# Patient Record
Sex: MALE | Race: WHITE | NOT HISPANIC OR LATINO | Employment: FULL TIME | URBAN - METROPOLITAN AREA
[De-identification: names, ages, dates, MRNs, and addresses within clinical notes are randomized per-mention and may not be internally consistent; named-entity substitution may affect disease eponyms.]

---

## 2017-01-27 ENCOUNTER — LAB VISIT (OUTPATIENT)
Dept: LAB | Facility: HOSPITAL | Age: 77
End: 2017-01-27
Attending: INTERNAL MEDICINE
Payer: COMMERCIAL

## 2017-01-27 ENCOUNTER — OFFICE VISIT (OUTPATIENT)
Dept: HEMATOLOGY/ONCOLOGY | Facility: CLINIC | Age: 77
End: 2017-01-27
Payer: COMMERCIAL

## 2017-01-27 VITALS
SYSTOLIC BLOOD PRESSURE: 114 MMHG | BODY MASS INDEX: 32.94 KG/M2 | WEIGHT: 217.38 LBS | HEIGHT: 68 IN | TEMPERATURE: 98 F | DIASTOLIC BLOOD PRESSURE: 62 MMHG | OXYGEN SATURATION: 98 % | HEART RATE: 66 BPM

## 2017-01-27 DIAGNOSIS — R19.00 PELVIC MASS IN MALE: ICD-10-CM

## 2017-01-27 DIAGNOSIS — D69.6 THROMBOCYTOPENIA: ICD-10-CM

## 2017-01-27 DIAGNOSIS — E85.9 AMYLOIDOSIS, UNSPECIFIED TYPE: Primary | ICD-10-CM

## 2017-01-27 DIAGNOSIS — E85.9 AMYLOIDOSIS: ICD-10-CM

## 2017-01-27 LAB
ALBUMIN SERPL BCP-MCNC: 4.3 G/DL
ALP SERPL-CCNC: 68 U/L
ALT SERPL W/O P-5'-P-CCNC: 34 U/L
ANION GAP SERPL CALC-SCNC: 13 MMOL/L
AST SERPL-CCNC: 28 U/L
BASOPHILS # BLD AUTO: 0.03 K/UL
BASOPHILS NFR BLD: 0.7 %
BILIRUB SERPL-MCNC: 2.2 MG/DL
BUN SERPL-MCNC: 16 MG/DL
CALCIUM SERPL-MCNC: 9.9 MG/DL
CHLORIDE SERPL-SCNC: 103 MMOL/L
CO2 SERPL-SCNC: 26 MMOL/L
CREAT SERPL-MCNC: 1 MG/DL
DIFFERENTIAL METHOD: ABNORMAL
EOSINOPHIL # BLD AUTO: 0.3 K/UL
EOSINOPHIL NFR BLD: 7.2 %
ERYTHROCYTE [DISTWIDTH] IN BLOOD BY AUTOMATED COUNT: 13.9 %
EST. GFR  (AFRICAN AMERICAN): >60 ML/MIN/1.73 M^2
EST. GFR  (NON AFRICAN AMERICAN): >60 ML/MIN/1.73 M^2
GLUCOSE SERPL-MCNC: 142 MG/DL
HCT VFR BLD AUTO: 43.2 %
HGB BLD-MCNC: 14.6 G/DL
LYMPHOCYTES # BLD AUTO: 0.8 K/UL
LYMPHOCYTES NFR BLD: 17.8 %
MCH RBC QN AUTO: 32.4 PG
MCHC RBC AUTO-ENTMCNC: 33.8 %
MCV RBC AUTO: 96 FL
MONOCYTES # BLD AUTO: 0.7 K/UL
MONOCYTES NFR BLD: 16.2 %
NEUTROPHILS # BLD AUTO: 2.7 K/UL
NEUTROPHILS NFR BLD: 58.1 %
PLATELET # BLD AUTO: 125 K/UL
PMV BLD AUTO: 9.1 FL
POTASSIUM SERPL-SCNC: 5 MMOL/L
PROT SERPL-MCNC: 7.1 G/DL
RBC # BLD AUTO: 4.5 M/UL
SODIUM SERPL-SCNC: 142 MMOL/L
WBC # BLD AUTO: 4.56 K/UL

## 2017-01-27 PROCEDURE — 1157F ADVNC CARE PLAN IN RCRD: CPT | Mod: S$GLB,,, | Performed by: INTERNAL MEDICINE

## 2017-01-27 PROCEDURE — 85025 COMPLETE CBC W/AUTO DIFF WBC: CPT | Mod: PO

## 2017-01-27 PROCEDURE — 1159F MED LIST DOCD IN RCRD: CPT | Mod: S$GLB,,, | Performed by: INTERNAL MEDICINE

## 2017-01-27 PROCEDURE — 86334 IMMUNOFIX E-PHORESIS SERUM: CPT

## 2017-01-27 PROCEDURE — 99214 OFFICE O/P EST MOD 30 MIN: CPT | Mod: S$GLB,,, | Performed by: INTERNAL MEDICINE

## 2017-01-27 PROCEDURE — 84165 PROTEIN E-PHORESIS SERUM: CPT

## 2017-01-27 PROCEDURE — 3074F SYST BP LT 130 MM HG: CPT | Mod: S$GLB,,, | Performed by: INTERNAL MEDICINE

## 2017-01-27 PROCEDURE — 36415 COLL VENOUS BLD VENIPUNCTURE: CPT | Mod: PO

## 2017-01-27 PROCEDURE — 80053 COMPREHEN METABOLIC PANEL: CPT | Mod: PO

## 2017-01-27 PROCEDURE — 1160F RVW MEDS BY RX/DR IN RCRD: CPT | Mod: S$GLB,,, | Performed by: INTERNAL MEDICINE

## 2017-01-27 PROCEDURE — 84165 PROTEIN E-PHORESIS SERUM: CPT | Mod: 26,,, | Performed by: PATHOLOGY

## 2017-01-27 PROCEDURE — 1126F AMNT PAIN NOTED NONE PRSNT: CPT | Mod: S$GLB,,, | Performed by: INTERNAL MEDICINE

## 2017-01-27 PROCEDURE — 86334 IMMUNOFIX E-PHORESIS SERUM: CPT | Mod: 26,,, | Performed by: PATHOLOGY

## 2017-01-27 PROCEDURE — 3078F DIAST BP <80 MM HG: CPT | Mod: S$GLB,,, | Performed by: INTERNAL MEDICINE

## 2017-01-27 PROCEDURE — 99999 PR PBB SHADOW E&M-EST. PATIENT-LVL III: CPT | Mod: PBBFAC,,, | Performed by: INTERNAL MEDICINE

## 2017-01-27 PROCEDURE — 83520 IMMUNOASSAY QUANT NOS NONAB: CPT

## 2017-01-27 NOTE — PROGRESS NOTES
Reason for visit: Amyloid tumor  Date of Diagnosis: September 26, 2014    HPI:   The patient is a 76-year-old  male who presents in consultation to the hematology oncology clinic today to discuss further evaluation and management recommendations for amyloid tumor.    I have reviewed all of the patient's relevant clinical history available in the medical record and have utilized this in my evaluation and management recommendations today.  Today the patient reports that overall he feels well and has no specific complaints.  He denies any chest pain or shortness of breath.  He denies any melena, hematochezia, hematemesis, hemoptysis or hematuria.  He denies any nausea, vomiting or abdominal pain.  He denies any bowel or urinary complaints.  The patient has previously been evaluated in the outpatient hematology oncology clinic by Dr. Roselyn Delgado for a chronic history of mild thrombocytopenia.    PAST MEDICAL HISTORY:   1.  Hypertension  2.  Dyslipidemia  3.  Asthma  4.  Obstructive sleep apnea  5.  GERD  6.  BPH  7.  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  Gilbert syndrome  9.  Colon polyps  10. Fatty liver    SURGICAL HISTORY:   1.  Tonsillectomy  2.  [place] tooth extraction    FAMILY HISTORY: His sister had an unknown type of cancer.  His father had cholangiocarcinoma.  He denies any other immediate family members with cancer or bleeding/clotting disorders.    SOCIAL HISTORY: He has never smoked cigarettes.  He drinks about 2 cans of beer every day.  He denies any recreational drug use.  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  He is  and has 3 children.  He lives in [place].    ALLERGIES: [NKDA]    MEDICATIONS: [Medcard has been reviewed and/or reconciled.]    REVIEW OF SYSTEMS:   GENERAL: [No fevers, chills or sweats. No fatigue, weight loss or loss of appetite.]  HEENT: [No blurred vision, tinnitus, nasal discharge, sorethroat or dysphagia.]  HEART: [No chest pain,  palpitations or shortness of breath.]    LUNGS: [No cough, hemoptysis or breathing problems.]  ABDOMEN: [No abdominal pain, nausea, vomiting, diarrhea, constipation or melena.]  GENITOURINARY: [No dysuria, bleeding or malodorous discharge.]  NEURO: [No headache, dizziness or vertigo.]  HEMATOLOGY: [No easy bruising, spontaneous bleeding or blood clots in the past].  MUSCULOSKELETAL: [No arthralgias, myalgias or bone pains.]  SKIN: [No rashes or skin lesions.]  PSYCHIATRY: [No depression or anxiety.]    PHYSICAL EXAMINATION:   VS: Reviewed on nurse's notes.  APPEARANCE: The patient is an obese and well-groomed  male who appears in no acute distress.  HEENT: No scleral icterus. Both external auditory canals clear. No oral ulcers, lesions. Throat clear  HEAD: No sinus tenderness.  NECK: Supple. No palpable lymphadenopathy. Thyroid non-tender, no palpable masses  CHEST: Breath sounds clear bilaterally. No rales. No rhonchi. Unlabored respirations.  CARDIOVASCULAR: Normal S1, S2. Normal rate. Regular rhythm.  ABDOMEN: Bowel sounds normal. No tenderness. No abdominal distention. No hepatomegaly. No splenomegaly.  LYMPHATIC: No palpable supraclavicular, axillary nodes  EXTREMITIES: No clubbing, cyanosis, edema  SKIN: No lesions. No petechiae. No ecchymoses. No induration or nodules  NEUROLOGIC: No focal findings. Alert & Oriented x 3. Mood appropriate to affect    LABS:   Reviewed    IMAGING:  Reviewed    IMPRESSION:  1.  Amyloid tumor  2.  Elevated bilirubin    PLAN:  1.  I had a detailed discussion with the patient today with regard to his current clinical situation.  The patient had a needle biopsy of an incidentally noted pelvic mass in Aug 2014. This was sent to the [facility] for a second opinion.  He has been diagnosed with an amyloid tumor.  We have previously had a detailed discussion about the potentially different types of amyloid and the clinical relevance of each of these types.  We did contact the  [facility] to try to determine if liquid chromatography/tandem mass spectrometry would help to further characterize this.  This detected a peptide profile that includes proteins deposited with amyloid of all types including serum amyloid peak competent, April lipoprotein a 4 and a poor lipoprotein E.  However a specific amyloid type was not identified by this analysis.  So in general it was felt that this finding supported a diagnosis of amyloidosis but the specific amyloid type could not be determined.  Clinical correlation and repeat analysis on another amyloid involved site has been recommended.  All of this was discussed in detail with the patient and all of his questions were answered to his satisfaction.  2.  The patient's SPEP and UPEP results were normal.  Serum free light chains show normal kappa and lambda light chains with abnormal serum free light chain ratio. This is stable and can be followed conservatively at this time. I will follow up with pending labs from today.  3.  CT scan pelvis from Sep 2016 shows stable findings with regard to his pelvic mass. He will continue to follow up with Dr. Chua and is scheduled for follow up pelvic CT in sep 2018.  4.  Follow up with Dr. Dorsey for management of colon polyps.    Follow up in 1 year with labs 1 week before clinic visit. He knows to call sooner for any additional questions or new problems.    Larry Crespo MD

## 2017-01-30 LAB
ALBUMIN SERPL ELPH-MCNC: 4.4 G/DL
ALPHA1 GLOB SERPL ELPH-MCNC: 0.28 G/DL
ALPHA2 GLOB SERPL ELPH-MCNC: 0.84 G/DL
B-GLOBULIN SERPL ELPH-MCNC: 0.75 G/DL
GAMMA GLOB SERPL ELPH-MCNC: 0.63 G/DL
INTERPRETATION SERPL IFE-IMP: NORMAL
KAPPA LC SER QL IA: 1.54 MG/DL
KAPPA LC/LAMBDA SER IA: 2.11
LAMBDA LC SER QL IA: 0.73 MG/DL
PATHOLOGIST INTERPRETATION IFE: NORMAL
PATHOLOGIST INTERPRETATION SPE: NORMAL
PROT SERPL-MCNC: 6.9 G/DL

## 2017-02-03 ENCOUNTER — ANESTHESIA (OUTPATIENT)
Dept: ENDOSCOPY | Facility: HOSPITAL | Age: 77
End: 2017-02-03
Payer: COMMERCIAL

## 2017-02-03 ENCOUNTER — SURGERY (OUTPATIENT)
Age: 77
End: 2017-02-03

## 2017-02-03 ENCOUNTER — ANESTHESIA EVENT (OUTPATIENT)
Dept: ENDOSCOPY | Facility: HOSPITAL | Age: 77
End: 2017-02-03
Payer: COMMERCIAL

## 2017-02-03 PROCEDURE — 63600175 PHARM REV CODE 636 W HCPCS: Performed by: NURSE ANESTHETIST, CERTIFIED REGISTERED

## 2017-02-03 PROCEDURE — 25000003 PHARM REV CODE 250: Performed by: NURSE ANESTHETIST, CERTIFIED REGISTERED

## 2017-02-03 RX ORDER — LIDOCAINE HYDROCHLORIDE 10 MG/ML
INJECTION INFILTRATION; PERINEURAL
Status: DISCONTINUED | OUTPATIENT
Start: 2017-02-03 | End: 2017-02-03

## 2017-02-03 RX ORDER — PROPOFOL 10 MG/ML
VIAL (ML) INTRAVENOUS
Status: DISCONTINUED | OUTPATIENT
Start: 2017-02-03 | End: 2017-02-03

## 2017-02-03 RX ADMIN — PROPOFOL 20 MG: 10 INJECTION, EMULSION INTRAVENOUS at 10:02

## 2017-02-03 RX ADMIN — PROPOFOL 30 MG: 10 INJECTION, EMULSION INTRAVENOUS at 10:02

## 2017-02-03 RX ADMIN — PROPOFOL 50 MG: 10 INJECTION, EMULSION INTRAVENOUS at 10:02

## 2017-02-03 RX ADMIN — LIDOCAINE HYDROCHLORIDE 50 MG: 10 INJECTION, SOLUTION INFILTRATION; PERINEURAL at 10:02

## 2017-02-03 NOTE — TRANSFER OF CARE
"Anesthesia Transfer of Care Note    Patient: Jovan Del Cid Jr.    Procedure(s) Performed: Procedure(s) (LRB):  COLONOSCOPY (N/A)    Patient location: PACU    Anesthesia Type: MAC    Transport from OR: Transported from OR on room air with adequate spontaneous ventilation    Post pain: adequate analgesia    Post assessment: no apparent anesthetic complications and tolerated procedure well    Post vital signs: stable    Level of consciousness: sedated    Nausea/Vomiting: no nausea/vomiting    Complications: none          Last vitals:   Visit Vitals    BP (!) 143/90 (BP Location: Left arm, Patient Position: Lying, BP Method: Automatic)    Pulse 73    Temp 36.6 °C (97.9 °F) (Oral)    Resp 18    Ht 5' 8" (1.727 m)    Wt 96.6 kg (213 lb)    SpO2 99%    BMI 32.39 kg/m2     "

## 2017-02-03 NOTE — ANESTHESIA PREPROCEDURE EVALUATION
02/03/2017  Jovan Del Cid Jr. is a 76 y.o., male.    [facility] Anesthesia Evaluation    I have reviewed the Patient Summary Reports.    I have reviewed the Nursing Notes.   I have reviewed the Medications.     Review of Systems  Anesthesia Hx:  No problems with previous Anesthesia  Denies Family Hx of Anesthesia complications.   Denies Personal Hx of Anesthesia complications.   Social:  Former Smoker, Social Alcohol Use    Hematology/Oncology:     Oncology Normal   Hematology Comments: thrombocytopenia   Cardiovascular:   Hypertension, well controlled Denies MI.   Denies CABG/stent.      hyperlipidemia    Pulmonary:   Denies COPD. Asthma Sleep Apnea pulm nodule  Last used rescue inhaler 5 months ago   Renal/:   Denies Chronic Renal Disease. BPH    Hepatic/GI:   Bowel Prep. Denies GERD. Denies Liver Disease.  Denies Hepatitis. Last prep at 0620   Musculoskeletal:  Musculoskeletal Normal    Neurological:   Denies CVA. Denies Seizures.    Endocrine:  Endocrine Normal        Physical Exam  General:  Obesity    Airway/Jaw/Neck:  Airway Findings: Mouth Opening: Normal Tongue: Normal  General Airway Assessment: Adult  Mallampati: II      Dental:  Dental Findings: In tact   Chest/Lungs:  Chest/Lungs Findings: Clear to auscultation, Normal Respiratory Rate     Heart/Vascular:  Heart Findings: Rate: Normal  Rhythm: Regular Rhythm  Sounds: Normal             Anesthesia Plan  Type of Anesthesia, risks & benefits discussed:  Anesthesia Type:  MAC  Patient's Preference:   Intra-op Monitoring Plan: standard ASA monitors  Intra-op Monitoring Plan Comments:   Post Op Pain Control Plan:   Post Op Pain Control Plan Comments:   Induction:   IV  Beta Blocker:  Patient is not currently on a Beta-Blocker (No further documentation required).       Informed Consent: Patient understands risks and agrees with Anesthesia plan.   Questions answered. Anesthesia consent signed with patient.  ASA Score: 2     Day of Surgery Review of History & Physical: I have interviewed and examined the patient. I have reviewed the patient's H&P dated:  There are no significant changes.  H&P update referred to the surgeon.         Ready For Surgery From Anesthesia Perspective.

## 2017-02-03 NOTE — ANESTHESIA POSTPROCEDURE EVALUATION
"Anesthesia Post Evaluation    Patient: Jovan Del Cid Jr.    Procedure(s) Performed: Procedure(s) (LRB):  COLONOSCOPY (N/A)    Final Anesthesia Type: MAC  Patient location during evaluation: PACU  Patient participation: No - Unable to Participate, Sedation  Level of consciousness: sedated  Post-procedure vital signs: reviewed and stable  Pain management: adequate  Airway patency: patent  PONV status at discharge: No PONV  Anesthetic complications: no      Cardiovascular status: blood pressure returned to baseline and hemodynamically stable  Respiratory status: unassisted, spontaneous ventilation and room air  Hydration status: euvolemic  Follow-up not needed.        Visit Vitals    BP (!) 143/90 (BP Location: Left arm, Patient Position: Lying, BP Method: Automatic)    Pulse 73    Temp 36.6 °C (97.9 °F) (Oral)    Resp 18    Ht 5' 8" (1.727 m)    Wt 96.6 kg (213 lb)    SpO2 99%    BMI 32.39 kg/m2       Pain/Roxie Score: Pain Assessment Performed: Yes (2/3/2017  9:55 AM)  Presence of Pain: denies (2/3/2017  9:55 AM)      "

## 2017-02-03 NOTE — ANESTHESIA RELEASE NOTE
"Anesthesia Release from PACU Note    Patient: Jovan Del Cid Jr.    Procedure(s) Performed: Procedure(s) (LRB):  COLONOSCOPY (N/A)    Anesthesia type: MAC    Post pain: Adequate analgesia    Post assessment: no apparent anesthetic complications, tolerated procedure well and no evidence of recall    Last Vitals:   Visit Vitals    BP (!) 143/90 (BP Location: Left arm, Patient Position: Lying, BP Method: Automatic)    Pulse 73    Temp 36.6 °C (97.9 °F) (Oral)    Resp 18    Ht 5' 8" (1.727 m)    Wt 96.6 kg (213 lb)    SpO2 99%    BMI 32.39 kg/m2       Post vital signs: stable    Level of consciousness: sedated    Nausea/Vomiting: no nausea/no vomiting    Complications: none    Airway Patency: patent    Respiratory: unassisted, spontaneous ventilation, room air    Cardiovascular: stable and blood pressure at baseline    Hydration: euvolemic  "

## 2017-02-07 ENCOUNTER — TELEPHONE (OUTPATIENT)
Dept: HEMATOLOGY/ONCOLOGY | Facility: CLINIC | Age: 77
End: 2017-02-07

## 2017-02-07 NOTE — TELEPHONE ENCOUNTER
----- Message from Larry Crespo MD sent at 2/6/2017  9:46 AM CST -----  Please let the patient know that the results of all of his recent labs look okay.  I will see him back in one year at [facility] clinic with labs (cbc, cmp, spep, light chains, serum immunofixation) 1 week before clinic visit at [facility] lab as recommended. Orders are in. Thank you.  If this appointment has not not already been scheduled please schedule.  Thank you.

## 2017-02-09 ENCOUNTER — TELEPHONE (OUTPATIENT)
Dept: HEMATOLOGY/ONCOLOGY | Facility: CLINIC | Age: 77
End: 2017-02-09

## 2017-02-09 NOTE — TELEPHONE ENCOUNTER
----- Message from Gwen Correia LPN sent at 2/9/2017  2:35 PM CST -----  Contact: pt      ----- Message -----     From: Elen Main     Sent: 2/9/2017   2:05 PM       To: Sunita CRENSHAW Staff    Pt returning missed , pt can be reached at [phone]///[id number]

## 2017-06-15 ENCOUNTER — OFFICE VISIT (OUTPATIENT)
Dept: FAMILY MEDICINE | Facility: CLINIC | Age: 77
End: 2017-06-15
Payer: COMMERCIAL

## 2017-06-15 VITALS
SYSTOLIC BLOOD PRESSURE: 128 MMHG | HEIGHT: 67 IN | WEIGHT: 220.69 LBS | TEMPERATURE: 96 F | OXYGEN SATURATION: 95 % | BODY MASS INDEX: 34.64 KG/M2 | RESPIRATION RATE: 18 BRPM | HEART RATE: 69 BPM | DIASTOLIC BLOOD PRESSURE: 68 MMHG

## 2017-06-15 DIAGNOSIS — E78.5 DYSLIPIDEMIA: ICD-10-CM

## 2017-06-15 DIAGNOSIS — G47.33 OSA (OBSTRUCTIVE SLEEP APNEA): ICD-10-CM

## 2017-06-15 DIAGNOSIS — Z00.00 ROUTINE ADULT HEALTH MAINTENANCE: ICD-10-CM

## 2017-06-15 DIAGNOSIS — I10 ESSENTIAL HYPERTENSION: ICD-10-CM

## 2017-06-15 DIAGNOSIS — N40.0 BENIGN NON-NODULAR PROSTATIC HYPERPLASIA WITHOUT LOWER URINARY TRACT SYMPTOMS: ICD-10-CM

## 2017-06-15 DIAGNOSIS — D69.6 THROMBOCYTOPENIA: ICD-10-CM

## 2017-06-15 DIAGNOSIS — J45.20 MILD INTERMITTENT ASTHMA WITHOUT COMPLICATION: Primary | ICD-10-CM

## 2017-06-15 PROCEDURE — 1159F MED LIST DOCD IN RCRD: CPT | Mod: S$GLB,,, | Performed by: INTERNAL MEDICINE

## 2017-06-15 PROCEDURE — 99999 PR PBB SHADOW E&M-EST. PATIENT-LVL IV: CPT | Mod: PBBFAC,,, | Performed by: INTERNAL MEDICINE

## 2017-06-15 PROCEDURE — 1126F AMNT PAIN NOTED NONE PRSNT: CPT | Mod: S$GLB,,, | Performed by: INTERNAL MEDICINE

## 2017-06-15 PROCEDURE — 99215 OFFICE O/P EST HI 40 MIN: CPT | Mod: S$GLB,,, | Performed by: INTERNAL MEDICINE

## 2017-06-16 NOTE — PROGRESS NOTES
Subjective:       Patient ID: Jovan Del Cid Jr. is a 76 y.o. male.    Chief Complaint: Hypertension; Hyperlipidemia; Benign Prostatic Hypertrophy; Sleep Apnea; and Asthma    Hypertension   This is a chronic problem. The problem is controlled. Pertinent negatives include no chest pain, headaches, neck pain, palpitations or shortness of breath.   Hyperlipidemia   Pertinent negatives include no chest pain, myalgias or shortness of breath. Current antihyperlipidemic treatment includes statins. The current treatment provides significant improvement of lipids.   Benign Prostatic Hypertrophy   Irritative symptoms do not include frequency or urgency. Pertinent negatives include no chills, dysuria, hematuria, nausea or vomiting.   Asthma   There is no cough, shortness of breath or wheezing. This is a chronic problem. Pertinent negatives include no appetite change, chest pain, ear pain, fever, headaches, myalgias, postnasal drip, rhinorrhea, sneezing, sore throat or trouble swallowing. His past medical history is significant for asthma.     Review of Systems   Constitutional: Negative for activity change, appetite change, chills, diaphoresis, fatigue, fever and unexpected weight change.   HENT: Negative for drooling, ear discharge, ear pain, facial swelling, hearing loss, mouth sores, nosebleeds, postnasal drip, rhinorrhea, sinus pressure, sneezing, sore throat, tinnitus, trouble swallowing and voice change.    Eyes: Negative for photophobia, redness and visual disturbance.   Respiratory: Negative for apnea, cough, choking, chest tightness, shortness of breath and wheezing.    Cardiovascular: Negative for chest pain, palpitations and leg swelling.   Gastrointestinal: Negative for abdominal distention, abdominal pain, anal bleeding, blood in stool, constipation, diarrhea, nausea and vomiting.   Endocrine: Negative for cold intolerance, heat intolerance, polydipsia, polyphagia and polyuria.   Genitourinary: Negative for  difficulty urinating, dysuria, enuresis, flank pain, frequency, genital sores, hematuria and urgency.   Musculoskeletal: Negative for arthralgias, back pain, gait problem, joint swelling, myalgias, neck pain and neck stiffness.   Skin: Negative for color change, pallor, rash and wound.   Allergic/Immunologic: Negative for food allergies and immunocompromised state.   Neurological: Negative for dizziness, tremors, seizures, syncope, facial asymmetry, speech difficulty, weakness, light-headedness, numbness and headaches.   Hematological: Negative for adenopathy. Does not bruise/bleed easily.   Psychiatric/Behavioral: Negative for agitation, behavioral problems, confusion, decreased concentration, dysphoric mood, hallucinations, self-injury, sleep disturbance and suicidal ideas. The patient is not nervous/anxious and is not hyperactive.        Objective:      Physical Exam   Constitutional: He is oriented to person, place, and time. He appears well-developed and well-nourished. No distress.   HENT:   Head: Normocephalic and atraumatic.   Eyes: Pupils are equal, round, and reactive to light.   Neck: Normal range of motion. Neck supple. No JVD present. Carotid bruit is not present. No tracheal deviation present. No thyromegaly present.   Cardiovascular: Normal rate, regular rhythm, normal heart sounds and intact distal pulses.    Pulmonary/Chest: Effort normal and breath sounds normal. No respiratory distress. He has no wheezes. He has no rales. He exhibits no tenderness.   Abdominal: Soft. Bowel sounds are normal. He exhibits no distension. There is no tenderness. There is no rebound and no guarding.   Musculoskeletal: Normal range of motion. He exhibits no edema or tenderness.   Lymphadenopathy:     He has no cervical adenopathy.   Neurological: He is alert and oriented to person, place, and time. No cranial nerve deficit. Coordination normal.   Skin: Skin is warm and dry. No rash noted. He is not diaphoretic. No  erythema. No pallor.   Psychiatric: He has a normal mood and affect. His behavior is normal. Judgment and thought content normal.   Nursing note and vitals reviewed.      Assessment:       1. Mild intermittent asthma without complication    2. Benign non-nodular prostatic hyperplasia without lower urinary tract symptoms    3. Dyslipidemia    4. Essential hypertension    5. KALYAN (obstructive sleep apnea)    6. Thrombocytopenia    7. Routine adult health maintenance        Plan:        stable-----------continue meds.                 Notes/labs reviewed.               Check cbc,cmp,lipids,hga1c,psa.                      F/u prn or 6 months,

## 2017-06-20 ENCOUNTER — LAB VISIT (OUTPATIENT)
Dept: LAB | Facility: HOSPITAL | Age: 77
End: 2017-06-20
Attending: INTERNAL MEDICINE
Payer: COMMERCIAL

## 2017-06-20 DIAGNOSIS — J45.20 MILD INTERMITTENT ASTHMA WITHOUT COMPLICATION: ICD-10-CM

## 2017-06-20 DIAGNOSIS — I10 ESSENTIAL HYPERTENSION: ICD-10-CM

## 2017-06-20 DIAGNOSIS — Z00.00 ROUTINE ADULT HEALTH MAINTENANCE: ICD-10-CM

## 2017-06-20 LAB
BASOPHILS # BLD AUTO: 0.05 K/UL
BASOPHILS NFR BLD: 1.2 %
CHOLEST/HDLC SERPL: 3.5 {RATIO}
COMPLEXED PSA SERPL-MCNC: 1 NG/ML
DIFFERENTIAL METHOD: ABNORMAL
EOSINOPHIL # BLD AUTO: 0.4 K/UL
EOSINOPHIL NFR BLD: 8.8 %
ERYTHROCYTE [DISTWIDTH] IN BLOOD BY AUTOMATED COUNT: 14.2 %
HCT VFR BLD AUTO: 43.7 %
HDL/CHOLESTEROL RATIO: 28.7 %
HDLC SERPL-MCNC: 164 MG/DL
HDLC SERPL-MCNC: 47 MG/DL
HGB BLD-MCNC: 14.3 G/DL
LDLC SERPL CALC-MCNC: 93.6 MG/DL
LYMPHOCYTES # BLD AUTO: 0.9 K/UL
LYMPHOCYTES NFR BLD: 19.6 %
MCH RBC QN AUTO: 31.2 PG
MCHC RBC AUTO-ENTMCNC: 32.7 %
MCV RBC AUTO: 95 FL
MONOCYTES # BLD AUTO: 0.6 K/UL
MONOCYTES NFR BLD: 13.6 %
NEUTROPHILS # BLD AUTO: 2.5 K/UL
NEUTROPHILS NFR BLD: 56.6 %
NONHDLC SERPL-MCNC: 117 MG/DL
PLATELET # BLD AUTO: 148 K/UL
PMV BLD AUTO: 10.5 FL
RBC # BLD AUTO: 4.59 M/UL
TRIGL SERPL-MCNC: 117 MG/DL
WBC # BLD AUTO: 4.34 K/UL

## 2017-06-20 PROCEDURE — 85025 COMPLETE CBC W/AUTO DIFF WBC: CPT

## 2017-06-20 PROCEDURE — 80061 LIPID PANEL: CPT

## 2017-06-20 PROCEDURE — 36415 COLL VENOUS BLD VENIPUNCTURE: CPT | Mod: PO

## 2017-06-20 PROCEDURE — 83036 HEMOGLOBIN GLYCOSYLATED A1C: CPT

## 2017-06-20 PROCEDURE — 84153 ASSAY OF PSA TOTAL: CPT

## 2017-06-21 LAB
ESTIMATED AVG GLUCOSE: 123 MG/DL
HBA1C MFR BLD HPLC: 5.9 %

## 2017-06-21 RX ORDER — ROSUVASTATIN CALCIUM 40 MG/1
40 TABLET, COATED ORAL DAILY
Qty: 30 TABLET | Refills: 6 | Status: SHIPPED | OUTPATIENT
Start: 2017-06-21 | End: 2018-04-27 | Stop reason: SDUPTHER

## 2017-07-13 RX ORDER — VALSARTAN AND HYDROCHLOROTHIAZIDE 160; 12.5 MG/1; MG/1
TABLET, FILM COATED ORAL
Qty: 30 TABLET | Refills: 11 | Status: SHIPPED | OUTPATIENT
Start: 2017-07-13 | End: 2018-08-14 | Stop reason: SDUPTHER

## 2017-08-02 RX ORDER — MONTELUKAST SODIUM 10 MG/1
10 TABLET ORAL NIGHTLY
Qty: 30 TABLET | Refills: 6 | Status: SHIPPED | OUTPATIENT
Start: 2017-08-02 | End: 2018-04-27 | Stop reason: SDUPTHER

## 2017-08-02 NOTE — TELEPHONE ENCOUNTER
----- Message from Coco Moon sent at 8/2/2017  3:42 PM CDT -----  Contact: [facility]-  1. What is the name of the medication you are requesting? montelukest  2. What is the dose? 10 mg   3. How do you take the medication? Orally, topically, etc? orally  4. How often do you take this medication? Once daily  5. Do you need a 30 day or 90 day supply? 30 day  6. How many refills are you requesting? n/a  7. What is your preferred pharmacy and location of the pharmacy?   [id number] [place] [facility] [address]  [address] [address]  Phone: [phone] Fax: [phone]  8. Who can we contact with further questions? [phone]

## 2017-09-25 RX ORDER — FLUTICASONE PROPIONATE 50 MCG
2 SPRAY, SUSPENSION (ML) NASAL DAILY
Qty: 16 G | Refills: 3 | Status: SHIPPED | OUTPATIENT
Start: 2017-09-25 | End: 2018-04-02 | Stop reason: SDUPTHER

## 2017-10-18 DIAGNOSIS — K63.5 POLYP OF COLON, UNSPECIFIED PART OF COLON, UNSPECIFIED TYPE: Primary | ICD-10-CM

## 2017-10-18 RX ORDER — SODIUM, POTASSIUM,MAG SULFATES 17.5-3.13G
1 SOLUTION, RECONSTITUTED, ORAL ORAL ONCE
Qty: 1 BOTTLE | Refills: 0 | Status: SHIPPED | OUTPATIENT
Start: 2017-10-18 | End: 2017-10-18

## 2017-11-13 ENCOUNTER — HOSPITAL ENCOUNTER (OUTPATIENT)
Facility: HOSPITAL | Age: 77
Discharge: HOME | End: 2017-11-13
Attending: INTERNAL MEDICINE | Admitting: INTERNAL MEDICINE
Payer: COMMERCIAL

## 2017-11-13 ENCOUNTER — ANESTHESIA (OUTPATIENT)
Dept: ENDOSCOPY | Facility: HOSPITAL | Age: 77
End: 2017-11-13
Payer: COMMERCIAL

## 2017-11-13 ENCOUNTER — SURGERY (OUTPATIENT)
Age: 77
End: 2017-11-13

## 2017-11-13 ENCOUNTER — ANESTHESIA EVENT (OUTPATIENT)
Dept: ENDOSCOPY | Facility: HOSPITAL | Age: 77
End: 2017-11-13
Payer: COMMERCIAL

## 2017-11-13 VITALS
DIASTOLIC BLOOD PRESSURE: 80 MMHG | RESPIRATION RATE: 19 BRPM | TEMPERATURE: 98 F | HEIGHT: 67 IN | HEART RATE: 65 BPM | BODY MASS INDEX: 33.43 KG/M2 | OXYGEN SATURATION: 98 % | WEIGHT: 213 LBS | SYSTOLIC BLOOD PRESSURE: 112 MMHG

## 2017-11-13 DIAGNOSIS — D12.6 ADENOMATOUS COLON POLYP: ICD-10-CM

## 2017-11-13 PROCEDURE — 25000003 PHARM REV CODE 250: Performed by: INTERNAL MEDICINE

## 2017-11-13 PROCEDURE — 25000003 PHARM REV CODE 250: Performed by: NURSE ANESTHETIST, CERTIFIED REGISTERED

## 2017-11-13 PROCEDURE — 45380 COLONOSCOPY AND BIOPSY: CPT | Mod: 33,,, | Performed by: INTERNAL MEDICINE

## 2017-11-13 PROCEDURE — 37000008 HC ANESTHESIA 1ST 15 MINUTES: Performed by: INTERNAL MEDICINE

## 2017-11-13 PROCEDURE — 37000009 HC ANESTHESIA EA ADD 15 MINS: Performed by: INTERNAL MEDICINE

## 2017-11-13 PROCEDURE — 88305 TISSUE EXAM BY PATHOLOGIST: CPT | Mod: 26,,, | Performed by: PATHOLOGY

## 2017-11-13 PROCEDURE — 45380 COLONOSCOPY AND BIOPSY: CPT | Performed by: INTERNAL MEDICINE

## 2017-11-13 PROCEDURE — 27201012 HC FORCEPS, HOT/COLD, DISP: Performed by: INTERNAL MEDICINE

## 2017-11-13 PROCEDURE — 63600175 PHARM REV CODE 636 W HCPCS: Performed by: NURSE ANESTHETIST, CERTIFIED REGISTERED

## 2017-11-13 PROCEDURE — 88305 TISSUE EXAM BY PATHOLOGIST: CPT | Performed by: PATHOLOGY

## 2017-11-13 RX ORDER — SODIUM CHLORIDE, SODIUM LACTATE, POTASSIUM CHLORIDE, CALCIUM CHLORIDE 600; 310; 30; 20 MG/100ML; MG/100ML; MG/100ML; MG/100ML
INJECTION, SOLUTION INTRAVENOUS CONTINUOUS
Status: DISCONTINUED | OUTPATIENT
Start: 2017-11-13 | End: 2017-11-13 | Stop reason: HOSPADM

## 2017-11-13 RX ORDER — GLYCOPYRROLATE 0.2 MG/ML
INJECTION INTRAMUSCULAR; INTRAVENOUS
Status: DISCONTINUED | OUTPATIENT
Start: 2017-11-13 | End: 2017-11-13

## 2017-11-13 RX ORDER — PROPOFOL 10 MG/ML
VIAL (ML) INTRAVENOUS
Status: DISCONTINUED | OUTPATIENT
Start: 2017-11-13 | End: 2017-11-13

## 2017-11-13 RX ORDER — LIDOCAINE HYDROCHLORIDE 10 MG/ML
INJECTION INFILTRATION; PERINEURAL
Status: DISCONTINUED | OUTPATIENT
Start: 2017-11-13 | End: 2017-11-13

## 2017-11-13 RX ADMIN — PROPOFOL 20 MG: 10 INJECTION, EMULSION INTRAVENOUS at 10:11

## 2017-11-13 RX ADMIN — GLYCOPYRROLATE 0.2 MG: 0.2 INJECTION INTRAMUSCULAR; INTRAVENOUS at 10:11

## 2017-11-13 RX ADMIN — PROPOFOL 40 MG: 10 INJECTION, EMULSION INTRAVENOUS at 10:11

## 2017-11-13 RX ADMIN — SODIUM CHLORIDE, SODIUM LACTATE, POTASSIUM CHLORIDE, AND CALCIUM CHLORIDE: .6; .31; .03; .02 INJECTION, SOLUTION INTRAVENOUS at 08:11

## 2017-11-13 RX ADMIN — LIDOCAINE HYDROCHLORIDE 50 MG: 10 INJECTION, SOLUTION INFILTRATION; PERINEURAL at 10:11

## 2017-11-13 RX ADMIN — PROPOFOL 30 MG: 10 INJECTION, EMULSION INTRAVENOUS at 10:11

## 2017-11-13 RX ADMIN — PROPOFOL 60 MG: 10 INJECTION, EMULSION INTRAVENOUS at 10:11

## 2017-11-13 NOTE — ANESTHESIA RELEASE NOTE
"Anesthesia Release from PACU Note    Patient: Jovan Del Cid Jr.    Procedure(s) Performed: Procedure(s) (LRB):  COLONOSCOPY (N/A)    Anesthesia type: MAC    Post pain: Adequate analgesia    Post assessment: no apparent anesthetic complications, tolerated procedure well and no evidence of recall    Last Vitals:   Visit Vitals  /80 (BP Location: Left arm, Patient Position: Lying)   Pulse 69   Temp 36.9 °C (98.4 °F) (Oral)   Resp 18   Ht 5' 7" (1.702 m)   Wt 96.6 kg (213 lb)   SpO2 95%   BMI 33.36 kg/m²       Post vital signs: stable    Level of consciousness: awake    Nausea/Vomiting: no nausea/no vomiting    Complications: none    Airway Patency: patent    Respiratory: unassisted, spontaneous ventilation, room air    Cardiovascular: stable and blood pressure at baseline    Hydration: euvolemic  "

## 2017-11-13 NOTE — ANESTHESIA PREPROCEDURE EVALUATION
11/13/2017  Jovan Del Cid Jr. is a 77 y.o., male.    Anesthesia Evaluation    I have reviewed the Patient Summary Reports.    I have reviewed the Nursing Notes.   I have reviewed the Medications.     Review of Systems  Anesthesia Hx:  No problems with previous Anesthesia  Denies Family Hx of Anesthesia complications.   Denies Personal Hx of Anesthesia complications.   Social:  Former Smoker, Alcohol Use 2-3 beers per day   Hematology/Oncology:  Hematology Normal      Oncology Comments: Amyloid disease    Cardiovascular:   Hypertension Denies MI.   Denies CABG/stent.      hyperlipidemia    Pulmonary:   Asthma Sleep Apnea Pulmonary nodule seen on imaging study   Renal/:   BPH    Hepatic/GI:   Bowel Prep. Denies GERD. Denies Liver Disease.  Denies Hepatitis. Liver cyst   Musculoskeletal:  Musculoskeletal Normal    Neurological:   Denies CVA. Denies Seizures.    Endocrine:  Endocrine Normal        Physical Exam  General:  Obesity    Airway/Jaw/Neck:  Airway Findings: Mouth Opening: Normal Tongue: Normal  General Airway Assessment: Adult  Mallampati: II      Dental:  Dental Findings: In tact   Chest/Lungs:  Chest/Lungs Findings: Clear to auscultation, Normal Respiratory Rate     Heart/Vascular:  Heart Findings: Rate: Normal  Rhythm: Regular Rhythm  Sounds: Normal             Anesthesia Plan  Type of Anesthesia, risks & benefits discussed:  Anesthesia Type:  MAC  Patient's Preference:   Intra-op Monitoring Plan: standard ASA monitors  Intra-op Monitoring Plan Comments:   Post Op Pain Control Plan:   Post Op Pain Control Plan Comments:   Induction:   IV  Beta Blocker:  Patient is not currently on a Beta-Blocker (No further documentation required).       Informed Consent: Patient understands risks and agrees with Anesthesia plan.  Questions answered. Anesthesia consent signed with patient.  ASA Score: 2     Day  of Surgery Review of History & Physical: I have interviewed and examined the patient. I have reviewed the patient's H&P dated:  There are no significant changes.  H&P update referred to the surgeon.         Ready For Surgery From Anesthesia Perspective.

## 2017-11-13 NOTE — OR NURSING
Final time out preformed for Patient and procedure verification agreed by all staff - RN, Tech, MD and CRNA.   See anesthesia record for MAC documentation including vital signs and medication administration.

## 2017-11-13 NOTE — PLAN OF CARE
VSS. No GI bleeding noted. Discharge instructions provided to pt/spouse w/understanding verbalized.

## 2017-11-13 NOTE — PLAN OF CARE
Pt discharged from unit in stable condition via wc. Driven per family. No distress noted, denies pain or discomfort. All discharge criteria met.

## 2017-11-13 NOTE — H&P
Short Stay Endoscopy History and Physical    PCP - Paul Neal MD    Procedure - Colonoscopy  ASA 2  Mallampati - per anesthesia  History of Anesthesia problems - no  Family history Anesthesia problems -  no     HPI:  This is a 77 y.o. male here for evaluation of :     Average Risk Screening: No  High risk screening: yes  History of polyps: yes  Anemia: No  Blood in stools: No  Diarrhea: No  Abdominal Pain: No    Review of Systems:  CONSTITUTIONAL: Denies weight change,  fatigue, fevers, chills, night sweats.  CARDIOVASCULAR: Denies chest pain, shortness of breath, orthopnea and edema.  RESPIRATORY: Denies cough, hemoptysis, dyspnea, and wheezing.  GI: See HPI.    Medical History:  Past Medical History:   Diagnosis Date    Asthma     Bronchitis chronic     Cough     Hyperlipidemia     Hypertension        Surgical History:   Past Surgical History:   Procedure Laterality Date    COLONOSCOPY N/A 6/21/2016    Procedure: COLONOSCOPY;  Surgeon: Alonso Dorsey MD;  Location: [facility];  Service: Endoscopy;  Laterality: N/A;    COLONOSCOPY N/A 11/1/2016    Procedure: COLONOSCOPY;  Surgeon: Alonso Dorsey MD;  Location: [facility];  Service: Endoscopy;  Laterality: N/A;    COLONOSCOPY N/A 2/3/2017    Procedure: COLONOSCOPY;  Surgeon: Alonso Dorsey MD;  Location: [facility];  Service: Endoscopy;  Laterality: N/A;    TONSILLECTOMY         Family History:   Family History   Problem Relation Age of Onset    Cancer Father     Alzheimer's disease Mother     Colon cancer Neg Hx     Melanoma Neg Hx        Social History:   Social History   Substance Use Topics    Smoking status: Former Smoker     Years: 3.00     Types: Cigarettes    Smokeless tobacco: Never Used      Comment: smoked a pipe - quit smoking 1960    Alcohol use 1.2 - 1.8 oz/week     2 - 3 Cans of beer per week      Comment: daily       Allergies: Reviewed.    Medications:  No current facility-administered medications on file  prior to encounter.      Current Outpatient Prescriptions on File Prior to Encounter   Medication Sig Dispense Refill    aspirin (ECOTRIN) 81 MG EC tablet Take 81 mg by mouth once daily.      betamethasone dipropionate (DIPROLENE) 0.05 % ointment AAA of right forearm bid prn 45 g 1    fluticasone (FLONASE) 50 mcg/actuation nasal spray 2 sprays by Each Nare route once daily. 16 g 3    montelukast (SINGULAIR) 10 mg tablet Take 1 tablet (10 mg total) by mouth every evening. 30 tablet 6    multivitamin (ONE DAILY MULTIVITAMIN) per tablet Take 1 tablet by mouth once daily.      rosuvastatin (CRESTOR) 40 MG Tab Take 1 tablet (40 mg total) by mouth once daily. 30 tablet 6    valsartan-hydrochlorothiazide (DIOVAN-HCT) 160-12.5 mg per tablet take 1 tablet once daily 30 tablet 11    fluticasone-salmeterol 500-50 mcg/dose (ADVAIR) 500-50 mcg/dose DsDv diskus inhaler Inhale 1 puff into the lungs 2 (two) times daily. 60 each 5       Physical Exam:  Vital Signs:   Vitals:    11/13/17 0853   BP: 107/80   Pulse: 69   Resp: 18   Temp: 98.4 °F (36.9 °C)     General Appearance: Well appearing in no acute distress  ENT: OP clear  Chest: CTA B  CV: RRR, no m/r/g  Abd: s/nt/nd/nabs  Ext: no edema    Labs:  Reviewed    Impression: Hx of polyps    Plan:  I have explained the risks and benefits of colonoscopy to the patient including but not limited to bleeding, perforation, infection, and death. The patient wishes to proceed with colonoscopy.

## 2017-11-13 NOTE — DISCHARGE SUMMARY
[facility]  Brief Operative Note     SUMMARY     Surgery Date: 11/13/2017     Surgeon(s) and Role:     * Alonso Dorsey MD - Primary    Assisting Surgeon: None    Pre-op Diagnosis:  Polyp of colon, unspecified part of colon, unspecified type [K63.5]    Post-op Diagnosis:  Post-Op Diagnosis Codes:     * Polyp of colon, unspecified part of colon, unspecified type [K63.5]    Procedure(s) (LRB):  COLONOSCOPY (N/A)    Anesthesia: Monitor Anesthesia Care    Description of the findings of the procedure: Procedure completed. See Procedure note for details.     Findings/Key Components: Procedure completed. See Procedure note for details.     Prosthesis/Implants: None    Estimated Blood Loss: less than 10         Specimens:   Specimen (12h ago through future)    Start     Ordered    11/13/17 1022  Specimen to Pathology - Surgery  Once     Comments:  1. Colon Polyp      11/13/17 1021          Discharge Note    SUMMARY     Admit Date: 11/13/2017    Discharge Date and Time:  11/13/2017 10:22 AM    Hospital Course (synopsis of major diagnoses, care, treatment, and services provided during the course of the hospital stay): Procedure completed. See Procedure note for details.      Final Diagnosis: Post-Op Diagnosis Codes:     * Polyp of colon, unspecified part of colon, unspecified type [K63.5]    Disposition: Home or Self Care    Follow Up/Patient Instructions:     Medications:  Reconciled Home Medications:   Current Discharge Medication List      CONTINUE these medications which have NOT CHANGED    Details   aspirin (ECOTRIN) 81 MG EC tablet Take 81 mg by mouth once daily.      betamethasone dipropionate (DIPROLENE) 0.05 % ointment AAA of right forearm bid prn  Qty: 45 g, Refills: 1    Associated Diagnoses: Lichen simplex chronicus      fluticasone (FLONASE) 50 mcg/actuation nasal spray 2 sprays by Each Nare route once daily.  Qty: 16 g, Refills: 3      montelukast (SINGULAIR) 10 mg tablet Take 1 tablet (10  mg total) by mouth every evening.  Qty: 30 tablet, Refills: 6      multivitamin (ONE DAILY MULTIVITAMIN) per tablet Take 1 tablet by mouth once daily.      rosuvastatin (CRESTOR) 40 MG Tab Take 1 tablet (40 mg total) by mouth once daily.  Qty: 30 tablet, Refills: 6      valsartan-hydrochlorothiazide (DIOVAN-HCT) 160-12.5 mg per tablet take 1 tablet once daily  Qty: 30 tablet, Refills: 11      fluticasone-salmeterol 500-50 mcg/dose (ADVAIR) 500-50 mcg/dose DsDv diskus inhaler Inhale 1 puff into the lungs 2 (two) times daily.  Qty: 60 each, Refills: 5    Comments: Please call patient to pick prescription once it is ready.  Associated Diagnoses: Asthma, mild intermittent, uncomplicated             Discharge Procedure Orders  Diet general     Activity as tolerated       Follow-up Information     Paul Neal MD.    Specialty:  Internal Medicine  Contact information:  [address] [place] [address] [phone]

## 2017-11-13 NOTE — DISCHARGE INSTRUCTIONS
Understanding Colon and Rectal Polyps    The colon (also called the large intestine) is a muscular tube that forms the last part of the digestive tract. It absorbs water and stores food waste. The colon is about 4 to 6 feet long. The rectum is the last 6 inches of the colon. The colon and rectum have a smooth lining composed of millions of cells. Changes in these cells can lead to growths in the colon that can become cancerous and should be removed. Multiple tests are available to screen for colon cancer, but the colonoscopy is the most recommended test. During colonoscopy, these polyps can be removed. How often you need this test depends on many things including your condition, your family history, symptoms, and what the findings were at the previous colonoscopy.   When the colon lining changes  Changes that happen in the cells that line the colon or rectum can lead to growths called polyps. Over a period of years, polyps can turn cancerous. Removing polyps early may prevent cancer from ever forming.  Polyps  Polyps are fleshy clumps of tissue that form on the lining of the colon or rectum. Small polyps are usually benign (not cancerous). However, over time, cells in a polyp can change and become cancerous. Certain types of polyps known as adenomatous polyps are premalignant. The risk for invasive cancer increases with the size of the polyp and certain cell and gene features. This means that they can become cancerous if they're not removed. Hyperplastic polyps are benign. They can grow quite large and not turn cancerous.   Cancer  Almost all colorectal cancers start when polyp cells begin growing abnormally. As a cancerous tumor grows, it may involve more and more of the colon or rectum. In time, cancer can also grow beyond the colon or rectum and spread to nearby organs or to glands called lymph nodes. The cells can also travel to other parts of the body. This is known as metastasis. The earlier a cancerous  tumor is removed, the better the chance of preventing its spread.    Date Last Reviewed: 8/1/2016  © 9945-2453 [employer]. [address] [place] [address], [place] [address]. All rights reserved. This information is not intended as a substitute for professional medical care. Always follow your healthcare professional's instructions.        Hemorrhoids    Hemorrhoids are swollen and inflamed veins inside the rectum and near the anus. The rectum is the last several inches of the colon. The anus is the passage between the rectum and the outside of the body.  Causes  The veins can become swollen due to increased pressure in them. This is most often caused by:  · Chronic constipation or diarrhea  · Straining when having a bowel movement  · Sitting too long on the toilet  · A low-fiber diet  · Pregnancy  Symptoms  · Bleeding from the rectum (this may be noticeable after bowel movements)  · Lump near the anus  · Itching around the anus  · Pain around the anus  There are different types of hemorrhoids. Depending on the type you have and the severity, you may be able to treat yourself at home. In some cases, a procedure may be the best treatment option. Your healthcare provider can tell you more about this, if needed.  Home care  General care  · To get relief from pain or itching, try:  ¨ Topical products. Your healthcare provider may prescribe or recommend creams, ointments, or pads that can be applied to the hemorrhoid. Use these exactly as directed.  ¨ Medicines. Your healthcare provider may recommend stool softeners, suppositories, or laxatives to help manage constipation. Use these exactly as directed.  ¨ Sitz baths. A sitz bath involves sitting in a few inches of warm bath water. Be careful not to make the water so hot that you burn yourself--test it before sitting in it. Soak for about 10 to 15 minutes a few times a day. This may help relieve pain.  Tips to help prevent hemorrhoids  · Eat more fiber. Fiber adds  bulk to stool and absorbs water as it moves through your colon. This makes stool softer and easier to pass.  ¨ Increase the fiber in your diet with more fiber-rich foods. These include fresh fruit, vegetables, and whole grains.  ¨ Take a fiber supplement or bulking agent, if advised to by your provider. These include products such as psyllium or methylcellulose.  · Drink plenty of water, if directed to by your provider. This can help keep stool soft.  · Be more active. Frequent exercise aids digestion and helps prevent constipation. It may also help make bowel movements more regular.  · Dont strain during bowel movements. This can make hemorrhoids more likely. Also, dont sit on the toilet for long periods of time.  Follow-up care  Follow up with your healthcare provider, or as advised. If a culture or imaging tests were done, you will be notified of the results when they are ready. This may take a few days or longer.  When to seek medical advice  Call your healthcare provider right away if any of these occur:  · Increased bleeding from the rectum  · Increased pain around the rectum or anus  · Weakness or dizziness  Call 911  Call 911 or return to the emergency department right away if any of these occur:  · Trouble breathing or swallowing  · Fainting or loss of consciousness  · Unusually fast heart rate  · Vomiting blood  · Large amounts of blood in stool  Date Last Reviewed: 6/22/2015  © 9842-8643 [employer]. [address] [place] [address], [place] [address]. All rights reserved. This information is not intended as a substitute for professional medical care. Always follow your healthcare professional's instructions.

## 2017-12-18 ENCOUNTER — OFFICE VISIT (OUTPATIENT)
Dept: FAMILY MEDICINE | Facility: CLINIC | Age: 77
End: 2017-12-18
Payer: COMMERCIAL

## 2017-12-18 ENCOUNTER — LAB VISIT (OUTPATIENT)
Dept: LAB | Facility: HOSPITAL | Age: 77
End: 2017-12-18
Payer: COMMERCIAL

## 2017-12-18 VITALS
HEART RATE: 85 BPM | WEIGHT: 218.94 LBS | DIASTOLIC BLOOD PRESSURE: 70 MMHG | HEIGHT: 66 IN | SYSTOLIC BLOOD PRESSURE: 126 MMHG | RESPIRATION RATE: 16 BRPM | OXYGEN SATURATION: 99 % | TEMPERATURE: 96 F | BODY MASS INDEX: 35.19 KG/M2

## 2017-12-18 DIAGNOSIS — L29.9 ITCHY SKIN: ICD-10-CM

## 2017-12-18 DIAGNOSIS — K76.89 LIVER CYST: ICD-10-CM

## 2017-12-18 DIAGNOSIS — D69.6 THROMBOCYTOPENIA: ICD-10-CM

## 2017-12-18 DIAGNOSIS — Z00.00 ROUTINE ADULT HEALTH MAINTENANCE: ICD-10-CM

## 2017-12-18 DIAGNOSIS — N40.0 BENIGN PROSTATIC HYPERPLASIA WITHOUT LOWER URINARY TRACT SYMPTOMS: ICD-10-CM

## 2017-12-18 DIAGNOSIS — I10 ESSENTIAL HYPERTENSION: ICD-10-CM

## 2017-12-18 DIAGNOSIS — E78.5 DYSLIPIDEMIA: ICD-10-CM

## 2017-12-18 DIAGNOSIS — J45.909 UNCOMPLICATED ASTHMA, UNSPECIFIED ASTHMA SEVERITY, UNSPECIFIED WHETHER PERSISTENT: Primary | ICD-10-CM

## 2017-12-18 DIAGNOSIS — D17.9 LIPOMA, UNSPECIFIED SITE: ICD-10-CM

## 2017-12-18 DIAGNOSIS — G47.33 OSA (OBSTRUCTIVE SLEEP APNEA): ICD-10-CM

## 2017-12-18 LAB
ALBUMIN SERPL BCP-MCNC: 3.9 G/DL
ALBUMIN SERPL BCP-MCNC: 3.9 G/DL
ALP SERPL-CCNC: 52 U/L
ALP SERPL-CCNC: 52 U/L
ALT SERPL W/O P-5'-P-CCNC: 27 U/L
ALT SERPL W/O P-5'-P-CCNC: 27 U/L
ANION GAP SERPL CALC-SCNC: 7 MMOL/L
ANION GAP SERPL CALC-SCNC: 7 MMOL/L
AST SERPL-CCNC: 23 U/L
AST SERPL-CCNC: 23 U/L
BILIRUB SERPL-MCNC: 1.6 MG/DL
BILIRUB SERPL-MCNC: 1.6 MG/DL
BUN SERPL-MCNC: 14 MG/DL
BUN SERPL-MCNC: 14 MG/DL
CALCIUM SERPL-MCNC: 9.3 MG/DL
CALCIUM SERPL-MCNC: 9.3 MG/DL
CHLORIDE SERPL-SCNC: 100 MMOL/L
CHLORIDE SERPL-SCNC: 100 MMOL/L
CO2 SERPL-SCNC: 30 MMOL/L
CO2 SERPL-SCNC: 30 MMOL/L
CREAT SERPL-MCNC: 1.1 MG/DL
CREAT SERPL-MCNC: 1.1 MG/DL
EST. GFR  (AFRICAN AMERICAN): >60 ML/MIN/1.73 M^2
EST. GFR  (AFRICAN AMERICAN): >60 ML/MIN/1.73 M^2
EST. GFR  (NON AFRICAN AMERICAN): >60 ML/MIN/1.73 M^2
EST. GFR  (NON AFRICAN AMERICAN): >60 ML/MIN/1.73 M^2
ESTIMATED AVG GLUCOSE: 114 MG/DL
GLUCOSE SERPL-MCNC: 181 MG/DL
GLUCOSE SERPL-MCNC: 181 MG/DL
HBA1C MFR BLD HPLC: 5.6 %
POTASSIUM SERPL-SCNC: 3.9 MMOL/L
POTASSIUM SERPL-SCNC: 3.9 MMOL/L
PROT SERPL-MCNC: 6.7 G/DL
PROT SERPL-MCNC: 6.7 G/DL
SODIUM SERPL-SCNC: 137 MMOL/L
SODIUM SERPL-SCNC: 137 MMOL/L

## 2017-12-18 PROCEDURE — 80053 COMPREHEN METABOLIC PANEL: CPT

## 2017-12-18 PROCEDURE — 99215 OFFICE O/P EST HI 40 MIN: CPT | Mod: S$GLB,,, | Performed by: INTERNAL MEDICINE

## 2017-12-18 PROCEDURE — 83036 HEMOGLOBIN GLYCOSYLATED A1C: CPT

## 2017-12-18 PROCEDURE — 36415 COLL VENOUS BLD VENIPUNCTURE: CPT | Mod: PO

## 2017-12-18 PROCEDURE — 99999 PR PBB SHADOW E&M-EST. PATIENT-LVL IV: CPT | Mod: PBBFAC,,, | Performed by: INTERNAL MEDICINE

## 2017-12-18 RX ORDER — TRIAMCINOLONE ACETONIDE 1 MG/G
CREAM TOPICAL
Status: ON HOLD | COMMUNITY
Start: 2017-11-27 | End: 2022-09-02

## 2017-12-18 RX ORDER — ECONAZOLE NITRATE 10 MG/G
CREAM TOPICAL DAILY
COMMUNITY
Start: 2017-11-30 | End: 2020-10-15

## 2017-12-18 NOTE — PROGRESS NOTES
Subjective:       Patient ID: Jovan Del Cid Jr. is a 77 y.o. male.    Chief Complaint: Follow-up; Hypertension; Hyperlipidemia; Asthma; and Benign Prostatic Hypertrophy    Hypertension   This is a chronic problem. The problem is controlled. Pertinent negatives include no chest pain, headaches, neck pain, palpitations or shortness of breath.   Hyperlipidemia   Pertinent negatives include no chest pain, myalgias or shortness of breath. Current antihyperlipidemic treatment includes statins. The current treatment provides significant improvement of lipids.   Asthma   There is no cough, shortness of breath or wheezing. Pertinent negatives include no appetite change, chest pain, ear pain, fever, headaches, myalgias, postnasal drip, rhinorrhea, sneezing, sore throat or trouble swallowing. His past medical history is significant for asthma.   Benign Prostatic Hypertrophy   Irritative symptoms do not include frequency or urgency. Pertinent negatives include no chills, dysuria, hematuria, nausea or vomiting.     Review of Systems   Constitutional: Negative for activity change, appetite change, chills, diaphoresis, fatigue, fever and unexpected weight change.   HENT: Negative for drooling, ear discharge, ear pain, facial swelling, hearing loss, mouth sores, nosebleeds, postnasal drip, rhinorrhea, sinus pressure, sneezing, sore throat, tinnitus, trouble swallowing and voice change.    Eyes: Negative for photophobia, redness and visual disturbance.   Respiratory: Negative for apnea, cough, choking, chest tightness, shortness of breath and wheezing.    Cardiovascular: Negative for chest pain, palpitations and leg swelling.   Gastrointestinal: Negative for abdominal distention, abdominal pain, anal bleeding, blood in stool, constipation, diarrhea, nausea and vomiting.   Endocrine: Negative for cold intolerance, heat intolerance, polydipsia, polyphagia and polyuria.   Genitourinary: Negative for difficulty urinating, dysuria,  enuresis, flank pain, frequency, genital sores, hematuria and urgency.   Musculoskeletal: Negative for arthralgias, back pain, gait problem, joint swelling, myalgias, neck pain and neck stiffness.   Skin: Negative for color change, pallor and wound.   Allergic/Immunologic: Negative for food allergies and immunocompromised state.   Neurological: Negative for dizziness, tremors, seizures, syncope, facial asymmetry, speech difficulty, weakness, light-headedness, numbness and headaches.   Hematological: Negative for adenopathy. Does not bruise/bleed easily.   Psychiatric/Behavioral: Negative for agitation, behavioral problems, confusion, decreased concentration, dysphoric mood, hallucinations, self-injury, sleep disturbance and suicidal ideas. The patient is not nervous/anxious and is not hyperactive.        Objective:      Physical Exam   Constitutional: He is oriented to person, place, and time. He appears well-developed and well-nourished. No distress.   HENT:   Head: Normocephalic and atraumatic.   Eyes: Pupils are equal, round, and reactive to light. No scleral icterus.   Neck: Normal range of motion. Neck supple. No JVD present. Carotid bruit is not present. No tracheal deviation present. No thyromegaly present.   Cardiovascular: Normal rate, regular rhythm, normal heart sounds and intact distal pulses.    Pulmonary/Chest: Effort normal and breath sounds normal. No respiratory distress. He has no wheezes. He has no rales. He exhibits no tenderness.   Abdominal: Soft. Bowel sounds are normal. He exhibits no distension. There is no tenderness. There is no rebound and no guarding.   Musculoskeletal: Normal range of motion. He exhibits no edema or tenderness.   Lymphadenopathy:     He has no cervical adenopathy.   Neurological: He is alert and oriented to person, place, and time. No cranial nerve deficit. Coordination normal.   Skin: Skin is warm and dry. No rash noted. He is not diaphoretic. No erythema. No pallor.    Psychiatric: He has a normal mood and affect. His behavior is normal. Judgment and thought content normal.   Nursing note and vitals reviewed.      Assessment:       1. Uncomplicated asthma, unspecified asthma severity, unspecified whether persistent    2. KALYAN (obstructive sleep apnea)    3. Liver cyst    4. Essential hypertension    5. Dyslipidemia    6. Benign prostatic hyperplasia without lower urinary tract symptoms    7. Lipoma, unspecified site    8. Thrombocytopenia    9. Itchy skin        Plan:        stable------------continue meds.               Notes/labs reviewed.                              Check cmp,hga1c.              F/u derm.                 F/u 6 months.

## 2018-02-05 ENCOUNTER — HOSPITAL ENCOUNTER (OUTPATIENT)
Dept: RADIOLOGY | Facility: HOSPITAL | Age: 78
Discharge: HOME | End: 2018-02-05
Attending: INTERNAL MEDICINE
Payer: COMMERCIAL

## 2018-02-05 ENCOUNTER — OFFICE VISIT (OUTPATIENT)
Dept: PULMONOLOGY | Facility: CLINIC | Age: 78
End: 2018-02-05
Payer: COMMERCIAL

## 2018-02-05 VITALS
HEIGHT: 66 IN | OXYGEN SATURATION: 96 % | DIASTOLIC BLOOD PRESSURE: 80 MMHG | SYSTOLIC BLOOD PRESSURE: 130 MMHG | HEART RATE: 79 BPM | BODY MASS INDEX: 35.22 KG/M2 | RESPIRATION RATE: 18 BRPM | WEIGHT: 219.13 LBS

## 2018-02-05 DIAGNOSIS — J32.0 CHRONIC MAXILLARY SINUSITIS: ICD-10-CM

## 2018-02-05 DIAGNOSIS — R05.9 COUGH: ICD-10-CM

## 2018-02-05 DIAGNOSIS — G47.33 OSA (OBSTRUCTIVE SLEEP APNEA): ICD-10-CM

## 2018-02-05 DIAGNOSIS — R05.9 COUGH: Primary | ICD-10-CM

## 2018-02-05 DIAGNOSIS — R91.8 LUNG INFILTRATE: ICD-10-CM

## 2018-02-05 DIAGNOSIS — J45.909 UNCOMPLICATED ASTHMA, UNSPECIFIED ASTHMA SEVERITY, UNSPECIFIED WHETHER PERSISTENT: Primary | ICD-10-CM

## 2018-02-05 PROCEDURE — 71046 X-RAY EXAM CHEST 2 VIEWS: CPT | Mod: TC,FY,PO

## 2018-02-05 PROCEDURE — 99999 PR PBB SHADOW E&M-EST. PATIENT-LVL III: CPT | Mod: PBBFAC,,, | Performed by: INTERNAL MEDICINE

## 2018-02-05 PROCEDURE — 99204 OFFICE O/P NEW MOD 45 MIN: CPT | Mod: S$GLB,,, | Performed by: INTERNAL MEDICINE

## 2018-02-05 PROCEDURE — 71046 X-RAY EXAM CHEST 2 VIEWS: CPT | Mod: 26,,, | Performed by: RADIOLOGY

## 2018-02-05 PROCEDURE — 3008F BODY MASS INDEX DOCD: CPT | Mod: S$GLB,,, | Performed by: INTERNAL MEDICINE

## 2018-02-05 PROCEDURE — 1159F MED LIST DOCD IN RCRD: CPT | Mod: S$GLB,,, | Performed by: INTERNAL MEDICINE

## 2018-02-05 RX ORDER — LEVOFLOXACIN 500 MG/1
500 TABLET, FILM COATED ORAL DAILY
Qty: 10 TABLET | Refills: 0 | Status: SHIPPED | OUTPATIENT
Start: 2018-02-05 | End: 2018-02-15

## 2018-02-05 RX ORDER — FLUTICASONE PROPIONATE AND SALMETEROL 500; 50 UG/1; UG/1
1 POWDER RESPIRATORY (INHALATION) 2 TIMES DAILY
Qty: 60 EACH | Refills: 11 | Status: SHIPPED | OUTPATIENT
Start: 2018-02-05 | End: 2018-08-10

## 2018-02-05 RX ORDER — ALBUTEROL SULFATE 90 UG/1
1-2 AEROSOL, METERED RESPIRATORY (INHALATION) EVERY 6 HOURS PRN
Qty: 18 G | Refills: 3 | Status: SHIPPED | OUTPATIENT
Start: 2018-02-05 | End: 2019-02-05

## 2018-02-05 RX ORDER — METHYLPREDNISOLONE 4 MG/1
TABLET ORAL
Qty: 1 PACKAGE | Refills: 3 | Status: SHIPPED | OUTPATIENT
Start: 2018-02-05 | End: 2018-04-27 | Stop reason: ALTCHOICE

## 2018-02-05 NOTE — ASSESSMENT & PLAN NOTE
Last visit was 2015  Last [place]; FEV1 2.74( 97%)  Immunization: Declined flu shot  MEDs: Montelukast and Advair 500/50  ACT score 19

## 2018-02-05 NOTE — PROGRESS NOTES
"Subjective:       Patient ID: Jovan Del Cid Jr. is a 77 y.o. male.    Chief Complaint: Cough      Jovan Del Cid Jr. is a 77 y.o. male here for evaluation of a cough , congestion.  Has been out of his meds: Advair 500/50 and Singular  Last visit : 2015  No fever  Declined immunizations  Has increased cough, postnasal, sharp pain, pleuritic  CXR shows LLL infiltrate  Busy lifestyle.  No interval asthma exacerbations since last visit, Actually not used rescue albuterol in 12 months.  Asthma control test score is 19.         Asthma   He complains of cough. There is no shortness of breath, sputum production or wheezing. Associated symptoms include postnasal drip. His past medical history is significant for asthma.     Review of Systems   Constitutional: Negative.    HENT: Positive for postnasal drip. Negative for congestion.    Eyes: Negative.    Respiratory: Positive for cough. Negative for sputum production, shortness of breath, wheezing, asthma nighttime symptoms, pleurisy, dyspnea on extertion and use of rescue inhaler.    Cardiovascular: Negative.    Genitourinary: Negative.    Musculoskeletal: Negative.    Skin: Negative.    Gastrointestinal: Negative.    Neurological: Negative.    Psychiatric/Behavioral: Negative.        Objective:       Vitals:    02/05/18 1013   BP: 130/80   Pulse: 79   Resp: 18   SpO2: 96%   Weight: 99.4 kg (219 lb 2.2 oz)   Height: 5' 6" (1.676 m)       Physical Exam   Constitutional: He is oriented to person, place, and time. He appears well-developed and well-nourished.   HENT:   Head: Normocephalic and atraumatic.       Right Ear: External ear normal.   Left Ear: External ear normal.   Nose: Nose normal.   Mouth/Throat: Uvula is midline, oropharynx is clear and moist and mucous membranes are normal. No oropharyngeal exudate.   Eyes: Conjunctivae, EOM and lids are normal. Pupils are equal, round, and reactive to light. No scleral icterus.   Neck: Trachea normal, normal range of " "motion and phonation normal. Neck supple.   Neck 18"   Cardiovascular: Normal rate, regular rhythm, S1 normal, S2 normal, normal heart sounds, intact distal pulses and normal pulses.    No murmur heard.  Pulmonary/Chest: Effort normal. No respiratory distress. He has decreased breath sounds in the left lower field. He has no wheezes. He has no rhonchi. He has no rales.   Abdominal: Soft. Bowel sounds are normal.   Musculoskeletal: Normal range of motion. He exhibits no edema.   Lymphadenopathy:     He has no cervical adenopathy.     He has no axillary adenopathy.   Neurological: He is alert and oriented to person, place, and time. No cranial nerve deficit or sensory deficit. GCS eye subscore is 4. GCS verbal subscore is 5. GCS motor subscore is 6.   Skin: Skin is warm, dry and intact. No rash noted. No cyanosis. Nails show no clubbing.   Psychiatric: He has a normal mood and affect. His speech is normal.   Nursing note and vitals reviewed.    Personal Diagnostic Review  Chest x-ray:     Findings: There is a parenchymal opacity noted within the left lung base which may represent some early infiltrate or atelectasis and is new when compared to a plain film from 01/29/2015.  Followup is recommended.  The heart is not enlarged.  No flowsheet data found.      Assessment:       Problem List Items Addressed This Visit     Asthma - Primary     Last visit was 2015  Last [place]; FEV1 2.74( 97%)  Immunization: Declined flu shot  MEDs: Montelukast and Advair 500/50  ACT score 19         Relevant Medications    fluticasone-salmeterol 500-50 mcg/dose (ADVAIR) 500-50 mcg/dose DsDv diskus inhaler    albuterol (VENTOLIN HFA) 90 mcg/actuation inhaler    methylPREDNISolone (MEDROL DOSEPACK) 4 mg tablet    Other Relevant Orders    X-Ray Chest PA And Lateral    Spirometry with/without bronchodilator    KALYAN (obstructive sleep apnea)     Unable to tolerate PAP         Chronic maxillary sinusitis     Proper instruction Flonase         " Relevant Medications    methylPREDNISolone (MEDROL DOSEPACK) 4 mg tablet      Other Visit Diagnoses     Lung infiltrate        Relevant Medications    levoFLOXacin (LEVAQUIN) 500 MG tablet          Plan:     Levaquin and Medrol dose pack sent  Refills for Advair    Follow-up in about 6 months (around 8/5/2018) for Spirometry and CXR next visit.    This note was prepared using voice recognition system and is likely to have sound alike errors that may have been overlooked even after proof reading.  Please call me with any questions    Discussed diagnosis, its evaluation, treatment and usual course. All questions answered.    Thank you for the courtesy of participating in the care of this patient    Stefan Buckner MD      Answers for HPI/ROS submitted by the patient on 2/5/2018   Asthma  In the past 4 weeks, how much of the time did your asthma keep you from getting as much done at work, school, or at home?: none of the time  During the past 4 weeks, how often have you had shortness of breath?: once a day  During the past 4 weeks, how often did your asthma symptoms (Wheezing, coughing, shortness of breath, chest tightness or pain) wake you up at night or earlier that usual in the morning?: once a week  During the past 4 weeks, how often have you used your rescue inhaler or nebulizer medication (such as albuterol)?: not at all  How would you rate your asthma control during the past 4 weeks?: well controlled   : 19

## 2018-02-21 ENCOUNTER — LAB VISIT (OUTPATIENT)
Dept: LAB | Facility: HOSPITAL | Age: 78
End: 2018-02-21
Attending: INTERNAL MEDICINE
Payer: COMMERCIAL

## 2018-02-21 DIAGNOSIS — E85.9 AMYLOIDOSIS, UNSPECIFIED TYPE: ICD-10-CM

## 2018-02-21 DIAGNOSIS — R19.00 PELVIC MASS IN MALE: ICD-10-CM

## 2018-02-21 DIAGNOSIS — D69.6 THROMBOCYTOPENIA: ICD-10-CM

## 2018-02-21 LAB
ALBUMIN SERPL BCP-MCNC: 4.1 G/DL
ALP SERPL-CCNC: 59 U/L
ALT SERPL W/O P-5'-P-CCNC: 24 U/L
ANION GAP SERPL CALC-SCNC: 12 MMOL/L
AST SERPL-CCNC: 19 U/L
BASOPHILS # BLD AUTO: 0.04 K/UL
BASOPHILS NFR BLD: 0.7 %
BILIRUB SERPL-MCNC: 1.2 MG/DL
BUN SERPL-MCNC: 12 MG/DL
CALCIUM SERPL-MCNC: 9.4 MG/DL
CHLORIDE SERPL-SCNC: 103 MMOL/L
CO2 SERPL-SCNC: 25 MMOL/L
CREAT SERPL-MCNC: 0.9 MG/DL
DIFFERENTIAL METHOD: ABNORMAL
EOSINOPHIL # BLD AUTO: 0.9 K/UL
EOSINOPHIL NFR BLD: 16.4 %
ERYTHROCYTE [DISTWIDTH] IN BLOOD BY AUTOMATED COUNT: 14.2 %
EST. GFR  (AFRICAN AMERICAN): >60 ML/MIN/1.73 M^2
EST. GFR  (NON AFRICAN AMERICAN): >60 ML/MIN/1.73 M^2
GLUCOSE SERPL-MCNC: 118 MG/DL
HCT VFR BLD AUTO: 40.9 %
HGB BLD-MCNC: 13.7 G/DL
LYMPHOCYTES # BLD AUTO: 1.2 K/UL
LYMPHOCYTES NFR BLD: 22.1 %
MCH RBC QN AUTO: 32.4 PG
MCHC RBC AUTO-ENTMCNC: 33.5 G/DL
MCV RBC AUTO: 97 FL
MONOCYTES # BLD AUTO: 0.5 K/UL
MONOCYTES NFR BLD: 8.2 %
NEUTROPHILS # BLD AUTO: 2.9 K/UL
NEUTROPHILS NFR BLD: 52.6 %
PLATELET # BLD AUTO: 139 K/UL
PMV BLD AUTO: 9.6 FL
POTASSIUM SERPL-SCNC: 4.5 MMOL/L
PROT SERPL-MCNC: 6.8 G/DL
RBC # BLD AUTO: 4.23 M/UL
SODIUM SERPL-SCNC: 140 MMOL/L
WBC # BLD AUTO: 5.48 K/UL

## 2018-02-21 PROCEDURE — 86334 IMMUNOFIX E-PHORESIS SERUM: CPT

## 2018-02-21 PROCEDURE — 83520 IMMUNOASSAY QUANT NOS NONAB: CPT | Mod: 59

## 2018-02-21 PROCEDURE — 86334 IMMUNOFIX E-PHORESIS SERUM: CPT | Mod: 26,,, | Performed by: PATHOLOGY

## 2018-02-21 PROCEDURE — 36415 COLL VENOUS BLD VENIPUNCTURE: CPT | Mod: PO

## 2018-02-21 PROCEDURE — 85025 COMPLETE CBC W/AUTO DIFF WBC: CPT | Mod: PO

## 2018-02-21 PROCEDURE — 84165 PROTEIN E-PHORESIS SERUM: CPT | Mod: 26,,, | Performed by: PATHOLOGY

## 2018-02-21 PROCEDURE — 80053 COMPREHEN METABOLIC PANEL: CPT | Mod: PO

## 2018-02-21 PROCEDURE — 84165 PROTEIN E-PHORESIS SERUM: CPT

## 2018-02-22 LAB
ALBUMIN SERPL ELPH-MCNC: 4.18 G/DL
ALPHA1 GLOB SERPL ELPH-MCNC: 0.3 G/DL
ALPHA2 GLOB SERPL ELPH-MCNC: 0.85 G/DL
B-GLOBULIN SERPL ELPH-MCNC: 0.69 G/DL
GAMMA GLOB SERPL ELPH-MCNC: 0.58 G/DL
INTERPRETATION SERPL IFE-IMP: NORMAL
KAPPA LC SER QL IA: 1.65 MG/DL
KAPPA LC/LAMBDA SER IA: 2.01
LAMBDA LC SER QL IA: 0.82 MG/DL
PATHOLOGIST INTERPRETATION IFE: NORMAL
PATHOLOGIST INTERPRETATION SPE: NORMAL
PROT SERPL-MCNC: 6.6 G/DL

## 2018-02-28 ENCOUNTER — OFFICE VISIT (OUTPATIENT)
Dept: HEMATOLOGY/ONCOLOGY | Facility: CLINIC | Age: 78
End: 2018-02-28
Payer: COMMERCIAL

## 2018-02-28 VITALS
HEIGHT: 66 IN | TEMPERATURE: 99 F | DIASTOLIC BLOOD PRESSURE: 72 MMHG | WEIGHT: 225.75 LBS | SYSTOLIC BLOOD PRESSURE: 136 MMHG | RESPIRATION RATE: 18 BRPM | BODY MASS INDEX: 36.28 KG/M2 | OXYGEN SATURATION: 93 % | HEART RATE: 70 BPM

## 2018-02-28 DIAGNOSIS — D69.6 THROMBOCYTOPENIA: ICD-10-CM

## 2018-02-28 DIAGNOSIS — R19.00 PELVIC MASS IN MALE: ICD-10-CM

## 2018-02-28 DIAGNOSIS — E85.9 AMYLOIDOSIS, UNSPECIFIED TYPE: Primary | ICD-10-CM

## 2018-02-28 DIAGNOSIS — K76.0 FATTY LIVER: ICD-10-CM

## 2018-02-28 PROCEDURE — 99999 PR PBB SHADOW E&M-EST. PATIENT-LVL IV: CPT | Mod: PBBFAC,,, | Performed by: INTERNAL MEDICINE

## 2018-02-28 PROCEDURE — 99214 OFFICE O/P EST MOD 30 MIN: CPT | Mod: S$GLB,,, | Performed by: INTERNAL MEDICINE

## 2018-02-28 NOTE — PROGRESS NOTES
Reason for visit: Amyloid tumor  Date of Diagnosis: September 26, 2014    HPI:   The patient is a 77-year-old  male who presents in consultation to the hematology oncology clinic today to discuss further evaluation and management recommendations for amyloid tumor.    I have reviewed all of the patient's relevant clinical history available in the medical record and have utilized this in my evaluation and management recommendations today.  Today the patient reports that overall he feels well and has no specific complaints.  He denies any chest pain or shortness of breath.  He denies any melena, hematochezia, hematemesis, hemoptysis or hematuria.  He denies any nausea, vomiting or abdominal pain.  He denies any bowel or urinary complaints.  The patient has previously been evaluated in the outpatient hematology oncology clinic by Dr. Roselyn Delgado for a chronic history of mild thrombocytopenia.    PAST MEDICAL HISTORY:   1.  Hypertension  2.  Dyslipidemia  3.  Asthma  4.  Obstructive sleep apnea  5.  GERD  6.  BPH  7.  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  Gilbert syndrome  9.  Colon polyps  10. Fatty liver    SURGICAL HISTORY:   1.  Tonsillectomy  2.  [place] tooth extraction    FAMILY HISTORY: His sister had an unknown type of cancer.  His father had cholangiocarcinoma.  He denies any other immediate family members with cancer or bleeding/clotting disorders.    SOCIAL HISTORY: He has never smoked cigarettes.  He drinks about 2 cans of beer every day.  He denies any recreational drug use.  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  He is  and has 3 children.  He lives in [place].    ALLERGIES: [NKDA]    MEDICATIONS: [Medcard has been reviewed and/or reconciled.]    REVIEW OF SYSTEMS:   GENERAL: [No fevers, chills or sweats. No fatigue, weight loss or loss of appetite.]  HEENT: [No blurred vision, tinnitus, nasal discharge, sorethroat or dysphagia.]  HEART: [No chest pain,  palpitations or shortness of breath.]    LUNGS: [No cough, hemoptysis or breathing problems.]  ABDOMEN: [No abdominal pain, nausea, vomiting, diarrhea, constipation or melena.]  GENITOURINARY: [No dysuria, bleeding or malodorous discharge.]  NEURO: [No headache, dizziness or vertigo.]  HEMATOLOGY: [No easy bruising, spontaneous bleeding or blood clots in the past].  MUSCULOSKELETAL: [No arthralgias, myalgias or bone pains.]  SKIN: [No rashes or skin lesions.]  PSYCHIATRY: [No depression or anxiety.]    PHYSICAL EXAMINATION:   VS: Reviewed on nurse's notes.  APPEARANCE: The patient is an obese and well-groomed  male who appears in no acute distress.  HEENT: No scleral icterus. Both external auditory canals clear. No oral ulcers, lesions. Throat clear  HEAD: No sinus tenderness.  NECK: Supple. No palpable lymphadenopathy. Thyroid non-tender, no palpable masses  CHEST: Breath sounds clear bilaterally. No rales. No rhonchi. Unlabored respirations.  CARDIOVASCULAR: Normal S1, S2. Normal rate. Regular rhythm.  ABDOMEN: Bowel sounds normal. No tenderness. No abdominal distention. No hepatomegaly. No splenomegaly.  LYMPHATIC: No palpable supraclavicular, axillary nodes  EXTREMITIES: No clubbing, cyanosis, edema  SKIN: No lesions. No petechiae. No ecchymoses. No induration or nodules  NEUROLOGIC: No focal findings. Alert & Oriented x 3. Mood appropriate to affect    LABS:   Reviewed    IMAGING:  Reviewed    IMPRESSION:  1.  Amyloid tumor  2.  Elevated bilirubin  3.  Fatty liver  4.  Chronic thrombocytopenia    PLAN:  1.  I had a detailed discussion with the patient today with regard to his current clinical situation.  The patient had a needle biopsy of an incidentally noted pelvic mass in Aug 2014. This was sent to the [facility] for a second opinion.  He has been diagnosed with an amyloid tumor.  We have previously had a detailed discussion about the potentially different types of amyloid and the clinical  relevance of each of these types.  We did contact the [facility] to try to determine if liquid chromatography/tandem mass spectrometry would help to further characterize this.  This detected a peptide profile that includes proteins deposited with amyloid of all types including serum amyloid peak competent, April lipoprotein a 4 and a poor lipoprotein E.  However a specific amyloid type was not identified by this analysis.  So in general it was felt that this finding supported a diagnosis of amyloidosis but the specific amyloid type could not be determined.  Clinical correlation and repeat analysis on another amyloid involved site has been recommended.  All of this was discussed in detail with the patient and all of his questions were answered to his satisfaction.  2.  The patient's SPEP and UPEP results were normal.  Serum free light chains show normal kappa and lambda light chains with abnormal serum free light chain ratio. This is stable and can be followed conservatively at this time.   3.  CT scan pelvis from Sep 2016 shows stable findings with regard to his pelvic mass. I will schedule him for follow up pelvic CT in sep 2018.  4. Hepatology consult for further evaluation of fatty liver. His chronic thrombocytopenia has been stable for several years. He knows to seek immediate medical attention for any signs/symptoms of increased bleeding.    Follow up in 1 year with labs 1 week before clinic visit. He knows to call sooner for any additional questions or new problems.    Larry Crespo MD

## 2018-04-02 RX ORDER — FLUTICASONE PROPIONATE 50 MCG
SPRAY, SUSPENSION (ML) NASAL
Qty: 16 G | Refills: 3 | Status: SHIPPED | OUTPATIENT
Start: 2018-04-02 | End: 2018-12-21 | Stop reason: SDUPTHER

## 2018-04-27 ENCOUNTER — OFFICE VISIT (OUTPATIENT)
Dept: GASTROENTEROLOGY | Facility: CLINIC | Age: 78
End: 2018-04-27
Payer: COMMERCIAL

## 2018-04-27 VITALS
HEART RATE: 60 BPM | DIASTOLIC BLOOD PRESSURE: 72 MMHG | WEIGHT: 220.44 LBS | HEIGHT: 66 IN | SYSTOLIC BLOOD PRESSURE: 128 MMHG | BODY MASS INDEX: 35.43 KG/M2

## 2018-04-27 DIAGNOSIS — E85.9 AMYLOIDOSIS, UNSPECIFIED TYPE: ICD-10-CM

## 2018-04-27 DIAGNOSIS — D69.6 THROMBOCYTOPENIA: ICD-10-CM

## 2018-04-27 DIAGNOSIS — K76.0 FATTY LIVER: Primary | ICD-10-CM

## 2018-04-27 PROCEDURE — 99214 OFFICE O/P EST MOD 30 MIN: CPT | Mod: S$GLB,,, | Performed by: INTERNAL MEDICINE

## 2018-04-27 PROCEDURE — 3078F DIAST BP <80 MM HG: CPT | Mod: CPTII,S$GLB,, | Performed by: INTERNAL MEDICINE

## 2018-04-27 PROCEDURE — 3074F SYST BP LT 130 MM HG: CPT | Mod: CPTII,S$GLB,, | Performed by: INTERNAL MEDICINE

## 2018-04-27 PROCEDURE — 99999 PR PBB SHADOW E&M-EST. PATIENT-LVL III: CPT | Mod: PBBFAC,,, | Performed by: INTERNAL MEDICINE

## 2018-04-27 PROCEDURE — 3008F BODY MASS INDEX DOCD: CPT | Mod: CPTII,S$GLB,, | Performed by: INTERNAL MEDICINE

## 2018-04-27 NOTE — PROGRESS NOTES
Subjective:     Jovan Del Cid Jr. is here for evaluation of Fatty Liver (referred by Dr. Crespo)      HPI  Jovan Del Cid Jr. is here for evaluation of fatty liver.  This was followed ultrasound.  He also has a unclear history of amyloid disease.  Although it does not seem like this is an active issue.  He denies any previous known history of liver disease or liver problems.  He does have obesity and hyperlipidemia.  He does have regular alcohol use around 2-3 cans of beer daily.    No evidence of liver decompensation: no ascites, confusion or GI bleeding.      Review of Systems   Constitutional: Negative.    HENT: Negative.    Eyes: Negative.    Respiratory: Negative.    Cardiovascular: Negative.    Gastrointestinal: Negative.    Genitourinary: Negative.    Musculoskeletal: Negative.    Skin: Negative.    Neurological: Negative.    Psychiatric/Behavioral: Negative.        Objective:     Physical Exam   Constitutional: He is oriented to person, place, and time. He appears well-developed and well-nourished. No distress.   HENT:   Head: Normocephalic and atraumatic.   Mouth/Throat: Oropharynx is clear and moist. No oropharyngeal exudate.   Eyes: Conjunctivae are normal. Pupils are equal, round, and reactive to light. Right eye exhibits no discharge. Left eye exhibits no discharge. No scleral icterus.   Pulmonary/Chest: Effort normal and breath sounds normal. No respiratory distress. He has no wheezes.   Abdominal: Soft. He exhibits no distension. There is no tenderness.   Musculoskeletal: He exhibits no edema.   Neurological: He is alert and oriented to person, place, and time.   Psychiatric: He has a normal mood and affect. His behavior is normal.   Vitals reviewed.      US 3/2016  1.  Borderline hepatomegaly and fatty infiltration of the liver.    2.  Multiple complex hepatic cysts as detailed.    Computed MELD-Na score unavailable. Necessary lab results were not found in the last year.  Computed MELD  score unavailable. Necessary lab results were not found in the last year.    WBC   Date Value Ref Range Status   02/21/2018 5.48 3.90 - 12.70 K/uL Final     Hemoglobin   Date Value Ref Range Status   02/21/2018 13.7 (L) 14.0 - 18.0 g/dL Final     Hematocrit   Date Value Ref Range Status   02/21/2018 40.9 40.0 - 54.0 % Final     Platelets   Date Value Ref Range Status   02/21/2018 139 (L) 150 - 350 K/uL Final     BUN, Bld   Date Value Ref Range Status   02/21/2018 12 8 - 23 mg/dL Final     Creatinine   Date Value Ref Range Status   02/21/2018 0.9 0.5 - 1.4 mg/dL Final     Glucose   Date Value Ref Range Status   02/21/2018 118 (H) 70 - 110 mg/dL Final     Calcium   Date Value Ref Range Status   02/21/2018 9.4 8.7 - 10.5 mg/dL Final     Sodium   Date Value Ref Range Status   02/21/2018 140 136 - 145 mmol/L Final     Potassium   Date Value Ref Range Status   02/21/2018 4.5 3.5 - 5.1 mmol/L Final     Chloride   Date Value Ref Range Status   02/21/2018 103 95 - 110 mmol/L Final     AST   Date Value Ref Range Status   02/21/2018 19 10 - 40 U/L Final     ALT   Date Value Ref Range Status   02/21/2018 24 10 - 44 U/L Final     Alkaline Phosphatase   Date Value Ref Range Status   02/21/2018 59 55 - 135 U/L Final     Total Bilirubin   Date Value Ref Range Status   02/21/2018 1.2 (H) 0.1 - 1.0 mg/dL Final     Comment:     For infants and newborns, interpretation of results should be based  on gestational age, weight and in agreement with clinical  observations.  Premature Infant recommended reference ranges:  Up to 24 hours.............<8.0 mg/dL  Up to 48 hours............<12.0 mg/dL  3-5 days..................<15.0 mg/dL  6-29 days.................<15.0 mg/dL       Albumin   Date Value Ref Range Status   02/21/2018 4.1 3.5 - 5.2 g/dL Final     INR   Date Value Ref Range Status   08/22/2014 0.9 0.8 - 1.2 Final     Comment:     Coumadin Therapy:  2.0 - 3.0 for INR for all indicators except mechanical heart valves  and  antiphospholipid syndromes which should use 2.5 - 3.5.           Assessment/Plan:     1. Fatty liver    2. Thrombocytopenia    3. Amyloidosis, unspecified type      Jovan Del Cid Jr. is a 77 y.o. male withFatty Liver (referred by Dr. Crespo)    Fatty liver-needs further evaluation; concern for advanced fibrosis given thrombocytopenia  -     CBC auto differential; Future; Expected date: 04/27/2018  -     Protime-INR; Future; Expected date: 04/27/2018  -     APTT; Future; Expected date: 04/27/2018  -     IR Transjugular Liver Biopsy; Future; Expected date: 04/27/2018    Thrombocytopenia  -     CBC auto differential; Future; Expected date: 04/27/2018  -     Protime-INR; Future; Expected date: 04/27/2018  -     APTT; Future; Expected date: 04/27/2018  -     IR Transjugular Liver Biopsy; Future; Expected date: 04/27/2018    Amyloidosis, unspecified type  -Will evaluate for amyloid in the liver  -     CBC auto differential; Future; Expected date: 04/27/2018  -     Protime-INR; Future; Expected date: 04/27/2018  -     APTT; Future; Expected date: 04/27/2018  -     IR Transjugular Liver Biopsy; Future; Expected date: 04/27/2018    RTC about 1 week after biopsy      Delfina Ospina MD

## 2018-04-27 NOTE — LETTER
May 1, 2018      Larry Crespo MD  [address] [facility] [place] [address]           [facility]  [address] [facility] [address] [place] [address]  Phone: [phone]  Fax: [phone]          Patient: Jovan Del Cid Jr.   MR Number: [id number]   YOB: 1940   Date of Visit: 4/27/2018       Dear Dr. Larry Crespo:    Thank you for referring Jovan Del Cid to me for evaluation. Attached you will find relevant portions of my assessment and plan of care.    If you have questions, please do not hesitate to call me. I look forward to following Jovan Del Cid along with you.    Sincerely,    Pito Mane  CC:  No Recipients    If you would like to receive this communication electronically, please contact [email or web address] or [phone] to request more information on [employer] Link access.    For providers and/or their staff who would like to refer a patient to [facility], please contact us through our one-stop-shop provider referral line, Rayna Ray, at [phone].    If you feel you have received this communication in error or would no longer like to receive these types of communications, please e-mail [email or web address]

## 2018-04-28 RX ORDER — MONTELUKAST SODIUM 10 MG/1
TABLET ORAL
Qty: 30 TABLET | Refills: 6 | Status: SHIPPED | OUTPATIENT
Start: 2018-04-28 | End: 2019-09-30 | Stop reason: SDUPTHER

## 2018-04-28 RX ORDER — ROSUVASTATIN CALCIUM 40 MG/1
TABLET, COATED ORAL
Qty: 30 TABLET | Refills: 6 | Status: SHIPPED | OUTPATIENT
Start: 2018-04-28 | End: 2020-07-24 | Stop reason: SDUPTHER

## 2018-05-03 ENCOUNTER — TELEPHONE (OUTPATIENT)
Dept: GASTROENTEROLOGY | Facility: CLINIC | Age: 78
End: 2018-05-03

## 2018-05-07 ENCOUNTER — TELEPHONE (OUTPATIENT)
Dept: GASTROENTEROLOGY | Facility: CLINIC | Age: 78
End: 2018-05-07

## 2018-05-07 ENCOUNTER — TELEPHONE (OUTPATIENT)
Dept: RADIOLOGY | Facility: HOSPITAL | Age: 78
End: 2018-05-07

## 2018-05-07 NOTE — TELEPHONE ENCOUNTER
Spoke with patient, answered his questions. Explained I cannot say for sure he has cirrhosis but this is why I think bx is necessary. He seems concerned about why I think he has cirrhosis when his labs have been stable but I explained he has stably low platelets and a high bilirubin which can be seen in cirrhosis. I recommended proceeding with liver bx he said he had more to investigate.

## 2018-05-07 NOTE — TELEPHONE ENCOUNTER
Spoke with patient. He wants to know how he had fatty liver and seems he is at stage 4 cirrhosis. He has looked through his records and does not see how this has occurred without seeing Stage 2, 3 cirrhosis. Will inform , he verbalized understanding, DWAYNE.

## 2018-05-07 NOTE — TELEPHONE ENCOUNTER
----- Message from Janis Sofia sent at 5/7/2018  2:28 PM CDT -----  Contact: pt   Pt is requesting a call back from nurse in regards to the change in pt medical history.  [phone] (work)

## 2018-05-09 ENCOUNTER — TELEPHONE (OUTPATIENT)
Dept: RADIOLOGY | Facility: HOSPITAL | Age: 78
End: 2018-05-09

## 2018-06-12 ENCOUNTER — TELEPHONE (OUTPATIENT)
Dept: GASTROENTEROLOGY | Facility: CLINIC | Age: 78
End: 2018-06-12

## 2018-06-12 NOTE — TELEPHONE ENCOUNTER
----- Message from Mirta Kuhn sent at 6/12/2018  3:15 PM CDT -----  Contact: Patient  Patient would like a call back at [phone], Regards to his appointment on 6/15/18 he is not sure why he is doing a procedure and wants to know what it is for.    Thanks  Td

## 2018-06-12 NOTE — TELEPHONE ENCOUNTER
Spoke with patient. Informed him of transjugular liver biopsy appointment, he verbalized understanding, DWAYNE.

## 2018-06-15 ENCOUNTER — HOSPITAL ENCOUNTER (OUTPATIENT)
Facility: HOSPITAL | Age: 78
Discharge: HOME | End: 2018-06-15
Attending: RADIOLOGY | Admitting: RADIOLOGY
Payer: COMMERCIAL

## 2018-06-15 ENCOUNTER — HOSPITAL ENCOUNTER (OUTPATIENT)
Dept: RADIOLOGY | Facility: HOSPITAL | Age: 78
Discharge: HOME | End: 2018-06-15
Attending: INTERNAL MEDICINE | Admitting: RADIOLOGY
Payer: COMMERCIAL

## 2018-06-15 ENCOUNTER — SURGERY (OUTPATIENT)
Age: 78
End: 2018-06-15

## 2018-06-15 VITALS
OXYGEN SATURATION: 95 % | DIASTOLIC BLOOD PRESSURE: 60 MMHG | BODY MASS INDEX: 35.36 KG/M2 | RESPIRATION RATE: 18 BRPM | TEMPERATURE: 98 F | HEIGHT: 66 IN | HEART RATE: 65 BPM | SYSTOLIC BLOOD PRESSURE: 112 MMHG | WEIGHT: 220 LBS

## 2018-06-15 DIAGNOSIS — E85.9 AMYLOIDOSIS, UNSPECIFIED TYPE: ICD-10-CM

## 2018-06-15 DIAGNOSIS — K76.0 FATTY LIVER: ICD-10-CM

## 2018-06-15 DIAGNOSIS — D69.6 THROMBOCYTOPENIA: ICD-10-CM

## 2018-06-15 LAB
APTT BLDCRRT: 23.5 SEC
BASOPHILS # BLD AUTO: 0.04 K/UL
BASOPHILS NFR BLD: 0.7 %
DIFFERENTIAL METHOD: ABNORMAL
EOSINOPHIL # BLD AUTO: 0.4 K/UL
EOSINOPHIL NFR BLD: 6.9 %
ERYTHROCYTE [DISTWIDTH] IN BLOOD BY AUTOMATED COUNT: 14.2 %
HCT VFR BLD AUTO: 43.1 %
HGB BLD-MCNC: 14.8 G/DL
INR PPP: 1
LYMPHOCYTES # BLD AUTO: 1.1 K/UL
LYMPHOCYTES NFR BLD: 19 %
MCH RBC QN AUTO: 32.5 PG
MCHC RBC AUTO-ENTMCNC: 34.3 G/DL
MCV RBC AUTO: 95 FL
MONOCYTES # BLD AUTO: 0.5 K/UL
MONOCYTES NFR BLD: 9.2 %
NEUTROPHILS # BLD AUTO: 3.6 K/UL
NEUTROPHILS NFR BLD: 64.2 %
PLATELET # BLD AUTO: 139 K/UL
PMV BLD AUTO: 9.9 FL
PROTHROMBIN TIME: 10 SEC
RBC # BLD AUTO: 4.55 M/UL
WBC # BLD AUTO: 5.54 K/UL

## 2018-06-15 PROCEDURE — 88307 TISSUE EXAM BY PATHOLOGIST: CPT | Mod: 26,,, | Performed by: PATHOLOGY

## 2018-06-15 PROCEDURE — 85025 COMPLETE CBC W/AUTO DIFF WBC: CPT

## 2018-06-15 PROCEDURE — 99152 MOD SED SAME PHYS/QHP 5/>YRS: CPT

## 2018-06-15 PROCEDURE — 88313 SPECIAL STAINS GROUP 2: CPT | Mod: 26,,, | Performed by: PATHOLOGY

## 2018-06-15 PROCEDURE — 25000003 PHARM REV CODE 250: Performed by: PHYSICIAN ASSISTANT

## 2018-06-15 PROCEDURE — 85610 PROTHROMBIN TIME: CPT

## 2018-06-15 PROCEDURE — 88307 TISSUE EXAM BY PATHOLOGIST: CPT | Performed by: PATHOLOGY

## 2018-06-15 PROCEDURE — 63600175 PHARM REV CODE 636 W HCPCS

## 2018-06-15 PROCEDURE — 85730 THROMBOPLASTIN TIME PARTIAL: CPT

## 2018-06-15 PROCEDURE — 76937 US GUIDE VASCULAR ACCESS: CPT | Mod: TC

## 2018-06-15 PROCEDURE — 36415 COLL VENOUS BLD VENIPUNCTURE: CPT

## 2018-06-15 PROCEDURE — 25000003 PHARM REV CODE 250

## 2018-06-15 PROCEDURE — 75825 VEIN X-RAY TRUNK: CPT | Mod: TC,59

## 2018-06-15 RX ORDER — SODIUM CHLORIDE 9 MG/ML
INJECTION, SOLUTION INTRAVENOUS CONTINUOUS
Status: DISCONTINUED | OUTPATIENT
Start: 2018-06-15 | End: 2018-06-16 | Stop reason: HOSPADM

## 2018-06-15 RX ORDER — SODIUM CHLORIDE 9 MG/ML
INJECTION, SOLUTION INTRAVENOUS CONTINUOUS
Status: DISCONTINUED | OUTPATIENT
Start: 2018-06-15 | End: 2018-06-15 | Stop reason: HOSPADM

## 2018-06-15 RX ADMIN — SODIUM CHLORIDE 500 ML: 0.9 INJECTION, SOLUTION INTRAVENOUS at 03:06

## 2018-06-15 NOTE — PROCEDURES
Radiology Post-Procedure Note    Pre Op Diagnosis: Elevated LFTs    Post Op Diagnosis: Elevated LFTs    Procedure: Transjugular liver biposy    Procedure performed by: Dr Petros Recio    Written Informed Consent Obtained: Yes    Specimen Removed: YES liver    Estimated Blood Loss: Minimal    Findings: Local anesthesia and moderate sedation were used.    A right-sided transjugular approach was used to performed hepatic venography, pressure measurements and liver biopsy.  5 random specimens of the right hepatic lobe were obtained and sent to pathology for further evaluation.    Hemostasis of the right internal jugular vein was achieved using manual pressure and there was no hematoma.    The patient tolerated the procedure well and there were no complications.  Please see Imaging report for further details.    Petros Recio MD  Staff Radiologist  Department of Radiology  Pager: [phone]

## 2018-06-15 NOTE — PLAN OF CARE
1700 Evening meal eaten w/o any c/o nausea or vomiting. Amb to BR. No problems observed.   Drsg to rt neck remains d/I.  D/C instructions reviewed. Questions answered.  Removed peripheral IV. Cath tip in tact.   D/C to home Via WC. Wife driving pt home.  MAY Cullen RN

## 2018-06-15 NOTE — DISCHARGE SUMMARY
Radiology Discharge Summary      Hospital Course: No complications    Admit Date: 6/15/2018  Discharge Date: 06/15/2018     Instructions Given to Patient: Yes  Diet: clear liquids, advance as tolerated and Resume prior diet  Activity: activity as tolerated and no driving for today    Description of Condition on Discharge: Stable  Vital Signs (Most Recent):      Discharge Disposition: Home    Discharge Diagnosis: elevated lfts     Follow-up: with dr hoffman as scheduled    Petros Recio MD  Staff Radiologist  Department of Radiology  Pager: [phone]

## 2018-06-15 NOTE — DISCHARGE SUMMARY
Procedure was performed radiologist.  Sterile technique was performed in the right neck, lidocaine was used as a local anesthetic.  Multiple liver samples were obtained tvia a transjugular approach  Pt tolerated the procedure well without immediate complications.  Please see radiologist report for details. F/u with PCP and/or ordering physician.

## 2018-06-15 NOTE — DISCHARGE INSTRUCTIONS
POST PROCEDURE INSTRUCTIONS    · ACTIVITY/ SAFETY: BECAUSE OF THE AFTER EFFECT OF THE SEDATION, WE ADVISE YOU TO REFRAIN FROM THE FOLLOWING ACTIVITIES FOR AT LEAST 12-16 HOURS:    A. DO NOT DRIVE A CAR OR OPERATE MACHINERY. YOUR  REFLEXES               AND COORDINATION ARE ALTERED.    B. HAVE STANDBY ASSISTANCE ON STAIRWAYS.    C. DO NOT RETURN TO WORK.    D. DO NOT OPERATE APPLIANCES IF ALONE (I.E.STOVE,IRON,LAWN                    MOWER)    E. DO NOT SIGN IMPORTANT PAPERS OR MAKE IMPORTANT DECISIONS.    F.  A RESPONSIBLE PERSON MUST STAY WITH THE PATIENT FOR 12             HOURS POST PROCEDURE.    G. YOU MAY SHOWER OR BATHE THE NEXT DAY.      · MEDICATIONS: 1.) RESUME TAKING YOUR USUAL MEDICINES AS SOON AS YOU START TO EAT. TAKE ONLY THE MEDICINES THAT YOUR DOCTORS PRESCRIBED OR APPROVED. 2.) THERE IS ONLY MINOR PAIN AFTER. IF YOUR DOCTOR SAYS IT IS OK FOR YOU TO TAKE ACETAMINOPHEN (TYLENOL), THIS SHOULD EASE ANY DISCOMFORT YOU HAVE. IF YOUR DOCTOR EXPECTS YOU TO HAVE MORE SEVERE PAIN, YOU WILL RECEIVE A PRESCRIPTION FOR A STRONGER PAIN MEDICINE.    · WHEN TO CALL: CALL US RIGHT AWAY IF YOU HAVE : 1. BLEEDING IN YOUR NECK WHERE THE CATHETER WAS INSERTED. 2. ABDOMINAL PAIN.  3. DIZZINESS

## 2018-06-15 NOTE — H&P
[facility] -   History & Physical    Subjective:      Chief Complaint/Reason for Admission: Thrombocytopenia. Fatty Liver    Jovan Del Cid Jr. is a 77 y.o. male. He has a sig past medical history of fatty liver and thrombocytopenia.   He denies any previous known history of liver disease or liver problems.  He does have regular alcohol use around 2-3 cans of beer daily. He presents to the radiology department to have a liver biopsy.     Past Medical History:   Diagnosis Date    Asthma     Bronchitis chronic     Cough     Hyperlipidemia     Hypertension      Past Surgical History:   Procedure Laterality Date    COLONOSCOPY N/A 6/21/2016    Procedure: COLONOSCOPY;  Surgeon: Alonso Dorsey MD;  Location: [facility];  Service: Endoscopy;  Laterality: N/A;    COLONOSCOPY N/A 11/1/2016    Procedure: COLONOSCOPY;  Surgeon: Alonso oDrsey MD;  Location: [facility];  Service: Endoscopy;  Laterality: N/A;    COLONOSCOPY N/A 2/3/2017    Procedure: COLONOSCOPY;  Surgeon: Alonso Dorsey MD;  Location: [facility];  Service: Endoscopy;  Laterality: N/A;    COLONOSCOPY N/A 11/13/2017    Procedure: COLONOSCOPY;  Surgeon: Alonso Dorsey MD;  Location: [facility];  Service: Endoscopy;  Laterality: N/A;    TONSILLECTOMY       Family History   Problem Relation Age of Onset    Cancer Father     Alzheimer's disease Mother     Colon cancer Neg Hx     Melanoma Neg Hx      Social History   Substance Use Topics    Smoking status: Former Smoker     Years: 3.00     Types: Cigarettes    Smokeless tobacco: Never Used      Comment: smoked a pipe - quit smoking 1960    Alcohol use 1.2 - 1.8 oz/week     2 - 3 Cans of beer per week      Comment: daily         Review of Systems   Constitutional: Negative for fever.   Respiratory: Negative for cough.    Cardiovascular: Negative for chest pain.       Objective:      Vital Signs (Most Recent)       Vital Signs Range (Last 24H):       Physical Exam    Eyes: Pupils are equal, round, and reactive to light.   Neck: Normal range of motion.   Cardiovascular: Normal rate.    Pulmonary/Chest: Effort normal.   Abdominal: Soft.          Assessment:      1. Fatty liver    2. Thrombocytopenia          Plan:    I had a lengthy discussion regarding the risks, benefits, and alternatives to transjugular liver biopsy. I believe he is a canidate for this procedure.  The patient was given the opportunity to ask questions and have those questions answered to his satisfaction.    I appreciate the consult and the opportunity to participate in the care of your patient.

## 2018-06-18 ENCOUNTER — OFFICE VISIT (OUTPATIENT)
Dept: FAMILY MEDICINE | Facility: CLINIC | Age: 78
End: 2018-06-18
Payer: COMMERCIAL

## 2018-06-18 VITALS
OXYGEN SATURATION: 94 % | HEIGHT: 66 IN | TEMPERATURE: 98 F | RESPIRATION RATE: 16 BRPM | WEIGHT: 218.94 LBS | HEART RATE: 78 BPM | SYSTOLIC BLOOD PRESSURE: 110 MMHG | BODY MASS INDEX: 35.19 KG/M2 | DIASTOLIC BLOOD PRESSURE: 72 MMHG

## 2018-06-18 DIAGNOSIS — I10 ESSENTIAL HYPERTENSION: Primary | ICD-10-CM

## 2018-06-18 DIAGNOSIS — J45.909 UNCOMPLICATED ASTHMA, UNSPECIFIED ASTHMA SEVERITY, UNSPECIFIED WHETHER PERSISTENT: ICD-10-CM

## 2018-06-18 DIAGNOSIS — G47.33 OSA (OBSTRUCTIVE SLEEP APNEA): ICD-10-CM

## 2018-06-18 DIAGNOSIS — D69.6 THROMBOCYTOPENIA: ICD-10-CM

## 2018-06-18 DIAGNOSIS — K76.0 FATTY LIVER: ICD-10-CM

## 2018-06-18 DIAGNOSIS — R19.00 PELVIC MASS IN MALE: ICD-10-CM

## 2018-06-18 DIAGNOSIS — Z00.00 ROUTINE ADULT HEALTH MAINTENANCE: ICD-10-CM

## 2018-06-18 DIAGNOSIS — E85.9 AMYLOIDOSIS, UNSPECIFIED TYPE: ICD-10-CM

## 2018-06-18 DIAGNOSIS — E78.5 DYSLIPIDEMIA: ICD-10-CM

## 2018-06-18 PROCEDURE — 99397 PER PM REEVAL EST PAT 65+ YR: CPT | Mod: S$GLB,,, | Performed by: INTERNAL MEDICINE

## 2018-06-18 PROCEDURE — 99999 PR PBB SHADOW E&M-EST. PATIENT-LVL IV: CPT | Mod: PBBFAC,,, | Performed by: INTERNAL MEDICINE

## 2018-06-18 PROCEDURE — 3074F SYST BP LT 130 MM HG: CPT | Mod: CPTII,S$GLB,, | Performed by: INTERNAL MEDICINE

## 2018-06-18 PROCEDURE — 3078F DIAST BP <80 MM HG: CPT | Mod: CPTII,S$GLB,, | Performed by: INTERNAL MEDICINE

## 2018-06-18 NOTE — PROGRESS NOTES
Subjective:       Patient ID: Jovan Del Cid Jr. is a 77 y.o. male.    Chief Complaint: 6 month follow up; Hypertension; and Hyperlipidemia    Hypertension   This is a chronic problem. The problem is controlled. Pertinent negatives include no chest pain, headaches, neck pain, palpitations or shortness of breath.   Hyperlipidemia   Pertinent negatives include no chest pain, myalgias or shortness of breath. Current antihyperlipidemic treatment includes statins. The current treatment provides significant improvement of lipids.     Past Medical History:   Diagnosis Date    Asthma     Bronchitis chronic     Cough     Hyperlipidemia     Hypertension      Past Surgical History:   Procedure Laterality Date    COLONOSCOPY N/A 6/21/2016    Procedure: COLONOSCOPY;  Surgeon: Alonso Dorsey MD;  Location: [facility];  Service: Endoscopy;  Laterality: N/A;    COLONOSCOPY N/A 11/1/2016    Procedure: COLONOSCOPY;  Surgeon: Alonso Dorsey MD;  Location: [facility];  Service: Endoscopy;  Laterality: N/A;    COLONOSCOPY N/A 2/3/2017    Procedure: COLONOSCOPY;  Surgeon: lAonso Dorsey MD;  Location: [facility];  Service: Endoscopy;  Laterality: N/A;    COLONOSCOPY N/A 11/13/2017    Procedure: COLONOSCOPY;  Surgeon: Alonso Dorsey MD;  Location: [facility];  Service: Endoscopy;  Laterality: N/A;    TONSILLECTOMY       Family History   Problem Relation Age of Onset    Cancer Father     Alzheimer's disease Mother     Colon cancer Neg Hx     Melanoma Neg Hx      Social History     Social History    Marital status:      Spouse name: N/A    Number of children: N/A    Years of education: N/A     Occupational History    Not on file.     Social History Main Topics    Smoking status: Former Smoker     Years: 3.00     Types: Cigarettes    Smokeless tobacco: Never Used      Comment: smoked a pipe - quit smoking 1960    Alcohol use 1.2 - 1.8 oz/week     2 - 3 Cans of beer per week      Comment: daily     Drug use: No    Sexual activity: No     Other Topics Concern    Not on file     Social History Narrative    No narrative on file     Review of Systems   Constitutional: Negative for activity change, appetite change, chills, diaphoresis, fatigue, fever and unexpected weight change.   HENT: Negative for drooling, ear discharge, ear pain, facial swelling, hearing loss, mouth sores, nosebleeds, postnasal drip, rhinorrhea, sinus pressure, sneezing, sore throat, tinnitus, trouble swallowing and voice change.    Eyes: Negative for photophobia, redness and visual disturbance.   Respiratory: Negative for apnea, cough, choking, chest tightness, shortness of breath and wheezing.    Cardiovascular: Negative for chest pain, palpitations and leg swelling.   Gastrointestinal: Negative for abdominal distention, abdominal pain, anal bleeding, blood in stool, constipation, diarrhea, nausea, rectal pain and vomiting.   Endocrine: Negative for cold intolerance, heat intolerance, polydipsia, polyphagia and polyuria.   Genitourinary: Negative for difficulty urinating, dysuria, enuresis, flank pain, frequency, genital sores, hematuria and urgency.   Musculoskeletal: Negative for arthralgias, back pain, gait problem, joint swelling, myalgias, neck pain and neck stiffness.   Skin: Negative for color change, pallor, rash and wound.   Allergic/Immunologic: Negative for food allergies and immunocompromised state.   Neurological: Negative for dizziness, tremors, seizures, syncope, facial asymmetry, speech difficulty, weakness, light-headedness, numbness and headaches.   Hematological: Negative for adenopathy. Does not bruise/bleed easily.   Psychiatric/Behavioral: Negative for agitation, behavioral problems, confusion, decreased concentration, dysphoric mood, hallucinations, self-injury, sleep disturbance and suicidal ideas. The patient is not nervous/anxious and is not hyperactive.        Objective:      Physical Exam   Constitutional:  He is oriented to person, place, and time. He appears well-developed and well-nourished. No distress.   HENT:   Head: Normocephalic and atraumatic.   Mouth/Throat: No oropharyngeal exudate.   Eyes: Pupils are equal, round, and reactive to light. No scleral icterus.   Neck: Normal range of motion. Neck supple. No JVD present. Carotid bruit is not present. No tracheal deviation present. No thyromegaly present.   Cardiovascular: Normal rate, regular rhythm, normal heart sounds and intact distal pulses.    Pulmonary/Chest: Effort normal and breath sounds normal. No respiratory distress. He has no wheezes. He has no rales. He exhibits no tenderness.   Abdominal: Soft. Bowel sounds are normal. He exhibits no distension. There is no tenderness. There is no rebound and no guarding.   Musculoskeletal: Normal range of motion. He exhibits no edema or tenderness.   Lymphadenopathy:     He has no cervical adenopathy.   Neurological: He is alert and oriented to person, place, and time. No cranial nerve deficit. Coordination normal.   Skin: Skin is warm and dry. No rash noted. He is not diaphoretic. No erythema. No pallor.   Psychiatric: He has a normal mood and affect. His behavior is normal. Judgment and thought content normal.   Nursing note and vitals reviewed.      CMP  Sodium   Date Value Ref Range Status   02/21/2018 140 136 - 145 mmol/L Final     Potassium   Date Value Ref Range Status   02/21/2018 4.5 3.5 - 5.1 mmol/L Final     Chloride   Date Value Ref Range Status   02/21/2018 103 95 - 110 mmol/L Final     CO2   Date Value Ref Range Status   02/21/2018 25 23 - 29 mmol/L Final     Glucose   Date Value Ref Range Status   02/21/2018 118 (H) 70 - 110 mg/dL Final     BUN, Bld   Date Value Ref Range Status   02/21/2018 12 8 - 23 mg/dL Final     Creatinine   Date Value Ref Range Status   02/21/2018 0.9 0.5 - 1.4 mg/dL Final     Calcium   Date Value Ref Range Status   02/21/2018 9.4 8.7 - 10.5 mg/dL Final     Total Protein    Date Value Ref Range Status   02/21/2018 6.8 6.0 - 8.4 g/dL Final     Albumin   Date Value Ref Range Status   02/21/2018 4.1 3.5 - 5.2 g/dL Final     Total Bilirubin   Date Value Ref Range Status   02/21/2018 1.2 (H) 0.1 - 1.0 mg/dL Final     Comment:     For infants and newborns, interpretation of results should be based  on gestational age, weight and in agreement with clinical  observations.  Premature Infant recommended reference ranges:  Up to 24 hours.............<8.0 mg/dL  Up to 48 hours............<12.0 mg/dL  3-5 days..................<15.0 mg/dL  6-29 days.................<15.0 mg/dL       Alkaline Phosphatase   Date Value Ref Range Status   02/21/2018 59 55 - 135 U/L Final     AST   Date Value Ref Range Status   02/21/2018 19 10 - 40 U/L Final     ALT   Date Value Ref Range Status   02/21/2018 24 10 - 44 U/L Final     Anion Gap   Date Value Ref Range Status   02/21/2018 12 8 - 16 mmol/L Final     eGFR if    Date Value Ref Range Status   02/21/2018 >60 >60 mL/min/1.73 m^2 Final     eGFR if non    Date Value Ref Range Status   02/21/2018 >60 >60 mL/min/1.73 m^2 Final     Comment:     Calculation used to obtain the estimated glomerular filtration  rate (eGFR) is the CKD-EPI equation.        Lab Results   Component Value Date    WBC 5.54 06/15/2018    HGB 14.8 06/15/2018    HCT 43.1 06/15/2018    MCV 95 06/15/2018     (L) 06/15/2018     Lab Results   Component Value Date    CHOL 164 06/20/2017     Lab Results   Component Value Date    HDL 47 06/20/2017     Lab Results   Component Value Date    LDLCALC 93.6 06/20/2017     Lab Results   Component Value Date    TRIG 117 06/20/2017     Lab Results   Component Value Date    CHOLHDL 28.7 06/20/2017     Lab Results   Component Value Date    TSH 2.868 07/23/2014     Lab Results   Component Value Date    HGBA1C 5.6 12/18/2017     Assessment:       1. Essential hypertension    2. Dyslipidemia    3. Uncomplicated asthma,  unspecified asthma severity, unspecified whether persistent    4. Amyloidosis, unspecified type    5. KALYAN (obstructive sleep apnea)    6. Pelvic mass in male    7. Thrombocytopenia    8. Fatty liver    9. Routine adult health maintenance        Plan:   Essential hypertension    Dyslipidemia    Uncomplicated asthma, unspecified asthma severity, unspecified whether persistent    Amyloidosis, unspecified type    KALYAN (obstructive sleep apnea)    Pelvic mass in male    Thrombocytopenia    Fatty liver    Routine adult health maintenance  -     Lipid panel; Future; Expected date: 06/18/2018  -     Hemoglobin A1c; Future; Expected date: 06/18/2018  -     PSA, Screening; Future; Expected date: 06/18/2018  -     TSH; Future; Expected date: 06/18/2018     f/u 6 months.

## 2018-06-20 ENCOUNTER — OFFICE VISIT (OUTPATIENT)
Dept: GASTROENTEROLOGY | Facility: CLINIC | Age: 78
End: 2018-06-20
Payer: COMMERCIAL

## 2018-06-20 VITALS
BODY MASS INDEX: 35.08 KG/M2 | SYSTOLIC BLOOD PRESSURE: 118 MMHG | DIASTOLIC BLOOD PRESSURE: 70 MMHG | WEIGHT: 218.25 LBS | HEIGHT: 66 IN | HEART RATE: 60 BPM

## 2018-06-20 DIAGNOSIS — K75.81 NASH (NONALCOHOLIC STEATOHEPATITIS): Primary | ICD-10-CM

## 2018-06-20 PROCEDURE — 3078F DIAST BP <80 MM HG: CPT | Mod: CPTII,S$GLB,, | Performed by: INTERNAL MEDICINE

## 2018-06-20 PROCEDURE — 3074F SYST BP LT 130 MM HG: CPT | Mod: CPTII,S$GLB,, | Performed by: INTERNAL MEDICINE

## 2018-06-20 PROCEDURE — 99213 OFFICE O/P EST LOW 20 MIN: CPT | Mod: S$GLB,,, | Performed by: INTERNAL MEDICINE

## 2018-06-20 PROCEDURE — 99999 PR PBB SHADOW E&M-EST. PATIENT-LVL III: CPT | Mod: PBBFAC,,, | Performed by: INTERNAL MEDICINE

## 2018-06-20 NOTE — PROGRESS NOTES
Subjective:     Jovan Del Cid Jr. is here for follow up of liver bx results      HPI  Since Jovan Del Cid Jr.'s last visit he denies any acute issues.  He is is can discuss his biopsy results.  Reports the biopsy well without complication and was lot easier than he had anticipated.      6/2018  Liver (needle biopsy):  -Mild steatohepatitis  -Minimal patchy portal-based chronic inflammatory infiltrates without significant interface activity  -Macrosteatosis, 10%  -Microsteatosis, 25%  -Rare possible pseudoground glass hepatocytes  -Trichrome stain: No fibrosis (stage 0 out of 4)  -Iron stain: Weak hepatocyte iron (1+ out of 4+)  -Iron and trichrome stains with appropriate controls    Review of Systems    Objective:     Physical Exam    Computed MELD-Na score unavailable. Necessary lab results were not found in the last year.  Computed MELD score unavailable. Necessary lab results were not found in the last year.    WBC   Date Value Ref Range Status   06/15/2018 5.54 3.90 - 12.70 K/uL Final     Hemoglobin   Date Value Ref Range Status   06/15/2018 14.8 14.0 - 18.0 g/dL Final     Hematocrit   Date Value Ref Range Status   06/15/2018 43.1 40.0 - 54.0 % Final     Platelets   Date Value Ref Range Status   06/15/2018 139 (L) 150 - 350 K/uL Final     BUN, Bld   Date Value Ref Range Status   02/21/2018 12 8 - 23 mg/dL Final     Creatinine   Date Value Ref Range Status   02/21/2018 0.9 0.5 - 1.4 mg/dL Final     Glucose   Date Value Ref Range Status   02/21/2018 118 (H) 70 - 110 mg/dL Final     Calcium   Date Value Ref Range Status   02/21/2018 9.4 8.7 - 10.5 mg/dL Final     Sodium   Date Value Ref Range Status   02/21/2018 140 136 - 145 mmol/L Final     Potassium   Date Value Ref Range Status   02/21/2018 4.5 3.5 - 5.1 mmol/L Final     Chloride   Date Value Ref Range Status   02/21/2018 103 95 - 110 mmol/L Final     AST   Date Value Ref Range Status   02/21/2018 19 10 - 40 U/L Final     ALT   Date Value Ref Range  Status   02/21/2018 24 10 - 44 U/L Final     Alkaline Phosphatase   Date Value Ref Range Status   02/21/2018 59 55 - 135 U/L Final     Total Bilirubin   Date Value Ref Range Status   02/21/2018 1.2 (H) 0.1 - 1.0 mg/dL Final     Comment:     For infants and newborns, interpretation of results should be based  on gestational age, weight and in agreement with clinical  observations.  Premature Infant recommended reference ranges:  Up to 24 hours.............<8.0 mg/dL  Up to 48 hours............<12.0 mg/dL  3-5 days..................<15.0 mg/dL  6-29 days.................<15.0 mg/dL       Albumin   Date Value Ref Range Status   02/21/2018 4.1 3.5 - 5.2 g/dL Final     INR   Date Value Ref Range Status   06/15/2018 1.0 0.8 - 1.2 Final     Comment:     Coumadin Therapy:  2.0 - 3.0 for INR for all indicators except mechanical heart valves  and antiphospholipid syndromes which should use 2.5 - 3.5.           Assessment/Plan:     1. MCMAHAN (nonalcoholic steatohepatitis)      Jovan Del Cid Jr. is a 77 y.o. male withliver bx results    MCMAHAN-patient with MCMAHAN on biopsy but is only mild activity and no evidence of fibrosis.  No other cause of liver disease seen on biopsy.  Given no fibrosis at this point in time based on patient's age low suspicion he should never have any significant issues with his liver disease.  -provided patient with detailed explanation of biopsy results  -recommended continued weight management, strict glycemic a lipid control  -advise no additional need for liver follow-up at this time    Return to clinic as needed    Delfina Ospina MD

## 2018-08-10 ENCOUNTER — OFFICE VISIT (OUTPATIENT)
Dept: PULMONOLOGY | Facility: CLINIC | Age: 78
End: 2018-08-10
Payer: COMMERCIAL

## 2018-08-10 ENCOUNTER — PROCEDURE VISIT (OUTPATIENT)
Dept: PULMONOLOGY | Facility: CLINIC | Age: 78
End: 2018-08-10
Payer: COMMERCIAL

## 2018-08-10 ENCOUNTER — HOSPITAL ENCOUNTER (OUTPATIENT)
Dept: RADIOLOGY | Facility: HOSPITAL | Age: 78
Discharge: HOME | End: 2018-08-10
Attending: INTERNAL MEDICINE
Payer: COMMERCIAL

## 2018-08-10 VITALS
RESPIRATION RATE: 18 BRPM | SYSTOLIC BLOOD PRESSURE: 118 MMHG | OXYGEN SATURATION: 95 % | WEIGHT: 221 LBS | HEART RATE: 75 BPM | DIASTOLIC BLOOD PRESSURE: 74 MMHG | HEIGHT: 66 IN | BODY MASS INDEX: 35.52 KG/M2

## 2018-08-10 DIAGNOSIS — J45.909 UNCOMPLICATED ASTHMA, UNSPECIFIED ASTHMA SEVERITY, UNSPECIFIED WHETHER PERSISTENT: ICD-10-CM

## 2018-08-10 DIAGNOSIS — J45.20 MILD INTERMITTENT ASTHMA WITHOUT COMPLICATION: Primary | ICD-10-CM

## 2018-08-10 DIAGNOSIS — Z78.9 INTOLERANCE OF CONTINUOUS POSITIVE AIRWAY PRESSURE (CPAP) VENTILATION: ICD-10-CM

## 2018-08-10 LAB
BRPFT: NORMAL
FEF 25 75 CHG: 21.4 %
FEF 25 75 LLN: 0.75
FEF 25 75 POST REF: 121.2 %
FEF 25 75 POST: 2.32 L/S (ref 0.75–3.08)
FEF 25 75 PRE REF: 99.9 %
FEF 25 75 PRE: 1.91 L/S (ref 0.75–3.08)
FEF 25 75 REF: 1.91
FET100 CHG: -15.1 %
FET100 POST: 10.54 SEC
FET100 PRE: 12.42 SEC
FEV1 CHG: 4.3 %
FEV1 FVC CHG: 5.4 %
FEV1 FVC LLN: 61
FEV1 FVC POST REF: 100.7 %
FEV1 FVC POST: 76.09 % (ref 60.97–90.2)
FEV1 FVC PRE REF: 95.5 %
FEV1 FVC PRE: 72.17 % (ref 60.97–90.2)
FEV1 FVC REF: 76
FEV1 LLN: 1.81
FEV1 POST REF: 104.5 %
FEV1 POST: 2.71 L (ref 1.81–3.38)
FEV1 PRE REF: 100.2 %
FEV1 PRE: 2.6 L (ref 1.81–3.38)
FEV1 REF: 2.59
FEV6 CHG: 1.5 %
FEV6 LLN: 2.46
FEV6 POST REF: 105.1 %
FEV6 POST: 3.44 L (ref 2.46–4.08)
FEV6 PRE REF: 103.6 %
FEV6 PRE: 3.39 L (ref 2.46–4.08)
FEV6 REF: 3.27
FVC CHG: -1 %
FVC LLN: 2.51
FVC POST REF: 103.2 %
FVC POST: 3.56 L (ref 2.51–4.4)
FVC PRE REF: 104.2 %
FVC PRE: 3.6 L (ref 2.51–4.4)
FVC REF: 3.45
MVV LLN: 85
MVV REF: 100
PEF CHG: 7.4 %
PEF LLN: 4.69
PEF POST REF: 93.8 %
PEF POST: 6.34 L/S (ref 4.69–8.82)
PEF PRE REF: 87.4 %
PEF PRE: 5.9 L/S (ref 4.69–8.82)
PEF REF: 6.75

## 2018-08-10 PROCEDURE — 99214 OFFICE O/P EST MOD 30 MIN: CPT | Mod: 25,S$GLB,, | Performed by: INTERNAL MEDICINE

## 2018-08-10 PROCEDURE — 71046 X-RAY EXAM CHEST 2 VIEWS: CPT | Mod: TC,FY,PO

## 2018-08-10 PROCEDURE — 3074F SYST BP LT 130 MM HG: CPT | Mod: CPTII,S$GLB,, | Performed by: INTERNAL MEDICINE

## 2018-08-10 PROCEDURE — 99999 PR PBB SHADOW E&M-EST. PATIENT-LVL III: CPT | Mod: PBBFAC,,, | Performed by: INTERNAL MEDICINE

## 2018-08-10 PROCEDURE — 3078F DIAST BP <80 MM HG: CPT | Mod: CPTII,S$GLB,, | Performed by: INTERNAL MEDICINE

## 2018-08-10 PROCEDURE — 71046 X-RAY EXAM CHEST 2 VIEWS: CPT | Mod: 26,,, | Performed by: RADIOLOGY

## 2018-08-10 PROCEDURE — 94060 EVALUATION OF WHEEZING: CPT | Mod: S$GLB,,, | Performed by: INTERNAL MEDICINE

## 2018-08-10 NOTE — ASSESSMENT & PLAN NOTE
Last visit was 02/5/2018  Last [place]; FEV1 2.60( 100.2%)  Immunization: Declined flu shot  MEDs: Montelukast and Advair 500/50 ( used infrequently 4 days out of 30)  ACT score 21

## 2018-08-10 NOTE — PROGRESS NOTES
Subjective:       Patient ID: Jovan Del Cid Jr. is a 77 y.o. male.    Chief Complaint: Asthma and Sleep Apnea      Jovan Del Cid Jr. is a 77 y.o. male   Follow up, No cough, No congetion.  ACT 21: hardly taking Advair 500/50   No fever  Declined immunizations  Busy lifestyle.  No interval asthma exacerbations since last visit, Actually not used rescue albuterol in 12 months.  Spirometry normal         Asthma   He complains of cough. There is no shortness of breath, sputum production or wheezing. Associated symptoms include postnasal drip. His past medical history is significant for asthma.     Review of Systems   Constitutional: Negative.    HENT: Positive for postnasal drip. Negative for congestion.    Eyes: Negative.    Respiratory: Positive for cough. Negative for sputum production, shortness of breath, wheezing, asthma nighttime symptoms, pleurisy, dyspnea on extertion and use of rescue inhaler.    Cardiovascular: Negative.    Genitourinary: Negative.    Musculoskeletal: Negative.    Skin: Negative.    Gastrointestinal: Negative.    Neurological: Negative.    Psychiatric/Behavioral: Negative.        Answers for HPI/ROS submitted by the patient on 8/10/2018   Asthma  In the past 4 weeks, how much of the time did your asthma keep you from getting as much done at work, school, or at home?: none of the time  During the past 4 weeks, how often have you had shortness of breath?: 3 to 6 times a week  During the past 4 weeks, how often did your asthma symptoms (Wheezing, coughing, shortness of breath, chest tightness or pain) wake you up at night or earlier that usual in the morning?: not at all  During the past 4 weeks, how often have you used your rescue inhaler or nebulizer medication (such as albuterol)?: not at all  How would you rate your asthma control during the past 4 weeks?: somewhat controlled   : 21      Objective:       Vitals:    08/10/18 1136   BP: 118/74   Pulse: 75   Resp: 18   SpO2: 95%  "  Weight: 100.2 kg (221 lb)   Height: 5' 6" (1.676 m)       Physical Exam   Constitutional: He is oriented to person, place, and time. He appears well-developed and well-nourished.   HENT:   Head: Normocephalic and atraumatic.       Right Ear: External ear normal.   Left Ear: External ear normal.   Nose: Nose normal.   Mouth/Throat: Uvula is midline, oropharynx is clear and moist and mucous membranes are normal. No oropharyngeal exudate.   Eyes: Conjunctivae, EOM and lids are normal. Pupils are equal, round, and reactive to light. No scleral icterus.   Neck: Trachea normal, normal range of motion and phonation normal. Neck supple.   Neck 18"   Cardiovascular: Normal rate, regular rhythm, S1 normal, S2 normal, normal heart sounds, intact distal pulses and normal pulses.    No murmur heard.  Pulmonary/Chest: Effort normal. No respiratory distress. He has decreased breath sounds in the left lower field. He has no wheezes. He has no rhonchi. He has no rales.   Abdominal: Soft. Bowel sounds are normal.   Musculoskeletal: Normal range of motion. He exhibits no edema.   Lymphadenopathy:     He has no cervical adenopathy.     He has no axillary adenopathy.   Neurological: He is alert and oriented to person, place, and time. No cranial nerve deficit or sensory deficit. GCS eye subscore is 4. GCS verbal subscore is 5. GCS motor subscore is 6.   Skin: Skin is warm, dry and intact. No rash noted. No cyanosis. Nails show no clubbing.   Psychiatric: He has a normal mood and affect. His speech is normal.   Nursing note and vitals reviewed.    Personal Diagnostic Review  Chest x-ray:    FINDINGS:  Cardiac silhouette and mediastinal contours are normal.  Lungs are clear.  Osseous structures are intact.    [place] Normal  FEV1 : 2.60( 100.2%), FVc 3.60( 104.2%)  FEV1/FVc 72  No flowsheet data found.      Assessment:       Problem List Items Addressed This Visit     Asthma - Primary     Last visit was 02/5/2018  Last [place]; FEV1 2.60( " 100.2%)  Immunization: Declined flu shot  MEDs: Montelukast and Advair 500/50 ( used infrequently 4 days out of 30)  ACT score 21         Relevant Orders    X-Ray Chest PA And Lateral    Spirometry with/without bronchodilator    Intolerance of continuous positive airway pressure (CPAP) ventilation     Unable to tolerate PAP               Plan:     Will update on symptoms    Follow-up in about 1 year (around 8/10/2019) for Spirometry and CXR next visit.    This note was prepared using voice recognition system and is likely to have sound alike errors that may have been overlooked even after proof reading.  Please call me with any questions    Discussed diagnosis, its evaluation, treatment and usual course. All questions answered.    Thank you for the courtesy of participating in the care of this patient    Stefan Buckner MD

## 2018-08-14 RX ORDER — VALSARTAN AND HYDROCHLOROTHIAZIDE 160; 12.5 MG/1; MG/1
1 TABLET, FILM COATED ORAL DAILY
Qty: 30 TABLET | Refills: 11 | Status: SHIPPED | OUTPATIENT
Start: 2018-08-14 | End: 2019-11-18 | Stop reason: SDUPTHER

## 2018-08-14 NOTE — TELEPHONE ENCOUNTER
----- Message from Rupinder Molina sent at 8/14/2018  3:00 PM CDT -----  Contact: Pt  ..1. What is the name of the medication you are requesting? Valsartan- HCTZ  2. What is the dose? 160-12.5 mg Tab  3. How do you take the medication? Orally, topically, etc? Orally   4. How often do you take this medication? Once a day   5. Do you need a 30 day or 90 day supply? 30 day   6. How many refills are you requesting? 6  7. What is your preferred pharmacy and location of the pharmacy? ..  [facility] on [address]  [phone]    8. Who can we contact with further questions? Pt

## 2018-09-13 ENCOUNTER — TELEPHONE (OUTPATIENT)
Dept: RADIOLOGY | Facility: HOSPITAL | Age: 78
End: 2018-09-13

## 2018-09-14 ENCOUNTER — HOSPITAL ENCOUNTER (OUTPATIENT)
Dept: RADIOLOGY | Facility: HOSPITAL | Age: 78
Discharge: HOME | End: 2018-09-14
Attending: INTERNAL MEDICINE
Payer: COMMERCIAL

## 2018-09-14 DIAGNOSIS — D69.6 THROMBOCYTOPENIA: ICD-10-CM

## 2018-09-14 DIAGNOSIS — E85.9 AMYLOIDOSIS, UNSPECIFIED TYPE: ICD-10-CM

## 2018-09-14 DIAGNOSIS — R19.00 PELVIC MASS IN MALE: ICD-10-CM

## 2018-09-14 PROCEDURE — 72193 CT PELVIS W/DYE: CPT | Mod: 26,,, | Performed by: RADIOLOGY

## 2018-09-14 PROCEDURE — 25500020 PHARM REV CODE 255: Mod: PO | Performed by: INTERNAL MEDICINE

## 2018-09-14 PROCEDURE — 72193 CT PELVIS W/DYE: CPT | Mod: TC,PO

## 2018-09-14 RX ADMIN — IOHEXOL 30 ML: 350 INJECTION, SOLUTION INTRAVENOUS at 07:09

## 2018-09-14 RX ADMIN — IOHEXOL 100 ML: 350 INJECTION, SOLUTION INTRAVENOUS at 09:09

## 2018-12-18 ENCOUNTER — HOSPITAL ENCOUNTER (OUTPATIENT)
Dept: RADIOLOGY | Facility: HOSPITAL | Age: 78
Discharge: HOME | End: 2018-12-18
Attending: INTERNAL MEDICINE
Payer: COMMERCIAL

## 2018-12-18 ENCOUNTER — OFFICE VISIT (OUTPATIENT)
Dept: FAMILY MEDICINE | Facility: CLINIC | Age: 78
End: 2018-12-18
Payer: COMMERCIAL

## 2018-12-18 VITALS
WEIGHT: 222.75 LBS | BODY MASS INDEX: 35.8 KG/M2 | HEART RATE: 89 BPM | TEMPERATURE: 98 F | DIASTOLIC BLOOD PRESSURE: 61 MMHG | HEIGHT: 66 IN | SYSTOLIC BLOOD PRESSURE: 128 MMHG | OXYGEN SATURATION: 93 %

## 2018-12-18 DIAGNOSIS — M54.9 BACK PAIN, UNSPECIFIED BACK LOCATION, UNSPECIFIED BACK PAIN LATERALITY, UNSPECIFIED CHRONICITY: ICD-10-CM

## 2018-12-18 DIAGNOSIS — K75.81 NASH (NONALCOHOLIC STEATOHEPATITIS): ICD-10-CM

## 2018-12-18 DIAGNOSIS — Z00.00 ROUTINE ADULT HEALTH MAINTENANCE: ICD-10-CM

## 2018-12-18 DIAGNOSIS — J45.20 MILD INTERMITTENT ASTHMA WITHOUT COMPLICATION: Primary | ICD-10-CM

## 2018-12-18 DIAGNOSIS — D69.6 THROMBOCYTOPENIA: ICD-10-CM

## 2018-12-18 DIAGNOSIS — E78.5 DYSLIPIDEMIA: ICD-10-CM

## 2018-12-18 DIAGNOSIS — I10 ESSENTIAL HYPERTENSION: ICD-10-CM

## 2018-12-18 PROCEDURE — 99999 PR PBB SHADOW E&M-EST. PATIENT-LVL IV: CPT | Mod: PBBFAC,,, | Performed by: INTERNAL MEDICINE

## 2018-12-18 PROCEDURE — 3078F DIAST BP <80 MM HG: CPT | Mod: CPTII,S$GLB,, | Performed by: INTERNAL MEDICINE

## 2018-12-18 PROCEDURE — 1101F PT FALLS ASSESS-DOCD LE1/YR: CPT | Mod: CPTII,S$GLB,, | Performed by: INTERNAL MEDICINE

## 2018-12-18 PROCEDURE — 71046 X-RAY EXAM CHEST 2 VIEWS: CPT | Mod: 26,,, | Performed by: RADIOLOGY

## 2018-12-18 PROCEDURE — 71046 X-RAY EXAM CHEST 2 VIEWS: CPT | Mod: TC,FY,PO

## 2018-12-18 PROCEDURE — 99214 OFFICE O/P EST MOD 30 MIN: CPT | Mod: S$GLB,,, | Performed by: INTERNAL MEDICINE

## 2018-12-18 PROCEDURE — 3074F SYST BP LT 130 MM HG: CPT | Mod: CPTII,S$GLB,, | Performed by: INTERNAL MEDICINE

## 2018-12-18 RX ORDER — MELOXICAM 15 MG/1
15 TABLET ORAL DAILY PRN
Qty: 30 TABLET | Refills: 0 | Status: SHIPPED | OUTPATIENT
Start: 2018-12-18 | End: 2020-10-15

## 2018-12-18 NOTE — PROGRESS NOTES
Subjective:       Patient ID: Jovan Del Cid Jr. is a 78 y.o. male.    Chief Complaint: Follow-up; Hyperlipidemia; Hypertension; and Asthma    Hyperlipidemia   This is a chronic problem. Pertinent negatives include no chest pain, myalgias or shortness of breath. Current antihyperlipidemic treatment includes statins. The current treatment provides significant improvement of lipids.   Hypertension   Pertinent negatives include no chest pain, headaches, neck pain, palpitations or shortness of breath.   Asthma   There is no cough, shortness of breath or wheezing. Pertinent negatives include no appetite change, chest pain, ear pain, fever, headaches, myalgias, postnasal drip, rhinorrhea, sneezing, sore throat or trouble swallowing. His past medical history is significant for asthma.     Past Medical History:   Diagnosis Date    Asthma     Bronchitis chronic     Cough     Hyperlipidemia     Hypertension      Past Surgical History:   Procedure Laterality Date    COLONOSCOPY N/A 6/21/2016    Procedure: COLONOSCOPY;  Surgeon: Alonso Dorsey MD;  Location: [facility];  Service: Endoscopy;  Laterality: N/A;    COLONOSCOPY N/A 11/1/2016    Procedure: COLONOSCOPY;  Surgeon: Alonso Dorsey MD;  Location: [facility];  Service: Endoscopy;  Laterality: N/A;    COLONOSCOPY N/A 2/3/2017    Procedure: COLONOSCOPY;  Surgeon: Alonso Dorsey MD;  Location: [facility];  Service: Endoscopy;  Laterality: N/A;    COLONOSCOPY N/A 11/13/2017    Procedure: COLONOSCOPY;  Surgeon: Alonso Dorsey MD;  Location: [facility];  Service: Endoscopy;  Laterality: N/A;    COLONOSCOPY N/A 11/13/2017    Performed by Alonso Dorsey MD at [facility]    COLONOSCOPY N/A 2/3/2017    Performed by Alonso Dorsey MD at [facility]    COLONOSCOPY N/A 11/1/2016    Performed by Alonso Dorsey MD at [facility]    COLONOSCOPY N/A 6/21/2016    Performed by Alonso Dorsey MD at [facility]    EGD (ESOPHAGOGASTRODUODENOSCOPY)  N/A 8/22/2013    Performed by [facility]sey MD at [facility] ENDO    FLUOROSCOPY N/A 6/15/2018    Procedure: Transjugular liver bx;  Surgeon: Petros Recio MD;  Location: [facility] CATH LAB;  Service: General;  Laterality: N/A;    TONSILLECTOMY      Transjugular liver bx N/A 6/15/2018    Performed by Petros Recio MD at [facility] CATH LAB     Family History   Problem Relation Age of Onset    Cancer Father     Alzheimer's disease Mother     Colon cancer Neg Hx     Melanoma Neg Hx      Social History     Socioeconomic History    Marital status:      Spouse name: Not on file    Number of children: Not on file    Years of education: Not on file    Highest education level: Not on file   Social Needs    Financial resource strain: Not on file    Food insecurity - worry: Not on file    Food insecurity - inability: Not on file    Transportation needs - medical: Not on file    Transportation needs - non-medical: Not on file   Occupational History    Not on file   Tobacco Use    Smoking status: Former Smoker     Years: 3.00     Types: Cigarettes    Smokeless tobacco: Never Used    Tobacco comment: smoked a pipe - quit smoking 1960   Substance and Sexual Activity    Alcohol use: Yes     Alcohol/week: 1.2 - 1.8 oz     Types: 2 - 3 Cans of beer per week     Comment: daily    Drug use: No    Sexual activity: No   Other Topics Concern    Not on file   Social History Narrative    Not on file     Review of Systems   Constitutional: Negative for activity change, appetite change, chills, diaphoresis, fatigue, fever and unexpected weight change.   HENT: Negative for drooling, ear discharge, ear pain, facial swelling, hearing loss, mouth sores, nosebleeds, postnasal drip, rhinorrhea, sinus pressure, sneezing, sore throat, tinnitus, trouble swallowing and voice change.    Eyes: Negative for photophobia, redness and visual disturbance.   Respiratory: Negative for apnea, cough, choking, chest tightness, shortness  of breath and wheezing.    Cardiovascular: Negative for chest pain, palpitations and leg swelling.   Gastrointestinal: Negative for abdominal distention, abdominal pain, anal bleeding, blood in stool, constipation, diarrhea, nausea and vomiting.   Endocrine: Negative for cold intolerance, heat intolerance, polydipsia, polyphagia and polyuria.   Genitourinary: Negative for difficulty urinating, dysuria, enuresis, flank pain, frequency, genital sores, hematuria and urgency.   Musculoskeletal: Positive for back pain. Negative for arthralgias, gait problem, joint swelling, myalgias, neck pain and neck stiffness.   Skin: Negative for color change, pallor, rash and wound.   Allergic/Immunologic: Negative for food allergies and immunocompromised state.   Neurological: Negative for dizziness, tremors, seizures, syncope, facial asymmetry, speech difficulty, weakness, light-headedness, numbness and headaches.   Hematological: Negative for adenopathy. Does not bruise/bleed easily.   Psychiatric/Behavioral: Negative for agitation, behavioral problems, confusion, decreased concentration, dysphoric mood, hallucinations, self-injury, sleep disturbance and suicidal ideas. The patient is not nervous/anxious and is not hyperactive.        Objective:      Physical Exam   Constitutional: He is oriented to person, place, and time. He appears well-developed and well-nourished. No distress.   HENT:   Head: Normocephalic and atraumatic.   Eyes: Pupils are equal, round, and reactive to light.   Neck: Normal range of motion. Neck supple. No JVD present. Carotid bruit is not present. No tracheal deviation present. No thyromegaly present.   Cardiovascular: Normal rate, regular rhythm, normal heart sounds and intact distal pulses.   Pulmonary/Chest: Effort normal and breath sounds normal. No respiratory distress. He has no wheezes. He has no rales. He exhibits no tenderness.   Abdominal: Soft. Bowel sounds are normal. He exhibits no distension.  There is no tenderness. There is no rebound and no guarding.   Musculoskeletal: Normal range of motion. He exhibits no edema or tenderness.   Lymphadenopathy:     He has no cervical adenopathy.   Neurological: He is alert and oriented to person, place, and time.   Skin: Skin is warm and dry. No rash noted. He is not diaphoretic. No erythema. No pallor.   Psychiatric: He has a normal mood and affect. His behavior is normal. Judgment and thought content normal.   Nursing note and vitals reviewed.      CMP  Sodium   Date Value Ref Range Status   02/21/2018 140 136 - 145 mmol/L Final     Potassium   Date Value Ref Range Status   02/21/2018 4.5 3.5 - 5.1 mmol/L Final     Chloride   Date Value Ref Range Status   02/21/2018 103 95 - 110 mmol/L Final     CO2   Date Value Ref Range Status   02/21/2018 25 23 - 29 mmol/L Final     Glucose   Date Value Ref Range Status   02/21/2018 118 (H) 70 - 110 mg/dL Final     BUN, Bld   Date Value Ref Range Status   02/21/2018 12 8 - 23 mg/dL Final     Creatinine   Date Value Ref Range Status   09/14/2018 1.0 0.5 - 1.4 mg/dL Final     Calcium   Date Value Ref Range Status   02/21/2018 9.4 8.7 - 10.5 mg/dL Final     Total Protein   Date Value Ref Range Status   02/21/2018 6.8 6.0 - 8.4 g/dL Final     Albumin   Date Value Ref Range Status   02/21/2018 4.1 3.5 - 5.2 g/dL Final     Total Bilirubin   Date Value Ref Range Status   02/21/2018 1.2 (H) 0.1 - 1.0 mg/dL Final     Comment:     For infants and newborns, interpretation of results should be based  on gestational age, weight and in agreement with clinical  observations.  Premature Infant recommended reference ranges:  Up to 24 hours.............<8.0 mg/dL  Up to 48 hours............<12.0 mg/dL  3-5 days..................<15.0 mg/dL  6-29 days.................<15.0 mg/dL       Alkaline Phosphatase   Date Value Ref Range Status   02/21/2018 59 55 - 135 U/L Final     AST   Date Value Ref Range Status   02/21/2018 19 10 - 40 U/L Final     ALT    Date Value Ref Range Status   02/21/2018 24 10 - 44 U/L Final     Anion Gap   Date Value Ref Range Status   02/21/2018 12 8 - 16 mmol/L Final     eGFR if    Date Value Ref Range Status   09/14/2018 >60 >60 mL/min/1.73 m^2 Final     eGFR if non    Date Value Ref Range Status   09/14/2018 >60 >60 mL/min/1.73 m^2 Final     Comment:     Calculation used to obtain the estimated glomerular filtration  rate (eGFR) is the CKD-EPI equation.        Lab Results   Component Value Date    WBC 5.54 06/15/2018    HGB 14.8 06/15/2018    HCT 43.1 06/15/2018    MCV 95 06/15/2018     (L) 06/15/2018     Lab Results   Component Value Date    CHOL 164 06/20/2017     Lab Results   Component Value Date    HDL 47 06/20/2017     Lab Results   Component Value Date    LDLCALC 93.6 06/20/2017     Lab Results   Component Value Date    TRIG 117 06/20/2017     Lab Results   Component Value Date    CHOLHDL 28.7 06/20/2017     Lab Results   Component Value Date    TSH 2.868 07/23/2014     Lab Results   Component Value Date    HGBA1C 5.6 12/18/2017     Assessment:       1. Mild intermittent asthma without complication    2. Dyslipidemia    3. Essential hypertension    4. MCMAHAN (nonalcoholic steatohepatitis)    5. Thrombocytopenia    6. Routine adult health maintenance    7. Back pain, unspecified back location, unspecified back pain laterality, unspecified chronicity        Plan:   Mild intermittent asthma without complication    Dyslipidemia-stable.  -     Lipid panel; Future; Expected date: 12/18/2018    Essential hypertension-stable.  -     Comprehensive metabolic panel; Future; Expected date: 12/18/2018  -     CBC auto differential; Future; Expected date: 12/18/2018  -     TSH; Future; Expected date: 12/18/2018    MCMAHAN (nonalcoholic steatohepatitis)    Thrombocytopenia-stable.    Routine adult health maintenance  -     Comprehensive metabolic panel; Future; Expected date: 12/18/2018  -     CBC auto  differential; Future; Expected date: 12/18/2018  -     PSA, Screening; Future; Expected date: 12/18/2018  -     Hemoglobin A1c; Future; Expected date: 12/18/2018    Back pain, unspecified back location, unspecified back pain laterality, unspecified chronicity----call if persists.  -     X-Ray Chest PA And Lateral; Future; Expected date: 12/18/2018  -     meloxicam (MOBIC) 15 MG tablet; Take 1 tablet (15 mg total) by mouth daily as needed.  Dispense: 30 tablet; Refill: 0    F/u 6 months.

## 2018-12-19 ENCOUNTER — TELEPHONE (OUTPATIENT)
Dept: FAMILY MEDICINE | Facility: CLINIC | Age: 78
End: 2018-12-19

## 2018-12-19 NOTE — TELEPHONE ENCOUNTER
----- Message from Alexandra Tillman sent at 12/19/2018  8:56 AM CST -----  Contact: Patient  Patient state she was told to call back if he had Valsartan, its on a recall, that is the med he takes, he states Dr Neal would change the medication to something else and he needs a 90 day supply. Please call  Him back at [phone]. Thank you        [facility] [address] [place] [address] [address] TRAN [address] AT [address] [address]  Phone: [phone] Fax: [phone]

## 2018-12-20 ENCOUNTER — LAB VISIT (OUTPATIENT)
Dept: LAB | Facility: HOSPITAL | Age: 78
End: 2018-12-20
Attending: INTERNAL MEDICINE
Payer: COMMERCIAL

## 2018-12-20 DIAGNOSIS — E78.5 DYSLIPIDEMIA: ICD-10-CM

## 2018-12-20 DIAGNOSIS — Z00.00 ROUTINE ADULT HEALTH MAINTENANCE: ICD-10-CM

## 2018-12-20 DIAGNOSIS — I10 ESSENTIAL HYPERTENSION: ICD-10-CM

## 2018-12-20 LAB
ALBUMIN SERPL BCP-MCNC: 4.1 G/DL
ALP SERPL-CCNC: 51 U/L
ALT SERPL W/O P-5'-P-CCNC: 25 U/L
ANION GAP SERPL CALC-SCNC: 8 MMOL/L
AST SERPL-CCNC: 20 U/L
BASOPHILS # BLD AUTO: 0.05 K/UL
BASOPHILS NFR BLD: 1 %
BILIRUB SERPL-MCNC: 1.6 MG/DL
BUN SERPL-MCNC: 19 MG/DL
CALCIUM SERPL-MCNC: 9.2 MG/DL
CHLORIDE SERPL-SCNC: 102 MMOL/L
CHOLEST SERPL-MCNC: 188 MG/DL
CHOLEST/HDLC SERPL: 3.8 {RATIO}
CO2 SERPL-SCNC: 27 MMOL/L
COMPLEXED PSA SERPL-MCNC: 0.81 NG/ML
CREAT SERPL-MCNC: 0.9 MG/DL
DIFFERENTIAL METHOD: ABNORMAL
EOSINOPHIL # BLD AUTO: 0.5 K/UL
EOSINOPHIL NFR BLD: 9.2 %
ERYTHROCYTE [DISTWIDTH] IN BLOOD BY AUTOMATED COUNT: 14.3 %
EST. GFR  (AFRICAN AMERICAN): >60 ML/MIN/1.73 M^2
EST. GFR  (NON AFRICAN AMERICAN): >60 ML/MIN/1.73 M^2
ESTIMATED AVG GLUCOSE: 108 MG/DL
GLUCOSE SERPL-MCNC: 143 MG/DL
HBA1C MFR BLD HPLC: 5.4 %
HCT VFR BLD AUTO: 40.5 %
HDLC SERPL-MCNC: 50 MG/DL
HDLC SERPL: 26.6 %
HGB BLD-MCNC: 13.6 G/DL
IMM GRANULOCYTES # BLD AUTO: 0.01 K/UL
IMM GRANULOCYTES NFR BLD AUTO: 0.2 %
LDLC SERPL CALC-MCNC: 116.2 MG/DL
LYMPHOCYTES # BLD AUTO: 0.9 K/UL
LYMPHOCYTES NFR BLD: 18 %
MCH RBC QN AUTO: 32.2 PG
MCHC RBC AUTO-ENTMCNC: 33.6 G/DL
MCV RBC AUTO: 96 FL
MONOCYTES # BLD AUTO: 0.5 K/UL
MONOCYTES NFR BLD: 10.2 %
NEUTROPHILS # BLD AUTO: 3 K/UL
NEUTROPHILS NFR BLD: 61.4 %
NONHDLC SERPL-MCNC: 138 MG/DL
NRBC BLD-RTO: 0 /100 WBC
PLATELET # BLD AUTO: 142 K/UL
PMV BLD AUTO: 10.3 FL
POTASSIUM SERPL-SCNC: 4.6 MMOL/L
PROT SERPL-MCNC: 6.9 G/DL
RBC # BLD AUTO: 4.23 M/UL
SODIUM SERPL-SCNC: 137 MMOL/L
TRIGL SERPL-MCNC: 109 MG/DL
TSH SERPL DL<=0.005 MIU/L-ACNC: 2.79 UIU/ML
WBC # BLD AUTO: 4.89 K/UL

## 2018-12-20 PROCEDURE — 80053 COMPREHEN METABOLIC PANEL: CPT

## 2018-12-20 PROCEDURE — 85025 COMPLETE CBC W/AUTO DIFF WBC: CPT

## 2018-12-20 PROCEDURE — 84153 ASSAY OF PSA TOTAL: CPT

## 2018-12-20 PROCEDURE — 80061 LIPID PANEL: CPT

## 2018-12-20 PROCEDURE — 83036 HEMOGLOBIN GLYCOSYLATED A1C: CPT

## 2018-12-20 PROCEDURE — 84443 ASSAY THYROID STIM HORMONE: CPT

## 2018-12-20 PROCEDURE — 36415 COLL VENOUS BLD VENIPUNCTURE: CPT | Mod: PO

## 2018-12-22 RX ORDER — FLUTICASONE PROPIONATE 50 MCG
SPRAY, SUSPENSION (ML) NASAL
Qty: 16 ML | Refills: 3 | Status: SHIPPED | OUTPATIENT
Start: 2018-12-22 | End: 2019-08-06 | Stop reason: SDUPTHER

## 2019-01-24 ENCOUNTER — OFFICE VISIT (OUTPATIENT)
Dept: PULMONOLOGY | Facility: CLINIC | Age: 79
End: 2019-01-24
Payer: COMMERCIAL

## 2019-01-24 VITALS
RESPIRATION RATE: 18 BRPM | BODY MASS INDEX: 35.65 KG/M2 | DIASTOLIC BLOOD PRESSURE: 72 MMHG | SYSTOLIC BLOOD PRESSURE: 160 MMHG | HEART RATE: 92 BPM | WEIGHT: 221.81 LBS | OXYGEN SATURATION: 91 % | HEIGHT: 66 IN

## 2019-01-24 DIAGNOSIS — Z78.9 INTOLERANCE OF CONTINUOUS POSITIVE AIRWAY PRESSURE (CPAP) VENTILATION: Chronic | ICD-10-CM

## 2019-01-24 DIAGNOSIS — J45.31 MILD PERSISTENT ASTHMA WITH EXACERBATION: Primary | ICD-10-CM

## 2019-01-24 DIAGNOSIS — E66.01 CLASS 2 SEVERE OBESITY DUE TO EXCESS CALORIES WITH SERIOUS COMORBIDITY AND BODY MASS INDEX (BMI) OF 35.0 TO 35.9 IN ADULT: Chronic | ICD-10-CM

## 2019-01-24 PROBLEM — E66.812 CLASS 2 SEVERE OBESITY DUE TO EXCESS CALORIES WITH SERIOUS COMORBIDITY AND BODY MASS INDEX (BMI) OF 35.0 TO 35.9 IN ADULT: Status: ACTIVE | Noted: 2019-01-24

## 2019-01-24 PROCEDURE — 3078F DIAST BP <80 MM HG: CPT | Mod: CPTII,S$GLB,, | Performed by: INTERNAL MEDICINE

## 2019-01-24 PROCEDURE — 3078F PR MOST RECENT DIASTOLIC BLOOD PRESSURE < 80 MM HG: ICD-10-PCS | Mod: CPTII,S$GLB,, | Performed by: INTERNAL MEDICINE

## 2019-01-24 PROCEDURE — 3288F FALL RISK ASSESSMENT DOCD: CPT | Mod: CPTII,S$GLB,, | Performed by: INTERNAL MEDICINE

## 2019-01-24 PROCEDURE — 99999 PR PBB SHADOW E&M-EST. PATIENT-LVL III: ICD-10-PCS | Mod: PBBFAC,,, | Performed by: INTERNAL MEDICINE

## 2019-01-24 PROCEDURE — 99215 PR OFFICE/OUTPT VISIT, EST, LEVL V, 40-54 MIN: ICD-10-PCS | Mod: 25,S$GLB,, | Performed by: INTERNAL MEDICINE

## 2019-01-24 PROCEDURE — 99999 PR PBB SHADOW E&M-EST. PATIENT-LVL III: CPT | Mod: PBBFAC,,, | Performed by: INTERNAL MEDICINE

## 2019-01-24 PROCEDURE — 96372 THER/PROPH/DIAG INJ SC/IM: CPT | Mod: S$GLB,,, | Performed by: INTERNAL MEDICINE

## 2019-01-24 PROCEDURE — 96372 PR INJECTION,THERAP/PROPH/DIAG2ST, IM OR SUBCUT: ICD-10-PCS | Mod: S$GLB,,, | Performed by: INTERNAL MEDICINE

## 2019-01-24 PROCEDURE — 3077F SYST BP >= 140 MM HG: CPT | Mod: CPTII,S$GLB,, | Performed by: INTERNAL MEDICINE

## 2019-01-24 PROCEDURE — 99215 OFFICE O/P EST HI 40 MIN: CPT | Mod: 25,S$GLB,, | Performed by: INTERNAL MEDICINE

## 2019-01-24 PROCEDURE — 3077F PR MOST RECENT SYSTOLIC BLOOD PRESSURE >= 140 MM HG: ICD-10-PCS | Mod: CPTII,S$GLB,, | Performed by: INTERNAL MEDICINE

## 2019-01-24 PROCEDURE — 3288F PR FALLS RISK ASSESSMENT DOCUMENTED: ICD-10-PCS | Mod: CPTII,S$GLB,, | Performed by: INTERNAL MEDICINE

## 2019-01-24 PROCEDURE — 1100F PR PT FALLS ASSESS DOC 2+ FALLS/FALL W/INJURY/YR: ICD-10-PCS | Mod: CPTII,S$GLB,, | Performed by: INTERNAL MEDICINE

## 2019-01-24 PROCEDURE — 1100F PTFALLS ASSESS-DOCD GE2>/YR: CPT | Mod: CPTII,S$GLB,, | Performed by: INTERNAL MEDICINE

## 2019-01-24 RX ORDER — ALBUTEROL SULFATE 0.63 MG/3ML
0.63 SOLUTION RESPIRATORY (INHALATION) EVERY 6 HOURS PRN
Qty: 1 BOX | Refills: 11 | Status: SHIPPED | OUTPATIENT
Start: 2019-01-24 | End: 2021-06-23

## 2019-01-24 RX ORDER — BENZONATATE 100 MG/1
100 CAPSULE ORAL EVERY 4 HOURS PRN
Qty: 120 CAPSULE | Refills: 3 | Status: SHIPPED | OUTPATIENT
Start: 2019-01-24 | End: 2019-02-23

## 2019-01-24 RX ORDER — AZITHROMYCIN 500 MG/1
500 TABLET, FILM COATED ORAL DAILY
Qty: 10 TABLET | Refills: 0 | Status: SHIPPED | OUTPATIENT
Start: 2019-01-24 | End: 2019-02-03

## 2019-01-24 RX ORDER — TRIAMCINOLONE ACETONIDE 40 MG/ML
80 INJECTION, SUSPENSION INTRA-ARTICULAR; INTRAMUSCULAR
Status: COMPLETED | OUTPATIENT
Start: 2019-01-24 | End: 2019-01-24

## 2019-01-24 RX ORDER — CEFTRIAXONE 1 G/1
1 INJECTION, POWDER, FOR SOLUTION INTRAMUSCULAR; INTRAVENOUS
Status: COMPLETED | OUTPATIENT
Start: 2019-01-24 | End: 2019-01-24

## 2019-01-24 RX ORDER — PREDNISONE 10 MG/1
TABLET ORAL
Qty: 60 TABLET | Refills: 0 | Status: SHIPPED | OUTPATIENT
Start: 2019-01-24 | End: 2020-10-15

## 2019-01-24 RX ADMIN — TRIAMCINOLONE ACETONIDE 80 MG: 40 INJECTION, SUSPENSION INTRA-ARTICULAR; INTRAMUSCULAR at 03:01

## 2019-01-24 RX ADMIN — CEFTRIAXONE 1 G: 1 INJECTION, POWDER, FOR SOLUTION INTRAMUSCULAR; INTRAVENOUS at 03:01

## 2019-01-24 NOTE — ASSESSMENT & PLAN NOTE
1. Kenalog 80mg IM X 1   2. Rocephin 1g IM X 1  3. Azithromycin 500mg PO daily X 10 days  4. Prednisone taper per orders.  5. ACCUNEB PER ORDERS

## 2019-01-24 NOTE — PATIENT INSTRUCTIONS
Understanding Asthma Triggers  Triggers are things that cause you to have asthma symptoms. Some triggers you can stay away from completely. Others you can plan for and adjust to. Use this sheet to help you know your triggers.    What are triggers?  Triggers irritate your lungs and lead to asthma flare-ups. Some examples are:  · Irritants, such as tobacco smoke or air pollution. These are a concern for all people with asthma.  · Allergens or substances that cause allergies, like pets, dust mites, or pollen  · Special conditions. These include being ill with a cold or the flu, or certain kinds of weather, including changes in weather. These differ from person to person.  · Exercise can trigger asthma in some people. If exercise is one of your triggers, you can learn how to exercise safely.  What triggers your asthma?  Which of these common triggers cause your asthma to flare up? Check all that apply to you.  Irritants:  ? Tobacco smoke (smoking or secondhand smoke)  ? Smoke from fireplaces  ? Vehicle exhaust  ? Smog or air pollution  ? Aerosol sprays  ? Strong odors, such as perfume, incense, or cooking odors  ? Household , such as ammonia or bleach  Allergens:  ? Cats  ? Dogs  ? Birds  ? Dust or dust mites  ? Pollen  ? Mold  ? Cockroaches  Other triggers:  ? Cold air  ? Hot air  ? Weather changes  ? Exercise  ? Certain foods or food ingredients (such as sulfites)  ? Medicines  ? Emotions such as laughing, crying, or feeling stressed  ? Illness such as colds, flu, and sinus infections  Allergies and allergy treatment  People with asthma often have allergies. If you have allergies, or think you have them, talk with your healthcare provider about testing and treatment. Allergy testing can find out exactly which allergens affect you. Types of tests include:  · Skin tests. A small amount of each allergen is put on the skin. Sites are then looked at for an allergic reaction. This could be redness, swelling, or  itching. In general, the greater the reaction, the stronger the allergy.  · Blood tests. A blood test can show sensitivity to the allergen.  Exposing a person to gradually larger amounts of an allergen can help the body build up a tolerance. This is the purpose of allergy shots (immunotherapy). For this therapy, injections are given over a period of years. At first, you get injections with a very small amount of allergen about once a week. As treatment goes on, the amount of allergen is gradually increased to a certain level. Eventually, you have the injections less often. This therapy can take up to a year to start working. But it can be very effective to manage certain allergies over time.   Date Last Reviewed: 10/1/2016  © 6565-4442 [employer]. [address] [place] [address], [place] [address]. All rights reserved. This information is not intended as a substitute for professional medical care. Always follow your healthcare professional's instructions.

## 2019-01-24 NOTE — ASSESSMENT & PLAN NOTE
Complications of untreated sleep apnea discussed with pateint in detail including but not limited to  high blood pressure, heart attack, stroke, obesity,  diabetes and worsening heart failure. Patient voiced understanding.

## 2019-01-24 NOTE — PROGRESS NOTES
Subjective:      Patient ID: Jovan Del Cid Jr. is a 78 y.o. male.    Patient Active Problem List   Diagnosis    Asthma    Chronic cough    Intolerance of continuous positive airway pressure (CPAP) ventilation    Liver cyst    HTN (hypertension)    Dyslipidemia    Benign prostatic hyperplasia    Lipoma    Thrombocytopenia    Pelvic mass in male    Amyloid disease    Chronic maxillary sinusitis    Screening for colorectal cancer    Tubulovillous adenoma of colon    Adenomatous colon polyp    MCMAHAN (nonalcoholic steatohepatitis)    Mild persistent asthma with exacerbation    Class 2 severe obesity due to excess calories with serious comorbidity and body mass index (BMI) of 35.0 to 35.9 in adult     Problem list has been reviewed.    Chief Complaint: Cough; Asthma; Wheezing; and Shortness of Breath    HPI    Productive cough since Monday: Mostly clear phlegm. Associated wheezing and shortness of breath.   Has not sought any medical attention.   Has self medicated with MUCINEX.   Denies any fevers, chills or rigors.     Asthma is not well controlled.     Answers for HPI/ROS submitted by the patient on 1/24/2019   Asthma  In the past 4 weeks, how much of the time did your asthma keep you from getting as much done at work, school, or at home?: most of the time  During the past 4 weeks, how often have you had shortness of breath?: more than once a day  During the past 4 weeks, how often did your asthma symptoms (Wheezing, coughing, shortness of breath, chest tightness or pain) wake you up at night or earlier that usual in the morning?: 4 or more nights a week  During the past 4 weeks, how often have you used your rescue inhaler or nebulizer medication (such as albuterol)?: 1 or 2 times per day  How would you rate your asthma control during the past 4 weeks?: somewhat controlled   : 9       His current respiratory therapy regimen is VENTOLIN, SINGULAIR   which provides relief. He is  adherent with his  regimen. He is not currently on any CONTROLLER medicines.      Diagnosed with KALYAN by [facility] in 2001. AHI was 18.1 events per hour.  He ws prescribed CPAP 12 CMWP. He could not tolerate CPAP. Complications of untreated sleep apnea discussed with pateint in detail including but not limited to  high blood pressure, heart attack, stroke, obesity,  diabetes and worsening heart failure. Patient voiced understanding.         Previous Report Reviewed: lab reports, office notes and radiology reports     The following portions of the patient's history were reviewed and updated as appropriate: He  has a past medical history of Asthma, Bronchitis chronic, Cough, Hyperlipidemia, and Hypertension.  He  has a past surgical history that includes Tonsillectomy; Colonoscopy (N/A, 6/21/2016); Colonoscopy (N/A, 11/1/2016); Colonoscopy (N/A, 2/3/2017); Colonoscopy (N/A, 11/13/2017); and fluoroscopy (N/A, 6/15/2018).  His family history includes Alzheimer's disease in his mother; Cancer in his father.  He  reports that he has quit smoking. His smoking use included cigarettes. He quit after 3.00 years of use. he has never used smokeless tobacco. He reports that he drinks about 1.2 - 1.8 oz of alcohol per week. He reports that he does not use drugs.  He has a current medication list which includes the following prescription(s): albuterol, aspirin, econazole nitrate, fluticasone, meloxicam, montelukast, multivitamin, rosuvastatin, triamcinolone acetonide 0.1%, valsartan-hydrochlorothiazide, albuterol, azithromycin, benzonatate, and prednisone, and the following Facility-Administered Medications: ceftriaxone and triamcinolone acetonide.  He has No Known Allergies..    Review of Systems   Constitutional: Negative for fever, chills, fatigue and night sweats.   HENT: Positive for nosebleeds, postnasal drip, rhinorrhea, sore throat and congestion.    Eyes: Negative for redness and itching.   Respiratory: Positive for cough. Negative for sputum  "production, shortness of breath, wheezing, asthma nighttime symptoms, pleurisy, dyspnea on extertion and use of rescue inhaler.    Cardiovascular: Negative for chest pain, palpitations and leg swelling.   Genitourinary: Negative for difficulty urinating and hematuria.   Endocrine: Negative for cold intolerance and heat intolerance.    Musculoskeletal: Negative for arthralgias, back pain and gait problem.   Gastrointestinal: Negative for nausea, vomiting, abdominal pain, abdominal distention and acid reflux.   Neurological: Negative for dizziness, light-headedness and headaches.   Hematological: Excessive bruising.   Psychiatric/Behavioral: The patient is not nervous/anxious.    All other systems reviewed and are negative.       Objective:   BP (!) 160/72   Pulse 92   Resp 18   Ht 5' 6" (1.676 m)   Wt 100.6 kg (221 lb 12.5 oz)   SpO2 (!) 91%   BMI 35.80 kg/m²   Body mass index is 35.8 kg/m².    Physical Exam   Constitutional: He is oriented to person, place, and time. He appears well-developed and well-nourished.   HENT:   Head: Normocephalic and atraumatic.   Eyes: EOM are normal. Pupils are equal, round, and reactive to light.   Neck: Normal range of motion. Neck supple.   Cardiovascular: Normal rate and regular rhythm.   Pulmonary/Chest: Effort normal and breath sounds normal. He has no wheezes.   Abdominal: Soft. Bowel sounds are normal.   Musculoskeletal: Normal range of motion.   Neurological: He is alert and oriented to person, place, and time.   Skin: Skin is warm and dry. Capillary refill takes less than 2 seconds.   Psychiatric: He has a normal mood and affect. His behavior is normal.   Vitals reviewed.      Personal Diagnostic Review  none pertinent    Assessment / plan :         Discussed diagnosis, its evaluation, treatment and usual course. All questions answered.    Problem List Items Addressed This Visit        Pulmonary    Mild persistent asthma with exacerbation - Primary    Current Assessment " & Plan     1. Kenalog 80mg IM X 1   2. Rocephin 1g IM X 1  3. Azithromycin 500mg PO daily X 10 days  4. Prednisone taper per orders.  5. ACCUNEB PER ORDERS            Relevant Medications    triamcinolone acetonide injection 80 mg (Start on 1/24/2019  3:30 PM)    cefTRIAXone injection 1 g (Start on 1/24/2019  3:30 PM)    azithromycin (ZITHROMAX) 500 MG tablet    predniSONE (DELTASONE) 10 MG tablet    benzonatate (TESSALON) 100 MG capsule    albuterol (ACCUNEB) 0.63 mg/3 mL Nebu       Other    Intolerance of continuous positive airway pressure (CPAP) ventilation    Current Assessment & Plan     Complications of untreated sleep apnea discussed with pateint in detail including but not limited to  high blood pressure, heart attack, stroke, obesity,  diabetes and worsening heart failure. Patient voiced understanding.         Class 2 severe obesity due to excess calories with serious comorbidity and body mass index (BMI) of 35.0 to 35.9 in adult    Current Assessment & Plan     General weight loss/lifestyle modification strategies discussed (elicit support from others; identify saboteurs; non-food rewards, etc).  Diet interventions: low calorie (1000 kCal/d) deficit diet.                   TIME SPENT WITH PATIENT: Time spent: 40 minutes in face to face  discussion concerning diagnosis, prognosis, review of lab and test results, benefits of treatment as well as management of disease, counseling of patient and coordination of care between various health  care providers . Greater than half the time spent was used for coordination of care and counseling of patient.     Follow-up in about 1 month (around 2/24/2019) for KALYAN, Obesity, Asthma.

## 2019-03-07 ENCOUNTER — OFFICE VISIT (OUTPATIENT)
Dept: PULMONOLOGY | Facility: CLINIC | Age: 79
End: 2019-03-07
Payer: COMMERCIAL

## 2019-03-07 VITALS
SYSTOLIC BLOOD PRESSURE: 119 MMHG | DIASTOLIC BLOOD PRESSURE: 60 MMHG | WEIGHT: 222.69 LBS | HEIGHT: 66 IN | BODY MASS INDEX: 35.79 KG/M2 | OXYGEN SATURATION: 97 % | HEART RATE: 91 BPM | RESPIRATION RATE: 16 BRPM

## 2019-03-07 DIAGNOSIS — J45.20 MILD INTERMITTENT ASTHMA WITHOUT COMPLICATION: Primary | Chronic | ICD-10-CM

## 2019-03-07 DIAGNOSIS — E66.01 CLASS 2 SEVERE OBESITY DUE TO EXCESS CALORIES WITH SERIOUS COMORBIDITY AND BODY MASS INDEX (BMI) OF 35.0 TO 35.9 IN ADULT: Chronic | ICD-10-CM

## 2019-03-07 PROCEDURE — 3078F PR MOST RECENT DIASTOLIC BLOOD PRESSURE < 80 MM HG: ICD-10-PCS | Mod: CPTII,S$GLB,, | Performed by: INTERNAL MEDICINE

## 2019-03-07 PROCEDURE — 3074F SYST BP LT 130 MM HG: CPT | Mod: CPTII,S$GLB,, | Performed by: INTERNAL MEDICINE

## 2019-03-07 PROCEDURE — 99999 PR PBB SHADOW E&M-EST. PATIENT-LVL III: CPT | Mod: PBBFAC,,, | Performed by: INTERNAL MEDICINE

## 2019-03-07 PROCEDURE — 99214 OFFICE O/P EST MOD 30 MIN: CPT | Mod: S$GLB,,, | Performed by: INTERNAL MEDICINE

## 2019-03-07 PROCEDURE — 99214 PR OFFICE/OUTPT VISIT, EST, LEVL IV, 30-39 MIN: ICD-10-PCS | Mod: S$GLB,,, | Performed by: INTERNAL MEDICINE

## 2019-03-07 PROCEDURE — 3074F PR MOST RECENT SYSTOLIC BLOOD PRESSURE < 130 MM HG: ICD-10-PCS | Mod: CPTII,S$GLB,, | Performed by: INTERNAL MEDICINE

## 2019-03-07 PROCEDURE — 1101F PR PT FALLS ASSESS DOC 0-1 FALLS W/OUT INJ PAST YR: ICD-10-PCS | Mod: CPTII,S$GLB,, | Performed by: INTERNAL MEDICINE

## 2019-03-07 PROCEDURE — 99999 PR PBB SHADOW E&M-EST. PATIENT-LVL III: ICD-10-PCS | Mod: PBBFAC,,, | Performed by: INTERNAL MEDICINE

## 2019-03-07 PROCEDURE — 3078F DIAST BP <80 MM HG: CPT | Mod: CPTII,S$GLB,, | Performed by: INTERNAL MEDICINE

## 2019-03-07 PROCEDURE — 1101F PT FALLS ASSESS-DOCD LE1/YR: CPT | Mod: CPTII,S$GLB,, | Performed by: INTERNAL MEDICINE

## 2019-03-07 NOTE — PROGRESS NOTES
Subjective:      Patient ID: Jovan Del Cid Jr. is a 78 y.o. male.    Patient Active Problem List   Diagnosis    Asthma    Chronic cough    Intolerance of continuous positive airway pressure (CPAP) ventilation    Liver cyst    HTN (hypertension)    Dyslipidemia    Benign prostatic hyperplasia    Lipoma    Thrombocytopenia    Pelvic mass in male    Amyloid disease    Chronic maxillary sinusitis    Screening for colorectal cancer    Tubulovillous adenoma of colon    Adenomatous colon polyp    MCMAHAN (nonalcoholic steatohepatitis)    Mild intermittent asthma without complication    Class 2 severe obesity due to excess calories with serious comorbidity and body mass index (BMI) of 35.0 to 35.9 in adult     Problem list has been reviewed.    Chief Complaint: Asthma    HPI    He reports dimitrios his symptoms have improved.     Patients reports NYHA II dyspnea    The patient has currently have symptoms / an exacerbation.       No recent change in breathing.     Asthma is  well controlled.     Asthma Control Test  In the past 4  weeks, how much of the time did your asthma keep you from getting as much done at work, school or at home?: None of the time  During the past 4 weeks, how often have you had shortness of breath?: Once or twice a week  During the past 4 weeks, how often did your asthma symptoms (wheezing, couging, shortness of breath, chest tightness or pain) wake you up at night or earlier than usual in the morning?: Not at all  During the past 4 weeks, how often have you used your rescue inhaler or nebulizer medication (such as albuterol)?: Once a week or less  How would you rate your asthma control during the past 4 weeks?: Well controlled  If your score is 19 or less, your asthma may not be under control: 22         His current respiratory therapy regimen is VENTOLIN, SINGULAIR   which provides relief. He is  adherent with his regimen. He is not currently on any CONTROLLER medicines.      Diagnosed  with KALYAN by [facility] in 2001. AHI was 18.1 events per hour.  He ws prescribed CPAP 12 CMWP. He could not tolerate CPAP. Complications of untreated sleep apnea discussed with pateint in detail including but not limited to  high blood pressure, heart attack, stroke, obesity,  diabetes and worsening heart failure. Patient voiced understanding.         A full  review of systems, past , family  and social histories was performed except as mentioned in the note above, these are non contributory to the main issues discussed today. .     Previous Report Reviewed: lab reports, office notes and radiology reports     The following portions of the patient's history were reviewed and updated as appropriate: He  has a past medical history of Asthma, Bronchitis chronic, Cough, Hyperlipidemia, and Hypertension.  He  has a past surgical history that includes Tonsillectomy; Colonoscopy (N/A, 6/21/2016); Colonoscopy (N/A, 11/1/2016); Colonoscopy (N/A, 2/3/2017); Colonoscopy (N/A, 11/13/2017); and Fluoroscopy (N/A, 6/15/2018).  His family history includes Alzheimer's disease in his mother; Cancer in his father.  He  reports that he quit smoking about 59 years ago. His smoking use included cigarettes and pipe. He quit after 3.00 years of use. he has never used smokeless tobacco. He reports that he drinks about 1.2 - 1.8 oz of alcohol per week. He reports that he does not use drugs.  He has a current medication list which includes the following prescription(s): aspirin, econazole nitrate, fluticasone, meloxicam, montelukast, multivitamin, rosuvastatin, valsartan-hydrochlorothiazide, albuterol, prednisone, and triamcinolone acetonide 0.1%.  He has No Known Allergies..    Review of Systems   Constitutional: Negative for fever, chills, fatigue and night sweats.   HENT: Positive for nosebleeds, postnasal drip, rhinorrhea, sore throat and congestion.    Eyes: Negative for redness and itching.   Respiratory: Positive for cough. Negative for sputum  "production, shortness of breath, wheezing, asthma nighttime symptoms, pleurisy, dyspnea on extertion and use of rescue inhaler.    Cardiovascular: Negative for chest pain, palpitations and leg swelling.   Genitourinary: Negative for difficulty urinating and hematuria.   Endocrine: Negative for cold intolerance and heat intolerance.    Musculoskeletal: Negative for arthralgias, back pain and gait problem.   Gastrointestinal: Negative for nausea, vomiting, abdominal pain, abdominal distention and acid reflux.   Neurological: Negative for dizziness, light-headedness and headaches.   Hematological: Excessive bruising.   Psychiatric/Behavioral: The patient is not nervous/anxious.    All other systems reviewed and are negative.       Objective:   /60   Pulse 91   Resp 16   Ht 5' 6" (1.676 m)   Wt 101 kg (222 lb 10.6 oz)   SpO2 97%   BMI 35.94 kg/m²   Body mass index is 35.94 kg/m².    Physical Exam   Constitutional: He is oriented to person, place, and time. He appears well-developed and well-nourished.   HENT:   Head: Normocephalic and atraumatic.   Eyes: EOM are normal. Pupils are equal, round, and reactive to light.   Neck: Normal range of motion. Neck supple.   Cardiovascular: Normal rate and regular rhythm.   Pulmonary/Chest: Effort normal and breath sounds normal. He has no wheezes.   Abdominal: Soft. Bowel sounds are normal.   Musculoskeletal: Normal range of motion.   Neurological: He is alert and oriented to person, place, and time.   Skin: Skin is warm and dry. Capillary refill takes less than 2 seconds.   Psychiatric: He has a normal mood and affect. His behavior is normal.   Vitals reviewed.      Personal Diagnostic Review  none pertinent    Assessment / plan :         Discussed diagnosis, its evaluation, treatment and usual course. All questions answered.    Problem List Items Addressed This Visit        Pulmonary    Mild intermittent asthma without complication - Primary (Chronic)    Current " Assessment & Plan     Asthma ROS: taking medications as instructed, no medication side effects noted, no significant ongoing wheezing or shortness of breath, using bronchodilator MDI less than twice a week.   New concerns: None.   Exam: appears well, vitals normal, no respiratory distress, acyanotic, normal RR.   Assessment:  Asthma improved.   Plan:VENTOLIN, SINGULAIR. Current treatment plan is effective, no change in therapy.            Other    Class 2 severe obesity due to excess calories with serious comorbidity and body mass index (BMI) of 35.0 to 35.9 in adult    Current Assessment & Plan     General weight loss/lifestyle modification strategies discussed (elicit support from others; identify saboteurs; non-food rewards, etc).  Diet interventions: low calorie (1000 kCal/d) deficit diet.                   TIME SPENT WITH PATIENT: Time spent: 30  minutes in face to face  discussion concerning diagnosis, prognosis, review of lab and test results, benefits of treatment as well as management of disease, counseling of patient and coordination of care between various health  care providers . Greater than half the time spent was used for coordination of care and counseling of patient.     Follow-up with Dr. Buckner as scheduled.

## 2019-03-07 NOTE — PATIENT INSTRUCTIONS
Lung Anatomy  Your lungs take air in to give your body oxygen, which the body needs to work. Your lungs, like all the tissues in your body, are made up of billions of tiny specialized cells. Old lung cells die and are replaced by new, identical lung cells. This natural process helps ensure healthy lungs.    Date Last Reviewed: 11/1/2016  © 2994-8056 [email or web address]. [address] [place] [address], [place] [address]. All rights reserved. This information is not intended as a substitute for professional medical care. Always follow your healthcare professional's instructions.

## 2019-03-07 NOTE — ASSESSMENT & PLAN NOTE
Asthma ROS: taking medications as instructed, no medication side effects noted, no significant ongoing wheezing or shortness of breath, using bronchodilator MDI less than twice a week.   New concerns: None.   Exam: appears well, vitals normal, no respiratory distress, acyanotic, normal RR.   Assessment:  Asthma improved.   Plan:VENTOLIN, SINGULAIR. Current treatment plan is effective, no change in therapy.

## 2019-07-11 ENCOUNTER — OFFICE VISIT (OUTPATIENT)
Dept: FAMILY MEDICINE | Facility: CLINIC | Age: 79
End: 2019-07-11
Payer: COMMERCIAL

## 2019-07-11 VITALS
HEART RATE: 84 BPM | TEMPERATURE: 98 F | DIASTOLIC BLOOD PRESSURE: 73 MMHG | OXYGEN SATURATION: 92 % | WEIGHT: 219.94 LBS | BODY MASS INDEX: 35.49 KG/M2 | SYSTOLIC BLOOD PRESSURE: 134 MMHG

## 2019-07-11 DIAGNOSIS — I10 ESSENTIAL HYPERTENSION: Primary | ICD-10-CM

## 2019-07-11 DIAGNOSIS — J45.20 MILD INTERMITTENT ASTHMA WITHOUT COMPLICATION: Chronic | ICD-10-CM

## 2019-07-11 DIAGNOSIS — E78.5 DYSLIPIDEMIA: ICD-10-CM

## 2019-07-11 PROCEDURE — 99999 PR PBB SHADOW E&M-EST. PATIENT-LVL III: CPT | Mod: PBBFAC,,, | Performed by: INTERNAL MEDICINE

## 2019-07-11 PROCEDURE — 99999 PR PBB SHADOW E&M-EST. PATIENT-LVL III: ICD-10-PCS | Mod: PBBFAC,,, | Performed by: INTERNAL MEDICINE

## 2019-07-11 PROCEDURE — 99214 OFFICE O/P EST MOD 30 MIN: CPT | Mod: S$GLB,,, | Performed by: INTERNAL MEDICINE

## 2019-07-11 PROCEDURE — 1101F PR PT FALLS ASSESS DOC 0-1 FALLS W/OUT INJ PAST YR: ICD-10-PCS | Mod: CPTII,S$GLB,, | Performed by: INTERNAL MEDICINE

## 2019-07-11 PROCEDURE — 99214 PR OFFICE/OUTPT VISIT, EST, LEVL IV, 30-39 MIN: ICD-10-PCS | Mod: S$GLB,,, | Performed by: INTERNAL MEDICINE

## 2019-07-11 PROCEDURE — 3078F PR MOST RECENT DIASTOLIC BLOOD PRESSURE < 80 MM HG: ICD-10-PCS | Mod: CPTII,S$GLB,, | Performed by: INTERNAL MEDICINE

## 2019-07-11 PROCEDURE — 3075F PR MOST RECENT SYSTOLIC BLOOD PRESS GE 130-139MM HG: ICD-10-PCS | Mod: CPTII,S$GLB,, | Performed by: INTERNAL MEDICINE

## 2019-07-11 PROCEDURE — 3075F SYST BP GE 130 - 139MM HG: CPT | Mod: CPTII,S$GLB,, | Performed by: INTERNAL MEDICINE

## 2019-07-11 PROCEDURE — 1101F PT FALLS ASSESS-DOCD LE1/YR: CPT | Mod: CPTII,S$GLB,, | Performed by: INTERNAL MEDICINE

## 2019-07-11 PROCEDURE — 3078F DIAST BP <80 MM HG: CPT | Mod: CPTII,S$GLB,, | Performed by: INTERNAL MEDICINE

## 2019-07-11 RX ORDER — PANTOPRAZOLE SODIUM 40 MG/1
40 TABLET, DELAYED RELEASE ORAL DAILY PRN
Qty: 30 TABLET | Refills: 3 | Status: SHIPPED | OUTPATIENT
Start: 2019-07-11 | End: 2024-02-26

## 2019-07-11 NOTE — PROGRESS NOTES
Subjective:       Patient ID: Jovan Del Cid Jr. is a 78 y.o. male.    Chief Complaint: Follow-up; Hypertension; Hyperlipidemia; and Asthma    Hypertension   Pertinent negatives include no chest pain, headaches, neck pain, palpitations or shortness of breath.   Hyperlipidemia   Pertinent negatives include no chest pain, myalgias or shortness of breath.   Asthma   There is no cough, shortness of breath or wheezing. Pertinent negatives include no appetite change, chest pain, ear pain, fever, headaches, myalgias, postnasal drip, rhinorrhea, sneezing, sore throat or trouble swallowing. His past medical history is significant for asthma.     Past Medical History:   Diagnosis Date    Asthma     Bronchitis chronic     Cough     Hyperlipidemia     Hypertension      Past Surgical History:   Procedure Laterality Date    COLONOSCOPY N/A 11/13/2017    [facility]    COLONOSCOPY N/A 2/3/2017    [facility]    COLONOSCOPY N/A 11/1/2016    [facility]    COLONOSCOPY N/A 6/21/2016    [facility]    EGD (ESOPHAGOGASTRODUODENOSCOPY) N/A 8/22/2013    [facility]    TONSILLECTOMY      Transjugular liver bx N/A 6/15/2018    Performed by Petros Recio [facility] at [facility]     Family History   Problem Relation Age of Onset    Cancer Father     Alzheimer's disease Mother     Colon cancer Neg Hx     Melanoma Neg Hx      Social History     Socioeconomic History    Marital status:      Spouse name: Not on file    Number of children: Not on file    Years of education: Not on file    Highest education level: Not on file   Occupational History    Not on file   Social Needs    Financial resource strain: Not on file    Food insecurity:     Worry: Not on file     Inability: Not on file    Transportation needs:     Medical: Not on  file     Non-medical: Not on file   Tobacco Use    Smoking status: Former Smoker     Years: 3.00     Types: Cigarettes, Pipe     Last attempt to quit: 1960     Years since quittin.5    Smokeless tobacco: Never Used    Tobacco comment: smoked a pipe - quit smoking    Substance and Sexual Activity    Alcohol use: Yes     Alcohol/week: 1.2 - 1.8 oz     Types: 2 - 3 Cans of beer per week     Comment: daily    Drug use: No    Sexual activity: Never   Lifestyle    Physical activity:     Days per week: Not on file     Minutes per session: Not on file    Stress: Not on file   Relationships    Social connections:     Talks on phone: Not on file     Gets together: Not on file     Attends Protestant service: Not on file     Active member of club or organization: Not on file     Attends meetings of clubs or organizations: Not on file     Relationship status: Not on file   Other Topics Concern    Not on file   Social History Narrative    Not on file     Review of Systems   Constitutional: Negative for activity change, appetite change, chills, diaphoresis, fatigue, fever and unexpected weight change.   HENT: Negative for drooling, ear discharge, ear pain, facial swelling, hearing loss, mouth sores, nosebleeds, postnasal drip, rhinorrhea, sinus pressure, sneezing, sore throat, tinnitus, trouble swallowing and voice change.    Eyes: Negative for photophobia, redness and visual disturbance.   Respiratory: Negative for apnea, cough, choking, chest tightness, shortness of breath and wheezing.    Cardiovascular: Negative for chest pain, palpitations and leg swelling.   Gastrointestinal: Negative for abdominal distention, abdominal pain, anal bleeding, blood in stool, constipation, diarrhea, nausea and vomiting.   Endocrine: Negative for cold intolerance, heat intolerance, polydipsia, polyphagia and polyuria.   Genitourinary: Negative for difficulty urinating, dysuria, enuresis, flank pain, frequency, genital sores,  hematuria and urgency.   Musculoskeletal: Negative for arthralgias, back pain, gait problem, joint swelling, myalgias, neck pain and neck stiffness.   Skin: Negative for color change, pallor, rash and wound.   Allergic/Immunologic: Negative for food allergies and immunocompromised state.   Neurological: Negative for dizziness, tremors, seizures, syncope, facial asymmetry, speech difficulty, weakness, light-headedness, numbness and headaches.   Hematological: Negative for adenopathy. Does not bruise/bleed easily.   Psychiatric/Behavioral: Negative for agitation, behavioral problems, confusion, decreased concentration, dysphoric mood, hallucinations, self-injury, sleep disturbance and suicidal ideas. The patient is not nervous/anxious and is not hyperactive.        Objective:      Physical Exam   Constitutional: He is oriented to person, place, and time. He appears well-developed and well-nourished. No distress.   HENT:   Head: Normocephalic and atraumatic.   Eyes: Pupils are equal, round, and reactive to light.   Neck: Normal range of motion. Neck supple. No JVD present. Carotid bruit is not present. No tracheal deviation present. No thyromegaly present.   Cardiovascular: Normal rate, regular rhythm, normal heart sounds and intact distal pulses.   Pulmonary/Chest: Effort normal and breath sounds normal. No respiratory distress. He has no wheezes. He has no rales. He exhibits no tenderness.   Abdominal: Soft. Bowel sounds are normal. He exhibits no distension. There is no tenderness. There is no rebound and no guarding.   Musculoskeletal: Normal range of motion. He exhibits no edema or tenderness.   Lymphadenopathy:     He has no cervical adenopathy.   Neurological: He is alert and oriented to person, place, and time. No cranial nerve deficit. Coordination normal.   Skin: Skin is warm and dry. No rash noted. He is not diaphoretic. No erythema. No pallor.   Psychiatric: He has a normal mood and affect. His behavior is  normal. Judgment and thought content normal.   Nursing note and vitals reviewed.      CMP  Sodium   Date Value Ref Range Status   12/20/2018 137 136 - 145 mmol/L Final     Potassium   Date Value Ref Range Status   12/20/2018 4.6 3.5 - 5.1 mmol/L Final     Chloride   Date Value Ref Range Status   12/20/2018 102 95 - 110 mmol/L Final     CO2   Date Value Ref Range Status   12/20/2018 27 23 - 29 mmol/L Final     Glucose   Date Value Ref Range Status   12/20/2018 143 (H) 70 - 110 mg/dL Final     BUN, Bld   Date Value Ref Range Status   12/20/2018 19 8 - 23 mg/dL Final     Creatinine   Date Value Ref Range Status   12/20/2018 0.9 0.5 - 1.4 mg/dL Final     Calcium   Date Value Ref Range Status   12/20/2018 9.2 8.7 - 10.5 mg/dL Final     Total Protein   Date Value Ref Range Status   12/20/2018 6.9 6.0 - 8.4 g/dL Final     Albumin   Date Value Ref Range Status   12/20/2018 4.1 3.5 - 5.2 g/dL Final     Total Bilirubin   Date Value Ref Range Status   12/20/2018 1.6 (H) 0.1 - 1.0 mg/dL Final     Comment:     For infants and newborns, interpretation of results should be based  on gestational age, weight and in agreement with clinical  observations.  Premature Infant recommended reference ranges:  Up to 24 hours.............<8.0 mg/dL  Up to 48 hours............<12.0 mg/dL  3-5 days..................<15.0 mg/dL  6-29 days.................<15.0 mg/dL       Alkaline Phosphatase   Date Value Ref Range Status   12/20/2018 51 (L) 55 - 135 U/L Final     AST   Date Value Ref Range Status   12/20/2018 20 10 - 40 U/L Final     ALT   Date Value Ref Range Status   12/20/2018 25 10 - 44 U/L Final     Anion Gap   Date Value Ref Range Status   12/20/2018 8 8 - 16 mmol/L Final     eGFR if    Date Value Ref Range Status   12/20/2018 >60.0 >60 mL/min/1.73 m^2 Final     eGFR if non    Date Value Ref Range Status   12/20/2018 >60.0 >60 mL/min/1.73 m^2 Final     Comment:     Calculation used to obtain the estimated  glomerular filtration  rate (eGFR) is the CKD-EPI equation.        Lab Results   Component Value Date    WBC 4.89 12/20/2018    HGB 13.6 (L) 12/20/2018    HCT 40.5 12/20/2018    MCV 96 12/20/2018     (L) 12/20/2018     Lab Results   Component Value Date    CHOL 188 12/20/2018     Lab Results   Component Value Date    HDL 50 12/20/2018     Lab Results   Component Value Date    LDLCALC 116.2 12/20/2018     Lab Results   Component Value Date    TRIG 109 12/20/2018     Lab Results   Component Value Date    CHOLHDL 26.6 12/20/2018     Lab Results   Component Value Date    TSH 2.789 12/20/2018     Lab Results   Component Value Date    HGBA1C 5.4 12/20/2018     Assessment:       1. Essential hypertension    2. Dyslipidemia    3. Mild intermittent asthma without complication        Plan:   Essential hypertension    Dyslipidemia    Mild intermittent asthma without complication    Other orders  -     pantoprazole (PROTONIX) 40 MG tablet; Take 1 tablet (40 mg total) by mouth daily as needed.  Dispense: 30 tablet; Refill: 3    Stable----------------------continue current meds.                   F/u 6 months.

## 2019-08-02 ENCOUNTER — TELEPHONE (OUTPATIENT)
Dept: PULMONOLOGY | Facility: CLINIC | Age: 79
End: 2019-08-02

## 2019-08-02 NOTE — TELEPHONE ENCOUNTER
----- Message from Maddy Esparza sent at 8/2/2019  2:48 PM CDT -----  Contact: pt  Please call pt @ [phone] regardgin appts cancel, pt need to reschedule appts

## 2019-08-06 RX ORDER — FLUTICASONE PROPIONATE 50 MCG
SPRAY, SUSPENSION (ML) NASAL
Qty: 16 ML | Refills: 0 | Status: SHIPPED | OUTPATIENT
Start: 2019-08-06 | End: 2019-11-01 | Stop reason: SDUPTHER

## 2019-09-30 ENCOUNTER — HOSPITAL ENCOUNTER (OUTPATIENT)
Dept: RADIOLOGY | Facility: HOSPITAL | Age: 79
Discharge: HOME | End: 2019-09-30
Attending: INTERNAL MEDICINE
Payer: COMMERCIAL

## 2019-09-30 ENCOUNTER — CLINICAL SUPPORT (OUTPATIENT)
Dept: PULMONOLOGY | Facility: CLINIC | Age: 79
End: 2019-09-30
Payer: COMMERCIAL

## 2019-09-30 ENCOUNTER — OFFICE VISIT (OUTPATIENT)
Dept: PULMONOLOGY | Facility: CLINIC | Age: 79
End: 2019-09-30
Payer: COMMERCIAL

## 2019-09-30 VITALS
HEART RATE: 80 BPM | SYSTOLIC BLOOD PRESSURE: 128 MMHG | RESPIRATION RATE: 20 BRPM | DIASTOLIC BLOOD PRESSURE: 70 MMHG | HEIGHT: 66 IN | OXYGEN SATURATION: 93 % | WEIGHT: 221.81 LBS | BODY MASS INDEX: 35.65 KG/M2

## 2019-09-30 DIAGNOSIS — E78.5 DYSLIPIDEMIA: ICD-10-CM

## 2019-09-30 DIAGNOSIS — J45.20 MILD INTERMITTENT ASTHMA WITHOUT COMPLICATION: Primary | Chronic | ICD-10-CM

## 2019-09-30 DIAGNOSIS — J45.20 MILD INTERMITTENT ASTHMA WITHOUT COMPLICATION: ICD-10-CM

## 2019-09-30 DIAGNOSIS — E66.01 CLASS 2 SEVERE OBESITY DUE TO EXCESS CALORIES WITH SERIOUS COMORBIDITY AND BODY MASS INDEX (BMI) OF 35.0 TO 35.9 IN ADULT: ICD-10-CM

## 2019-09-30 DIAGNOSIS — I10 ESSENTIAL HYPERTENSION: ICD-10-CM

## 2019-09-30 DIAGNOSIS — Z78.9 INTOLERANCE OF CONTINUOUS POSITIVE AIRWAY PRESSURE (CPAP) VENTILATION: ICD-10-CM

## 2019-09-30 LAB
BRPFT: NORMAL
FEF 25 75 CHG: 6.7 %
FEF 25 75 LLN: 0.72
FEF 25 75 POST REF: 95.6 %
FEF 25 75 PRE REF: 89.6 %
FEF 25 75 REF: 1.87
FET100 CHG: -17.1 %
FEV1 CHG: -0.2 %
FEV1 FVC CHG: 1.4 %
FEV1 FVC LLN: 61
FEV1 FVC POST REF: 97.7 %
FEV1 FVC PRE REF: 96.3 %
FEV1 FVC REF: 75
FEV1 LLN: 1.78
FEV1 POST REF: 93.5 %
FEV1 PRE REF: 93.8 %
FEV1 REF: 2.56
FVC CHG: -1.6 %
FVC LLN: 2.47
FVC POST REF: 95.1 %
FVC PRE REF: 96.7 %
FVC REF: 3.42
PEF CHG: -2.5 %
PEF LLN: 4.44
PEF POST REF: 92.9 %
PEF PRE REF: 95.3 %
PEF REF: 6.51
POST FEF 25 75: 1.79 L/S (ref 0.72–3.03)
POST FET 100: 10.82 SEC
POST FEV1 FVC: 73.69 % (ref 60.67–90.21)
POST FEV1: 2.4 L (ref 1.78–3.34)
POST FVC: 3.25 L (ref 2.47–4.37)
POST PEF: 6.05 L/S (ref 4.44–8.58)
PRE FEF 25 75: 1.68 L/S (ref 0.72–3.03)
PRE FET 100: 13.05 SEC
PRE FEV1 FVC: 72.66 % (ref 60.67–90.21)
PRE FEV1: 2.4 L (ref 1.78–3.34)
PRE FVC: 3.31 L (ref 2.47–4.37)
PRE PEF: 6.21 L/S (ref 4.44–8.58)

## 2019-09-30 PROCEDURE — 3074F SYST BP LT 130 MM HG: CPT | Mod: CPTII,S$GLB,, | Performed by: INTERNAL MEDICINE

## 2019-09-30 PROCEDURE — 3074F PR MOST RECENT SYSTOLIC BLOOD PRESSURE < 130 MM HG: ICD-10-PCS | Mod: CPTII,S$GLB,, | Performed by: INTERNAL MEDICINE

## 2019-09-30 PROCEDURE — 3078F PR MOST RECENT DIASTOLIC BLOOD PRESSURE < 80 MM HG: ICD-10-PCS | Mod: CPTII,S$GLB,, | Performed by: INTERNAL MEDICINE

## 2019-09-30 PROCEDURE — 99999 PR PBB SHADOW E&M-EST. PATIENT-LVL III: CPT | Mod: PBBFAC,,, | Performed by: INTERNAL MEDICINE

## 2019-09-30 PROCEDURE — 71046 XR CHEST PA AND LATERAL: ICD-10-PCS | Mod: 26,,, | Performed by: RADIOLOGY

## 2019-09-30 PROCEDURE — 71046 X-RAY EXAM CHEST 2 VIEWS: CPT | Mod: 26,,, | Performed by: RADIOLOGY

## 2019-09-30 PROCEDURE — 1101F PR PT FALLS ASSESS DOC 0-1 FALLS W/OUT INJ PAST YR: ICD-10-PCS | Mod: CPTII,S$GLB,, | Performed by: INTERNAL MEDICINE

## 2019-09-30 PROCEDURE — 71046 X-RAY EXAM CHEST 2 VIEWS: CPT | Mod: TC

## 2019-09-30 PROCEDURE — 99214 OFFICE O/P EST MOD 30 MIN: CPT | Mod: 25,S$GLB,, | Performed by: INTERNAL MEDICINE

## 2019-09-30 PROCEDURE — 94060 EVALUATION OF WHEEZING: CPT | Mod: S$GLB,,, | Performed by: INTERNAL MEDICINE

## 2019-09-30 PROCEDURE — 1101F PT FALLS ASSESS-DOCD LE1/YR: CPT | Mod: CPTII,S$GLB,, | Performed by: INTERNAL MEDICINE

## 2019-09-30 PROCEDURE — 94060 PR EVAL OF BRONCHOSPASM: ICD-10-PCS | Mod: S$GLB,,, | Performed by: INTERNAL MEDICINE

## 2019-09-30 PROCEDURE — 99214 PR OFFICE/OUTPT VISIT, EST, LEVL IV, 30-39 MIN: ICD-10-PCS | Mod: 25,S$GLB,, | Performed by: INTERNAL MEDICINE

## 2019-09-30 PROCEDURE — 99999 PR PBB SHADOW E&M-EST. PATIENT-LVL III: ICD-10-PCS | Mod: PBBFAC,,, | Performed by: INTERNAL MEDICINE

## 2019-09-30 PROCEDURE — 3078F DIAST BP <80 MM HG: CPT | Mod: CPTII,S$GLB,, | Performed by: INTERNAL MEDICINE

## 2019-09-30 RX ORDER — MONTELUKAST SODIUM 10 MG/1
10 TABLET ORAL NIGHTLY
Qty: 30 TABLET | Refills: 11 | Status: ON HOLD | OUTPATIENT
Start: 2019-09-30 | End: 2022-09-04 | Stop reason: SDUPTHER

## 2019-09-30 NOTE — PROGRESS NOTES
Subjective:       Patient ID: Jovan Del Cid Jr. is a 79 y.o. male.  Patient Active Problem List   Diagnosis    Asthma    Chronic cough    Intolerance of continuous positive airway pressure (CPAP) ventilation    Liver cyst    HTN (hypertension)    Dyslipidemia    Benign prostatic hyperplasia    Lipoma    Thrombocytopenia    Pelvic mass in male    Amyloid disease    Chronic maxillary sinusitis    Screening for colorectal cancer    Tubulovillous adenoma of colon    Adenomatous colon polyp    MCMAHAN (nonalcoholic steatohepatitis)    Mild intermittent asthma without complication    Class 2 severe obesity due to excess calories with serious comorbidity and body mass index (BMI) of 35.0 to 35.9 in adult       There is no immunization history on file for this patient.  Social History     Tobacco Use   Smoking Status Former Smoker    Years: 3.00    Types: Cigarettes, Pipe    Last attempt to quit: 1960    Years since quittin.7   Smokeless Tobacco Never Used   Tobacco Comment    smoked a pipe - quit smoking      Answers for HPI/ROS submitted by the patient on 2019   Asthma  In the past 4 weeks, how much of the time did your asthma keep you from getting as much done at work, school, or at home?: some of the time  During the past 4 weeks, how often have you had shortness of breath?: once a day  During the past 4 weeks, how often did your asthma symptoms (Wheezing, coughing, shortness of breath, chest tightness or pain) wake you up at night or earlier that usual in the morning?: not at all  During the past 4 weeks, how often have you used your rescue inhaler or nebulizer medication (such as albuterol)?: not at all  How would you rate your asthma control during the past 4 weeks?: well controlled   : 19    Cough index: 11    Chief Complaint: Asthma      Jovan Del Cid Jr. is a 79 y.o. male   Follow up, No cough, No congetion.No fever  ACT 19: hardly taking Advair 500/50   Here to reviewed :  "CXR and Drew  Normal Drew  Runs asphalt bussiness  Busy lifestyle.  No interval asthma exacerbations since last visit, Actually not used rescue albuterol in 12 months.  Spirometry normal       Asthma   There is no cough, shortness of breath, sputum production or wheezing. Associated symptoms include postnasal drip. His past medical history is significant for asthma.     Review of Systems   Constitutional: Negative.    HENT: Positive for postnasal drip. Negative for congestion.    Eyes: Negative.    Respiratory: Negative for cough, sputum production, shortness of breath, wheezing, asthma nighttime symptoms, pleurisy, dyspnea on extertion and use of rescue inhaler.    Cardiovascular: Negative.    Genitourinary: Negative.    Endocrine: endocrine negative   Musculoskeletal: Negative.    Skin: Negative.    Gastrointestinal: Negative.    Neurological: Negative.    Psychiatric/Behavioral: Negative.    All other systems reviewed and are negative.            Objective:       Vitals:    09/30/19 1419   BP: 128/70   Pulse: 80   Resp: 20   SpO2: (!) 93%   Weight: 100.6 kg (221 lb 12.5 oz)   Height: 5' 6.14" (1.68 m)       Physical Exam   Constitutional: He is oriented to person, place, and time. He appears well-developed and well-nourished.   HENT:   Head: Normocephalic and atraumatic.       Right Ear: External ear normal.   Left Ear: External ear normal.   Nose: Nose normal.   Mouth/Throat: Uvula is midline, oropharynx is clear and moist and mucous membranes are normal. No oropharyngeal exudate.   Eyes: Pupils are equal, round, and reactive to light. Conjunctivae, EOM and lids are normal. No scleral icterus.   Neck: Trachea normal, normal range of motion and phonation normal. Neck supple.   Neck 18"   Cardiovascular: Normal rate, regular rhythm, S1 normal, S2 normal, normal heart sounds, intact distal pulses and normal pulses.   No murmur heard.  Pulmonary/Chest: Effort normal. No respiratory distress. He has decreased " breath sounds in the left lower field. He has no wheezes. He has no rhonchi. He has no rales. He exhibits no tenderness.   Abdominal: Soft. Bowel sounds are normal.   Musculoskeletal: Normal range of motion. He exhibits no edema.   Lymphadenopathy:     He has no cervical adenopathy.     He has no axillary adenopathy.   Neurological: He is alert and oriented to person, place, and time. No cranial nerve deficit or sensory deficit. GCS eye subscore is 4. GCS verbal subscore is 5. GCS motor subscore is 6.   Skin: Skin is warm, dry and intact. No rash noted. No cyanosis. Nails show no clubbing.   Psychiatric: He has a normal mood and affect. His speech is normal.   Nursing note and vitals reviewed.    Personal Diagnostic Review  [x]  Chest x-ray:       TECHNIQUE:  PA and lateral views of the chest were performed.    COMPARISON:  12/18/2018    FINDINGS:  The cardiac and mediastinal silhouettes appear within normal limits.   The lungs are clear bilaterally.  No acute osseous findings demonstrated.      Impression       No acute findings.         [x] Drew Normal  FEV1 : 2.40( 93.8%), FVC 3.31( 96.7%)  FEV1/FVC 72.66    No flowsheet data found.      Assessment:       Problem List Items Addressed This Visit     Mild intermittent asthma without complication - Primary (Chronic)     Asthma ROS: taking medications as instructed, no medication side effects noted, no significant ongoing wheezing or shortness of breath, using bronchodilator MDI less than twice a week.   New concerns: None.   Exam: appears well, vitals normal, no respiratory distress, acyanotic, normal RR.   Assessment:  Asthma stable  Normal spirometry   Plan:VENTMARY CHAVEZULAIR. Current treatment plan is effective, no change in therapy.         Relevant Medications    montelukast (SINGULAIR) 10 mg tablet    Other Relevant Orders    X-Ray Chest PA And Lateral    Spirometry with/without bronchodilator    Intolerance of continuous positive airway pressure (CPAP)  ventilation     Complications of untreated sleep apnea discussed with pateint in detail including but not limited to  high blood pressure, heart attack, stroke, obesity,  diabetes and worsening heart failure. Patient voiced understanding.         HTN (hypertension)     Stable on DIOVAN HCTZ         Dyslipidemia     Stable on CRESTOR         Class 2 severe obesity due to excess calories with serious comorbidity and body mass index (BMI) of 35.0 to 35.9 in adult     General weight loss/lifestyle modification strategies discussed (elicit support from others; identify saboteurs; non-food rewards).  Diet interventions: low calorie (1000 kCal/d) deficit diet                 Plan:     Stable asthma  Declined PAP    Follow up in about 1 year (around 9/30/2020) for Spirometry and CXR next visit.    This note was prepared using voice recognition system and is likely to have sound alike errors that may have been overlooked even after proof reading.  Please call me with any questions    Discussed diagnosis, its evaluation, treatment and usual course. All questions answered.    Thank you for the courtesy of participating in the care of this patient    Stefan Buckner MD    Orders Placed This Encounter   Procedures    X-Ray Chest PA And Lateral     Standing Status:   Future     Standing Expiration Date:   9/29/2020    Spirometry with/without bronchodilator     Standing Status:   Future     Standing Expiration Date:   9/29/2020

## 2019-10-01 NOTE — ASSESSMENT & PLAN NOTE
Asthma ROS: taking medications as instructed, no medication side effects noted, no significant ongoing wheezing or shortness of breath, using bronchodilator MDI less than twice a week.   New concerns: None.   Exam: appears well, vitals normal, no respiratory distress, acyanotic, normal RR.   Assessment:  Asthma stable  Normal spirometry   Plan:MAKAYLA ZAVALETA. Current treatment plan is effective, no change in therapy.

## 2019-11-01 RX ORDER — FLUTICASONE PROPIONATE 50 MCG
SPRAY, SUSPENSION (ML) NASAL
Qty: 16 ML | Refills: 0 | Status: SHIPPED | OUTPATIENT
Start: 2019-11-01 | End: 2020-01-29

## 2019-11-14 ENCOUNTER — TELEPHONE (OUTPATIENT)
Dept: FAMILY MEDICINE | Facility: CLINIC | Age: 79
End: 2019-11-14

## 2019-11-14 NOTE — TELEPHONE ENCOUNTER
----- Message from Janis Sofia sent at 11/14/2019  8:22 AM CST -----  Contact: PT   Type:  Needs Medical Advice    Who Called:SANDIP SONI JR.  Symptoms (please be specific):  How long has patient had these symptoms:   Pharmacy name and phone #:   Would the patient rather a call back or a response via [facility]? Call   Best Call Back Number:   [phone] (work)   Additional Information:  Pt is requesting a call back from the nurse in regards to the pt needing some insurance paperwork filled out by his

## 2019-11-14 NOTE — TELEPHONE ENCOUNTER
Pt need medical statement filled out  Pt given fax number and explained that pcp will look over and may want a f/u appt     Will call pt back with further instructions

## 2019-11-18 RX ORDER — VALSARTAN AND HYDROCHLOROTHIAZIDE 160; 12.5 MG/1; MG/1
TABLET, FILM COATED ORAL
Qty: 90 TABLET | Refills: 3 | Status: SHIPPED | OUTPATIENT
Start: 2019-11-18 | End: 2021-02-05

## 2019-12-12 ENCOUNTER — TELEPHONE (OUTPATIENT)
Dept: FAMILY MEDICINE | Facility: CLINIC | Age: 79
End: 2019-12-12

## 2019-12-12 NOTE — TELEPHONE ENCOUNTER
----- Message from Jasmin Carter sent at 12/12/2019  1:26 PM CST -----  Contact: patient  Calling concerning the status of the forms sent about a month ago.m please call patient @ [phone]. Thankpat

## 2019-12-13 ENCOUNTER — TELEPHONE (OUTPATIENT)
Dept: FAMILY MEDICINE | Facility: CLINIC | Age: 79
End: 2019-12-13

## 2019-12-16 ENCOUNTER — TELEPHONE (OUTPATIENT)
Dept: FAMILY MEDICINE | Facility: CLINIC | Age: 79
End: 2019-12-16

## 2019-12-16 NOTE — TELEPHONE ENCOUNTER
----- Message from Cassidy Kincaid sent at 12/16/2019 11:02 AM CST -----  Patient needs call back rg insurance paperwork..[phone] (work)

## 2019-12-16 NOTE — TELEPHONE ENCOUNTER
Pt states he received the filled out paperwork but states the form was cut off during fax transaction. Pt states he will send over the form again with a fax cover to ensure paperwork comes out correct.

## 2019-12-20 ENCOUNTER — PATIENT MESSAGE (OUTPATIENT)
Dept: FAMILY MEDICINE | Facility: CLINIC | Age: 79
End: 2019-12-20

## 2020-01-20 ENCOUNTER — OFFICE VISIT (OUTPATIENT)
Dept: FAMILY MEDICINE | Facility: CLINIC | Age: 80
End: 2020-01-20
Payer: COMMERCIAL

## 2020-01-20 VITALS
OXYGEN SATURATION: 95 % | DIASTOLIC BLOOD PRESSURE: 72 MMHG | SYSTOLIC BLOOD PRESSURE: 135 MMHG | HEIGHT: 66 IN | TEMPERATURE: 98 F | BODY MASS INDEX: 35.98 KG/M2 | HEART RATE: 81 BPM | RESPIRATION RATE: 16 BRPM | WEIGHT: 223.88 LBS

## 2020-01-20 DIAGNOSIS — E78.5 DYSLIPIDEMIA: ICD-10-CM

## 2020-01-20 DIAGNOSIS — I10 ESSENTIAL HYPERTENSION: ICD-10-CM

## 2020-01-20 DIAGNOSIS — J45.20 MILD INTERMITTENT ASTHMA WITHOUT COMPLICATION: Primary | Chronic | ICD-10-CM

## 2020-01-20 DIAGNOSIS — Z12.5 ENCOUNTER FOR PROSTATE CANCER SCREENING: ICD-10-CM

## 2020-01-20 DIAGNOSIS — R73.9 HYPERGLYCEMIA: ICD-10-CM

## 2020-01-20 PROCEDURE — 3078F PR MOST RECENT DIASTOLIC BLOOD PRESSURE < 80 MM HG: ICD-10-PCS | Mod: CPTII,S$GLB,, | Performed by: INTERNAL MEDICINE

## 2020-01-20 PROCEDURE — 1101F PT FALLS ASSESS-DOCD LE1/YR: CPT | Mod: CPTII,S$GLB,, | Performed by: INTERNAL MEDICINE

## 2020-01-20 PROCEDURE — 90471 IMMUNIZATION ADMIN: CPT | Mod: S$GLB,,, | Performed by: INTERNAL MEDICINE

## 2020-01-20 PROCEDURE — 90471 FLU VACCINE - HIGH DOSE (65+) PRESERVATIVE FREE IM: ICD-10-PCS | Mod: S$GLB,,, | Performed by: INTERNAL MEDICINE

## 2020-01-20 PROCEDURE — 3075F PR MOST RECENT SYSTOLIC BLOOD PRESS GE 130-139MM HG: ICD-10-PCS | Mod: CPTII,S$GLB,, | Performed by: INTERNAL MEDICINE

## 2020-01-20 PROCEDURE — 1159F MED LIST DOCD IN RCRD: CPT | Mod: S$GLB,,, | Performed by: INTERNAL MEDICINE

## 2020-01-20 PROCEDURE — 99999 PR PBB SHADOW E&M-EST. PATIENT-LVL IV: ICD-10-PCS | Mod: PBBFAC,,, | Performed by: INTERNAL MEDICINE

## 2020-01-20 PROCEDURE — 1159F PR MEDICATION LIST DOCUMENTED IN MEDICAL RECORD: ICD-10-PCS | Mod: S$GLB,,, | Performed by: INTERNAL MEDICINE

## 2020-01-20 PROCEDURE — 1126F AMNT PAIN NOTED NONE PRSNT: CPT | Mod: S$GLB,,, | Performed by: INTERNAL MEDICINE

## 2020-01-20 PROCEDURE — 90662 IIV NO PRSV INCREASED AG IM: CPT | Mod: S$GLB,,, | Performed by: INTERNAL MEDICINE

## 2020-01-20 PROCEDURE — 90662 FLU VACCINE - HIGH DOSE (65+) PRESERVATIVE FREE IM: ICD-10-PCS | Mod: S$GLB,,, | Performed by: INTERNAL MEDICINE

## 2020-01-20 PROCEDURE — 3075F SYST BP GE 130 - 139MM HG: CPT | Mod: CPTII,S$GLB,, | Performed by: INTERNAL MEDICINE

## 2020-01-20 PROCEDURE — 99999 PR PBB SHADOW E&M-EST. PATIENT-LVL IV: CPT | Mod: PBBFAC,,, | Performed by: INTERNAL MEDICINE

## 2020-01-20 PROCEDURE — 3078F DIAST BP <80 MM HG: CPT | Mod: CPTII,S$GLB,, | Performed by: INTERNAL MEDICINE

## 2020-01-20 PROCEDURE — 1101F PR PT FALLS ASSESS DOC 0-1 FALLS W/OUT INJ PAST YR: ICD-10-PCS | Mod: CPTII,S$GLB,, | Performed by: INTERNAL MEDICINE

## 2020-01-20 PROCEDURE — 1126F PR PAIN SEVERITY QUANTIFIED, NO PAIN PRESENT: ICD-10-PCS | Mod: S$GLB,,, | Performed by: INTERNAL MEDICINE

## 2020-01-20 PROCEDURE — 99214 PR OFFICE/OUTPT VISIT, EST, LEVL IV, 30-39 MIN: ICD-10-PCS | Mod: 25,S$GLB,, | Performed by: INTERNAL MEDICINE

## 2020-01-20 PROCEDURE — 99214 OFFICE O/P EST MOD 30 MIN: CPT | Mod: 25,S$GLB,, | Performed by: INTERNAL MEDICINE

## 2020-01-20 NOTE — PROGRESS NOTES
Subjective:       Patient ID: Jovan Del Cid Jr. is a 79 y.o. male.    Chief Complaint: Annual Exam; Hypertension; Hyperlipidemia; and Asthma    Hypertension   Pertinent negatives include no chest pain, headaches, neck pain, palpitations or shortness of breath.   Hyperlipidemia   Associated symptoms include myalgias. Pertinent negatives include no chest pain or shortness of breath.   Asthma   There is no cough, shortness of breath or wheezing. Associated symptoms include myalgias. Pertinent negatives include no appetite change, chest pain, ear pain, fever, headaches, postnasal drip, rhinorrhea, sneezing, sore throat or trouble swallowing. His past medical history is significant for asthma.     Past Medical History:   Diagnosis Date    Asthma     Bronchitis chronic     Cough     Hyperlipidemia     Hypertension      Past Surgical History:   Procedure Laterality Date    COLONOSCOPY N/A 6/21/2016    Procedure: COLONOSCOPY;  Surgeon: Alonso Dorsey MD;  Location: [facility];  Service: Endoscopy;  Laterality: N/A;    COLONOSCOPY N/A 11/1/2016    Procedure: COLONOSCOPY;  Surgeon: Alonso Dorsey MD;  Location: [facility];  Service: Endoscopy;  Laterality: N/A;    COLONOSCOPY N/A 2/3/2017    Procedure: COLONOSCOPY;  Surgeon: Alonso Dorsey MD;  Location: [facility];  Service: Endoscopy;  Laterality: N/A;    COLONOSCOPY N/A 11/13/2017    Procedure: COLONOSCOPY;  Surgeon: Alonso Dorsey MD;  Location: [facility];  Service: Endoscopy;  Laterality: N/A;    FLUOROSCOPY N/A 6/15/2018    Procedure: Transjugular liver bx;  Surgeon: Petros Recio MD;  Location: [facility] CATH LAB;  Service: General;  Laterality: N/A;    TONSILLECTOMY       Family History   Problem Relation Age of Onset    Cancer Father     Alzheimer's disease Mother     Colon cancer Neg Hx     Melanoma Neg Hx      Social History     Socioeconomic History    Marital status:      Spouse name: Not on file    Number of children:  Not on file    Years of education: Not on file    Highest education level: Not on file   Occupational History    Not on file   Social Needs    Financial resource strain: Not on file    Food insecurity:     Worry: Not on file     Inability: Not on file    Transportation needs:     Medical: Not on file     Non-medical: Not on file   Tobacco Use    Smoking status: Former Smoker     Years: 3.00     Types: Cigarettes, Pipe     Last attempt to quit:      Years since quittin.0    Smokeless tobacco: Never Used    Tobacco comment: smoked a pipe - quit smoking    Substance and Sexual Activity    Alcohol use: Yes     Alcohol/week: 2.0 - 3.0 standard drinks     Types: 2 - 3 Cans of beer per week     Comment: daily    Drug use: No    Sexual activity: Never   Lifestyle    Physical activity:     Days per week: Not on file     Minutes per session: Not on file    Stress: Not on file   Relationships    Social connections:     Talks on phone: Not on file     Gets together: Not on file     Attends Congregational service: Not on file     Active member of club or organization: Not on file     Attends meetings of clubs or organizations: Not on file     Relationship status: Not on file   Other Topics Concern    Not on file   Social History Narrative    Not on file     Review of Systems   Constitutional: Negative for activity change, appetite change, chills, diaphoresis, fatigue, fever and unexpected weight change.   HENT: Negative for drooling, ear discharge, ear pain, facial swelling, hearing loss, mouth sores, nosebleeds, postnasal drip, rhinorrhea, sinus pressure, sneezing, sore throat, tinnitus, trouble swallowing and voice change.    Eyes: Negative for photophobia, redness and visual disturbance.   Respiratory: Negative for apnea, cough, choking, chest tightness, shortness of breath and wheezing.    Cardiovascular: Negative for chest pain, palpitations and leg swelling.   Gastrointestinal: Negative for  abdominal distention, abdominal pain, anal bleeding, blood in stool, constipation, diarrhea, nausea and vomiting.   Endocrine: Negative for cold intolerance, heat intolerance, polydipsia, polyphagia and polyuria.   Genitourinary: Negative for difficulty urinating, dysuria, enuresis, flank pain, frequency, genital sores, hematuria and urgency.   Musculoskeletal: Positive for arthralgias and myalgias. Negative for back pain, gait problem, joint swelling, neck pain and neck stiffness.   Skin: Negative for color change, pallor, rash and wound.   Allergic/Immunologic: Negative for food allergies and immunocompromised state.   Neurological: Negative for dizziness, tremors, seizures, syncope, facial asymmetry, speech difficulty, weakness, light-headedness, numbness and headaches.   Hematological: Negative for adenopathy. Does not bruise/bleed easily.   Psychiatric/Behavioral: Negative for agitation, behavioral problems, confusion, decreased concentration, dysphoric mood, hallucinations, self-injury, sleep disturbance and suicidal ideas. The patient is not nervous/anxious and is not hyperactive.        Objective:      Physical Exam   Constitutional: He is oriented to person, place, and time. He appears well-developed and well-nourished. No distress.   HENT:   Head: Normocephalic and atraumatic.   Eyes: Pupils are equal, round, and reactive to light.   Neck: Normal range of motion. Neck supple. No JVD present. Carotid bruit is not present. No tracheal deviation present. No thyromegaly present.   Cardiovascular: Normal rate, regular rhythm, normal heart sounds and intact distal pulses.   Pulmonary/Chest: Effort normal and breath sounds normal. No respiratory distress. He has no wheezes. He has no rales. He exhibits no tenderness.   Abdominal: Soft. Bowel sounds are normal. He exhibits no distension. There is no tenderness. There is no rebound and no guarding.   Musculoskeletal: Normal range of motion. He exhibits no edema or  tenderness.   Lymphadenopathy:     He has no cervical adenopathy.   Neurological: He is alert and oriented to person, place, and time.   Skin: Skin is warm and dry. No rash noted. He is not diaphoretic. No erythema. No pallor.   Psychiatric: He has a normal mood and affect. His behavior is normal. Judgment and thought content normal.   Nursing note and vitals reviewed.      CMP  Sodium   Date Value Ref Range Status   12/20/2018 137 136 - 145 mmol/L Final     Potassium   Date Value Ref Range Status   12/20/2018 4.6 3.5 - 5.1 mmol/L Final     Chloride   Date Value Ref Range Status   12/20/2018 102 95 - 110 mmol/L Final     CO2   Date Value Ref Range Status   12/20/2018 27 23 - 29 mmol/L Final     Glucose   Date Value Ref Range Status   12/20/2018 143 (H) 70 - 110 mg/dL Final     BUN, Bld   Date Value Ref Range Status   12/20/2018 19 8 - 23 mg/dL Final     Creatinine   Date Value Ref Range Status   12/20/2018 0.9 0.5 - 1.4 mg/dL Final     Calcium   Date Value Ref Range Status   12/20/2018 9.2 8.7 - 10.5 mg/dL Final     Total Protein   Date Value Ref Range Status   12/20/2018 6.9 6.0 - 8.4 g/dL Final     Albumin   Date Value Ref Range Status   12/20/2018 4.1 3.5 - 5.2 g/dL Final     Total Bilirubin   Date Value Ref Range Status   12/20/2018 1.6 (H) 0.1 - 1.0 mg/dL Final     Comment:     For infants and newborns, interpretation of results should be based  on gestational age, weight and in agreement with clinical  observations.  Premature Infant recommended reference ranges:  Up to 24 hours.............<8.0 mg/dL  Up to 48 hours............<12.0 mg/dL  3-5 days..................<15.0 mg/dL  6-29 days.................<15.0 mg/dL       Alkaline Phosphatase   Date Value Ref Range Status   12/20/2018 51 (L) 55 - 135 U/L Final     AST   Date Value Ref Range Status   12/20/2018 20 10 - 40 U/L Final     ALT   Date Value Ref Range Status   12/20/2018 25 10 - 44 U/L Final     Anion Gap   Date Value Ref Range Status   12/20/2018 8  8 - 16 mmol/L Final     eGFR if    Date Value Ref Range Status   12/20/2018 >60.0 >60 mL/min/1.73 m^2 Final     eGFR if non    Date Value Ref Range Status   12/20/2018 >60.0 >60 mL/min/1.73 m^2 Final     Comment:     Calculation used to obtain the estimated glomerular filtration  rate (eGFR) is the CKD-EPI equation.        Lab Results   Component Value Date    WBC 4.89 12/20/2018    HGB 13.6 (L) 12/20/2018    HCT 40.5 12/20/2018    MCV 96 12/20/2018     (L) 12/20/2018     Lab Results   Component Value Date    CHOL 188 12/20/2018     Lab Results   Component Value Date    HDL 50 12/20/2018     Lab Results   Component Value Date    LDLCALC 116.2 12/20/2018     Lab Results   Component Value Date    TRIG 109 12/20/2018     Lab Results   Component Value Date    CHOLHDL 26.6 12/20/2018     Lab Results   Component Value Date    TSH 2.789 12/20/2018     Lab Results   Component Value Date    HGBA1C 5.4 12/20/2018     Assessment:       1. Mild intermittent asthma without complication    2. Essential hypertension    3. Dyslipidemia    4. Hyperglycemia    5. Encounter for prostate cancer screening        Plan:   Mild intermittent asthma without complication    Essential hypertension  -     Comprehensive metabolic panel; Future; Expected date: 01/20/2020  -     CBC auto differential; Future; Expected date: 01/20/2020    Dyslipidemia  -     Lipid panel; Future; Expected date: 01/20/2020    Hyperglycemia  -     Hemoglobin A1c; Future; Expected date: 01/20/2020    Encounter for prostate cancer screening  -     PSA, Screening; Future; Expected date: 01/20/2020    Stable--------------------continue current meds, watch diet,exercise.                   F/u 6 months-colon.

## 2020-01-29 RX ORDER — FLUTICASONE PROPIONATE 50 MCG
SPRAY, SUSPENSION (ML) NASAL
Qty: 16 G | Refills: 3 | Status: SHIPPED | OUTPATIENT
Start: 2020-01-29 | End: 2020-10-08

## 2020-07-20 ENCOUNTER — OFFICE VISIT (OUTPATIENT)
Dept: FAMILY MEDICINE | Facility: CLINIC | Age: 80
End: 2020-07-20
Payer: COMMERCIAL

## 2020-07-20 VITALS
TEMPERATURE: 98 F | OXYGEN SATURATION: 94 % | DIASTOLIC BLOOD PRESSURE: 72 MMHG | BODY MASS INDEX: 35.97 KG/M2 | WEIGHT: 222.88 LBS | RESPIRATION RATE: 16 BRPM | HEART RATE: 78 BPM | SYSTOLIC BLOOD PRESSURE: 122 MMHG

## 2020-07-20 DIAGNOSIS — E78.5 DYSLIPIDEMIA: ICD-10-CM

## 2020-07-20 DIAGNOSIS — I10 ESSENTIAL HYPERTENSION: ICD-10-CM

## 2020-07-20 DIAGNOSIS — J45.20 MILD INTERMITTENT ASTHMA WITHOUT COMPLICATION: Primary | ICD-10-CM

## 2020-07-20 DIAGNOSIS — Z00.00 ROUTINE ADULT HEALTH MAINTENANCE: ICD-10-CM

## 2020-07-20 PROCEDURE — 3074F PR MOST RECENT SYSTOLIC BLOOD PRESSURE < 130 MM HG: ICD-10-PCS | Mod: CPTII,S$GLB,, | Performed by: INTERNAL MEDICINE

## 2020-07-20 PROCEDURE — 3078F DIAST BP <80 MM HG: CPT | Mod: CPTII,S$GLB,, | Performed by: INTERNAL MEDICINE

## 2020-07-20 PROCEDURE — 99397 PR PREVENTIVE VISIT,EST,65 & OVER: ICD-10-PCS | Mod: S$GLB,,, | Performed by: INTERNAL MEDICINE

## 2020-07-20 PROCEDURE — 99999 PR PBB SHADOW E&M-EST. PATIENT-LVL III: CPT | Mod: PBBFAC,,, | Performed by: INTERNAL MEDICINE

## 2020-07-20 PROCEDURE — 99397 PER PM REEVAL EST PAT 65+ YR: CPT | Mod: S$GLB,,, | Performed by: INTERNAL MEDICINE

## 2020-07-20 PROCEDURE — 99999 PR PBB SHADOW E&M-EST. PATIENT-LVL III: ICD-10-PCS | Mod: PBBFAC,,, | Performed by: INTERNAL MEDICINE

## 2020-07-20 PROCEDURE — 3074F SYST BP LT 130 MM HG: CPT | Mod: CPTII,S$GLB,, | Performed by: INTERNAL MEDICINE

## 2020-07-20 PROCEDURE — 3078F PR MOST RECENT DIASTOLIC BLOOD PRESSURE < 80 MM HG: ICD-10-PCS | Mod: CPTII,S$GLB,, | Performed by: INTERNAL MEDICINE

## 2020-07-20 NOTE — PROGRESS NOTES
Subjective:       Patient ID: Jovan Del Cid Jr. is a 79 y.o. male.    Chief Complaint: Follow-up, Hypertension, Hyperlipidemia, and Asthma    Follow-up  Pertinent negatives include no abdominal pain, arthralgias, chest pain, chills, coughing, diaphoresis, fatigue, fever, headaches, joint swelling, myalgias, nausea, neck pain, numbness, rash, sore throat, vomiting or weakness.   Hypertension  Pertinent negatives include no chest pain, headaches, neck pain, palpitations or shortness of breath.   Hyperlipidemia  Pertinent negatives include no chest pain, myalgias or shortness of breath.   Asthma  There is no cough, shortness of breath or wheezing. Pertinent negatives include no appetite change, chest pain, ear pain, fever, headaches, myalgias, postnasal drip, rhinorrhea, sneezing, sore throat or trouble swallowing. His past medical history is significant for asthma.     Past Medical History:   Diagnosis Date    Asthma     Bronchitis chronic     Cough     Hyperlipidemia     Hypertension      Past Surgical History:   Procedure Laterality Date    COLONOSCOPY N/A 6/21/2016    Procedure: COLONOSCOPY;  Surgeon: Alonso Dorsey MD;  Location: [facility];  Service: Endoscopy;  Laterality: N/A;    COLONOSCOPY N/A 11/1/2016    Procedure: COLONOSCOPY;  Surgeon: Alonso Dorsey MD;  Location: [facility];  Service: Endoscopy;  Laterality: N/A;    COLONOSCOPY N/A 2/3/2017    Procedure: COLONOSCOPY;  Surgeon: Alonso Dorsey MD;  Location: [facility] ENDO;  Service: Endoscopy;  Laterality: N/A;    COLONOSCOPY N/A 11/13/2017    Procedure: COLONOSCOPY;  Surgeon: Alonso Dorsey MD;  Location: [facility];  Service: Endoscopy;  Laterality: N/A;    FLUOROSCOPY N/A 6/15/2018    Procedure: Transjugular liver bx;  Surgeon: Petros Recio MD;  Location: [facility] CATH LAB;  Service: General;  Laterality: N/A;    TONSILLECTOMY       Family History   Problem Relation Age of Onset    Cancer Father     Alzheimer's disease  Mother     Colon cancer Neg Hx     Melanoma Neg Hx      Social History     Socioeconomic History    Marital status:      Spouse name: Not on file    Number of children: Not on file    Years of education: Not on file    Highest education level: Not on file   Occupational History    Not on file   Social Needs    Financial resource strain: Not on file    Food insecurity     Worry: Not on file     Inability: Not on file    Transportation needs     Medical: Not on file     Non-medical: Not on file   Tobacco Use    Smoking status: Former Smoker     Years: 3.00     Types: Cigarettes, Pipe     Quit date:      Years since quittin.5    Smokeless tobacco: Never Used    Tobacco comment: smoked a pipe - quit smoking    Substance and Sexual Activity    Alcohol use: Yes     Alcohol/week: 2.0 - 3.0 standard drinks     Types: 2 - 3 Cans of beer per week     Comment: daily    Drug use: No    Sexual activity: Never   Lifestyle    Physical activity     Days per week: Not on file     Minutes per session: Not on file    Stress: Not on file   Relationships    Social connections     Talks on phone: Not on file     Gets together: Not on file     Attends [employer] service: Not on file     Active member of club or organization: Not on file     Attends meetings of clubs or organizations: Not on file     Relationship status: Not on file   Other Topics Concern    Not on file   Social History Narrative    Not on file     Review of Systems   Constitutional: Negative for activity change, appetite change, chills, diaphoresis, fatigue, fever and unexpected weight change.   HENT: Negative for drooling, ear discharge, ear pain, facial swelling, hearing loss, mouth sores, nosebleeds, postnasal drip, rhinorrhea, sinus pressure, sneezing, sore throat, tinnitus, trouble swallowing and voice change.    Eyes: Negative for photophobia, redness and visual disturbance.   Respiratory: Negative for apnea, cough, choking,  chest tightness, shortness of breath and wheezing.    Cardiovascular: Negative for chest pain, palpitations and leg swelling.   Gastrointestinal: Negative for abdominal distention, abdominal pain, anal bleeding, blood in stool, constipation, diarrhea, nausea, rectal pain and vomiting.   Endocrine: Negative for cold intolerance, heat intolerance, polydipsia, polyphagia and polyuria.   Genitourinary: Negative for difficulty urinating, dysuria, enuresis, flank pain, frequency, genital sores, hematuria and urgency.   Musculoskeletal: Negative for arthralgias, back pain, gait problem, joint swelling, myalgias, neck pain and neck stiffness.   Skin: Negative for color change, pallor, rash and wound.   Allergic/Immunologic: Negative for food allergies and immunocompromised state.   Neurological: Negative for dizziness, tremors, seizures, syncope, facial asymmetry, speech difficulty, weakness, light-headedness, numbness and headaches.   Hematological: Negative for adenopathy. Does not bruise/bleed easily.   Psychiatric/Behavioral: Negative for agitation, behavioral problems, confusion, decreased concentration, dysphoric mood, hallucinations, self-injury, sleep disturbance and suicidal ideas. The patient is not nervous/anxious and is not hyperactive.        Objective:      Physical Exam  Vitals signs and nursing note reviewed.   Constitutional:       General: He is not in acute distress.     Appearance: He is well-developed. He is not diaphoretic.   HENT:      Head: Normocephalic and atraumatic.      Mouth/Throat:      Pharynx: No oropharyngeal exudate.   Eyes:      General: No scleral icterus.     Pupils: Pupils are equal, round, and reactive to light.   Neck:      Musculoskeletal: Normal range of motion and neck supple.      Thyroid: No thyromegaly.      Vascular: No carotid bruit or JVD.      Trachea: No tracheal deviation.   Cardiovascular:      Rate and Rhythm: Normal rate and regular rhythm.      Heart sounds: Normal  heart sounds.   Pulmonary:      Effort: Pulmonary effort is normal. No respiratory distress.      Breath sounds: Normal breath sounds. No wheezing or rales.   Chest:      Chest wall: No tenderness.   Abdominal:      General: Bowel sounds are normal. There is no distension.      Palpations: Abdomen is soft.      Tenderness: There is no abdominal tenderness. There is no guarding or rebound.   Musculoskeletal: Normal range of motion.         General: No tenderness.   Lymphadenopathy:      Cervical: No cervical adenopathy.   Skin:     General: Skin is warm and dry.      Coloration: Skin is not pale.      Findings: No erythema or rash.   Neurological:      Mental Status: He is alert and oriented to person, place, and time.      Coordination: Coordination normal.   Psychiatric:         Behavior: Behavior normal.         Thought Content: Thought content normal.         Judgment: Judgment normal.         CMP  Sodium   Date Value Ref Range Status   12/20/2018 137 136 - 145 mmol/L Final     Potassium   Date Value Ref Range Status   12/20/2018 4.6 3.5 - 5.1 mmol/L Final     Chloride   Date Value Ref Range Status   12/20/2018 102 95 - 110 mmol/L Final     CO2   Date Value Ref Range Status   12/20/2018 27 23 - 29 mmol/L Final     Glucose   Date Value Ref Range Status   12/20/2018 143 (H) 70 - 110 mg/dL Final     BUN, Bld   Date Value Ref Range Status   12/20/2018 19 8 - 23 mg/dL Final     Creatinine   Date Value Ref Range Status   12/20/2018 0.9 0.5 - 1.4 mg/dL Final     Calcium   Date Value Ref Range Status   12/20/2018 9.2 8.7 - 10.5 mg/dL Final     Total Protein   Date Value Ref Range Status   12/20/2018 6.9 6.0 - 8.4 g/dL Final     Albumin   Date Value Ref Range Status   12/20/2018 4.1 3.5 - 5.2 g/dL Final     Total Bilirubin   Date Value Ref Range Status   12/20/2018 1.6 (H) 0.1 - 1.0 mg/dL Final     Comment:     For infants and newborns, interpretation of results should be based  on gestational age, weight and in agreement  with clinical  observations.  Premature Infant recommended reference ranges:  Up to 24 hours.............<8.0 mg/dL  Up to 48 hours............<12.0 mg/dL  3-5 days..................<15.0 mg/dL  6-29 days.................<15.0 mg/dL       Alkaline Phosphatase   Date Value Ref Range Status   12/20/2018 51 (L) 55 - 135 U/L Final     AST   Date Value Ref Range Status   12/20/2018 20 10 - 40 U/L Final     ALT   Date Value Ref Range Status   12/20/2018 25 10 - 44 U/L Final     Anion Gap   Date Value Ref Range Status   12/20/2018 8 8 - 16 mmol/L Final     eGFR if    Date Value Ref Range Status   12/20/2018 >60.0 >60 mL/min/1.73 m^2 Final     eGFR if non    Date Value Ref Range Status   12/20/2018 >60.0 >60 mL/min/1.73 m^2 Final     Comment:     Calculation used to obtain the estimated glomerular filtration  rate (eGFR) is the CKD-EPI equation.        Lab Results   Component Value Date    WBC 4.89 12/20/2018    HGB 13.6 (L) 12/20/2018    HCT 40.5 12/20/2018    MCV 96 12/20/2018     (L) 12/20/2018     Lab Results   Component Value Date    CHOL 188 12/20/2018     Lab Results   Component Value Date    HDL 50 12/20/2018     Lab Results   Component Value Date    LDLCALC 116.2 12/20/2018     Lab Results   Component Value Date    TRIG 109 12/20/2018     Lab Results   Component Value Date    CHOLHDL 26.6 12/20/2018     Lab Results   Component Value Date    TSH 2.789 12/20/2018     Lab Results   Component Value Date    HGBA1C 5.4 12/20/2018     Assessment:       1. Mild intermittent asthma without complication    2. Dyslipidemia    3. Essential hypertension    4. Routine adult health maintenance        Plan:   Mild intermittent asthma without complication    Dyslipidemia  -     Lipid Panel; Future; Expected date: 07/20/2020    Essential hypertension  -     Comprehensive metabolic panel; Future; Expected date: 07/20/2020  -     CBC auto differential; Future; Expected date: 07/20/2020    Routine  adult health maintenance  -     Hemoglobin A1C; Future; Expected date: 07/20/2020  -     PSA, Screening; Future; Expected date: 07/20/2020  -     Case request GI: COLONOSCOPY    Stable--------------continue current meds-----------has not been taking his crestor.                  F/u 6 months---------------

## 2020-07-23 ENCOUNTER — LAB VISIT (OUTPATIENT)
Dept: LAB | Facility: HOSPITAL | Age: 80
End: 2020-07-23
Attending: INTERNAL MEDICINE
Payer: COMMERCIAL

## 2020-07-23 DIAGNOSIS — E78.5 DYSLIPIDEMIA: ICD-10-CM

## 2020-07-23 DIAGNOSIS — Z12.5 ENCOUNTER FOR PROSTATE CANCER SCREENING: ICD-10-CM

## 2020-07-23 DIAGNOSIS — R73.9 HYPERGLYCEMIA: ICD-10-CM

## 2020-07-23 DIAGNOSIS — I10 ESSENTIAL HYPERTENSION: ICD-10-CM

## 2020-07-23 LAB
BASOPHILS # BLD AUTO: 0.05 K/UL (ref 0–0.2)
BASOPHILS NFR BLD: 1 % (ref 0–1.9)
DIFFERENTIAL METHOD: ABNORMAL
EOSINOPHIL # BLD AUTO: 0.4 K/UL (ref 0–0.5)
EOSINOPHIL NFR BLD: 9 % (ref 0–8)
ERYTHROCYTE [DISTWIDTH] IN BLOOD BY AUTOMATED COUNT: 14.7 % (ref 11.5–14.5)
HCT VFR BLD AUTO: 42.5 % (ref 40–54)
HGB BLD-MCNC: 14 G/DL (ref 14–18)
IMM GRANULOCYTES # BLD AUTO: 0.02 K/UL (ref 0–0.04)
IMM GRANULOCYTES NFR BLD AUTO: 0.4 % (ref 0–0.5)
LYMPHOCYTES # BLD AUTO: 0.9 K/UL (ref 1–4.8)
LYMPHOCYTES NFR BLD: 18.8 % (ref 18–48)
MCH RBC QN AUTO: 32.5 PG (ref 27–31)
MCHC RBC AUTO-ENTMCNC: 32.9 G/DL (ref 32–36)
MCV RBC AUTO: 99 FL (ref 82–98)
MONOCYTES # BLD AUTO: 0.6 K/UL (ref 0.3–1)
MONOCYTES NFR BLD: 12.1 % (ref 4–15)
NEUTROPHILS # BLD AUTO: 2.8 K/UL (ref 1.8–7.7)
NEUTROPHILS NFR BLD: 58.7 % (ref 38–73)
NRBC BLD-RTO: 0 /100 WBC
PLATELET # BLD AUTO: 156 K/UL (ref 150–350)
PMV BLD AUTO: 10.2 FL (ref 9.2–12.9)
RBC # BLD AUTO: 4.31 M/UL (ref 4.6–6.2)
WBC # BLD AUTO: 4.79 K/UL (ref 3.9–12.7)

## 2020-07-23 PROCEDURE — 83036 HEMOGLOBIN GLYCOSYLATED A1C: CPT

## 2020-07-23 PROCEDURE — 85025 COMPLETE CBC W/AUTO DIFF WBC: CPT

## 2020-07-23 PROCEDURE — 80053 COMPREHEN METABOLIC PANEL: CPT

## 2020-07-23 PROCEDURE — 80061 LIPID PANEL: CPT

## 2020-07-23 PROCEDURE — 84153 ASSAY OF PSA TOTAL: CPT

## 2020-07-23 PROCEDURE — 36415 COLL VENOUS BLD VENIPUNCTURE: CPT | Mod: PO

## 2020-07-24 ENCOUNTER — TELEPHONE (OUTPATIENT)
Dept: FAMILY MEDICINE | Facility: CLINIC | Age: 80
End: 2020-07-24

## 2020-07-24 DIAGNOSIS — E78.5 DYSLIPIDEMIA: Primary | ICD-10-CM

## 2020-07-24 LAB
ALBUMIN SERPL BCP-MCNC: 4.2 G/DL (ref 3.5–5.2)
ALP SERPL-CCNC: 54 U/L (ref 55–135)
ALT SERPL W/O P-5'-P-CCNC: 36 U/L (ref 10–44)
ANION GAP SERPL CALC-SCNC: 11 MMOL/L (ref 8–16)
AST SERPL-CCNC: 27 U/L (ref 10–40)
BILIRUB SERPL-MCNC: 1.3 MG/DL (ref 0.1–1)
BUN SERPL-MCNC: 15 MG/DL (ref 8–23)
CALCIUM SERPL-MCNC: 9.4 MG/DL (ref 8.7–10.5)
CHLORIDE SERPL-SCNC: 101 MMOL/L (ref 95–110)
CHOLEST SERPL-MCNC: 302 MG/DL (ref 120–199)
CHOLEST/HDLC SERPL: 6.9 {RATIO} (ref 2–5)
CO2 SERPL-SCNC: 26 MMOL/L (ref 23–29)
COMPLEXED PSA SERPL-MCNC: 0.87 NG/ML (ref 0–4)
CREAT SERPL-MCNC: 0.9 MG/DL (ref 0.5–1.4)
EST. GFR  (AFRICAN AMERICAN): >60 ML/MIN/1.73 M^2
EST. GFR  (NON AFRICAN AMERICAN): >60 ML/MIN/1.73 M^2
ESTIMATED AVG GLUCOSE: 114 MG/DL (ref 68–131)
GLUCOSE SERPL-MCNC: 118 MG/DL (ref 70–110)
HBA1C MFR BLD HPLC: 5.6 % (ref 4–5.6)
HDLC SERPL-MCNC: 44 MG/DL (ref 40–75)
HDLC SERPL: 14.6 % (ref 20–50)
LDLC SERPL CALC-MCNC: 201.8 MG/DL (ref 63–159)
NONHDLC SERPL-MCNC: 258 MG/DL
POTASSIUM SERPL-SCNC: 4.9 MMOL/L (ref 3.5–5.1)
PROT SERPL-MCNC: 6.7 G/DL (ref 6–8.4)
SODIUM SERPL-SCNC: 138 MMOL/L (ref 136–145)
TRIGL SERPL-MCNC: 281 MG/DL (ref 30–150)

## 2020-07-24 RX ORDER — ROSUVASTATIN CALCIUM 40 MG/1
40 TABLET, COATED ORAL DAILY
Qty: 90 TABLET | Refills: 3 | Status: SHIPPED | OUTPATIENT
Start: 2020-07-24 | End: 2022-04-22

## 2020-07-24 NOTE — TELEPHONE ENCOUNTER
Chol is very high--------over 300-------------get back on crestor and repeat cmp,lipids in 1 month--------

## 2020-07-29 ENCOUNTER — TELEPHONE (OUTPATIENT)
Dept: ENDOSCOPY | Facility: HOSPITAL | Age: 80
End: 2020-07-29

## 2020-07-29 DIAGNOSIS — Z13.9 ASIAN ANCESTRY REQUIRING POPULATION-SPECIFIC GENETIC SCREENING: ICD-10-CM

## 2020-07-29 DIAGNOSIS — Z13.9 SCREENING PROCEDURE: Primary | ICD-10-CM

## 2020-07-29 RX ORDER — SODIUM, POTASSIUM,MAG SULFATES 17.5-3.13G
1 SOLUTION, RECONSTITUTED, ORAL ORAL DAILY
Qty: 1 KIT | Refills: 0 | Status: SHIPPED | OUTPATIENT
Start: 2020-07-29 | End: 2020-07-31

## 2020-07-29 NOTE — TELEPHONE ENCOUNTER
Location Screening:    If answers yes to any of the following, schedule at [facility] ONLY. If No, OK for either location.    1. Is there a diagnosis of heart failure, severe heart valve disease (aortic stenosis) or mechanical valve? no  a. Is the Left Ventricle Ejection Fraction <30% ? no    2. Does the pt have pulmonary hypertension? no   a. Is pulmonary arterial pressure gradient >50mmHg? no   b. Is there evidence of right ventricular dysfunction? no    3. Does the pt have achalasia? no    4. Any history of negative reaction to anesthesia? no   a. Myasthenia gravis? not applicable   b. Malignant hyperthermia? no   c. Other? no    5. Is procedure for esophageal banding? no      COVID Screening    1. Have you had a fever in the last 7 days or have you used fever reducing medicines for a fever in the last 7 days?  no    2. Are you experiencing shortness of breath, cough, muscle aches, loss of taste or loss of smell?  no    If answered yes to questions 1 and 2, the patient must seek medical attention with their PCP.  Do not schedule their procedure.     3. Are you residing with anyone who has tested positive for Covid?  no    If answered yes to question 3, recommend 14 day self-quarantine from the date of relative's positive test and place special needs note in the depot.  Wait to schedule.     ENDO screening    1. Have you been admitted for cardiac, kidney or pulmonary causes to the hospital in the past 3 months? no   If yes, schedule an appointment with PCP before scheduling endoscopic procedure.     2. Have you had a stent placed in the last 12 months? no   If yes, for a screening visit, cancel and message the ordering provider.  The patient will need a new order when the time is appropriate.     3. Have you had a stroke or heart attack in the past 6 months? no   If yes, cancel and refer patient to ordering provider for clearance, also message ordering provider to inform.     4. Have you had any chest pain in the past  "3 months? no   If yes, Have you been evaluated by your PCP and/or cardiologist and it was determined to not be heart related? not applicable   If No, Pt needs to be seen by PCP or Cardiologist .  Pt can be scheduled once clearance obtained by either of those providers.     5. Do you take prescription weight loss medications?  no   If yes, must stop for 2 weeks prior to procedure.     6. Have you been diagnosed with diverticulitis within the past 3 months? no   If yes, must have been seen by GI within the last 3 months, if not schedule with GI [facility].    If pt has been seen by GI, schedule procedure 8-12 weeks post antibiotic treatment.     7. Are you on Dialysis? no  If yes, schedule procedure for the day AFTER dialysis.  Appt time should be 9am or later, patient arrival time is 2 hours prior.  Nulytely or miralax prep for all patients with kidney disease.     8. Are you diabetic?  no   If yes, schedule morning appt. Advise pt to hold all diabetic meds day of procedure.     9. If pt is older than 80 years of age and HAS NOT been seen by GI or PCP within the last 6 months, needs appt with GI [facility].   If pt has been seen by the GI provider or PCP within the past 6  months AND meets criteria, schedule procedure AND send message to the endoscopist.     10. Is patient on a "high risk" medication (blood thinner/antiplatelet agent)?  no   If yes, has cardiac clearance been obtained within the last 60 days? No   If no, a new clearance needs to be obtained.     Final Questions:    1.I have reviewed the last colonoscopy for recommendations regarding next procedure bowel prep.  yes  2. I have reviewed medications and allergies.  yes  3. I have verified the pharmacy information and appropriate prep sent if needed. yes  4. Prep instructions have been mailed or sent to portal per patient request. yes        All schedulers will have ability to reach out to the ordering GI provider to clarify any issues.     11- grove    "

## 2020-09-21 DIAGNOSIS — J45.20 MILD INTERMITTENT ASTHMA WITHOUT COMPLICATION: Primary | ICD-10-CM

## 2020-09-29 ENCOUNTER — TELEPHONE (OUTPATIENT)
Dept: PULMONOLOGY | Facility: CLINIC | Age: 80
End: 2020-09-29

## 2020-09-29 NOTE — TELEPHONE ENCOUNTER
----- Message from Mirta Kuhn sent at 9/29/2020  2:36 PM CDT -----  Contact: Jovan  Please call Jovan back at [phone], regarding scheduling for appointments that are changing and confirming tests.      Thank you    TF

## 2020-10-12 ENCOUNTER — LAB VISIT (OUTPATIENT)
Dept: OTOLARYNGOLOGY | Facility: CLINIC | Age: 80
End: 2020-10-12
Payer: COMMERCIAL

## 2020-10-12 DIAGNOSIS — J45.20 MILD INTERMITTENT ASTHMA WITHOUT COMPLICATION: ICD-10-CM

## 2020-10-12 PROCEDURE — U0003 INFECTIOUS AGENT DETECTION BY NUCLEIC ACID (DNA OR RNA); SEVERE ACUTE RESPIRATORY SYNDROME CORONAVIRUS 2 (SARS-COV-2) (CORONAVIRUS DISEASE [COVID-19]), AMPLIFIED PROBE TECHNIQUE, MAKING USE OF HIGH THROUGHPUT TECHNOLOGIES AS DESCRIBED BY CMS-2020-01-R: HCPCS

## 2020-10-13 LAB — SARS-COV-2 RNA RESP QL NAA+PROBE: NOT DETECTED

## 2020-10-14 ENCOUNTER — PATIENT OUTREACH (OUTPATIENT)
Dept: ADMINISTRATIVE | Facility: OTHER | Age: 80
End: 2020-10-14

## 2020-10-14 NOTE — PROGRESS NOTES
Health Maintenance Due   Topic Date Due    Shingles Vaccine (1 of 2) 08/27/1990    Pneumococcal Vaccine (65+ Low/Medium Risk) (1 of 2 - PCV13) 08/27/2005    Influenza Vaccine (1) 08/01/2020    Colonoscopy  11/13/2020     Updates were requested from care everywhere.  Chart was reviewed for overdue Proactive [facility] Encounters (RHIANNA) topics (CRS, Breast Cancer Screening, Eye exam)  Health Maintenance has been updated.  LINKS immunization registry triggered.  Immunizations were reconciled.

## 2020-10-15 ENCOUNTER — CLINICAL SUPPORT (OUTPATIENT)
Dept: PULMONOLOGY | Facility: CLINIC | Age: 80
End: 2020-10-15
Payer: COMMERCIAL

## 2020-10-15 ENCOUNTER — OFFICE VISIT (OUTPATIENT)
Dept: SLEEP MEDICINE | Facility: CLINIC | Age: 80
End: 2020-10-15
Payer: COMMERCIAL

## 2020-10-15 ENCOUNTER — HOSPITAL ENCOUNTER (OUTPATIENT)
Dept: RADIOLOGY | Facility: HOSPITAL | Age: 80
Discharge: HOME | End: 2020-10-15
Attending: INTERNAL MEDICINE
Payer: COMMERCIAL

## 2020-10-15 VITALS
OXYGEN SATURATION: 93 % | BODY MASS INDEX: 36.35 KG/M2 | WEIGHT: 226.19 LBS | SYSTOLIC BLOOD PRESSURE: 130 MMHG | HEIGHT: 66 IN | DIASTOLIC BLOOD PRESSURE: 76 MMHG | RESPIRATION RATE: 16 BRPM | HEART RATE: 76 BPM

## 2020-10-15 DIAGNOSIS — J45.20 MILD INTERMITTENT ASTHMA WITHOUT COMPLICATION: Chronic | ICD-10-CM

## 2020-10-15 DIAGNOSIS — E78.5 DYSLIPIDEMIA: ICD-10-CM

## 2020-10-15 DIAGNOSIS — Z78.9 INTOLERANCE OF CONTINUOUS POSITIVE AIRWAY PRESSURE (CPAP) VENTILATION: ICD-10-CM

## 2020-10-15 DIAGNOSIS — J45.20 MILD INTERMITTENT ASTHMA WITHOUT COMPLICATION: ICD-10-CM

## 2020-10-15 DIAGNOSIS — I10 ESSENTIAL HYPERTENSION: ICD-10-CM

## 2020-10-15 DIAGNOSIS — R05.3 CHRONIC COUGH: ICD-10-CM

## 2020-10-15 DIAGNOSIS — J45.20 MILD INTERMITTENT ASTHMA WITHOUT COMPLICATION: Primary | Chronic | ICD-10-CM

## 2020-10-15 LAB
BRPFT: NORMAL
FEF 25 75 LLN: 0.7
FEF 25 75 PRE REF: 87.1 %
FEF 25 75 REF: 1.84
FEV1 FVC LLN: 60
FEV1 FVC PRE REF: 94.8 %
FEV1 FVC REF: 75
FEV1 LLN: 1.76
FEV1 PRE REF: 92 %
FEV1 REF: 2.53
FVC LLN: 2.44
FVC PRE REF: 96.3 %
FVC REF: 3.39
PEF LLN: 4.32
PEF PRE REF: 81.6 %
PEF REF: 6.39
PRE FEF 25 75: 1.6 L/S (ref 0.7–2.98)
PRE FET 100: 12.35 SEC
PRE FEV1 FVC: 71.42 % (ref 60.4–90.23)
PRE FEV1: 2.33 L (ref 1.76–3.31)
PRE FVC: 3.27 L (ref 2.44–4.34)
PRE PEF: 5.21 L/S (ref 4.32–8.46)

## 2020-10-15 PROCEDURE — 3078F PR MOST RECENT DIASTOLIC BLOOD PRESSURE < 80 MM HG: ICD-10-PCS | Mod: CPTII,S$GLB,, | Performed by: INTERNAL MEDICINE

## 2020-10-15 PROCEDURE — 99999 PR PBB SHADOW E&M-EST. PATIENT-LVL III: ICD-10-PCS | Mod: PBBFAC,,, | Performed by: INTERNAL MEDICINE

## 2020-10-15 PROCEDURE — 94010 BREATHING CAPACITY TEST: ICD-10-PCS | Mod: S$GLB,,, | Performed by: INTERNAL MEDICINE

## 2020-10-15 PROCEDURE — 71046 X-RAY EXAM CHEST 2 VIEWS: CPT | Mod: TC

## 2020-10-15 PROCEDURE — 71046 X-RAY EXAM CHEST 2 VIEWS: CPT | Mod: 26,,, | Performed by: RADIOLOGY

## 2020-10-15 PROCEDURE — 90471 FLU VACCINE - QUADRIVALENT - ADJUVANTED: ICD-10-PCS | Mod: S$GLB,,, | Performed by: INTERNAL MEDICINE

## 2020-10-15 PROCEDURE — 3078F DIAST BP <80 MM HG: CPT | Mod: CPTII,S$GLB,, | Performed by: INTERNAL MEDICINE

## 2020-10-15 PROCEDURE — 99999 PR PBB SHADOW E&M-EST. PATIENT-LVL III: CPT | Mod: PBBFAC,,, | Performed by: INTERNAL MEDICINE

## 2020-10-15 PROCEDURE — 1159F MED LIST DOCD IN RCRD: CPT | Mod: S$GLB,,, | Performed by: INTERNAL MEDICINE

## 2020-10-15 PROCEDURE — 99214 PR OFFICE/OUTPT VISIT, EST, LEVL IV, 30-39 MIN: ICD-10-PCS | Mod: 25,S$GLB,, | Performed by: INTERNAL MEDICINE

## 2020-10-15 PROCEDURE — 1101F PR PT FALLS ASSESS DOC 0-1 FALLS W/OUT INJ PAST YR: ICD-10-PCS | Mod: CPTII,S$GLB,, | Performed by: INTERNAL MEDICINE

## 2020-10-15 PROCEDURE — 90471 IMMUNIZATION ADMIN: CPT | Mod: S$GLB,,, | Performed by: INTERNAL MEDICINE

## 2020-10-15 PROCEDURE — 1159F PR MEDICATION LIST DOCUMENTED IN MEDICAL RECORD: ICD-10-PCS | Mod: S$GLB,,, | Performed by: INTERNAL MEDICINE

## 2020-10-15 PROCEDURE — 71046 XR CHEST PA AND LATERAL: ICD-10-PCS | Mod: 26,,, | Performed by: RADIOLOGY

## 2020-10-15 PROCEDURE — 94010 BREATHING CAPACITY TEST: CPT | Mod: S$GLB,,, | Performed by: INTERNAL MEDICINE

## 2020-10-15 PROCEDURE — 99214 OFFICE O/P EST MOD 30 MIN: CPT | Mod: 25,S$GLB,, | Performed by: INTERNAL MEDICINE

## 2020-10-15 PROCEDURE — 3075F SYST BP GE 130 - 139MM HG: CPT | Mod: CPTII,S$GLB,, | Performed by: INTERNAL MEDICINE

## 2020-10-15 PROCEDURE — 90694 VACC AIIV4 NO PRSRV 0.5ML IM: CPT | Mod: S$GLB,,, | Performed by: INTERNAL MEDICINE

## 2020-10-15 PROCEDURE — 90694 FLU VACCINE - QUADRIVALENT - ADJUVANTED: ICD-10-PCS | Mod: S$GLB,,, | Performed by: INTERNAL MEDICINE

## 2020-10-15 PROCEDURE — 3075F PR MOST RECENT SYSTOLIC BLOOD PRESS GE 130-139MM HG: ICD-10-PCS | Mod: CPTII,S$GLB,, | Performed by: INTERNAL MEDICINE

## 2020-10-15 PROCEDURE — 1101F PT FALLS ASSESS-DOCD LE1/YR: CPT | Mod: CPTII,S$GLB,, | Performed by: INTERNAL MEDICINE

## 2020-10-15 NOTE — PROGRESS NOTES
Lens Treatment: Subjective:       Patient ID: Jovan Del Cid Jr. is a 80 y.o. male.  Patient Active Problem List   Diagnosis    Asthma    Chronic cough    Intolerance of continuous positive airway pressure (CPAP) ventilation    Liver cyst    HTN (hypertension)    Dyslipidemia    Benign prostatic hyperplasia    Lipoma    Thrombocytopenia    Pelvic mass in male    Amyloid disease    Chronic maxillary sinusitis    Screening for colorectal cancer    Tubulovillous adenoma of colon    Adenomatous colon polyp    MCMAHAN (nonalcoholic steatohepatitis)    Mild intermittent asthma without complication    BMI 36.0-36.9,adult     Immunization History   Administered Date(s) Administered    Influenza (FLUAD) - Quadrivalent - Adjuvanted - PF *Preferred* (65+) 10/15/2020    Influenza - High Dose - PF (65 years and older) 2020     Social History     Tobacco Use   Smoking Status Former Smoker    Years: 3.00    Types: Cigarettes, Pipe    Quit date:     Years since quittin.8   Smokeless Tobacco Never Used   Tobacco Comment    smoked a pipe - quit smoking      Asthma Control Test  In the past 4  weeks, how much of the time did your asthma keep you from getting as much done at work, school or at home?: A little of the time  During the past 4 weeks, how often have you had shortness of breath?: 3 to 6 times a week  During the past 4 weeks, how often did your asthma symptoms (wheezing, couging, shortness of breath, chest tightness or pain) wake you up at night or earlier than usual in the morning?: Not at all  During the past 4 weeks, how often have you used your rescue inhaler or nebulizer medication (such as albuterol)?: Once a week or less  How would you rate your asthma control during the past 4 weeks?: Well controlled  If your score is 19 or less, your asthma may not be under control: 20       Cough index: 9    Chief Complaint: Asthma      Jovan Del Cid Jr. is a 80 y.o.  male   Follow up, No cough, No  "congetion.No fever  ACT  20: hardly taking rescue albuterol HFA.   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  No interval asthma exacerbations since last visit, Actually not used rescue albuterol in 12 months.  Spirometry normal       Asthma  There is no cough, shortness of breath, sputum production or wheezing. Pertinent negatives include no postnasal drip. His past medical history is significant for asthma.     Review of Systems   Constitutional: Negative.    HENT: Negative for postnasal drip and congestion.    Eyes: Negative.    Respiratory: Negative for cough, sputum production, shortness of breath, wheezing, asthma nighttime symptoms, pleurisy, dyspnea on extertion and use of rescue inhaler.    Cardiovascular: Negative.    Genitourinary: Negative.    Endocrine: endocrine negative   Musculoskeletal: Negative.    Skin: Negative.    Gastrointestinal: Negative.    Neurological: Negative.    Psychiatric/Behavioral: Negative.    All other systems reviewed and are negative.           Objective:       Vitals:    10/15/20 1453   BP: 130/76   Pulse: 76   Resp: 16   SpO2: (!) 93%   Weight: 102.6 kg (226 lb 3.1 oz)   Height: 5' 6" (1.676 m)       Physical Exam   Constitutional: He is oriented to person, place, and time. He appears well-developed and well-nourished.   HENT:   Head: Normocephalic and atraumatic.       Right Ear: External ear normal.   Left Ear: External ear normal.   Nose: Nose normal.   Mouth/Throat: Uvula is midline, oropharynx is clear and moist and mucous membranes are normal. No oropharyngeal exudate.   Eyes: Pupils are equal, round, and reactive to light. Conjunctivae, EOM and lids are normal. No scleral icterus.   Neck: Trachea normal, normal range of motion and phonation normal. Neck supple.   Neck 18"   Cardiovascular: Normal rate, regular rhythm, S1 normal, S2 normal, normal heart sounds, intact distal pulses and normal pulses.   No murmur " heard.  Pulmonary/Chest: Effort normal. No respiratory distress. He has decreased breath sounds in the left lower field. He has no wheezes. He has no rhonchi. He has no rales. He exhibits no tenderness.   Abdominal: Soft. Bowel sounds are normal.   Musculoskeletal: Normal range of motion.         General: No edema.   Lymphadenopathy:     He has no cervical adenopathy.     He has no axillary adenopathy.   Neurological: He is alert and oriented to person, place, and time. No cranial nerve deficit or sensory deficit. GCS eye subscore is 4. GCS verbal subscore is 5. GCS motor subscore is 6.   Skin: Skin is warm, dry and intact. No rash noted. No cyanosis. Nails show no clubbing.   Psychiatric: He has a normal mood and affect. His speech is normal.   Nursing note and vitals reviewed.    Personal Diagnostic Review  [x]  Chest x-ray:        EXAMINATION:  XR CHEST PA AND LATERAL     CLINICAL HISTORY:  Mild intermittent asthma, uncomplicated     TECHNIQUE:  PA and lateral views of the chest were performed.     COMPARISON:  09/30/2019 08/10/2018.     FINDINGS:  Stable scarring or atelectasis in the left lower lobe.  This appears similar to the study from 08/10/2018.  No focal consolidation.  No effusion.  Aortic atherosclerosis and tortuosity.  Cardiomediastinal silhouette is not enlarged.  Thoracic spondylosis     Impression:     Stable chest.  No active disease.      [x] Drew Normal  FEV1 : 2.33L( 92.0%), FVC 3.27L( 96.3%)  FEV1/FVC 71.42  NORMAL SPIROMETRY    No flowsheet data found.      Assessment:       Problem List Items Addressed This Visit     Mild intermittent asthma without complication - Primary (Chronic)     Asthma ROS: taking medications as instructed, no medication side effects noted, no significant ongoing wheezing or shortness of breath, using bronchodilator MDI less than twice a week.   New concerns: None.   Normal Spirometry reviewed with patient   Exam: appears well, vitals normal, no respiratory  distress, acyanotic, normal RR.   Assessment:  Asthma stable  Normal spirometry   Plan:MAKAYLA ZAVALETA. Current treatment plan is effective, no change in therapy.         Relevant Orders    X-Ray Chest PA And Lateral    Spirometry without Bronchodilator    Chronic cough     Cough score 9.  Stable         Intolerance of continuous positive airway pressure (CPAP) ventilation     Complications of untreated sleep apnea discussed with pateint in detail including but not limited to  high blood pressure, heart attack, stroke, obesity,  diabetes and worsening heart failure. Patient voiced understanding.         HTN (hypertension)     Stable on DIOVAN HCTZ         Dyslipidemia     Stable on CRESTOR         BMI 36.0-36.9,adult     General weight loss/lifestyle modification strategies discussed (elicit support from others; identify saboteurs; non-food rewards).  Diet interventions: low calorie (1000 kCal/d) deficit diet                Plan:     Stable asthma  Declined PAP    Follow up in about 1 year (around 10/15/2021), or cxr, betty, FLU SHOT TODAY.    This note was prepared using voice recognition system and is likely to have sound alike errors that may have been overlooked even after proof reading.  Please call me with any questions    Discussed diagnosis, its evaluation, treatment and usual course. All questions answered.    Thank you for the courtesy of participating in the care of this patient    Stefan Buckner MD    Orders Placed This Encounter   Procedures    X-Ray Chest PA And Lateral     Standing Status:   Future     Standing Expiration Date:   10/15/2021    Influenza - Quadrivalent (Adjuvanted)    Spirometry without Bronchodilator     Standing Status:   Future     Standing Expiration Date:   10/15/2021

## 2020-10-15 NOTE — ASSESSMENT & PLAN NOTE
Asthma ROS: taking medications as instructed, no medication side effects noted, no significant ongoing wheezing or shortness of breath, using bronchodilator MDI less than twice a week.   New concerns: None.   Normal Spirometry reviewed with patient   Exam: appears well, vitals normal, no respiratory distress, acyanotic, normal RR.   Assessment:  Asthma stable  Normal spirometry   Plan:MAKAYLA ZAVALETA. Current treatment plan is effective, no change in therapy.

## 2020-11-17 ENCOUNTER — TELEPHONE (OUTPATIENT)
Dept: ENDOSCOPY | Facility: HOSPITAL | Age: 80
End: 2020-11-17

## 2020-11-17 NOTE — TELEPHONE ENCOUNTER
Pt will call back to schedule due family health issues. Pt was being r/s from Dr. Ferreira 11-. patricio

## 2021-01-06 ENCOUNTER — TELEPHONE (OUTPATIENT)
Dept: ADMINISTRATIVE | Facility: HOSPITAL | Age: 81
End: 2021-01-06

## 2021-01-07 ENCOUNTER — TELEPHONE (OUTPATIENT)
Dept: FAMILY MEDICINE | Facility: CLINIC | Age: 81
End: 2021-01-07

## 2021-01-10 ENCOUNTER — IMMUNIZATION (OUTPATIENT)
Dept: INTERNAL MEDICINE | Facility: CLINIC | Age: 81
End: 2021-01-10
Payer: COMMERCIAL

## 2021-01-10 DIAGNOSIS — Z23 NEED FOR VACCINATION: ICD-10-CM

## 2021-01-10 PROCEDURE — 91300 COVID-19, MRNA, LNP-S, PF, 30 MCG/0.3 ML DOSE VACCINE: CPT | Mod: PBBFAC | Performed by: FAMILY MEDICINE

## 2021-01-20 ENCOUNTER — OFFICE VISIT (OUTPATIENT)
Dept: FAMILY MEDICINE | Facility: CLINIC | Age: 81
End: 2021-01-20
Payer: COMMERCIAL

## 2021-01-20 ENCOUNTER — LAB VISIT (OUTPATIENT)
Dept: LAB | Facility: HOSPITAL | Age: 81
End: 2021-01-20
Attending: INTERNAL MEDICINE
Payer: COMMERCIAL

## 2021-01-20 VITALS
SYSTOLIC BLOOD PRESSURE: 138 MMHG | TEMPERATURE: 98 F | OXYGEN SATURATION: 95 % | HEART RATE: 82 BPM | BODY MASS INDEX: 36.65 KG/M2 | WEIGHT: 227.06 LBS | RESPIRATION RATE: 16 BRPM | DIASTOLIC BLOOD PRESSURE: 70 MMHG

## 2021-01-20 DIAGNOSIS — Z12.11 COLON CANCER SCREENING: ICD-10-CM

## 2021-01-20 DIAGNOSIS — I10 ESSENTIAL HYPERTENSION: ICD-10-CM

## 2021-01-20 DIAGNOSIS — E78.5 DYSLIPIDEMIA: ICD-10-CM

## 2021-01-20 DIAGNOSIS — J45.20 MILD INTERMITTENT ASTHMA WITHOUT COMPLICATION: Primary | Chronic | ICD-10-CM

## 2021-01-20 PROCEDURE — 1126F PR PAIN SEVERITY QUANTIFIED, NO PAIN PRESENT: ICD-10-PCS | Mod: S$GLB,,, | Performed by: INTERNAL MEDICINE

## 2021-01-20 PROCEDURE — 3078F DIAST BP <80 MM HG: CPT | Mod: CPTII,S$GLB,, | Performed by: INTERNAL MEDICINE

## 2021-01-20 PROCEDURE — 99214 PR OFFICE/OUTPT VISIT, EST, LEVL IV, 30-39 MIN: ICD-10-PCS | Mod: S$GLB,,, | Performed by: INTERNAL MEDICINE

## 2021-01-20 PROCEDURE — 1159F MED LIST DOCD IN RCRD: CPT | Mod: S$GLB,,, | Performed by: INTERNAL MEDICINE

## 2021-01-20 PROCEDURE — 1101F PR PT FALLS ASSESS DOC 0-1 FALLS W/OUT INJ PAST YR: ICD-10-PCS | Mod: CPTII,S$GLB,, | Performed by: INTERNAL MEDICINE

## 2021-01-20 PROCEDURE — 1101F PT FALLS ASSESS-DOCD LE1/YR: CPT | Mod: CPTII,S$GLB,, | Performed by: INTERNAL MEDICINE

## 2021-01-20 PROCEDURE — 1126F AMNT PAIN NOTED NONE PRSNT: CPT | Mod: S$GLB,,, | Performed by: INTERNAL MEDICINE

## 2021-01-20 PROCEDURE — 99999 PR PBB SHADOW E&M-EST. PATIENT-LVL III: ICD-10-PCS | Mod: PBBFAC,,, | Performed by: INTERNAL MEDICINE

## 2021-01-20 PROCEDURE — 3288F FALL RISK ASSESSMENT DOCD: CPT | Mod: CPTII,S$GLB,, | Performed by: INTERNAL MEDICINE

## 2021-01-20 PROCEDURE — 36415 COLL VENOUS BLD VENIPUNCTURE: CPT | Mod: PO

## 2021-01-20 PROCEDURE — 3075F PR MOST RECENT SYSTOLIC BLOOD PRESS GE 130-139MM HG: ICD-10-PCS | Mod: CPTII,S$GLB,, | Performed by: INTERNAL MEDICINE

## 2021-01-20 PROCEDURE — 80053 COMPREHEN METABOLIC PANEL: CPT

## 2021-01-20 PROCEDURE — 99999 PR PBB SHADOW E&M-EST. PATIENT-LVL III: CPT | Mod: PBBFAC,,, | Performed by: INTERNAL MEDICINE

## 2021-01-20 PROCEDURE — 80061 LIPID PANEL: CPT

## 2021-01-20 PROCEDURE — 99214 OFFICE O/P EST MOD 30 MIN: CPT | Mod: S$GLB,,, | Performed by: INTERNAL MEDICINE

## 2021-01-20 PROCEDURE — 3078F PR MOST RECENT DIASTOLIC BLOOD PRESSURE < 80 MM HG: ICD-10-PCS | Mod: CPTII,S$GLB,, | Performed by: INTERNAL MEDICINE

## 2021-01-20 PROCEDURE — 1159F PR MEDICATION LIST DOCUMENTED IN MEDICAL RECORD: ICD-10-PCS | Mod: S$GLB,,, | Performed by: INTERNAL MEDICINE

## 2021-01-20 PROCEDURE — 3288F PR FALLS RISK ASSESSMENT DOCUMENTED: ICD-10-PCS | Mod: CPTII,S$GLB,, | Performed by: INTERNAL MEDICINE

## 2021-01-20 PROCEDURE — 3075F SYST BP GE 130 - 139MM HG: CPT | Mod: CPTII,S$GLB,, | Performed by: INTERNAL MEDICINE

## 2021-01-21 ENCOUNTER — LAB VISIT (OUTPATIENT)
Dept: LAB | Facility: HOSPITAL | Age: 81
End: 2021-01-21
Attending: INTERNAL MEDICINE
Payer: COMMERCIAL

## 2021-01-21 ENCOUNTER — TELEPHONE (OUTPATIENT)
Dept: FAMILY MEDICINE | Facility: CLINIC | Age: 81
End: 2021-01-21

## 2021-01-21 DIAGNOSIS — R73.9 HYPERGLYCEMIA: ICD-10-CM

## 2021-01-21 DIAGNOSIS — R73.9 HYPERGLYCEMIA: Primary | ICD-10-CM

## 2021-01-21 LAB
ALBUMIN SERPL BCP-MCNC: 4.3 G/DL (ref 3.5–5.2)
ALP SERPL-CCNC: 54 U/L (ref 55–135)
ALT SERPL W/O P-5'-P-CCNC: 39 U/L (ref 10–44)
ANION GAP SERPL CALC-SCNC: 10 MMOL/L (ref 8–16)
AST SERPL-CCNC: 34 U/L (ref 10–40)
BILIRUB SERPL-MCNC: 1.9 MG/DL (ref 0.1–1)
BUN SERPL-MCNC: 11 MG/DL (ref 8–23)
CALCIUM SERPL-MCNC: 9.2 MG/DL (ref 8.7–10.5)
CHLORIDE SERPL-SCNC: 102 MMOL/L (ref 95–110)
CHOLEST SERPL-MCNC: 142 MG/DL (ref 120–199)
CHOLEST/HDLC SERPL: 2.7 {RATIO} (ref 2–5)
CO2 SERPL-SCNC: 25 MMOL/L (ref 23–29)
CREAT SERPL-MCNC: 0.9 MG/DL (ref 0.5–1.4)
EST. GFR  (AFRICAN AMERICAN): >60 ML/MIN/1.73 M^2
EST. GFR  (NON AFRICAN AMERICAN): >60 ML/MIN/1.73 M^2
GLUCOSE SERPL-MCNC: 179 MG/DL (ref 70–110)
HDLC SERPL-MCNC: 53 MG/DL (ref 40–75)
HDLC SERPL: 37.3 % (ref 20–50)
LDLC SERPL CALC-MCNC: 67.2 MG/DL (ref 63–159)
NONHDLC SERPL-MCNC: 89 MG/DL
POTASSIUM SERPL-SCNC: 4.4 MMOL/L (ref 3.5–5.1)
PROT SERPL-MCNC: 6.9 G/DL (ref 6–8.4)
SODIUM SERPL-SCNC: 137 MMOL/L (ref 136–145)
TRIGL SERPL-MCNC: 109 MG/DL (ref 30–150)

## 2021-01-21 PROCEDURE — 83036 HEMOGLOBIN GLYCOSYLATED A1C: CPT

## 2021-01-21 PROCEDURE — 36415 COLL VENOUS BLD VENIPUNCTURE: CPT | Mod: PO

## 2021-01-22 LAB
ESTIMATED AVG GLUCOSE: 120 MG/DL (ref 68–131)
HBA1C MFR BLD HPLC: 5.8 % (ref 4–5.6)

## 2021-01-29 ENCOUNTER — TELEPHONE (OUTPATIENT)
Dept: ENDOSCOPY | Facility: HOSPITAL | Age: 81
End: 2021-01-29

## 2021-01-29 ENCOUNTER — DOCUMENTATION ONLY (OUTPATIENT)
Dept: ENDOSCOPY | Facility: HOSPITAL | Age: 81
End: 2021-01-29

## 2021-01-31 ENCOUNTER — PATIENT OUTREACH (OUTPATIENT)
Dept: ADMINISTRATIVE | Facility: OTHER | Age: 81
End: 2021-01-31

## 2021-01-31 ENCOUNTER — IMMUNIZATION (OUTPATIENT)
Dept: INTERNAL MEDICINE | Facility: CLINIC | Age: 81
End: 2021-01-31
Payer: COMMERCIAL

## 2021-01-31 DIAGNOSIS — Z23 NEED FOR VACCINATION: Primary | ICD-10-CM

## 2021-01-31 PROCEDURE — 91300 COVID-19, MRNA, LNP-S, PF, 30 MCG/0.3 ML DOSE VACCINE: CPT | Mod: PBBFAC | Performed by: FAMILY MEDICINE

## 2021-01-31 PROCEDURE — 0002A COVID-19, MRNA, LNP-S, PF, 30 MCG/0.3 ML DOSE VACCINE: CPT | Mod: PBBFAC | Performed by: FAMILY MEDICINE

## 2021-02-02 ENCOUNTER — OFFICE VISIT (OUTPATIENT)
Dept: GASTROENTEROLOGY | Facility: CLINIC | Age: 81
End: 2021-02-02
Payer: COMMERCIAL

## 2021-02-02 VITALS
OXYGEN SATURATION: 98 % | BODY MASS INDEX: 36.95 KG/M2 | HEART RATE: 91 BPM | HEIGHT: 66 IN | SYSTOLIC BLOOD PRESSURE: 134 MMHG | WEIGHT: 229.94 LBS | DIASTOLIC BLOOD PRESSURE: 68 MMHG

## 2021-02-02 DIAGNOSIS — E80.6 HYPERBILIRUBINEMIA: ICD-10-CM

## 2021-02-02 DIAGNOSIS — J45.20 MILD INTERMITTENT ASTHMA WITHOUT COMPLICATION: ICD-10-CM

## 2021-02-02 DIAGNOSIS — E78.2 MIXED HYPERLIPIDEMIA: ICD-10-CM

## 2021-02-02 DIAGNOSIS — Z86.010 HX OF ADENOMATOUS COLONIC POLYPS: Primary | ICD-10-CM

## 2021-02-02 DIAGNOSIS — D17.0 LIPOMA OF FACE: ICD-10-CM

## 2021-02-02 DIAGNOSIS — K75.81 NASH (NONALCOHOLIC STEATOHEPATITIS): ICD-10-CM

## 2021-02-02 DIAGNOSIS — Z80.0 FAMILY HISTORY OF CHOLANGIOCARCINOMA: ICD-10-CM

## 2021-02-02 PROCEDURE — 99999 PR PBB SHADOW E&M-EST. PATIENT-LVL III: CPT | Mod: PBBFAC,,, | Performed by: INTERNAL MEDICINE

## 2021-02-02 PROCEDURE — 3078F PR MOST RECENT DIASTOLIC BLOOD PRESSURE < 80 MM HG: ICD-10-PCS | Mod: CPTII,S$GLB,, | Performed by: INTERNAL MEDICINE

## 2021-02-02 PROCEDURE — 1159F MED LIST DOCD IN RCRD: CPT | Mod: S$GLB,,, | Performed by: INTERNAL MEDICINE

## 2021-02-02 PROCEDURE — 3075F PR MOST RECENT SYSTOLIC BLOOD PRESS GE 130-139MM HG: ICD-10-PCS | Mod: CPTII,S$GLB,, | Performed by: INTERNAL MEDICINE

## 2021-02-02 PROCEDURE — 1101F PR PT FALLS ASSESS DOC 0-1 FALLS W/OUT INJ PAST YR: ICD-10-PCS | Mod: CPTII,S$GLB,, | Performed by: INTERNAL MEDICINE

## 2021-02-02 PROCEDURE — 1126F AMNT PAIN NOTED NONE PRSNT: CPT | Mod: S$GLB,,, | Performed by: INTERNAL MEDICINE

## 2021-02-02 PROCEDURE — 1101F PT FALLS ASSESS-DOCD LE1/YR: CPT | Mod: CPTII,S$GLB,, | Performed by: INTERNAL MEDICINE

## 2021-02-02 PROCEDURE — 3078F DIAST BP <80 MM HG: CPT | Mod: CPTII,S$GLB,, | Performed by: INTERNAL MEDICINE

## 2021-02-02 PROCEDURE — 1126F PR PAIN SEVERITY QUANTIFIED, NO PAIN PRESENT: ICD-10-PCS | Mod: S$GLB,,, | Performed by: INTERNAL MEDICINE

## 2021-02-02 PROCEDURE — 1159F PR MEDICATION LIST DOCUMENTED IN MEDICAL RECORD: ICD-10-PCS | Mod: S$GLB,,, | Performed by: INTERNAL MEDICINE

## 2021-02-02 PROCEDURE — 3288F FALL RISK ASSESSMENT DOCD: CPT | Mod: CPTII,S$GLB,, | Performed by: INTERNAL MEDICINE

## 2021-02-02 PROCEDURE — 3075F SYST BP GE 130 - 139MM HG: CPT | Mod: CPTII,S$GLB,, | Performed by: INTERNAL MEDICINE

## 2021-02-02 PROCEDURE — 99999 PR PBB SHADOW E&M-EST. PATIENT-LVL III: ICD-10-PCS | Mod: PBBFAC,,, | Performed by: INTERNAL MEDICINE

## 2021-02-02 PROCEDURE — 99214 OFFICE O/P EST MOD 30 MIN: CPT | Mod: S$GLB,,, | Performed by: INTERNAL MEDICINE

## 2021-02-02 PROCEDURE — 3288F PR FALLS RISK ASSESSMENT DOCUMENTED: ICD-10-PCS | Mod: CPTII,S$GLB,, | Performed by: INTERNAL MEDICINE

## 2021-02-02 PROCEDURE — 99214 PR OFFICE/OUTPT VISIT, EST, LEVL IV, 30-39 MIN: ICD-10-PCS | Mod: S$GLB,,, | Performed by: INTERNAL MEDICINE

## 2021-02-02 RX ORDER — SODIUM, POTASSIUM,MAG SULFATES 17.5-3.13G
1 SOLUTION, RECONSTITUTED, ORAL ORAL DAILY
Qty: 1 KIT | Refills: 0 | Status: SHIPPED | OUTPATIENT
Start: 2021-02-02 | End: 2021-02-04

## 2021-02-12 ENCOUNTER — TELEPHONE (OUTPATIENT)
Dept: PREADMISSION TESTING | Facility: HOSPITAL | Age: 81
End: 2021-02-12

## 2021-02-17 ENCOUNTER — ANESTHESIA EVENT (OUTPATIENT)
Dept: ENDOSCOPY | Facility: HOSPITAL | Age: 81
End: 2021-02-17
Payer: COMMERCIAL

## 2021-02-18 PROBLEM — Z86.010 PERSONAL HISTORY OF COLONIC POLYPS: Status: ACTIVE | Noted: 2021-02-18

## 2021-02-18 PROBLEM — Z86.0100 PERSONAL HISTORY OF COLONIC POLYPS: Status: ACTIVE | Noted: 2021-02-18

## 2021-02-19 ENCOUNTER — HOSPITAL ENCOUNTER (OUTPATIENT)
Facility: HOSPITAL | Age: 81
Discharge: HOME | End: 2021-02-19
Attending: INTERNAL MEDICINE | Admitting: INTERNAL MEDICINE
Payer: COMMERCIAL

## 2021-02-19 ENCOUNTER — ANESTHESIA (OUTPATIENT)
Dept: ENDOSCOPY | Facility: HOSPITAL | Age: 81
End: 2021-02-19
Payer: COMMERCIAL

## 2021-02-19 VITALS
WEIGHT: 229 LBS | TEMPERATURE: 99 F | BODY MASS INDEX: 36.8 KG/M2 | RESPIRATION RATE: 17 BRPM | HEART RATE: 60 BPM | OXYGEN SATURATION: 97 % | SYSTOLIC BLOOD PRESSURE: 135 MMHG | DIASTOLIC BLOOD PRESSURE: 75 MMHG | HEIGHT: 66 IN

## 2021-02-19 DIAGNOSIS — Z86.010 PERSONAL HISTORY OF COLONIC POLYPS: Primary | ICD-10-CM

## 2021-02-19 LAB — SARS-COV-2 RDRP RESP QL NAA+PROBE: NEGATIVE

## 2021-02-19 PROCEDURE — D9220A PRA ANESTHESIA: Mod: 33,CRNA,, | Performed by: NURSE ANESTHETIST, CERTIFIED REGISTERED

## 2021-02-19 PROCEDURE — 45380 COLONOSCOPY AND BIOPSY: CPT | Mod: 33,,, | Performed by: INTERNAL MEDICINE

## 2021-02-19 PROCEDURE — D9220A PRA ANESTHESIA: ICD-10-PCS | Mod: 33,ANES,, | Performed by: ANESTHESIOLOGY

## 2021-02-19 PROCEDURE — 45380 COLONOSCOPY AND BIOPSY: CPT | Performed by: INTERNAL MEDICINE

## 2021-02-19 PROCEDURE — 88305 TISSUE EXAM BY PATHOLOGIST: CPT | Mod: 26,,, | Performed by: PATHOLOGY

## 2021-02-19 PROCEDURE — 63600175 PHARM REV CODE 636 W HCPCS: Performed by: INTERNAL MEDICINE

## 2021-02-19 PROCEDURE — 37000009 HC ANESTHESIA EA ADD 15 MINS: Performed by: INTERNAL MEDICINE

## 2021-02-19 PROCEDURE — 88305 TISSUE EXAM BY PATHOLOGIST: ICD-10-PCS | Mod: 26,,, | Performed by: PATHOLOGY

## 2021-02-19 PROCEDURE — U0002 COVID-19 LAB TEST NON-CDC: HCPCS

## 2021-02-19 PROCEDURE — 27201012 HC FORCEPS, HOT/COLD, DISP: Performed by: INTERNAL MEDICINE

## 2021-02-19 PROCEDURE — 37000008 HC ANESTHESIA 1ST 15 MINUTES: Performed by: INTERNAL MEDICINE

## 2021-02-19 PROCEDURE — 45380 PR COLONOSCOPY,BIOPSY: ICD-10-PCS | Mod: 33,,, | Performed by: INTERNAL MEDICINE

## 2021-02-19 PROCEDURE — 63600175 PHARM REV CODE 636 W HCPCS: Performed by: NURSE ANESTHETIST, CERTIFIED REGISTERED

## 2021-02-19 PROCEDURE — D9220A PRA ANESTHESIA: ICD-10-PCS | Mod: 33,CRNA,, | Performed by: NURSE ANESTHETIST, CERTIFIED REGISTERED

## 2021-02-19 PROCEDURE — 88305 TISSUE EXAM BY PATHOLOGIST: CPT | Performed by: PATHOLOGY

## 2021-02-19 PROCEDURE — D9220A PRA ANESTHESIA: Mod: 33,ANES,, | Performed by: ANESTHESIOLOGY

## 2021-02-19 RX ORDER — SODIUM CHLORIDE 0.9 % (FLUSH) 0.9 %
10 SYRINGE (ML) INJECTION
Status: DISCONTINUED | OUTPATIENT
Start: 2021-02-19 | End: 2021-02-19 | Stop reason: HOSPADM

## 2021-02-19 RX ORDER — LIDOCAINE HCL/PF 100 MG/5ML
SYRINGE (ML) INTRAVENOUS
Status: DISCONTINUED | OUTPATIENT
Start: 2021-02-19 | End: 2021-02-19

## 2021-02-19 RX ORDER — PROPOFOL 10 MG/ML
VIAL (ML) INTRAVENOUS
Status: DISCONTINUED | OUTPATIENT
Start: 2021-02-19 | End: 2021-02-19

## 2021-02-19 RX ORDER — SODIUM CHLORIDE, SODIUM LACTATE, POTASSIUM CHLORIDE, CALCIUM CHLORIDE 600; 310; 30; 20 MG/100ML; MG/100ML; MG/100ML; MG/100ML
INJECTION, SOLUTION INTRAVENOUS CONTINUOUS
Status: DISCONTINUED | OUTPATIENT
Start: 2021-02-19 | End: 2021-02-19 | Stop reason: HOSPADM

## 2021-02-19 RX ADMIN — PROPOFOL 25 MG: 10 INJECTION, EMULSION INTRAVENOUS at 10:02

## 2021-02-19 RX ADMIN — SODIUM CHLORIDE, SODIUM LACTATE, POTASSIUM CHLORIDE, AND CALCIUM CHLORIDE: 600; 310; 30; 20 INJECTION, SOLUTION INTRAVENOUS at 10:02

## 2021-02-19 RX ADMIN — PROPOFOL 50 MG: 10 INJECTION, EMULSION INTRAVENOUS at 10:02

## 2021-02-19 RX ADMIN — Medication 60 MG: at 10:02

## 2021-02-23 LAB
FINAL PATHOLOGIC DIAGNOSIS: NORMAL
GROSS: NORMAL
Lab: NORMAL

## 2021-04-05 ENCOUNTER — HOSPITAL ENCOUNTER (OUTPATIENT)
Dept: RADIOLOGY | Facility: HOSPITAL | Age: 81
Discharge: HOME | End: 2021-04-05
Attending: INTERNAL MEDICINE
Payer: COMMERCIAL

## 2021-04-05 ENCOUNTER — OFFICE VISIT (OUTPATIENT)
Dept: FAMILY MEDICINE | Facility: CLINIC | Age: 81
End: 2021-04-05
Payer: COMMERCIAL

## 2021-04-05 VITALS
BODY MASS INDEX: 36.74 KG/M2 | TEMPERATURE: 98 F | HEART RATE: 79 BPM | HEIGHT: 66 IN | SYSTOLIC BLOOD PRESSURE: 122 MMHG | WEIGHT: 228.63 LBS | OXYGEN SATURATION: 97 % | DIASTOLIC BLOOD PRESSURE: 68 MMHG

## 2021-04-05 DIAGNOSIS — I10 ESSENTIAL HYPERTENSION: ICD-10-CM

## 2021-04-05 DIAGNOSIS — R10.30 LOWER ABDOMINAL PAIN: ICD-10-CM

## 2021-04-05 DIAGNOSIS — R10.30 LOWER ABDOMINAL PAIN: Primary | ICD-10-CM

## 2021-04-05 PROCEDURE — 1159F PR MEDICATION LIST DOCUMENTED IN MEDICAL RECORD: ICD-10-PCS | Mod: S$GLB,,, | Performed by: INTERNAL MEDICINE

## 2021-04-05 PROCEDURE — 3074F PR MOST RECENT SYSTOLIC BLOOD PRESSURE < 130 MM HG: ICD-10-PCS | Mod: CPTII,S$GLB,, | Performed by: INTERNAL MEDICINE

## 2021-04-05 PROCEDURE — 3288F PR FALLS RISK ASSESSMENT DOCUMENTED: ICD-10-PCS | Mod: CPTII,S$GLB,, | Performed by: INTERNAL MEDICINE

## 2021-04-05 PROCEDURE — 99999 PR PBB SHADOW E&M-EST. PATIENT-LVL III: ICD-10-PCS | Mod: PBBFAC,,, | Performed by: INTERNAL MEDICINE

## 2021-04-05 PROCEDURE — 99999 PR PBB SHADOW E&M-EST. PATIENT-LVL III: CPT | Mod: PBBFAC,,, | Performed by: INTERNAL MEDICINE

## 2021-04-05 PROCEDURE — 99213 PR OFFICE/OUTPT VISIT, EST, LEVL III, 20-29 MIN: ICD-10-PCS | Mod: S$GLB,,, | Performed by: INTERNAL MEDICINE

## 2021-04-05 PROCEDURE — 3078F PR MOST RECENT DIASTOLIC BLOOD PRESSURE < 80 MM HG: ICD-10-PCS | Mod: CPTII,S$GLB,, | Performed by: INTERNAL MEDICINE

## 2021-04-05 PROCEDURE — 1125F PR PAIN SEVERITY QUANTIFIED, PAIN PRESENT: ICD-10-PCS | Mod: S$GLB,,, | Performed by: INTERNAL MEDICINE

## 2021-04-05 PROCEDURE — 1101F PT FALLS ASSESS-DOCD LE1/YR: CPT | Mod: CPTII,S$GLB,, | Performed by: INTERNAL MEDICINE

## 2021-04-05 PROCEDURE — 3288F FALL RISK ASSESSMENT DOCD: CPT | Mod: CPTII,S$GLB,, | Performed by: INTERNAL MEDICINE

## 2021-04-05 PROCEDURE — 74018 XR ABDOMEN AP 1 VIEW: ICD-10-PCS | Mod: 26,,, | Performed by: RADIOLOGY

## 2021-04-05 PROCEDURE — 1159F MED LIST DOCD IN RCRD: CPT | Mod: S$GLB,,, | Performed by: INTERNAL MEDICINE

## 2021-04-05 PROCEDURE — 1101F PR PT FALLS ASSESS DOC 0-1 FALLS W/OUT INJ PAST YR: ICD-10-PCS | Mod: CPTII,S$GLB,, | Performed by: INTERNAL MEDICINE

## 2021-04-05 PROCEDURE — 3074F SYST BP LT 130 MM HG: CPT | Mod: CPTII,S$GLB,, | Performed by: INTERNAL MEDICINE

## 2021-04-05 PROCEDURE — 74018 RADEX ABDOMEN 1 VIEW: CPT | Mod: 26,,, | Performed by: RADIOLOGY

## 2021-04-05 PROCEDURE — 1125F AMNT PAIN NOTED PAIN PRSNT: CPT | Mod: S$GLB,,, | Performed by: INTERNAL MEDICINE

## 2021-04-05 PROCEDURE — 74018 RADEX ABDOMEN 1 VIEW: CPT | Mod: TC,FY,PO

## 2021-04-05 PROCEDURE — 99213 OFFICE O/P EST LOW 20 MIN: CPT | Mod: S$GLB,,, | Performed by: INTERNAL MEDICINE

## 2021-04-05 PROCEDURE — 3078F DIAST BP <80 MM HG: CPT | Mod: CPTII,S$GLB,, | Performed by: INTERNAL MEDICINE

## 2021-04-06 ENCOUNTER — TELEPHONE (OUTPATIENT)
Dept: FAMILY MEDICINE | Facility: CLINIC | Age: 81
End: 2021-04-06

## 2021-04-06 ENCOUNTER — PATIENT MESSAGE (OUTPATIENT)
Dept: FAMILY MEDICINE | Facility: CLINIC | Age: 81
End: 2021-04-06

## 2021-06-23 ENCOUNTER — HOSPITAL ENCOUNTER (OUTPATIENT)
Dept: RADIOLOGY | Facility: CLINIC | Age: 81
Discharge: HOME | End: 2021-06-23
Attending: PHYSICIAN ASSISTANT
Payer: COMMERCIAL

## 2021-06-23 ENCOUNTER — OFFICE VISIT (OUTPATIENT)
Dept: URGENT CARE | Facility: CLINIC | Age: 81
End: 2021-06-23
Payer: COMMERCIAL

## 2021-06-23 VITALS
TEMPERATURE: 98 F | SYSTOLIC BLOOD PRESSURE: 117 MMHG | DIASTOLIC BLOOD PRESSURE: 58 MMHG | RESPIRATION RATE: 18 BRPM | OXYGEN SATURATION: 95 % | HEART RATE: 76 BPM

## 2021-06-23 DIAGNOSIS — J20.9 ACUTE BRONCHITIS, UNSPECIFIED ORGANISM: ICD-10-CM

## 2021-06-23 DIAGNOSIS — J20.9 ACUTE BRONCHITIS, UNSPECIFIED ORGANISM: Primary | ICD-10-CM

## 2021-06-23 PROCEDURE — 1126F AMNT PAIN NOTED NONE PRSNT: CPT | Mod: S$GLB,,, | Performed by: PHYSICIAN ASSISTANT

## 2021-06-23 PROCEDURE — 1126F PR PAIN SEVERITY QUANTIFIED, NO PAIN PRESENT: ICD-10-PCS | Mod: S$GLB,,, | Performed by: PHYSICIAN ASSISTANT

## 2021-06-23 PROCEDURE — 99214 OFFICE O/P EST MOD 30 MIN: CPT | Mod: S$GLB,,, | Performed by: PHYSICIAN ASSISTANT

## 2021-06-23 PROCEDURE — 71046 X-RAY EXAM CHEST 2 VIEWS: CPT | Mod: S$GLB,,, | Performed by: RADIOLOGY

## 2021-06-23 PROCEDURE — 99214 PR OFFICE/OUTPT VISIT, EST, LEVL IV, 30-39 MIN: ICD-10-PCS | Mod: S$GLB,,, | Performed by: PHYSICIAN ASSISTANT

## 2021-06-23 PROCEDURE — 71046 XR CHEST PA AND LATERAL: ICD-10-PCS | Mod: S$GLB,,, | Performed by: RADIOLOGY

## 2021-06-23 RX ORDER — ALBUTEROL SULFATE 90 UG/1
2 AEROSOL, METERED RESPIRATORY (INHALATION) EVERY 6 HOURS PRN
Qty: 8 G | Refills: 0 | Status: SHIPPED | OUTPATIENT
Start: 2021-06-23 | End: 2023-07-31 | Stop reason: SDUPTHER

## 2021-06-23 RX ORDER — BENZONATATE 200 MG/1
200 CAPSULE ORAL 3 TIMES DAILY PRN
Qty: 15 CAPSULE | Refills: 0 | Status: SHIPPED | OUTPATIENT
Start: 2021-06-23 | End: 2021-06-28

## 2021-06-23 RX ORDER — METHYLPREDNISOLONE 4 MG/1
TABLET ORAL
Qty: 1 PACKAGE | Refills: 0 | Status: ON HOLD | OUTPATIENT
Start: 2021-06-23 | End: 2022-09-02 | Stop reason: ALTCHOICE

## 2021-06-23 RX ORDER — CODEINE PHOSPHATE AND GUAIFENESIN 10; 100 MG/5ML; MG/5ML
5 SOLUTION ORAL EVERY 6 HOURS PRN
Qty: 120 ML | Refills: 0 | Status: SHIPPED | OUTPATIENT
Start: 2021-06-23 | End: 2021-06-30

## 2021-06-24 ENCOUNTER — TELEPHONE (OUTPATIENT)
Dept: URGENT CARE | Facility: CLINIC | Age: 81
End: 2021-06-24

## 2021-07-20 ENCOUNTER — OFFICE VISIT (OUTPATIENT)
Dept: FAMILY MEDICINE | Facility: CLINIC | Age: 81
End: 2021-07-20
Payer: COMMERCIAL

## 2021-07-20 VITALS
RESPIRATION RATE: 16 BRPM | OXYGEN SATURATION: 96 % | DIASTOLIC BLOOD PRESSURE: 72 MMHG | HEART RATE: 74 BPM | SYSTOLIC BLOOD PRESSURE: 124 MMHG | WEIGHT: 232.38 LBS | TEMPERATURE: 98 F | BODY MASS INDEX: 37.5 KG/M2

## 2021-07-20 DIAGNOSIS — E78.5 DYSLIPIDEMIA: ICD-10-CM

## 2021-07-20 DIAGNOSIS — Z12.5 ENCOUNTER FOR PROSTATE CANCER SCREENING: ICD-10-CM

## 2021-07-20 DIAGNOSIS — R73.02 IGT (IMPAIRED GLUCOSE TOLERANCE): ICD-10-CM

## 2021-07-20 DIAGNOSIS — I10 ESSENTIAL HYPERTENSION: ICD-10-CM

## 2021-07-20 DIAGNOSIS — J45.20 MILD INTERMITTENT ASTHMA WITHOUT COMPLICATION: Primary | ICD-10-CM

## 2021-07-20 PROCEDURE — 1159F MED LIST DOCD IN RCRD: CPT | Mod: CPTII,S$GLB,, | Performed by: INTERNAL MEDICINE

## 2021-07-20 PROCEDURE — 3074F PR MOST RECENT SYSTOLIC BLOOD PRESSURE < 130 MM HG: ICD-10-PCS | Mod: CPTII,S$GLB,, | Performed by: INTERNAL MEDICINE

## 2021-07-20 PROCEDURE — 99999 PR PBB SHADOW E&M-EST. PATIENT-LVL III: CPT | Mod: PBBFAC,,, | Performed by: INTERNAL MEDICINE

## 2021-07-20 PROCEDURE — 99214 PR OFFICE/OUTPT VISIT, EST, LEVL IV, 30-39 MIN: ICD-10-PCS | Mod: S$GLB,,, | Performed by: INTERNAL MEDICINE

## 2021-07-20 PROCEDURE — 3288F FALL RISK ASSESSMENT DOCD: CPT | Mod: CPTII,S$GLB,, | Performed by: INTERNAL MEDICINE

## 2021-07-20 PROCEDURE — 3078F PR MOST RECENT DIASTOLIC BLOOD PRESSURE < 80 MM HG: ICD-10-PCS | Mod: CPTII,S$GLB,, | Performed by: INTERNAL MEDICINE

## 2021-07-20 PROCEDURE — 1100F PTFALLS ASSESS-DOCD GE2>/YR: CPT | Mod: CPTII,S$GLB,, | Performed by: INTERNAL MEDICINE

## 2021-07-20 PROCEDURE — 3078F DIAST BP <80 MM HG: CPT | Mod: CPTII,S$GLB,, | Performed by: INTERNAL MEDICINE

## 2021-07-20 PROCEDURE — 99999 PR PBB SHADOW E&M-EST. PATIENT-LVL III: ICD-10-PCS | Mod: PBBFAC,,, | Performed by: INTERNAL MEDICINE

## 2021-07-20 PROCEDURE — 1126F PR PAIN SEVERITY QUANTIFIED, NO PAIN PRESENT: ICD-10-PCS | Mod: CPTII,S$GLB,, | Performed by: INTERNAL MEDICINE

## 2021-07-20 PROCEDURE — 1159F PR MEDICATION LIST DOCUMENTED IN MEDICAL RECORD: ICD-10-PCS | Mod: CPTII,S$GLB,, | Performed by: INTERNAL MEDICINE

## 2021-07-20 PROCEDURE — 1100F PR PT FALLS ASSESS DOC 2+ FALLS/FALL W/INJURY/YR: ICD-10-PCS | Mod: CPTII,S$GLB,, | Performed by: INTERNAL MEDICINE

## 2021-07-20 PROCEDURE — 99214 OFFICE O/P EST MOD 30 MIN: CPT | Mod: S$GLB,,, | Performed by: INTERNAL MEDICINE

## 2021-07-20 PROCEDURE — 1126F AMNT PAIN NOTED NONE PRSNT: CPT | Mod: CPTII,S$GLB,, | Performed by: INTERNAL MEDICINE

## 2021-07-20 PROCEDURE — 3288F PR FALLS RISK ASSESSMENT DOCUMENTED: ICD-10-PCS | Mod: CPTII,S$GLB,, | Performed by: INTERNAL MEDICINE

## 2021-07-20 PROCEDURE — 3074F SYST BP LT 130 MM HG: CPT | Mod: CPTII,S$GLB,, | Performed by: INTERNAL MEDICINE

## 2021-12-17 ENCOUNTER — IMMUNIZATION (OUTPATIENT)
Dept: PRIMARY CARE CLINIC | Facility: CLINIC | Age: 81
End: 2021-12-17
Payer: COMMERCIAL

## 2021-12-17 DIAGNOSIS — Z23 NEED FOR VACCINATION: Primary | ICD-10-CM

## 2021-12-17 PROCEDURE — 0004A COVID-19, MRNA, LNP-S, PF, 30 MCG/0.3 ML DOSE VACCINE: CPT | Mod: CV19,PBBFAC | Performed by: FAMILY MEDICINE

## 2021-12-30 RX ORDER — FLUTICASONE PROPIONATE 50 MCG
SPRAY, SUSPENSION (ML) NASAL
Qty: 48 G | Refills: 1 | Status: SHIPPED | OUTPATIENT
Start: 2021-12-30 | End: 2023-01-09

## 2021-12-30 RX ORDER — FLUTICASONE PROPIONATE 50 MCG
SPRAY, SUSPENSION (ML) NASAL
Qty: 16 G | Refills: 3 | OUTPATIENT
Start: 2021-12-30

## 2022-01-14 ENCOUNTER — HOSPITAL ENCOUNTER (OUTPATIENT)
Dept: RADIOLOGY | Facility: CLINIC | Age: 82
Discharge: HOME | End: 2022-01-14
Attending: PHYSICIAN ASSISTANT
Payer: COMMERCIAL

## 2022-01-14 ENCOUNTER — OFFICE VISIT (OUTPATIENT)
Dept: URGENT CARE | Facility: CLINIC | Age: 82
End: 2022-01-14
Payer: COMMERCIAL

## 2022-01-14 VITALS
HEIGHT: 66 IN | DIASTOLIC BLOOD PRESSURE: 79 MMHG | SYSTOLIC BLOOD PRESSURE: 173 MMHG | TEMPERATURE: 98 F | BODY MASS INDEX: 35.36 KG/M2 | OXYGEN SATURATION: 96 % | RESPIRATION RATE: 19 BRPM | WEIGHT: 220 LBS | HEART RATE: 64 BPM

## 2022-01-14 DIAGNOSIS — U07.1 COVID-19 VIRUS INFECTION: Primary | ICD-10-CM

## 2022-01-14 DIAGNOSIS — R05.9 COUGH: ICD-10-CM

## 2022-01-14 DIAGNOSIS — U07.1 COVID-19 VIRUS DETECTED: ICD-10-CM

## 2022-01-14 LAB
CTP QC/QA: YES
SARS-COV-2 RDRP RESP QL NAA+PROBE: POSITIVE

## 2022-01-14 PROCEDURE — 3078F DIAST BP <80 MM HG: CPT | Mod: CPTII,S$GLB,, | Performed by: PHYSICIAN ASSISTANT

## 2022-01-14 PROCEDURE — U0002: ICD-10-PCS | Mod: QW,S$GLB,, | Performed by: PHYSICIAN ASSISTANT

## 2022-01-14 PROCEDURE — U0002 COVID-19 LAB TEST NON-CDC: HCPCS | Mod: QW,S$GLB,, | Performed by: PHYSICIAN ASSISTANT

## 2022-01-14 PROCEDURE — 3077F SYST BP >= 140 MM HG: CPT | Mod: CPTII,S$GLB,, | Performed by: PHYSICIAN ASSISTANT

## 2022-01-14 PROCEDURE — 1159F MED LIST DOCD IN RCRD: CPT | Mod: CPTII,S$GLB,, | Performed by: PHYSICIAN ASSISTANT

## 2022-01-14 PROCEDURE — 71046 X-RAY EXAM CHEST 2 VIEWS: CPT | Mod: S$GLB,,, | Performed by: RADIOLOGY

## 2022-01-14 PROCEDURE — 1126F AMNT PAIN NOTED NONE PRSNT: CPT | Mod: CPTII,S$GLB,, | Performed by: PHYSICIAN ASSISTANT

## 2022-01-14 PROCEDURE — 99214 PR OFFICE/OUTPT VISIT, EST, LEVL IV, 30-39 MIN: ICD-10-PCS | Mod: S$GLB,,, | Performed by: PHYSICIAN ASSISTANT

## 2022-01-14 PROCEDURE — 3077F PR MOST RECENT SYSTOLIC BLOOD PRESSURE >= 140 MM HG: ICD-10-PCS | Mod: CPTII,S$GLB,, | Performed by: PHYSICIAN ASSISTANT

## 2022-01-14 PROCEDURE — 99214 OFFICE O/P EST MOD 30 MIN: CPT | Mod: S$GLB,,, | Performed by: PHYSICIAN ASSISTANT

## 2022-01-14 PROCEDURE — 1160F RVW MEDS BY RX/DR IN RCRD: CPT | Mod: CPTII,S$GLB,, | Performed by: PHYSICIAN ASSISTANT

## 2022-01-14 PROCEDURE — 1126F PR PAIN SEVERITY QUANTIFIED, NO PAIN PRESENT: ICD-10-PCS | Mod: CPTII,S$GLB,, | Performed by: PHYSICIAN ASSISTANT

## 2022-01-14 PROCEDURE — 71046 XR CHEST PA AND LATERAL: ICD-10-PCS | Mod: S$GLB,,, | Performed by: RADIOLOGY

## 2022-01-14 PROCEDURE — 3078F PR MOST RECENT DIASTOLIC BLOOD PRESSURE < 80 MM HG: ICD-10-PCS | Mod: CPTII,S$GLB,, | Performed by: PHYSICIAN ASSISTANT

## 2022-01-14 PROCEDURE — 1160F PR REVIEW ALL MEDS BY PRESCRIBER/CLIN PHARMACIST DOCUMENTED: ICD-10-PCS | Mod: CPTII,S$GLB,, | Performed by: PHYSICIAN ASSISTANT

## 2022-01-14 PROCEDURE — 1159F PR MEDICATION LIST DOCUMENTED IN MEDICAL RECORD: ICD-10-PCS | Mod: CPTII,S$GLB,, | Performed by: PHYSICIAN ASSISTANT

## 2022-01-14 NOTE — PROGRESS NOTES
"Subjective:       Patient ID: Joavn Del Cid Jr. is a 81 y.o. male.    Vitals:  height is 5' 6" (1.676 m) and weight is 99.8 kg (220 lb). His tympanic temperature is 97.9 °F (36.6 °C). His blood pressure is 173/79 (abnormal) and his pulse is 64. His respiration is 19 and oxygen saturation is 96%.     Chief Complaint: Cough    Pt has had cough for over a month but states it has worsened recently.. Pt has had mild SOB. Pt is fully vaccinatetd with booster. Pt wife test postive for covid on yesterday.  Denies fever, body aches or chills.  No other OTC meds added to treatment regimen at this time.    Cough  This is a new problem. The current episode started 1 to 4 weeks ago. The problem has been gradually worsening. The problem occurs constantly. The cough is productive of sputum. Associated symptoms include nasal congestion, postnasal drip, shortness of breath and wheezing. Pertinent negatives include no chest pain, chills, ear congestion, ear pain, fever, headaches, heartburn, hemoptysis, myalgias, rash, rhinorrhea, sore throat, sweats or weight loss. Nothing aggravates the symptoms. He has tried nothing for the symptoms. The treatment provided no relief. His past medical history is significant for asthma, bronchiectasis and bronchitis. There is no history of COPD, emphysema, environmental allergies or pneumonia.       Constitution: Negative for chills and fever.   HENT: Positive for postnasal drip. Negative for ear pain and sore throat.    Cardiovascular: Negative for chest pain.   Respiratory: Positive for cough, shortness of breath and wheezing. Negative for bloody sputum.    Gastrointestinal: Negative for heartburn.   Musculoskeletal: Negative for muscle ache.   Skin: Negative for rash.   Allergic/Immunologic: Negative for environmental allergies.   Neurological: Negative for headaches.       Objective:      Physical Exam   Constitutional: He is oriented to person, place, and time. He appears well-developed and " well-nourished. He is cooperative.  Non-toxic appearance. He does not have a sickly appearance. He does not appear ill. No distress.   HENT:   Head: Normocephalic and atraumatic.   Ears:   Right Ear: Hearing and external ear normal.   Left Ear: Hearing and external ear normal.   Nose: Nose normal. No mucosal edema, rhinorrhea or nasal deformity. No epistaxis. Right sinus exhibits no maxillary sinus tenderness and no frontal sinus tenderness. Left sinus exhibits no maxillary sinus tenderness and no frontal sinus tenderness.   Mouth/Throat: Uvula is midline, oropharynx is clear and moist and mucous membranes are normal. No trismus in the jaw. Normal dentition. No uvula swelling. No oropharyngeal exudate, posterior oropharyngeal edema or posterior oropharyngeal erythema.   Eyes: Conjunctivae and lids are normal. No scleral icterus.   Neck: Trachea normal and phonation normal. Neck supple. No edema present. No erythema present. No neck rigidity present.   Cardiovascular: Normal rate, regular rhythm, normal heart sounds, intact distal pulses and normal pulses.   Pulmonary/Chest: Effort normal. No respiratory distress. He has no decreased breath sounds. He has no rhonchi. He has rales in the right lower field.   Abdominal: Normal appearance.   Musculoskeletal: Normal range of motion.         General: No deformity or edema. Normal range of motion.   Neurological: He is alert and oriented to person, place, and time. He exhibits normal muscle tone. Coordination normal.   Skin: Skin is warm, dry, intact, not diaphoretic and not pale.   Psychiatric: He has a normal mood and affect. His speech is normal and behavior is normal. Judgment and thought content normal. Cognition and memory  Nursing note and vitals reviewed.        Assessment:       1. COVID-19 virus infection    2. Cough          Plan:         COVID-19 virus infection  -     Ambulatory referral/consult to [facility]  -     XR CHEST PA AND LATERAL; Future; Expected  date: 01/14/2022    Cough  -     POCT COVID-19 Rapid Screening  -     XR CHEST PA AND LATERAL; Future; Expected date: 01/14/2022    COVID test is positive.  CXR is WNL.      COVID risk score:  7  [facility] infusion ordered and patient info sheet provided.    All of patients questions answered.      Increase fluids.  Get plenty of rest.   Normal saline nasal wash to irrigate sinuses and for congestion/runny nose.  Cool mist humidifier/vaporizer.  Practice good handwashing.  Mucinex for cough and chest congestion.  Tylenol or Ibuprofen for fever, headache and body aches.  Warm salt water gargles for throat comfort.  Chloraseptic spray or lozenges for throat comfort.  See PCP or go to ER if symptoms worsen or fail to improve with treatment.         CDC Testing and Quarantine Guidelines for patients with exposure to a known-positive COVID-19 person:    ·     A 'close exposure' is defined as anyone who has had an exposure (masked or unmasked) to a known COVID -19 positive person within 6 feet of someone for a cumulative total of 15 minutes or more over a 24-hour period.    ·     Vaccinated Have been boosted or completed the primary series of Pfizer or Moderna vaccine within the last 6 months or completed the primary series of J&J vaccine within the last 2 months and/or had a positive test within 90 days.  Do NOT need to quarantine after contact with someone who had COVID-19 unless they have symptoms.  Fully vaccinated people who have not had a positive test within 90 days, should get tested 3-5 days after their exposure, even if they don't have symptoms and wear a mask indoors in public for 10 days following exposure or until their test result is negative on day 5.  If you develop symptoms test and quarantine.      ·     Unvaccinated, or are more than six months out from their second mRNA dose (or more than 2 months after the J&J vaccine) and not yet boosted,  and/or NOT had a positive test within 90 days and meet 'close  exposure' you are required by CDC guidelines to quarantine for at least 5 days from time of exposure followed by 5 days of strict masking. It is recommended, but not required to test after 5 days, unless you develop symptoms, in which case you should test at that time.    If you do decide to test at 5 days and are asymptomatic, the risk is that if you test without symptoms on Day 5 for example) and you are positive, your 5 day isolation begins on that day, and you turned your 5 day quarantine into 10 days.    If your exposure does not meet the above definition, you can return to your normal daily activities to include social distancing, wearing a mask and frequent handwashing.    Alternatively, if a 5-day quarantine is not feasible, it is imperative that an exposed person wear a well-fitting mask at all times when around others for 10 days after exposure.           You have tested positive for COVID-19 today.         ISOLATION    If you tested positive and do not have symptoms, you must isolate for 5 days starting on the day of the positive test. I    If you tested positive and have symptoms, you must isolate for 5 days starting on the day of the first symptoms,  not the day of the positive test.    This is the most important part, both the CDC and the [facility] emphasize that you do not test out of isolation.    After 5 days, if your symptoms have improved and you have not had fever on day 5, you can return to the community on day 6- NO TESTING REQUIRED!     In fact, we do not retest if you were positive in the last 90 days.    After your 5 days of isolation are completed, the CDC recommends strict mask use for the first 5 days that you come out of isolation.

## 2022-01-14 NOTE — PATIENT INSTRUCTIONS
Increase fluids.  Get plenty of rest.   Normal saline nasal wash to irrigate sinuses and for congestion/runny nose.  Cool mist humidifier/vaporizer.  Practice good handwashing.  Mucinex for cough and chest congestion.  Tylenol or Ibuprofen for fever, headache and body aches.  Warm salt water gargles for throat comfort.  Chloraseptic spray or lozenges for throat comfort.  See PCP or go to ER if symptoms worsen or fail to improve with treatment.           CDC Testing and Quarantine Guidelines for patients with exposure to a known-positive COVID-19 person:    ·     A 'close exposure' is defined as anyone who has had an exposure (masked or unmasked) to a known COVID -19 positive person within 6 feet of someone for a cumulative total of 15 minutes or more over a 24-hour period.    ·     Vaccinated Have been boosted or completed the primary series of Pfizer or Moderna vaccine within the last 6 months or completed the primary series of J&J vaccine within the last 2 months and/or had a positive test within 90 days.  Do NOT need to quarantine after contact with someone who had COVID-19 unless they have symptoms.  Fully vaccinated people who have not had a positive test within 90 days, should get tested 3-5 days after their exposure, even if they don't have symptoms and wear a mask indoors in public for 10 days following exposure or until their test result is negative on day 5.  If you develop symptoms test and quarantine.      ·     Unvaccinated, or are more than six months out from their second mRNA dose (or more than 2 months after the J&J vaccine) and not yet boosted,  and/or NOT had a positive test within 90 days and meet 'close exposure' you are required by CDC guidelines to quarantine for at least 5 days from time of exposure followed by 5 days of strict masking. It is recommended, but not required to test after 5 days, unless you develop symptoms, in which case you should test at that time.    If you do decide to  test at 5 days and are asymptomatic, the risk is that if you test without symptoms on Day 5 for example) and you are positive, your 5 day isolation begins on that day, and you turned your 5 day quarantine into 10 days.    If your exposure does not meet the above definition, you can return to your normal daily activities to include social distancing, wearing a mask and frequent handwashing.    Alternatively, if a 5-day quarantine is not feasible, it is imperative that an exposed person wear a well-fitting mask at all times when around others for 10 days after exposure.           You have tested positive for COVID-19 today.         ISOLATION    If you tested positive and do not have symptoms, you must isolate for 5 days starting on the day of the positive test. I    If you tested positive and have symptoms, you must isolate for 5 days starting on the day of the first symptoms,  not the day of the positive test.    This is the most important part, both the CDC and the [facility] emphasize that you do not test out of isolation.    After 5 days, if your symptoms have improved and you have not had fever on day 5, you can return to the community on day 6- NO TESTING REQUIRED!     In fact, we do not retest if you were positive in the last 90 days.    After your 5 days of isolation are completed, the CDC recommends strict mask use for the first 5 days that you come out of isolation.

## 2022-01-15 ENCOUNTER — INFUSION (OUTPATIENT)
Dept: INFECTIOUS DISEASES | Facility: HOSPITAL | Age: 82
End: 2022-01-15
Attending: PHYSICIAN ASSISTANT
Payer: COMMERCIAL

## 2022-01-15 VITALS
OXYGEN SATURATION: 95 % | DIASTOLIC BLOOD PRESSURE: 76 MMHG | SYSTOLIC BLOOD PRESSURE: 141 MMHG | TEMPERATURE: 97 F | HEART RATE: 70 BPM | RESPIRATION RATE: 18 BRPM

## 2022-01-15 DIAGNOSIS — U07.1 COVID: Primary | ICD-10-CM

## 2022-01-15 PROCEDURE — 63600175 PHARM REV CODE 636 W HCPCS: Performed by: INTERNAL MEDICINE

## 2022-01-15 PROCEDURE — M0247 HC IV INFUSION, SOTROVIMAB, INCL POST ADMIN MONIT: HCPCS | Performed by: INTERNAL MEDICINE

## 2022-01-15 PROCEDURE — 25000003 PHARM REV CODE 250: Performed by: INTERNAL MEDICINE

## 2022-01-15 RX ORDER — ALBUTEROL SULFATE 90 UG/1
2 AEROSOL, METERED RESPIRATORY (INHALATION)
Status: DISCONTINUED | OUTPATIENT
Start: 2022-01-15 | End: 2022-09-15 | Stop reason: CLARIF

## 2022-01-15 RX ORDER — SODIUM CHLORIDE 0.9 % (FLUSH) 0.9 %
10 SYRINGE (ML) INJECTION
Status: DISCONTINUED | OUTPATIENT
Start: 2022-01-15 | End: 2022-09-15 | Stop reason: CLARIF

## 2022-01-15 RX ORDER — ACETAMINOPHEN 325 MG/1
650 TABLET ORAL ONCE AS NEEDED
Status: DISCONTINUED | OUTPATIENT
Start: 2022-01-15 | End: 2022-09-15 | Stop reason: CLARIF

## 2022-01-15 RX ORDER — DIPHENHYDRAMINE HYDROCHLORIDE 50 MG/ML
25 INJECTION INTRAMUSCULAR; INTRAVENOUS ONCE AS NEEDED
Status: DISCONTINUED | OUTPATIENT
Start: 2022-01-15 | End: 2022-09-15 | Stop reason: CLARIF

## 2022-01-15 RX ORDER — ONDANSETRON 4 MG/1
4 TABLET, ORALLY DISINTEGRATING ORAL ONCE AS NEEDED
Status: ACTIVE | OUTPATIENT
Start: 2022-01-15 | End: 2033-06-13

## 2022-01-15 RX ORDER — EPINEPHRINE 0.3 MG/.3ML
0.3 INJECTION SUBCUTANEOUS
Status: ACTIVE | OUTPATIENT
Start: 2022-01-15

## 2022-01-15 RX ADMIN — SODIUM CHLORIDE 500 MG: 9 INJECTION, SOLUTION INTRAVENOUS at 03:01

## 2022-01-15 NOTE — PROGRESS NOTES
If you have any further COVID related symtoms that have not improved or feel you're having a reaction to your infusion please notify your primary care physician, go to the nearest emergency room or call 911. Patient discharged via ambulatory with escort to front door of hospital in no apparent distress. Tolerated infusion well. Instructed patient to notify primary care physician if symptoms do not resolve or worsen and to continue to quarantine for the full 10 day period. Also, instructed to wait 90 days post infusion to receive Covid vaccine. Patient verbalized intent to comply.

## 2022-02-02 DIAGNOSIS — I10 HTN (HYPERTENSION): ICD-10-CM

## 2022-04-21 RX ORDER — VALSARTAN AND HYDROCHLOROTHIAZIDE 160; 12.5 MG/1; MG/1
TABLET, FILM COATED ORAL
Qty: 90 TABLET | Refills: 3 | Status: ON HOLD | OUTPATIENT
Start: 2022-04-21 | End: 2022-09-04 | Stop reason: SDUPTHER

## 2022-04-21 NOTE — TELEPHONE ENCOUNTER
Care Due:                  Date            Visit Type   Department     Provider  --------------------------------------------------------------------------------                                EP -                              PRIMARY      [facility]  Last Visit: 07-      CARE ([facility])   MEDICINE       Paul Neal  Next Visit: None Scheduled  None         None Found                                                            Last  Test          Frequency    Reason                     Performed    Due Date  --------------------------------------------------------------------------------    Office Visit  12 months..  rosuvastatin,              07-   07-                             valsartan-hydrochlorothia                             zide.....................    CMP.........  12 months..  rosuvastatin,              01- 01-                             valsartan-hydrochlorothia                             zide.....................    Lipid Panel.  12 months..  rosuvastatin.............  01- 01-    Powered by [employer]. Reference number: [id number].   4/21/2022 6:11:51 AM CDT

## 2022-04-21 NOTE — TELEPHONE ENCOUNTER
Refill Routing Note   Medication(s) are not appropriate for processing by [facility] for the following reason(s):      - Required laboratory values are outdated  - Required vitals are abnormal    [facility] action(s):  Defer          Medication reconciliation completed: No     Appointments  past 12m or future 3m with PCP    Date Provider   Last Visit   7/20/2021 Paul Neal MD   Next Visit   Visit date not found Paul Neal MD   ED visits in past 90 days: 0        Note composed:12:02 PM 04/21/2022

## 2022-04-22 RX ORDER — ROSUVASTATIN CALCIUM 40 MG/1
TABLET, COATED ORAL
Qty: 90 TABLET | Refills: 3 | Status: ON HOLD | OUTPATIENT
Start: 2022-04-22 | End: 2022-09-04 | Stop reason: SDUPTHER

## 2022-04-22 NOTE — TELEPHONE ENCOUNTER
Refill Routing Note   Medication(s) are not appropriate for processing by [facility] for the following reason(s):      - Required laboratory values are outdated    [facility] action(s):  Defer Medication-related problems identified: Requires labs        Medication reconciliation completed: No     Appointments  past 12m or future 3m with PCP    Date Provider   Last Visit   7/20/2021 Paul Neal MD   Next Visit   Visit date not found Paul Neal MD   ED visits in past 90 days: 0        Note composed:2:29 PM 04/22/2022

## 2022-04-22 NOTE — TELEPHONE ENCOUNTER
No new care gaps identified.  Powered by [employer]. Reference number: [id number].   4/22/2022 12:00:21 PM CDT

## 2022-07-27 ENCOUNTER — PATIENT MESSAGE (OUTPATIENT)
Dept: ADMINISTRATIVE | Facility: HOSPITAL | Age: 82
End: 2022-07-27
Payer: COMMERCIAL

## 2022-08-05 ENCOUNTER — PATIENT OUTREACH (OUTPATIENT)
Dept: ADMINISTRATIVE | Facility: HOSPITAL | Age: 82
End: 2022-08-05
Payer: COMMERCIAL

## 2022-08-05 DIAGNOSIS — Z12.5 SCREENING PSA (PROSTATE SPECIFIC ANTIGEN): Primary | ICD-10-CM

## 2022-08-05 DIAGNOSIS — Z00.00 ENCOUNTER FOR PREVENTIVE HEALTH EXAMINATION: ICD-10-CM

## 2022-08-05 NOTE — PROGRESS NOTES
Blood Pressure Report: Called Pt to obtain home blood pressure or schedule PCP  Visit. Pt at work, accepts PCP appt 09/27/22 & labs the week before. Labs ordered, scheduled, & linked.

## 2022-09-02 ENCOUNTER — TELEPHONE (OUTPATIENT)
Dept: FAMILY MEDICINE | Facility: CLINIC | Age: 82
End: 2022-09-02
Payer: COMMERCIAL

## 2022-09-02 ENCOUNTER — HOSPITAL ENCOUNTER (INPATIENT)
Facility: HOSPITAL | Age: 82
LOS: 2 days | Discharge: HOME | DRG: 066 | End: 2022-09-04
Attending: EMERGENCY MEDICINE | Admitting: INTERNAL MEDICINE
Payer: COMMERCIAL

## 2022-09-02 DIAGNOSIS — I63.9 ACUTE CVA (CEREBROVASCULAR ACCIDENT): ICD-10-CM

## 2022-09-02 DIAGNOSIS — I63.9 STROKE: ICD-10-CM

## 2022-09-02 DIAGNOSIS — J45.20 MILD INTERMITTENT ASTHMA WITHOUT COMPLICATION: Chronic | ICD-10-CM

## 2022-09-02 DIAGNOSIS — I63.9 CEREBROVASCULAR ACCIDENT (CVA), UNSPECIFIED MECHANISM: Primary | ICD-10-CM

## 2022-09-02 DIAGNOSIS — I63.9 CVA (CEREBRAL VASCULAR ACCIDENT): ICD-10-CM

## 2022-09-02 PROBLEM — R53.1 RIGHT SIDED WEAKNESS: Status: ACTIVE | Noted: 2022-09-02

## 2022-09-02 LAB
ALBUMIN SERPL BCP-MCNC: 3.7 G/DL (ref 3.5–5.2)
ALP SERPL-CCNC: 70 U/L (ref 55–135)
ALT SERPL W/O P-5'-P-CCNC: 68 U/L (ref 10–44)
ANION GAP SERPL CALC-SCNC: 12 MMOL/L (ref 8–16)
AORTIC ROOT ANNULUS: 3.78 CM
ASCENDING AORTA: 3.04 CM
AST SERPL-CCNC: 38 U/L (ref 10–40)
AV INDEX (PROSTH): 0.96
AV MEAN GRADIENT: 3 MMHG
AV PEAK GRADIENT: 5 MMHG
AV VALVE AREA: 3.36 CM2
AV VELOCITY RATIO: 0.92
BACTERIA #/AREA URNS HPF: NORMAL /HPF
BASOPHILS # BLD AUTO: 0.04 K/UL (ref 0–0.2)
BASOPHILS NFR BLD: 0.8 % (ref 0–1.9)
BILIRUB SERPL-MCNC: 1.9 MG/DL (ref 0.1–1)
BILIRUB UR QL STRIP: NEGATIVE
BSA FOR ECHO PROCEDURE: 2.23 M2
BUN SERPL-MCNC: 15 MG/DL (ref 8–23)
CALCIUM SERPL-MCNC: 8.9 MG/DL (ref 8.7–10.5)
CHLORIDE SERPL-SCNC: 103 MMOL/L (ref 95–110)
CHOLEST SERPL-MCNC: 269 MG/DL (ref 120–199)
CHOLEST/HDLC SERPL: 6.9 {RATIO} (ref 2–5)
CLARITY UR: CLEAR
CO2 SERPL-SCNC: 21 MMOL/L (ref 23–29)
COLOR UR: YELLOW
CREAT SERPL-MCNC: 0.9 MG/DL (ref 0.5–1.4)
CV ECHO LV RWT: 0.43 CM
DIFFERENTIAL METHOD: ABNORMAL
DOP CALC AO PEAK VEL: 1.09 M/S
DOP CALC AO VTI: 21.3 CM
DOP CALC LVOT AREA: 3.5 CM2
DOP CALC LVOT DIAMETER: 2.11 CM
DOP CALC LVOT PEAK VEL: 1 M/S
DOP CALC LVOT STROKE VOLUME: 71.65 CM3
DOP CALC RVOT PEAK VEL: 0.49 M/S
DOP CALC RVOT VTI: 13.5 CM
DOP CALCLVOT PEAK VEL VTI: 20.5 CM
E WAVE DECELERATION TIME: 239.6 MSEC
E/A RATIO: 0.86
ECHO LV POSTERIOR WALL: 1.02 CM (ref 0.6–1.1)
EJECTION FRACTION: 60 %
EOSINOPHIL # BLD AUTO: 0.2 K/UL (ref 0–0.5)
EOSINOPHIL NFR BLD: 3.6 % (ref 0–8)
ERYTHROCYTE [DISTWIDTH] IN BLOOD BY AUTOMATED COUNT: 13.5 % (ref 11.5–14.5)
EST. GFR  (NO RACE VARIABLE): >60 ML/MIN/1.73 M^2
FRACTIONAL SHORTENING: 39 % (ref 28–44)
GLUCOSE SERPL-MCNC: 257 MG/DL (ref 70–110)
GLUCOSE UR QL STRIP: ABNORMAL
HCT VFR BLD AUTO: 42.6 % (ref 40–54)
HDLC SERPL-MCNC: 39 MG/DL (ref 40–75)
HDLC SERPL: 14.5 % (ref 20–50)
HGB BLD-MCNC: 13.7 G/DL (ref 14–18)
HGB UR QL STRIP: NEGATIVE
IMM GRANULOCYTES # BLD AUTO: 0.02 K/UL (ref 0–0.04)
IMM GRANULOCYTES NFR BLD AUTO: 0.4 % (ref 0–0.5)
INR PPP: 1 (ref 0.8–1.2)
INTERVENTRICULAR SEPTUM: 1.12 CM (ref 0.6–1.1)
IVRT: 77.07 MSEC
KETONES UR QL STRIP: NEGATIVE
LA MAJOR: 3.89 CM
LA MINOR: 2.77 CM
LA WIDTH: 3.4 CM
LDLC SERPL CALC-MCNC: 178.4 MG/DL (ref 63–159)
LEFT ATRIUM SIZE: 3.79 CM
LEFT ATRIUM VOLUME INDEX: 16.5 ML/M2
LEFT ATRIUM VOLUME: 35.44 CM3
LEFT INTERNAL DIMENSION IN SYSTOLE: 2.89 CM (ref 2.1–4)
LEFT VENTRICLE DIASTOLIC VOLUME INDEX: 49.26 ML/M2
LEFT VENTRICLE DIASTOLIC VOLUME: 105.9 ML
LEFT VENTRICLE MASS INDEX: 86 G/M2
LEFT VENTRICLE SYSTOLIC VOLUME INDEX: 14.9 ML/M2
LEFT VENTRICLE SYSTOLIC VOLUME: 32.07 ML
LEFT VENTRICULAR INTERNAL DIMENSION IN DIASTOLE: 4.77 CM (ref 3.5–6)
LEFT VENTRICULAR MASS: 184.81 G
LEUKOCYTE ESTERASE UR QL STRIP: NEGATIVE
LVOT MG: 1.82 MMHG
LVOT MV: 0.61 CM/S
LYMPHOCYTES # BLD AUTO: 0.8 K/UL (ref 1–4.8)
LYMPHOCYTES NFR BLD: 15.2 % (ref 18–48)
MCH RBC QN AUTO: 30.7 PG (ref 27–31)
MCHC RBC AUTO-ENTMCNC: 32.2 G/DL (ref 32–36)
MCV RBC AUTO: 96 FL (ref 82–98)
MICROSCOPIC COMMENT: NORMAL
MONOCYTES # BLD AUTO: 0.7 K/UL (ref 0.3–1)
MONOCYTES NFR BLD: 13.4 % (ref 4–15)
MV PEAK A VEL: 0.73 M/S
MV PEAK E VEL: 0.63 M/S
MV STENOSIS PRESSURE HALF TIME: 69.48 MS
MV VALVE AREA P 1/2 METHOD: 3.17 CM2
NEUTROPHILS # BLD AUTO: 3.3 K/UL (ref 1.8–7.7)
NEUTROPHILS NFR BLD: 66.6 % (ref 38–73)
NITRITE UR QL STRIP: NEGATIVE
NONHDLC SERPL-MCNC: 230 MG/DL
NRBC BLD-RTO: 0 /100 WBC
PH UR STRIP: 6 [PH] (ref 5–8)
PISA MRMAX VEL: 7 M/S
PLATELET # BLD AUTO: 154 K/UL (ref 150–450)
PMV BLD AUTO: 9.9 FL (ref 9.2–12.9)
POCT GLUCOSE: 258 MG/DL (ref 70–110)
POTASSIUM SERPL-SCNC: 3.9 MMOL/L (ref 3.5–5.1)
PROT SERPL-MCNC: 6.5 G/DL (ref 6–8.4)
PROT UR QL STRIP: NEGATIVE
PROTHROMBIN TIME: 10.3 SEC (ref 9–12.5)
PV MEAN GRADIENT: 0.58 MMHG
RA MAJOR: 3.44 CM
RA PRESSURE: 3 MMHG
RBC # BLD AUTO: 4.46 M/UL (ref 4.6–6.2)
SARS-COV-2 RDRP RESP QL NAA+PROBE: NEGATIVE
SODIUM SERPL-SCNC: 136 MMOL/L (ref 136–145)
SP GR UR STRIP: 1.01 (ref 1–1.03)
STJ: 3.06 CM
TRIGL SERPL-MCNC: 258 MG/DL (ref 30–150)
TSH SERPL DL<=0.005 MIU/L-ACNC: 1.88 UIU/ML (ref 0.4–4)
URN SPEC COLLECT METH UR: ABNORMAL
UROBILINOGEN UR STRIP-ACNC: NEGATIVE EU/DL
WBC # BLD AUTO: 5 K/UL (ref 3.9–12.7)
YEAST URNS QL MICRO: NORMAL

## 2022-09-02 PROCEDURE — 93010 ELECTROCARDIOGRAM REPORT: CPT | Mod: ,,, | Performed by: INTERNAL MEDICINE

## 2022-09-02 PROCEDURE — 80061 LIPID PANEL: CPT | Performed by: EMERGENCY MEDICINE

## 2022-09-02 PROCEDURE — 11000001 HC ACUTE MED/SURG PRIVATE ROOM

## 2022-09-02 PROCEDURE — 99285 EMERGENCY DEPT VISIT HI MDM: CPT | Mod: 25

## 2022-09-02 PROCEDURE — 93005 ELECTROCARDIOGRAM TRACING: CPT

## 2022-09-02 PROCEDURE — 80053 COMPREHEN METABOLIC PANEL: CPT | Performed by: EMERGENCY MEDICINE

## 2022-09-02 PROCEDURE — 36415 COLL VENOUS BLD VENIPUNCTURE: CPT | Performed by: INTERNAL MEDICINE

## 2022-09-02 PROCEDURE — 83036 HEMOGLOBIN GLYCOSYLATED A1C: CPT | Performed by: INTERNAL MEDICINE

## 2022-09-02 PROCEDURE — 84443 ASSAY THYROID STIM HORMONE: CPT | Performed by: EMERGENCY MEDICINE

## 2022-09-02 PROCEDURE — 25500020 PHARM REV CODE 255: Performed by: INTERNAL MEDICINE

## 2022-09-02 PROCEDURE — 81000 URINALYSIS NONAUTO W/SCOPE: CPT | Performed by: EMERGENCY MEDICINE

## 2022-09-02 PROCEDURE — 85610 PROTHROMBIN TIME: CPT | Performed by: EMERGENCY MEDICINE

## 2022-09-02 PROCEDURE — 63600175 PHARM REV CODE 636 W HCPCS: Performed by: INTERNAL MEDICINE

## 2022-09-02 PROCEDURE — 25000003 PHARM REV CODE 250: Performed by: EMERGENCY MEDICINE

## 2022-09-02 PROCEDURE — 82962 GLUCOSE BLOOD TEST: CPT

## 2022-09-02 PROCEDURE — 93010 EKG 12-LEAD: ICD-10-PCS | Mod: ,,, | Performed by: INTERNAL MEDICINE

## 2022-09-02 PROCEDURE — 85025 COMPLETE CBC W/AUTO DIFF WBC: CPT | Performed by: EMERGENCY MEDICINE

## 2022-09-02 PROCEDURE — U0002 COVID-19 LAB TEST NON-CDC: HCPCS | Performed by: EMERGENCY MEDICINE

## 2022-09-02 PROCEDURE — 25000003 PHARM REV CODE 250: Performed by: INTERNAL MEDICINE

## 2022-09-02 PROCEDURE — 21400001 HC TELEMETRY ROOM

## 2022-09-02 RX ORDER — GLUCAGON 1 MG
1 KIT INJECTION
Status: DISCONTINUED | OUTPATIENT
Start: 2022-09-02 | End: 2022-09-04 | Stop reason: HOSPADM

## 2022-09-02 RX ORDER — ASPIRIN 325 MG
325 TABLET ORAL
Status: COMPLETED | OUTPATIENT
Start: 2022-09-02 | End: 2022-09-02

## 2022-09-02 RX ORDER — LABETALOL HYDROCHLORIDE 5 MG/ML
10 INJECTION, SOLUTION INTRAVENOUS EVERY 4 HOURS PRN
Status: DISCONTINUED | OUTPATIENT
Start: 2022-09-02 | End: 2022-09-04 | Stop reason: HOSPADM

## 2022-09-02 RX ORDER — ASPIRIN 81 MG/1
81 TABLET ORAL DAILY
Status: DISCONTINUED | OUTPATIENT
Start: 2022-09-03 | End: 2022-09-04 | Stop reason: HOSPADM

## 2022-09-02 RX ORDER — MONTELUKAST SODIUM 10 MG/1
10 TABLET ORAL NIGHTLY
Status: DISCONTINUED | OUTPATIENT
Start: 2022-09-02 | End: 2022-09-04 | Stop reason: HOSPADM

## 2022-09-02 RX ORDER — PANTOPRAZOLE SODIUM 40 MG/1
40 TABLET, DELAYED RELEASE ORAL DAILY
Status: DISCONTINUED | OUTPATIENT
Start: 2022-09-02 | End: 2022-09-04 | Stop reason: HOSPADM

## 2022-09-02 RX ORDER — ONDANSETRON 2 MG/ML
4 INJECTION INTRAMUSCULAR; INTRAVENOUS EVERY 6 HOURS PRN
Status: DISCONTINUED | OUTPATIENT
Start: 2022-09-02 | End: 2022-09-04 | Stop reason: HOSPADM

## 2022-09-02 RX ORDER — ATORVASTATIN CALCIUM 40 MG/1
40 TABLET, FILM COATED ORAL DAILY
Status: DISCONTINUED | OUTPATIENT
Start: 2022-09-02 | End: 2022-09-04 | Stop reason: HOSPADM

## 2022-09-02 RX ORDER — IPRATROPIUM BROMIDE AND ALBUTEROL SULFATE 2.5; .5 MG/3ML; MG/3ML
3 SOLUTION RESPIRATORY (INHALATION) EVERY 4 HOURS PRN
Status: DISCONTINUED | OUTPATIENT
Start: 2022-09-02 | End: 2022-09-04 | Stop reason: HOSPADM

## 2022-09-02 RX ORDER — TALC
6 POWDER (GRAM) TOPICAL NIGHTLY PRN
Status: DISCONTINUED | OUTPATIENT
Start: 2022-09-02 | End: 2022-09-04 | Stop reason: HOSPADM

## 2022-09-02 RX ORDER — ENOXAPARIN SODIUM 100 MG/ML
40 INJECTION SUBCUTANEOUS EVERY 24 HOURS
Status: DISCONTINUED | OUTPATIENT
Start: 2022-09-02 | End: 2022-09-04 | Stop reason: HOSPADM

## 2022-09-02 RX ORDER — IBUPROFEN 200 MG
24 TABLET ORAL
Status: DISCONTINUED | OUTPATIENT
Start: 2022-09-02 | End: 2022-09-04 | Stop reason: HOSPADM

## 2022-09-02 RX ORDER — IBUPROFEN 200 MG
16 TABLET ORAL
Status: DISCONTINUED | OUTPATIENT
Start: 2022-09-02 | End: 2022-09-04 | Stop reason: HOSPADM

## 2022-09-02 RX ORDER — ALPRAZOLAM 0.25 MG/1
0.25 TABLET ORAL ONCE AS NEEDED
Status: COMPLETED | OUTPATIENT
Start: 2022-09-02 | End: 2022-09-02

## 2022-09-02 RX ORDER — SODIUM CHLORIDE 0.9 % (FLUSH) 0.9 %
10 SYRINGE (ML) INJECTION
Status: DISCONTINUED | OUTPATIENT
Start: 2022-09-02 | End: 2022-09-04 | Stop reason: HOSPADM

## 2022-09-02 RX ORDER — INSULIN ASPART 100 [IU]/ML
0-5 INJECTION, SOLUTION INTRAVENOUS; SUBCUTANEOUS
Status: DISCONTINUED | OUTPATIENT
Start: 2022-09-02 | End: 2022-09-04 | Stop reason: HOSPADM

## 2022-09-02 RX ADMIN — IOHEXOL 100 ML: 350 INJECTION, SOLUTION INTRAVENOUS at 03:09

## 2022-09-02 RX ADMIN — MONTELUKAST 10 MG: 10 TABLET, FILM COATED ORAL at 11:09

## 2022-09-02 RX ADMIN — ASPIRIN 325 MG ORAL TABLET 325 MG: 325 PILL ORAL at 01:09

## 2022-09-02 RX ADMIN — ENOXAPARIN SODIUM 40 MG: 100 INJECTION SUBCUTANEOUS at 05:09

## 2022-09-02 RX ADMIN — PANTOPRAZOLE SODIUM 40 MG: 40 TABLET, DELAYED RELEASE ORAL at 03:09

## 2022-09-02 RX ADMIN — ALPRAZOLAM 0.25 MG: 0.25 TABLET ORAL at 03:09

## 2022-09-02 RX ADMIN — ATORVASTATIN CALCIUM 40 MG: 40 TABLET, FILM COATED ORAL at 03:09

## 2022-09-02 NOTE — SUBJECTIVE & OBJECTIVE
Past Medical History:   Diagnosis Date    Asthma     Bronchitis chronic     Cancer     Cough     Digestive disorder     Hyperlipidemia     Hypertension     Liver disease        Past Surgical History:   Procedure Laterality Date    COLONOSCOPY N/A 6/21/2016    Procedure: COLONOSCOPY;  Surgeon: Alonso Dorsey MD;  Location: [facility] ENDO;  Service: Endoscopy;  Laterality: N/A;    COLONOSCOPY N/A 11/1/2016    Procedure: COLONOSCOPY;  Surgeon: Alonso Dorsey MD;  Location: [facility] ENDO;  Service: Endoscopy;  Laterality: N/A;    COLONOSCOPY N/A 2/3/2017    Procedure: COLONOSCOPY;  Surgeon: Alonso Dorsey MD;  Location: [facility] ENDO;  Service: Endoscopy;  Laterality: N/A;    COLONOSCOPY N/A 11/13/2017    Procedure: COLONOSCOPY;  Surgeon: Alonso Dorsey MD;  Location: [facility] ENDO;  Service: Endoscopy;  Laterality: N/A;    COLONOSCOPY N/A 2/19/2021    Procedure: COLONOSCOPY;  Surgeon: Paula Ricardo MD;  Location: [facility] ENDO;  Service: Endoscopy;  Laterality: N/A;    FLUOROSCOPY N/A 6/15/2018    Procedure: Transjugular liver bx;  Surgeon: Petros Recio MD;  Location: [facility] CATH LAB;  Service: General;  Laterality: N/A;    TONSILLECTOMY         Review of patient's allergies indicates:  No Known Allergies    Current Facility-Administered Medications on File Prior to Encounter   Medication    acetaminophen tablet 650 mg    albuterol inhaler 2 puff    diphenhydrAMINE injection 25 mg    EPINEPHrine (EPIPEN) 0.3 mg/0.3 mL pen injection 0.3 mg    methylPREDNISolone sodium succinate injection 40 mg    ondansetron disintegrating tablet 4 mg    sodium chloride 0.9% 500 mL flush bag    sodium chloride 0.9% flush 10 mL     Current Outpatient Medications on File Prior to Encounter   Medication Sig    aspirin (ECOTRIN) 81 MG EC tablet Take 162 mg by mouth 2 (two) times daily.     fluticasone propionate (FLONASE) 50 mcg/actuation nasal spray SHAKE LIQUID AND USE 2 SPRAYS IN EACH NOSTRIL EVERY DAY    rosuvastatin (CRESTOR) 40  MG Tab TAKE 1 TABLET(40 MG) BY MOUTH EVERY DAY    valsartan-hydrochlorothiazide (DIOVAN-HCT) 160-12.5 mg per tablet TAKE 1 TABLET BY MOUTH EVERY DAY    albuterol (VENTOLIN HFA) 90 mcg/actuation inhaler Inhale 2 puffs into the lungs every 6 (six) hours as needed for Wheezing. Rescue    methylPREDNISolone (MEDROL DOSEPACK) 4 mg tablet use as directed    montelukast (SINGULAIR) 10 mg tablet Take 1 tablet (10 mg total) by mouth every evening.    multivitamin (THERAGRAN) per tablet Take 1 tablet by mouth once daily.    pantoprazole (PROTONIX) 40 MG tablet Take 1 tablet (40 mg total) by mouth daily as needed.    triamcinolone acetonide 0.1% (KENALOG) 0.1 % cream      Family History       Problem Relation (Age of Onset)    Alzheimer's disease Mother    Cancer Father          Tobacco Use    Smoking status: Former     Years: 3.00     Types: Cigarettes, Pipe     Quit date:      Years since quittin.7    Smokeless tobacco: Never    Tobacco comments:     smoked a pipe - quit smoking    Substance and Sexual Activity    Alcohol use: Yes     Alcohol/week: 2.0 - 3.0 standard drinks     Types: 2 - 3 Cans of beer per week     Comment: daily    Drug use: No    Sexual activity: Never     Review of Systems   Constitutional:  Positive for activity change.   HENT: Negative.     Eyes: Negative.    Respiratory: Negative.     Gastrointestinal: Negative.    Endocrine: Negative.    Genitourinary: Negative.    Musculoskeletal: Negative.    Allergic/Immunologic: Negative.    Neurological:  Positive for weakness.   Hematological: Negative.    Psychiatric/Behavioral:  Positive for confusion and decreased concentration.    Objective:     Vital Signs (Most Recent):  Temp: 98.2 °F (36.8 °C) (22)  Pulse: (!) 57 (22 1420)  Resp: 18 (22 1420)  BP: (!) 138/96 (22 1420)  SpO2: 95 % (22 142)   Vital Signs (24h Range):  Temp:  [98.2 °F (36.8 °C)] 98.2 °F (36.8 °C)  Pulse:  [56-66] 57  Resp:  [15-25] 18  SpO2:   [94 %-97 %] 95 %  BP: (134-190)/() 138/96     Weight: 105 kg (231 lb 7.7 oz)  Body mass index is 36.26 kg/m².    Physical Exam  Constitutional:       Appearance: Normal appearance. He is obese. He is ill-appearing.   HENT:      Head: Normocephalic and atraumatic.      Nose: Nose normal.      Mouth/Throat:      Mouth: Mucous membranes are moist.   Eyes:      Extraocular Movements: Extraocular movements intact.      Conjunctiva/sclera: Conjunctivae normal.      Pupils: Pupils are equal, round, and reactive to light.   Cardiovascular:      Rate and Rhythm: Normal rate and regular rhythm.      Pulses: Normal pulses.      Heart sounds: No murmur heard.    No friction rub.   Pulmonary:      Effort: Pulmonary effort is normal. No respiratory distress.      Breath sounds: No stridor. No wheezing, rhonchi or rales.   Chest:      Chest wall: No tenderness.   Abdominal:      General: Abdomen is flat. Bowel sounds are normal. There is no distension.      Palpations: Abdomen is soft. There is no mass.      Tenderness: There is no abdominal tenderness. There is no guarding or rebound.      Hernia: No hernia is present.   Musculoskeletal:         General: No swelling, tenderness, deformity or signs of injury. Normal range of motion.      Cervical back: Normal range of motion. No rigidity or tenderness.      Right lower leg: No edema.      Left lower leg: No edema.   Skin:     General: Skin is warm.      Capillary Refill: Capillary refill takes less than 2 seconds.      Coloration: Skin is not jaundiced or pale.      Findings: No bruising.   Neurological:      General: No focal deficit present.      Mental Status: He is alert and oriented to person, place, and time.      Cranial Nerves: No cranial nerve deficit.      Sensory: No sensory deficit.      Motor: No weakness.      Coordination: Coordination normal.      Deep Tendon Reflexes: Reflexes normal.   Psychiatric:         Mood and Affect: Mood normal.         CRANIAL  NERVES     CN III, IV, VI   Pupils are equal, round, and reactive to light.     Significant Labs: All pertinent labs within the past 24 hours have been reviewed.      Significant Imaging: I have reviewed all pertinent imaging results/findings within the past 24 hours.

## 2022-09-02 NOTE — HPI
83 y/o WM  with a PMHx of asthma, HLD, HTN,Hx of Colon Tubulovillous Adenoma, MCMAHAN  and  obesity  who presents to the Emergency Department for R-sided paresthesias which onset suddenly at 1200 yesterday. the pt reports that he has been tripping and fell last night. He denies any Head trauma , LOC ,  Chest pain ,palpitation , Dizziness bowel or urinary incontinence , facial asymmetry, speech difficulty, fever, chills, Seizures and all other sxs  . Associated sxs include R sided numbness and gait difficulty. Pt report has not been taking his BP medication for the last days . The BP was significantly high this am  per pt son .   Pt denies having a history of DM, MI, or CVA. He report ws in his usual states of health before yesterday .   ER COURSE: CTH :No noncontrast CT evidence for major vascular distribution infarction or hemorrhage.Left thalamic hypodensity suggesting lacunar type infarction.   ER VS:   BP Pulse Resp Temp SpO2   (!) 187/102 65 15 98.2 °F (36.8 °C) 96 %         Pt will be admitted to Inpt with a dx of Right side weakness and possible CVA   CODE STATUS : FULL CODE

## 2022-09-02 NOTE — ASSESSMENT & PLAN NOTE
-Most likely due to acute CVA  -Risk factor uncontrolled HTM , HLD and elevated CBG  -CTH show a possible lacuna cva  -F/U MRI  Brain   -F//U MRI  head and neck CTA  -F/U TTE  -F/U Hba1c and lipid panel  -Start asa , statin   -Consult OT/PT/SLP   -Allow permissive HTN  For 24 to 48 hrs

## 2022-09-02 NOTE — ASSESSMENT & PLAN NOTE
Allow Permissive HTN for the   Next 24 48 hrs  Until acute CVA r/o   Labetalol Prn  For a SBP> 200   Resume BP medication accordingly

## 2022-09-02 NOTE — PHARMACY MED REC
"Admission Medication History     The home medication history was taken by Moreno Main.    You may go to "Admission" then "Reconcile Home Medications" tabs to review and/or act upon these items.     The home medication list has been updated by the Pharmacy department.   Please read ALL comments highlighted in yellow.   Please address this information as you see fit.    Feel free to contact us if you have any questions or require assistance.      The medications listed below were removed from the home medication list. Please reorder if appropriate:  Patient reports no longer taking the following medication(s):  MEDROL DOSEPACK    Medications listed below were obtained from: Patient/family and Analytic software- [employer]    Facility-Administered Medications Prior to Admission   Medication    acetaminophen tablet 650 mg    albuterol inhaler 2 puff    diphenhydrAMINE injection 25 mg    EPINEPHrine (EPIPEN) 0.3 mg/0.3 mL pen injection 0.3 mg    methylPREDNISolone sodium succinate injection 40 mg    ondansetron disintegrating tablet 4 mg    sodium chloride 0.9% 500 mL flush bag    sodium chloride 0.9% flush 10 mL     PTA Medications   Medication Sig    albuterol (VENTOLIN HFA) 90 mcg/actuation inhaler Inhale 2 puffs into the lungs every 6 (six) hours as needed for Wheezing. Rescue    aspirin (ECOTRIN) 81 MG EC tablet Take 162 mg by mouth 2 (two) times daily.     fluticasone propionate (FLONASE) 50 mcg/actuation nasal spray SHAKE LIQUID AND USE 2 SPRAYS IN EACH NOSTRIL EVERY DAY    multivitamin (THERAGRAN) per tablet Take 1 tablet by mouth once daily.    rosuvastatin (CRESTOR) 40 MG Tab TAKE 1 TABLET(40 MG) BY MOUTH EVERY DAY    valsartan-hydrochlorothiazide (DIOVAN-HCT) 160-12.5 mg per tablet TAKE 1 TABLET BY MOUTH EVERY DAY    montelukast (SINGULAIR) 10 mg tablet Take 1 tablet (10 mg total) by mouth every evening. (Patient not taking: Reported on 9/2/2022)    pantoprazole (PROTONIX) 40 MG tablet Take 1 tablet (40 mg " total) by mouth daily as needed.           Moreno Main  [id number]                .

## 2022-09-02 NOTE — PLAN OF CARE
Pt arrived up to floor around 1400. Pt went down to CT and MRI immediately. Pt got back to floor around 1700. Pt is stable and oriented. Pt call light is in reach.

## 2022-09-02 NOTE — H&P
[facility] Medicine  History & Physical    Patient Name: Jovan Del Cid Jr.  MRN: [id number]  Patient Class: IP- Inpatient  Admission Date: 9/2/2022  Attending Physician: Johnny Paris MD  Primary Care Provider: Paul Neal MD         Patient information was obtained from patient, relative(s) and ER records.     Subjective:     Principal Problem:Right sided weakness    Chief Complaint:   Chief Complaint   Patient presents with    Numbness     Pt reports numbness and weakness to the R side of the body since yesterday at lunch         HPI:    81 y/o WM  with a PMHx of asthma, HLD, HTN,Hx of Colon Tubulovillous Adenoma, MCMAHAN  and  obesity  who presents to the Emergency Department for R-sided paresthesias which onset suddenly at 1200 yesterday. the pt reports that he has been tripping and fell last night. He denies any Head trauma , LOC ,  Chest pain ,palpitation , Dizziness bowel or urinary incontinence , facial asymmetry, speech difficulty, fever, chills, Seizures and all other sxs  . Associated sxs include R sided numbness and gait difficulty. Pt report has not been taking his BP medication for the last days . The BP was significantly high this am  per pt son .   Pt denies having a history of DM, MI, or CVA. He report ws in his usual states of health before yesterday .   ER COURSE: CTH :No noncontrast CT evidence for major vascular distribution infarction or hemorrhage.Left thalamic hypodensity suggesting lacunar type infarction.   ER VS:   BP Pulse Resp Temp SpO2   (!) 187/102 65 15 98.2 °F (36.8 °C) 96 %         Pt will be admitted to Inpt with a dx of Right side weakness and possible CVA   CODE STATUS : FULL CODE      Past Medical History:   Diagnosis Date    Asthma     Bronchitis chronic     Cancer     Cough     Digestive disorder     Hyperlipidemia     Hypertension     Liver disease        Past Surgical History:   Procedure Laterality Date    COLONOSCOPY N/A  6/21/2016    Procedure: COLONOSCOPY;  Surgeon: Alonso Dorsey MD;  Location: [facility] ENDO;  Service: Endoscopy;  Laterality: N/A;    COLONOSCOPY N/A 11/1/2016    Procedure: COLONOSCOPY;  Surgeon: Alonso Dorsey MD;  Location: [facility] ENDO;  Service: Endoscopy;  Laterality: N/A;    COLONOSCOPY N/A 2/3/2017    Procedure: COLONOSCOPY;  Surgeon: Alonso Dorsey MD;  Location: [facility] ENDO;  Service: Endoscopy;  Laterality: N/A;    COLONOSCOPY N/A 11/13/2017    Procedure: COLONOSCOPY;  Surgeon: Alonso Dorsey MD;  Location: [facility] ENDO;  Service: Endoscopy;  Laterality: N/A;    COLONOSCOPY N/A 2/19/2021    Procedure: COLONOSCOPY;  Surgeon: Paula Ricardo MD;  Location: [facility] ENDO;  Service: Endoscopy;  Laterality: N/A;    FLUOROSCOPY N/A 6/15/2018    Procedure: Transjugular liver bx;  Surgeon: Petros Recio MD;  Location: [facility] CATH LAB;  Service: General;  Laterality: N/A;    TONSILLECTOMY         Review of patient's allergies indicates:  No Known Allergies    Current Facility-Administered Medications on File Prior to Encounter   Medication    acetaminophen tablet 650 mg    albuterol inhaler 2 puff    diphenhydrAMINE injection 25 mg    EPINEPHrine (EPIPEN) 0.3 mg/0.3 mL pen injection 0.3 mg    methylPREDNISolone sodium succinate injection 40 mg    ondansetron disintegrating tablet 4 mg    sodium chloride 0.9% 500 mL flush bag    sodium chloride 0.9% flush 10 mL     Current Outpatient Medications on File Prior to Encounter   Medication Sig    aspirin (ECOTRIN) 81 MG EC tablet Take 162 mg by mouth 2 (two) times daily.     fluticasone propionate (FLONASE) 50 mcg/actuation nasal spray SHAKE LIQUID AND USE 2 SPRAYS IN EACH NOSTRIL EVERY DAY    rosuvastatin (CRESTOR) 40 MG Tab TAKE 1 TABLET(40 MG) BY MOUTH EVERY DAY    valsartan-hydrochlorothiazide (DIOVAN-HCT) 160-12.5 mg per tablet TAKE 1 TABLET BY MOUTH EVERY DAY    albuterol (VENTOLIN HFA) 90 mcg/actuation inhaler Inhale 2 puffs into the  lungs every 6 (six) hours as needed for Wheezing. Rescue    methylPREDNISolone (MEDROL DOSEPACK) 4 mg tablet use as directed    montelukast (SINGULAIR) 10 mg tablet Take 1 tablet (10 mg total) by mouth every evening.    multivitamin (THERAGRAN) per tablet Take 1 tablet by mouth once daily.    pantoprazole (PROTONIX) 40 MG tablet Take 1 tablet (40 mg total) by mouth daily as needed.    triamcinolone acetonide 0.1% (KENALOG) 0.1 % cream      Family History       Problem Relation (Age of Onset)    Alzheimer's disease Mother    Cancer Father          Tobacco Use    Smoking status: Former     Years: 3.00     Types: Cigarettes, Pipe     Quit date:      Years since quittin.7    Smokeless tobacco: Never    Tobacco comments:     smoked a pipe - quit smoking    Substance and Sexual Activity    Alcohol use: Yes     Alcohol/week: 2.0 - 3.0 standard drinks     Types: 2 - 3 Cans of beer per week     Comment: daily    Drug use: No    Sexual activity: Never     Review of Systems   Constitutional:  Positive for activity change.   HENT: Negative.     Eyes: Negative.    Respiratory: Negative.     Gastrointestinal: Negative.    Endocrine: Negative.    Genitourinary: Negative.    Musculoskeletal: Negative.    Allergic/Immunologic: Negative.    Neurological:  Positive for weakness.   Hematological: Negative.    Psychiatric/Behavioral:  Positive for confusion and decreased concentration.    Objective:     Vital Signs (Most Recent):  Temp: 98.2 °F (36.8 °C) (22 0931)  Pulse: (!) 57 (22 1420)  Resp: 18 (22 1420)  BP: (!) 138/96 (22 1420)  SpO2: 95 % (22 1420)   Vital Signs (24h Range):  Temp:  [98.2 °F (36.8 °C)] 98.2 °F (36.8 °C)  Pulse:  [56-66] 57  Resp:  [15-25] 18  SpO2:  [94 %-97 %] 95 %  BP: (134-190)/() 138/96     Weight: 105 kg (231 lb 7.7 oz)  Body mass index is 36.26 kg/m².    Physical Exam  Constitutional:       Appearance: Normal appearance. He is obese. He is  ill-appearing.   HENT:      Head: Normocephalic and atraumatic.      Nose: Nose normal.      Mouth/Throat:      Mouth: Mucous membranes are moist.   Eyes:      Extraocular Movements: Extraocular movements intact.      Conjunctiva/sclera: Conjunctivae normal.      Pupils: Pupils are equal, round, and reactive to light.   Cardiovascular:      Rate and Rhythm: Normal rate and regular rhythm.      Pulses: Normal pulses.      Heart sounds: No murmur heard.    No friction rub.   Pulmonary:      Effort: Pulmonary effort is normal. No respiratory distress.      Breath sounds: No stridor. No wheezing, rhonchi or rales.   Chest:      Chest wall: No tenderness.   Abdominal:      General: Abdomen is flat. Bowel sounds are normal. There is no distension.      Palpations: Abdomen is soft. There is no mass.      Tenderness: There is no abdominal tenderness. There is no guarding or rebound.      Hernia: No hernia is present.   Musculoskeletal:         General: No swelling, tenderness, deformity or signs of injury. Normal range of motion.      Cervical back: Normal range of motion. No rigidity or tenderness.      Right lower leg: No edema.      Left lower leg: No edema.   Skin:     General: Skin is warm.      Capillary Refill: Capillary refill takes less than 2 seconds.      Coloration: Skin is not jaundiced or pale.      Findings: No bruising.   Neurological:      General: No focal deficit present.      Mental Status: He is alert and oriented to person, place, and time.      Cranial Nerves: No cranial nerve deficit.      Sensory: No sensory deficit.      Motor: No weakness.      Coordination: Coordination normal.      Deep Tendon Reflexes: Reflexes normal.   Psychiatric:         Mood and Affect: Mood normal.         CRANIAL NERVES     CN III, IV, VI   Pupils are equal, round, and reactive to light.     Significant Labs: All pertinent labs within the past 24 hours have been reviewed.      Significant Imaging: I have reviewed all  pertinent imaging results/findings within the past 24 hours.      Assessment/Plan:     * Right sided weakness  -Most likely due to acute CVA  -Risk factor uncontrolled HTM , HLD and elevated CBG  -CTH show a possible lacuna cva  -F/U MRI  Brain   -F//U MRI  head and neck CTA  -F/U TTE  -F/U Hba1c and lipid panel  -Start asa , statin   -Consult OT/PT/SLP   -Allow permissive HTN  For 24 to 48 hrs       IGT (impaired glucose tolerance)  H/O IGT  Now with a CBG> 200  Start ISS  F/U Hba1c       Mild intermittent asthma without complication  Cont singular  Cont Duoneb prn  Stable       Dyslipidemia  F/U Lipid panel   Cont statin       HTN (hypertension)   Allow Permissive HTN for the   Next 24 48 hrs  Until acute CVA r/o   Labetalol Prn  For a SBP> 200   Resume BP medication accordingly         VTE Risk Mitigation (From admission, onward)         Ordered     enoxaparin injection 40 mg  Daily         09/02/22 1418     IP VTE HIGH RISK PATIENT  Once         09/02/22 1418     Place sequential compression device  Until discontinued         09/02/22 1418                   Johnny Paris MD  Department of Hospital Medicine   [facility]

## 2022-09-02 NOTE — TELEPHONE ENCOUNTER
----- Message from Ani Olsen sent at 9/2/2022  3:29 PM CDT -----  Contact: [phone]  Wife Mavis states patient is currently in hospital undergoing test,Please call wife back at [phone].Thanks

## 2022-09-02 NOTE — PROGRESS NOTES
ST evaluation orders received and chart reviewed.  Pt OOR for MRI brain.  ST to return in AM for communication and swallowing assessment.

## 2022-09-02 NOTE — ED PROVIDER NOTES
SCRIBE #1 NOTE: IAshley, am scribing for, and in the presence of, Magdaleno Kirk Jr., MD. I have scribed the entire note.      History      Chief Complaint   Patient presents with    Numbness     Pt reports numbness and weakness to the R side of the body since yesterday at lunch        Review of patient's allergies indicates:  No Known Allergies     HPI   HPI    9/2/2022, 9:58 AM   History obtained from the patient      History of Present Illness: Jovan Del Cid Jr. is a 82 y.o. male patient with a PMHx of asthma, cancer, HLD, HTN, and liver disease who presents to the Emergency Department for R-sided paresthesias which onset suddenly at 1200 yesterday. Since his sxs onset, the pt reports that he has been tripping and fell last night. Symptoms are constant and moderate in severity. No mitigating or exacerbating factors reported. Associated sxs include R sided numbness and gait difficulty. Patient denies any weakness, dizziness, facial asymmetry, speech difficulty, fever, chills, and all other sxs at this time. No prior Tx reported. Pt denies having a history of DM, MI, or CVA. No further complaints or concerns at this time.         Arrival mode: EMS    PCP: Paul Neal MD       Past Medical History:  Past Medical History:   Diagnosis Date    Asthma     Bronchitis chronic     Cancer     Cough     Digestive disorder     Hyperlipidemia     Hypertension     Liver disease        Past Surgical History:  Past Surgical History:   Procedure Laterality Date    COLONOSCOPY N/A 6/21/2016    Procedure: COLONOSCOPY;  Surgeon: Alonso Dorsey MD;  Location: [facility];  Service: Endoscopy;  Laterality: N/A;    COLONOSCOPY N/A 11/1/2016    Procedure: COLONOSCOPY;  Surgeon: Alonso Dorsey MD;  Location: [facility];  Service: Endoscopy;  Laterality: N/A;    COLONOSCOPY N/A 2/3/2017    Procedure: COLONOSCOPY;  Surgeon: Alonso Dorsey MD;  Location: [facility];  Service: Endoscopy;  Laterality: N/A;     COLONOSCOPY N/A 2017    Procedure: COLONOSCOPY;  Surgeon: Alonso Dorsey MD;  Location: [facility] ENDO;  Service: Endoscopy;  Laterality: N/A;    COLONOSCOPY N/A 2021    Procedure: COLONOSCOPY;  Surgeon: Paula Ricardo MD;  Location: [facility] ENDO;  Service: Endoscopy;  Laterality: N/A;    FLUOROSCOPY N/A 6/15/2018    Procedure: Transjugular liver bx;  Surgeon: Petros Recio MD;  Location: [facility] CATH LAB;  Service: General;  Laterality: N/A;    TONSILLECTOMY           Family History:  Family History   Problem Relation Age of Onset    Cancer Father     Alzheimer's disease Mother     Colon cancer Neg Hx     Melanoma Neg Hx        Social History:  Social History     Tobacco Use    Smoking status: Former     Years: 3.00     Types: Cigarettes, Pipe     Quit date:      Years since quittin.7    Smokeless tobacco: Never    Tobacco comments:     smoked a pipe - quit smoking    Substance and Sexual Activity    Alcohol use: Yes     Alcohol/week: 2.0 - 3.0 standard drinks     Types: 2 - 3 Cans of beer per week     Comment: daily    Drug use: No    Sexual activity: Never       ROS   Review of Systems   Constitutional:  Negative for chills and fever.   HENT:  Negative for sore throat.    Respiratory:  Negative for shortness of breath.    Cardiovascular:  Negative for chest pain.   Gastrointestinal:  Negative for nausea.   Genitourinary:  Negative for dysuria.   Musculoskeletal:  Positive for gait problem (difficulty). Negative for back pain.   Skin:  Negative for rash.   Neurological:  Positive for numbness (R-sided). Negative for dizziness, facial asymmetry, speech difficulty and weakness.        (+) R-sided paresthesias   Hematological:  Does not bruise/bleed easily.   All other systems reviewed and are negative.    Physical Exam      Initial Vitals [22 0931]   BP Pulse Resp Temp SpO2   (!) 187/102 65 15 98.2 °F (36.8 °C) 96 %      MAP       --          Physical Exam  Nursing Notes and Vital  "Signs Reviewed.  Constitutional: Patient is in no acute distress. Well-developed and well-nourished.  Head: Atraumatic. Normocephalic.  Eyes: PERRL. EOM intact. Conjunctivae are not pale. No scleral icterus.  ENT: Mucous membranes are moist. Oropharynx is clear and symmetric.    Neck: Supple. Full ROM. No lymphadenopathy.  Cardiovascular: Regular rate. Regular rhythm. No murmurs, rubs, or gallops. Distal pulses are 2+ and symmetric.  Pulmonary/Chest: No respiratory distress. Clear to auscultation bilaterally. No wheezing or rales.  Abdominal: Soft and non-distended.  There is no tenderness.  No rebound, guarding, or rigidity.   Musculoskeletal: Moves all extremities. No obvious deformities. No edema.  Skin: Warm and dry.  Neurological: Patient is alert and oriented to person, place and time. Pupils ERRL and EOM normal. Cranial nerves II-XII are intact. Strength is full bilaterally; it is equal and 5/5 in bilateral upper and lower extremities. There is a pronator drift of outstretched arms. Speech is clear and normal. No acute focal neurological deficits noted.  Psychiatric: Normal affect. Good eye contact. Appropriate in content.    ED Course    Procedures  ED Vital Signs:  Vitals:    09/02/22 0931 09/02/22 0952 09/02/22 1015 09/02/22 1030   BP: (!) 187/102  (!) 165/88 (!) 145/78   Pulse: 65 66 64 63   Resp: 15  20 (!) 25   Temp: 98.2 °F (36.8 °C)      TempSrc: Oral      SpO2: 96%  96% (!) 94%   Weight: 105 kg (231 lb 7.7 oz)      Height: 5' 7" (1.702 m)       09/02/22 1100 09/02/22 1130 09/02/22 1330 09/02/22 1400   BP: 134/84 (!) 141/78 (!) 163/87 (!) 190/98   Pulse: 61 61 (!) 56 61   Resp: 20 20 20 (!) 22   Temp:       TempSrc:       SpO2: (!) 94% (!) 94% 95% 97%   Weight:       Height:        09/02/22 1420   BP: (!) 138/96   Pulse: (!) 57   Resp: 18   Temp:    TempSrc:    SpO2: 95%   Weight: 104.8 kg (231 lb)   Height: 5' 7" (1.702 m)       Abnormal Lab Results:  Labs Reviewed   CBC W/ AUTO DIFFERENTIAL - " Abnormal; Notable for the following components:       Result Value    RBC 4.46 (*)     Hemoglobin 13.7 (*)     Lymph # 0.8 (*)     Lymph % 15.2 (*)     All other components within normal limits   COMPREHENSIVE METABOLIC PANEL - Abnormal; Notable for the following components:    CO2 21 (*)     Glucose 257 (*)     Total Bilirubin 1.9 (*)     ALT 68 (*)     All other components within normal limits   URINALYSIS, REFLEX TO URINE CULTURE - Abnormal; Notable for the following components:    Glucose, UA 4+ (*)     All other components within normal limits    Narrative:     Specimen Source->Urine   POCT GLUCOSE - Abnormal; Notable for the following components:    POCT Glucose 258 (*)     All other components within normal limits   PROTIME-INR   TSH   SARS-COV-2 RNA AMPLIFICATION, QUAL   URINALYSIS MICROSCOPIC    Narrative:     Specimen Source->Urine   LIPID PANEL   POCT GLUCOSE, HAND-HELD DEVICE   POCT GLUCOSE MONITORING CONTINUOUS        All Lab Results:  Results for orders placed or performed during the hospital encounter of 09/02/22   CBC W/ AUTO DIFFERENTIAL   Result Value Ref Range    WBC 5.00 3.90 - 12.70 K/uL    RBC 4.46 (L) 4.60 - 6.20 M/uL    Hemoglobin 13.7 (L) 14.0 - 18.0 g/dL    Hematocrit 42.6 40.0 - 54.0 %    MCV 96 82 - 98 fL    MCH 30.7 27.0 - 31.0 pg    MCHC 32.2 32.0 - 36.0 g/dL    RDW 13.5 11.5 - 14.5 %    Platelets 154 150 - 450 K/uL    MPV 9.9 9.2 - 12.9 fL    Immature Granulocytes 0.4 0.0 - 0.5 %    Gran # (ANC) 3.3 1.8 - 7.7 K/uL    Immature Grans (Abs) 0.02 0.00 - 0.04 K/uL    Lymph # 0.8 (L) 1.0 - 4.8 K/uL    Mono # 0.7 0.3 - 1.0 K/uL    Eos # 0.2 0.0 - 0.5 K/uL    Baso # 0.04 0.00 - 0.20 K/uL    nRBC 0 0 /100 WBC    Gran % 66.6 38.0 - 73.0 %    Lymph % 15.2 (L) 18.0 - 48.0 %    Mono % 13.4 4.0 - 15.0 %    Eosinophil % 3.6 0.0 - 8.0 %    Basophil % 0.8 0.0 - 1.9 %    Differential Method Automated    Comprehensive metabolic panel   Result Value Ref Range    Sodium 136 136 - 145 mmol/L    Potassium  3.9 3.5 - 5.1 mmol/L    Chloride 103 95 - 110 mmol/L    CO2 21 (L) 23 - 29 mmol/L    Glucose 257 (H) 70 - 110 mg/dL    BUN 15 8 - 23 mg/dL    Creatinine 0.9 0.5 - 1.4 mg/dL    Calcium 8.9 8.7 - 10.5 mg/dL    Total Protein 6.5 6.0 - 8.4 g/dL    Albumin 3.7 3.5 - 5.2 g/dL    Total Bilirubin 1.9 (H) 0.1 - 1.0 mg/dL    Alkaline Phosphatase 70 55 - 135 U/L    AST 38 10 - 40 U/L    ALT 68 (H) 10 - 44 U/L    Anion Gap 12 8 - 16 mmol/L    eGFR >60 >60 mL/min/1.73 m^2   Protime-INR   Result Value Ref Range    Prothrombin Time 10.3 9.0 - 12.5 sec    INR 1.0 0.8 - 1.2   TSH   Result Value Ref Range    TSH 1.876 0.400 - 4.000 uIU/mL   COVID-19 Rapid Screening   Result Value Ref Range    SARS-CoV-2 RNA, Amplification, Qual Negative Negative   Urinalysis, Reflex to Urine Culture Urine, Clean Catch    Specimen: Urine   Result Value Ref Range    Specimen UA Urine, Clean Catch     Color, UA Yellow Yellow, Straw, Drea    Appearance, UA Clear Clear    pH, UA 6.0 5.0 - 8.0    Specific Gravity, UA 1.010 1.005 - 1.030    Protein, UA Negative Negative    Glucose, UA 4+ (A) Negative    Ketones, UA Negative Negative    Bilirubin (UA) Negative Negative    Occult Blood UA Negative Negative    Nitrite, UA Negative Negative    Urobilinogen, UA Negative <2.0 EU/dL    Leukocytes, UA Negative Negative   Urinalysis Microscopic   Result Value Ref Range    Bacteria None None-Occ /hpf    Yeast, UA None None    Microscopic Comment SEE COMMENT    Echo Saline Bubble? Yes   Result Value Ref Range    BSA 2.23 m2    LA WIDTH 3.40 cm    Left Ventricular Outflow Tract Mean Velocity 0.61 cm/s    Left Ventricular Outflow Tract Mean Gradient 1.82 mmHg    LVIDd 4.77 3.5 - 6.0 cm    IVS 1.12 (A) 0.6 - 1.1 cm    Posterior Wall 1.02 0.6 - 1.1 cm    Ao root annulus 3.78 cm    LVIDs 2.89 2.1 - 4.0 cm    FS 39 28 - 44 %    LA volume 35.44 cm3    STJ 3.06 cm    Ascending aorta 3.04 cm    LV mass 184.81 g    LA size 3.79 cm    Left Ventricle Relative Wall Thickness 0.43  cm    AV mean gradient 3 mmHg    AV valve area 3.36 cm2    AV Velocity Ratio 0.92     AV index (prosthetic) 0.96     MV valve area p 1/2 method 3.17 cm2    E/A ratio 0.86     E wave deceleration time 239.60 msec    IVRT 77.07 msec    LVOT diameter 2.11 cm    LVOT area 3.5 cm2    LVOT peak rogerio 1.00 m/s    LVOT peak VTI 20.50 cm    Ao peak rogerio 1.09 m/s    Ao VTI 21.3 cm    RVOT peak rogerio 0.49 m/s    RVOT peak VTI 13.5 cm    Mr max rogerio 7.00 m/s    LVOT stroke volume 71.65 cm3    AV peak gradient 5 mmHg    PV mean gradient 0.58 mmHg    MV Peak E Rogerio 0.63 m/s    MV stenosis pressure 1/2 time 69.48 ms    MV Peak A Rogerio 0.73 m/s    LV Systolic Volume 32.07 mL    LV Systolic Volume Index 14.9 mL/m2    LV Diastolic Volume 105.90 mL    LV Diastolic Volume Index 49.26 mL/m2    LA Volume Index 16.5 mL/m2    LV Mass Index 86 g/m2    RA Major Axis 3.44 cm    Left Atrium Minor Axis 2.77 cm    Left Atrium Major Axis 3.89 cm   POCT glucose   Result Value Ref Range    POCT Glucose 258 (H) 70 - 110 mg/dL           Imaging Results:  Imaging Results              CTA Head and Neck (xpd) (In process)    Procedure changed from CTA Head                    CT Head Without Contrast (Final result)  Result time 09/02/22 10:13:28      Final result by Tari Ward MD (09/02/22 10:13:28)                   Impression:      No noncontrast CT evidence for major vascular distribution infarction or hemorrhage.    Left thalamic hypodensity suggesting lacunar type infarction.    Chronic microvascular ischemic change.      Electronically signed by: Tari Ward  Date:    09/02/2022  Time:    10:13               Narrative:    EXAMINATION:  CT HEAD WITHOUT CONTRAST    CLINICAL HISTORY:  Neuro deficit, acute, stroke suspected;    TECHNIQUE:  Low dose axial CT images obtained throughout the head without intravenous contrast. Sagittal and coronal reconstructions were performed.  All CT scans at this facility are performed using dose optimization  techniques including the following: automated exposure control; adjustment of the mA and/or kV; use of iterative reconstruction technique.    COMPARISON:  CT sinuses without contrast 08/15/2013    FINDINGS:  Intracranial compartment:    Ventricles and sulci are normal in size for age without evidence of hydrocephalus. No extra-axial blood or fluid collections.    There is patchy hypoattenuation within the supratentorial white matter, most commonly seen in the setting of chronic microvascular ischemic change.  Focal hypodensity within the left thalamus suggestive of lacunar type infarction.  No parenchymal mass, hemorrhage, edema or major vascular distribution infarct.    Skull/extracranial contents (limited evaluation): No fracture. There is mild opacity within the left ethmoid air cells.  Remaining paranasal sinuses and mastoid air cells are essentially clear.                                     The EKG was ordered, reviewed, and independently interpreted by the ED provider.  Interpretation time: 10:02  Rate: 63 BPM  Rhythm: normal sinus rhythm  Interpretation: T wave abnormality, consider anterolateral ischemia. Prolonged QT. No STEMI.           The Emergency Provider reviewed the vital signs and test results, which are outlined above.    ED Discussion     10:03 AM: Discussed pt's case with Dr. Ngo (Vascular Neurology) who has no recommendations at this time.    12:58 PM: Discussed case with Yesika Dc NP (Hospital Medicine). Dr. Veloz agrees with current care and management of pt and accepts admission.   Admitting Service: Hospital Medicine  Admitting Physician: Dr. Veloz  Admit to: Inpatient Med Tele    12:59 PM: Re-evaluated pt. I have discussed test results, shared treatment plan, and the need for admission with patient and family at bedside. Pt and family express understanding at this time and agree with all information. All questions answered. Pt and family have no further questions or concerns  at this time. Pt is ready for admit.           ED Medication(s):  Medications   sodium chloride 0.9% flush 10 mL (has no administration in time range)   sodium chloride 0.9% bolus 500 mL (has no administration in time range)   labetaloL injection 10 mg (has no administration in time range)   enoxaparin injection 40 mg (has no administration in time range)   aspirin EC tablet 81 mg (has no administration in time range)   atorvastatin tablet 40 mg (40 mg Oral Given 9/2/22 1513)   ondansetron injection 4 mg (has no administration in time range)   glucose chewable tablet 16 g (has no administration in time range)   glucose chewable tablet 24 g (has no administration in time range)   glucagon (human recombinant) injection 1 mg (has no administration in time range)   dextrose 10% bolus 125 mL (has no administration in time range)   dextrose 10% bolus 250 mL (has no administration in time range)   insulin aspart U-100 pen 0-5 Units (has no administration in time range)   pantoprazole EC tablet 40 mg (40 mg Oral Given 9/2/22 1513)   montelukast tablet 10 mg (has no administration in time range)   albuterol-ipratropium 2.5 mg-0.5 mg/3 mL nebulizer solution 3 mL (has no administration in time range)   aspirin tablet 325 mg (325 mg Oral Given 9/2/22 1318)   ALPRAZolam tablet 0.25 mg (0.25 mg Oral Given 9/2/22 1513)   iohexoL (OMNIPAQUE 350) injection 100 mL (100 mLs Intravenous Given 9/2/22 1528)       Current Discharge Medication List                Medical Decision Making    Medical Decision Making:   Clinical Tests:   Lab Tests: Ordered and Reviewed  Radiological Study: Ordered and Reviewed  Medical Tests: Ordered and Reviewed         Scribe Attestation:   Scribe #1: I performed the above scribed service and the documentation accurately describes the services I performed. I attest to the accuracy of the note.    Attending:   Physician Attestation Statement for Scribe #1: IMagdaleno Jr., MD, personally performed the  services described in this documentation, as scribed by Ashley Dc, in my presence, and it is both accurate and complete.          Clinical Impression       ICD-10-CM ICD-9-CM   1. Cerebrovascular accident (CVA), unspecified mechanism  I63.9 434.91   2. Stroke  I63.9 434.91   3. CVA (cerebral vascular accident)  I63.9 434.91       Disposition:   Disposition: Admitted  Condition: Fair       Magdaleno Kirk Jr., MD  09/02/22 1529

## 2022-09-02 NOTE — TELEPHONE ENCOUNTER
Called all 3 numbers in chart with no answer. Left message on two of the 3 numbers.                   Received this message via teams: [8:33 AM] Perla Mcconnell  Good Morning we had a pt wife on the phone trying to get in touch with Dr Neal office. wife of pt Jovan Del Cid MRN [id number] needs to speak with office about pt needing to go to ER, Nurse on call will not accept the call without the pt being present. Wife is out of town and son is at the home wanting to bring the pt to the ER because of tingling on right side, and a fall. This occurred last night. She wants to know if he should go to [facility] or [facility]. The Rep tried to call the office but no one answered, can you get the nurse to call them

## 2022-09-03 PROBLEM — I63.9 ACUTE CVA (CEREBROVASCULAR ACCIDENT): Status: ACTIVE | Noted: 2022-09-02

## 2022-09-03 PROBLEM — E11.9 T2DM (TYPE 2 DIABETES MELLITUS): Status: ACTIVE | Noted: 2021-07-20

## 2022-09-03 LAB
ALBUMIN SERPL BCP-MCNC: 3.7 G/DL (ref 3.5–5.2)
ALP SERPL-CCNC: 71 U/L (ref 55–135)
ALT SERPL W/O P-5'-P-CCNC: 67 U/L (ref 10–44)
ANION GAP SERPL CALC-SCNC: 10 MMOL/L (ref 8–16)
APTT BLDCRRT: 27 SEC (ref 21–32)
AST SERPL-CCNC: 42 U/L (ref 10–40)
BASOPHILS # BLD AUTO: 0.03 K/UL (ref 0–0.2)
BASOPHILS NFR BLD: 0.7 % (ref 0–1.9)
BILIRUB SERPL-MCNC: 2.4 MG/DL (ref 0.1–1)
BUN SERPL-MCNC: 12 MG/DL (ref 8–23)
CALCIUM SERPL-MCNC: 9 MG/DL (ref 8.7–10.5)
CHLORIDE SERPL-SCNC: 103 MMOL/L (ref 95–110)
CK MB SERPL-MCNC: 2.8 NG/ML (ref 0.1–6.5)
CK MB SERPL-RTO: 4 % (ref 0–5)
CK SERPL-CCNC: 70 U/L (ref 20–200)
CO2 SERPL-SCNC: 25 MMOL/L (ref 23–29)
CREAT SERPL-MCNC: 0.8 MG/DL (ref 0.5–1.4)
DIFFERENTIAL METHOD: ABNORMAL
EOSINOPHIL # BLD AUTO: 0.2 K/UL (ref 0–0.5)
EOSINOPHIL NFR BLD: 4.9 % (ref 0–8)
ERYTHROCYTE [DISTWIDTH] IN BLOOD BY AUTOMATED COUNT: 13.6 % (ref 11.5–14.5)
EST. GFR  (NO RACE VARIABLE): >60 ML/MIN/1.73 M^2
ESTIMATED AVG GLUCOSE: 192 MG/DL (ref 68–131)
GLUCOSE SERPL-MCNC: 199 MG/DL (ref 70–110)
HBA1C MFR BLD: 8.3 % (ref 4–5.6)
HCT VFR BLD AUTO: 41.7 % (ref 40–54)
HGB BLD-MCNC: 13.9 G/DL (ref 14–18)
IMM GRANULOCYTES # BLD AUTO: 0.01 K/UL (ref 0–0.04)
IMM GRANULOCYTES NFR BLD AUTO: 0.2 % (ref 0–0.5)
INR PPP: 0.9 (ref 0.8–1.2)
LYMPHOCYTES # BLD AUTO: 0.7 K/UL (ref 1–4.8)
LYMPHOCYTES NFR BLD: 15.7 % (ref 18–48)
MAGNESIUM SERPL-MCNC: 2 MG/DL (ref 1.6–2.6)
MCH RBC QN AUTO: 31.3 PG (ref 27–31)
MCHC RBC AUTO-ENTMCNC: 33.3 G/DL (ref 32–36)
MCV RBC AUTO: 94 FL (ref 82–98)
MONOCYTES # BLD AUTO: 0.5 K/UL (ref 0.3–1)
MONOCYTES NFR BLD: 12 % (ref 4–15)
NEUTROPHILS # BLD AUTO: 3 K/UL (ref 1.8–7.7)
NEUTROPHILS NFR BLD: 66.5 % (ref 38–73)
NRBC BLD-RTO: 0 /100 WBC
PHOSPHATE SERPL-MCNC: 3.1 MG/DL (ref 2.7–4.5)
PLATELET # BLD AUTO: 129 K/UL (ref 150–450)
PMV BLD AUTO: 9.7 FL (ref 9.2–12.9)
POCT GLUCOSE: 190 MG/DL (ref 70–110)
POCT GLUCOSE: 198 MG/DL (ref 70–110)
POCT GLUCOSE: 218 MG/DL (ref 70–110)
POTASSIUM SERPL-SCNC: 3.6 MMOL/L (ref 3.5–5.1)
PROT SERPL-MCNC: 6.4 G/DL (ref 6–8.4)
PROTHROMBIN TIME: 10.2 SEC (ref 9–12.5)
RBC # BLD AUTO: 4.44 M/UL (ref 4.6–6.2)
SODIUM SERPL-SCNC: 138 MMOL/L (ref 136–145)
TROPONIN I SERPL DL<=0.01 NG/ML-MCNC: <0.006 NG/ML (ref 0–0.03)
WBC # BLD AUTO: 4.51 K/UL (ref 3.9–12.7)

## 2022-09-03 PROCEDURE — 80053 COMPREHEN METABOLIC PANEL: CPT | Performed by: INTERNAL MEDICINE

## 2022-09-03 PROCEDURE — 97162 PT EVAL MOD COMPLEX 30 MIN: CPT

## 2022-09-03 PROCEDURE — 97116 GAIT TRAINING THERAPY: CPT

## 2022-09-03 PROCEDURE — 25000003 PHARM REV CODE 250: Performed by: INTERNAL MEDICINE

## 2022-09-03 PROCEDURE — 84484 ASSAY OF TROPONIN QUANT: CPT | Performed by: INTERNAL MEDICINE

## 2022-09-03 PROCEDURE — 92610 EVALUATE SWALLOWING FUNCTION: CPT

## 2022-09-03 PROCEDURE — 96372 THER/PROPH/DIAG INJ SC/IM: CPT

## 2022-09-03 PROCEDURE — 85025 COMPLETE CBC W/AUTO DIFF WBC: CPT | Performed by: INTERNAL MEDICINE

## 2022-09-03 PROCEDURE — 82553 CREATINE MB FRACTION: CPT | Performed by: INTERNAL MEDICINE

## 2022-09-03 PROCEDURE — 97165 OT EVAL LOW COMPLEX 30 MIN: CPT

## 2022-09-03 PROCEDURE — 83735 ASSAY OF MAGNESIUM: CPT | Performed by: INTERNAL MEDICINE

## 2022-09-03 PROCEDURE — 85610 PROTHROMBIN TIME: CPT | Performed by: INTERNAL MEDICINE

## 2022-09-03 PROCEDURE — 84100 ASSAY OF PHOSPHORUS: CPT | Performed by: INTERNAL MEDICINE

## 2022-09-03 PROCEDURE — 94761 N-INVAS EAR/PLS OXIMETRY MLT: CPT

## 2022-09-03 PROCEDURE — 21400001 HC TELEMETRY ROOM

## 2022-09-03 PROCEDURE — 11000001 HC ACUTE MED/SURG PRIVATE ROOM

## 2022-09-03 PROCEDURE — 63600175 PHARM REV CODE 636 W HCPCS: Performed by: INTERNAL MEDICINE

## 2022-09-03 PROCEDURE — 36415 COLL VENOUS BLD VENIPUNCTURE: CPT | Performed by: INTERNAL MEDICINE

## 2022-09-03 PROCEDURE — 85730 THROMBOPLASTIN TIME PARTIAL: CPT | Performed by: INTERNAL MEDICINE

## 2022-09-03 PROCEDURE — 92523 SPEECH SOUND LANG COMPREHEN: CPT

## 2022-09-03 RX ORDER — CLOPIDOGREL BISULFATE 75 MG/1
75 TABLET ORAL DAILY
Status: DISCONTINUED | OUTPATIENT
Start: 2022-09-03 | End: 2022-09-04 | Stop reason: HOSPADM

## 2022-09-03 RX ORDER — LOSARTAN POTASSIUM 25 MG/1
25 TABLET ORAL DAILY
Status: DISCONTINUED | OUTPATIENT
Start: 2022-09-03 | End: 2022-09-04 | Stop reason: HOSPADM

## 2022-09-03 RX ADMIN — ENOXAPARIN SODIUM 40 MG: 100 INJECTION SUBCUTANEOUS at 05:09

## 2022-09-03 RX ADMIN — ATORVASTATIN CALCIUM 40 MG: 40 TABLET, FILM COATED ORAL at 10:09

## 2022-09-03 RX ADMIN — LOSARTAN POTASSIUM 25 MG: 25 TABLET, FILM COATED ORAL at 11:09

## 2022-09-03 RX ADMIN — INSULIN ASPART 2 UNITS: 100 INJECTION, SOLUTION INTRAVENOUS; SUBCUTANEOUS at 11:09

## 2022-09-03 RX ADMIN — PANTOPRAZOLE SODIUM 40 MG: 40 TABLET, DELAYED RELEASE ORAL at 10:09

## 2022-09-03 RX ADMIN — MONTELUKAST 10 MG: 10 TABLET, FILM COATED ORAL at 09:09

## 2022-09-03 RX ADMIN — CLOPIDOGREL 75 MG: 75 TABLET, FILM COATED ORAL at 11:09

## 2022-09-03 RX ADMIN — ASPIRIN 81 MG: 81 TABLET, COATED ORAL at 10:09

## 2022-09-03 NOTE — PROGRESS NOTES
[facility] Medicine  Progress Note    Patient Name: Jovan Del Cid Jr.  MRN: [id number]  Patient Class: IP- Inpatient   Admission Date: 9/2/2022  Length of Stay: 1 days  Attending Physician: Johnny Paris, *  Primary Care Provider: Paul Neal MD        Subjective:     Principal Problem:Acute CVA (cerebrovascular accident)        HPI:   83 y/o WM  with a PMHx of asthma, HLD, HTN,Hx of Colon Tubulovillous Adenoma, MCMAHAN  and  obesity  who presents to the Emergency Department for R-sided paresthesias which onset suddenly at 1200 yesterday. the pt reports that he has been tripping and fell last night. He denies any Head trauma , LOC ,  Chest pain ,palpitation , Dizziness bowel or urinary incontinence , facial asymmetry, speech difficulty, fever, chills, Seizures and all other sxs  . Associated sxs include R sided numbness and gait difficulty. Pt report has not been taking his BP medication for the last days . The BP was significantly high this am  per pt son .   Pt denies having a history of DM, MI, or CVA. He report ws in his usual states of health before yesterday .   ER COURSE: CTH :No noncontrast CT evidence for major vascular distribution infarction or hemorrhage.Left thalamic hypodensity suggesting lacunar type infarction.   ER VS:   BP Pulse Resp Temp SpO2   (!) 187/102 65 15 98.2 °F (36.8 °C) 96 %         Pt will be admitted to Inpt with a dx of Right side weakness and possible CVA   CODE STATUS : FULL CODE      Overview/Hospital Course:  83 y/o WM admitted with a Dx of acute lacunar infarct .  MRI phillip show Acute lacunar infarcts within the left thalamus . CT head and neck show :No evidence of thromboembolism within the visible branches of the [id number]-uz-Erpglg.Coarse calcific plaque within the left carotid bifurcation resulting in up to 50% stenosis of the origin/proximal aspect of the left internal carotid artery. TTE did not show any acute finding  with normal  EF .  HBa1c 8.3  and LDL > 100 . SLP rec  advance diet . Awaiting PT/OT eval .  Cont with right side numbness and tingling.       Interval History:     Review of Systems   Constitutional:  Positive for activity change.   HENT: Negative.     Eyes: Negative.    Respiratory: Negative.     Gastrointestinal: Negative.    Endocrine: Negative.    Genitourinary: Negative.    Musculoskeletal: Negative.    Allergic/Immunologic: Negative.    Neurological:  Positive for weakness.   Hematological: Negative.    Psychiatric/Behavioral:  Positive for decreased concentration. Negative for confusion.    Objective:     Vital Signs (Most Recent):  Temp: 98.6 °F (37 °C) (09/03/22 0746)  Pulse: 60 (09/03/22 0746)  Resp: 20 (09/03/22 0746)  BP: (!) 165/82 (09/03/22 0746)  SpO2: 96 % (09/03/22 0746)   Vital Signs (24h Range):  Temp:  [97.9 °F (36.6 °C)-98.6 °F (37 °C)] 98.6 °F (37 °C)  Pulse:  [55-61] 60  Resp:  [17-22] 20  SpO2:  [93 %-97 %] 96 %  BP: (135-190)/(65-98) 165/82     Weight: 102.1 kg (225 lb 1.4 oz)  Body mass index is 35.25 kg/m².    Intake/Output Summary (Last 24 hours) at 9/3/2022 1105  Last data filed at 9/2/2022 1800  Gross per 24 hour   Intake 0 ml   Output --   Net 0 ml      Physical Exam  Constitutional:       Appearance: Normal appearance. He is obese. He is ill-appearing.   HENT:      Head: Normocephalic and atraumatic.      Nose: Nose normal.      Mouth/Throat:      Mouth: Mucous membranes are moist.   Eyes:      Extraocular Movements: Extraocular movements intact.      Conjunctiva/sclera: Conjunctivae normal.      Pupils: Pupils are equal, round, and reactive to light.   Cardiovascular:      Rate and Rhythm: Normal rate and regular rhythm.      Pulses: Normal pulses.      Heart sounds: No murmur heard.    No friction rub.   Pulmonary:      Effort: Pulmonary effort is normal. No respiratory distress.      Breath sounds: No stridor. No wheezing, rhonchi or rales.   Chest:      Chest wall: No tenderness.   Abdominal:       General: Abdomen is flat. Bowel sounds are normal. There is no distension.      Palpations: Abdomen is soft. There is no mass.      Tenderness: There is no abdominal tenderness. There is no guarding or rebound.      Hernia: No hernia is present.   Musculoskeletal:         General: No swelling, tenderness, deformity or signs of injury. Normal range of motion.      Cervical back: Normal range of motion. No rigidity or tenderness.      Right lower leg: No edema.      Left lower leg: No edema.   Skin:     General: Skin is warm.      Capillary Refill: Capillary refill takes less than 2 seconds.      Coloration: Skin is not jaundiced or pale.      Findings: No bruising.   Neurological:      General: No focal deficit present.      Mental Status: He is alert and oriented to person, place, and time.      Cranial Nerves: No cranial nerve deficit.      Sensory: No sensory deficit.      Motor: No weakness.      Coordination: Coordination normal.      Deep Tendon Reflexes: Reflexes normal.   Psychiatric:         Mood and Affect: Mood normal.       Significant Labs: All pertinent labs within the past 24 hours have been reviewed.      Significant Imaging: I have reviewed all pertinent imaging results/findings within the past 24 hours.        Assessment/Plan:      * Acute CVA (cerebrovascular accident)  -Risk factor uncontrolled HTM , HLD and elevated CBG  -CTH show a possible lacuna cva  -MRI phillip show Acute lacunar infarcts within the left thalamus .  - CT head and neck show :No evidence of thromboembolism within the visible branches of the yrpsfc-bx-Oqczwo.Coarse calcific plaque within the left carotid bifurcation resulting in up to 50% stenosis of the origin/proximal aspect of the left internal carotid artery.   -TTE did not show any acute finding  with normal  EF .   -HBa1c 8.3  and LDL > 100 .   -SLP rec  advance diet .   -Awaiting PT/OT eval   -Cont asa and Plavix x 28 days then after only Plavix ( had a CVA on asa )  -Cont  statin           T2DM (type 2 diabetes mellitus)  Hba1c 8.3  Cont  ISS  Plan to be d/c on metformin       Mild intermittent asthma without complication  Cont singular  Cont Duoneb prn  Stable       Dyslipidemia  LDL > 100  LDL goal < 100  New  Dx T2DM   Cont statin       HTN (hypertension)   Allow Permissive HTN for the   Next 24 48 hrs  Until acute CVA r/o   Labetalol Prn  For a SBP> 200   Resume BP medication accordingly   Resume losartan         VTE Risk Mitigation (From admission, onward)         Ordered     enoxaparin injection 40 mg  Daily         09/02/22 1418     IP VTE HIGH RISK PATIENT  Once         09/02/22 1555     Place sequential compression device  Until discontinued         09/02/22 1555     Place sequential compression device  Until discontinued         09/02/22 1418                Discharge Planning   ALVERTO:      Code Status: Full Code   Is the patient medically ready for discharge?:     Reason for patient still in hospital (select all that apply): Treatment                     Johnny Paris MD  Department of Hospital Medicine   [facility] - Med S[facility]

## 2022-09-03 NOTE — ASSESSMENT & PLAN NOTE
-Risk factor uncontrolled HTM , HLD and elevated CBG  -CTH show a possible lacuna cva  -MRI phillip show Acute lacunar infarcts within the left thalamus .  - CT head and neck show :No evidence of thromboembolism within the visible branches of the hlxhiz-zl-Ytqadp.Coarse calcific plaque within the left carotid bifurcation resulting in up to 50% stenosis of the origin/proximal aspect of the left internal carotid artery.   -TTE did not show any acute finding  with normal  EF .   -HBa1c 8.3  and LDL > 100 .   -SLP rec  advance diet .   -Awaiting PT/OT eval   -Cont asa and Plavix x 28 days then after only Plavix ( had a CVA on asa )  -Cont statin

## 2022-09-03 NOTE — PLAN OF CARE
Pt is stable and on room air. Pt had swallow test completed and was placed on a cardiac diet. Pt got up and walked around room today. Pt has had no pain. Pt is oriented and call light is in reach.

## 2022-09-03 NOTE — PLAN OF CARE
S.T. evaluation completed.  Pt recommended for a regular consistency diet and thin liquids with basic swallow precautions reviewed.  Pt did report decreased sensation on Right facial area but with functional oral motor skills. S.T. to continue as appropriate. Please see POC.

## 2022-09-03 NOTE — PLAN OF CARE
NO SKILLED OT INTERVENTION NEEDS IDENTIFIED. PATIENT WILL PARTICIPATE IN PEOPLE MOVERS PROGRAM FOR CONT MOBILITY.

## 2022-09-03 NOTE — HOSPITAL COURSE
83 y/o WM admitted with a Dx of acute lacunar infarct .  MRI phillip show Acute lacunar infarcts within the left thalamus . CT head and neck show :No evidence of thromboembolism within the visible branches of the ygnzse-aq-Qenmpp.Coarse calcific plaque within the left carotid bifurcation resulting in up to 50% stenosis of the origin/proximal aspect of the left internal carotid artery. TTE did not show any acute finding  with normal  EF . HBa1c 8.3  and LDL > 100 . SLP rec  advance diet . Awaiting PT/OT eval .  Cont with right side numbness and tingling.   9/4 Pt was seen and examined at  bedside . He was determined to be  suitable for d/c /  He report has not been compliance with his medication . Plan to d/c on current Home medication dose  ( BP , Cholesterol  and asa ). He will be d/c on asa and Plavix x 30 days and then after  just asa daily . He is a New Dx T2DM with a Hba1c 8.3 . He will be d/c on metformin 500 mg po bid x 7 days and then ater 100 mg po bid . PT/OT consulted and manuel  HH .  He was advised to f/u with his PCP in 1 week  . He was educated about be compliance with medication  . There was no acute event since admission .

## 2022-09-03 NOTE — PLAN OF CARE
Initial PT eval completed. Patient ambulated 80 feet, Mod I. Patient will not require skilled therapy services at this time and will be DC to people movers program for gait/TE to tolerance. Recommend home with HH upon DC.

## 2022-09-03 NOTE — ASSESSMENT & PLAN NOTE
Allow Permissive HTN for the   Next 24 48 hrs  Until acute CVA r/o   Labetalol Prn  For a SBP> 200   Resume BP medication accordingly   Resume losartan

## 2022-09-03 NOTE — PT/OT/SLP EVAL
Occupational Therapy   Evaluation and Discharge Note    Name: Jovan Del Cid Jr.  MRN: [id number]  Admitting Diagnosis:  Acute CVA (cerebrovascular accident)   Recent Surgery: * No surgery found *      Recommendations:     Discharge Recommendations: home health OT  Discharge Equipment Recommendations:  none  Barriers to discharge:  None    Assessment:     Jovan Del Cid Jr. is a 82 y.o. male with a medical diagnosis of Acute CVA (cerebrovascular accident). At this time, patient is functioning at their prior level of function and does not require further acute OT services.     Plan:     During this hospitalization, patient does not require further acute OT services.  Please re-consult if situation changes.    Plan of Care Reviewed with: spouse, family    Subjective     Chief Complaint: NONE  Patient/Family Comments/goals: TO GO HOME.    Occupational Profile:  Living Environment: PATIENT LIVES IN 2 STORY HOUSE ACCESS TO BEDROOM AND BATHROOM ON THE 1ST FLOOR WITH WIFE.   Previous level of function: PATIENT STATED HE WAS (I) WITH ALL LEVELS OF SELF CARE.  PATIENT WAS DRIVING.   Roles and Routines: PATIENT USES WALK-IN SHOWER.  Equipment Used at home:  none  Assistance upon Discharge: FAMILY    Pain/Comfort:  Pain Rating 1: 0/10    Patients cultural, spiritual, Evangelical conflicts given the current situation:      Objective:     Communicated with: NURSE prior to session.  Patient found supine with telemetry, peripheral IV upon OT entry to room.    General Precautions: Standard, aphasia, fall   Orthopedic Precautions:    Braces:    Respiratory Status: Room air     Occupational Performance:    Bed Mobility:    Patient completed Rolling/Turning to Right with modified independence  Patient completed Scooting/Bridging with modified independence  Patient completed Supine to Sit with modified independence    Functional Mobility/Transfers:  Patient completed Sit <> Stand Transfer with modified independence  with  no  assistive device   Patient completed Bed <> Chair Transfer using Stand Pivot technique with modified independence with no assistive device  Functional Mobility: MOD (I) WITH NO LOB.    Activities of Daily Living:  Upper Body Dressing: modified independence    Lower Body Dressing: modified independence    Toileting: modified independence      Cognitive/Visual Perceptual:  NO DEFICITS NOTED.    Physical Exam:  Balance:    -       NO LOB WITH AMBULATION NOR WITH STAND PIVOT T/F.  Upper Extremity Range of Motion:     -       Right Upper Extremity: WFL  -       Left Upper Extremity: WFL    AMPA 6 Click ADL:  AMPA Total Score: 22    Treatment & Education:  OT EVAL PERFORMED.  PATIENT EDUCATED RE:  PURPOSE OF OT.  PATIENT PARTICIPATED IN [facility] MOBILITY AND SELF CARE TASKS.    Patient left up in chair with all lines intact, call button in reach, NURSE notified, and FAMILY present    GOALS:   Multidisciplinary Problems       Occupational Therapy Goals          Problem: Occupational Therapy    Goal Priority Disciplines Outcome Interventions   Occupational Therapy Goal     OT, PT/OT     Description: NO SKILLED OT INTERVENTION NEEDS IDENTIFIED. PATIENT WILL PARTICIPATE IN PEOPLE MOVERS PROGRAM FOR CONT MOBILITY.                        History:     Past Medical History:   Diagnosis Date    Asthma     Bronchitis chronic     Cancer     Cough     Digestive disorder     Hyperlipidemia     Hypertension     Liver disease          Past Surgical History:   Procedure Laterality Date    COLONOSCOPY N/A 6/21/2016    Procedure: COLONOSCOPY;  Surgeon: Alonso Dorsey MD;  Location: [facility];  Service: Endoscopy;  Laterality: N/A;    COLONOSCOPY N/A 11/1/2016    Procedure: COLONOSCOPY;  Surgeon: Alonso Dorsey MD;  Location: [facility];  Service: Endoscopy;  Laterality: N/A;    COLONOSCOPY N/A 2/3/2017    Procedure: COLONOSCOPY;  Surgeon: Alonso Dorsey MD;  Location: [facility];  Service: Endoscopy;  Laterality: N/A;     COLONOSCOPY N/A 11/13/2017    Procedure: COLONOSCOPY;  Surgeon: Alonso Dorsey MD;  Location: [facility] ENDO;  Service: Endoscopy;  Laterality: N/A;    COLONOSCOPY N/A 2/19/2021    Procedure: COLONOSCOPY;  Surgeon: Paula Ricardo MD;  Location: [facility] ENDO;  Service: Endoscopy;  Laterality: N/A;    FLUOROSCOPY N/A 6/15/2018    Procedure: Transjugular liver bx;  Surgeon: Petros Recio MD;  Location: [facility] CATH LAB;  Service: General;  Laterality: N/A;    TONSILLECTOMY         Time Tracking:     OT Date of Treatment: 09/03/22  OT Start Time: 1140  OT Stop Time: 1153  OT Total Time (min): 13 min    Billable Minutes:Evaluation 13    9/3/2022

## 2022-09-03 NOTE — SUBJECTIVE & OBJECTIVE
Interval History:     Review of Systems   Constitutional:  Positive for activity change.   HENT: Negative.     Eyes: Negative.    Respiratory: Negative.     Gastrointestinal: Negative.    Endocrine: Negative.    Genitourinary: Negative.    Musculoskeletal: Negative.    Allergic/Immunologic: Negative.    Neurological:  Positive for weakness.   Hematological: Negative.    Psychiatric/Behavioral:  Positive for decreased concentration. Negative for confusion.    Objective:     Vital Signs (Most Recent):  Temp: 98.6 °F (37 °C) (09/03/22 0746)  Pulse: 60 (09/03/22 0746)  Resp: 20 (09/03/22 0746)  BP: (!) 165/82 (09/03/22 0746)  SpO2: 96 % (09/03/22 0746)   Vital Signs (24h Range):  Temp:  [97.9 °F (36.6 °C)-98.6 °F (37 °C)] 98.6 °F (37 °C)  Pulse:  [55-61] 60  Resp:  [17-22] 20  SpO2:  [93 %-97 %] 96 %  BP: (135-190)/(65-98) 165/82     Weight: 102.1 kg (225 lb 1.4 oz)  Body mass index is 35.25 kg/m².    Intake/Output Summary (Last 24 hours) at 9/3/2022 1105  Last data filed at 9/2/2022 1800  Gross per 24 hour   Intake 0 ml   Output --   Net 0 ml      Physical Exam  Constitutional:       Appearance: Normal appearance. He is obese. He is ill-appearing.   HENT:      Head: Normocephalic and atraumatic.      Nose: Nose normal.      Mouth/Throat:      Mouth: Mucous membranes are moist.   Eyes:      Extraocular Movements: Extraocular movements intact.      Conjunctiva/sclera: Conjunctivae normal.      Pupils: Pupils are equal, round, and reactive to light.   Cardiovascular:      Rate and Rhythm: Normal rate and regular rhythm.      Pulses: Normal pulses.      Heart sounds: No murmur heard.    No friction rub.   Pulmonary:      Effort: Pulmonary effort is normal. No respiratory distress.      Breath sounds: No stridor. No wheezing, rhonchi or rales.   Chest:      Chest wall: No tenderness.   Abdominal:      General: Abdomen is flat. Bowel sounds are normal. There is no distension.      Palpations: Abdomen is soft. There is no  mass.      Tenderness: There is no abdominal tenderness. There is no guarding or rebound.      Hernia: No hernia is present.   Musculoskeletal:         General: No swelling, tenderness, deformity or signs of injury. Normal range of motion.      Cervical back: Normal range of motion. No rigidity or tenderness.      Right lower leg: No edema.      Left lower leg: No edema.   Skin:     General: Skin is warm.      Capillary Refill: Capillary refill takes less than 2 seconds.      Coloration: Skin is not jaundiced or pale.      Findings: No bruising.   Neurological:      General: No focal deficit present.      Mental Status: He is alert and oriented to person, place, and time.      Cranial Nerves: No cranial nerve deficit.      Sensory: No sensory deficit.      Motor: No weakness.      Coordination: Coordination normal.      Deep Tendon Reflexes: Reflexes normal.   Psychiatric:         Mood and Affect: Mood normal.       Significant Labs: All pertinent labs within the past 24 hours have been reviewed.      Significant Imaging: I have reviewed all pertinent imaging results/findings within the past 24 hours.

## 2022-09-03 NOTE — PT/OT/SLP EVAL
Physical Therapy Evaluation and Discharge Note    Patient Name:  Jovan Del Cid Jr.   MRN:  [id number]    Recommendations:     Discharge Recommendations:  home health PT   Discharge Equipment Recommendations: none   Barriers to discharge: None    Assessment:     Jovan Del Cid Jr. is a 82 y.o. male admitted with a medical diagnosis of Acute CVA (cerebrovascular accident). .  At this time, patient is functioning at their prior level of function and does not require further acute PT services.     Recent Surgery: * No surgery found *      Plan:     During this hospitalization, patient does not require further acute PT services.  Please re-consult if situation changes.      Subjective     Chief Complaint: None stated  Patient/Family Comments/goals: Get better and go home.  Pain/Comfort:  Pain Rating 1: 0/10    Patients cultural, spiritual, Presybeterian conflicts given the current situation:      Living Environment:  Patient lives in a 2 story home with his spouse.   Prior to admission, patients level of function was independent.  Equipment used at home: none.  DME owned (not currently used): none.  Upon discharge, patient will have assistance from family.    Objective:     Communicated with THEA Shipman prior to session.  Patient found supine with telemetry, peripheral IV upon PT entry to room.    General Precautions: Standard,     Orthopedic Precautions: (None)   Braces: N/A   Respiratory Status: Room air    Exams:  Cognitive Exam:  Patient is oriented to Person, Place, Time, and Situation  RLE ROM: WFL  RLE Strength: WFL  LLE ROM: WFL  LLE Strength: WFL    Functional Mobility:  Bed Mobility:     Supine to Sit: modified independence  Transfers:     Sit to Stand:  modified independence with no AD  Bed to Chair: modified independence with  no AD  using  Step Transfer  Gait: 80 feet, Mod I     AM-PAC 6 CLICK MOBILITY  Total Score:21       Therapeutic Activities and Exercises:     Initial PT eval completed. Patient ambulated  80 feet, Mod I. Patient will not require skilled therapy services at this time and will be DC to people movers program for gait/TE to tolerance. Recommend home with HH upon DC.     AM-PAC 6 CLICK MOBILITY  Total Score:21     Patient left up in chair with call button in reach and family present.    GOALS:   Multidisciplinary Problems       Physical Therapy Goals       Not on file                    History:     Past Medical History:   Diagnosis Date    Asthma     Bronchitis chronic     Cancer     Cough     Digestive disorder     Hyperlipidemia     Hypertension     Liver disease        Past Surgical History:   Procedure Laterality Date    COLONOSCOPY N/A 6/21/2016    Procedure: COLONOSCOPY;  Surgeon: Alonso Dorsey MD;  Location: [facility];  Service: Endoscopy;  Laterality: N/A;    COLONOSCOPY N/A 11/1/2016    Procedure: COLONOSCOPY;  Surgeon: Alonso Dorsey MD;  Location: [facility];  Service: Endoscopy;  Laterality: N/A;    COLONOSCOPY N/A 2/3/2017    Procedure: COLONOSCOPY;  Surgeon: Alonso Dorsey MD;  Location: [facility];  Service: Endoscopy;  Laterality: N/A;    COLONOSCOPY N/A 11/13/2017    Procedure: COLONOSCOPY;  Surgeon: Alonso Dorsey MD;  Location: [facility];  Service: Endoscopy;  Laterality: N/A;    COLONOSCOPY N/A 2/19/2021    Procedure: COLONOSCOPY;  Surgeon: Paula Ricardo MD;  Location: [facility];  Service: Endoscopy;  Laterality: N/A;    FLUOROSCOPY N/A 6/15/2018    Procedure: Transjugular liver bx;  Surgeon: Petros Recio MD;  Location: [facility] CATH LAB;  Service: General;  Laterality: N/A;    TONSILLECTOMY         Time Tracking:     PT Received On: 09/03/22  PT Start Time: 1035     PT Stop Time: 1050  PT Total Time (min): 15 min     Billable Minutes: Evaluation 15      09/03/2022

## 2022-09-03 NOTE — PT/OT/SLP EVAL
Speech Language Pathology Evaluation  Bedside Swallow    Patient Name:  Jovan Del Cid Jr.   MRN:  [id number]  Admitting Diagnosis: Acute CVA (cerebrovascular accident)    Recommendations:                 General Recommendations:  Speech/language therapy  Diet recommendations:  Regular, Thin Liquids   Aspiration Precautions: Standard aspiration precautions   General Precautions: Standard, aspiration  Communication strategies:  none    History:     Past Medical History:   Diagnosis Date    Asthma     Bronchitis chronic     Cancer     Cough     Digestive disorder     Hyperlipidemia     Hypertension     Liver disease        Past Surgical History:   Procedure Laterality Date    COLONOSCOPY N/A 6/21/2016    Procedure: COLONOSCOPY;  Surgeon: Alonso Dorsey MD;  Location: [facility];  Service: Endoscopy;  Laterality: N/A;    COLONOSCOPY N/A 11/1/2016    Procedure: COLONOSCOPY;  Surgeon: Alonso Dorsey MD;  Location: [facility];  Service: Endoscopy;  Laterality: N/A;    COLONOSCOPY N/A 2/3/2017    Procedure: COLONOSCOPY;  Surgeon: Alonso Dorsey MD;  Location: [facility];  Service: Endoscopy;  Laterality: N/A;    COLONOSCOPY N/A 11/13/2017    Procedure: COLONOSCOPY;  Surgeon: Alonso Dorsey MD;  Location: [facility];  Service: Endoscopy;  Laterality: N/A;    COLONOSCOPY N/A 2/19/2021    Procedure: COLONOSCOPY;  Surgeon: Paula Ricardo MD;  Location: [facility];  Service: Endoscopy;  Laterality: N/A;    FLUOROSCOPY N/A 6/15/2018    Procedure: Transjugular liver bx;  Surgeon: Petros Recio MD;  Location: [facility] CATH LAB;  Service: General;  Laterality: N/A;    TONSILLECTOMY         Social History: Patient lives with his wife, works and has been independent with daily living skills.     Prior Intubation HX:  none    Modified Barium Swallow: none reported     Chest X-Rays: see medical chart     Prior diet:  Regular consistency; thin liquids .    Occupation/hobbies/homemaking:   Pt working prior to this  admit .    Subjective     Pt sitting up in bed; agreeable to work with S.T.  Patient goals: pt requesting to eat      Pain/Comfort:  Pain Rating 1: 0/10    Respiratory Status: Room air    Objective:     Oral Musculature Evaluation  Oral Musculature: WFL, other (see comments) (Pt did report decrease sensation (R) facial area)  Dentition: present and adequate  Mucosal Quality: good  Mandibular Strength and Mobility: WFL  Oral Labial Strength and Mobility: WFL  Lingual Strength and Mobility: WFL  Velar Elevation: WFL  Buccal Strength and Mobility: WFL    Bedside Swallow Eval:   Consistencies Assessed:  Thin liquids  Pureed  Cracker      Oral Phase:   WFL    Pharyngeal Phase:   no overt clinical signs/symptoms of pharyngeal dysphagia    Compensatory Strategies  Basic aspiration precautions    Treatment: Pt followed basic commands, answered yes/no questions was oriented appropriately.  Pt's speech was not dysarthric and at baseline status per pt/family report.  Pt with no observable difficulties with word-finding skills or difficulties communicating needs or ideas.    Assessment:     Jovan Del Cid Jr. is an 82 y.o. male who presented with no observable swallowing difficulties and presented with appropriate cognitive/linguistic skills at basic levels.  Swallowing precautions reviewed and pt recommended for a regular consistency diet and thin liquids.     Goals:   Multidisciplinary Problems       SLP Goals          Problem: SLP    Goal Priority Disciplines Outcome   SLP Goal     SLP    Description: 1. Pt will tolerate a regular consistency diet and thin liquids without signs of swallowing difficulties.  2. Pt will communicate needs/ideas effectively.                         Plan:     Patient to be seen:  3 x/week   Plan of Care expires:  09/10/22  Plan of Care reviewed with:  patient, daughter   SLP Follow-Up:  Yes       Discharge recommendations:    determine at d/c  Barriers to Discharge:  None    Time Tracking:      SLP Treatment Date:   09/03/22  Speech Start Time:  0945  Speech Stop Time:  1015     Speech Total Time (min):  30 min    Billable Minutes: 30  minutes     09/03/2022          No

## 2022-09-03 NOTE — CONSULTS
Food & Nutrition                                                           Education     Diet Education: cardiac diet  Time Spent: 15 minutes  Learners: patient        Nutrition Education provided with handouts: yes, pt discharge packet        Comments: Patient admitted with possible stroke. PMH HTN, HLD, MCMAHAN, obesity. Remote consult- I could not reach pt on his cell phone for education and RN that answered the room phone says pt is asleep. RD to follow up Monday for verbal education, handouts in pt discharge packet.         All questions and concerns answered. Dietitian's contact information provided.         Follow-Up: yes     Please Re-consult as needed           Thanks!

## 2022-09-04 VITALS
HEIGHT: 67 IN | DIASTOLIC BLOOD PRESSURE: 69 MMHG | RESPIRATION RATE: 18 BRPM | WEIGHT: 225.06 LBS | SYSTOLIC BLOOD PRESSURE: 149 MMHG | BODY MASS INDEX: 35.33 KG/M2 | TEMPERATURE: 98 F | HEART RATE: 62 BPM | OXYGEN SATURATION: 94 %

## 2022-09-04 LAB
ALBUMIN SERPL BCP-MCNC: 3.7 G/DL (ref 3.5–5.2)
ALP SERPL-CCNC: 77 U/L (ref 55–135)
ALT SERPL W/O P-5'-P-CCNC: 69 U/L (ref 10–44)
ANION GAP SERPL CALC-SCNC: 10 MMOL/L (ref 8–16)
AST SERPL-CCNC: 41 U/L (ref 10–40)
BASOPHILS # BLD AUTO: 0.04 K/UL (ref 0–0.2)
BASOPHILS NFR BLD: 0.8 % (ref 0–1.9)
BILIRUB SERPL-MCNC: 2.5 MG/DL (ref 0.1–1)
BUN SERPL-MCNC: 14 MG/DL (ref 8–23)
CALCIUM SERPL-MCNC: 8.8 MG/DL (ref 8.7–10.5)
CHLORIDE SERPL-SCNC: 102 MMOL/L (ref 95–110)
CO2 SERPL-SCNC: 26 MMOL/L (ref 23–29)
CREAT SERPL-MCNC: 0.8 MG/DL (ref 0.5–1.4)
DIFFERENTIAL METHOD: ABNORMAL
EOSINOPHIL # BLD AUTO: 0.2 K/UL (ref 0–0.5)
EOSINOPHIL NFR BLD: 4.9 % (ref 0–8)
ERYTHROCYTE [DISTWIDTH] IN BLOOD BY AUTOMATED COUNT: 13.8 % (ref 11.5–14.5)
EST. GFR  (NO RACE VARIABLE): >60 ML/MIN/1.73 M^2
GLUCOSE SERPL-MCNC: 218 MG/DL (ref 70–110)
HCT VFR BLD AUTO: 42.5 % (ref 40–54)
HGB BLD-MCNC: 13.6 G/DL (ref 14–18)
IMM GRANULOCYTES # BLD AUTO: 0.02 K/UL (ref 0–0.04)
IMM GRANULOCYTES NFR BLD AUTO: 0.4 % (ref 0–0.5)
LYMPHOCYTES # BLD AUTO: 0.7 K/UL (ref 1–4.8)
LYMPHOCYTES NFR BLD: 14.2 % (ref 18–48)
MCH RBC QN AUTO: 30.4 PG (ref 27–31)
MCHC RBC AUTO-ENTMCNC: 32 G/DL (ref 32–36)
MCV RBC AUTO: 95 FL (ref 82–98)
MONOCYTES # BLD AUTO: 0.7 K/UL (ref 0.3–1)
MONOCYTES NFR BLD: 13.8 % (ref 4–15)
NEUTROPHILS # BLD AUTO: 3.2 K/UL (ref 1.8–7.7)
NEUTROPHILS NFR BLD: 65.9 % (ref 38–73)
NRBC BLD-RTO: 0 /100 WBC
PLATELET # BLD AUTO: 130 K/UL (ref 150–450)
PMV BLD AUTO: 9.8 FL (ref 9.2–12.9)
POCT GLUCOSE: 208 MG/DL (ref 70–110)
POCT GLUCOSE: 226 MG/DL (ref 70–110)
POTASSIUM SERPL-SCNC: 3.7 MMOL/L (ref 3.5–5.1)
PROT SERPL-MCNC: 6.1 G/DL (ref 6–8.4)
RBC # BLD AUTO: 4.47 M/UL (ref 4.6–6.2)
SODIUM SERPL-SCNC: 138 MMOL/L (ref 136–145)
WBC # BLD AUTO: 4.85 K/UL (ref 3.9–12.7)

## 2022-09-04 PROCEDURE — 94761 N-INVAS EAR/PLS OXIMETRY MLT: CPT

## 2022-09-04 PROCEDURE — 80053 COMPREHEN METABOLIC PANEL: CPT | Performed by: INTERNAL MEDICINE

## 2022-09-04 PROCEDURE — 25000003 PHARM REV CODE 250: Performed by: INTERNAL MEDICINE

## 2022-09-04 PROCEDURE — 63600175 PHARM REV CODE 636 W HCPCS: Performed by: INTERNAL MEDICINE

## 2022-09-04 PROCEDURE — 85025 COMPLETE CBC W/AUTO DIFF WBC: CPT | Performed by: INTERNAL MEDICINE

## 2022-09-04 PROCEDURE — 36415 COLL VENOUS BLD VENIPUNCTURE: CPT | Performed by: INTERNAL MEDICINE

## 2022-09-04 RX ORDER — PEN NEEDLE, DIABETIC 30 GX3/16"
1 NEEDLE, DISPOSABLE MISCELLANEOUS 2 TIMES DAILY WITH MEALS
Qty: 100 EACH | Refills: 11 | Status: SHIPPED | OUTPATIENT
Start: 2022-09-04

## 2022-09-04 RX ORDER — VALSARTAN AND HYDROCHLOROTHIAZIDE 160; 12.5 MG/1; MG/1
1 TABLET, FILM COATED ORAL DAILY
Qty: 90 TABLET | Refills: 3 | Status: SHIPPED | OUTPATIENT
Start: 2022-09-04 | End: 2022-09-08

## 2022-09-04 RX ORDER — METFORMIN HYDROCHLORIDE 1000 MG/1
TABLET ORAL
Qty: 120 TABLET | Refills: 2 | Status: ON HOLD | OUTPATIENT
Start: 2022-09-04 | End: 2022-09-16 | Stop reason: HOSPADM

## 2022-09-04 RX ORDER — MONTELUKAST SODIUM 10 MG/1
10 TABLET ORAL NIGHTLY
Qty: 90 TABLET | Refills: 2 | Status: SHIPPED | OUTPATIENT
Start: 2022-09-04 | End: 2022-12-03

## 2022-09-04 RX ORDER — CLOPIDOGREL BISULFATE 75 MG/1
75 TABLET ORAL DAILY
Qty: 30 TABLET | Refills: 0 | Status: SHIPPED | OUTPATIENT
Start: 2022-09-04 | End: 2022-10-24

## 2022-09-04 RX ORDER — LANCING DEVICE
1 EACH MISCELLANEOUS 2 TIMES DAILY WITH MEALS
Qty: 1 EACH | Refills: 0 | Status: SHIPPED | OUTPATIENT
Start: 2022-09-04 | End: 2023-12-05

## 2022-09-04 RX ORDER — ROSUVASTATIN CALCIUM 40 MG/1
40 TABLET, COATED ORAL DAILY
Qty: 90 TABLET | Refills: 3 | Status: SHIPPED | OUTPATIENT
Start: 2022-09-04 | End: 2023-09-28 | Stop reason: SDUPTHER

## 2022-09-04 RX ORDER — LANCETS
1 EACH MISCELLANEOUS 2 TIMES DAILY WITH MEALS
Qty: 100 EACH | Refills: 11 | Status: SHIPPED | OUTPATIENT
Start: 2022-09-04

## 2022-09-04 RX ORDER — INSULIN PUMP SYRINGE, 3 ML
EACH MISCELLANEOUS
Qty: 1 EACH | Refills: 0 | Status: SHIPPED | OUTPATIENT
Start: 2022-09-04 | End: 2023-12-05

## 2022-09-04 RX ORDER — ASPIRIN 81 MG/1
81 TABLET ORAL DAILY
Qty: 90 TABLET | Refills: 3 | Status: SHIPPED | OUTPATIENT
Start: 2022-09-04 | End: 2024-02-26

## 2022-09-04 RX ADMIN — CLOPIDOGREL 75 MG: 75 TABLET, FILM COATED ORAL at 09:09

## 2022-09-04 RX ADMIN — INSULIN ASPART 2 UNITS: 100 INJECTION, SOLUTION INTRAVENOUS; SUBCUTANEOUS at 06:09

## 2022-09-04 RX ADMIN — PANTOPRAZOLE SODIUM 40 MG: 40 TABLET, DELAYED RELEASE ORAL at 09:09

## 2022-09-04 RX ADMIN — INSULIN ASPART 2 UNITS: 100 INJECTION, SOLUTION INTRAVENOUS; SUBCUTANEOUS at 01:09

## 2022-09-04 RX ADMIN — ATORVASTATIN CALCIUM 40 MG: 40 TABLET, FILM COATED ORAL at 09:09

## 2022-09-04 RX ADMIN — ASPIRIN 81 MG: 81 TABLET, COATED ORAL at 10:09

## 2022-09-04 RX ADMIN — LOSARTAN POTASSIUM 25 MG: 25 TABLET, FILM COATED ORAL at 09:09

## 2022-09-04 NOTE — PLAN OF CARE
[facility] - Med Surg  Initial Discharge Assessment       Primary Care Provider: Paul Neal MD    Admission Diagnosis: CVA (cerebral vascular accident) [I63.9]  Stroke [I63.9]  Cerebrovascular accident (CVA), unspecified mechanism [I63.9]    Admission Date: 9/2/2022  Expected Discharge Date: 9/4/2022    Discharge Barriers Identified: None    Payor: [employer] BLUE SHIELD / Plan: BCBS BLUE SAVER PPO - HD / Product Type: PPO /     Extended Emergency Contact Information  Primary Emergency Contact: Jimbo Del Cid   [place]  Mobile Phone: [phone]  Relation: Son  Secondary Emergency Contact: Mavis Del Cid   [place]  Mobile Phone: [phone]  Relation: Spouse    Discharge Plan A: Home with family         [id number] - [place], [place] - [address]  [address] [place] [address]  Phone: [phone] Fax: [phone]    [facility] - [place] - [address]  [address] [place] [address]  Phone: [phone] Fax: [phone]    [id number]  [place] - [address]  [address]  [place] [address]  Phone: [phone] Fax: [phone]      Initial Assessment (most recent)       Adult Discharge Assessment - 09/04/22 1222          Discharge Assessment    Assessment Type Discharge Planning Assessment     Confirmed/corrected address, phone number and insurance Yes     Confirmed Demographics Correct on Facesheet     Source of Information patient     When was your last doctors appointment? --   Pt reports last doctors apointment approx 1 yr ago.    Does patient/caregiver understand observation status Yes     Communicated ALVERTO with patient/caregiver Yes     Lives With spouse     Do you expect to return to your current living situation? Yes     Do you have help at home or someone to help you manage your care at home? Yes     Who are  your caregiver(s) and their phone number(s)? Mavis Del Cid (spouse) [phone]     Prior to hospitilization cognitive status: Alert/Oriented     Current cognitive status: Alert/Oriented     Walking or Climbing Stairs Difficulty none     Dressing/Bathing Difficulty none     Home Accessibility wheelchair accessible     Home Layout Able to live on 1st floor     Equipment Currently Used at Home none     Readmission within 30 days? No     Patient currently being followed by outpatient case management? No     Do you currently have service(s) that help you manage your care at home? No     Do you take prescription medications? Yes     Do you have prescription coverage? Yes     Do you have any problems affording any of your prescribed medications? No     Is the patient taking medications as prescribed? yes     How do you get to doctors appointments? car, drives self     Are you on dialysis? No     Do you take coumadin? No     Discharge Plan A Home with family     DME Needed Upon Discharge  none     Discharge Plan discussed with: Spouse/sig other;Patient     Name(s) and Number(s) Mavis Del Cid (spouse) [phone]     Discharge Barriers Identified None        Housing Stability    In the last 12 months, was there a time when you did not have a steady place to sleep or slept in a shelter (including now)? No        Social Connections    Are you , , , , never , or living with a partner?         Relationship/Environment    Name(s) of Who Lives With Patient Mavis Del Cid (spouse) [phone]                 SW met with pt and spouse at bedside to complete discharge assessment. Pt is AAOx4 and able to answer questions independently. Pt lives at home with spouse and reports that he is independent with ADLs/IADLs. Pt dose not require the use of DME or assistive devices. Pt denies any post hospital needs or services at this time. Transitional Care Folder, Discharge Planning Begins  on Admission pamphlet, [facility] Bedside Delivery pamphlet, Advance Directive information given to patient along with the contact information given. SW instructed patient or family to call with any questions or concerns. RW, GEORGETTE.

## 2022-09-04 NOTE — PLAN OF CARE
Problem: Adult Inpatient Plan of Care  Goal: Plan of Care Review  Outcome: Ongoing, Progressing     Problem: Adjustment to Illness (Stroke, Ischemic/Transient Ischemic Attack)  Goal: Optimal Coping  Outcome: Ongoing, Progressing     Problem: Functional Ability Impaired (Stroke, Ischemic/Transient Ischemic Attack)  Goal: Optimal Functional Ability  Outcome: Ongoing, Progressing     Problem: Sensorimotor Impairment (Stroke, Ischemic/Transient Ischemic Attack)  Goal: Improved Sensorimotor Function  Outcome: Ongoing, Progressing     Problem: Fall Injury Risk  Goal: Absence of Fall and Fall-Related Injury  Outcome: Ongoing, Progressing     Problem: Infection  Goal: Absence of Infection Signs and Symptoms  Outcome: Ongoing, Progressing      Patient remains free from injury. Safety precautions maintained. No s/s of acute distress. Cardiac monitoring in place. Pt education about care completed.

## 2022-09-04 NOTE — PLAN OF CARE
[facility]  Discharge Final Note    Primary Care Provider: Paul Neal MD    Expected Discharge Date: 9/4/2022    Final Discharge Note (most recent)       Final Note - 09/04/22 1345          Final Note    Assessment Type Final Discharge Note     Anticipated Discharge Disposition Home or Self Care        Post-Acute Status    Discharge Delays None known at this time                   No needs at time of chart review. KM,MSW    Important Message from Medicare             Contact Info       Paul Neal MD   Specialty: Internal Medicine   Relationship: PCP - General    [address] [place], [place] [address] [address]   Phone: [phone]       Next Steps: Follow up in 1 week(s)    Dagoberto Montes MD   Specialty: Neurology    [address] [place] [address] [address] [place] [address]   Phone: [phone]       Next Steps: Follow up in 3 week(s)    Instructions: acute CVA

## 2022-09-04 NOTE — PLAN OF CARE
Pt is stable and on room air. Pt moving around independently and ambulating to bathroom. Diabetes education provided. Pt oriented and call light in reach.

## 2022-09-04 NOTE — NURSING
Pt given written and verbal education. Pt and family verbalized understanding.  Pt IV was removed, catheter intact. Pt is stable. Pt discharged with family.

## 2022-09-04 NOTE — ASSESSMENT & PLAN NOTE
-Risk factor uncontrolled HTM , HLD and elevated CBG  -CTH show a possible lacuna cva  -MRI phillip show Acute lacunar infarcts within the left thalamus .  - CT head and neck show :No evidence of thromboembolism within the visible branches of the tobgkh-rk-Ejifgu.Coarse calcific plaque within the left carotid bifurcation resulting in up to 50% stenosis of the origin/proximal aspect of the left internal carotid artery.   -TTE did not show any acute finding  with normal  EF .   -HBa1c 8.3  and LDL > 100 .   -SLP rec  advance diet .   -Awaiting PT/OT eval   -Cont asa and Plavix x 30 days a nd then after just asa  -Cont statin

## 2022-09-04 NOTE — DISCHARGE SUMMARY
[facility] Medicine  Discharge Summary      Patient Name: Jovan Del Cid Jr.  MRN: [id number]  Patient Class: IP- Inpatient  Admission Date: 9/2/2022  Hospital Length of Stay: 2 days  Discharge Date and Time:  09/04/2022 11:19 AM  Attending Physician: Johnny Paris, *   Discharging Provider: Johnny Paris MD  Primary Care Provider: Paul Neal MD      HPI:    83 y/o WM  with a PMHx of asthma, HLD, HTN,Hx of Colon Tubulovillous Adenoma, MCMAHAN  and  obesity  who presents to the Emergency Department for R-sided paresthesias which onset suddenly at 1200 yesterday. the pt reports that he has been tripping and fell last night. He denies any Head trauma , LOC ,  Chest pain ,palpitation , Dizziness bowel or urinary incontinence , facial asymmetry, speech difficulty, fever, chills, Seizures and all other sxs  . Associated sxs include R sided numbness and gait difficulty. Pt report has not been taking his BP medication for the last days . The BP was significantly high this am  per pt son .   Pt denies having a history of DM, MI, or CVA. He report ws in his usual states of health before yesterday .   ER COURSE: CTH :No noncontrast CT evidence for major vascular distribution infarction or hemorrhage.Left thalamic hypodensity suggesting lacunar type infarction.   ER VS:   BP Pulse Resp Temp SpO2   (!) 187/102 65 15 98.2 °F (36.8 °C) 96 %         Pt will be admitted to Inpt with a dx of Right side weakness and possible CVA   CODE STATUS : FULL CODE      * No surgery found *      Hospital Course:   83 y/o WM admitted with a Dx of acute lacunar infarct .  MRI phillip show Acute lacunar infarcts within the left thalamus . CT head and neck show :No evidence of thromboembolism within the visible branches of the rekxyj-zp-Hwhhzq.Coarse calcific plaque within the left carotid bifurcation resulting in up to 50% stenosis of the origin/proximal aspect of the left internal carotid artery. TTE did  not show any acute finding  with normal  EF . HBa1c 8.3  and LDL > 100 . SLP rec  advance diet . Awaiting PT/OT eval .  Cont with right side numbness and tingling.   9/4 Pt was seen and examined at  bedside . He was determined to be  suitable for d/c /  He report has not been compliance with his medication . Plan to d/c on current Home medication dose  ( BP , Cholesterol  and asa ). He will be d/c on asa and Plavix x 30 days and then after  just asa daily . He is a New Dx T2DM with a Hba1c 8.3 . He will be d/c on metformin 500 mg po bid x 7 days and then ater 100 mg po bid . PT/OT consulted and manuel  HH .  He was advised to f/u with his PCP in 1 week  . He was educated about be compliance with medication  . There was no acute event since admission .        Goals of Care Treatment Preferences:  Code Status: Full Code      Consults:   Consults (From admission, onward)        Status Ordering Provider     Inpatient consult to Diabetes educator  Once        Provider:  (Not yet assigned)    Acknowledged CARLY MAI     Inpatient consult to Physical Medicine Rehab  Once        Provider:  (Not yet assigned)    Acknowledged CARLY MAI     Inpatient consult to Registered Dietitian/Nutritionist  Once        Provider:  (Not yet assigned)    Completed CARLY MAI     IP consult to case management/social work  Once        Provider:  (Not yet assigned)    Acknowledged CARLY MAI     Consult to Telemedicine - Acute Stroke  Once        Provider:  (Not yet assigned)    Acknowledged RAYMOND CARD JR          * Acute CVA (cerebrovascular accident)  -Risk factor uncontrolled HTM , HLD and elevated CBG  -CTH show a possible lacuna cva  -MRI phillip show Acute lacunar infarcts within the left thalamus .  - CT head and neck show :No evidence of thromboembolism within the visible branches of the cfaaie-fz-Hfdlml.Coarse calcific plaque within the left carotid bifurcation resulting in up to 50%  stenosis of the origin/proximal aspect of the left internal carotid artery.   -TTE did not show any acute finding  with normal  EF .   -HBa1c 8.3  and LDL > 100 .   -SLP rec  advance diet .   -Awaiting PT/OT eval   -Cont asa and Plavix x 30 days a nd then after just asa  -Cont statin           T2DM (type 2 diabetes mellitus)  Hba1c 8.3  Cont  ISS  Plan to be d/c on metformin         Final Active Diagnoses:    Diagnosis Date Noted POA    PRINCIPAL PROBLEM:  Acute CVA (cerebrovascular accident) [I63.9] 09/02/2022 Yes    T2DM (type 2 diabetes mellitus) [E11.9] 07/20/2021 Yes    Mild intermittent asthma without complication [J45.20] 01/24/2019 Yes     Chronic    HTN (hypertension) [I10] 07/22/2014 Yes    Dyslipidemia [E78.5] 07/22/2014 Yes      Problems Resolved During this Admission:       Discharged Condition: stable    Disposition: Home or Self Care    Follow Up:   Follow-up Information     Paul Neal MD Follow up in 1 week(s).    Specialty: Internal Medicine  Contact information:  [address] [facility] [address] [phone]                       Patient Instructions:      Ambulatory referral/consult to Home Health   Standing Status: Future   Referral Priority: Routine Referral Type: Home Health   Referral Reason: Specialty Services Required   Requested Specialty: Home Health Services   Number of Visits Requested: 1     Ambulatory referral/consult to [facility]   Standing Status: Future   Referral Priority: Routine Referral Type: Consultation   Referral Reason: Specialty Services Required   Number of Visits Requested: 1     Diet Cardiac   Order Comments: See Stroke Patient Education Guide Booklet for details.     Call 911 for any of the following:   Order Comments: Call 911  right away if any of the following warning signs come on suddenly, even if the symptoms only last for a few minutes. With stroke, timing is very important.   - Warning Signs of Stroke:  - Weakness: You  may feel a sudden weakness, tingling or loss of feeling on one side of your face or body.  - Vision Problems: You may have sudden double vision or trouble seeing in one or both eyes.  - Speech Problems: You may have sudden trouble talking, slured speech, or problems understanding others.  - Headache: You may have sudden, severe headache.  - Movement Problems: You may experience dizziness, a feeling of spinning, a loss of balance, a feeling of falling or blackouts.     Activity as tolerated     Call MD for:  temperature >100.4     Call MD for:  persistent nausea and vomiting or diarrhea     Call MD for:  severe uncontrolled pain     Call MD for:  redness, tenderness, or signs of infection (pain, swelling, redness, odor or green/yellow discharge around incision site)     Call MD for:  difficulty breathing or increased cough     Call MD for:  severe persistent headache     Call MD for:  worsening rash     Call MD for:  persistent dizziness, light-headedness, or visual disturbances     Call MD for:  increased confusion or weakness       Significant Diagnostic Studies: Labs:   BMP:   Recent Labs   Lab 09/03/22  0352 09/04/22  0509   * 218*    138   K 3.6 3.7    102   CO2 25 26   BUN 12 14   CREATININE 0.8 0.8   CALCIUM 9.0 8.8   MG 2.0  --    , CMP   Recent Labs   Lab 09/03/22  0352 09/04/22  0509    138   K 3.6 3.7    102   CO2 25 26   * 218*   BUN 12 14   CREATININE 0.8 0.8   CALCIUM 9.0 8.8   PROT 6.4 6.1   ALBUMIN 3.7 3.7   BILITOT 2.4* 2.5*   ALKPHOS 71 77   AST 42* 41*   ALT 67* 69*   ANIONGAP 10 10    and CBC   Recent Labs   Lab 09/03/22  0352 09/04/22  0509   WBC 4.51 4.85   HGB 13.9* 13.6*   HCT 41.7 42.5   * 130*     Microbiology: Blood Culture No results found for: LABBLOO    Pending Diagnostic Studies:     None         Medications:  Reconciled Home Medications:      Medication List      START taking these medications    blood sugar diagnostic Strp  1 strip by  "Misc.(Non-Drug; Combo Route) route 2 (two) times daily with meals.     blood-glucose meter kit  Use as instructed     clopidogreL 75 mg tablet  Commonly known as: PLAVIX  Take 1 tablet (75 mg total) by mouth once daily.     lancets Misc  1 each by Misc.(Non-Drug; Combo Route) route 2 (two) times daily with meals.     lancing device Misc  1 Device by Misc.(Non-Drug; Combo Route) route 2 (two) times daily with meals.     metFORMIN 1000 MG tablet  Commonly known as: GLUCOPHAGE  Please take 500 mg oral  two time a day ( 1/2 of tablet ) for 7 days and then after take  1000 mg two time a day .     pen needle, diabetic 31 gauge x 5/16" Ndle  1 each by Misc.(Non-Drug; Combo Route) route 2 (two) times daily with meals.        CHANGE how you take these medications    aspirin 81 MG EC tablet  Commonly known as: ECOTRIN  Take 1 tablet (81 mg total) by mouth once daily.  What changed:   · how much to take  · when to take this        CONTINUE taking these medications    albuterol 90 mcg/actuation inhaler  Commonly known as: VENTOLIN HFA  Inhale 2 puffs into the lungs every 6 (six) hours as needed for Wheezing. Rescue     fluticasone propionate 50 mcg/actuation nasal spray  Commonly known as: FLONASE  SHAKE LIQUID AND USE 2 SPRAYS IN EACH NOSTRIL EVERY DAY     montelukast 10 mg tablet  Commonly known as: SINGULAIR  Take 1 tablet (10 mg total) by mouth every evening.     multivitamin per tablet  Commonly known as: THERAGRAN  Take 1 tablet by mouth once daily.     pantoprazole 40 MG tablet  Commonly known as: PROTONIX  Take 1 tablet (40 mg total) by mouth daily as needed.     rosuvastatin 40 MG Tab  Commonly known as: CRESTOR  Take 1 tablet (40 mg total) by mouth once daily.     valsartan-hydrochlorothiazide 160-12.5 mg per tablet  Commonly known as: DIOVAN-HCT  Take 1 tablet by mouth once daily.            Indwelling Lines/Drains at time of discharge:   Lines/Drains/Airways     None                 Time spent on the discharge of " patient: 31 minutes         Johnny Paris MD  Department of Hospital Medicine  [facility]

## 2022-09-06 ENCOUNTER — TELEPHONE (OUTPATIENT)
Dept: PHYSICAL MEDICINE AND REHAB | Facility: CLINIC | Age: 82
End: 2022-09-06
Payer: COMMERCIAL

## 2022-09-06 ENCOUNTER — PATIENT OUTREACH (OUTPATIENT)
Dept: ADMINISTRATIVE | Facility: CLINIC | Age: 82
End: 2022-09-06
Payer: COMMERCIAL

## 2022-09-06 ENCOUNTER — TELEPHONE (OUTPATIENT)
Dept: NEUROLOGY | Facility: CLINIC | Age: 82
End: 2022-09-06
Payer: COMMERCIAL

## 2022-09-06 NOTE — TELEPHONE ENCOUNTER
Reached out to patient from Stroke Core Measures Report and offered services of PMR/Dr. Medina.  Patient declined services at this time. Information was provided as well as a contact number if ever needed.  Patient verbalized understanding.  Harmony Marsh RN

## 2022-09-06 NOTE — PROGRESS NOTES
C3 nurse spoke with Jovan Del Cid Jr.'s wife Mavis for a TCC post hospital discharge follow up call. The patient has a scheduled HOSFU appointment with Paul Neal MD  on 9/7/2022 @ 4322.

## 2022-09-06 NOTE — TELEPHONE ENCOUNTER
----- Message from Alexandra Tillman sent at 9/6/2022 12:24 PM CDT -----  Regarding: appt  Contact: wife-Mavis Gamble has a referral from hospital for patient to see Dr Montes for acute CVA, but there are no openings, please call her back at  [phone]

## 2022-09-07 ENCOUNTER — OFFICE VISIT (OUTPATIENT)
Dept: FAMILY MEDICINE | Facility: CLINIC | Age: 82
End: 2022-09-07
Payer: COMMERCIAL

## 2022-09-07 VITALS
BODY MASS INDEX: 34.6 KG/M2 | DIASTOLIC BLOOD PRESSURE: 60 MMHG | TEMPERATURE: 98 F | WEIGHT: 220.88 LBS | SYSTOLIC BLOOD PRESSURE: 110 MMHG | RESPIRATION RATE: 18 BRPM | HEART RATE: 66 BPM

## 2022-09-07 DIAGNOSIS — E11.65 TYPE 2 DIABETES MELLITUS WITH HYPERGLYCEMIA, WITHOUT LONG-TERM CURRENT USE OF INSULIN: ICD-10-CM

## 2022-09-07 DIAGNOSIS — I63.9 ACUTE CVA (CEREBROVASCULAR ACCIDENT): ICD-10-CM

## 2022-09-07 DIAGNOSIS — Z09 HOSPITAL DISCHARGE FOLLOW-UP: ICD-10-CM

## 2022-09-07 DIAGNOSIS — I10 PRIMARY HYPERTENSION: Primary | ICD-10-CM

## 2022-09-07 DIAGNOSIS — E78.5 DYSLIPIDEMIA: ICD-10-CM

## 2022-09-07 PROBLEM — E11.9 TYPE 2 DIABETES MELLITUS, WITHOUT LONG-TERM CURRENT USE OF INSULIN: Status: ACTIVE | Noted: 2022-09-07

## 2022-09-07 PROCEDURE — 1111F PR DISCHARGE MEDS RECONCILED W/ CURRENT OUTPATIENT MED LIST: ICD-10-PCS | Mod: CPTII,S$GLB,, | Performed by: INTERNAL MEDICINE

## 2022-09-07 PROCEDURE — 99214 OFFICE O/P EST MOD 30 MIN: CPT | Mod: S$GLB,,, | Performed by: INTERNAL MEDICINE

## 2022-09-07 PROCEDURE — 1159F MED LIST DOCD IN RCRD: CPT | Mod: CPTII,S$GLB,, | Performed by: INTERNAL MEDICINE

## 2022-09-07 PROCEDURE — 1101F PT FALLS ASSESS-DOCD LE1/YR: CPT | Mod: CPTII,S$GLB,, | Performed by: INTERNAL MEDICINE

## 2022-09-07 PROCEDURE — 3074F SYST BP LT 130 MM HG: CPT | Mod: CPTII,S$GLB,, | Performed by: INTERNAL MEDICINE

## 2022-09-07 PROCEDURE — 3074F PR MOST RECENT SYSTOLIC BLOOD PRESSURE < 130 MM HG: ICD-10-PCS | Mod: CPTII,S$GLB,, | Performed by: INTERNAL MEDICINE

## 2022-09-07 PROCEDURE — 1125F AMNT PAIN NOTED PAIN PRSNT: CPT | Mod: CPTII,S$GLB,, | Performed by: INTERNAL MEDICINE

## 2022-09-07 PROCEDURE — 1111F DSCHRG MED/CURRENT MED MERGE: CPT | Mod: CPTII,S$GLB,, | Performed by: INTERNAL MEDICINE

## 2022-09-07 PROCEDURE — 99999 PR PBB SHADOW E&M-EST. PATIENT-LVL V: CPT | Mod: PBBFAC,,, | Performed by: INTERNAL MEDICINE

## 2022-09-07 PROCEDURE — 3078F DIAST BP <80 MM HG: CPT | Mod: CPTII,S$GLB,, | Performed by: INTERNAL MEDICINE

## 2022-09-07 PROCEDURE — 3288F FALL RISK ASSESSMENT DOCD: CPT | Mod: CPTII,S$GLB,, | Performed by: INTERNAL MEDICINE

## 2022-09-07 PROCEDURE — 99214 PR OFFICE/OUTPT VISIT, EST, LEVL IV, 30-39 MIN: ICD-10-PCS | Mod: S$GLB,,, | Performed by: INTERNAL MEDICINE

## 2022-09-07 PROCEDURE — 1125F PR PAIN SEVERITY QUANTIFIED, PAIN PRESENT: ICD-10-PCS | Mod: CPTII,S$GLB,, | Performed by: INTERNAL MEDICINE

## 2022-09-07 PROCEDURE — 1101F PR PT FALLS ASSESS DOC 0-1 FALLS W/OUT INJ PAST YR: ICD-10-PCS | Mod: CPTII,S$GLB,, | Performed by: INTERNAL MEDICINE

## 2022-09-07 PROCEDURE — 3288F PR FALLS RISK ASSESSMENT DOCUMENTED: ICD-10-PCS | Mod: CPTII,S$GLB,, | Performed by: INTERNAL MEDICINE

## 2022-09-07 PROCEDURE — 99999 PR PBB SHADOW E&M-EST. PATIENT-LVL V: ICD-10-PCS | Mod: PBBFAC,,, | Performed by: INTERNAL MEDICINE

## 2022-09-07 PROCEDURE — 1159F PR MEDICATION LIST DOCUMENTED IN MEDICAL RECORD: ICD-10-PCS | Mod: CPTII,S$GLB,, | Performed by: INTERNAL MEDICINE

## 2022-09-07 PROCEDURE — 3078F PR MOST RECENT DIASTOLIC BLOOD PRESSURE < 80 MM HG: ICD-10-PCS | Mod: CPTII,S$GLB,, | Performed by: INTERNAL MEDICINE

## 2022-09-07 NOTE — PROGRESS NOTES
Subjective:       Patient ID: Jovan Del Cid Jr. is a 82 y.o. male.    Chief Complaint: Hospital Follow Up    Hospital f/u for acute CVA with residual right side tingling, some weakness.        Otherwise , feels ok--------a little dizzy today-------------    Past Medical History:   Diagnosis Date    Asthma     Bronchitis chronic     Cancer     Cough     Digestive disorder     Hyperlipidemia     Hypertension     Liver disease     Type 2 diabetes mellitus, without long-term current use of insulin 2022     Past Surgical History:   Procedure Laterality Date    COLONOSCOPY N/A 2016    Procedure: COLONOSCOPY;  Surgeon: Alonso Dorsey MD;  Location: [facility];  Service: Endoscopy;  Laterality: N/A;    COLONOSCOPY N/A 2016    Procedure: COLONOSCOPY;  Surgeon: Alonso Dorsey MD;  Location: [facility];  Service: Endoscopy;  Laterality: N/A;    COLONOSCOPY N/A 2/3/2017    Procedure: COLONOSCOPY;  Surgeon: Alonso Dorsey MD;  Location: [facility];  Service: Endoscopy;  Laterality: N/A;    COLONOSCOPY N/A 2017    Procedure: COLONOSCOPY;  Surgeon: Alonso Dorsey MD;  Location: [facility];  Service: Endoscopy;  Laterality: N/A;    COLONOSCOPY N/A 2021    Procedure: COLONOSCOPY;  Surgeon: Paula Ricardo MD;  Location: [facility];  Service: Endoscopy;  Laterality: N/A;    FLUOROSCOPY N/A 6/15/2018    Procedure: Transjugular liver bx;  Surgeon: Petros Recio MD;  Location: [facility] CATH LAB;  Service: General;  Laterality: N/A;    TONSILLECTOMY       Family History   Problem Relation Age of Onset    Cancer Father     Alzheimer's disease Mother     Colon cancer Neg Hx     Melanoma Neg Hx      Social History     Socioeconomic History    Marital status:    Tobacco Use    Smoking status: Former     Years: 3.00     Types: Cigarettes, Pipe     Quit date:      Years since quittin.7    Smokeless tobacco: Never    Tobacco comments:     smoked a pipe - quit smoking     Substance and Sexual Activity    Alcohol use: Yes     Alcohol/week: 2.0 - 3.0 standard drinks     Types: 2 - 3 Cans of beer per week     Comment: daily    Drug use: No    Sexual activity: Never     Social Determinants of Health     Social Connections: Unknown    Marital Status:    Housing Stability: Unknown    Unstable Housing in the Last Year: No     Review of Systems   Constitutional:  Negative for activity change, appetite change, chills, diaphoresis, fatigue, fever and unexpected weight change.   HENT:  Negative for drooling, ear discharge, ear pain, facial swelling, hearing loss, mouth sores, nosebleeds, postnasal drip, rhinorrhea, sinus pressure, sneezing, sore throat, tinnitus, trouble swallowing and voice change.    Eyes:  Negative for photophobia, redness and visual disturbance.   Respiratory:  Negative for apnea, cough, choking, chest tightness, shortness of breath and wheezing.    Cardiovascular:  Negative for chest pain and palpitations.   Gastrointestinal:  Negative for abdominal distention, abdominal pain, anal bleeding, blood in stool, constipation, diarrhea, nausea and vomiting.   Endocrine: Negative for cold intolerance, heat intolerance, polydipsia, polyphagia and polyuria.   Genitourinary:  Negative for difficulty urinating, dysuria, enuresis, flank pain, frequency, genital sores, hematuria and urgency.   Musculoskeletal:  Positive for arthralgias. Negative for back pain, gait problem, joint swelling, myalgias, neck pain and neck stiffness.   Skin:  Negative for color change, pallor, rash and wound.   Allergic/Immunologic: Negative for food allergies and immunocompromised state.   Neurological:  Positive for light-headedness. Negative for tremors, seizures, syncope, facial asymmetry, speech difficulty, numbness and headaches.   Hematological:  Negative for adenopathy. Does not bruise/bleed easily.   Psychiatric/Behavioral:  Negative for agitation, behavioral problems, confusion, decreased  concentration, dysphoric mood, hallucinations, self-injury, sleep disturbance and suicidal ideas. The patient is not nervous/anxious and is not hyperactive.      Objective:      Physical Exam  Vitals and nursing note reviewed.   Constitutional:       General: He is not in acute distress.     Appearance: Normal appearance. He is well-developed. He is not diaphoretic.   HENT:      Head: Normocephalic and atraumatic.      Mouth/Throat:      Pharynx: No oropharyngeal exudate.   Eyes:      General: No scleral icterus.     Pupils: Pupils are equal, round, and reactive to light.   Neck:      Thyroid: No thyromegaly.      Vascular: No carotid bruit or JVD.      Trachea: No tracheal deviation.   Cardiovascular:      Rate and Rhythm: Normal rate and regular rhythm.      Heart sounds: Normal heart sounds.   Pulmonary:      Effort: Pulmonary effort is normal. No respiratory distress.      Breath sounds: Normal breath sounds. No wheezing or rales.   Chest:      Chest wall: No tenderness.   Abdominal:      General: Bowel sounds are normal. There is no distension.      Palpations: Abdomen is soft.      Tenderness: There is no abdominal tenderness. There is no guarding or rebound.   Musculoskeletal:         General: No tenderness. Normal range of motion.      Cervical back: Normal range of motion and neck supple.   Lymphadenopathy:      Cervical: No cervical adenopathy.   Skin:     General: Skin is warm and dry.      Coloration: Skin is not pale.      Findings: No erythema or rash.   Neurological:      Mental Status: He is alert and oriented to person, place, and time.   Psychiatric:         Behavior: Behavior normal.         Thought Content: Thought content normal.         Judgment: Judgment normal.       CMP  Sodium   Date Value Ref Range Status   09/04/2022 138 136 - 145 mmol/L Final     Potassium   Date Value Ref Range Status   09/04/2022 3.7 3.5 - 5.1 mmol/L Final     Chloride   Date Value Ref Range Status   09/04/2022 102 95  - 110 mmol/L Final     CO2   Date Value Ref Range Status   09/04/2022 26 23 - 29 mmol/L Final     Glucose   Date Value Ref Range Status   09/04/2022 218 (H) 70 - 110 mg/dL Final     BUN   Date Value Ref Range Status   09/04/2022 14 8 - 23 mg/dL Final     Creatinine   Date Value Ref Range Status   09/04/2022 0.8 0.5 - 1.4 mg/dL Final     Calcium   Date Value Ref Range Status   09/04/2022 8.8 8.7 - 10.5 mg/dL Final     Total Protein   Date Value Ref Range Status   09/04/2022 6.1 6.0 - 8.4 g/dL Final     Albumin   Date Value Ref Range Status   09/04/2022 3.7 3.5 - 5.2 g/dL Final     Total Bilirubin   Date Value Ref Range Status   09/04/2022 2.5 (H) 0.1 - 1.0 mg/dL Final     Comment:     For infants and newborns, interpretation of results should be based  on gestational age, weight and in agreement with clinical  observations.    Premature Infant recommended reference ranges:  Up to 24 hours.............<8.0 mg/dL  Up to 48 hours............<12.0 mg/dL  3-5 days..................<15.0 mg/dL  6-29 days.................<15.0 mg/dL       Alkaline Phosphatase   Date Value Ref Range Status   09/04/2022 77 55 - 135 U/L Final     AST   Date Value Ref Range Status   09/04/2022 41 (H) 10 - 40 U/L Final     ALT   Date Value Ref Range Status   09/04/2022 69 (H) 10 - 44 U/L Final     Anion Gap   Date Value Ref Range Status   09/04/2022 10 8 - 16 mmol/L Final     eGFR if    Date Value Ref Range Status   01/20/2021 >60.0 >60 mL/min/1.73 m^2 Final     eGFR if non    Date Value Ref Range Status   01/20/2021 >60.0 >60 mL/min/1.73 m^2 Final     Comment:     Calculation used to obtain the estimated glomerular filtration  rate (eGFR) is the CKD-EPI equation.        Lab Results   Component Value Date    WBC 4.85 09/04/2022    HGB 13.6 (L) 09/04/2022    HCT 42.5 09/04/2022    MCV 95 09/04/2022     (L) 09/04/2022     Lab Results   Component Value Date    CHOL 269 (H) 09/02/2022     Lab Results    Component Value Date    HDL 39 (L) 09/02/2022     Lab Results   Component Value Date    LDLCALC 178.4 (H) 09/02/2022     Lab Results   Component Value Date    TRIG 258 (H) 09/02/2022     Lab Results   Component Value Date    CHOLHDL 14.5 (L) 09/02/2022     Lab Results   Component Value Date    TSH 1.876 09/02/2022     Lab Results   Component Value Date    HGBA1C 8.3 (H) 09/02/2022     Assessment:       1. Primary hypertension    2. Dyslipidemia    3. Acute CVA (cerebrovascular accident)    4. Hospital discharge follow-up    5. Type 2 diabetes mellitus with hyperglycemia, without long-term current use of insulin          Plan:   Primary hypertension--------take 1/2 valsartan/hctz 2nd hypotension -------follow-----    Dyslipidemia---------statin-------    Acute CVA (cerebrovascular accident)----------asa, plavix x 30 days-------statin--------  -     Ambulatory referral/consult to Home Health; Future; Expected date: 09/08/2022---------------with P.T.-------------  -     Ambulatory referral/consult to Cardiology; Future; Expected date: 09/14/2022  -     Ambulatory referral/consult to Neurology; Future; Expected date: 09/14/2022    Hospital discharge follow-up  -     Ambulatory referral/consult to Home Health; Future; Expected date: 09/08/2022  -     Ambulatory referral/consult to Cardiology; Future; Expected date: 09/14/2022  -     Ambulatory referral/consult to Neurology; Future; Expected date: 09/14/2022    Type 2 diabetes mellitus with hyperglycemia, without long-term current use of insulin-------------metformin------------follow-----------  -     Ambulatory referral/consult to Diabetes Education; Future; Expected date: 09/14/2022      Continue meds----------as above---------f/u 2 weeks--------go to er for any worsening symptoms-------

## 2022-09-08 ENCOUNTER — TELEPHONE (OUTPATIENT)
Dept: NEUROLOGY | Facility: CLINIC | Age: 82
End: 2022-09-08
Payer: COMMERCIAL

## 2022-09-08 ENCOUNTER — OFFICE VISIT (OUTPATIENT)
Dept: NEUROLOGY | Facility: CLINIC | Age: 82
End: 2022-09-08
Payer: COMMERCIAL

## 2022-09-08 VITALS
WEIGHT: 220.44 LBS | SYSTOLIC BLOOD PRESSURE: 101 MMHG | OXYGEN SATURATION: 98 % | HEART RATE: 88 BPM | HEIGHT: 67 IN | DIASTOLIC BLOOD PRESSURE: 65 MMHG | RESPIRATION RATE: 16 BRPM | BODY MASS INDEX: 34.6 KG/M2

## 2022-09-08 DIAGNOSIS — Z86.73 HISTORY OF LACUNAR CEREBROVASCULAR ACCIDENT: Primary | ICD-10-CM

## 2022-09-08 DIAGNOSIS — Z78.9 INTOLERANCE OF CONTINUOUS POSITIVE AIRWAY PRESSURE (CPAP) VENTILATION: ICD-10-CM

## 2022-09-08 DIAGNOSIS — Z79.4 TYPE 2 DIABETES MELLITUS WITH OTHER SPECIFIED COMPLICATION, WITH LONG-TERM CURRENT USE OF INSULIN: ICD-10-CM

## 2022-09-08 DIAGNOSIS — G47.33 OSA (OBSTRUCTIVE SLEEP APNEA): ICD-10-CM

## 2022-09-08 DIAGNOSIS — E11.69 TYPE 2 DIABETES MELLITUS WITH OTHER SPECIFIED COMPLICATION, WITH LONG-TERM CURRENT USE OF INSULIN: ICD-10-CM

## 2022-09-08 DIAGNOSIS — I10 PRIMARY HYPERTENSION: ICD-10-CM

## 2022-09-08 DIAGNOSIS — Z09 HOSPITAL DISCHARGE FOLLOW-UP: ICD-10-CM

## 2022-09-08 DIAGNOSIS — E78.5 DYSLIPIDEMIA: ICD-10-CM

## 2022-09-08 PROBLEM — I63.9 ACUTE CVA (CEREBROVASCULAR ACCIDENT): Status: ACTIVE | Noted: 2022-09-08

## 2022-09-08 PROCEDURE — 3074F SYST BP LT 130 MM HG: CPT | Mod: CPTII,S$GLB,, | Performed by: PSYCHIATRY & NEUROLOGY

## 2022-09-08 PROCEDURE — 3288F FALL RISK ASSESSMENT DOCD: CPT | Mod: CPTII,S$GLB,, | Performed by: PSYCHIATRY & NEUROLOGY

## 2022-09-08 PROCEDURE — 1125F PR PAIN SEVERITY QUANTIFIED, PAIN PRESENT: ICD-10-PCS | Mod: CPTII,S$GLB,, | Performed by: PSYCHIATRY & NEUROLOGY

## 2022-09-08 PROCEDURE — 99999 PR PBB SHADOW E&M-EST. PATIENT-LVL V: CPT | Mod: PBBFAC,,, | Performed by: PSYCHIATRY & NEUROLOGY

## 2022-09-08 PROCEDURE — 99205 OFFICE O/P NEW HI 60 MIN: CPT | Mod: S$GLB,,, | Performed by: PSYCHIATRY & NEUROLOGY

## 2022-09-08 PROCEDURE — G0180 MD CERTIFICATION HHA PATIENT: HCPCS | Mod: ,,, | Performed by: INTERNAL MEDICINE

## 2022-09-08 PROCEDURE — 1159F PR MEDICATION LIST DOCUMENTED IN MEDICAL RECORD: ICD-10-PCS | Mod: CPTII,S$GLB,, | Performed by: PSYCHIATRY & NEUROLOGY

## 2022-09-08 PROCEDURE — 1101F PR PT FALLS ASSESS DOC 0-1 FALLS W/OUT INJ PAST YR: ICD-10-PCS | Mod: CPTII,S$GLB,, | Performed by: PSYCHIATRY & NEUROLOGY

## 2022-09-08 PROCEDURE — 99999 PR PBB SHADOW E&M-EST. PATIENT-LVL V: ICD-10-PCS | Mod: PBBFAC,,, | Performed by: PSYCHIATRY & NEUROLOGY

## 2022-09-08 PROCEDURE — 1111F DSCHRG MED/CURRENT MED MERGE: CPT | Mod: CPTII,S$GLB,, | Performed by: PSYCHIATRY & NEUROLOGY

## 2022-09-08 PROCEDURE — 1159F MED LIST DOCD IN RCRD: CPT | Mod: CPTII,S$GLB,, | Performed by: PSYCHIATRY & NEUROLOGY

## 2022-09-08 PROCEDURE — G0180 PR HOME HEALTH MD CERTIFICATION: ICD-10-PCS | Mod: ,,, | Performed by: INTERNAL MEDICINE

## 2022-09-08 PROCEDURE — 1125F AMNT PAIN NOTED PAIN PRSNT: CPT | Mod: CPTII,S$GLB,, | Performed by: PSYCHIATRY & NEUROLOGY

## 2022-09-08 PROCEDURE — 1111F PR DISCHARGE MEDS RECONCILED W/ CURRENT OUTPATIENT MED LIST: ICD-10-PCS | Mod: CPTII,S$GLB,, | Performed by: PSYCHIATRY & NEUROLOGY

## 2022-09-08 PROCEDURE — 1101F PT FALLS ASSESS-DOCD LE1/YR: CPT | Mod: CPTII,S$GLB,, | Performed by: PSYCHIATRY & NEUROLOGY

## 2022-09-08 PROCEDURE — 3078F PR MOST RECENT DIASTOLIC BLOOD PRESSURE < 80 MM HG: ICD-10-PCS | Mod: CPTII,S$GLB,, | Performed by: PSYCHIATRY & NEUROLOGY

## 2022-09-08 PROCEDURE — 99205 PR OFFICE/OUTPT VISIT, NEW, LEVL V, 60-74 MIN: ICD-10-PCS | Mod: S$GLB,,, | Performed by: PSYCHIATRY & NEUROLOGY

## 2022-09-08 PROCEDURE — 3288F PR FALLS RISK ASSESSMENT DOCUMENTED: ICD-10-PCS | Mod: CPTII,S$GLB,, | Performed by: PSYCHIATRY & NEUROLOGY

## 2022-09-08 PROCEDURE — 99417 PR PROLONGED SVC, OUTPT, W/WO DIRECT PT CONTACT,  EA ADDTL 15 MIN: ICD-10-PCS | Mod: S$GLB,,, | Performed by: PSYCHIATRY & NEUROLOGY

## 2022-09-08 PROCEDURE — 99417 PROLNG OP E/M EACH 15 MIN: CPT | Mod: S$GLB,,, | Performed by: PSYCHIATRY & NEUROLOGY

## 2022-09-08 PROCEDURE — 3078F DIAST BP <80 MM HG: CPT | Mod: CPTII,S$GLB,, | Performed by: PSYCHIATRY & NEUROLOGY

## 2022-09-08 PROCEDURE — 3074F PR MOST RECENT SYSTOLIC BLOOD PRESSURE < 130 MM HG: ICD-10-PCS | Mod: CPTII,S$GLB,, | Performed by: PSYCHIATRY & NEUROLOGY

## 2022-09-08 NOTE — TELEPHONE ENCOUNTER
HI , This pt had a stroke on 09/02 and needs a follow up he is est with [facility] . I scheduled an appt but its in DEC , can we get something sooner . THX

## 2022-09-08 NOTE — PROGRESS NOTES
Subjective:       Patient ID: Jovan Del Cid Jr. is a 82 y.o. male.    Chief Complaint: Stroke          HPI      The patient was admitted to the hospital on 09- with RT side numbness and tingling with loss of balance that started on 09-. INIGUEZ showed /102, CTA H/N  Coarse calcific plaque within the left carotid bifurcation resulting in up to 50% stenosis of the origin/proximal aspect of the left internal carotid artery, CTH/Brain MRI Acute lacunar infarcts within the left thalamus, EKG NSR, TTE EF 60% and HA1C 8.3, . Was discharged on DAPT.     VRFs include Uncontrolled HTN, Uncontrolled HLD, Untreated KALYAN, Obesity and a new diagnosis of T2DM. The patient admits to not taking his medications as prescribed in the last year.     Feels better compared to 09- but still has significant RT side numbness and tingling. Balance has improved. He will start undergoing Home Health with PT-OT. His wife is with him and will help manage his medications.         Review of Systems   Constitutional:  Negative for appetite change and fatigue.   HENT:  Negative for hearing loss and tinnitus.    Eyes:  Negative for photophobia and visual disturbance.   Respiratory:  Positive for apnea. Negative for shortness of breath.    Cardiovascular:  Negative for chest pain and palpitations.   Gastrointestinal:  Negative for nausea and vomiting.   Endocrine: Negative for cold intolerance and heat intolerance.   Genitourinary:  Negative for difficulty urinating and urgency.   Musculoskeletal:  Positive for gait problem. Negative for arthralgias, back pain, joint swelling, myalgias, neck pain and neck stiffness.   Skin:  Negative for color change and rash.   Allergic/Immunologic: Negative for environmental allergies and immunocompromised state.   Neurological:  Positive for numbness. Negative for dizziness, tremors, seizures, syncope, facial asymmetry, speech difficulty, weakness, light-headedness and headaches.  "  Hematological:  Negative for adenopathy. Does not bruise/bleed easily.   Psychiatric/Behavioral:  Negative for agitation, behavioral problems, confusion, decreased concentration, dysphoric mood, hallucinations, self-injury, sleep disturbance and suicidal ideas. The patient is not hyperactive.                Current Outpatient Medications:     aspirin (ECOTRIN) 81 MG EC tablet, Take 1 tablet (81 mg total) by mouth once daily., Disp: 90 tablet, Rfl: 3    blood sugar diagnostic Strp, 1 strip by Misc.(Non-Drug; Combo Route) route 2 (two) times daily with meals., Disp: 100 strip, Rfl: 11    blood-glucose meter kit, Use as instructed, Disp: 1 each, Rfl: 0    clopidogreL (PLAVIX) 75 mg tablet, Take 1 tablet (75 mg total) by mouth once daily., Disp: 30 tablet, Rfl: 0    fluticasone propionate (FLONASE) 50 mcg/actuation nasal spray, SHAKE LIQUID AND USE 2 SPRAYS IN EACH NOSTRIL EVERY DAY, Disp: 48 g, Rfl: 1    lancets Misc, 1 each by Misc.(Non-Drug; Combo Route) route 2 (two) times daily with meals., Disp: 100 each, Rfl: 11    lancing device Misc, 1 Device by Misc.(Non-Drug; Combo Route) route 2 (two) times daily with meals., Disp: 1 each, Rfl: 0    metFORMIN (GLUCOPHAGE) 1000 MG tablet, Please take 500 mg oral  two time a day ( 1/2 of tablet ) for 7 days and then after take  1000 mg two time a day ., Disp: 120 tablet, Rfl: 2    montelukast (SINGULAIR) 10 mg tablet, Take 1 tablet (10 mg total) by mouth every evening., Disp: 90 tablet, Rfl: 2    multivitamin (THERAGRAN) per tablet, Take 1 tablet by mouth once daily., Disp: , Rfl:     pen needle, diabetic 31 gauge x 5/16" Ndle, 1 each by Misc.(Non-Drug; Combo Route) route 2 (two) times daily with meals., Disp: 100 each, Rfl: 11    rosuvastatin (CRESTOR) 40 MG Tab, Take 1 tablet (40 mg total) by mouth once daily., Disp: 90 tablet, Rfl: 3    albuterol (VENTOLIN HFA) 90 mcg/actuation inhaler, Inhale 2 puffs into the lungs every 6 (six) hours as needed for Wheezing. Rescue, " Disp: 8 g, Rfl: 0    pantoprazole (PROTONIX) 40 MG tablet, Take 1 tablet (40 mg total) by mouth daily as needed., Disp: 30 tablet, Rfl: 3    valsartan-hydrochlorothiazide (DIOVAN-HCT) 160-12.5 mg per tablet, Take 1 tablet by mouth once daily. (Patient not taking: Reported on 9/8/2022), Disp: 90 tablet, Rfl: 3    Current Facility-Administered Medications:     acetaminophen tablet 650 mg, 650 mg, Oral, Once PRN, Amando Ulrich MD    albuterol inhaler 2 puff, 2 puff, Inhalation, Q20 Min PRN, Amando Ulrich MD    diphenhydrAMINE injection 25 mg, 25 mg, Intravenous, Once PRN, Amando Ulrich MD    EPINEPHrine (EPIPEN) 0.3 mg/0.3 mL pen injection 0.3 mg, 0.3 mg, Intramuscular, PRN, Amando Ulrich MD    methylPREDNISolone sodium succinate injection 40 mg, 40 mg, Intravenous, Once PRN, Amando Ulrich MD    ondansetron disintegrating tablet 4 mg, 4 mg, Oral, Once PRN, Amando Ulrich MD    sodium chloride 0.9% 500 mL flush bag, , Intravenous, PRN, Amando Ulrich MD    sodium chloride 0.9% flush 10 mL, 10 mL, Intravenous, PRN, Amando Ulrich MD  Past Medical History:   Diagnosis Date    Asthma     Bronchitis chronic     Cancer     Cough     Digestive disorder     Hyperlipidemia     Hypertension     Liver disease     KALYAN (obstructive sleep apnea) 9/8/2022    Type 2 diabetes mellitus, without long-term current use of insulin 9/7/2022     Past Surgical History:   Procedure Laterality Date    COLONOSCOPY N/A 6/21/2016    Procedure: COLONOSCOPY;  Surgeon: Alonso Dorsey MD;  Location: [facility] ENDO;  Service: Endoscopy;  Laterality: N/A;    COLONOSCOPY N/A 11/1/2016    Procedure: COLONOSCOPY;  Surgeon: Alonso Dorsey MD;  Location: [facility];  Service: Endoscopy;  Laterality: N/A;    COLONOSCOPY N/A 2/3/2017    Procedure: COLONOSCOPY;  Surgeon: Alonso Dorsey MD;  Location: [facility];  Service: Endoscopy;  Laterality: N/A;    COLONOSCOPY N/A 11/13/2017    Procedure: COLONOSCOPY;  Surgeon: Alonso  GARCÍA Dorsey MD;  Location: [facility] ENDO;  Service: Endoscopy;  Laterality: N/A;    COLONOSCOPY N/A 2021    Procedure: COLONOSCOPY;  Surgeon: Paula Ricardo MD;  Location: [facility] ENDO;  Service: Endoscopy;  Laterality: N/A;    FLUOROSCOPY N/A 6/15/2018    Procedure: Transjugular liver bx;  Surgeon: Petros Recio MD;  Location: [facility] CATH LAB;  Service: General;  Laterality: N/A;    TONSILLECTOMY       Social History     Socioeconomic History    Marital status:    Tobacco Use    Smoking status: Former     Years: 3.00     Types: Cigarettes, Pipe     Quit date:      Years since quittin.7    Smokeless tobacco: Never    Tobacco comments:     smoked a pipe - quit smoking    Substance and Sexual Activity    Alcohol use: Yes     Alcohol/week: 2.0 - 3.0 standard drinks     Types: 2 - 3 Cans of beer per week     Comment: daily    Drug use: No    Sexual activity: Never     Social Determinants of Health     Social Connections: Unknown    Marital Status:    Housing Stability: Unknown    Unstable Housing in the Last Year: No             Past/Current Medical/Surgical History, Past/Current Social History, Past/Current Family History and Past/Current Medications were reviewed in detail.        Objective:           VITAL SIGNS WERE REVIEWED      GENERAL APPEARANCE:     The patient looks comfortable.    BMI 34.53     No signs of respiratory distress.    Normal breathing pattern.    No dysmorphic features    Normal eye contact.     GENERAL MEDICAL EXAM:    HEENT:  Head is atraumatic normocephalic. Fundoscopic (Ophthalmoscopic) exam showed no disc edema.      Neck and Axillae: No JVD. No visible lesions.    Cardiopulmonary: No cyanosis. No tachypnea. Normal respiratory effort.    Gastrointestinal/Urogenital:  No jaundice. No stomas or lesions. No visible hernias. No catheters.     Skin, Hair and Nails: No pathognonomic skin rash. No neurofibromatosis. No visible lesions.No stigmata of autoimmune disease.  No clubbing.    Limbs: No varicose veins. No visible swelling.    Muskoskeletal: No visible deformities.No visible lesions.           Neurologic Exam     Mental Status   Oriented to person, place, and time.   Follows 3 step commands.   Attention: normal. Concentration: normal.   Speech: speech is normal   Level of consciousness: alert  Able to name object. Able to repeat. Normal comprehension.     Cranial Nerves     CN II   Visual fields full to confrontation.   Visual acuity: normal  Right visual field deficit: none  Left visual field deficit: none     CN III, IV, VI   Pupils are equal, round, and reactive to light.  Extraocular motions are normal.   Right pupil: Size: 2 mm. Shape: regular. Reactivity: brisk. Consensual response: intact. Accommodation: intact.   Left pupil: Size: 2 mm. Shape: regular. Reactivity: brisk. Consensual response: intact. Accommodation: intact.   CN III: no CN III palsy  CN VI: no CN VI palsy  Nystagmus: none   Diplopia: none  Ophthalmoparesis: none  Upgaze: normal  Downgaze: normal  Conjugate gaze: present  Vestibulo-ocular reflex: present    CN V   Right facial sensation deficit: complete  Left facial sensation deficit: none  Jaw jerk: normal    CN VII   Facial expression full, symmetric.   Right facial weakness: none  Left facial weakness: none    CN VIII   CN VIII normal.   Hearing: intact    CN IX, X   CN IX normal.   CN X normal.   Palate: symmetric    CN XI   CN XI normal.   Right sternocleidomastoid strength: normal  Left sternocleidomastoid strength: normal  Right trapezius strength: normal  Left trapezius strength: normal    CN XII   CN XII normal.   Tongue: not atrophic  Fasciculations: absent  Tongue deviation: none    Motor Exam   Muscle bulk: normal  Overall muscle tone: normal  Right arm tone: normal  Left arm tone: normal  Right arm pronator drift: present  Left arm pronator drift: absent  Right leg tone: normal  Left leg tone: normal    Strength   Strength 5/5 throughout.    Right neck flexion:   Left neck flexion:   Right neck extension:   Left neck extension:   Right deltoid:   Left deltoid:   Right biceps:   Left biceps:   Right triceps:   Left triceps:   Right wrist flexion:   Left wrist flexion:   Right wrist extension:   Left wrist extension:   Right interossei:   Left interossei:   Right iliopsoas:   Left iliopsoas:   Right quadriceps:   Left quadriceps:   Right hamstrin/5  Left hamstrin/5  Right glutei:   Left glutei:   Right anterior tibial:   Left anterior tibial:   Right posterior tibial:   Left posterior tibial:   Right peroneal:   Left peroneal:   Right gastroc:   Left gastroc:     Sensory Exam   Right arm light touch: decreased from elbow  Left arm light touch: normal  Right leg light touch: decreased from knee  Left leg light touch: normal  Right arm vibration: decreased from elbow  Left arm vibration: normal  Right leg vibration: decreased from knee  Left leg vibration: normal  Right arm proprioception: decreased from elbow  Left arm proprioception: normal  Right leg proprioception: decreased from knee  Left leg proprioception: normal  Right arm pinprick: decreased from elbow  Left arm pinprick: normal  Right leg pinprick: decreased from knee  Left leg pinprick: normal  Graphesthesia: abnormal right  Stereognosis: abnormal right    Gait, Coordination, and Reflexes     Gait  Gait: (Slow. Hesitant)    Coordination   Finger to nose coordination: normal  Heel to shin coordination: normal    Tremor   Resting tremor: absent  Intention tremor: absent  Action tremor: absent    Reflexes   Right brachioradialis: 1+  Left brachioradialis: 1+  Right biceps: 1+  Left biceps: 1+  Right triceps: 1+  Left triceps: 1+  Right patellar: 1+  Left patellar: 1+  Right achilles: 0  Left achilles: 0  Right plantar: normal  Left plantar: normal  Right Singh: absent  Left Singh: absent  Right  ankle clonus: absent  Left ankle clonus: absent  Right pendular knee jerk: absent  Left pendular knee jerk: absent    Lab Results   Component Value Date    WBC 4.85 09/04/2022    HGB 13.6 (L) 09/04/2022    HCT 42.5 09/04/2022    MCV 95 09/04/2022     (L) 09/04/2022     Sodium   Date Value Ref Range Status   09/04/2022 138 136 - 145 mmol/L Final     Potassium   Date Value Ref Range Status   09/04/2022 3.7 3.5 - 5.1 mmol/L Final     Chloride   Date Value Ref Range Status   09/04/2022 102 95 - 110 mmol/L Final     CO2   Date Value Ref Range Status   09/04/2022 26 23 - 29 mmol/L Final     Glucose   Date Value Ref Range Status   09/04/2022 218 (H) 70 - 110 mg/dL Final     BUN   Date Value Ref Range Status   09/04/2022 14 8 - 23 mg/dL Final     Creatinine   Date Value Ref Range Status   09/04/2022 0.8 0.5 - 1.4 mg/dL Final     Calcium   Date Value Ref Range Status   09/04/2022 8.8 8.7 - 10.5 mg/dL Final     Total Protein   Date Value Ref Range Status   09/04/2022 6.1 6.0 - 8.4 g/dL Final     Albumin   Date Value Ref Range Status   09/04/2022 3.7 3.5 - 5.2 g/dL Final     Total Bilirubin   Date Value Ref Range Status   09/04/2022 2.5 (H) 0.1 - 1.0 mg/dL Final     Comment:     For infants and newborns, interpretation of results should be based  on gestational age, weight and in agreement with clinical  observations.    Premature Infant recommended reference ranges:  Up to 24 hours.............<8.0 mg/dL  Up to 48 hours............<12.0 mg/dL  3-5 days..................<15.0 mg/dL  6-29 days.................<15.0 mg/dL       Alkaline Phosphatase   Date Value Ref Range Status   09/04/2022 77 55 - 135 U/L Final     AST   Date Value Ref Range Status   09/04/2022 41 (H) 10 - 40 U/L Final     ALT   Date Value Ref Range Status   09/04/2022 69 (H) 10 - 44 U/L Final     Anion Gap   Date Value Ref Range Status   09/04/2022 10 8 - 16 mmol/L Final     eGFR if    Date Value Ref Range Status   01/20/2021 >60.0 >60  mL/min/1.73 m^2 Final     eGFR if non    Date Value Ref Range Status   01/20/2021 >60.0 >60 mL/min/1.73 m^2 Final     Comment:     Calculation used to obtain the estimated glomerular filtration  rate (eGFR) is the CKD-EPI equation.        Lab Results   Component Value Date    [id number] 465 08/06/2008     Lab Results   Component Value Date    TSH 1.876 09/02/2022 09-    /102     CTA H/N  Coarse calcific plaque within the left carotid bifurcation resulting in up to 50% stenosis of the origin/proximal aspect of the left internal carotid artery.    CTH/Brain MRI Acute lacunar infarcts within the left thalamus    EKG NSR, TTE EF 60%     HA1C 8.3,            03-    OT Driving Evaluation. He did well in all areas except visual acuity. He did not have his glasses with him for distance vision and does not like them as they are bifocals and he is uncomfortable with the focus. His vision is not good enough to drive without them. His wife was present for a post testing conference and both are fully understanding that he is in need of new glasses before returning to driving.       Reviewed the neuroimaging independently       Assessment:       1. History of lacunar cerebrovascular accident    2. Intolerance of continuous positive airway pressure (CPAP) ventilation    3. Primary hypertension    4. Dyslipidemia    5. BMI 36.0-36.9,adult    6. Type 2 diabetes mellitus with other specified complication, with long-term current use of insulin    7. KALYAN (obstructive sleep apnea)            Modified Dover Score (m-RS)      0  The patient has no residual symptoms.    1  The patient has no significant disability; able to carry out all pre-stroke activities.    2  The patient has slight disability; unable to carry out all pre-stroke activities but able to look after self without daily help.    3  The patient has moderate disability; requiring some external help but able to walk without  the assistance of another individual.    4  The patient has moderately severe disability; unable to walk or attend to bodily functions without assistance of another individual.    5  The patient has severe disability; bedridden, incontinent, requires continuous care.    6  The patient has  (during the hospital stay or after discharge from the hospital).        Plan:                 S/P LACUNAR LEFT THALAMIC STROKE (2022) DUE TO SMALL VESSEL DISEASE     SECONDARY TO UNCONTROLLED VASCULAR RISK FACTORS: HTN, HLD, T2DM, KALYAN, OBESITY.         Vascular Risk Factors (VRFs) stratification (BP ('s-140's/70's-80's, BS HA1C <7, BC control LDL <70 and KALYAN control) is the mainstay of stroke prevention (>90%). The benefit of antiplatelets is < 20% stroke risk reduction.       Continue DAPT for 1 month then ASA Monotherapy.     PT-OT with Home Health.    Sleep Medicine to treat KALYAN.     Avoid driving.    Call 911 if any SUDDEN:       Weakness  Numbness  Slurring of speech  Speech difficulty   Vertigo  Loss of balance  Loss of vision  Loss of hearing  Double vision  Trouble swallowing  Trouble breathing  Facial drooping    MEDICAL/SURGICAL COMORBIDITIES     All relevant medical comorbidities noted and managed by primary care physician and medical care team.          HEALTHY LIFESTYLE AND PREVENTATIVE CARE    The patient to adhere to the age-appropriate health maintenance guidelines including screening tests and vaccinations. The patient to adhere to  healthy lifestyle, optimal weight, exercise, healthy diet, good sleep hygiene and avoiding drugs including smoking, alcohol and recreational drugs.          RTC LUIS E Montes MD, FAAN    Attending Neurologist/Epileptologist         Diplomate, American Board of Psychiatry and Neurology    Diplomate, American Board of Clinical Neurophysiology     Fellow, American Academy of Neurology         I spent a total of 99 minutes on the day of the visit.  This includes  face to face time and non-face to face time preparing to see the patient (eg, review of tests), obtaining and/or reviewing separately obtained history, documenting clinical information in the electronic or other health record, independently interpreting results and communicating results to the patient/family/caregiver, or care coordinator.

## 2022-09-08 NOTE — LETTER
March 14, 2023    Jovan Del Cid Jr.  [address] [address] [facility] [address] [address]             [facility]  [address]  [facility] [place] [address]  Phone: [phone]  Fax: [phone] To Whom It May Concern       Mr. Del Cid was diagnosed with a lacunar thalamic stroke on 09/02/2022 which left him with right side numbness. Before resuming driving I recommended waiting 6 months and undergoing driving evaluation. He waited 6 months and then underwent extensive occupational therapy driving evaluation. On 03/07/2023 the report stated that Mr. Del Cid did well in all areas except for visual acuity. Mr. Del Cid did not have his glasses with him for distance vision. His vision was not good enough to drive without them. His wife was present for a post testing conference and both were fully understanding that he is in need of new glasses before returning to driving. Mr. Del Cid did comply with the recommendations and underwent additional visual testing with prescribed glasses.  The patient did pass all the requirements and is cleared to drive as long as he wears his prescribed glasses all the time.     If you have any questions or concerns, please don't hesitate to call.    Sincerely,        Dagoberto Montes MD, FAAN

## 2022-09-09 ENCOUNTER — OFFICE VISIT (OUTPATIENT)
Dept: CARDIOLOGY | Facility: CLINIC | Age: 82
End: 2022-09-09
Payer: COMMERCIAL

## 2022-09-09 VITALS
OXYGEN SATURATION: 95 % | SYSTOLIC BLOOD PRESSURE: 82 MMHG | HEART RATE: 73 BPM | BODY MASS INDEX: 34.43 KG/M2 | WEIGHT: 219.81 LBS | DIASTOLIC BLOOD PRESSURE: 64 MMHG

## 2022-09-09 DIAGNOSIS — I10 PRIMARY HYPERTENSION: Primary | ICD-10-CM

## 2022-09-09 DIAGNOSIS — E78.5 DYSLIPIDEMIA: ICD-10-CM

## 2022-09-09 DIAGNOSIS — E11.69 TYPE 2 DIABETES MELLITUS WITH OTHER SPECIFIED COMPLICATION, WITH LONG-TERM CURRENT USE OF INSULIN: ICD-10-CM

## 2022-09-09 DIAGNOSIS — Z79.4 TYPE 2 DIABETES MELLITUS WITH OTHER SPECIFIED COMPLICATION, WITH LONG-TERM CURRENT USE OF INSULIN: ICD-10-CM

## 2022-09-09 DIAGNOSIS — Z09 HOSPITAL DISCHARGE FOLLOW-UP: ICD-10-CM

## 2022-09-09 DIAGNOSIS — I63.9 ACUTE CVA (CEREBROVASCULAR ACCIDENT): ICD-10-CM

## 2022-09-09 PROCEDURE — 1159F PR MEDICATION LIST DOCUMENTED IN MEDICAL RECORD: ICD-10-PCS | Mod: CPTII,S$GLB,, | Performed by: INTERNAL MEDICINE

## 2022-09-09 PROCEDURE — 1111F DSCHRG MED/CURRENT MED MERGE: CPT | Mod: CPTII,S$GLB,, | Performed by: INTERNAL MEDICINE

## 2022-09-09 PROCEDURE — 1160F RVW MEDS BY RX/DR IN RCRD: CPT | Mod: CPTII,S$GLB,, | Performed by: INTERNAL MEDICINE

## 2022-09-09 PROCEDURE — 99999 PR PBB SHADOW E&M-EST. PATIENT-LVL V: ICD-10-PCS | Mod: PBBFAC,,, | Performed by: INTERNAL MEDICINE

## 2022-09-09 PROCEDURE — 1111F PR DISCHARGE MEDS RECONCILED W/ CURRENT OUTPATIENT MED LIST: ICD-10-PCS | Mod: CPTII,S$GLB,, | Performed by: INTERNAL MEDICINE

## 2022-09-09 PROCEDURE — 99999 PR PBB SHADOW E&M-EST. PATIENT-LVL V: CPT | Mod: PBBFAC,,, | Performed by: INTERNAL MEDICINE

## 2022-09-09 PROCEDURE — 99204 OFFICE O/P NEW MOD 45 MIN: CPT | Mod: S$GLB,,, | Performed by: INTERNAL MEDICINE

## 2022-09-09 PROCEDURE — 1159F MED LIST DOCD IN RCRD: CPT | Mod: CPTII,S$GLB,, | Performed by: INTERNAL MEDICINE

## 2022-09-09 PROCEDURE — 99204 PR OFFICE/OUTPT VISIT, NEW, LEVL IV, 45-59 MIN: ICD-10-PCS | Mod: S$GLB,,, | Performed by: INTERNAL MEDICINE

## 2022-09-09 PROCEDURE — 3074F SYST BP LT 130 MM HG: CPT | Mod: CPTII,S$GLB,, | Performed by: INTERNAL MEDICINE

## 2022-09-09 PROCEDURE — 3078F PR MOST RECENT DIASTOLIC BLOOD PRESSURE < 80 MM HG: ICD-10-PCS | Mod: CPTII,S$GLB,, | Performed by: INTERNAL MEDICINE

## 2022-09-09 PROCEDURE — 3078F DIAST BP <80 MM HG: CPT | Mod: CPTII,S$GLB,, | Performed by: INTERNAL MEDICINE

## 2022-09-09 PROCEDURE — 3074F PR MOST RECENT SYSTOLIC BLOOD PRESSURE < 130 MM HG: ICD-10-PCS | Mod: CPTII,S$GLB,, | Performed by: INTERNAL MEDICINE

## 2022-09-09 PROCEDURE — 1160F PR REVIEW ALL MEDS BY PRESCRIBER/CLIN PHARMACIST DOCUMENTED: ICD-10-PCS | Mod: CPTII,S$GLB,, | Performed by: INTERNAL MEDICINE

## 2022-09-09 NOTE — PROGRESS NOTES
Subjective:   Patient ID:  Jovan Del Cid Jr. is a 82 y.o. male who presents for evaluation of No chief complaint on file.      83 y/o male, care establish and hypotension  PMH acute CVA in , asthma, HLD, HTN,Hx of Colon Tubulovillous Adenoma, MCMAHAN  and  obesity    admitted for acute CVA. with R-sided paresthesias The BP was significantly high /102    MRI phillip show Acute lacunar infarcts within the left thalamus . CT head and neck show :No evidence of thromboembolism within the visible branches of the [id number].Coarse calcific plaque within the left carotid bifurcation resulting in up to 50% stenosis of the origin/proximal aspect of the left internal carotid artery.   TTE did not show any acute finding  with normal  EF .     After discharge. No fall/ still right body numbness. No chest pain dyspnea. Some dizziness and no faint  BP low and valsartan HCTZ decreased the dose by PCP last week and stopped yesterday   Pt had h/o med noncompliance  HH once a week.     Echo     · There is no evidence of intracardiac shunting.  · The left ventricle is normal in size with concentric remodeling and normal systolic function.  · The estimated ejection fraction is 60%.  · Normal left ventricular diastolic function.  · Normal right ventricular size with normal right ventricular systolic function.  · There is bileaflet mitral prolapse.  · Mild-to-moderate mitral regurgitation.  · Normal central venous pressure (3 mmHg).             Past Medical History:   Diagnosis Date    Acute CVA (cerebrovascular accident) 9/8/2022    Asthma     Bronchitis chronic     Cancer     Cough     Digestive disorder     Hyperlipidemia     Hypertension     Liver disease     KALYAN (obstructive sleep apnea) 9/8/2022    Type 2 diabetes mellitus, without long-term current use of insulin 9/7/2022       Past Surgical History:   Procedure Laterality Date    COLONOSCOPY N/A 6/21/2016    Procedure: COLONOSCOPY;  Surgeon: Alonso  GARCÍA Dorsey MD;  Location: [facility] ENDO;  Service: Endoscopy;  Laterality: N/A;    COLONOSCOPY N/A 2016    Procedure: COLONOSCOPY;  Surgeon: Alonso Dorsey MD;  Location: [facility] ENDO;  Service: Endoscopy;  Laterality: N/A;    COLONOSCOPY N/A 2/3/2017    Procedure: COLONOSCOPY;  Surgeon: Alonso Dorsey MD;  Location: [facility] ENDO;  Service: Endoscopy;  Laterality: N/A;    COLONOSCOPY N/A 2017    Procedure: COLONOSCOPY;  Surgeon: Alonso Dorsey MD;  Location: [facility] ENDO;  Service: Endoscopy;  Laterality: N/A;    COLONOSCOPY N/A 2021    Procedure: COLONOSCOPY;  Surgeon: Paula Ricardo MD;  Location: [facility] ENDO;  Service: Endoscopy;  Laterality: N/A;    FLUOROSCOPY N/A 6/15/2018    Procedure: Transjugular liver bx;  Surgeon: Petros Recio MD;  Location: [facility] CATH LAB;  Service: General;  Laterality: N/A;    TONSILLECTOMY         Social History     Tobacco Use    Smoking status: Former     Years: 3.00     Types: Cigarettes, Pipe     Quit date:      Years since quittin.7    Smokeless tobacco: Never    Tobacco comments:     smoked a pipe - quit smoking    Substance Use Topics    Alcohol use: Yes     Alcohol/week: 2.0 - 3.0 standard drinks     Types: 2 - 3 Cans of beer per week     Comment: daily    Drug use: No       Family History   Problem Relation Age of Onset    Cancer Father     Alzheimer's disease Mother     Colon cancer Neg Hx     Melanoma Neg Hx        Review of Systems   Constitutional: Negative for decreased appetite, diaphoresis, fever, malaise/fatigue and night sweats.   HENT:  Negative for nosebleeds.    Eyes:  Negative for blurred vision and double vision.   Cardiovascular:  Negative for chest pain, claudication, dyspnea on exertion, irregular heartbeat, leg swelling, near-syncope, orthopnea, palpitations, paroxysmal nocturnal dyspnea and syncope.   Respiratory:  Negative for cough, shortness of breath, sleep disturbances due to breathing, snoring, sputum production  and wheezing.    Endocrine: Negative for cold intolerance and polyuria.   Hematologic/Lymphatic: Does not bruise/bleed easily.   Skin:  Negative for rash.   Musculoskeletal:  Negative for back pain, falls, joint pain, joint swelling and neck pain.   Gastrointestinal:  Negative for abdominal pain, heartburn, nausea and vomiting.   Genitourinary:  Negative for dysuria, frequency and hematuria.   Neurological:  Positive for focal weakness and loss of balance. Negative for difficulty with concentration, dizziness, headaches, light-headedness, numbness, seizures and weakness.   Psychiatric/Behavioral:  Negative for depression, memory loss and substance abuse. The patient does not have insomnia.    Allergic/Immunologic: Negative for HIV exposure and hives.     Objective:   Physical Exam  HENT:      Head: Normocephalic.   Eyes:      Pupils: Pupils are equal, round, and reactive to light.   Neck:      Thyroid: No thyromegaly.      Vascular: Normal carotid pulses. No carotid bruit or JVD.   Cardiovascular:      Rate and Rhythm: Normal rate and regular rhythm. No extrasystoles are present.     Chest Wall: PMI is not displaced.      Pulses: Normal pulses.      Heart sounds: Normal heart sounds. No murmur heard.    No gallop. No S3 sounds.   Pulmonary:      Effort: No respiratory distress.      Breath sounds: Normal breath sounds. No stridor.   Abdominal:      General: Bowel sounds are normal.      Palpations: Abdomen is soft.      Tenderness: There is no abdominal tenderness. There is no rebound.   Musculoskeletal:         General: Normal range of motion.   Skin:     Findings: No rash.   Neurological:      Mental Status: He is alert and oriented to person, place, and time.   Psychiatric:         Behavior: Behavior normal.       Lab Results   Component Value Date    CHOL 269 (H) 09/02/2022    CHOL 142 01/20/2021    CHOL 302 (H) 07/23/2020     Lab Results   Component Value Date    HDL 39 (L) 09/02/2022    HDL 53 01/20/2021     HDL 44 07/23/2020     Lab Results   Component Value Date    LDLCALC 178.4 (H) 09/02/2022    LDLCALC 67.2 01/20/2021    LDLCALC 201.8 (H) 07/23/2020     Lab Results   Component Value Date    TRIG 258 (H) 09/02/2022    TRIG 109 01/20/2021    TRIG 281 (H) 07/23/2020     Lab Results   Component Value Date    CHOLHDL 14.5 (L) 09/02/2022    CHOLHDL 37.3 01/20/2021    CHOLHDL 14.6 (L) 07/23/2020       Chemistry        Component Value Date/Time     09/04/2022 0509    K 3.7 09/04/2022 0509     09/04/2022 0509    CO2 26 09/04/2022 0509    BUN 14 09/04/2022 0509    CREATININE 0.8 09/04/2022 0509     (H) 09/04/2022 0509        Component Value Date/Time    CALCIUM 8.8 09/04/2022 0509    ALKPHOS 77 09/04/2022 0509    AST 41 (H) 09/04/2022 0509    ALT 69 (H) 09/04/2022 0509    BILITOT 2.5 (H) 09/04/2022 0509    ESTGFRAFRICA >60.0 01/20/2021 1120    EGFRNONAA >60.0 01/20/2021 1120          Lab Results   Component Value Date    HGBA1C 8.3 (H) 09/02/2022     Lab Results   Component Value Date    TSH 1.876 09/02/2022     Lab Results   Component Value Date    INR 0.9 09/03/2022    INR 1.0 09/02/2022    INR 1.0 06/15/2018     Lab Results   Component Value Date    WBC 4.85 09/04/2022    HGB 13.6 (L) 09/04/2022    HCT 42.5 09/04/2022    MCV 95 09/04/2022     (L) 09/04/2022     BNP  @LABRCNTIP(BNP,BNPTRIAGEBLO)@  Estimated Creatinine Clearance: 80.1 mL/min (based on SCr of 0.8 mg/dL).  No results found in the last 24 hours.  No results found in the last 24 hours.  No results found in the last 24 hours.    Assessment:      1. Primary hypertension    2. Acute CVA (cerebrovascular accident)    3. Hospital discharge follow-up    4. Dyslipidemia    5. BMI 36.0-36.9,adult    6. Type 2 diabetes mellitus with other specified complication, with long-term current use of insulin        Plan:   ASA 81mg life long and Palvix 3 weeks  Continue crestor  Hydration  Check BP and report in 3 days  Ok to hold valsartan.HCTZ now  DM  Rx per PCP  Counseled DASH  Check Lipid profile in 6 months  Recommend heart-healthy diet, weight control   Lety. Risk modification.   I have reviewed all pertinent labs and cardiac studies independently. Plans and recommendations have been formulated under my direct supervision. All questions answered and patient voiced understanding.   If symptoms persist go to the ED  RTC in 6 months

## 2022-09-14 ENCOUNTER — HOSPITAL ENCOUNTER (INPATIENT)
Facility: HOSPITAL | Age: 82
LOS: 1 days | Discharge: HOME | DRG: 683 | End: 2022-09-16
Attending: EMERGENCY MEDICINE | Admitting: EMERGENCY MEDICINE
Payer: MEDICARE

## 2022-09-14 ENCOUNTER — TELEPHONE (OUTPATIENT)
Dept: FAMILY MEDICINE | Facility: CLINIC | Age: 82
End: 2022-09-14
Payer: COMMERCIAL

## 2022-09-14 DIAGNOSIS — N17.9 AKI (ACUTE KIDNEY INJURY): Primary | ICD-10-CM

## 2022-09-14 DIAGNOSIS — R07.9 CHEST PAIN: ICD-10-CM

## 2022-09-14 DIAGNOSIS — R73.9 HYPERGLYCEMIA WITHOUT KETOSIS: ICD-10-CM

## 2022-09-14 DIAGNOSIS — E86.9 VOLUME DEPLETION: ICD-10-CM

## 2022-09-14 LAB
ALBUMIN SERPL BCP-MCNC: 4.6 G/DL (ref 3.5–5.2)
ALP SERPL-CCNC: 118 U/L (ref 55–135)
ALT SERPL W/O P-5'-P-CCNC: 69 U/L (ref 10–44)
ANION GAP SERPL CALC-SCNC: 15 MMOL/L (ref 8–16)
AST SERPL-CCNC: 40 U/L (ref 10–40)
BACTERIA #/AREA URNS HPF: ABNORMAL /HPF
BASOPHILS # BLD AUTO: 0.1 K/UL (ref 0–0.2)
BASOPHILS NFR BLD: 1 % (ref 0–1.9)
BILIRUB SERPL-MCNC: 2.5 MG/DL (ref 0.1–1)
BILIRUB UR QL STRIP: NEGATIVE
BUN SERPL-MCNC: 37 MG/DL (ref 8–23)
CALCIUM SERPL-MCNC: 9.7 MG/DL (ref 8.7–10.5)
CHLORIDE SERPL-SCNC: 100 MMOL/L (ref 95–110)
CLARITY UR: ABNORMAL
CO2 SERPL-SCNC: 17 MMOL/L (ref 23–29)
COLOR UR: YELLOW
CREAT SERPL-MCNC: 2.4 MG/DL (ref 0.5–1.4)
CREAT UR-MCNC: 62.3 MG/DL (ref 23–375)
DIFFERENTIAL METHOD: ABNORMAL
EOSINOPHIL # BLD AUTO: 0.2 K/UL (ref 0–0.5)
EOSINOPHIL NFR BLD: 2.3 % (ref 0–8)
ERYTHROCYTE [DISTWIDTH] IN BLOOD BY AUTOMATED COUNT: 13.2 % (ref 11.5–14.5)
EST. GFR  (NO RACE VARIABLE): 26 ML/MIN/1.73 M^2
GLUCOSE SERPL-MCNC: 150 MG/DL (ref 70–110)
GLUCOSE UR QL STRIP: NEGATIVE
GRAN CASTS #/AREA URNS LPF: 25 /LPF
HCT VFR BLD AUTO: 47.7 % (ref 40–54)
HGB BLD-MCNC: 15.8 G/DL (ref 14–18)
HGB UR QL STRIP: ABNORMAL
HYALINE CASTS #/AREA URNS LPF: 1 /LPF
IMM GRANULOCYTES # BLD AUTO: 0.03 K/UL (ref 0–0.04)
IMM GRANULOCYTES NFR BLD AUTO: 0.3 % (ref 0–0.5)
KETONES UR QL STRIP: ABNORMAL
LEUKOCYTE ESTERASE UR QL STRIP: NEGATIVE
LYMPHOCYTES # BLD AUTO: 1 K/UL (ref 1–4.8)
LYMPHOCYTES NFR BLD: 10.4 % (ref 18–48)
MCH RBC QN AUTO: 30.6 PG (ref 27–31)
MCHC RBC AUTO-ENTMCNC: 33.1 G/DL (ref 32–36)
MCV RBC AUTO: 92 FL (ref 82–98)
MICROSCOPIC COMMENT: ABNORMAL
MONOCYTES # BLD AUTO: 1.3 K/UL (ref 0.3–1)
MONOCYTES NFR BLD: 12.6 % (ref 4–15)
NEUTROPHILS # BLD AUTO: 7.3 K/UL (ref 1.8–7.7)
NEUTROPHILS NFR BLD: 73.4 % (ref 38–73)
NITRITE UR QL STRIP: NEGATIVE
NRBC BLD-RTO: 0 /100 WBC
PH UR STRIP: 6 [PH] (ref 5–8)
PLATELET # BLD AUTO: 216 K/UL (ref 150–450)
PLATELET BLD QL SMEAR: ABNORMAL
PMV BLD AUTO: 10.6 FL (ref 9.2–12.9)
POTASSIUM SERPL-SCNC: 4.7 MMOL/L (ref 3.5–5.1)
PROT SERPL-MCNC: 7.7 G/DL (ref 6–8.4)
PROT UR QL STRIP: ABNORMAL
RBC # BLD AUTO: 5.17 M/UL (ref 4.6–6.2)
RBC #/AREA URNS HPF: 3 /HPF (ref 0–4)
SODIUM SERPL-SCNC: 132 MMOL/L (ref 136–145)
SODIUM UR-SCNC: <20 MMOL/L (ref 20–250)
SP GR UR STRIP: 1.01 (ref 1–1.03)
SQUAMOUS #/AREA URNS HPF: 3 /HPF
URN SPEC COLLECT METH UR: ABNORMAL
UROBILINOGEN UR STRIP-ACNC: NEGATIVE EU/DL
WBC # BLD AUTO: 9.91 K/UL (ref 3.9–12.7)
WBC #/AREA URNS HPF: 18 /HPF (ref 0–5)

## 2022-09-14 PROCEDURE — G0378 HOSPITAL OBSERVATION PER HR: HCPCS

## 2022-09-14 PROCEDURE — 99205 OFFICE O/P NEW HI 60 MIN: CPT | Mod: ,,, | Performed by: INTERNAL MEDICINE

## 2022-09-14 PROCEDURE — 96366 THER/PROPH/DIAG IV INF ADDON: CPT

## 2022-09-14 PROCEDURE — 96361 HYDRATE IV INFUSION ADD-ON: CPT

## 2022-09-14 PROCEDURE — 80053 COMPREHEN METABOLIC PANEL: CPT | Performed by: NURSE PRACTITIONER

## 2022-09-14 PROCEDURE — 87086 URINE CULTURE/COLONY COUNT: CPT | Performed by: NURSE PRACTITIONER

## 2022-09-14 PROCEDURE — 96365 THER/PROPH/DIAG IV INF INIT: CPT

## 2022-09-14 PROCEDURE — 81000 URINALYSIS NONAUTO W/SCOPE: CPT | Performed by: NURSE PRACTITIONER

## 2022-09-14 PROCEDURE — 82570 ASSAY OF URINE CREATININE: CPT | Performed by: INTERNAL MEDICINE

## 2022-09-14 PROCEDURE — 85025 COMPLETE CBC W/AUTO DIFF WBC: CPT | Performed by: NURSE PRACTITIONER

## 2022-09-14 PROCEDURE — 99205 PR OFFICE/OUTPT VISIT, NEW, LEVL V, 60-74 MIN: ICD-10-PCS | Mod: ,,, | Performed by: INTERNAL MEDICINE

## 2022-09-14 PROCEDURE — 84300 ASSAY OF URINE SODIUM: CPT | Performed by: INTERNAL MEDICINE

## 2022-09-14 PROCEDURE — 25000003 PHARM REV CODE 250: Performed by: NURSE PRACTITIONER

## 2022-09-14 PROCEDURE — 99285 EMERGENCY DEPT VISIT HI MDM: CPT | Mod: 25

## 2022-09-14 PROCEDURE — 25000003 PHARM REV CODE 250: Performed by: INTERNAL MEDICINE

## 2022-09-14 RX ADMIN — SODIUM CHLORIDE 500 ML: 0.9 INJECTION, SOLUTION INTRAVENOUS at 03:09

## 2022-09-14 RX ADMIN — SODIUM BICARBONATE: 84 INJECTION, SOLUTION INTRAVENOUS at 07:09

## 2022-09-14 NOTE — TELEPHONE ENCOUNTER
Needs to go to [facility] now for evaluation of acute kidney injury------decreased kidney function-( by labs)

## 2022-09-14 NOTE — HPI
"Pt was seen and examined. H/o, chart, Labs and meds reviewed. Discussed with other providers. Pt is a 83 y/o male with newly diagnosed DM and recent acute lacunar stroke who was sent to ER for abnormal labs related to abrupt increase in Cr and metabolic acidosis. Pt himself has no c/os's or symptoms, except for "loose BM's" in the past week. His BP was low compared to his past BP's. Pt is not on any BP meds. Pt is noted to have been started on metformin on 9/4/22 at 500 mg bid x 1 weeks, then 1000 mg bid. No other pertinent h/o, denies diarrhea, but admits to loose BM's, no NSAIds taken, no abx, no recent infections, no nausea, no abdominal pain, no sx's of dysuria, no cardiopulmonary sx's.    "

## 2022-09-14 NOTE — PHARMACY MED REC
"Admission Medication History     The home medication history was taken by Moreno Main.    You may go to "Admission" then "Reconcile Home Medications" tabs to review and/or act upon these items.     The home medication list has been updated by the Pharmacy department.   Please read ALL comments highlighted in yellow.   Please address this information as you see fit.    Feel free to contact us if you have any questions or require assistance.      Medications listed below were obtained from: Patient/family and Medications brought from home  (Not in a hospital admission)      Moreno Main  [id number]    Current Outpatient Medications on File Prior to Encounter   Medication Sig Dispense Refill Last Dose    albuterol (VENTOLIN HFA) 90 mcg/actuation inhaler Inhale 2 puffs into the lungs every 6 (six) hours as needed for Wheezing. Rescue 8 g 0 Past Month    aspirin (ECOTRIN) 81 MG EC tablet Take 1 tablet (81 mg total) by mouth once daily. 90 tablet 3 9/13/2022    clopidogreL (PLAVIX) 75 mg tablet Take 1 tablet (75 mg total) by mouth once daily. 30 tablet 0 9/13/2022    fluticasone propionate (FLONASE) 50 mcg/actuation nasal spray SHAKE LIQUID AND USE 2 SPRAYS IN EACH NOSTRIL EVERY DAY 48 g 1 Past Week    metFORMIN (GLUCOPHAGE) 1000 MG tablet Please take 500 mg oral  two time a day ( 1/2 of tablet ) for 7 days and then after take  1000 mg two time a day . 120 tablet 2 9/13/2022    montelukast (SINGULAIR) 10 mg tablet Take 1 tablet (10 mg total) by mouth every evening. 90 tablet 2 9/13/2022    multivitamin (THERAGRAN) per tablet Take 1 tablet by mouth once daily.   9/13/2022    pantoprazole (PROTONIX) 40 MG tablet Take 1 tablet (40 mg total) by mouth daily as needed. 30 tablet 3 Past Week    rosuvastatin (CRESTOR) 40 MG Tab Take 1 tablet (40 mg total) by mouth once daily. 90 tablet 3 9/13/2022                           .        "

## 2022-09-14 NOTE — ED NOTES
Bed: 10  Expected date:   Expected time:   Means of arrival: Personal Transportation  Comments:  When clean

## 2022-09-14 NOTE — ASSESSMENT & PLAN NOTE
DANY. Reason not clear at this point  New start on metformin  Metabolic acidosis  Presented with hypotension  Reports loose BM's, but no diarrhea, but has no other GI sx's    DDX: DANY due to prerenal azotemia due to hypotension and GI losses  VS more serious causes related to metformin induced lactic acidosis and kidney injury    Recommend:  Admit to hospital  Send lactic acid level  Send urine Na and Cr to calculate FENa  Start IVF's: 1/2 NS with 1.5 amp bicarbonate added per L at 100 ml/hr  Stop metformin

## 2022-09-14 NOTE — FIRST PROVIDER EVALUATION
Medical screening examination initiated.  I have conducted a focused provider triage encounter, findings are as follows:    Brief history of present illness:  sent to the Er for acute kidney injury     There were no vitals filed for this visit.    Pertinent physical exam:  nad    Brief workup plan:  labs, fluids    Preliminary workup initiated; this workup will be continued and followed by the physician or advanced practice provider that is assigned to the patient when roomed.

## 2022-09-14 NOTE — CONSULTS
"[facility] - Emergency Dept.  Nephrology  Consult Note    Patient Name: Jovan Del Cid Jr.  MRN: [id number]  Admission Date: 9/14/2022  Hospital Length of Stay: 0 days  Attending Provider: Navneet Oconnor Jr., MD   Primary Care Physician: Paul Neal MD  Principal Problem: abnormal labs    Reason for consult: DANY    Consults  Subjective:     HPI: Pt was seen and examined. H/o, chart, Labs and meds reviewed. Discussed with other providers. Pt is a 83 y/o male with newly diagnosed DM and recent acute lacunar stroke who was sent to ER for abnormal labs related to abrupt increase in Cr and metabolic acidosis. Pt himself has no c/os's or symptoms, except for "loose BM's" in the past week. His BP was low compared to his past BP's. Pt is not on any BP meds. Pt is noted to have been started on metformin on 9/4/22 at 500 mg bid x 1 weeks, then 1000 mg bid. No other pertinent h/o, denies diarrhea, but admits to loose BM's, no NSAIds taken, no abx, no recent infections, no nausea, no abdominal pain, no sx's of dysuria, no cardiopulmonary sx's.        Past Medical History:   Diagnosis Date    Acute CVA (cerebrovascular accident) 9/8/2022    Asthma     Bronchitis chronic     Cancer     Cough     Digestive disorder     Hyperlipidemia     Hypertension     Liver disease     KALYAN (obstructive sleep apnea) 9/8/2022    Type 2 diabetes mellitus, without long-term current use of insulin 9/7/2022       Past Surgical History:   Procedure Laterality Date    COLONOSCOPY N/A 6/21/2016    Procedure: COLONOSCOPY;  Surgeon: Alonso Dorsey MD;  Location: [facility];  Service: Endoscopy;  Laterality: N/A;    COLONOSCOPY N/A 11/1/2016    Procedure: COLONOSCOPY;  Surgeon: Alonso Dorsey MD;  Location: [facility] ENDO;  Service: Endoscopy;  Laterality: N/A;    COLONOSCOPY N/A 2/3/2017    Procedure: COLONOSCOPY;  Surgeon: Alonso Dorsey MD;  Location: [facility];  Service: Endoscopy;  Laterality: N/A;    COLONOSCOPY N/A " "2017    Procedure: COLONOSCOPY;  Surgeon: Alonso Dorsey MD;  Location: [facility] ENDO;  Service: Endoscopy;  Laterality: N/A;    COLONOSCOPY N/A 2021    Procedure: COLONOSCOPY;  Surgeon: Paula Ricardo MD;  Location: [facility] ENDO;  Service: Endoscopy;  Laterality: N/A;    FLUOROSCOPY N/A 6/15/2018    Procedure: Transjugular liver bx;  Surgeon: Petros Recio MD;  Location: [facility] CATH LAB;  Service: General;  Laterality: N/A;    TONSILLECTOMY         Review of patient's allergies indicates:  No Known Allergies  Current Facility-Administered Medications   Medication Frequency    acetaminophen tablet 650 mg Once PRN    albuterol inhaler 2 puff Q20 Min PRN    diphenhydrAMINE injection 25 mg Once PRN    EPINEPHrine (EPIPEN) 0.3 mg/0.3 mL pen injection 0.3 mg PRN    methylPREDNISolone sodium succinate injection 40 mg Once PRN    ondansetron disintegrating tablet 4 mg Once PRN    sodium bicarbonate 75 mEq in sodium chloride 0.45% 1,075 mL infusion Continuous    sodium chloride 0.9% 500 mL flush bag PRN    sodium chloride 0.9% flush 10 mL PRN     Current Outpatient Medications   Medication    albuterol (VENTOLIN HFA) 90 mcg/actuation inhaler    aspirin (ECOTRIN) 81 MG EC tablet    blood sugar diagnostic Strp    blood-glucose meter kit    clopidogreL (PLAVIX) 75 mg tablet    fluticasone propionate (FLONASE) 50 mcg/actuation nasal spray    lancets Misc    lancing device Misc    metFORMIN (GLUCOPHAGE) 1000 MG tablet    montelukast (SINGULAIR) 10 mg tablet    multivitamin (THERAGRAN) per tablet    pantoprazole (PROTONIX) 40 MG tablet    pen needle, diabetic 31 gauge x 5/16" Ndle    rosuvastatin (CRESTOR) 40 MG Tab     Family History       Problem Relation (Age of Onset)    Alzheimer's disease Mother    Cancer Father          Tobacco Use    Smoking status: Former     Years: 3.00     Types: Cigarettes, Pipe     Quit date: 1960     Years since quittin.7    Smokeless tobacco: Never "    Tobacco comments:     smoked a pipe - quit smoking 1960   Substance and Sexual Activity    Alcohol use: Yes     Alcohol/week: 2.0 - 3.0 standard drinks     Types: 2 - 3 Cans of beer per week     Comment: daily    Drug use: No    Sexual activity: Never     Review of Systems   Constitutional: Negative.    HENT: Negative.     Eyes: Negative.    Respiratory: Negative.     Cardiovascular: Negative.    Gastrointestinal: Negative.    Genitourinary: Negative.    Musculoskeletal: Negative.    Neurological: Negative.    Psychiatric/Behavioral: Negative.     Objective:     Vital Signs (Most Recent):  Temp: 98.5 °F (36.9 °C) (09/14/22 1130)  Pulse: (!) 59 (09/14/22 1550)  Resp: 18 (09/14/22 1550)  BP: 107/72 (09/14/22 1550)  SpO2: 98 % (09/14/22 1550)  O2 Device (Oxygen Therapy): room air (09/14/22 1130)   Vital Signs (24h Range):  Temp:  [98.5 °F (36.9 °C)] 98.5 °F (36.9 °C)  Pulse:  [59-83] 59  Resp:  [18-19] 18  SpO2:  [98 %] 98 %  BP: (105-107)/(69-72) 107/72     Weight: 97.4 kg (214 lb 11.7 oz) (09/14/22 1130)  Body mass index is 33.63 kg/m².  Body surface area is 2.15 meters squared.    No intake/output data recorded.    Physical Exam  Vitals and nursing note reviewed.   Constitutional:       General: He is not in acute distress.     Appearance: Normal appearance. He is not ill-appearing.   HENT:      Head: Normocephalic and atraumatic.   Cardiovascular:      Rate and Rhythm: Normal rate and regular rhythm.      Pulses: Normal pulses.      Heart sounds: Normal heart sounds.   Pulmonary:      Effort: Pulmonary effort is normal.      Breath sounds: Normal breath sounds. No rales.   Abdominal:      Palpations: Abdomen is soft.      Tenderness: There is no abdominal tenderness.   Musculoskeletal:      Right lower leg: No edema.      Left lower leg: No edema.   Skin:     General: Skin is warm and dry.   Neurological:      General: No focal deficit present.      Mental Status: He is alert and oriented to person, place,  and time.   Psychiatric:         Mood and Affect: Mood normal.         Behavior: Behavior normal.       Significant Labs: reviewed  BMP  Lab Results   Component Value Date     (L) 09/14/2022    K 4.7 09/14/2022     09/14/2022    CO2 17 (L) 09/14/2022    BUN 37 (H) 09/14/2022    CREATININE 2.4 (H) 09/14/2022    CALCIUM 9.7 09/14/2022    ANIONGAP 15 09/14/2022    ESTGFRAFRICA >60.0 01/20/2021    EGFRNONAA >60.0 01/20/2021     Lab Results   Component Value Date    WBC 9.91 09/14/2022    HGB 15.8 09/14/2022    HCT 47.7 09/14/2022    MCV 92 09/14/2022     09/14/2022     U/a: specific gravity 1.010, 2+ protein, 2+blood  Urine Na and Cr pending  Lactic acid pending      Significant Imaging: reviewed, renal u/s pending    Assessment/Plan:     83 y/o male with new diagnosis of DM and DANY:    DANY (acute kidney injury)  DANY. Reason not clear at this point  New start on metformin  Metabolic acidosis  Presented with hypotension  Reports loose BM's, but no diarrhea, but has no other GI sx's  Liver labs are unremarkable    DDX: DANY due to prerenal azotemia due to hypotension and GI losses  VS more serious causes related to metformin induced lactic acidosis and kidney injury    Recommend:  Admit to hospital  Send lactic acid level  Send urine Na and Cr to calculate FENa  Start IVF's: 1/2 NS with 1.5 amp bicarbonate added per L at 100 ml/hr  Stop metformin    T2DM (type 2 diabetes mellitus)  New and recent diagnosis noted  Last d/c summary reviewed    Acute CVA (cerebrovascular accident)  Last hospital records reviewed  Acute lacunar stroke, has right sided weakness        Plans and recommendations:  As discussed above  Total time spent 70 minutes including time needed to review the records, the   patient evaluation, documentation, face-to-face discussion with the patient,   more than 50% of the time was spent on coordination of care and counseling.    Level V visit.      Saúl Avery MD   Nephrology  '[place] -  Emergency Dept.

## 2022-09-14 NOTE — SUBJECTIVE & OBJECTIVE
Past Medical History:   Diagnosis Date    Acute CVA (cerebrovascular accident) 9/8/2022    Asthma     Bronchitis chronic     Cancer     Cough     Digestive disorder     Hyperlipidemia     Hypertension     Liver disease     KALYAN (obstructive sleep apnea) 9/8/2022    Type 2 diabetes mellitus, without long-term current use of insulin 9/7/2022       Past Surgical History:   Procedure Laterality Date    COLONOSCOPY N/A 6/21/2016    Procedure: COLONOSCOPY;  Surgeon: Alonso Dorsey MD;  Location: [facility] ENDO;  Service: Endoscopy;  Laterality: N/A;    COLONOSCOPY N/A 11/1/2016    Procedure: COLONOSCOPY;  Surgeon: Alonso Dorsey MD;  Location: [facility] ENDO;  Service: Endoscopy;  Laterality: N/A;    COLONOSCOPY N/A 2/3/2017    Procedure: COLONOSCOPY;  Surgeon: Alonso Dorsey MD;  Location: [facility];  Service: Endoscopy;  Laterality: N/A;    COLONOSCOPY N/A 11/13/2017    Procedure: COLONOSCOPY;  Surgeon: Alonso Dorsey MD;  Location: [facility] ENDO;  Service: Endoscopy;  Laterality: N/A;    COLONOSCOPY N/A 2/19/2021    Procedure: COLONOSCOPY;  Surgeon: Paula Ricardo MD;  Location: [facility] ENDO;  Service: Endoscopy;  Laterality: N/A;    FLUOROSCOPY N/A 6/15/2018    Procedure: Transjugular liver bx;  Surgeon: Petros Recio MD;  Location: [facility] CATH LAB;  Service: General;  Laterality: N/A;    TONSILLECTOMY         Review of patient's allergies indicates:  No Known Allergies  Current Facility-Administered Medications   Medication Frequency    acetaminophen tablet 650 mg Once PRN    albuterol inhaler 2 puff Q20 Min PRN    diphenhydrAMINE injection 25 mg Once PRN    EPINEPHrine (EPIPEN) 0.3 mg/0.3 mL pen injection 0.3 mg PRN    methylPREDNISolone sodium succinate injection 40 mg Once PRN    ondansetron disintegrating tablet 4 mg Once PRN    sodium bicarbonate 75 mEq in sodium chloride 0.45% 1,075 mL infusion Continuous    sodium chloride 0.9% 500 mL flush bag PRN    sodium chloride 0.9% flush 10 mL PRN     Current  "Outpatient Medications   Medication    albuterol (VENTOLIN HFA) 90 mcg/actuation inhaler    aspirin (ECOTRIN) 81 MG EC tablet    blood sugar diagnostic Strp    blood-glucose meter kit    clopidogreL (PLAVIX) 75 mg tablet    fluticasone propionate (FLONASE) 50 mcg/actuation nasal spray    lancets Misc    lancing device Misc    metFORMIN (GLUCOPHAGE) 1000 MG tablet    montelukast (SINGULAIR) 10 mg tablet    multivitamin (THERAGRAN) per tablet    pantoprazole (PROTONIX) 40 MG tablet    pen needle, diabetic 31 gauge x 5/16" Ndle    rosuvastatin (CRESTOR) 40 MG Tab     Family History       Problem Relation (Age of Onset)    Alzheimer's disease Mother    Cancer Father          Tobacco Use    Smoking status: Former     Years: 3.00     Types: Cigarettes, Pipe     Quit date:      Years since quittin.7    Smokeless tobacco: Never    Tobacco comments:     smoked a pipe - quit smoking    Substance and Sexual Activity    Alcohol use: Yes     Alcohol/week: 2.0 - 3.0 standard drinks     Types: 2 - 3 Cans of beer per week     Comment: daily    Drug use: No    Sexual activity: Never     Review of Systems   Constitutional: Negative.    HENT: Negative.     Eyes: Negative.    Respiratory: Negative.     Cardiovascular: Negative.    Gastrointestinal: Negative.    Genitourinary: Negative.    Musculoskeletal: Negative.    Neurological: Negative.    Psychiatric/Behavioral: Negative.     Objective:     Vital Signs (Most Recent):  Temp: 98.5 °F (36.9 °C) (22 1130)  Pulse: (!) 59 (22 1550)  Resp: 18 (22 1550)  BP: 107/72 (22 1550)  SpO2: 98 % (22 1550)  O2 Device (Oxygen Therapy): room air (22 1130)   Vital Signs (24h Range):  Temp:  [98.5 °F (36.9 °C)] 98.5 °F (36.9 °C)  Pulse:  [59-83] 59  Resp:  [18-19] 18  SpO2:  [98 %] 98 %  BP: (105-107)/(69-72) 107/72     Weight: 97.4 kg (214 lb 11.7 oz) (22 1130)  Body mass index is 33.63 kg/m².  Body surface area is 2.15 meters squared.    No " intake/output data recorded.    Physical Exam  Vitals and nursing note reviewed.   Constitutional:       General: He is not in acute distress.     Appearance: Normal appearance. He is not ill-appearing.   HENT:      Head: Normocephalic and atraumatic.   Cardiovascular:      Rate and Rhythm: Normal rate and regular rhythm.      Pulses: Normal pulses.      Heart sounds: Normal heart sounds.   Pulmonary:      Effort: Pulmonary effort is normal.      Breath sounds: Normal breath sounds. No rales.   Abdominal:      Palpations: Abdomen is soft.      Tenderness: There is no abdominal tenderness.   Musculoskeletal:      Right lower leg: No edema.      Left lower leg: No edema.   Skin:     General: Skin is warm and dry.   Neurological:      General: No focal deficit present.      Mental Status: He is alert and oriented to person, place, and time.   Psychiatric:         Mood and Affect: Mood normal.         Behavior: Behavior normal.       Significant Labs: reviewed  BMP  Lab Results   Component Value Date     (L) 09/14/2022    K 4.7 09/14/2022     09/14/2022    CO2 17 (L) 09/14/2022    BUN 37 (H) 09/14/2022    CREATININE 2.4 (H) 09/14/2022    CALCIUM 9.7 09/14/2022    ANIONGAP 15 09/14/2022    ESTGFRAFRICA >60.0 01/20/2021    EGFRNONAA >60.0 01/20/2021     Lab Results   Component Value Date    WBC 9.91 09/14/2022    HGB 15.8 09/14/2022    HCT 47.7 09/14/2022    MCV 92 09/14/2022     09/14/2022     U/a: specific gravity 1.010, 2+ protein, 2+blood  Urine Na and Cr pending  Lactic acid pending      Significant Imaging: reviewed, renal u/s pending

## 2022-09-14 NOTE — ED PROVIDER NOTES
SCRIBE #1 NOTE: I, Agustin Leo, am scribing for, and in the presence of, Nick Card MD. I have scribed the HPI, ROS, and PEx.    SCRIBE #2 NOTE: I, Tan Ariza, am scribing for, and in the presence of,  Navneet Oconnor Jr., MD. I have scribed the remaining portions of the note not scribed by Scribe #1.   History      Chief Complaint   Patient presents with    Abnormal Lab     Pt sent by PCP for increased BUN, crt, and GFR. Pt states he has a recent dx of T2DM and started taking metformin 1 week ago. Pt c/o decreased hunger. Was seen here 2 weeks ago for CVA, residual numbness to R hand and foot       Review of patient's allergies indicates:  No Known Allergies     HPI   HPI    9/14/2022, 2:58 PM   History obtained from the patient      History of Present Illness: Jovan Del Cid Jr. is a 82 y.o. male patient with a PMHx of CVA on 9/2, DM2, HTN who presents to the Emergency Department for abnormal lab. Pt was referred to the ED by his PCP after labs drawn earlier today showed elevated BUN, creatinine, and GFR. Pt was recently dx with DM2 and was started on Metformin. Symptoms are constant and moderate in severity. No mitigating or exacerbating factors reported. Pt notes decreased fluid intake recently. Patient denies any fever, chills, n/v/d, SOB, CP, weakness, numbness, dizziness, headache, and all other sxs at this time. No prior Tx reported. No further complaints or concerns at this time.     Arrival mode: Personal vehicle    PCP: Paul Neal MD       Past Medical History:  Past Medical History:   Diagnosis Date    Acute CVA (cerebrovascular accident) 9/8/2022    DANY (acute kidney injury) 9/14/2022    Asthma     Bronchitis chronic     Cancer     Cough     Digestive disorder     Hyperlipidemia     Hypertension     Liver disease     KALYAN (obstructive sleep apnea) 9/8/2022    Type 2 diabetes mellitus, without long-term current use of insulin 9/7/2022       Past Surgical History:  Past Surgical History:    Procedure Laterality Date    COLONOSCOPY N/A 2016    Procedure: COLONOSCOPY;  Surgeon: Alonso Dorsey MD;  Location: [facility] ENDO;  Service: Endoscopy;  Laterality: N/A;    COLONOSCOPY N/A 2016    Procedure: COLONOSCOPY;  Surgeon: Alonso Dorsey MD;  Location: [facility] ENDO;  Service: Endoscopy;  Laterality: N/A;    COLONOSCOPY N/A 2/3/2017    Procedure: COLONOSCOPY;  Surgeon: Alonso Dorsey MD;  Location: [facility] ENDO;  Service: Endoscopy;  Laterality: N/A;    COLONOSCOPY N/A 2017    Procedure: COLONOSCOPY;  Surgeon: Alonso Dorsey MD;  Location: [facility] ENDO;  Service: Endoscopy;  Laterality: N/A;    COLONOSCOPY N/A 2021    Procedure: COLONOSCOPY;  Surgeon: Paula Ricardo MD;  Location: [facility] ENDO;  Service: Endoscopy;  Laterality: N/A;    FLUOROSCOPY N/A 6/15/2018    Procedure: Transjugular liver bx;  Surgeon: Petros Recio MD;  Location: [facility] CATH LAB;  Service: General;  Laterality: N/A;    TONSILLECTOMY           Family History:  Family History   Problem Relation Age of Onset    Cancer Father     Alzheimer's disease Mother     Colon cancer Neg Hx     Melanoma Neg Hx        Social History:  Social History     Tobacco Use    Smoking status: Former     Years: 3.00     Types: Cigarettes, Pipe     Quit date:      Years since quittin.7    Smokeless tobacco: Never    Tobacco comments:     smoked a pipe - quit smoking    Substance and Sexual Activity    Alcohol use: Yes     Alcohol/week: 2.0 - 3.0 standard drinks     Types: 2 - 3 Cans of beer per week     Comment: daily    Drug use: No    Sexual activity: Never       ROS   Review of Systems   Constitutional:  Positive for activity change (decreased fluid intake). Negative for chills and fever.   HENT:  Negative for sore throat.    Respiratory:  Negative for shortness of breath.    Cardiovascular:  Negative for chest pain.   Gastrointestinal:  Negative for diarrhea, nausea and vomiting.   Genitourinary:  Negative for  dysuria.   Musculoskeletal:  Negative for back pain.   Skin:  Negative for rash.   Neurological:  Negative for dizziness, weakness, light-headedness, numbness and headaches.   Hematological:  Does not bruise/bleed easily.   All other systems reviewed and are negative.    Physical Exam      Initial Vitals [09/14/22 1130]   BP Pulse Resp Temp SpO2   105/69 83 19 98.5 °F (36.9 °C) 98 %      MAP       --          Physical Exam  Nursing Notes and Vital Signs Reviewed.  Constitutional: Patient is in no acute distress. Well-developed and well-nourished.  Head: Atraumatic. Normocephalic.  Eyes: PERRL. EOM intact. Conjunctivae are not pale. No scleral icterus.  ENT: Mucous membranes are moist. Oropharynx is clear and symmetric.  Lipoma right neck  Neck: Supple. Full ROM. No lymphadenopathy.  Cardiovascular: Regular rate. Regular rhythm. No murmurs, rubs, or gallops. Distal pulses are 2+ and symmetric.  Pulmonary/Chest: No respiratory distress. Clear to auscultation bilaterally. No wheezing or rales.  Abdominal: Soft and non-distended.  There is no tenderness.  No rebound, guarding, or rigidity.   Musculoskeletal: Moves all extremities. No obvious deformities. No edema.  Skin: Warm and dry.  Neurological:  Alert, awake, and appropriate.  Normal speech.  No acute focal neurological deficits are appreciated.  Psychiatric: Normal affect. Good eye contact. Appropriate in content.    ED Course    Critical Care    Date/Time: 9/14/2022 5:31 PM  Performed by: Navneet Oconnor Jr., MD  Authorized by: Navneet Oconnor Jr., MD   Direct patient critical care time: 25 minutes  Additional history critical care time: 5 minutes  Ordering / reviewing critical care time: 5 minutes  Documentation critical care time: 5 minutes  Consulting other physicians critical care time: 5 minutes  Total critical care time (exclusive of procedural time) : 45 minutes  Critical care time was exclusive of teaching time and separately billable procedures and  treating other patients.  Critical care was necessary to treat or prevent imminent or life-threatening deterioration of the following conditions: renal failure.  Critical care was time spent personally by me on the following activities: blood draw for specimens, development of treatment plan with patient or surrogate, interpretation of cardiac output measurements, discussions with consultants, evaluation of patient's response to treatment, examination of patient, obtaining history from patient or surrogate, ordering and performing treatments and interventions, ordering and review of laboratory studies, ordering and review of radiographic studies, pulse oximetry, re-evaluation of patient's condition and review of old charts.      ED Vital Signs:  Vitals:    09/14/22 1130 09/14/22 1550 09/14/22 1730   BP: 105/69 107/72 131/76   Pulse: 83 (!) 59 (!) 58   Resp: 19 18    Temp: 98.5 °F (36.9 °C)     TempSrc: Oral     SpO2: 98% 98% 99%   Weight: 97.4 kg (214 lb 11.7 oz)         Abnormal Lab Results:  Labs Reviewed   CBC W/ AUTO DIFFERENTIAL - Abnormal; Notable for the following components:       Result Value    Mono # 1.3 (*)     Gran % 73.4 (*)     Lymph % 10.4 (*)     All other components within normal limits   COMPREHENSIVE METABOLIC PANEL - Abnormal; Notable for the following components:    Sodium 132 (*)     CO2 17 (*)     Glucose 150 (*)     BUN 37 (*)     Creatinine 2.4 (*)     Total Bilirubin 2.5 (*)     ALT 69 (*)     eGFR 26 (*)     All other components within normal limits   URINALYSIS, REFLEX TO URINE CULTURE - Abnormal; Notable for the following components:    Appearance, UA Hazy (*)     Protein, UA 2+ (*)     Ketones, UA 1+ (*)     Occult Blood UA 2+ (*)     All other components within normal limits    Narrative:     Specimen Source->Urine   URINALYSIS MICROSCOPIC - Abnormal; Notable for the following components:    WBC, UA 18 (*)     Granular Casts, UA 25 (*)     All other components within normal limits     Narrative:     Specimen Source->Urine   CULTURE, URINE   SODIUM, URINE, RANDOM   CREATININE, URINE, RANDOM        All Lab Results:  Results for orders placed or performed during the hospital encounter of 09/14/22   CBC auto differential   Result Value Ref Range    WBC 9.91 3.90 - 12.70 K/uL    RBC 5.17 4.60 - 6.20 M/uL    Hemoglobin 15.8 14.0 - 18.0 g/dL    Hematocrit 47.7 40.0 - 54.0 %    MCV 92 82 - 98 fL    MCH 30.6 27.0 - 31.0 pg    MCHC 33.1 32.0 - 36.0 g/dL    RDW 13.2 11.5 - 14.5 %    Platelets 216 150 - 450 K/uL    MPV 10.6 9.2 - 12.9 fL    Immature Granulocytes 0.3 0.0 - 0.5 %    Gran # (ANC) 7.3 1.8 - 7.7 K/uL    Immature Grans (Abs) 0.03 0.00 - 0.04 K/uL    Lymph # 1.0 1.0 - 4.8 K/uL    Mono # 1.3 (H) 0.3 - 1.0 K/uL    Eos # 0.2 0.0 - 0.5 K/uL    Baso # 0.10 0.00 - 0.20 K/uL    nRBC 0 0 /100 WBC    Gran % 73.4 (H) 38.0 - 73.0 %    Lymph % 10.4 (L) 18.0 - 48.0 %    Mono % 12.6 4.0 - 15.0 %    Eosinophil % 2.3 0.0 - 8.0 %    Basophil % 1.0 0.0 - 1.9 %    Platelet Estimate Appears normal     Differential Method Automated    Comprehensive metabolic panel   Result Value Ref Range    Sodium 132 (L) 136 - 145 mmol/L    Potassium 4.7 3.5 - 5.1 mmol/L    Chloride 100 95 - 110 mmol/L    CO2 17 (L) 23 - 29 mmol/L    Glucose 150 (H) 70 - 110 mg/dL    BUN 37 (H) 8 - 23 mg/dL    Creatinine 2.4 (H) 0.5 - 1.4 mg/dL    Calcium 9.7 8.7 - 10.5 mg/dL    Total Protein 7.7 6.0 - 8.4 g/dL    Albumin 4.6 3.5 - 5.2 g/dL    Total Bilirubin 2.5 (H) 0.1 - 1.0 mg/dL    Alkaline Phosphatase 118 55 - 135 U/L    AST 40 10 - 40 U/L    ALT 69 (H) 10 - 44 U/L    Anion Gap 15 8 - 16 mmol/L    eGFR 26 (A) >60 mL/min/1.73 m^2   Urinalysis, Reflex to Urine Culture Urine, Clean Catch    Specimen: Urine   Result Value Ref Range    Specimen UA Urine, Clean Catch     Color, UA Yellow Yellow, Straw, Drea    Appearance, UA Hazy (A) Clear    pH, UA 6.0 5.0 - 8.0    Specific Gravity, UA 1.010 1.005 - 1.030    Protein, UA 2+ (A) Negative    Glucose,  UA Negative Negative    Ketones, UA 1+ (A) Negative    Bilirubin (UA) Negative Negative    Occult Blood UA 2+ (A) Negative    Nitrite, UA Negative Negative    Urobilinogen, UA Negative <2.0 EU/dL    Leukocytes, UA Negative Negative   Urinalysis Microscopic   Result Value Ref Range    RBC, UA 3 0 - 4 /hpf    WBC, UA 18 (H) 0 - 5 /hpf    Bacteria Rare None-Occ /hpf    Squam Epithel, UA 3 /hpf    Hyaline Casts, UA 1 0-1/lpf /lpf    Granular Casts, UA 25 (A) None /lpf    Microscopic Comment SEE COMMENT      Imaging Results:  Imaging Results              US Retroperitoneal Complete (In process)                             The Emergency Provider reviewed the vital signs and test results, which are outlined above.    ED Discussion     3:42 PM: Discussed pt's case with Dr. Avery (Nephrology), who will come evaluate pt at bedside.    3:57 PM: Reassessed pt at this time.  Awaiting nephrology consult    4:00 PM: Dr. Card transfers care of patient to Dr. Oconnor pending nephrology consultation response.    5:29 PM: Discussed pt's case with Dr. Avery (Nephrology) who recommends admission for IVF.  Please that the patient is hypovolemic secondary to the diarrhea brought on by metformin leading to acute kidney injury and metabolic acidosis    5:33 PM: Discussed case with Rupert Oh NP (Hospital Medicine). Dr. Zambrano agrees with current care and management of pt and accepts admission.   Admitting Service: Hospital Medicine  Admitting Physician: Dr. Zambrano  Admit to: Obs tele    5:33 PM: Re-evaluated pt. I have discussed test results, shared treatment plan, and the need for admission with patient and family at bedside. Pt and family express understanding at this time and agree with all information. All questions answered. Pt and family have no further questions or concerns at this time. Pt is ready for admit.             ED Medication(s):  Medications   sodium bicarbonate 75 mEq in sodium chloride 0.45% 1,075 mL infusion (has no  administration in time range)   sodium chloride 0.9% bolus 500 mL (0 mLs Intravenous Stopped 9/14/22 1610)     New Prescriptions    No medications on file           Medical Decision Making    Medical Decision Making:   Clinical Tests:   Lab Tests: Ordered and Reviewed         Scribe Attestation:   Scribe #1: I performed the above scribed service and the documentation accurately describes the services I performed. I attest to the accuracy of the note.    Attending:   Physician Attestation Statement for Scribe #1: I, Nick Card MD, personally performed the services described in this documentation, as scribed by Agustin Leo, in my presence, and it is both accurate and complete.       Scribe Attestation:   Scribe #2: I performed the above scribed service and the documentation accurately describes the services I performed. I attest to the accuracy of the note.    Attending Attestation:           Physician Attestation for Scribe:    Physician Attestation Statement for Scribe #2: I, Navneet Oconnor Jr., MD, reviewed documentation, as scribed by Tan Ariza in my presence, and it is both accurate and complete. I also acknowledge and confirm the content of the note done by Scribe #1.        Clinical Impression       ICD-10-CM ICD-9-CM   1. DANY (acute kidney injury)  N17.9 584.9   2. Volume depletion  E86.9 276.50   3. Hyperglycemia without ketosis  R73.9 790.29       Disposition:   Disposition: Placed in Observation  Condition: Elvira Oconnor Jr., MD  09/14/22 1811

## 2022-09-14 NOTE — TELEPHONE ENCOUNTER
Called.  Pt was still sleeping.  Spoke to wife and explained what was going on.  She will get him up and get him to the ER

## 2022-09-15 PROBLEM — E87.20 METABOLIC ACIDOSIS: Status: ACTIVE | Noted: 2022-09-15

## 2022-09-15 LAB
ALBUMIN SERPL BCP-MCNC: 3.7 G/DL (ref 3.5–5.2)
ALP SERPL-CCNC: 98 U/L (ref 55–135)
ALT SERPL W/O P-5'-P-CCNC: 54 U/L (ref 10–44)
ANION GAP SERPL CALC-SCNC: 13 MMOL/L (ref 8–16)
AST SERPL-CCNC: 30 U/L (ref 10–40)
BILIRUB SERPL-MCNC: 1.6 MG/DL (ref 0.1–1)
BUN SERPL-MCNC: 34 MG/DL (ref 8–23)
CALCIUM SERPL-MCNC: 8.6 MG/DL (ref 8.7–10.5)
CHLORIDE SERPL-SCNC: 104 MMOL/L (ref 95–110)
CO2 SERPL-SCNC: 20 MMOL/L (ref 23–29)
CREAT SERPL-MCNC: 2.2 MG/DL (ref 0.5–1.4)
EST. GFR  (NO RACE VARIABLE): 29 ML/MIN/1.73 M^2
GLUCOSE SERPL-MCNC: 134 MG/DL (ref 70–110)
LACTATE SERPL-SCNC: 1 MMOL/L (ref 0.5–2.2)
POCT GLUCOSE: 115 MG/DL (ref 70–110)
POCT GLUCOSE: 135 MG/DL (ref 70–110)
POCT GLUCOSE: 176 MG/DL (ref 70–110)
POTASSIUM SERPL-SCNC: 3.7 MMOL/L (ref 3.5–5.1)
PROT SERPL-MCNC: 6 G/DL (ref 6–8.4)
SODIUM SERPL-SCNC: 137 MMOL/L (ref 136–145)

## 2022-09-15 PROCEDURE — 96366 THER/PROPH/DIAG IV INF ADDON: CPT

## 2022-09-15 PROCEDURE — 99233 PR SUBSEQUENT HOSPITAL CARE,LEVL III: ICD-10-PCS | Mod: ,,, | Performed by: INTERNAL MEDICINE

## 2022-09-15 PROCEDURE — 99233 SBSQ HOSP IP/OBS HIGH 50: CPT | Mod: ,,, | Performed by: INTERNAL MEDICINE

## 2022-09-15 PROCEDURE — 63600175 PHARM REV CODE 636 W HCPCS: Performed by: EMERGENCY MEDICINE

## 2022-09-15 PROCEDURE — 25000003 PHARM REV CODE 250: Performed by: NURSE PRACTITIONER

## 2022-09-15 PROCEDURE — 83605 ASSAY OF LACTIC ACID: CPT | Performed by: INTERNAL MEDICINE

## 2022-09-15 PROCEDURE — 97166 OT EVAL MOD COMPLEX 45 MIN: CPT

## 2022-09-15 PROCEDURE — 36415 COLL VENOUS BLD VENIPUNCTURE: CPT | Performed by: INTERNAL MEDICINE

## 2022-09-15 PROCEDURE — 80053 COMPREHEN METABOLIC PANEL: CPT | Performed by: INTERNAL MEDICINE

## 2022-09-15 PROCEDURE — 21400001 HC TELEMETRY ROOM

## 2022-09-15 PROCEDURE — 63600175 PHARM REV CODE 636 W HCPCS: Performed by: NURSE PRACTITIONER

## 2022-09-15 PROCEDURE — 97530 THERAPEUTIC ACTIVITIES: CPT

## 2022-09-15 PROCEDURE — 25000003 PHARM REV CODE 250: Performed by: INTERNAL MEDICINE

## 2022-09-15 PROCEDURE — 11000001 HC ACUTE MED/SURG PRIVATE ROOM

## 2022-09-15 RX ORDER — HYDROCODONE BITARTRATE AND ACETAMINOPHEN 5; 325 MG/1; MG/1
1 TABLET ORAL EVERY 6 HOURS PRN
Status: DISCONTINUED | OUTPATIENT
Start: 2022-09-15 | End: 2022-09-16 | Stop reason: HOSPADM

## 2022-09-15 RX ORDER — CLOPIDOGREL BISULFATE 75 MG/1
75 TABLET ORAL NIGHTLY
Status: DISCONTINUED | OUTPATIENT
Start: 2022-09-16 | End: 2022-09-16 | Stop reason: HOSPADM

## 2022-09-15 RX ORDER — MONTELUKAST SODIUM 10 MG/1
10 TABLET ORAL NIGHTLY
Status: DISCONTINUED | OUTPATIENT
Start: 2022-09-15 | End: 2022-09-16 | Stop reason: HOSPADM

## 2022-09-15 RX ORDER — MAG HYDROX/ALUMINUM HYD/SIMETH 200-200-20
30 SUSPENSION, ORAL (FINAL DOSE FORM) ORAL 4 TIMES DAILY PRN
Status: DISCONTINUED | OUTPATIENT
Start: 2022-09-15 | End: 2022-09-16 | Stop reason: HOSPADM

## 2022-09-15 RX ORDER — IBUPROFEN 200 MG
24 TABLET ORAL
Status: DISCONTINUED | OUTPATIENT
Start: 2022-09-15 | End: 2022-09-16 | Stop reason: HOSPADM

## 2022-09-15 RX ORDER — LANOLIN ALCOHOL/MO/W.PET/CERES
800 CREAM (GRAM) TOPICAL
Status: DISCONTINUED | OUTPATIENT
Start: 2022-09-15 | End: 2022-09-15

## 2022-09-15 RX ORDER — SODIUM,POTASSIUM PHOSPHATES 280-250MG
2 POWDER IN PACKET (EA) ORAL
Status: DISCONTINUED | OUTPATIENT
Start: 2022-09-15 | End: 2022-09-15

## 2022-09-15 RX ORDER — IPRATROPIUM BROMIDE AND ALBUTEROL SULFATE 2.5; .5 MG/3ML; MG/3ML
3 SOLUTION RESPIRATORY (INHALATION) EVERY 6 HOURS PRN
Status: DISCONTINUED | OUTPATIENT
Start: 2022-09-15 | End: 2022-09-16 | Stop reason: HOSPADM

## 2022-09-15 RX ORDER — PANTOPRAZOLE SODIUM 40 MG/1
40 TABLET, DELAYED RELEASE ORAL DAILY
Status: DISCONTINUED | OUTPATIENT
Start: 2022-09-15 | End: 2022-09-16 | Stop reason: HOSPADM

## 2022-09-15 RX ORDER — FLUTICASONE PROPIONATE 50 MCG
2 SPRAY, SUSPENSION (ML) NASAL DAILY
Status: DISCONTINUED | OUTPATIENT
Start: 2022-09-15 | End: 2022-09-15

## 2022-09-15 RX ORDER — INSULIN ASPART 100 [IU]/ML
0-5 INJECTION, SOLUTION INTRAVENOUS; SUBCUTANEOUS
Status: DISCONTINUED | OUTPATIENT
Start: 2022-09-15 | End: 2022-09-16 | Stop reason: HOSPADM

## 2022-09-15 RX ORDER — ONDANSETRON 2 MG/ML
4 INJECTION INTRAMUSCULAR; INTRAVENOUS EVERY 6 HOURS PRN
Status: DISCONTINUED | OUTPATIENT
Start: 2022-09-15 | End: 2022-09-16 | Stop reason: HOSPADM

## 2022-09-15 RX ORDER — IBUPROFEN 200 MG
16 TABLET ORAL
Status: DISCONTINUED | OUTPATIENT
Start: 2022-09-15 | End: 2022-09-16 | Stop reason: HOSPADM

## 2022-09-15 RX ORDER — PROCHLORPERAZINE EDISYLATE 5 MG/ML
5 INJECTION INTRAMUSCULAR; INTRAVENOUS EVERY 6 HOURS PRN
Status: DISCONTINUED | OUTPATIENT
Start: 2022-09-15 | End: 2022-09-16 | Stop reason: HOSPADM

## 2022-09-15 RX ORDER — CLOPIDOGREL BISULFATE 75 MG/1
75 TABLET ORAL DAILY
Status: DISCONTINUED | OUTPATIENT
Start: 2022-09-15 | End: 2022-09-15

## 2022-09-15 RX ORDER — GLUCAGON 1 MG
1 KIT INJECTION
Status: DISCONTINUED | OUTPATIENT
Start: 2022-09-15 | End: 2022-09-16 | Stop reason: HOSPADM

## 2022-09-15 RX ORDER — NALOXONE HCL 0.4 MG/ML
0.02 VIAL (ML) INJECTION
Status: DISCONTINUED | OUTPATIENT
Start: 2022-09-15 | End: 2022-09-16 | Stop reason: HOSPADM

## 2022-09-15 RX ORDER — TALC
6 POWDER (GRAM) TOPICAL NIGHTLY PRN
Status: DISCONTINUED | OUTPATIENT
Start: 2022-09-15 | End: 2022-09-16 | Stop reason: HOSPADM

## 2022-09-15 RX ORDER — SODIUM CHLORIDE 0.9 % (FLUSH) 0.9 %
10 SYRINGE (ML) INJECTION EVERY 8 HOURS PRN
Status: DISCONTINUED | OUTPATIENT
Start: 2022-09-15 | End: 2022-09-16 | Stop reason: HOSPADM

## 2022-09-15 RX ORDER — FLUTICASONE PROPIONATE 50 MCG
2 SPRAY, SUSPENSION (ML) NASAL DAILY PRN
Status: DISCONTINUED | OUTPATIENT
Start: 2022-09-15 | End: 2022-09-16 | Stop reason: HOSPADM

## 2022-09-15 RX ORDER — SIMETHICONE 80 MG
1 TABLET,CHEWABLE ORAL 4 TIMES DAILY PRN
Status: DISCONTINUED | OUTPATIENT
Start: 2022-09-15 | End: 2022-09-16 | Stop reason: HOSPADM

## 2022-09-15 RX ORDER — HEPARIN SODIUM 5000 [USP'U]/ML
5000 INJECTION, SOLUTION INTRAVENOUS; SUBCUTANEOUS EVERY 8 HOURS
Status: DISCONTINUED | OUTPATIENT
Start: 2022-09-15 | End: 2022-09-16 | Stop reason: HOSPADM

## 2022-09-15 RX ORDER — ASPIRIN 81 MG/1
81 TABLET ORAL DAILY
Status: DISCONTINUED | OUTPATIENT
Start: 2022-09-15 | End: 2022-09-16 | Stop reason: HOSPADM

## 2022-09-15 RX ORDER — ACETAMINOPHEN 325 MG/1
650 TABLET ORAL EVERY 4 HOURS PRN
Status: DISCONTINUED | OUTPATIENT
Start: 2022-09-15 | End: 2022-09-16 | Stop reason: HOSPADM

## 2022-09-15 RX ORDER — ATORVASTATIN CALCIUM 40 MG/1
40 TABLET, FILM COATED ORAL DAILY
Refills: 3 | Status: DISCONTINUED | OUTPATIENT
Start: 2022-09-15 | End: 2022-09-16 | Stop reason: HOSPADM

## 2022-09-15 RX ORDER — CYANOCOBALAMIN 1000 UG/ML
1000 INJECTION, SOLUTION INTRAMUSCULAR; SUBCUTANEOUS DAILY
Status: COMPLETED | OUTPATIENT
Start: 2022-09-15 | End: 2022-09-16

## 2022-09-15 RX ADMIN — ASPIRIN 81 MG: 81 TABLET, COATED ORAL at 10:09

## 2022-09-15 RX ADMIN — CYANOCOBALAMIN 1000 MCG: 1000 INJECTION, SOLUTION INTRAMUSCULAR; SUBCUTANEOUS at 12:09

## 2022-09-15 RX ADMIN — SODIUM BICARBONATE: 84 INJECTION, SOLUTION INTRAVENOUS at 06:09

## 2022-09-15 RX ADMIN — HEPARIN SODIUM 5000 UNITS: 5000 INJECTION, SOLUTION INTRAVENOUS; SUBCUTANEOUS at 02:09

## 2022-09-15 RX ADMIN — MONTELUKAST 10 MG: 10 TABLET, FILM COATED ORAL at 09:09

## 2022-09-15 RX ADMIN — HEPARIN SODIUM 5000 UNITS: 5000 INJECTION, SOLUTION INTRAVENOUS; SUBCUTANEOUS at 09:09

## 2022-09-15 RX ADMIN — PANTOPRAZOLE SODIUM 40 MG: 40 TABLET, DELAYED RELEASE ORAL at 10:09

## 2022-09-15 RX ADMIN — SODIUM BICARBONATE: 84 INJECTION, SOLUTION INTRAVENOUS at 07:09

## 2022-09-15 RX ADMIN — ATORVASTATIN CALCIUM 40 MG: 40 TABLET, FILM COATED ORAL at 10:09

## 2022-09-15 NOTE — SUBJECTIVE & OBJECTIVE
Interval History: see hospital course    Review of Systems   Constitutional:  Positive for appetite change (poor appetite). Negative for chills, diaphoresis, fatigue and fever.   HENT:  Negative for congestion, nosebleeds, sore throat and trouble swallowing.    Eyes:  Negative for pain, discharge and visual disturbance.   Respiratory:  Negative for apnea, cough, chest tightness, shortness of breath, wheezing and stridor.    Cardiovascular:  Negative for chest pain, palpitations and leg swelling.   Gastrointestinal:  Positive for diarrhea (loose BMs). Negative for abdominal distention, abdominal pain, blood in stool, constipation, nausea and vomiting.   Endocrine: Negative for cold intolerance and heat intolerance.   Genitourinary:  Negative for difficulty urinating, dysuria, flank pain, frequency and urgency.   Musculoskeletal:  Negative for arthralgias, back pain, joint swelling, myalgias, neck pain and neck stiffness.   Skin:  Negative for rash and wound.   Allergic/Immunologic: Negative for food allergies and immunocompromised state.   Neurological:  Positive for weakness (Generalized weakness- no obvious focal deficits after stroke) and numbness (numbness to right hand and foot after stroke). Negative for dizziness, seizures, syncope, facial asymmetry, light-headedness and headaches.   Hematological:  Negative for adenopathy.   Psychiatric/Behavioral:  Negative for agitation, behavioral problems and confusion. The patient is not nervous/anxious.    Objective:     Vital Signs (Most Recent):  Temp: 97.5 °F (36.4 °C) (09/15/22 1205)  Pulse: (!) 58 (09/15/22 1205)  Resp: 18 (09/15/22 1205)  BP: 137/65 (09/15/22 1205)  SpO2: 97 % (09/15/22 1205)   Vital Signs (24h Range):  Temp:  [97.5 °F (36.4 °C)-97.9 °F (36.6 °C)] 97.5 °F (36.4 °C)  Pulse:  [53-65] 58  Resp:  [17-20] 18  SpO2:  [95 %-99 %] 97 %  BP: (128-145)/(60-76) 137/65     Weight: 97.4 kg (214 lb 11.7 oz)  Body mass index is 34.66 kg/m².    Intake/Output  Summary (Last 24 hours) at 9/15/2022 1557  Last data filed at 9/15/2022 1300  Gross per 24 hour   Intake 270 ml   Output --   Net 270 ml      Physical Exam  Vitals and nursing note reviewed.   Constitutional:       Appearance: He is well-developed.   HENT:      Head: Normocephalic and atraumatic.      Nose: Nose normal.   Eyes:      General: No scleral icterus.     Pupils: Pupils are equal, round, and reactive to light.   Cardiovascular:      Rate and Rhythm: Normal rate and regular rhythm.      Heart sounds: Normal heart sounds. No murmur heard.    No friction rub. No gallop.   Pulmonary:      Effort: Pulmonary effort is normal. No respiratory distress.      Breath sounds: Normal breath sounds. No wheezing.   Abdominal:      General: Bowel sounds are normal. There is no distension.      Palpations: Abdomen is soft.      Tenderness: There is no abdominal tenderness.   Musculoskeletal:         General: Normal range of motion.      Cervical back: Normal range of motion and neck supple.   Skin:     General: Skin is warm and dry.      Findings: No rash.   Neurological:      Mental Status: He is alert and oriented to person, place, and time.      Cranial Nerves: No cranial nerve deficit.   Psychiatric:         Behavior: Behavior normal.       Significant Labs: All pertinent labs within the past 24 hours have been reviewed.    Significant Imaging: I have reviewed all pertinent imaging results/findings within the past 24 hours.

## 2022-09-15 NOTE — PROGRESS NOTES
[facility] Medicine  Progress Note    Patient Name: Jovan Del Cid Jr.  MRN: [id number]  Patient Class: IP- Inpatient   Admission Date: 9/14/2022  Length of Stay: 0 days  Attending Physician: Waldemar Zambrano MD  Primary Care Provider: Paul Neal MD        Subjective:     Principal Problem:DANY (acute kidney injury)        HPI:  Jovan Del Cid Jr. is a 82 y.o. 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 Pt and his wife are poor historians, most of the hx obtained from the chart. Pt was recently dx with DM2 when he had the CVA and was started on Metformin. Pt notes decreased fluid intake recently due to loss of taste. He also has R sided numbness of the body since the strokes but it is improving. Pt not sure if his loss of taste is due to the CVA or Metformin. No recent Covid infection. Patient denies any fever, chills, n/v/d, SOB, CP, weakness, numbness, dizziness, headache, and all other sxs at this time. No prior Tx reported. No further complaints or concerns at this time.     In the ER, VSS afebrile, CBC normal, Bun/Cr 37/2.4, HCO3 17- renal function normal previously. Pt received a bolus of NS in the ER and started on Bicarb IVF in the ER. Pt is being placed in Observation under [facility]osp Med for DANY with Metabolic Acidosis likely sec to IVVD.         Overview/Hospital Course:  The patient is a 81 yo amel with recent Acute CVA and Newly diagnosed DM on 9/2/22, HTN, Asthma, HLD, MCMAHAN, Obesity who was admitted for DANY and Metabolic acidosis on Bicarb drip. Nephrology consulted. Pt reported loose BMs -recently started on Metformin. Renal U/s showed no hydronephrosis. Nephrology felt DANY due to prerenal azotemia due to hypotension and GI losses VS  related to metformin induced kidney injury. He  recommended to discontinue Metformin and do not restart.     9/15/22: DANY slowly improving 2.4> 2.2. Nephrology recommended to continue bicarb drip.       Interval History: see hospital course    Review of Systems   Constitutional:  Positive for appetite change (poor appetite). Negative for chills, diaphoresis, fatigue and fever.   HENT:  Negative for congestion, nosebleeds, sore throat and trouble swallowing.    Eyes:  Negative for pain, discharge and visual disturbance.   Respiratory:  Negative for apnea, cough, chest tightness, shortness of breath, wheezing and stridor.    Cardiovascular:  Negative for chest pain, palpitations and leg swelling.   Gastrointestinal:  Positive for diarrhea (loose BMs). Negative for abdominal distention, abdominal pain, blood in stool, constipation, nausea and vomiting.   Endocrine: Negative for cold intolerance and heat intolerance.   Genitourinary:  Negative for difficulty urinating, dysuria, flank pain, frequency and urgency.   Musculoskeletal:  Negative for arthralgias, back pain, joint swelling, myalgias, neck pain and neck stiffness.   Skin:  Negative for rash and wound.   Allergic/Immunologic: Negative for food allergies and immunocompromised state.   Neurological:  Positive for weakness (Generalized weakness- no obvious focal deficits after stroke) and numbness (numbness to right hand and foot after stroke). Negative for dizziness, seizures, syncope, facial asymmetry, light-headedness and headaches.   Hematological:  Negative for adenopathy.   Psychiatric/Behavioral:  Negative for agitation, behavioral problems and confusion. The patient is not nervous/anxious.    Objective:     Vital Signs (Most Recent):  Temp: 97.5 °F (36.4 °C) (09/15/22 1205)  Pulse: (!) 58 (09/15/22 1205)  Resp: 18 (09/15/22 1205)  BP: 137/65 (09/15/22 1205)  SpO2: 97 % (09/15/22 1205)   Vital Signs (24h Range):  Temp:  [97.5 °F (36.4 °C)-97.9 °F (36.6 °C)] 97.5 °F (36.4 °C)  Pulse:  [53-65] 58  Resp:   [17-20] 18  SpO2:  [95 %-99 %] 97 %  BP: (128-145)/(60-76) 137/65     Weight: 97.4 kg (214 lb 11.7 oz)  Body mass index is 34.66 kg/m².    Intake/Output Summary (Last 24 hours) at 9/15/2022 1557  Last data filed at 9/15/2022 1300  Gross per 24 hour   Intake 270 ml   Output --   Net 270 ml      Physical Exam  Vitals and nursing note reviewed.   Constitutional:       Appearance: He is well-developed.   HENT:      Head: Normocephalic and atraumatic.      Nose: Nose normal.   Eyes:      General: No scleral icterus.     Pupils: Pupils are equal, round, and reactive to light.   Cardiovascular:      Rate and Rhythm: Normal rate and regular rhythm.      Heart sounds: Normal heart sounds. No murmur heard.    No friction rub. No gallop.   Pulmonary:      Effort: Pulmonary effort is normal. No respiratory distress.      Breath sounds: Normal breath sounds. No wheezing.   Abdominal:      General: Bowel sounds are normal. There is no distension.      Palpations: Abdomen is soft.      Tenderness: There is no abdominal tenderness.   Musculoskeletal:         General: Normal range of motion.      Cervical back: Normal range of motion and neck supple.   Skin:     General: Skin is warm and dry.      Findings: No rash.   Neurological:      Mental Status: He is alert and oriented to person, place, and time.      Cranial Nerves: No cranial nerve deficit.   Psychiatric:         Behavior: Behavior normal.       Significant Labs: All pertinent labs within the past 24 hours have been reviewed.    Significant Imaging: I have reviewed all pertinent imaging results/findings within the past 24 hours.      Assessment/Plan:      * DANY (acute kidney injury)  Patient with acute kidney injury likely due to IVVD/dehydration DANY is currently improving. Labs reviewed- Renal function/electrolytes with Estimated Creatinine Clearance: 28.3 mL/min (A) (based on SCr of 2.2 mg/dL (H)). according to latest data. Monitor urine output and serial BMP and adjust  therapy as needed. Avoid nephrotoxins and renally dose meds for GFR listed above.   Nephrology consulted.   Pt reported loose BMs -recently started on Metformin.   Renal U/s showed no hydronephrosis.   Nephrology felt DANY due to prerenal azotemia due to hypotension and GI losses VS  related to metformin induced kidney injury. He recommended to discontinue Metformin and do not restart. Add Bicarb drip     9/15/22: DANY slowly improving 2.4> 2.2. Nephrology recommended to continue bicarb drip.         Metabolic acidosis  Improving   Cont IV Bicarb drip       Acute CVA (cerebrovascular accident)  Recent acute CVA 9/2/22  No obvious focal deficits -numbness to right hand and foot   Cont ASA, Plavix Statin       T2DM (type 2 diabetes mellitus)  Patient's FSGs are uncontrolled due to hyperglycemia on current medication regimen.  Last A1c reviewed-   Lab Results   Component Value Date    HGBA1C 7.8 (H) 09/13/2022     Most recent fingerstick glucose reviewed-   Recent Labs   Lab 09/15/22  1207   POCTGLUCOSE 176*     Current correctional scale  Low  Maintain anti-hyperglycemic dose as follows-   Antihyperglycemics (From admission, onward)    Start     Stop Route Frequency Ordered    09/15/22 1042  insulin aspart U-100 pen 0-5 Units         -- SubQ Before meals & nightly PRN 09/15/22 0944        Hold Oral hypoglycemics while patient is in the hospital.    Discontinue metformin   Nephrology recommends Glipizide 2.5 mg po qd      VTE Risk Mitigation (From admission, onward)         Ordered     heparin (porcine) injection 5,000 Units  Every 8 hours         09/15/22 0944     IP VTE HIGH RISK PATIENT  Once         09/15/22 0944     Place sequential compression device  Until discontinued         09/15/22 0944                Discharge Planning   ALVERTO:      Code Status: Full Code   Is the patient medically ready for discharge?:     Reason for patient still in hospital (select all that apply): Patient trending condition  Discharge Plan  A: Home                  Mercedez Trinidad NP  Department of Hospital Medicine   '[facility] Surg

## 2022-09-15 NOTE — HPI
Jovan Del Cid Jr. is a 82 y.o. 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 Pt and his wife are poor historians, most of the hx obtained from the chart. Pt was recently dx with DM2 when he had the CVA and was started on Metformin. Pt notes decreased fluid intake recently due to loss of taste. He also has R sided numbness of the body since the strokes but it is improving. Pt not sure if his loss of taste is due to the CVA or Metformin. No recent Covid infection. Patient denies any fever, chills, n/v/d, SOB, CP, weakness, numbness, dizziness, headache, and all other sxs at this time. No prior Tx reported. No further complaints or concerns at this time.     In the ER, VSS afebrile, CBC normal, Bun/Cr 37/2.4, HCO3 17- renal function normal previously. Pt received a bolus of NS in the ER and started on Bicarb IVF in the ER. Pt is being placed in Observation under [facility] for DANY with Metabolic Acidosis likely sec to IVVD.

## 2022-09-15 NOTE — ASSESSMENT & PLAN NOTE
Recent acute CVA 9/2/22  No obvious focal deficits -numbness to right hand and foot   Cont ASA, Plavix Statin

## 2022-09-15 NOTE — ASSESSMENT & PLAN NOTE
Patient with acute kidney injury likely due to IVVD/dehydration DANY is currently improving. Labs reviewed- Renal function/electrolytes with Estimated Creatinine Clearance: 28.3 mL/min (A) (based on SCr of 2.2 mg/dL (H)). according to latest data. Monitor urine output and serial BMP and adjust therapy as needed. Avoid nephrotoxins and renally dose meds for GFR listed above.     Continue IVF, encouraged oral intake

## 2022-09-15 NOTE — PLAN OF CARE
OT eval completed. Pt at [facility] and does not require skilled acute OT services at this time. Discharge OT. Recommends home.

## 2022-09-15 NOTE — PROGRESS NOTES
"[facility]  Nephrology  Progress Note    Patient Name: Jovan Del Cid Jr.  MRN: [id number]  Admission Date: 9/14/2022  Hospital Length of Stay: 0 days  Attending Provider: Waldemar Zambrano MD   Primary Care Physician: Paul Neal MD  Principal Problem:DANY (acute kidney injury)    Subjective:     HPI: Pt was seen and examined. H/o, chart, Labs and meds reviewed. Discussed with other providers. Pt is a 83 y/o male with newly diagnosed DM and recent acute lacunar stroke who was sent to ER for abnormal labs related to abrupt increase in Cr and metabolic acidosis. Pt himself has no c/os's or symptoms, except for "loose BM's" in the past week. His BP was low compared to his past BP's. Pt is not on any BP meds. Pt is noted to have been started on metformin on 9/4/22 at 500 mg bid x 1 weeks, then 1000 mg bid. No other pertinent h/o, denies diarrhea, but admits to loose BM's, no NSAIds taken, no abx, no recent infections, no nausea, no abdominal pain, no sx's of dysuria, no cardiopulmonary sx's.        Interval History: Pt was seen and examined. Labs and meds reviewed. Discussed with other providers. No new c/o's, no abdominal pain, no nausea, no diarrhea, had soft BM. No SOB.    Review of patient's allergies indicates:  No Known Allergies  Current Facility-Administered Medications   Medication Frequency    acetaminophen tablet 650 mg Q4H PRN    albuterol-ipratropium 2.5 mg-0.5 mg/3 mL nebulizer solution 3 mL Q6H PRN    aluminum-magnesium hydroxide-simethicone 200-200-20 mg/5 mL suspension 30 mL QID PRN    aspirin EC tablet 81 mg Daily    atorvastatin tablet 40 mg Daily    clopidogreL tablet 75 mg Daily    fluticasone propionate 50 mcg/actuation nasal spray 100 mcg Daily    glucagon (human recombinant) injection 1 mg PRN    glucose chewable tablet 16 g PRN    glucose chewable tablet 24 g PRN    heparin (porcine) injection 5,000 Units Q8H    HYDROcodone-acetaminophen 5-325 mg per tablet 1 tablet " Q6H PRN    insulin aspart U-100 pen 0-5 Units QID (AC + HS) PRN    magnesium oxide tablet 800 mg PRN    magnesium oxide tablet 800 mg PRN    melatonin tablet 6 mg Nightly PRN    montelukast tablet 10 mg QHS    naloxone 0.4 mg/mL injection 0.02 mg PRN    ondansetron injection 4 mg Q6H PRN    pantoprazole EC tablet 40 mg Daily    potassium bicarbonate disintegrating tablet 35 mEq PRN    potassium bicarbonate disintegrating tablet 50 mEq PRN    potassium bicarbonate disintegrating tablet 60 mEq PRN    potassium, sodium phosphates 280-160-250 mg packet 2 packet PRN    potassium, sodium phosphates 280-160-250 mg packet 2 packet PRN    potassium, sodium phosphates 280-160-250 mg packet 2 packet PRN    prochlorperazine injection Soln 5 mg Q6H PRN    simethicone chewable tablet 80 mg QID PRN    sodium bicarbonate 75 mEq in sodium chloride 0.45% 1,075 mL infusion Continuous    sodium chloride 0.9% flush 10 mL Q8H PRN       Objective:     Vital Signs (Most Recent):  Temp: 97.5 °F (36.4 °C) (09/15/22 0402)  Pulse: (!) 53 (09/15/22 0739)  Resp: 20 (09/15/22 0739)  BP: (!) 142/66 (09/15/22 0739)  SpO2: 98 % (09/15/22 0739)  O2 Device (Oxygen Therapy): room air (09/14/22 2131)   Vital Signs (24h Range):  Temp:  [97.5 °F (36.4 °C)-98.5 °F (36.9 °C)] 97.5 °F (36.4 °C)  Pulse:  [53-83] 53  Resp:  [17-20] 20  SpO2:  [95 %-99 %] 98 %  BP: (105-145)/(60-76) 142/66     Weight: 97.4 kg (214 lb 11.7 oz) (09/14/22 1130)  Body mass index is 34.66 kg/m².  Body surface area is 2.13 meters squared.    No intake/output data recorded.    Physical Exam  Vitals and nursing note reviewed.   Constitutional:       Appearance: Normal appearance.   HENT:      Head: Normocephalic and atraumatic.   Cardiovascular:      Rate and Rhythm: Normal rate and regular rhythm.      Pulses: Normal pulses.      Heart sounds: Normal heart sounds.   Pulmonary:      Effort: Pulmonary effort is normal. No respiratory distress.      Breath sounds:  Normal breath sounds. No rales.   Abdominal:      Palpations: Abdomen is soft.      Tenderness: There is no abdominal tenderness. There is no guarding.   Musculoskeletal:      Right lower leg: No edema.      Left lower leg: No edema.   Skin:     General: Skin is warm and dry.   Neurological:      Mental Status: He is alert and oriented to person, place, and time.   Psychiatric:         Behavior: Behavior normal.       Significant Labs: reviewed  BMP  Lab Results   Component Value Date     09/15/2022    K 3.7 09/15/2022     09/15/2022    CO2 20 (L) 09/15/2022    BUN 34 (H) 09/15/2022    CREATININE 2.2 (H) 09/15/2022    CALCIUM 8.6 (L) 09/15/2022    ANIONGAP 13 09/15/2022    ESTGFRAFRICA >60.0 01/20/2021    EGFRNONAA >60.0 01/20/2021     Lab Results   Component Value Date    WBC 9.91 09/14/2022    HGB 15.8 09/14/2022    HCT 47.7 09/14/2022    MCV 92 09/14/2022     09/14/2022     Lactic acid 1.0  Urine na <20, urine Cr 62, FENa = 0.5%    Significant Imaging: reviewed  Renal u/s: no hydronephrosis    Assessment/Plan:     83 y/o male with new diagnosis of DM and DANY:     DANY (acute kidney injury)  s Cr lower since admit. Reason not clear at this point  DANY stable, appears resolving  Metabolic acidosis, stable, improved  New start on metformin    DANY suspect due to GI side effects of metformin: soft BM/diarrhea, low BP  Doubt related to more direct kidney or liver injury: no abdominal symptoms, lactic acid normal, Cr already improving with IVF's and after holding metformin.  Also, has low urine Na and FENa<1% c/w prerenal azotemia  Pt has elevated total bilirubin, but that is not new (at least 1 year)  Other liver tests unremarkable (slight increase in ALT noted, improving)      Recommend:  Continue IVF's: 1/2 NS with 1.5 amp bicarbonate added per L at 100 ml/hr  Do not re-start metformin     T2DM (type 2 diabetes mellitus)  New and recent diagnosis noted  Discussed with pt and wife, diabetes educator  in room.  Informed pt of ADA diet  Consider low dose of glipizide 2.5 mg po qd  Last d/c summary reviewed     Acute CVA (cerebrovascular accident)  Last hospital records reviewed  Acute lacunar stroke, has right sided weakness           Plans and recommendations:  As discussed above  Total time spent 40 minutes including time needed to review the records, the   patient evaluation, documentation, face-to-face discussion with the patient,   more than 50% of the time was spent on coordination of care and counseling.          Saúl Avery MD  Nephrology  [facility]

## 2022-09-15 NOTE — H&P
[facility] Medicine  History & Physical    Patient Name: Jovan Del Cid Jr.  MRN: [id number]  Patient Class: IP- Inpatient  Admission Date: 9/14/2022  Attending Physician: Waldemar Zambrano MD   Primary Care Provider: Paul Neal MD         Patient information was obtained from patient, spouse/SO, past medical records and ER records.     Subjective:     Principal Problem:DANY (acute kidney injury)    Chief Complaint:   Chief Complaint   Patient presents with    Abnormal Lab     Pt sent by PCP for increased BUN, crt, and GFR. Pt states he has a recent dx of T2DM and started taking metformin 1 week ago. Pt c/o decreased hunger. Was seen here 2 weeks ago for CVA, residual numbness to R hand and foot        HPI: Jovan Del Cid Jr. is a 82 y.o. 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 Pt and his wife are poor historians, most of the hx obtained from the chart. Pt was recently dx with DM2 when he had the CVA and was started on Metformin. Pt notes decreased fluid intake recently due to loss of taste. He also has R sided numbness of the body since the strokes but it is improving. Pt not sure if his loss of taste is due to the CVA or Metformin. No recent Covid infection. Patient denies any fever, chills, n/v/d, SOB, CP, weakness, numbness, dizziness, headache, and all other sxs at this time. No prior Tx reported. No further complaints or concerns at this time.     In the ER, VSS afebrile, CBC normal, Bun/Cr 37/2.4, HCO3 17- renal function normal previously. Pt received a bolus of NS in the ER and started on Bicarb IVF in the ER. Pt is being placed in Observation under [facility] for DANY with Metabolic Acidosis likely sec to IVVD.         Past Medical History:   Diagnosis Date    Acute CVA (cerebrovascular  accident) 9/8/2022    DANY (acute kidney injury) 9/14/2022    Asthma     Bronchitis chronic     Cancer     Cough     Digestive disorder     Hyperlipidemia     Hypertension     Liver disease     KALYAN (obstructive sleep apnea) 9/8/2022    Type 2 diabetes mellitus, without long-term current use of insulin 9/7/2022       Past Surgical History:   Procedure Laterality Date    COLONOSCOPY N/A 6/21/2016    Procedure: COLONOSCOPY;  Surgeon: Alonso Dorsey MD;  Location: [facility] ENDO;  Service: Endoscopy;  Laterality: N/A;    COLONOSCOPY N/A 11/1/2016    Procedure: COLONOSCOPY;  Surgeon: Alonso Dorsey MD;  Location: [facility] ENDO;  Service: Endoscopy;  Laterality: N/A;    COLONOSCOPY N/A 2/3/2017    Procedure: COLONOSCOPY;  Surgeon: Alonso Dorsey MD;  Location: [facility];  Service: Endoscopy;  Laterality: N/A;    COLONOSCOPY N/A 11/13/2017    Procedure: COLONOSCOPY;  Surgeon: Alonso Dorsey MD;  Location: [facility] ENDO;  Service: Endoscopy;  Laterality: N/A;    COLONOSCOPY N/A 2/19/2021    Procedure: COLONOSCOPY;  Surgeon: Paula Ricardo MD;  Location: [facility] ENDO;  Service: Endoscopy;  Laterality: N/A;    FLUOROSCOPY N/A 6/15/2018    Procedure: Transjugular liver bx;  Surgeon: Petros Recio MD;  Location: [facility] CATH LAB;  Service: General;  Laterality: N/A;    TONSILLECTOMY         Review of patient's allergies indicates:  No Known Allergies    No current facility-administered medications on file prior to encounter.     Current Outpatient Medications on File Prior to Encounter   Medication Sig    albuterol (VENTOLIN HFA) 90 mcg/actuation inhaler Inhale 2 puffs into the lungs every 6 (six) hours as needed for Wheezing. Rescue    aspirin (ECOTRIN) 81 MG EC tablet Take 1 tablet (81 mg total) by mouth once daily.    clopidogreL (PLAVIX) 75 mg tablet Take 1 tablet (75 mg total) by mouth once daily.    fluticasone propionate (FLONASE) 50 mcg/actuation nasal spray SHAKE LIQUID AND USE 2 SPRAYS IN  "EACH NOSTRIL EVERY DAY    metFORMIN (GLUCOPHAGE) 1000 MG tablet Please take 500 mg oral  two time a day ( 1/2 of tablet ) for 7 days and then after take  1000 mg two time a day .    montelukast (SINGULAIR) 10 mg tablet Take 1 tablet (10 mg total) by mouth every evening.    multivitamin (THERAGRAN) per tablet Take 1 tablet by mouth once daily.    pantoprazole (PROTONIX) 40 MG tablet Take 1 tablet (40 mg total) by mouth daily as needed.    rosuvastatin (CRESTOR) 40 MG Tab Take 1 tablet (40 mg total) by mouth once daily.    blood sugar diagnostic Strp 1 strip by Misc.(Non-Drug; Combo Route) route 2 (two) times daily with meals.    blood-glucose meter kit Use as instructed    lancets Misc 1 each by Misc.(Non-Drug; Combo Route) route 2 (two) times daily with meals.    lancing device Misc 1 Device by Misc.(Non-Drug; Combo Route) route 2 (two) times daily with meals.    pen needle, diabetic 31 gauge x 5/16" Ndle 1 each by Misc.(Non-Drug; Combo Route) route 2 (two) times daily with meals.     Family History       Problem Relation (Age of Onset)    Alzheimer's disease Mother    Cancer Father          Tobacco Use    Smoking status: Former     Years: 3.00     Types: Cigarettes, Pipe     Quit date:      Years since quittin.7    Smokeless tobacco: Never    Tobacco comments:     smoked a pipe - quit smoking    Substance and Sexual Activity    Alcohol use: Yes     Alcohol/week: 2.0 - 3.0 standard drinks     Types: 2 - 3 Cans of beer per week     Comment: daily    Drug use: No    Sexual activity: Never     Review of Systems   Constitutional:  Positive for activity change. Negative for chills, diaphoresis, fatigue, fever and unexpected weight change.   HENT: Negative.     Eyes: Negative.    Respiratory: Negative.  Negative for shortness of breath.    Cardiovascular: Negative.  Negative for chest pain, palpitations and leg swelling.   Gastrointestinal: Negative.  Negative for diarrhea and nausea. "   Endocrine: Negative.    Genitourinary: Negative.    Musculoskeletal: Negative.    Skin: Negative.    Allergic/Immunologic: Negative.    Neurological: Negative.    Hematological: Negative.    Psychiatric/Behavioral: Negative.     Objective:     Vital Signs (Most Recent):  Temp: 97.5 °F (36.4 °C) (09/15/22 1205)  Pulse: (!) 58 (09/15/22 1205)  Resp: 18 (09/15/22 1205)  BP: 137/65 (09/15/22 1205)  SpO2: 97 % (09/15/22 1205)   Vital Signs (24h Range):  Temp:  [97.5 °F (36.4 °C)-97.9 °F (36.6 °C)] 97.5 °F (36.4 °C)  Pulse:  [53-65] 58  Resp:  [17-20] 18  SpO2:  [95 %-99 %] 97 %  BP: (107-145)/(60-76) 137/65     Weight: 97.4 kg (214 lb 11.7 oz)  Body mass index is 34.66 kg/m².    Physical Exam  Vitals and nursing note reviewed.   Constitutional:       General: He is not in acute distress.     Appearance: Normal appearance. He is well-developed. He is obese. He is not ill-appearing or diaphoretic.      Comments: Elderly man in NAD, AAOx3, has an egg sized Lipoma R angle of mouth   HENT:      Head: Normocephalic and atraumatic.      Right Ear: External ear normal.      Left Ear: External ear normal.      Nose: Nose normal.      Mouth/Throat:      Mouth: Mucous membranes are dry.      Pharynx: Oropharynx is clear. No oropharyngeal exudate or posterior oropharyngeal erythema.   Eyes:      General: No scleral icterus.     Extraocular Movements: EOM normal.      Conjunctiva/sclera: Conjunctivae normal.      Pupils: Pupils are equal, round, and reactive to light.   Cardiovascular:      Rate and Rhythm: Normal rate and regular rhythm.      Pulses: Normal pulses.      Heart sounds: Normal heart sounds.   Pulmonary:      Effort: Pulmonary effort is normal.      Breath sounds: Normal breath sounds.   Abdominal:      General: Bowel sounds are normal.      Palpations: Abdomen is soft.   Genitourinary:     Penis: Normal.       Prostate: Normal.      Rectum: Normal.   Musculoskeletal:         General: Normal range of motion.       Cervical back: Normal range of motion and neck supple.      Right lower leg: No edema.      Left lower leg: No edema.   Skin:     General: Skin is warm and dry.      Coloration: Skin is not jaundiced.      Findings: No bruising or lesion.   Neurological:      General: No focal deficit present.      Mental Status: He is alert and oriented to person, place, and time. Mental status is at baseline.      Deep Tendon Reflexes: Reflexes are normal and symmetric.   Psychiatric:         Mood and Affect: Mood normal.         Behavior: Behavior normal.         Thought Content: Thought content normal.         Judgment: Judgment normal.         CRANIAL NERVES     CN III, IV, VI   Pupils are equal, round, and reactive to light.  Extraocular motions are normal.   Results for orders placed or performed during the hospital encounter of 09/14/22   CBC auto differential   Result Value Ref Range    WBC 9.91 3.90 - 12.70 K/uL    RBC 5.17 4.60 - 6.20 M/uL    Hemoglobin 15.8 14.0 - 18.0 g/dL    Hematocrit 47.7 40.0 - 54.0 %    MCV 92 82 - 98 fL    MCH 30.6 27.0 - 31.0 pg    MCHC 33.1 32.0 - 36.0 g/dL    RDW 13.2 11.5 - 14.5 %    Platelets 216 150 - 450 K/uL    MPV 10.6 9.2 - 12.9 fL    Immature Granulocytes 0.3 0.0 - 0.5 %    Gran # (ANC) 7.3 1.8 - 7.7 K/uL    Immature Grans (Abs) 0.03 0.00 - 0.04 K/uL    Lymph # 1.0 1.0 - 4.8 K/uL    Mono # 1.3 (H) 0.3 - 1.0 K/uL    Eos # 0.2 0.0 - 0.5 K/uL    Baso # 0.10 0.00 - 0.20 K/uL    nRBC 0 0 /100 WBC    Gran % 73.4 (H) 38.0 - 73.0 %    Lymph % 10.4 (L) 18.0 - 48.0 %    Mono % 12.6 4.0 - 15.0 %    Eosinophil % 2.3 0.0 - 8.0 %    Basophil % 1.0 0.0 - 1.9 %    Platelet Estimate Appears normal     Differential Method Automated    Comprehensive metabolic panel   Result Value Ref Range    Sodium 132 (L) 136 - 145 mmol/L    Potassium 4.7 3.5 - 5.1 mmol/L    Chloride 100 95 - 110 mmol/L    CO2 17 (L) 23 - 29 mmol/L    Glucose 150 (H) 70 - 110 mg/dL    BUN 37 (H) 8 - 23 mg/dL    Creatinine 2.4 (H) 0.5 -  1.4 mg/dL    Calcium 9.7 8.7 - 10.5 mg/dL    Total Protein 7.7 6.0 - 8.4 g/dL    Albumin 4.6 3.5 - 5.2 g/dL    Total Bilirubin 2.5 (H) 0.1 - 1.0 mg/dL    Alkaline Phosphatase 118 55 - 135 U/L    AST 40 10 - 40 U/L    ALT 69 (H) 10 - 44 U/L    Anion Gap 15 8 - 16 mmol/L    eGFR 26 (A) >60 mL/min/1.73 m^2   Urinalysis, Reflex to Urine Culture Urine, Clean Catch    Specimen: Urine   Result Value Ref Range    Specimen UA Urine, Clean Catch     Color, UA Yellow Yellow, Straw, Drea    Appearance, UA Hazy (A) Clear    pH, UA 6.0 5.0 - 8.0    Specific Gravity, UA 1.010 1.005 - 1.030    Protein, UA 2+ (A) Negative    Glucose, UA Negative Negative    Ketones, UA 1+ (A) Negative    Bilirubin (UA) Negative Negative    Occult Blood UA 2+ (A) Negative    Nitrite, UA Negative Negative    Urobilinogen, UA Negative <2.0 EU/dL    Leukocytes, UA Negative Negative   Urinalysis Microscopic   Result Value Ref Range    RBC, UA 3 0 - 4 /hpf    WBC, UA 18 (H) 0 - 5 /hpf    Bacteria Rare None-Occ /hpf    Squam Epithel, UA 3 /hpf    Hyaline Casts, UA 1 0-1/lpf /lpf    Granular Casts, UA 25 (A) None /lpf    Microscopic Comment SEE COMMENT    Sodium, urine, random   Result Value Ref Range    Sodium, Urine <20 (A) 20 - 250 mmol/L   Creatinine, urine, random   Result Value Ref Range    Creatinine, Urine 62.3 23.0 - 375.0 mg/dL   Comprehensive metabolic panel   Result Value Ref Range    Sodium 137 136 - 145 mmol/L    Potassium 3.7 3.5 - 5.1 mmol/L    Chloride 104 95 - 110 mmol/L    CO2 20 (L) 23 - 29 mmol/L    Glucose 134 (H) 70 - 110 mg/dL    BUN 34 (H) 8 - 23 mg/dL    Creatinine 2.2 (H) 0.5 - 1.4 mg/dL    Calcium 8.6 (L) 8.7 - 10.5 mg/dL    Total Protein 6.0 6.0 - 8.4 g/dL    Albumin 3.7 3.5 - 5.2 g/dL    Total Bilirubin 1.6 (H) 0.1 - 1.0 mg/dL    Alkaline Phosphatase 98 55 - 135 U/L    AST 30 10 - 40 U/L    ALT 54 (H) 10 - 44 U/L    Anion Gap 13 8 - 16 mmol/L    eGFR 29 (A) >60 mL/min/1.73 m^2   Lactic acid, plasma   Result Value Ref Range     Lactate (Lactic Acid) 1.0 0.5 - 2.2 mmol/L   POCT glucose   Result Value Ref Range    POCT Glucose 176 (H) 70 - 110 mg/dL      Significant Labs: All pertinent labs within the past 24 hours have been reviewed.  Imaging Results              US Retroperitoneal Complete (Final result)  Result time 09/14/22 18:43:29      Final result by Yoon Terrell MD (09/14/22 18:43:29)                   Impression:      No evidence for hydronephrosis bilaterally.      Electronically signed by: Xander Nettles  Date:    09/14/2022  Time:    18:43               Narrative:    EXAMINATION:  US RETROPERITONEAL COMPLETE    CLINICAL HISTORY:  r/o hydronephrosis;    TECHNIQUE:  Ultrasound of the kidneys and urinary bladder was performed including color flow and Doppler evaluation of the kidneys.    COMPARISON:  None.    FINDINGS:  Right kidney: Right kidney measures up to 9.9 mm cm in length with a resistive index of 0.71.    Left kidney: The left kidney measures 10.3 cm with a resistive index of 0.69.    The bladder is partially distended at the time of scanning and has an unremarkable appearance.                                     Significant Imaging: I have reviewed all pertinent imaging results/findings within the past 24 hours.    Assessment/Plan:     * DANY (acute kidney injury)  Patient with acute kidney injury likely due to IVVD/dehydration DANY is currently improving. Labs reviewed- Renal function/electrolytes with Estimated Creatinine Clearance: 28.3 mL/min (A) (based on SCr of 2.2 mg/dL (H)). according to latest data. Monitor urine output and serial BMP and adjust therapy as needed. Avoid nephrotoxins and renally dose meds for GFR listed above.     Continue IVF, encouraged oral intake     T2DM (type 2 diabetes mellitus)  Patient's FSGs are uncontrolled due to hyperglycemia on current medication regimen.  Last A1c reviewed-   Lab Results   Component Value Date    HGBA1C 7.8 (H) 09/13/2022     Most recent fingerstick glucose reviewed-    Recent Labs   Lab 09/15/22  1207   POCTGLUCOSE 176*     Current correctional scale  Low  Maintain anti-hyperglycemic dose as follows-   Antihyperglycemics (From admission, onward)    Start     Stop Route Frequency Ordered    09/15/22 1042  insulin aspart U-100 pen 0-5 Units         -- SubQ Before meals & nightly PRN 09/15/22 0944        Hold Oral hypoglycemics while patient is in the hospital.    Hold Metformin, may benefit from Jardiance or Januvia as out pt      VTE Risk Mitigation (From admission, onward)         Ordered     heparin (porcine) injection 5,000 Units  Every 8 hours         09/15/22 0944     IP VTE HIGH RISK PATIENT  Once         09/15/22 0944     Place sequential compression device  Until discontinued         09/15/22 0944                   Waldemar Zambrano MD  Department of Hospital Medicine   [facility]

## 2022-09-15 NOTE — ASSESSMENT & PLAN NOTE
Patient's FSGs are uncontrolled due to hyperglycemia on current medication regimen.  Last A1c reviewed-   Lab Results   Component Value Date    HGBA1C 7.8 (H) 09/13/2022     Most recent fingerstick glucose reviewed-   Recent Labs   Lab 09/15/22  1207   POCTGLUCOSE 176*     Current correctional scale  Low  Maintain anti-hyperglycemic dose as follows-   Antihyperglycemics (From admission, onward)    Start     Stop Route Frequency Ordered    09/15/22 1042  insulin aspart U-100 pen 0-5 Units         -- SubQ Before meals & nightly PRN 09/15/22 0944        Hold Oral hypoglycemics while patient is in the hospital.    Discontinue metformin   Nephrology recommends Glipizide 2.5 mg po qd

## 2022-09-15 NOTE — PLAN OF CARE
[facility] - Med Surg  Initial Discharge Assessment       Primary Care Provider: Paul Neal MD    Admission Diagnosis: Hyperglycemia without ketosis [R73.9]  Volume depletion [E86.9]  DANY (acute kidney injury) [N17.9]    Admission Date: 9/14/2022  Expected Discharge Date:     Discharge Barriers Identified: None    Payor: BLUE CROSS BLUE SHIELD / Plan: BCBS BLUE SAVER PPO - HD / Product Type: PPO /     Extended Emergency Contact Information  Primary Emergency Contact: Jimbo Del Cid   [place]  Mobile Phone: [phone]  Relation: Son  Secondary Emergency Contact: Mavis Del Cid   [place]  Mobile Phone: [phone]  Relation: Spouse    Discharge Plan A: Home  Discharge Plan B: Home      [id number] - [place], [place] - [address]  [address] [place] [address]  Phone: [phone] Fax: [phone]    [facility] - [place], [place] - [address]  [address] [place] [address]  Phone: [phone] Fax: [phone]    [id number]  [place] - [address] [facility] AT [facility]  [address] [facility]  [place] [address]  Phone: [phone] Fax: [phone]      Initial Assessment (most recent)       Adult Discharge Assessment - 09/15/22 1020          Discharge Assessment    Assessment Type Discharge Planning Assessment     Confirmed/corrected address, phone number and insurance Yes     Confirmed Demographics Correct on Facesheet     Source of Information family;patient     Does patient/caregiver understand observation status Yes     Communicated ALVERTO with patient/caregiver Date not available/Unable to determine     Reason For Admission DANY     Lives With spouse     Facility Arrived From: home     Do you expect to return to your current living situation? Yes     Do you have help at home or someone to help you manage your care at home? No     Prior to hospitilization  cognitive status: Alert/Oriented     Current cognitive status: Alert/Oriented     Walking or Climbing Stairs Difficulty none     Dressing/Bathing Difficulty none     Equipment Currently Used at Home none     Readmission within 30 days? No     Patient currently being followed by outpatient case management? No     Do you currently have service(s) that help you manage your care at home? No     Do you take prescription medications? Yes     Do you have prescription coverage? Yes     Coverage BCBS     Do you have any problems affording any of your prescribed medications? No     Is the patient taking medications as prescribed? yes     Who is going to help you get home at discharge? spouse     How do you get to doctors appointments? car, drives self;family or friend will provide     Are you on dialysis? No     Do you take coumadin? No     Discharge Plan A Home     Discharge Plan B Home     DME Needed Upon Discharge  none     Discharge Plan discussed with: Spouse/sig other;Patient     Name(s) and Number(s) Mavis Muhammadisi   [phone]     Discharge Barriers Identified None        Physical Activity    On average, how many days per week do you engage in moderate to strenuous exercise (like a brisk walk)? 0 days     On average, how many minutes do you engage in exercise at this level? 0 min        Financial Resource Strain    How hard is it for you to pay for the very basics like food, housing, medical care, and heating? Not very hard        Housing Stability    In the last 12 months, was there a time when you were not able to pay the mortgage or rent on time? No     In the last 12 months, how many places have you lived? 1     In the last 12 months, was there a time when you did not have a steady place to sleep or slept in a shelter (including now)? No        Transportation Needs    In the past 12 months, has lack of transportation kept you from medical appointments or from getting medications? No     In the past 12 months,  has lack of transportation kept you from meetings, work, or from getting things needed for daily living? No        Food Insecurity    Within the past 12 months, you worried that your food would run out before you got the money to buy more. Never true     Within the past 12 months, the food you bought just didn't last and you didn't have money to get more. Never true        Stress    Do you feel stress - tense, restless, nervous, or anxious, or unable to sleep at night because your mind is troubled all the time - these days? Not at all        Social Connections    In a typical week, how many times do you talk on the phone with family, friends, or neighbors? More than three times a week     How often do you get together with friends or relatives? More than three times a week     How often do you attend Shinto or Bahai services? More than 4 times per year     Do you belong to any clubs or organizations such as Shinto groups, unions, fraternal or athletic groups, or school groups? Yes     How often do you attend meetings of the clubs or organizations you belong to? More than 4 times per year     Are you , , , , never , or living with a partner?         Alcohol Use    Q1: How often do you have a drink containing alcohol? 4 or more times a week     Q2: How many drinks containing alcohol do you have on a typical day when you are drinking? 1 or 2     Q3: How often do you have six or more drinks on one occasion? Never                   Initial assessment completed with patient. Patient reports independence with ADL's  prior to hospitalization. Patient uses no DME at home. Patient currently has no cm needs upon DC. CM will continue following to assist with other needs.   CM provided a transitional care folder, information on advanced directives, information on pharmacy bedside delivery, and discharge planning begins on admission with contact information for any  needs/questions.

## 2022-09-15 NOTE — SUBJECTIVE & OBJECTIVE
Interval History: Pt was seen and examined. Labs and meds reviewed. Discussed with other providers. No new c/o's, no abdominal pain, no nausea, no diarrhea, had soft BM. No SOB.    Review of patient's allergies indicates:  No Known Allergies  Current Facility-Administered Medications   Medication Frequency    acetaminophen tablet 650 mg Q4H PRN    albuterol-ipratropium 2.5 mg-0.5 mg/3 mL nebulizer solution 3 mL Q6H PRN    aluminum-magnesium hydroxide-simethicone 200-200-20 mg/5 mL suspension 30 mL QID PRN    aspirin EC tablet 81 mg Daily    atorvastatin tablet 40 mg Daily    clopidogreL tablet 75 mg Daily    fluticasone propionate 50 mcg/actuation nasal spray 100 mcg Daily    glucagon (human recombinant) injection 1 mg PRN    glucose chewable tablet 16 g PRN    glucose chewable tablet 24 g PRN    heparin (porcine) injection 5,000 Units Q8H    HYDROcodone-acetaminophen 5-325 mg per tablet 1 tablet Q6H PRN    insulin aspart U-100 pen 0-5 Units QID (AC + HS) PRN    magnesium oxide tablet 800 mg PRN    magnesium oxide tablet 800 mg PRN    melatonin tablet 6 mg Nightly PRN    montelukast tablet 10 mg QHS    naloxone 0.4 mg/mL injection 0.02 mg PRN    ondansetron injection 4 mg Q6H PRN    pantoprazole EC tablet 40 mg Daily    potassium bicarbonate disintegrating tablet 35 mEq PRN    potassium bicarbonate disintegrating tablet 50 mEq PRN    potassium bicarbonate disintegrating tablet 60 mEq PRN    potassium, sodium phosphates 280-160-250 mg packet 2 packet PRN    potassium, sodium phosphates 280-160-250 mg packet 2 packet PRN    potassium, sodium phosphates 280-160-250 mg packet 2 packet PRN    prochlorperazine injection Soln 5 mg Q6H PRN    simethicone chewable tablet 80 mg QID PRN    sodium bicarbonate 75 mEq in sodium chloride 0.45% 1,075 mL infusion Continuous    sodium chloride 0.9% flush 10 mL Q8H PRN       Objective:     Vital Signs (Most Recent):  Temp: 97.5 °F (36.4 °C) (09/15/22 0402)  Pulse: (!) 53 (09/15/22  0739)  Resp: 20 (09/15/22 0739)  BP: (!) 142/66 (09/15/22 0739)  SpO2: 98 % (09/15/22 0739)  O2 Device (Oxygen Therapy): room air (09/14/22 2131)   Vital Signs (24h Range):  Temp:  [97.5 °F (36.4 °C)-98.5 °F (36.9 °C)] 97.5 °F (36.4 °C)  Pulse:  [53-83] 53  Resp:  [17-20] 20  SpO2:  [95 %-99 %] 98 %  BP: (105-145)/(60-76) 142/66     Weight: 97.4 kg (214 lb 11.7 oz) (09/14/22 1130)  Body mass index is 34.66 kg/m².  Body surface area is 2.13 meters squared.    No intake/output data recorded.    Physical Exam  Vitals and nursing note reviewed.   Constitutional:       Appearance: Normal appearance.   HENT:      Head: Normocephalic and atraumatic.   Cardiovascular:      Rate and Rhythm: Normal rate and regular rhythm.      Pulses: Normal pulses.      Heart sounds: Normal heart sounds.   Pulmonary:      Effort: Pulmonary effort is normal. No respiratory distress.      Breath sounds: Normal breath sounds. No rales.   Abdominal:      Palpations: Abdomen is soft.      Tenderness: There is no abdominal tenderness. There is no guarding.   Musculoskeletal:      Right lower leg: No edema.      Left lower leg: No edema.   Skin:     General: Skin is warm and dry.   Neurological:      Mental Status: He is alert and oriented to person, place, and time.   Psychiatric:         Behavior: Behavior normal.       Significant Labs: reviewed  BMP  Lab Results   Component Value Date     09/15/2022    K 3.7 09/15/2022     09/15/2022    CO2 20 (L) 09/15/2022    BUN 34 (H) 09/15/2022    CREATININE 2.2 (H) 09/15/2022    CALCIUM 8.6 (L) 09/15/2022    ANIONGAP 13 09/15/2022    ESTGFRAFRICA >60.0 01/20/2021    EGFRNONAA >60.0 01/20/2021     Lab Results   Component Value Date    WBC 9.91 09/14/2022    HGB 15.8 09/14/2022    HCT 47.7 09/14/2022    MCV 92 09/14/2022     09/14/2022     Lactic acid 1.0    Significant Imaging: reviewed  Renal u/s: no hydronephrosis

## 2022-09-15 NOTE — ASSESSMENT & PLAN NOTE
83 y/o male with new diagnosis of DM and DANY:     DANY (acute kidney injury)  DANY. Reason not clear at this point  New start on metformin  Metabolic acidosis  Presented with hypotension  Reports loose BM's, but no diarrhea, but has no other GI sx's  Liver labs are unremarkable     DDX: DANY due to prerenal azotemia due to hypotension and GI losses  VS more serious causes related to metformin induced lactic acidosis and kidney injury     Recommend:  Admit to hospital  Send lactic acid level  Send urine Na and Cr to calculate FENa  Start IVF's: 1/2 NS with 1.5 amp bicarbonate added per L at 100 ml/hr  Stop metformin     T2DM (type 2 diabetes mellitus)  New and recent diagnosis noted  Last d/c summary reviewed     Acute CVA (cerebrovascular accident)  Last hospital records reviewed  Acute lacunar stroke, has right sided weakness           Plans and recommendations:  As discussed above  Total time spent 70 minutes including time needed to review the records, the   patient evaluation, documentation, face-to-face discussion with the patient,   more than 50% of the time was spent on coordination of care and counseling.

## 2022-09-15 NOTE — HOSPITAL COURSE
The patient is a 81 yo amel with recent Acute CVA and Newly diagnosed DM on 9/2/22, HTN, Asthma, HLD, MCMAHAN, Obesity who was admitted for DANY and Metabolic acidosis on Bicarb drip. Nephrology consulted. Pt reported loose BMs -recently started on Metformin. Renal U/s showed no hydronephrosis. Nephrology felt DANY due to prerenal azotemia due to hypotension and GI losses VS  related to metformin induced kidney injury. He recommended to discontinue Metformin and do not restart.     9/15/22: DANY slowly improving 2.4> 2.2. Nephrology recommended to continue bicarb drip.   On 9/16/22 Nephrology, Dr. Ardon, stated the patient's acid base status was better and bicarb infusion discontinued. Pt was stable for discharge. His creatinine was 2.2. Pt and wife advised of stopping metformin due to symptoms of soft, liquid stool. Low dose glipizide was prescribed. Pt and wife were going to monitor for hypoglycemia. Pt's previous home health orders were resumed. He was noted to have mild right sided weakness from stroke that occurred on 9/2/22 and his ASA, Plavix and STATIN were resumed. Pt/wife advised to follow up with his PCP. Pt was seen and examined and determined to be safe and stable for discharge.   
Initial (On Arrival)

## 2022-09-15 NOTE — PT/OT/SLP EVAL
Occupational Therapy   Evaluation and Discharge Note    Name: Jovan Del Cid Jr.  MRN: [id number]  Admitting Diagnosis:  DANY (acute kidney injury)   Recent Surgery: * No surgery found *      Recommendations:     Discharge Recommendations: home  Discharge Equipment Recommendations:  none  Barriers to discharge:  None    Assessment:     Jovan Del Cid Jr. is a 82 y.o. male with a medical diagnosis of DANY (acute kidney injury). At this time, patient is functioning at their prior level of function and does not require further acute OT services.     Plan:     During this hospitalization, patient does not require further acute OT services.  Please re-consult if situation changes.    Plan of Care Reviewed with: patient    Subjective     Chief Complaint: Pain in R big toe, weakness on R side  Patient/Family Comments/goals: return home    Occupational Profile:  Living Environment: lives with wife in a 2 story house, pt's bed/bath on 1st floor. Pt has walk-in shower.  Previous level of function: (I) with ADLs and functional mobility.   Roles and Routines: drives  Equipment Used at home:  none  Assistance upon Discharge: spouse    Pain/Comfort:  Pain Rating 1: 2/10  Location - Side 1: Right  Location - Orientation 1: generalized  Location 1: first toe  Pain Addressed 1: Distraction      Objective:     Communicated with: nurse and epic chart review prior to session.  Patient found supine with peripheral IV, telemetry upon OT entry to room.    General Precautions: Standard, diabetic   Orthopedic Precautions:N/A   Braces: N/A  Respiratory Status: Room air     Occupational Performance:    Bed Mobility:    Patient completed Rolling/Turning to Left with  independence  Patient completed Scooting/Bridging with independence  Patient completed Supine to Sit with independence  Patient completed Sit to Supine with independence    Functional Mobility/Transfers:  Patient completed Sit <> Stand Transfer with modified independence  with   no assistive device  Pt holding onto sink countertop.  Functional Mobility: Patient completed x25ft functional mobility (I) with no AD to increase dynamic standing balance and activity tolerance needed for ADL completion.     Activities of Daily Living:  Lower Body Dressing: independence maria de jesus/doff and tie shoes while sitting EOB    Cognitive/Visual Perceptual:  Cognitive/Psychosocial Skills:     -       Oriented to: Person, Place, Time, and Situation   -       Follows Commands/attention:Follows multistep  commands  -       Communication: clear/fluent  -       Safety awareness/insight to disability: intact     Physical Exam:  Balance:    -       good  Sensation:    -       Impaired  Pt reports numbness and tingling in entire R side extremities, especially R hand and foot.  Upper Extremity Range of Motion:     -       Right Upper Extremity: WNL  -       Left Upper Extremity: WNL  Upper Extremity Strength:    -       Right Upper Extremity: 4/5 grossly  -       Left Upper Extremity: 4+/5 grossly   Strength:    -       Right Upper Extremity: WFL  -       Left Upper Extremity: WNL    AMPAC 6 Click ADL:  AMPAC Total Score: 24    Treatment & Education:  Pt stating that he has been getting up and toileting in the bathroom (I) while in his room. Patient educated on role of OT in acute setting and benefits of participation. Educated on importance of OOB activity and calling for A to meet needs. Educated on BUE AROM therex (shoulder, flexion, elbow flexion/ext, and digit flexion/ext). Encouraged completion of therex throughout the day to tolerance to increase functional strength and activity tolerance. Patient stated understanding and in agreement with POC.     Patient left supine with all lines intact, call button in reach, and nurse notified    GOALS:   Multidisciplinary Problems       Occupational Therapy Goals       Not on file                    History:     Past Medical History:   Diagnosis Date    Acute CVA  (cerebrovascular accident) 9/8/2022    DANY (acute kidney injury) 9/14/2022    Asthma     Bronchitis chronic     Cancer     Cough     Digestive disorder     Hyperlipidemia     Hypertension     Liver disease     KALYAN (obstructive sleep apnea) 9/8/2022    Type 2 diabetes mellitus, without long-term current use of insulin 9/7/2022         Past Surgical History:   Procedure Laterality Date    COLONOSCOPY N/A 6/21/2016    Procedure: COLONOSCOPY;  Surgeon: Alonso Dorsey MD;  Location: [facility];  Service: Endoscopy;  Laterality: N/A;    COLONOSCOPY N/A 11/1/2016    Procedure: COLONOSCOPY;  Surgeon: Alonso Dorsey MD;  Location: [facility] ENDO;  Service: Endoscopy;  Laterality: N/A;    COLONOSCOPY N/A 2/3/2017    Procedure: COLONOSCOPY;  Surgeon: Alonso Dosrey MD;  Location: [facility];  Service: Endoscopy;  Laterality: N/A;    COLONOSCOPY N/A 11/13/2017    Procedure: COLONOSCOPY;  Surgeon: Alonso Dorsey MD;  Location: [facility];  Service: Endoscopy;  Laterality: N/A;    COLONOSCOPY N/A 2/19/2021    Procedure: COLONOSCOPY;  Surgeon: Paula Ricardo MD;  Location: [facility] ENDO;  Service: Endoscopy;  Laterality: N/A;    FLUOROSCOPY N/A 6/15/2018    Procedure: Transjugular liver bx;  Surgeon: Petros Recio MD;  Location: [facility] CATH LAB;  Service: General;  Laterality: N/A;    TONSILLECTOMY         Time Tracking:     OT Date of Treatment: 09/15/22  OT Start Time: 0250  OT Stop Time: 0315  OT Total Time (min): 25 min    Billable Minutes:Evaluation 15  Therapeutic Activity 10    9/15/2022  Ellen Joseph OT

## 2022-09-15 NOTE — ASSESSMENT & PLAN NOTE
Patient with acute kidney injury likely due to IVVD/dehydration DANY is currently improving. Labs reviewed- Renal function/electrolytes with Estimated Creatinine Clearance: 28.3 mL/min (A) (based on SCr of 2.2 mg/dL (H)). according to latest data. Monitor urine output and serial BMP and adjust therapy as needed. Avoid nephrotoxins and renally dose meds for GFR listed above.   Nephrology consulted.   Pt reported loose BMs -recently started on Metformin.   Renal U/s showed no hydronephrosis.   Nephrology felt DANY due to prerenal azotemia due to hypotension and GI losses VS  related to metformin induced kidney injury. He recommended to discontinue Metformin and do not restart. Add Bicarb drip     9/15/22: DANY slowly improving 2.4> 2.2. Nephrology recommended to continue bicarb drip.

## 2022-09-15 NOTE — CONSULTS
Consult received for newly diagnosed diabetes. Patient was diagnosed about 2 weeks ago when he was hospitalized for CVA. Patient wife at bedside. Patient was on metformin at home prior to this hospital visit. Wife had a lot of questions about diabetes diet. Information given and will place a consult for dietitian to see patient and go over diet again. I gave patient and wife additional information and went into detail on different food options, however patient and wife state they also need a low sodium and low cholesterol diet. Wife seems very involved in care of patient and states she has been doing a lot of studying since patient was diagnosed 2 weeks ago. Will recommend outpatient diabetic management also. Patient and wife are comfortable with checking blood glucose daily. Went over signs and symptoms of hyperglycemia and hypoglycemia. Wife and patient verbalized understanding and state they feel better about knowing more about diabetes after this visit. My information given to them to call if additional information needed.

## 2022-09-15 NOTE — ASSESSMENT & PLAN NOTE
Patient's FSGs are uncontrolled due to hyperglycemia on current medication regimen.  Last A1c reviewed-   Lab Results   Component Value Date    HGBA1C 7.8 (H) 09/13/2022     Most recent fingerstick glucose reviewed-   Recent Labs   Lab 09/15/22  1207   POCTGLUCOSE 176*     Current correctional scale  Low  Maintain anti-hyperglycemic dose as follows-   Antihyperglycemics (From admission, onward)    Start     Stop Route Frequency Ordered    09/15/22 1042  insulin aspart U-100 pen 0-5 Units         -- SubQ Before meals & nightly PRN 09/15/22 0944        Hold Oral hypoglycemics while patient is in the hospital.    Hold Metformin, may benefit from Jardiance or Januvia as out pt

## 2022-09-15 NOTE — SUBJECTIVE & OBJECTIVE
Past Medical History:   Diagnosis Date    Acute CVA (cerebrovascular accident) 9/8/2022    DANY (acute kidney injury) 9/14/2022    Asthma     Bronchitis chronic     Cancer     Cough     Digestive disorder     Hyperlipidemia     Hypertension     Liver disease     KALYAN (obstructive sleep apnea) 9/8/2022    Type 2 diabetes mellitus, without long-term current use of insulin 9/7/2022       Past Surgical History:   Procedure Laterality Date    COLONOSCOPY N/A 6/21/2016    Procedure: COLONOSCOPY;  Surgeon: Alonso Dorsey MD;  Location: [facility] ENDO;  Service: Endoscopy;  Laterality: N/A;    COLONOSCOPY N/A 11/1/2016    Procedure: COLONOSCOPY;  Surgeon: Alonso Dorsey MD;  Location: [facility] ENDO;  Service: Endoscopy;  Laterality: N/A;    COLONOSCOPY N/A 2/3/2017    Procedure: COLONOSCOPY;  Surgeon: Alonso Dorsey MD;  Location: [facility];  Service: Endoscopy;  Laterality: N/A;    COLONOSCOPY N/A 11/13/2017    Procedure: COLONOSCOPY;  Surgeon: Alonso Dorsey MD;  Location: [facility] ENDO;  Service: Endoscopy;  Laterality: N/A;    COLONOSCOPY N/A 2/19/2021    Procedure: COLONOSCOPY;  Surgeon: Paula Ricardo MD;  Location: [facility] ENDO;  Service: Endoscopy;  Laterality: N/A;    FLUOROSCOPY N/A 6/15/2018    Procedure: Transjugular liver bx;  Surgeon: Petros Recio MD;  Location: [facility] CATH LAB;  Service: General;  Laterality: N/A;    TONSILLECTOMY         Review of patient's allergies indicates:  No Known Allergies    No current facility-administered medications on file prior to encounter.     Current Outpatient Medications on File Prior to Encounter   Medication Sig    albuterol (VENTOLIN HFA) 90 mcg/actuation inhaler Inhale 2 puffs into the lungs every 6 (six) hours as needed for Wheezing. Rescue    aspirin (ECOTRIN) 81 MG EC tablet Take 1 tablet (81 mg total) by mouth once daily.    clopidogreL (PLAVIX) 75 mg tablet Take 1 tablet (75 mg total) by mouth once daily.    fluticasone propionate (FLONASE) 50  "mcg/actuation nasal spray SHAKE LIQUID AND USE 2 SPRAYS IN EACH NOSTRIL EVERY DAY    metFORMIN (GLUCOPHAGE) 1000 MG tablet Please take 500 mg oral  two time a day ( 1/2 of tablet ) for 7 days and then after take  1000 mg two time a day .    montelukast (SINGULAIR) 10 mg tablet Take 1 tablet (10 mg total) by mouth every evening.    multivitamin (THERAGRAN) per tablet Take 1 tablet by mouth once daily.    pantoprazole (PROTONIX) 40 MG tablet Take 1 tablet (40 mg total) by mouth daily as needed.    rosuvastatin (CRESTOR) 40 MG Tab Take 1 tablet (40 mg total) by mouth once daily.    blood sugar diagnostic Strp 1 strip by Misc.(Non-Drug; Combo Route) route 2 (two) times daily with meals.    blood-glucose meter kit Use as instructed    lancets Misc 1 each by Misc.(Non-Drug; Combo Route) route 2 (two) times daily with meals.    lancing device Misc 1 Device by Misc.(Non-Drug; Combo Route) route 2 (two) times daily with meals.    pen needle, diabetic 31 gauge x 5/16" Ndle 1 each by Misc.(Non-Drug; Combo Route) route 2 (two) times daily with meals.     Family History       Problem Relation (Age of Onset)    Alzheimer's disease Mother    Cancer Father          Tobacco Use    Smoking status: Former     Years: 3.00     Types: Cigarettes, Pipe     Quit date:      Years since quittin.7    Smokeless tobacco: Never    Tobacco comments:     smoked a pipe - quit smoking    Substance and Sexual Activity    Alcohol use: Yes     Alcohol/week: 2.0 - 3.0 standard drinks     Types: 2 - 3 Cans of beer per week     Comment: daily    Drug use: No    Sexual activity: Never     Review of Systems   Constitutional:  Positive for activity change. Negative for chills, diaphoresis, fatigue, fever and unexpected weight change.   HENT: Negative.     Eyes: Negative.    Respiratory: Negative.  Negative for shortness of breath.    Cardiovascular: Negative.  Negative for chest pain, palpitations and leg swelling.   Gastrointestinal: Negative.  " Negative for diarrhea and nausea.   Endocrine: Negative.    Genitourinary: Negative.    Musculoskeletal: Negative.    Skin: Negative.    Allergic/Immunologic: Negative.    Neurological: Negative.    Hematological: Negative.    Psychiatric/Behavioral: Negative.     Objective:     Vital Signs (Most Recent):  Temp: 97.5 °F (36.4 °C) (09/15/22 1205)  Pulse: (!) 58 (09/15/22 1205)  Resp: 18 (09/15/22 1205)  BP: 137/65 (09/15/22 1205)  SpO2: 97 % (09/15/22 1205)   Vital Signs (24h Range):  Temp:  [97.5 °F (36.4 °C)-97.9 °F (36.6 °C)] 97.5 °F (36.4 °C)  Pulse:  [53-65] 58  Resp:  [17-20] 18  SpO2:  [95 %-99 %] 97 %  BP: (107-145)/(60-76) 137/65     Weight: 97.4 kg (214 lb 11.7 oz)  Body mass index is 34.66 kg/m².    Physical Exam  Vitals and nursing note reviewed.   Constitutional:       General: He is not in acute distress.     Appearance: Normal appearance. He is well-developed. He is obese. He is not ill-appearing or diaphoretic.      Comments: Elderly man in NAD, AAOx3, has an egg sized Lipoma R angle of mouth   HENT:      Head: Normocephalic and atraumatic.      Right Ear: External ear normal.      Left Ear: External ear normal.      Nose: Nose normal.      Mouth/Throat:      Mouth: Mucous membranes are dry.      Pharynx: Oropharynx is clear. No oropharyngeal exudate or posterior oropharyngeal erythema.   Eyes:      General: No scleral icterus.     Extraocular Movements: EOM normal.      Conjunctiva/sclera: Conjunctivae normal.      Pupils: Pupils are equal, round, and reactive to light.   Cardiovascular:      Rate and Rhythm: Normal rate and regular rhythm.      Pulses: Normal pulses.      Heart sounds: Normal heart sounds.   Pulmonary:      Effort: Pulmonary effort is normal.      Breath sounds: Normal breath sounds.   Abdominal:      General: Bowel sounds are normal.      Palpations: Abdomen is soft.   Genitourinary:     Penis: Normal.       Prostate: Normal.      Rectum: Normal.   Musculoskeletal:         General:  Normal range of motion.      Cervical back: Normal range of motion and neck supple.      Right lower leg: No edema.      Left lower leg: No edema.   Skin:     General: Skin is warm and dry.      Coloration: Skin is not jaundiced.      Findings: No bruising or lesion.   Neurological:      General: No focal deficit present.      Mental Status: He is alert and oriented to person, place, and time. Mental status is at baseline.      Deep Tendon Reflexes: Reflexes are normal and symmetric.   Psychiatric:         Mood and Affect: Mood normal.         Behavior: Behavior normal.         Thought Content: Thought content normal.         Judgment: Judgment normal.         CRANIAL NERVES     CN III, IV, VI   Pupils are equal, round, and reactive to light.  Extraocular motions are normal.   Results for orders placed or performed during the hospital encounter of 09/14/22   CBC auto differential   Result Value Ref Range    WBC 9.91 3.90 - 12.70 K/uL    RBC 5.17 4.60 - 6.20 M/uL    Hemoglobin 15.8 14.0 - 18.0 g/dL    Hematocrit 47.7 40.0 - 54.0 %    MCV 92 82 - 98 fL    MCH 30.6 27.0 - 31.0 pg    MCHC 33.1 32.0 - 36.0 g/dL    RDW 13.2 11.5 - 14.5 %    Platelets 216 150 - 450 K/uL    MPV 10.6 9.2 - 12.9 fL    Immature Granulocytes 0.3 0.0 - 0.5 %    Gran # (ANC) 7.3 1.8 - 7.7 K/uL    Immature Grans (Abs) 0.03 0.00 - 0.04 K/uL    Lymph # 1.0 1.0 - 4.8 K/uL    Mono # 1.3 (H) 0.3 - 1.0 K/uL    Eos # 0.2 0.0 - 0.5 K/uL    Baso # 0.10 0.00 - 0.20 K/uL    nRBC 0 0 /100 WBC    Gran % 73.4 (H) 38.0 - 73.0 %    Lymph % 10.4 (L) 18.0 - 48.0 %    Mono % 12.6 4.0 - 15.0 %    Eosinophil % 2.3 0.0 - 8.0 %    Basophil % 1.0 0.0 - 1.9 %    Platelet Estimate Appears normal     Differential Method Automated    Comprehensive metabolic panel   Result Value Ref Range    Sodium 132 (L) 136 - 145 mmol/L    Potassium 4.7 3.5 - 5.1 mmol/L    Chloride 100 95 - 110 mmol/L    CO2 17 (L) 23 - 29 mmol/L    Glucose 150 (H) 70 - 110 mg/dL    BUN 37 (H) 8 - 23 mg/dL     Creatinine 2.4 (H) 0.5 - 1.4 mg/dL    Calcium 9.7 8.7 - 10.5 mg/dL    Total Protein 7.7 6.0 - 8.4 g/dL    Albumin 4.6 3.5 - 5.2 g/dL    Total Bilirubin 2.5 (H) 0.1 - 1.0 mg/dL    Alkaline Phosphatase 118 55 - 135 U/L    AST 40 10 - 40 U/L    ALT 69 (H) 10 - 44 U/L    Anion Gap 15 8 - 16 mmol/L    eGFR 26 (A) >60 mL/min/1.73 m^2   Urinalysis, Reflex to Urine Culture Urine, Clean Catch    Specimen: Urine   Result Value Ref Range    Specimen UA Urine, Clean Catch     Color, UA Yellow Yellow, Straw, Drea    Appearance, UA Hazy (A) Clear    pH, UA 6.0 5.0 - 8.0    Specific Gravity, UA 1.010 1.005 - 1.030    Protein, UA 2+ (A) Negative    Glucose, UA Negative Negative    Ketones, UA 1+ (A) Negative    Bilirubin (UA) Negative Negative    Occult Blood UA 2+ (A) Negative    Nitrite, UA Negative Negative    Urobilinogen, UA Negative <2.0 EU/dL    Leukocytes, UA Negative Negative   Urinalysis Microscopic   Result Value Ref Range    RBC, UA 3 0 - 4 /hpf    WBC, UA 18 (H) 0 - 5 /hpf    Bacteria Rare None-Occ /hpf    Squam Epithel, UA 3 /hpf    Hyaline Casts, UA 1 0-1/lpf /lpf    Granular Casts, UA 25 (A) None /lpf    Microscopic Comment SEE COMMENT    Sodium, urine, random   Result Value Ref Range    Sodium, Urine <20 (A) 20 - 250 mmol/L   Creatinine, urine, random   Result Value Ref Range    Creatinine, Urine 62.3 23.0 - 375.0 mg/dL   Comprehensive metabolic panel   Result Value Ref Range    Sodium 137 136 - 145 mmol/L    Potassium 3.7 3.5 - 5.1 mmol/L    Chloride 104 95 - 110 mmol/L    CO2 20 (L) 23 - 29 mmol/L    Glucose 134 (H) 70 - 110 mg/dL    BUN 34 (H) 8 - 23 mg/dL    Creatinine 2.2 (H) 0.5 - 1.4 mg/dL    Calcium 8.6 (L) 8.7 - 10.5 mg/dL    Total Protein 6.0 6.0 - 8.4 g/dL    Albumin 3.7 3.5 - 5.2 g/dL    Total Bilirubin 1.6 (H) 0.1 - 1.0 mg/dL    Alkaline Phosphatase 98 55 - 135 U/L    AST 30 10 - 40 U/L    ALT 54 (H) 10 - 44 U/L    Anion Gap 13 8 - 16 mmol/L    eGFR 29 (A) >60 mL/min/1.73 m^2   Lactic acid, plasma    Result Value Ref Range    Lactate (Lactic Acid) 1.0 0.5 - 2.2 mmol/L   POCT glucose   Result Value Ref Range    POCT Glucose 176 (H) 70 - 110 mg/dL      Significant Labs: All pertinent labs within the past 24 hours have been reviewed.  Imaging Results              US Retroperitoneal Complete (Final result)  Result time 09/14/22 18:43:29      Final result by Yoon Terrell MD (09/14/22 18:43:29)                   Impression:      No evidence for hydronephrosis bilaterally.      Electronically signed by: Xander Nettles  Date:    09/14/2022  Time:    18:43               Narrative:    EXAMINATION:  US RETROPERITONEAL COMPLETE    CLINICAL HISTORY:  r/o hydronephrosis;    TECHNIQUE:  Ultrasound of the kidneys and urinary bladder was performed including color flow and Doppler evaluation of the kidneys.    COMPARISON:  None.    FINDINGS:  Right kidney: Right kidney measures up to 9.9 mm cm in length with a resistive index of 0.71.    Left kidney: The left kidney measures 10.3 cm with a resistive index of 0.69.    The bladder is partially distended at the time of scanning and has an unremarkable appearance.                                     Significant Imaging: I have reviewed all pertinent imaging results/findings within the past 24 hours.

## 2022-09-16 VITALS
BODY MASS INDEX: 37.14 KG/M2 | WEIGHT: 231.06 LBS | RESPIRATION RATE: 17 BRPM | HEIGHT: 66 IN | SYSTOLIC BLOOD PRESSURE: 114 MMHG | DIASTOLIC BLOOD PRESSURE: 70 MMHG | OXYGEN SATURATION: 96 % | TEMPERATURE: 98 F | HEART RATE: 72 BPM

## 2022-09-16 DIAGNOSIS — N17.9 AKI (ACUTE KIDNEY INJURY): Primary | ICD-10-CM

## 2022-09-16 LAB
ALBUMIN SERPL BCP-MCNC: 3.7 G/DL (ref 3.5–5.2)
ALP SERPL-CCNC: 102 U/L (ref 55–135)
ALT SERPL W/O P-5'-P-CCNC: 50 U/L (ref 10–44)
ANION GAP SERPL CALC-SCNC: 12 MMOL/L (ref 8–16)
AST SERPL-CCNC: 33 U/L (ref 10–40)
BACTERIA UR CULT: NORMAL
BASOPHILS # BLD AUTO: 0.04 K/UL (ref 0–0.2)
BASOPHILS NFR BLD: 0.7 % (ref 0–1.9)
BILIRUB SERPL-MCNC: 1.6 MG/DL (ref 0.1–1)
BUN SERPL-MCNC: 31 MG/DL (ref 8–23)
CALCIUM SERPL-MCNC: 8.8 MG/DL (ref 8.7–10.5)
CHLORIDE SERPL-SCNC: 105 MMOL/L (ref 95–110)
CO2 SERPL-SCNC: 24 MMOL/L (ref 23–29)
CREAT SERPL-MCNC: 2.2 MG/DL (ref 0.5–1.4)
DIFFERENTIAL METHOD: ABNORMAL
EOSINOPHIL # BLD AUTO: 0.2 K/UL (ref 0–0.5)
EOSINOPHIL NFR BLD: 3.8 % (ref 0–8)
ERYTHROCYTE [DISTWIDTH] IN BLOOD BY AUTOMATED COUNT: 13.2 % (ref 11.5–14.5)
EST. GFR  (NO RACE VARIABLE): 29 ML/MIN/1.73 M^2
GLUCOSE SERPL-MCNC: 147 MG/DL (ref 70–110)
HCT VFR BLD AUTO: 40.7 % (ref 40–54)
HGB BLD-MCNC: 13.2 G/DL (ref 14–18)
IMM GRANULOCYTES # BLD AUTO: 0.01 K/UL (ref 0–0.04)
IMM GRANULOCYTES NFR BLD AUTO: 0.2 % (ref 0–0.5)
LYMPHOCYTES # BLD AUTO: 0.7 K/UL (ref 1–4.8)
LYMPHOCYTES NFR BLD: 12.1 % (ref 18–48)
MCH RBC QN AUTO: 30.3 PG (ref 27–31)
MCHC RBC AUTO-ENTMCNC: 32.4 G/DL (ref 32–36)
MCV RBC AUTO: 94 FL (ref 82–98)
MONOCYTES # BLD AUTO: 0.8 K/UL (ref 0.3–1)
MONOCYTES NFR BLD: 13.7 % (ref 4–15)
NEUTROPHILS # BLD AUTO: 3.9 K/UL (ref 1.8–7.7)
NEUTROPHILS NFR BLD: 69.5 % (ref 38–73)
NRBC BLD-RTO: 0 /100 WBC
PHOSPHATE SERPL-MCNC: 3.1 MG/DL (ref 2.7–4.5)
PLATELET # BLD AUTO: 146 K/UL (ref 150–450)
PMV BLD AUTO: 10 FL (ref 9.2–12.9)
POCT GLUCOSE: 137 MG/DL (ref 70–110)
POCT GLUCOSE: 207 MG/DL (ref 70–110)
POTASSIUM SERPL-SCNC: 3.7 MMOL/L (ref 3.5–5.1)
PROT SERPL-MCNC: 6.2 G/DL (ref 6–8.4)
RBC # BLD AUTO: 4.35 M/UL (ref 4.6–6.2)
SODIUM SERPL-SCNC: 141 MMOL/L (ref 136–145)
WBC # BLD AUTO: 5.56 K/UL (ref 3.9–12.7)

## 2022-09-16 PROCEDURE — 94761 N-INVAS EAR/PLS OXIMETRY MLT: CPT

## 2022-09-16 PROCEDURE — 84100 ASSAY OF PHOSPHORUS: CPT | Performed by: INTERNAL MEDICINE

## 2022-09-16 PROCEDURE — 97161 PT EVAL LOW COMPLEX 20 MIN: CPT

## 2022-09-16 PROCEDURE — 36415 COLL VENOUS BLD VENIPUNCTURE: CPT | Performed by: NURSE PRACTITIONER

## 2022-09-16 PROCEDURE — 97116 GAIT TRAINING THERAPY: CPT

## 2022-09-16 PROCEDURE — 25000003 PHARM REV CODE 250: Performed by: NURSE PRACTITIONER

## 2022-09-16 PROCEDURE — 25000003 PHARM REV CODE 250: Performed by: INTERNAL MEDICINE

## 2022-09-16 PROCEDURE — 85025 COMPLETE CBC W/AUTO DIFF WBC: CPT | Performed by: NURSE PRACTITIONER

## 2022-09-16 PROCEDURE — 63600175 PHARM REV CODE 636 W HCPCS: Performed by: EMERGENCY MEDICINE

## 2022-09-16 PROCEDURE — 36415 COLL VENOUS BLD VENIPUNCTURE: CPT | Performed by: INTERNAL MEDICINE

## 2022-09-16 PROCEDURE — 80053 COMPREHEN METABOLIC PANEL: CPT | Performed by: NURSE PRACTITIONER

## 2022-09-16 PROCEDURE — 63600175 PHARM REV CODE 636 W HCPCS: Performed by: NURSE PRACTITIONER

## 2022-09-16 PROCEDURE — 99233 SBSQ HOSP IP/OBS HIGH 50: CPT | Mod: ,,, | Performed by: INTERNAL MEDICINE

## 2022-09-16 PROCEDURE — 99233 PR SUBSEQUENT HOSPITAL CARE,LEVL III: ICD-10-PCS | Mod: ,,, | Performed by: INTERNAL MEDICINE

## 2022-09-16 RX ORDER — GLIPIZIDE 2.5 MG/1
2.5 TABLET, EXTENDED RELEASE ORAL
Qty: 90 TABLET | Refills: 1 | Status: SHIPPED | OUTPATIENT
Start: 2022-09-16 | End: 2023-03-31 | Stop reason: SDUPTHER

## 2022-09-16 RX ADMIN — ASPIRIN 81 MG: 81 TABLET, COATED ORAL at 09:09

## 2022-09-16 RX ADMIN — PANTOPRAZOLE SODIUM 40 MG: 40 TABLET, DELAYED RELEASE ORAL at 09:09

## 2022-09-16 RX ADMIN — SODIUM BICARBONATE: 84 INJECTION, SOLUTION INTRAVENOUS at 06:09

## 2022-09-16 RX ADMIN — ATORVASTATIN CALCIUM 40 MG: 40 TABLET, FILM COATED ORAL at 09:09

## 2022-09-16 RX ADMIN — HEPARIN SODIUM 5000 UNITS: 5000 INJECTION, SOLUTION INTRAVENOUS; SUBCUTANEOUS at 06:09

## 2022-09-16 RX ADMIN — CYANOCOBALAMIN 1000 MCG: 1000 INJECTION, SOLUTION INTRAMUSCULAR; SUBCUTANEOUS at 09:09

## 2022-09-16 RX ADMIN — INSULIN ASPART 2 UNITS: 100 INJECTION, SOLUTION INTRAVENOUS; SUBCUTANEOUS at 11:09

## 2022-09-16 NOTE — PT/OT/SLP EVAL
Physical Therapy Evaluation    Patient Name:  Jovan Del Cid Jr.   MRN:  [id number]    Recommendations:     Discharge Recommendations:  outpatient PT   Discharge Equipment Recommendations: none   Barriers to discharge: None    Assessment:     Jovan Del Cid Jr. is a 82 y.o. male admitted with a medical diagnosis of DANY (acute kidney injury).  He presents with the following impairments/functional limitations:  weakness, decreased upper extremity function, pain (Pt reported R sided weakness in UE and LE) Pt reported occasional numbness and tingling in R UE/LE.    Rehab Prognosis: Good; patient would benefit from acute skilled PT services to address these deficits and reach maximum level of function.    Recent Surgery: * No surgery found *      Plan:     During this hospitalization, patient to be seen 3 x/week to address the identified rehab impairments via gait training, therapeutic exercises and progress toward the following goals:    Plan of Care Expires:  09/30/22    Subjective     Chief Complaint: Pain in R foot  Patient/Family Comments/goals: not stated  Pain/Comfort:  Pain Rating 1: 6/10  Location - Side 1: Right  Location 1: foot  Pain Addressed 1: Other (see comments) (Pt reported that pain in R foot is sporadic.)  Pain Rating Post-Intervention 1: 0/10    Patients cultural, spiritual, Sikhism conflicts given the current situation:      Living Environment:  Pt reported that he lives in a 2 story home /c his wife. His bedroom and bathroom are on the 1st level. There is 1 step to enter his home.   Prior to admission, patients level of function was independent and pt was able to drive.  Equipment used at home: none.  DME owned (not currently used): shower chair.  Upon discharge, patient will have assistance from wife.    Objective:     Communicated with nurse Ramonita and [facility] chart review  prior to session.  Patient found supine with telemetry, peripheral IV  upon PT entry to room.Pt verbally agreed to  treatment.     General Precautions: Standard, diabetic   Orthopedic Precautions:N/A   Braces: N/A  Respiratory Status: Room air      Functional Mobility:  Pt was met in room supine in bed. Pt supine>sit EOB /c CGA. Gait belt and  socks were donned. Pt gait trained 270' /s AD /c SBA on level surface. Pt reported 6/10 pain in R foot during gait training, but says it is sporadic pain. Pt completed 3x STS, 15x standing hamstring curls while holding onto sink counter top and 15x standing MIP while holding onto sink counter top. Pt also completed 20x seated AP. Pt was educated to continue TE throughout the day and pt verbally agreed. Pt was left in chair /c call bell in reach and all needs met.     AM-PAC 6 CLICK MOBILITY  Total Score:      Patient left up in chair with call button in reach.    GOALS:   Multidisciplinary Problems       Physical Therapy Goals          Problem: Physical Therapy    Goal Priority Disciplines Outcome Goal Variances Interventions   Physical Therapy Goal     PT, PT/OT      Description: lt22  1. Pt will independently ambulate 400' /s AD.  2. Pt will perform 5x STS independently to improve functional mobility.   3. Pt will perform 20x calf raises to improve RLE strength.                        History:     Past Medical History:   Diagnosis Date    Acute CVA (cerebrovascular accident) 2022    DANY (acute kidney injury) 2022    Asthma     Bronchitis chronic     Cancer     Cough     Digestive disorder     Hyperlipidemia     Hypertension     Liver disease     KALYAN (obstructive sleep apnea) 2022    Type 2 diabetes mellitus, without long-term current use of insulin 2022       Past Surgical History:   Procedure Laterality Date    COLONOSCOPY N/A 2016    Procedure: COLONOSCOPY;  Surgeon: Alonso Dorsey MD;  Location: [facility];  Service: Endoscopy;  Laterality: N/A;    COLONOSCOPY N/A 2016    Procedure: COLONOSCOPY;  Surgeon: Alonso Dorsey MD;  Location:  [facility] ENDO;  Service: Endoscopy;  Laterality: N/A;    COLONOSCOPY N/A 2/3/2017    Procedure: COLONOSCOPY;  Surgeon: Alonso Dorsey MD;  Location: [facility] ENDO;  Service: Endoscopy;  Laterality: N/A;    COLONOSCOPY N/A 11/13/2017    Procedure: COLONOSCOPY;  Surgeon: Alonso Dorsey MD;  Location: [facility] ENDO;  Service: Endoscopy;  Laterality: N/A;    COLONOSCOPY N/A 2/19/2021    Procedure: COLONOSCOPY;  Surgeon: Paula Ricardo MD;  Location: [facility];  Service: Endoscopy;  Laterality: N/A;    FLUOROSCOPY N/A 6/15/2018    Procedure: Transjugular liver bx;  Surgeon: Petros Recio MD;  Location: [facility] CATH LAB;  Service: General;  Laterality: N/A;    TONSILLECTOMY         Time Tracking:     PT Received On:    PT Start Time: 1019     PT Stop Time: 1045  PT Total Time (min): 26 min     Billable Minutes: Evaluation 10 and Gait Training 16      09/16/2022

## 2022-09-16 NOTE — NURSING
Pt was given written and verbal discharge education. Patient verbalized understanding. Pt IV was removed, catheter intact. Telemetry removed. Pt was discharged in stable condition to home.

## 2022-09-16 NOTE — CONSULTS
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 Recent dx DM2. RD remote for coverage, spoke with pt's wife on room phone. Reports pt's appetite improving. Still learning Diabetic/Low salt diet, educated and answered all questions. Handouts attached to discharge papers -pt's wife grateful for handouts.       All questions and concerns answered.    Follow-Up: Yes    Please Re-consult as needed        Thanks!

## 2022-09-16 NOTE — PROGRESS NOTES
"[facility] - Med [facility]  Nephrology  Progress Note    Patient Name: Jovan Del Cid Jr.  MRN: [id number]  Admission Date: 9/14/2022  Hospital Length of Stay: 1 days  Attending Provider: Zaki Diane, *   Primary Care Physician: Paul Neal MD  Principal Problem:DANY (acute kidney injury)    Subjective:     HPI: Pt was seen and examined. H/o, chart, Labs and meds reviewed. Discussed with other providers. Pt is a 81 y/o male with newly diagnosed DM and recent acute lacunar stroke who was sent to ER for abnormal labs related to abrupt increase in Cr and metabolic acidosis. Pt himself has no c/os's or symptoms, except for "loose BM's" in the past week. His BP was low compared to his past BP's. Pt is not on any BP meds. Pt is noted to have been started on metformin on 9/4/22 at 500 mg bid x 1 weeks, then 1000 mg bid. No other pertinent h/o, denies diarrhea, but admits to loose BM's, no NSAIds taken, no abx, no recent infections, no nausea, no abdominal pain, no sx's of dysuria, no cardiopulmonary sx's.    *For 9/16/22: Feels very good today; voices no acute c/o. UOP not recorded.     Interval History: Pt was seen and examined. Labs and meds reviewed. Discussed with other providers. No new c/o's, no abdominal pain, no nausea, no diarrhea, had soft BM. No SOB.    Review of patient's allergies indicates:  No Known Allergies  Current Facility-Administered Medications   Medication Frequency    acetaminophen tablet 650 mg Q4H PRN    albuterol-ipratropium 2.5 mg-0.5 mg/3 mL nebulizer solution 3 mL Q6H PRN    aluminum-magnesium hydroxide-simethicone 200-200-20 mg/5 mL suspension 30 mL QID PRN    aspirin EC tablet 81 mg Daily    atorvastatin tablet 40 mg Daily    clopidogreL tablet 75 mg Daily    fluticasone propionate 50 mcg/actuation nasal spray 100 mcg Daily    glucagon (human recombinant) injection 1 mg PRN    glucose chewable tablet 16 g PRN    glucose chewable tablet 24 g PRN    heparin (porcine) injection " 5,000 Units Q8H    HYDROcodone-acetaminophen 5-325 mg per tablet 1 tablet Q6H PRN    insulin aspart U-100 pen 0-5 Units QID (AC + HS) PRN    magnesium oxide tablet 800 mg PRN    magnesium oxide tablet 800 mg PRN    melatonin tablet 6 mg Nightly PRN    montelukast tablet 10 mg QHS    naloxone 0.4 mg/mL injection 0.02 mg PRN    ondansetron injection 4 mg Q6H PRN    pantoprazole EC tablet 40 mg Daily    potassium bicarbonate disintegrating tablet 35 mEq PRN    potassium bicarbonate disintegrating tablet 50 mEq PRN    potassium bicarbonate disintegrating tablet 60 mEq PRN    potassium, sodium phosphates 280-160-250 mg packet 2 packet PRN    potassium, sodium phosphates 280-160-250 mg packet 2 packet PRN    potassium, sodium phosphates 280-160-250 mg packet 2 packet PRN    prochlorperazine injection Soln 5 mg Q6H PRN    simethicone chewable tablet 80 mg QID PRN    sodium bicarbonate 75 mEq in sodium chloride 0.45% 1,075 mL infusion Continuous    sodium chloride 0.9% flush 10 mL Q8H PRN       Objective:     Vital Signs (Most Recent):  Temp: 97.5 °F (36.4 °C) (09/15/22 0402)  Pulse: (!) 53 (09/15/22 0739)  Resp: 20 (09/15/22 0739)  BP: (!) 142/66 (09/15/22 0739)  SpO2: 98 % (09/15/22 0739)  O2 Device (Oxygen Therapy): room air (09/14/22 2131)   Vital Signs (24h Range):  Temp:  [97.5 °F (36.4 °C)-98.5 °F (36.9 °C)] 97.5 °F (36.4 °C)  Pulse:  [53-83] 53  Resp:  [17-20] 20  SpO2:  [95 %-99 %] 98 %  BP: (105-145)/(60-76) 142/66     Weight: 97.4 kg (214 lb 11.7 oz) (09/14/22 1130)  Body mass index is 34.66 kg/m².  Body surface area is 2.13 meters squared.    No intake/output data recorded.    Physical Exam  Vitals and nursing note reviewed.   Constitutional:       Appearance: Normal appearance.   HENT:      Head: Normocephalic and atraumatic.   Cardiovascular:      Rate and Rhythm: Normal rate and regular rhythm.      Pulses: Normal pulses.      Heart sounds: Normal heart sounds.   Pulmonary:      Effort: Pulmonary effort is  normal. No respiratory distress.      Breath sounds: Normal breath sounds. No rales.   Abdominal:      Palpations: Abdomen is soft.      Tenderness: There is no abdominal tenderness. There is no guarding.   Musculoskeletal:      Right lower leg: No edema.      Left lower leg: No edema.   Skin:     General: Skin is warm and dry.   Neurological:      Mental Status: He is alert and oriented to person, place, and time.   Psychiatric:         Behavior: Behavior normal.       Significant Labs: reviewed  BMP  Lab Results   Component Value Date     09/15/2022    K 3.7 09/15/2022     09/15/2022    CO2 20 (L) 09/15/2022    BUN 34 (H) 09/15/2022    CREATININE 2.2 (H) 09/15/2022    CALCIUM 8.6 (L) 09/15/2022    ANIONGAP 13 09/15/2022    ESTGFRAFRICA >60.0 01/20/2021    EGFRNONAA >60.0 01/20/2021     Lab Results   Component Value Date    WBC 9.91 09/14/2022    HGB 15.8 09/14/2022    HCT 47.7 09/14/2022    MCV 92 09/14/2022     09/14/2022     Lactic acid 1.0  Urine na <20, urine Cr 62, FENa = 0.5%    Significant Imaging: reviewed  Renal u/s: no hydronephrosis    Assessment/Plan:     81 y/o male with new diagnosis of DM and DANY:     DANY (acute kidney injury)  s Cr lower since admit. Reason not clear at this point  DANY stable, appears resolving  Metabolic acidosis, stable, improved  New start on metformin    DANY suspect due to GI side effects of metformin: soft BM/diarrhea, low BP  Doubt related to more direct kidney or liver injury: no abdominal symptoms, lactic acid normal, Cr already improving with IVF's and after holding metformin.  Also, has low urine Na and FENa<1% c/w prerenal azotemia  Pt has elevated total bilirubin, but that is not new (at least 1 year)  Other liver tests unremarkable (slight increase in ALT noted, improving)      Recommend:  Continue IVF's: 1/2 NS with 1.5 amp bicarbonate added per L at 100 ml/hr  Do not re-start metformin     T2DM (type 2 diabetes mellitus)  New and recent diagnosis  noted  Discussed with pt and wife, diabetes educator in room.  Informed pt of ADA diet  Consider low dose of glipizide 2.5 mg po qd  Last d/c summary reviewed     Acute CVA (cerebrovascular accident)  Last hospital records reviewed  Acute lacunar stroke, has right sided weakness           Plans and recommendations:  As discussed above  Total time spent 40 minutes including time needed to review the records, the   patient evaluation, documentation, face-to-face discussion with the patient,   more than 50% of the time was spent on coordination of care and counseling.      *For 9/16/22:  acid base status better; can dc bicarb gtt; increase PO; dc planning ok from renal perspective; can f/u with us in office after dc; explained to wife need for remaining off Diovan/HCTZ for now until GFR back to normal. His low BP likely precipitated his DANY.     Madhav Ardon MD  Nephrology  [facility]

## 2022-09-16 NOTE — PLAN OF CARE
[facility]  Discharge Final Note    Primary Care Provider: Paul Neal MD    Expected Discharge Date: 9/16/2022    Final Discharge Note (most recent)       Final Note - 09/16/22 1257          Final Note    Assessment Type Final Discharge Note     Anticipated Discharge Disposition Home or Self Care     Hospital Resources/Appts/Education Provided Provided patient/caregiver with written discharge plan information;Appointments scheduled and added to AVS        Post-Acute Status    Discharge Delays None known at this time                     Important Message from Medicare             Contact Info       Paul Neal MD   Specialty: Internal Medicine   Relationship: PCP - General    [address] [facility] [address] [place] [address]   Phone: [phone]       Next Steps: Schedule an appointment as soon as possible for a visit in 2 day(s)    Thais Hansen RD, CDE   Specialty: Diabetes, Nutrition, Endocrinology    [address] [facility]  [facility] [place] [address]   Phone: [phone]       Next Steps: Follow up    Instructions: message sent to make patient appt.

## 2022-09-16 NOTE — PLAN OF CARE
Problem: Adult Inpatient Plan of Care  Goal: Plan of Care Review  Outcome: Ongoing, Progressing     Problem: Adult Inpatient Plan of Care  Goal: Patient-Specific Goal (Individualized)  Outcome: Ongoing, Progressing     Problem: Adult Inpatient Plan of Care  Goal: Absence of Hospital-Acquired Illness or Injury  Outcome: Ongoing, Progressing     Problem: Adult Inpatient Plan of Care  Goal: Optimal Comfort and Wellbeing  Outcome: Ongoing, Progressing     Problem: Adult Inpatient Plan of Care  Goal: Readiness for Transition of Care  Outcome: Ongoing, Progressing     Problem: Diabetes Comorbidity  Goal: Blood Glucose Level Within Targeted Range  Outcome: Ongoing, Progressing

## 2022-09-16 NOTE — PLAN OF CARE
Pt is stable and on room air. Pt walked down urbina with PT today and did well. Pt still on telemetry. Dc'd continuous IV fluids as ordered. Pt progressing. Pt is oriented and call light is in reach.

## 2022-09-17 NOTE — DISCHARGE SUMMARY
[facility] Medicine  Discharge Summary      Patient Name: Jovan Del Cid Jr.  MRN: [id number]  Patient Class: IP- Inpatient  Admission Date: 9/14/2022  Hospital Length of Stay: 1 days  Discharge Date and Time: 9/16/2022  3:13 PM  Attending Physician: Zaki Diane MD   Discharging Provider: Deann Wolf NP  Primary Care Provider: Paul Neal MD      HPI:   Jovan Del Cid Jr. is a 82 y.o. 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 Pt and his wife are poor historians, most of the hx obtained from the chart. Pt was recently dx with DM2 when he had the CVA and was started on Metformin. Pt notes decreased fluid intake recently due to loss of taste. He also has R sided numbness of the body since the strokes but it is improving. Pt not sure if his loss of taste is due to the CVA or Metformin. No recent Covid infection. Patient denies any fever, chills, n/v/d, SOB, CP, weakness, numbness, dizziness, headache, and all other sxs at this time. No prior Tx reported. No further complaints or concerns at this time.     In the ER, VSS afebrile, CBC normal, Bun/Cr 37/2.4, HCO3 17- renal function normal previously. Pt received a bolus of NS in the ER and started on Bicarb IVF in the ER. Pt is being placed in Observation under [facility]osp Med for DANY with Metabolic Acidosis likely sec to IVVD.         * No surgery found *      Hospital Course:   The patient is a 83 yo amel with recent Acute CVA and Newly diagnosed DM on 9/2/22, HTN, Asthma, HLD, MCMAHAN, Obesity who was admitted for DANY and Metabolic acidosis on Bicarb drip. Nephrology consulted. Pt reported loose BMs -recently started on Metformin. Renal U/s showed no hydronephrosis. Nephrology felt DANY due to prerenal azotemia due to hypotension and GI losses VS   related to metformin induced kidney injury. He recommended to discontinue Metformin and do not restart.     9/15/22: DANY slowly improving 2.4> 2.2. Nephrology recommended to continue bicarb drip.   On 9/16/22 Nephrology, Dr. Ardon, stated the patient's acid base status was better and bicarb infusion discontinued. Pt was stable for discharge. His creatinine was 2.2. Pt and wife advised of stopping metformin due to symptoms of soft, liquid stool. Low dose glipizide was prescribed. Pt and wife were going to monitor for hypoglycemia. Pt's previous home health orders were resumed. He was noted to have mild right sided weakness from stroke that occurred on 9/2/22 and his ASA, Plavix and STATIN were resumed. Pt/wife advised to follow up with his PCP. Pt was seen and examined and determined to be safe and stable for discharge.        Goals of Care Treatment Preferences:  Code Status: Full Code      Consults:   Consults (From admission, onward)        Status Ordering Provider     Inpatient consult to Registered Dietitian/Nutritionist  Once        Provider:  (Not yet assigned)    Completed DON LORD     Inpatient consult to Diabetes educator  Once        Provider:  (Not yet assigned)    Completed DON LORD.          No new Assessment & Plan notes have been filed under this hospital service since the last note was generated.  Service: Hospital Medicine    Final Active Diagnoses:    Diagnosis Date Noted POA    PRINCIPAL PROBLEM:  DANY (acute kidney injury) [N17.9] 09/14/2022 Yes    Metabolic acidosis [E87.2] 09/15/2022 Yes    Acute CVA (cerebrovascular accident) [I63.9] 09/08/2022 Yes    T2DM (type 2 diabetes mellitus) [E11.9] 07/20/2021 Yes      Problems Resolved During this Admission:       Discharged Condition: stable    Disposition: Home-Health Care [facility]    Follow Up:   Follow-up Information     Paul Neal MD. Schedule an appointment as soon as possible for a visit in 2 days.    Specialty:  Internal Medicine  Contact information:  [address] [place] [address] [phone]             Thais Hansen RD, CDE Follow up.    Specialties: Diabetes, Nutrition, Endocrinology  Why: message sent to make patient appt.  Contact information:  [address]  [place] [address]  [phone]                       Patient Instructions:      Notify your health care provider if you experience any of the following:  temperature >100.4     Notify your health care provider if you experience any of the following:  persistent nausea and vomiting or diarrhea     Notify your health care provider if you experience any of the following:  persistent dizziness, light-headedness, or visual disturbances     Activity as tolerated       Significant Diagnostic Studies: Labs:   CMP   Recent Labs   Lab 09/16/22 0527      K 3.7      CO2 24   *   BUN 31*   CREATININE 2.2*   CALCIUM 8.8   PROT 6.2   ALBUMIN 3.7   BILITOT 1.6*   ALKPHOS 102   AST 33   ALT 50*   ANIONGAP 12    and CBC   Recent Labs   Lab 09/16/22 0527   WBC 5.56   HGB 13.2*   HCT 40.7   *       Pending Diagnostic Studies:     None         Medications:  Reconciled Home Medications:      Medication List      START taking these medications    glipiZIDE 2.5 MG Tr24  Commonly known as: GLUCOTROL  Take 1 tablet (2.5 mg total) by mouth daily with breakfast.        CONTINUE taking these medications    albuterol 90 mcg/actuation inhaler  Commonly known as: VENTOLIN HFA  Inhale 2 puffs into the lungs every 6 (six) hours as needed for Wheezing. Rescue     aspirin 81 MG EC tablet  Commonly known as: ECOTRIN  Take 1 tablet (81 mg total) by mouth once daily.     blood sugar diagnostic Strp  1 strip by Misc.(Non-Drug; Combo Route) route 2 (two) times daily with meals.     blood-glucose meter kit  Use as instructed     clopidogreL 75 mg tablet  Commonly known as: PLAVIX  Take 1 tablet (75 mg total) by mouth once daily.     fluticasone  "propionate 50 mcg/actuation nasal spray  Commonly known as: FLONASE  SHAKE LIQUID AND USE 2 SPRAYS IN EACH NOSTRIL EVERY DAY     lancets Misc  1 each by Misc.(Non-Drug; Combo Route) route 2 (two) times daily with meals.     lancing device Misc  1 Device by Misc.(Non-Drug; Combo Route) route 2 (two) times daily with meals.     montelukast 10 mg tablet  Commonly known as: SINGULAIR  Take 1 tablet (10 mg total) by mouth every evening.     multivitamin per tablet  Commonly known as: THERAGRAN  Take 1 tablet by mouth once daily.     pantoprazole 40 MG tablet  Commonly known as: PROTONIX  Take 1 tablet (40 mg total) by mouth daily as needed.     pen needle, diabetic 31 gauge x 5/16" Ndle  1 each by Misc.(Non-Drug; Combo Route) route 2 (two) times daily with meals.     rosuvastatin 40 MG Tab  Commonly known as: CRESTOR  Take 1 tablet (40 mg total) by mouth once daily.        STOP taking these medications    metFORMIN 1000 MG tablet  Commonly known as: GLUCOPHAGE            Indwelling Lines/Drains at time of discharge:   Lines/Drains/Airways     None                 Time spent on the discharge of patient: > 30 minutes         Deann Wolf NP  Department of Hospital Medicine  [facility]  "

## 2022-09-20 ENCOUNTER — PATIENT OUTREACH (OUTPATIENT)
Dept: ADMINISTRATIVE | Facility: HOSPITAL | Age: 82
End: 2022-09-20
Payer: COMMERCIAL

## 2022-09-20 ENCOUNTER — EXTERNAL HOME HEALTH (OUTPATIENT)
Dept: HOME HEALTH SERVICES | Facility: HOSPITAL | Age: 82
End: 2022-09-20
Payer: COMMERCIAL

## 2022-09-21 ENCOUNTER — HOSPITAL ENCOUNTER (OUTPATIENT)
Dept: RADIOLOGY | Facility: HOSPITAL | Age: 82
Discharge: HOME | End: 2022-09-21
Attending: INTERNAL MEDICINE
Payer: COMMERCIAL

## 2022-09-21 ENCOUNTER — OFFICE VISIT (OUTPATIENT)
Dept: PULMONOLOGY | Facility: CLINIC | Age: 82
End: 2022-09-21
Payer: MEDICARE

## 2022-09-21 ENCOUNTER — CLINICAL SUPPORT (OUTPATIENT)
Dept: DIABETES | Facility: CLINIC | Age: 82
End: 2022-09-21
Payer: COMMERCIAL

## 2022-09-21 VITALS
RESPIRATION RATE: 14 BRPM | DIASTOLIC BLOOD PRESSURE: 78 MMHG | BODY MASS INDEX: 34.49 KG/M2 | OXYGEN SATURATION: 97 % | HEIGHT: 66 IN | SYSTOLIC BLOOD PRESSURE: 140 MMHG | HEART RATE: 78 BPM | WEIGHT: 214.63 LBS

## 2022-09-21 DIAGNOSIS — G47.33 OSA (OBSTRUCTIVE SLEEP APNEA): Primary | ICD-10-CM

## 2022-09-21 DIAGNOSIS — E78.5 DYSLIPIDEMIA: ICD-10-CM

## 2022-09-21 DIAGNOSIS — Z86.73 HISTORY OF LACUNAR CEREBROVASCULAR ACCIDENT: ICD-10-CM

## 2022-09-21 DIAGNOSIS — J45.20 MILD INTERMITTENT ASTHMA WITHOUT COMPLICATION: ICD-10-CM

## 2022-09-21 DIAGNOSIS — I10 PRIMARY HYPERTENSION: ICD-10-CM

## 2022-09-21 DIAGNOSIS — J32.0 CHRONIC MAXILLARY SINUSITIS: ICD-10-CM

## 2022-09-21 DIAGNOSIS — R05.3 CHRONIC COUGH: ICD-10-CM

## 2022-09-21 DIAGNOSIS — E11.65 TYPE 2 DIABETES MELLITUS WITH HYPERGLYCEMIA, WITHOUT LONG-TERM CURRENT USE OF INSULIN: Primary | ICD-10-CM

## 2022-09-21 DIAGNOSIS — J45.20 MILD INTERMITTENT ASTHMA WITHOUT COMPLICATION: Chronic | ICD-10-CM

## 2022-09-21 PROCEDURE — 1111F DSCHRG MED/CURRENT MED MERGE: CPT | Mod: CPTII,S$GLB,, | Performed by: INTERNAL MEDICINE

## 2022-09-21 PROCEDURE — 3077F SYST BP >= 140 MM HG: CPT | Mod: CPTII,S$GLB,, | Performed by: INTERNAL MEDICINE

## 2022-09-21 PROCEDURE — 1125F PR PAIN SEVERITY QUANTIFIED, PAIN PRESENT: ICD-10-PCS | Mod: CPTII,S$GLB,, | Performed by: INTERNAL MEDICINE

## 2022-09-21 PROCEDURE — 99999 PR PBB SHADOW E&M-EST. PATIENT-LVL III: CPT | Mod: PBBFAC,,, | Performed by: DIETITIAN, REGISTERED

## 2022-09-21 PROCEDURE — 3078F PR MOST RECENT DIASTOLIC BLOOD PRESSURE < 80 MM HG: ICD-10-PCS | Mod: CPTII,S$GLB,, | Performed by: INTERNAL MEDICINE

## 2022-09-21 PROCEDURE — 99999 PR PBB SHADOW E&M-EST. PATIENT-LVL V: ICD-10-PCS | Mod: PBBFAC,,, | Performed by: INTERNAL MEDICINE

## 2022-09-21 PROCEDURE — 3078F DIAST BP <80 MM HG: CPT | Mod: CPTII,S$GLB,, | Performed by: INTERNAL MEDICINE

## 2022-09-21 PROCEDURE — 71046 X-RAY EXAM CHEST 2 VIEWS: CPT | Mod: TC

## 2022-09-21 PROCEDURE — G0108 DIAB MANAGE TRN  PER INDIV: HCPCS | Mod: S$GLB,,, | Performed by: DIETITIAN, REGISTERED

## 2022-09-21 PROCEDURE — 3288F PR FALLS RISK ASSESSMENT DOCUMENTED: ICD-10-PCS | Mod: CPTII,S$GLB,, | Performed by: INTERNAL MEDICINE

## 2022-09-21 PROCEDURE — 3288F FALL RISK ASSESSMENT DOCD: CPT | Mod: CPTII,S$GLB,, | Performed by: INTERNAL MEDICINE

## 2022-09-21 PROCEDURE — G0108 PR DIAB MANAGE TRN  PER INDIV: ICD-10-PCS | Mod: S$GLB,,, | Performed by: DIETITIAN, REGISTERED

## 2022-09-21 PROCEDURE — 99999 PR PBB SHADOW E&M-EST. PATIENT-LVL V: CPT | Mod: PBBFAC,,, | Performed by: INTERNAL MEDICINE

## 2022-09-21 PROCEDURE — 99214 PR OFFICE/OUTPT VISIT, EST, LEVL IV, 30-39 MIN: ICD-10-PCS | Mod: S$GLB,,, | Performed by: INTERNAL MEDICINE

## 2022-09-21 PROCEDURE — 71046 XR CHEST PA AND LATERAL: ICD-10-PCS | Mod: 26,,, | Performed by: RADIOLOGY

## 2022-09-21 PROCEDURE — 71046 X-RAY EXAM CHEST 2 VIEWS: CPT | Mod: 26,,, | Performed by: RADIOLOGY

## 2022-09-21 PROCEDURE — 1125F AMNT PAIN NOTED PAIN PRSNT: CPT | Mod: CPTII,S$GLB,, | Performed by: INTERNAL MEDICINE

## 2022-09-21 PROCEDURE — 1100F PR PT FALLS ASSESS DOC 2+ FALLS/FALL W/INJURY/YR: ICD-10-PCS | Mod: CPTII,S$GLB,, | Performed by: INTERNAL MEDICINE

## 2022-09-21 PROCEDURE — 1100F PTFALLS ASSESS-DOCD GE2>/YR: CPT | Mod: CPTII,S$GLB,, | Performed by: INTERNAL MEDICINE

## 2022-09-21 PROCEDURE — 1111F PR DISCHARGE MEDS RECONCILED W/ CURRENT OUTPATIENT MED LIST: ICD-10-PCS | Mod: CPTII,S$GLB,, | Performed by: INTERNAL MEDICINE

## 2022-09-21 PROCEDURE — 99214 OFFICE O/P EST MOD 30 MIN: CPT | Mod: S$GLB,,, | Performed by: INTERNAL MEDICINE

## 2022-09-21 PROCEDURE — 3077F PR MOST RECENT SYSTOLIC BLOOD PRESSURE >= 140 MM HG: ICD-10-PCS | Mod: CPTII,S$GLB,, | Performed by: INTERNAL MEDICINE

## 2022-09-21 PROCEDURE — 99999 PR PBB SHADOW E&M-EST. PATIENT-LVL III: ICD-10-PCS | Mod: PBBFAC,,, | Performed by: DIETITIAN, REGISTERED

## 2022-09-21 NOTE — PROGRESS NOTES
Diabetes Care Specialist Follow-up Note  Author: Thais Hansen RD, CDE  Date: 9/21/2022    Program Intake  Reason for Diabetes Program Visit:: Initial Diabetes Assessment  Current diabetes risk level:: moderate  In the last 12 months, have you:: been admitted to a hospital  Was the ER or hospital admission related to diabetes?: No    Lab Results   Component Value Date    HGBA1C 7.8 (H) 09/13/2022       Wife was present for today's session.     Clinical         Problem Review  Reviewed Problem List with Patient: yes  Active comorbidities affecting diabetes self-care.: yes  Comorbidities: Stroke, Hypertension  Reviewed health maintenance: yes    Clinical Assessment  Current Diabetes Treatment: Oral Medication, Diet  Have you ever experienced hypoglycemia (low blood sugar)?: no  Have you ever experienced hyperglycemia (high blood sugar)?: no    Medication Information  How do you obtain your medications?: Patient drives  How many days a week do you miss your medications?: Never  Do you sometimes have difficulty refilling your medications?: No  Medication adherence impacting ability to self-manage diabetes?: No    Labs  Do you have regular lab work to monitor your medications?: Yes  Type of Regular Lab Work: A1c, Cholesterol, Microalbumin, CBC  Where do you get your labs drawn?: [facility]  Lab Compliance Barriers: No    Nutritional Status  Diet: Diabetic diet  Meal Plan 24 Hour Recall: Breakfast, Lunch, Dinner  Meal Plan 24 Hour Recall - Breakfast: egg on whole wheat toast, six strawberries, water  Meal Plan 24 Hour Recall - Lunch: Late lunch: small hamburger (broiled), green salad (lettuce, tomato, avocado, eggs), thousand island dressing, water  Meal Plan 24 Hour Recall - Dinner: sugar free vanilla pudding, small salad  Change in appetite?: No  Current nutritional status an area of need that is impacting patient's ability to self-manage diabetes?: No    Additional Social History    Support  Does anyone support you with  your diabetes care?: yes  Who supports you?: spouse, self  Who takes you to your medical appointments?: self, spouse  Does the current support meet the patient's needs?: Yes  Is Support an area impacting ability to self-manage diabetes?: No    Access to Mass Media & Technology  Does the patient have access to any of the following devices or technologies?: Smart phone  Media or technology needs impacting ability to self-manage diabetes?: No    Cognitive/Behavioral Health  Alert and Oriented: Yes  Difficulty Thinking: No  Requires Prompting: No  Requires assistance for routine expression?: No  Cognitive or behavioral barriers impacting ability to self-manage diabetes?: No    Culture/Muslim  Culture or Evangelical beliefs that may impact ability to access healthcare: No    Communication  Language preference: English  Hearing Problems: No  Vision Problems: No  Communication needs impacting ability to self-manage diabetes?: No    Health Literacy  Preferred Learning Method: Face to Face, Reading Materials  Health literacy needs impacting ability to self-manage diabetes?: No      Diabetes Self-Management Skills Assessment     Diabetes Disease Process/Treatment Options  Patient/caregiver able to state what happens when someone has diabetes.: yes  Patient/caregiver knows what type of diabetes they have.: yes  Diabetes Type : Type II  Patient/caregiver able to identify at least three signs and symptoms of diabetes.: no  Patient able to identify at least three risk factors for diabetes.: no  Diabetes Disease Process/Treatment Options: Skills Assessment Completed: Yes  Assessment indicates:: Instruction Needed, Knowledge deficit  Area of need?: Yes    Nutrition/Healthy Eating  Method of carbohydrate measurement:: carb counting/reading labels, measuring cups/spoons  Patient can identify foods that impact blood sugar.: yes  Patient-identified foods:: starches (bread, pasta, rice, cereal), starchy vegetables (corn, peas,  beans)  Nutrition/Healthy Eating Skills Assessment Completed:: Yes  Assessment indicates:: Instruction Needed, Knowledge deficit  Area of need?: Yes    Physical Activity/Exercise  Physical Activity/Exercise Skills Assessment Completed: : No  Deffered due to:: Time    Medications  Patient is able to describe current diabetes management routine.: yes  Diabetes management routine:: diet, oral medications  Patient is able to identify current diabetes medications, dosages, and appropriate timing of medications.: yes (Glipizide 2.5mg in AM)  Patient understands the purpose of the medications taken for diabetes.: yes  Patient reports problems or concerns with current medication regimen.: no  Medication Skills Assessment Completed:: Yes  Assessment indicates:: Instruction Needed  Area of need?: Yes    Home Blood Glucose Monitoring  Patient states that blood sugar is checked at home daily.: yes  Monitoring Method:: home glucometer  How often do you check your blood sugar?: Twice a day  When do you check your blood sugar?: Before breakfast, 2 hours after meal  When you check what is your typical blood sugar range? : [id number]  Blood glucose logs:: no (wife forgot at home)  Home Blood Glucose Monitoring Skills Assessment Completed: : Yes  Assessment indicates:: Instruction Needed  Area of need?: Yes    Acute Complications  Patient is able to identify types of acute complications: No  Acute Complications Skills Assessment Completed: : Yes  Assessment indicates:: Instruction Needed, Knowledge deficit  Area of need?: Yes    Chronic Complications  Patient can identify major chronic complications of diabetes.: yes  Stated chronic complications:: neuropathy/nerve damage, kidney disease  Patient can identify ways to prevent or delay diabetes complications.: yes  Stated ways to prevent complications:: controlling blood sugar  Patient is taking statin?: Yes  Chronic Complications Skills Assessment Completed: : Yes  Assessment  indicates:: Instruction Needed, Knowledge deficit  Area of need?: Yes    Psychosocial/Coping  Psychosocial/Coping Skills Assessment Completed: : No  Deffered due to:: Time      During today's follow-up visit,  the following areas required further assessment and content was provided/reviewed.    Based on today's diabetes care assessment, the following areas of need were identified:      Social 9/21/2022   Support No   Access to Mass Media/Tech No   Cognitive/Behavioral Health No   Culture/Jew No   Communication No   Health Literacy No        Clinical 9/21/2022   Medication Adherence No   Lab Compliance No   Nutritional Status No        Diabetes Self-Management Skills 9/21/2022   Diabetes Disease Process/Treatment Options Yes- Reviewed pathophysiology of type 2 diabetes, complications related to the disease, importance of annual dilated eye exam, and daily foot exam.     Nutrition/Healthy Eating Yes- see care plan.   Wife is cooking meals for patient and monitoring carb and sodium intake. Reviewed tips for eating out and provided information on the Eat Fit program.    Medication Yes- educated on action of medication.    Home Blood Glucose Monitoring Yes- Reviewed goals for FBG, PPBG and HS and provided additional BG logs for patient to complete.    Acute Complications Yes- Reviewed blood glucose goals, prevention, detection, signs and symptoms, and treatment of hypoglycemia and hyperglycemia, and when to contact the clinic.     Chronic Complications Yes- Discussed importance of A1c less than 7 to reduce risk of micro and macro complications, including nephropathy, neuropathy, retinopathy, heart attack and stroke. Reviewed the importance of controlled Blood Pressure and Cholesterol Lab Values in preventing disease.   Health maintenance reviewed          Today's interventions were provided through individual discussion, instruction, and written materials were provided.    Patient verbalized understanding of  instruction and written materials.  Pt was able to return back demonstration of instructions today. Patient understood key points, needs reinforcement and further instruction.     Diabetes Self-Management Care Plan Review:      During today's follow-up Jovan's Diabetes Self-Management Care Plan progress was reviewed and progress was evaluated including his/her input. Jovan has agreed to continue his/her journey to improve/maintain overall diabetes control by continuing to set health goals. See care plan progress below.      Care Plan: Diabetes Management   Updates made since 8/22/2022 12:00 AM        Problem: Healthy Eating         Goal: Eat 3 meals daily with 30-45g/2-3 servings of Carbohydrate per meal.    Start Date: 9/21/2022   Expected End Date: 12/21/2022   Priority: High   Barriers: Knowledge deficit        Task: Reviewed the sources and role of Carbohydrate, Protein, and Fat and how each nutrient impacts blood sugar. Completed 9/21/2022        Task: Provided visual examples using dry measuring cups, food models, and other familiar objects such as computer mouse, deck or cards, tennis ball etc. to help with visualization of portions. Completed 9/21/2022        Task: Discussed strategies for choosing healthier menu options when dining out. Completed 9/21/2022        Task: Review the importance of balancing carbohydrates with each meal using portion control techniques to count servings of carbohydrate and label reading to identify serving size and amount of total carbs per serving. Completed 9/21/2022        Task: Provided Sample plate method and reviewed the use of the plate to estimate amounts of carbohydrate per meal. Completed 9/21/2022          Follow Up Plan     Follow up in about 1 month (around 10/21/2022) for E11.65. Will review BG log and diet.     Today's care plan and follow up schedule was discussed with patient.  Jovan verbalized understanding of the care plan, goals, and agrees to follow up  plan.        The patient was encouraged to communicate with his/her health care provider/physician and care team regarding his/her condition(s) and treatment.  I provided the patient with my contact information today and encouraged to contact me via phone or [facility]'s Patient Portal as needed.

## 2022-09-21 NOTE — PATIENT INSTRUCTIONS
Your provider has scheduled you for a sleep study.   You should be receiving a phone call from the sleep lab shortly after your study has been approved by your insurance. Please make sure you have your current [facility] numbers [facility]. If you do not hear from anyone in the next 10 business days, please call the sleep lab at [phone] to schedule your sleep study. The sleep studies are performed at [facility] seven nights a week.  When you are scheduling your sleep study, they will also make you a follow up appointment with your provider. This follow up appointment will be 10-14 days after your sleep study to review the results. If it is noted that you do have sleep apnea on your initial sleep study, you may receive a call back for a second night study with the CPAP before you come back to the office.

## 2022-09-21 NOTE — PROGRESS NOTES
Subjective:       Patient ID: Jovan Del Cid Jr. is a 82 y.o. male.  Patient Active Problem List   Diagnosis    Asthma    Chronic cough    Intolerance of continuous positive airway pressure (CPAP) ventilation    Liver cyst    HTN (hypertension)    Dyslipidemia    Benign prostatic hyperplasia    Lipoma    Thrombocytopenia    Pelvic mass in male    Amyloid disease    Chronic maxillary sinusitis    Tubulovillous adenoma of colon    Adenomatous colon polyp    MCMAHAN (nonalcoholic steatohepatitis)    Mild intermittent asthma without complication    BMI 36.0-36.9,adult    Personal history of colonic polyps    T2DM (type 2 diabetes mellitus)    History of lacunar cerebrovascular accident    Type 2 diabetes mellitus, without long-term current use of insulin    KALYAN (obstructive sleep apnea)    Acute CVA (cerebrovascular accident)    DANY (acute kidney injury)    Metabolic acidosis     Immunization History   Administered Date(s) Administered    COVID-19, MRNA, LN-S, PF (Pfizer) (Purple Cap) 01/10/2021, 2021, 2021    Influenza (FLUAD) - Quadrivalent - Adjuvanted - PF *Preferred* (65+) 10/15/2020    Influenza - High Dose - PF (65 years and older) 2020     Social History     Tobacco Use   Smoking Status Former    Years: 3.00    Types: Cigarettes, Pipe    Quit date:     Years since quittin.7   Smokeless Tobacco Never   Tobacco Comments    smoked a pipe - quit smoking      Asthma Control Test  In the past 4  weeks, how much of the time did your asthma keep you from getting as much done at work, school or at home?: A little of the time  During the past 4 weeks, how often have you had shortness of breath?: More than once a day  During the past 4 weeks, how often did your asthma symptoms (wheezing, couging, shortness of breath, chest tightness or pain) wake you up at night or earlier than usual in the morning?: Not at all  During the past 4 weeks, how often have you used your rescue inhaler or nebulizer  medication (such as albuterol)?: Not at all  How would you rate your asthma control during the past 4 weeks?: Well controlled  If your score is 19 or less, your asthma may not be under control: 19       EPWORTH SLEEPINESS SCALE 9/21/2022   Sitting and reading 0   Watching TV 1   Sitting, inactive in a public place (e.g. a theatre or a meeting) 1   As a passenger in a car for an hour without a break 0   Lying down to rest in the afternoon when circumstances permit 3   Sitting and talking to someone 0   Sitting quietly after a lunch without alcohol 3   In a car, while stopped for a few minutes in traffic 3   Total score 11        Chief Complaint: Asthma and Apnea      Jovan Del Cid Jr. is a 80 y.o.  male   Follow up, No cough, No congetion.No fever  ACT  20: hardly taking rescue albuterol HFA.   Here to reviewed : CXR and Nice  Normal Nice  Runs asphalt bussiness  Busy lifestyle.  No interval asthma exacerbations since last visit, Actually not used rescue albuterol in 12 months.  Spirometry normal         09/21/2022  Last visit 10/15/2022  Referred by Dr Dagoberto Montes  Recent lacunar CVA  Known KALYAN Moderate KALYAN titrated to CPAP 12 cm  Snoring and apnea  Therapy options discussed  Was in Hospital x 2  Spouse present  Goals and therapy options discussed      Asthma  There is no cough, shortness of breath, sputum production or wheezing. Pertinent negatives include no postnasal drip. His past medical history is significant for asthma.   Review of Systems   Constitutional: Negative.    HENT:  Negative for postnasal drip and congestion.    Eyes: Negative.    Respiratory:  Negative for cough, sputum production, shortness of breath, wheezing, asthma nighttime symptoms, pleurisy, dyspnea on extertion and use of rescue inhaler.    Cardiovascular: Negative.    Genitourinary: Negative.    Endocrine: endocrine negative    Musculoskeletal: Negative.    Skin: Negative.    Gastrointestinal: Negative.    Neurological: Negative.     Psychiatric/Behavioral: Negative.     All other systems reviewed and are negative.       Patient Name: Jovan Del Cid Jr.  MRN: [id number]  Patient Class: IP- Inpatient  Admission Date: 9/2/2022  Hospital Length of Stay: 2 days  Discharge Date and Time:  09/04/2022 11:19 AM  Attending Physician: Johnny Paris, *   Discharging Provider: Johnny Paris MD  Primary Care Provider: Paul Neal MD        HPI:    83 y/o WM  with a PMHx of asthma, HLD, HTN,Hx of Colon Tubulovillous Adenoma, MCMAHAN  and  obesity  who presents to the Emergency Department for R-sided paresthesias which onset suddenly at 1200 yesterday. the pt reports that he has been tripping and fell last night. He denies any Head trauma , LOC ,  Chest pain ,palpitation , Dizziness bowel or urinary incontinence , facial asymmetry, speech difficulty, fever, chills, Seizures and all other sxs  . Associated sxs include R sided numbness and gait difficulty. Pt report has not been taking his BP medication for the last days . The BP was significantly high this am  per pt son .   Pt denies having a history of DM, MI, or CVA. He report ws in his usual states of health before yesterday .   ER COURSE: CTH :No noncontrast CT evidence for major vascular distribution infarction or hemorrhage.Left thalamic hypodensity suggesting lacunar type infarction.   ER VS:   BP Pulse Resp Temp SpO2   (!) 187/102 65 15 98.2 °F (36.8 °C) 96 %          Pt will be admitted to Inpt with a dx of Right side weakness and possible CVA   CODE STATUS : FULL CODE        * No surgery found *       Hospital Course:   83 y/o WM admitted with a Dx of acute lacunar infarct .  MRI phillip show Acute lacunar infarcts within the left thalamus . CT head and neck show :No evidence of thromboembolism within the visible branches of the [id number].Coarse calcific plaque within the left carotid bifurcation resulting in up to 50% stenosis of the origin/proximal aspect of the  left internal carotid artery. TTE did not show any acute finding  with normal  EF . HBa1c 8.3  and LDL > 100 . SLP rec  advance diet . Awaiting PT/OT eval .  Cont with right side numbness and tingling.   9/4 Pt was seen and examined at  bedside . He was determined to be  suitable for d/c /  He report has not been compliance with his medication . Plan to d/c on current Home medication dose  ( BP , Cholesterol  and asa ). He will be d/c on asa and Plavix x 30 days and then after  just asa daily . He is a New Dx T2DM with a Hba1c 8.3 . He will be d/c on metformin 500 mg po bid x 7 days and then ater 100 mg po bid . PT/OT consulted and manuel  HH .  He was advised to f/u with his PCP in 1 week  . He was educated about be compliance with medication  . There was no acute event since admission .         Goals of Care Treatment Preferences:  Code Status: Full Code     Patient Name: Jovan Del Cid Jr.  MRN: [id number]  Patient Class: IP- Inpatient  Admission Date: 9/14/2022  Hospital Length of Stay: 1 days  Discharge Date and Time: 9/16/2022  3:13 PM  Attending Physician: Zaki Diane MD   Discharging Provider: Deann Wolf NP  Primary Care Provider: Paul Neal MD        HPI:   Jovan Del Cid Jr. is a 82 y.o. 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 Pt and his wife are poor historians, most of the hx obtained from the chart. Pt was recently dx with DM2 when he had the CVA and was started on Metformin. Pt notes decreased fluid intake recently due to loss of taste. He also has R sided numbness of the body since the strokes but it is improving. Pt not sure if his loss of taste is due to the CVA or Metformin. No recent Covid infection. Patient denies any fever, chills, n/v/d, SOB, CP, weakness, numbness, dizziness,  headache, and all other sxs at this time. No prior Tx reported. No further complaints or concerns at this time.      In the ER, VSS afebrile, CBC normal, Bun/Cr 37/2.4, HCO3 17- renal function normal previously. Pt received a bolus of NS in the ER and started on Bicarb IVF in the ER. Pt is being placed in Observation under [facility]osp [facility] for DANY with Metabolic Acidosis likely sec to IVVD.           * No surgery found *       Hospital Course:   The patient is a 83 yo amel with recent Acute CVA and Newly diagnosed DM on 9/2/22, HTN, Asthma, HLD, MCMAHAN, Obesity who was admitted for DANY and Metabolic acidosis on Bicarb drip. Nephrology consulted. Pt reported loose BMs -recently started on Metformin. Renal U/s showed no hydronephrosis. Nephrology felt DANY due to prerenal azotemia due to hypotension and GI losses VS  related to metformin induced kidney injury. He recommended to discontinue Metformin and do not restart.      9/15/22: DANY slowly improving 2.4> 2.2. Nephrology recommended to continue bicarb drip.   On 9/16/22 Nephrology, Dr. Ardon, stated the patient's acid base status was better and bicarb infusion discontinued. Pt was stable for discharge. His creatinine was 2.2. Pt and wife advised of stopping metformin due to symptoms of soft, liquid stool. Low dose glipizide was prescribed. Pt and wife were going to monitor for hypoglycemia. Pt's previous home health orders were resumed. He was noted to have mild right sided weakness from stroke that occurred on 9/2/22 and his ASA, Plavix and STATIN were resumed. Pt/wife advised to follow up with his PCP. Pt was seen and examined and determined to be safe and stable for discharge.         Goals of Care Treatment Preferences:  Code Status: Full Code       BP Readings from Last 3 Encounters:   09/21/22 (!) 140/78   09/16/22 114/70   09/09/22 (!) 82/64     Snoring / Sleep:     Story Questionnaire (validated KALYAN screening questionnaire)    YES -- Snoring/apnea    YES --  "Fatigue    Body mass index is 34.64 kg/m².  (>25 is overweight, >30 is obese)    Blood Pressure = Hypertension  (PreHTN 120-139/80-89, Stg1 140-159/90-99, Stg2 >160/>100)  Wirt = 3 of three KALYAN categories are positive (high risk is 2-3 positive categories)     [place] Sleepiness Scale TOTAL =  11  (validated sleepiness questionnaire with a higher score indicating greater sleepiness; range 0-24)  No flowsheet data found.    STOP-Bang Questionnaire (validated KALYAN screening questionnaire)  Negative unless checked off.  [x] Snoring    [x]  Tired/Fatigued/Sleepy  [x] Obstruction (apneas/choking)  [] Pressure (HTN)  [] BMI >35  [x] Age >50  [x] Neck >40 cm  [x] Gender male   STOP-Bang = 6 (low risk 0-2,high risk 3-8)    Neck circumference  18.5" [?KALYAN risk if >43cm (17in) male or >41cm (15.5 in) female]             The following portions of the patient's history were reviewed and updated as appropriate: He  has a past medical history of Acute CVA (cerebrovascular accident) (9/8/2022), DANY (acute kidney injury) (9/14/2022), Asthma, Bronchitis chronic, Cancer, Cough, Digestive disorder, Hyperlipidemia, Hypertension, Liver disease, KALYAN (obstructive sleep apnea) (9/8/2022), and Type 2 diabetes mellitus, without long-term current use of insulin (9/7/2022).  He does not have any pertinent problems on file.  He  has a past surgical history that includes Tonsillectomy; Colonoscopy (N/A, 6/21/2016); Colonoscopy (N/A, 11/1/2016); Colonoscopy (N/A, 2/3/2017); Colonoscopy (N/A, 11/13/2017); Fluoroscopy (N/A, 6/15/2018); and Colonoscopy (N/A, 2/19/2021).  His family history includes Alzheimer's disease in his mother; Cancer in his father.  He  reports that he quit smoking about 62 years ago. His smoking use included cigarettes and pipe. He has never used smokeless tobacco. He reports current alcohol use of about 2.0 - 3.0 standard drinks per week. He reports that he does not use drugs.  He has a current medication list which " "includes the following prescription(s): aspirin, blood sugar diagnostic, blood-glucose meter, clopidogrel, fluticasone propionate, glipizide, lancets, lancing device, montelukast, multivitamin, pen needle, diabetic, albuterol, pantoprazole, and rosuvastatin, and the following Facility-Administered Medications: epinephrine and ondansetron.  Current Outpatient Medications on File Prior to Visit   Medication Sig Dispense Refill    aspirin (ECOTRIN) 81 MG EC tablet Take 1 tablet (81 mg total) by mouth once daily. 90 tablet 3    blood sugar diagnostic Strp 1 strip by Misc.(Non-Drug; Combo Route) route 2 (two) times daily with meals. 100 strip 11    blood-glucose meter kit Use as instructed 1 each 0    clopidogreL (PLAVIX) 75 mg tablet Take 1 tablet (75 mg total) by mouth once daily. 30 tablet 0    fluticasone propionate (FLONASE) 50 mcg/actuation nasal spray SHAKE LIQUID AND USE 2 SPRAYS IN EACH NOSTRIL EVERY DAY 48 g 1    glipiZIDE (GLUCOTROL) 2.5 MG TR24 Take 1 tablet (2.5 mg total) by mouth daily with breakfast. 90 tablet 1    lancets Misc 1 each by Misc.(Non-Drug; Combo Route) route 2 (two) times daily with meals. 100 each 11    lancing device Misc 1 Device by Misc.(Non-Drug; Combo Route) route 2 (two) times daily with meals. 1 each 0    montelukast (SINGULAIR) 10 mg tablet Take 1 tablet (10 mg total) by mouth every evening. 90 tablet 2    multivitamin (THERAGRAN) per tablet Take 1 tablet by mouth once daily.      pen needle, diabetic 31 gauge x 5/16" Ndle 1 each by Misc.(Non-Drug; Combo Route) route 2 (two) times daily with meals. 100 each 11    albuterol (VENTOLIN HFA) 90 mcg/actuation inhaler Inhale 2 puffs into the lungs every 6 (six) hours as needed for Wheezing. Rescue 8 g 0    pantoprazole (PROTONIX) 40 MG tablet Take 1 tablet (40 mg total) by mouth daily as needed. 30 tablet 3    rosuvastatin (CRESTOR) 40 MG Tab Take 1 tablet (40 mg total) by mouth once daily. (Patient not taking: Reported on 9/21/2022) 90 " "tablet 3     Current Facility-Administered Medications on File Prior to Visit   Medication Dose Route Frequency Provider Last Rate Last Admin    EPINEPHrine (EPIPEN) 0.3 mg/0.3 mL pen injection 0.3 mg  0.3 mg Intramuscular PRN Amando Ulrich MD        ondansetron disintegrating tablet 4 mg  4 mg Oral Once PRN Amando Ulrich MD         He has No Known Allergies..    Objective:       Vitals:    09/21/22 0857   BP: (!) 140/78   BP Location: Left arm   Patient Position: Sitting   BP Method: Large (Manual)   Pulse: 78   Resp: 14   SpO2: 97%   Weight: 97.3 kg (214 lb 9.9 oz)   Height: 5' 6" (1.676 m)         Physical Exam  Vitals and nursing note reviewed.   Constitutional:       Appearance: He is well-developed.   HENT:      Head: Normocephalic and atraumatic.        Right Ear: External ear normal.      Left Ear: External ear normal.      Nose: Nose normal.      Mouth/Throat:      Pharynx: Uvula midline. No oropharyngeal exudate.   Eyes:      General: Lids are normal. No scleral icterus.     Conjunctiva/sclera: Conjunctivae normal.      Pupils: Pupils are equal, round, and reactive to light.   Neck:      Trachea: Trachea and phonation normal.      Comments: Neck 18"  Cardiovascular:      Rate and Rhythm: Normal rate and regular rhythm.      Pulses: Normal pulses.      Heart sounds: Normal heart sounds, S1 normal and S2 normal. No murmur heard.  Pulmonary:      Effort: Pulmonary effort is normal. No respiratory distress.      Breath sounds: Examination of the left-lower field reveals decreased breath sounds. Decreased breath sounds present. No wheezing, rhonchi or rales.   Chest:      Chest wall: No tenderness.   Abdominal:      General: Bowel sounds are normal.      Palpations: Abdomen is soft.   Musculoskeletal:         General: Normal range of motion.      Cervical back: Normal range of motion and neck supple.   Lymphadenopathy:      Cervical: No cervical adenopathy.   Skin:     General: Skin is warm and dry.     "  Findings: No rash.      Nails: There is no clubbing.   Neurological:      Mental Status: He is alert and oriented to person, place, and time.      GCS: GCS eye subscore is 4. GCS verbal subscore is 5. GCS motor subscore is 6.      Cranial Nerves: No cranial nerve deficit.      Sensory: No sensory deficit.   Psychiatric:         Speech: Speech normal.     Personal Diagnostic Review  [x]  Chest x-ray:       X-Ray Chest PA And Lateral  Narrative: EXAMINATION:  XR CHEST PA AND LATERAL    CLINICAL HISTORY:  Mild intermittent asthma, uncomplicated    TECHNIQUE:  PA and lateral views of the chest were performed.    COMPARISON:  2022    FINDINGS:  The lungs are clear and free of infiltrate.  No pleural effusion or pneumothorax. The heart is not enlarged. There is mild tortuosity of the descending thoracic aorta.  Impression: 1.  No acute cardiopulmonary process.    Electronically signed by: Geovanny Walker DO  Date:    2022  Time:    10:02 .      [x] Lopez Normal  FEV1 : 2.33L( 92.0%), FVC 3.27L( 96.3%)  FEV1/FVC 71.42  NORMAL SPIROMETRY    Patient Name: Jovan Del Cid Date of Report: 07 Date of PS2007  [facility] No.: [id number] : 1940 Time of PSG: 10:00:54 PM - 05:44:54 AM  Sex: M Age: 66 Weight: 220 lbs Height: 5 8 Type of PSG: Diagnostic  REASONS FOR REFERRAL: Mr. Del Cid is a 66 year old male, referred for diagnostic polysomnography by Dr. Natasha Caba for evaluation of possible obstructive  sleep apnea, based on the results of his nocturnal oximetry (oxygen desaturation down to a nate of 78 %, with a saw tooth pattern).  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 The diagnostic polysomnography revealed a mild to moderate obstructive sleep apnea / hypopnea syndrome (A + H Index = 18.9 events / hr asleep; 6.1 arousals / hr  asleep),  with a mean SaO2 during events = 88.7 %, significant; lowest SaO2 during events 76 %, waking baseline SaO2 = 98 %. Persistent, mild snoring was noted.  2. The CPAP titration polysomnography revealed that 12 cm H2O pressure (C F roel 3), the highest pressure fully tested, was adequate, as it reduced the rate of respiratory  events 66 % to A + H Index = 6.4 events / hr asleep in 1.6 hrs sleep (< 0.1 hrs REM sleep). Mean SaO2 during events improved greatly to 92.5 %, moderate; lowest SaO2  during events = 90 %, waking baseline SaO2 = 97 %). Snoring was eliminated. Higher pressure might be needed to eliminate events in REM sleep, or autoCPAP (4  cm 18 cm) could be tried. A medium Respironics Comfort Gel nasal CPAP mask was used and was well - tolerated.  3. Weight loss to the normal range is recommended as it can decrease respiratory events and snoring in overweight patients.  4. A moderate to severe PLM disorder was observed (PLMS Index = 40.8 / hr sleep / hr asleep). Given that Mr. Del Cid s PLMS were disruptive (14.6 PLMS re lated  arousals / hr asleep), consider treatment of PLM disorder if daytime hypersomnolence is not sufficiently improved by treatment of other sleep disorders. Note that the  benzodiazepine hypnotic medications sometimes used to treat PLM disorder may exacerbate some sleep - related respiratory disorders, and that anti - Parkinsonian  medications are often used in such instances.  5. The following changes in sleep hygiene / sleep - related behavior are recommended:  ? Set time for sleep to number of hours of sleep needed for adequate daytime functioning (7.5 to 8.5 hrs / night).  ? Regular bedtimes and wake times, including weekends: Total sleep time / night should not be more than one hour more  than usual, and bedtime or wake time should not be more than one hour earlier or later than usual.  ? Do not attempt to make up lost sleep by extending sleep periods.  ? Avoid naps; none longer  than 20 min or later than mid - afternoon.  6. Consider a referral for a dental examination and possible dental splint for sleep bruxism.  7. Also consider behavioral and cognitive / behavioral treatments for stress; sleep bruxism might be expected to improve.      EXAMINATION:  MRI BRAIN WITHOUT CONTRAST     CLINICAL HISTORY:  Stroke, follow up;     TECHNIQUE:  Sagittal T1. Axial T1, T2, T2 FLAIR, GRE, DWI. Coronal T2 FLAIR.     COMPARISON:  None available.     FINDINGS:  There are foci of restricted diffusion within the left thalamus.  This is best demonstrated on axial DWI series 3 images 16 and 17. Moderate patchy T2 FLAIR hyperintense signal within the periventricular white matter.  No acute intracranial hemorrhage.  No mass effect.     There is mild generalized cerebral atrophy with bilateral temporal lobe atrophy score of 3. Midline structures are normal. There are preserved arterial flow-voids on T2 weighted imaging. The paranasal sinuses and mastoid air cells are clear.     No abnormal marrow signal is identified.     Impression:     1. Acute lacunar infarcts within the left thalamus.  2. Moderate patchy T2 FLAIR hyperintensity within the periventricular white matter likely relates to chronic microvascular ischemia.  3. Mild generalized cerebral atrophy with abnormal temporal lobe atrophy as may occur with certain dementia disorders.        No flowsheet data found.      Assessment:       Problem List Items Addressed This Visit       Mild intermittent asthma without complication (Chronic)    Relevant Orders    X-Ray Chest PA And Lateral (Completed)    Spirometry with/without bronchodilator    Fraction of  Nitric Oxide    Chronic maxillary sinusitis    Chronic cough    Relevant Orders    Polysomnogram (CPAP will be added if patient meets diagnostic criteria.)    BMI 36.0-36.9,adult    HTN (hypertension)    Dyslipidemia    History of lacunar cerebrovascular accident    Relevant Orders    Polysomnogram  (CPAP will be added if patient meets diagnostic criteria.)    KALYAN (obstructive sleep apnea) - Primary    Relevant Orders    Polysomnogram (CPAP will be added if patient meets diagnostic criteria.)       Plan:     Stable asthma  Agrees to try CPAP  INSPIRE may be option if CPAP truly intolerant    Follow up in about 6 weeks (around 2022), or PSG. CXR, Drew, FeNo.    This note was prepared using voice recognition system and is likely to have sound alike errors that may have been overlooked even after proof reading.  Please call me with any questions    Discussed diagnosis, its evaluation, treatment and usual course. All questions answered.    Thank you for the courtesy of participating in the care of this patient    Stefan Buckner MD    Orders Placed This Encounter   Procedures    X-Ray Chest PA And Lateral     Standing Status:   Future     Number of Occurrences:   1     Standing Expiration Date:   2023    Spirometry with/without bronchodilator     Standing Status:   Future     Standing Expiration Date:   2023     Order Specific Question:   Release to patient     Answer:   Immediate    Fraction of  Nitric Oxide     Standing Status:   Future     Standing Expiration Date:   2023     Order Specific Question:   Release to patient     Answer:   Immediate    Polysomnogram (CPAP will be added if patient meets diagnostic criteria.)     1. The diagnostic polysomnography revealed a mild to moderate obstructive sleep apnea / hypopnea syndrome (A + H Index = 18.9 events / hr asleep; 6.1 arousals / hr asleep),  with a mean SaO2 during events = 88.7 %, significant; lowest SaO2 during events 76 %, waking baseline SaO2 = 98 %. Persistent, mild snoring was noted.  2. The CPAP titration polysomnography revealed that 12 cm H2O pressure (C F roel 3), the highest pressure fully tested, was adequate, as it reduced the rate of respiratory  events 66 % to A + H Index = 6.4 events / hr asleep in 1.6 hrs sleep  (< 0.1 hrs REM sleep). Mean SaO2 during events improved greatly to 92.5 %, moderate; lowest SaO2  during events = 90 %, waking baseline SaO2 = 97 %). Snoring was eliminated. Higher pressure might be needed to eliminate events in REM sleep, or autoCPAP (4  cm 18 cm) could be tried. A medium Respironics Comfort Gel nasal CPAP mask was used and was well - tolerated.     Standing Status:   Future     Standing Expiration Date:   9/21/2023     Scheduling Instructions:      SPLIT STUDY

## 2022-09-22 ENCOUNTER — LAB VISIT (OUTPATIENT)
Dept: LAB | Facility: HOSPITAL | Age: 82
End: 2022-09-22
Attending: INTERNAL MEDICINE
Payer: COMMERCIAL

## 2022-09-22 ENCOUNTER — OFFICE VISIT (OUTPATIENT)
Dept: FAMILY MEDICINE | Facility: CLINIC | Age: 82
End: 2022-09-22
Payer: COMMERCIAL

## 2022-09-22 ENCOUNTER — TELEPHONE (OUTPATIENT)
Dept: NEPHROLOGY | Facility: CLINIC | Age: 82
End: 2022-09-22
Payer: COMMERCIAL

## 2022-09-22 VITALS
HEIGHT: 66 IN | BODY MASS INDEX: 34.61 KG/M2 | OXYGEN SATURATION: 96 % | RESPIRATION RATE: 18 BRPM | DIASTOLIC BLOOD PRESSURE: 78 MMHG | HEART RATE: 82 BPM | WEIGHT: 215.38 LBS | SYSTOLIC BLOOD PRESSURE: 128 MMHG | TEMPERATURE: 98 F

## 2022-09-22 DIAGNOSIS — Z79.4 TYPE 2 DIABETES MELLITUS WITH OTHER SPECIFIED COMPLICATION, WITH LONG-TERM CURRENT USE OF INSULIN: ICD-10-CM

## 2022-09-22 DIAGNOSIS — Z86.73 HISTORY OF LACUNAR CEREBROVASCULAR ACCIDENT: ICD-10-CM

## 2022-09-22 DIAGNOSIS — N17.9 AKI (ACUTE KIDNEY INJURY): Primary | ICD-10-CM

## 2022-09-22 DIAGNOSIS — E78.5 DYSLIPIDEMIA: ICD-10-CM

## 2022-09-22 DIAGNOSIS — I65.22 STENOSIS OF LEFT CAROTID ARTERY: ICD-10-CM

## 2022-09-22 DIAGNOSIS — I63.9 ACUTE CVA (CEREBROVASCULAR ACCIDENT): ICD-10-CM

## 2022-09-22 DIAGNOSIS — E11.69 TYPE 2 DIABETES MELLITUS WITH OTHER SPECIFIED COMPLICATION, WITH LONG-TERM CURRENT USE OF INSULIN: ICD-10-CM

## 2022-09-22 DIAGNOSIS — N17.9 AKI (ACUTE KIDNEY INJURY): ICD-10-CM

## 2022-09-22 DIAGNOSIS — M79.676 PAIN OF TOE, UNSPECIFIED LATERALITY: ICD-10-CM

## 2022-09-22 LAB
ALBUMIN SERPL BCP-MCNC: 4.5 G/DL (ref 3.5–5.2)
ALP SERPL-CCNC: 118 U/L (ref 55–135)
ALT SERPL W/O P-5'-P-CCNC: 60 U/L (ref 10–44)
ANION GAP SERPL CALC-SCNC: 9 MMOL/L (ref 8–16)
AST SERPL-CCNC: 42 U/L (ref 10–40)
BILIRUB SERPL-MCNC: 1.9 MG/DL (ref 0.1–1)
BUN SERPL-MCNC: 27 MG/DL (ref 8–23)
CALCIUM SERPL-MCNC: 9.6 MG/DL (ref 8.7–10.5)
CHLORIDE SERPL-SCNC: 105 MMOL/L (ref 95–110)
CO2 SERPL-SCNC: 24 MMOL/L (ref 23–29)
CREAT SERPL-MCNC: 1.5 MG/DL (ref 0.5–1.4)
EST. GFR  (NO RACE VARIABLE): 46.2 ML/MIN/1.73 M^2
GLUCOSE SERPL-MCNC: 150 MG/DL (ref 70–110)
POTASSIUM SERPL-SCNC: 4.2 MMOL/L (ref 3.5–5.1)
PROT SERPL-MCNC: 7.1 G/DL (ref 6–8.4)
SODIUM SERPL-SCNC: 138 MMOL/L (ref 136–145)

## 2022-09-22 PROCEDURE — 3288F FALL RISK ASSESSMENT DOCD: CPT | Mod: CPTII,S$GLB,, | Performed by: INTERNAL MEDICINE

## 2022-09-22 PROCEDURE — 80053 COMPREHEN METABOLIC PANEL: CPT | Performed by: INTERNAL MEDICINE

## 2022-09-22 PROCEDURE — 1101F PR PT FALLS ASSESS DOC 0-1 FALLS W/OUT INJ PAST YR: ICD-10-PCS | Mod: CPTII,S$GLB,, | Performed by: INTERNAL MEDICINE

## 2022-09-22 PROCEDURE — 90694 FLU VACCINE - QUADRIVALENT - ADJUVANTED: ICD-10-PCS | Mod: S$GLB,,, | Performed by: INTERNAL MEDICINE

## 2022-09-22 PROCEDURE — 1111F PR DISCHARGE MEDS RECONCILED W/ CURRENT OUTPATIENT MED LIST: ICD-10-PCS | Mod: CPTII,S$GLB,, | Performed by: INTERNAL MEDICINE

## 2022-09-22 PROCEDURE — 90471 IMMUNIZATION ADMIN: CPT | Mod: S$GLB,,, | Performed by: INTERNAL MEDICINE

## 2022-09-22 PROCEDURE — 1125F AMNT PAIN NOTED PAIN PRSNT: CPT | Mod: CPTII,S$GLB,, | Performed by: INTERNAL MEDICINE

## 2022-09-22 PROCEDURE — 3288F PR FALLS RISK ASSESSMENT DOCUMENTED: ICD-10-PCS | Mod: CPTII,S$GLB,, | Performed by: INTERNAL MEDICINE

## 2022-09-22 PROCEDURE — 1111F DSCHRG MED/CURRENT MED MERGE: CPT | Mod: CPTII,S$GLB,, | Performed by: INTERNAL MEDICINE

## 2022-09-22 PROCEDURE — 99999 PR PBB SHADOW E&M-EST. PATIENT-LVL V: ICD-10-PCS | Mod: PBBFAC,,, | Performed by: INTERNAL MEDICINE

## 2022-09-22 PROCEDURE — 1125F PR PAIN SEVERITY QUANTIFIED, PAIN PRESENT: ICD-10-PCS | Mod: CPTII,S$GLB,, | Performed by: INTERNAL MEDICINE

## 2022-09-22 PROCEDURE — 1101F PT FALLS ASSESS-DOCD LE1/YR: CPT | Mod: CPTII,S$GLB,, | Performed by: INTERNAL MEDICINE

## 2022-09-22 PROCEDURE — 99999 PR PBB SHADOW E&M-EST. PATIENT-LVL V: CPT | Mod: PBBFAC,,, | Performed by: INTERNAL MEDICINE

## 2022-09-22 PROCEDURE — 99214 OFFICE O/P EST MOD 30 MIN: CPT | Mod: 25,S$GLB,, | Performed by: INTERNAL MEDICINE

## 2022-09-22 PROCEDURE — 1159F MED LIST DOCD IN RCRD: CPT | Mod: CPTII,S$GLB,, | Performed by: INTERNAL MEDICINE

## 2022-09-22 PROCEDURE — 1159F PR MEDICATION LIST DOCUMENTED IN MEDICAL RECORD: ICD-10-PCS | Mod: CPTII,S$GLB,, | Performed by: INTERNAL MEDICINE

## 2022-09-22 PROCEDURE — 36415 COLL VENOUS BLD VENIPUNCTURE: CPT | Mod: PO | Performed by: INTERNAL MEDICINE

## 2022-09-22 PROCEDURE — 90471 FLU VACCINE - QUADRIVALENT - ADJUVANTED: ICD-10-PCS | Mod: S$GLB,,, | Performed by: INTERNAL MEDICINE

## 2022-09-22 PROCEDURE — 99214 PR OFFICE/OUTPT VISIT, EST, LEVL IV, 30-39 MIN: ICD-10-PCS | Mod: 25,S$GLB,, | Performed by: INTERNAL MEDICINE

## 2022-09-22 PROCEDURE — 90694 VACC AIIV4 NO PRSRV 0.5ML IM: CPT | Mod: S$GLB,,, | Performed by: INTERNAL MEDICINE

## 2022-09-22 NOTE — PROGRESS NOTES
Subjective:       Patient ID: Jovan Del Cid Jr. is a 82 y.o. male.    Chief Complaint: Follow-up, Hyperlipidemia, and Diabetes    F/u DANY---------pt doing well---------c/o toe pain-----    Follow-up  Associated symptoms include arthralgias. Pertinent negatives include no abdominal pain, chest pain, chills, coughing, diaphoresis, fatigue, fever, headaches, joint swelling, myalgias, nausea, neck pain, rash, sore throat, vomiting or weakness.   Hyperlipidemia  Pertinent negatives include no chest pain, myalgias or shortness of breath.   Diabetes  Pertinent negatives for hypoglycemia include no confusion, dizziness, headaches, nervousness/anxiousness, pallor, seizures, speech difficulty or tremors. Pertinent negatives for diabetes include no chest pain, no fatigue, no polydipsia, no polyphagia, no polyuria and no weakness.   Past Medical History:   Diagnosis Date    Acute CVA (cerebrovascular accident) 9/8/2022    DANY (acute kidney injury) 9/14/2022    Asthma     Bronchitis chronic     Cancer     Cough     Digestive disorder     Hyperlipidemia     Hypertension     Liver disease     KALYAN (obstructive sleep apnea) 9/8/2022    Type 2 diabetes mellitus, without long-term current use of insulin 9/7/2022     Past Surgical History:   Procedure Laterality Date    COLONOSCOPY N/A 6/21/2016    Procedure: COLONOSCOPY;  Surgeon: Alonso Dorsey MD;  Location: [facility];  Service: Endoscopy;  Laterality: N/A;    COLONOSCOPY N/A 11/1/2016    Procedure: COLONOSCOPY;  Surgeon: Alonso Dorsey MD;  Location: [facility];  Service: Endoscopy;  Laterality: N/A;    COLONOSCOPY N/A 2/3/2017    Procedure: COLONOSCOPY;  Surgeon: Alonso Dorsey MD;  Location: [facility];  Service: Endoscopy;  Laterality: N/A;    COLONOSCOPY N/A 11/13/2017    Procedure: COLONOSCOPY;  Surgeon: Alonso Dorsey MD;  Location: [facility];  Service: Endoscopy;  Laterality: N/A;    COLONOSCOPY N/A 2/19/2021    Procedure: COLONOSCOPY;  Surgeon: Paula  MAY Ricardo MD;  Location: [facility] ENDO;  Service: Endoscopy;  Laterality: N/A;    FLUOROSCOPY N/A 6/15/2018    Procedure: Transjugular liver bx;  Surgeon: Petros Recio MD;  Location: [facility] CATH LAB;  Service: General;  Laterality: N/A;    TONSILLECTOMY       Family History   Problem Relation Age of Onset    Cancer Father     Alzheimer's disease Mother     Colon cancer Neg Hx     Melanoma Neg Hx      Social History     Socioeconomic History    Marital status:    Tobacco Use    Smoking status: Former     Years: 3.00     Types: Cigarettes, Pipe     Quit date:      Years since quittin.7    Smokeless tobacco: Never    Tobacco comments:     smoked a pipe - quit smoking    Substance and Sexual Activity    Alcohol use: Yes     Alcohol/week: 2.0 - 3.0 standard drinks     Types: 2 - 3 Cans of beer per week     Comment: daily    Drug use: No    Sexual activity: Never     Social Determinants of Health     Financial Resource Strain: Low Risk     Difficulty of Paying Living Expenses: Not very hard   Food Insecurity: No Food Insecurity    Worried About Running Out of Food in the Last Year: Never true    Ran Out of Food in the Last Year: Never true   Transportation Needs: No Transportation Needs    Lack of Transportation (Medical): No    Lack of Transportation (Non-Medical): No   Physical Activity: Inactive    Days of Exercise per Week: 0 days    Minutes of Exercise per Session: 0 min   Stress: No Stress Concern Present    Feeling of Stress : Not at all   Social Connections: Socially Integrated    Frequency of Communication with Friends and Family: More than three times a week    Frequency of Social Gatherings with Friends and Family: More than three times a week    Attends [employer] Services: More than 4 times per year    Active Member of Clubs or Organizations: Yes    Attends Club or Organization Meetings: More than 4 times per year    Marital Status:    Housing Stability: Low Risk     Unable to Pay  for Housing in the Last Year: No    Number of Places Lived in the Last Year: 1    Unstable Housing in the Last Year: No     Review of Systems   Constitutional:  Negative for activity change, appetite change, chills, diaphoresis, fatigue, fever and unexpected weight change.   HENT:  Negative for drooling, ear discharge, ear pain, facial swelling, hearing loss, mouth sores, nosebleeds, postnasal drip, rhinorrhea, sinus pressure, sneezing, sore throat, tinnitus, trouble swallowing and voice change.    Eyes:  Negative for photophobia, redness and visual disturbance.   Respiratory:  Negative for apnea, cough, choking, chest tightness, shortness of breath and wheezing.    Cardiovascular:  Negative for chest pain, palpitations and leg swelling.   Gastrointestinal:  Negative for abdominal distention, abdominal pain, anal bleeding, blood in stool, constipation, diarrhea, nausea and vomiting.   Endocrine: Negative for cold intolerance, heat intolerance, polydipsia, polyphagia and polyuria.   Genitourinary:  Negative for difficulty urinating, dysuria, enuresis, flank pain, frequency, genital sores, hematuria and urgency.   Musculoskeletal:  Positive for arthralgias. Negative for back pain, gait problem, joint swelling, myalgias, neck pain and neck stiffness.   Skin:  Negative for color change, pallor, rash and wound.   Allergic/Immunologic: Negative for food allergies and immunocompromised state.   Neurological:  Negative for dizziness, tremors, seizures, syncope, facial asymmetry, speech difficulty, weakness, light-headedness and headaches.   Hematological:  Negative for adenopathy. Does not bruise/bleed easily.   Psychiatric/Behavioral:  Negative for agitation, behavioral problems, confusion, decreased concentration, dysphoric mood, hallucinations, self-injury, sleep disturbance and suicidal ideas. The patient is not nervous/anxious and is not hyperactive.      Objective:      Physical Exam  Vitals and nursing note reviewed.    Constitutional:       General: He is not in acute distress.     Appearance: Normal appearance. He is well-developed. He is not diaphoretic.   HENT:      Head: Normocephalic and atraumatic.      Mouth/Throat:      Pharynx: No oropharyngeal exudate.   Eyes:      General: No scleral icterus.     Pupils: Pupils are equal, round, and reactive to light.   Neck:      Thyroid: No thyromegaly.      Vascular: No carotid bruit or JVD.      Trachea: No tracheal deviation.   Cardiovascular:      Rate and Rhythm: Normal rate and regular rhythm.      Heart sounds: Normal heart sounds.   Pulmonary:      Effort: Pulmonary effort is normal. No respiratory distress.      Breath sounds: Normal breath sounds. No wheezing or rales.   Chest:      Chest wall: No tenderness.   Abdominal:      General: Bowel sounds are normal. There is no distension.      Palpations: Abdomen is soft.      Tenderness: There is no abdominal tenderness. There is no guarding or rebound.   Musculoskeletal:         General: No tenderness. Normal range of motion.      Cervical back: Normal range of motion and neck supple.   Lymphadenopathy:      Cervical: No cervical adenopathy.   Skin:     General: Skin is warm and dry.      Coloration: Skin is not pale.      Findings: No erythema or rash.   Neurological:      Mental Status: He is alert and oriented to person, place, and time.   Psychiatric:         Behavior: Behavior normal.         Thought Content: Thought content normal.         Judgment: Judgment normal.       CMP  Sodium   Date Value Ref Range Status   09/16/2022 141 136 - 145 mmol/L Final     Potassium   Date Value Ref Range Status   09/16/2022 3.7 3.5 - 5.1 mmol/L Final     Chloride   Date Value Ref Range Status   09/16/2022 105 95 - 110 mmol/L Final     CO2   Date Value Ref Range Status   09/16/2022 24 23 - 29 mmol/L Final     Glucose   Date Value Ref Range Status   09/16/2022 147 (H) 70 - 110 mg/dL Final     BUN   Date Value Ref Range Status    09/16/2022 31 (H) 8 - 23 mg/dL Final     Creatinine   Date Value Ref Range Status   09/16/2022 2.2 (H) 0.5 - 1.4 mg/dL Final     Calcium   Date Value Ref Range Status   09/16/2022 8.8 8.7 - 10.5 mg/dL Final     Total Protein   Date Value Ref Range Status   09/16/2022 6.2 6.0 - 8.4 g/dL Final     Albumin   Date Value Ref Range Status   09/16/2022 3.7 3.5 - 5.2 g/dL Final     Total Bilirubin   Date Value Ref Range Status   09/16/2022 1.6 (H) 0.1 - 1.0 mg/dL Final     Comment:     For infants and newborns, interpretation of results should be based  on gestational age, weight and in agreement with clinical  observations.    Premature Infant recommended reference ranges:  Up to 24 hours.............<8.0 mg/dL  Up to 48 hours............<12.0 mg/dL  3-5 days..................<15.0 mg/dL  6-29 days.................<15.0 mg/dL       Alkaline Phosphatase   Date Value Ref Range Status   09/16/2022 102 55 - 135 U/L Final     AST   Date Value Ref Range Status   09/16/2022 33 10 - 40 U/L Final     ALT   Date Value Ref Range Status   09/16/2022 50 (H) 10 - 44 U/L Final     Anion Gap   Date Value Ref Range Status   09/16/2022 12 8 - 16 mmol/L Final     eGFR if    Date Value Ref Range Status   01/20/2021 >60.0 >60 mL/min/1.73 m^2 Final     eGFR if non    Date Value Ref Range Status   01/20/2021 >60.0 >60 mL/min/1.73 m^2 Final     Comment:     Calculation used to obtain the estimated glomerular filtration  rate (eGFR) is the CKD-EPI equation.        Lab Results   Component Value Date    WBC 5.56 09/16/2022    HGB 13.2 (L) 09/16/2022    HCT 40.7 09/16/2022    MCV 94 09/16/2022     (L) 09/16/2022     Lab Results   Component Value Date    CHOL 215 (H) 09/13/2022     Lab Results   Component Value Date    HDL 33 (L) 09/13/2022     Lab Results   Component Value Date    LDLCALC 142.4 09/13/2022     Lab Results   Component Value Date    TRIG 198 (H) 09/13/2022     Lab Results   Component Value Date     CHOLHDL 15.3 (L) 09/13/2022     Lab Results   Component Value Date    TSH 1.876 09/02/2022     Lab Results   Component Value Date    HGBA1C 7.8 (H) 09/13/2022     Assessment:       1. DANY (acute kidney injury)    2. Stenosis of left carotid artery    3. Acute CVA (cerebrovascular accident)    4. Dyslipidemia    5. History of lacunar cerebrovascular accident    6. Type 2 diabetes mellitus with other specified complication, with long-term current use of insulin    7. Pain of toe, unspecified laterality          Plan:   DANY (acute kidney injury)-------------off metformin, valsartan/hctz--------follow  -     Comprehensive Metabolic Panel; Future; Expected date: 09/22/2022  -     Ambulatory referral/consult to Nephrology; Future; Expected date: 09/29/2022    Stenosis of left carotid artery------follow-----asa, statin----    Acute CVA (cerebrovascular accident)-------seen by neurology ,cards-    Dyslipidemia-statin-----    History of lacunar cerebrovascular accident    Type 2 diabetes mellitus with other specified complication, with long-term current use of insulin--glipizide----follow-    Pain of toe, unspecified laterality  -     Ambulatory referral/consult to Podiatry; Future; Expected date: 09/29/2022    As above-----f/u 1 month-

## 2022-09-22 NOTE — TELEPHONE ENCOUNTER
----- Message from Lidia Lloyd sent at 9/22/2022  3:50 PM CDT -----  Regarding: Appointment  Good Afternoon, patient seen by Dr. Paul Neal today has referral/consult for DANY (acute kidney injury). Patient request afternoon appointment. Please call [phone]. Thanks/elr

## 2022-09-23 ENCOUNTER — TELEPHONE (OUTPATIENT)
Dept: FAMILY MEDICINE | Facility: CLINIC | Age: 82
End: 2022-09-23
Payer: COMMERCIAL

## 2022-09-23 ENCOUNTER — PATIENT MESSAGE (OUTPATIENT)
Dept: FAMILY MEDICINE | Facility: CLINIC | Age: 82
End: 2022-09-23
Payer: COMMERCIAL

## 2022-09-23 DIAGNOSIS — R79.89 LFT ELEVATION: Primary | ICD-10-CM

## 2022-09-23 NOTE — TELEPHONE ENCOUNTER
Kidney function is better--------will continue to follow-.                            Liver enzymes slightly elevated--------do u/s abdomen and get gi consult for elevated liver enzymes-

## 2022-09-26 ENCOUNTER — DOCUMENT SCAN (OUTPATIENT)
Dept: HOME HEALTH SERVICES | Facility: HOSPITAL | Age: 82
End: 2022-09-26
Payer: COMMERCIAL

## 2022-09-28 ENCOUNTER — OFFICE VISIT (OUTPATIENT)
Dept: PODIATRY | Facility: CLINIC | Age: 82
End: 2022-09-28
Payer: COMMERCIAL

## 2022-09-28 VITALS — WEIGHT: 215.38 LBS | HEIGHT: 66 IN | BODY MASS INDEX: 34.61 KG/M2

## 2022-09-28 DIAGNOSIS — E11.9 ENCOUNTER FOR COMPREHENSIVE DIABETIC FOOT EXAMINATION, TYPE 2 DIABETES MELLITUS: Primary | ICD-10-CM

## 2022-09-28 DIAGNOSIS — M79.676 PAIN OF TOE, UNSPECIFIED LATERALITY: ICD-10-CM

## 2022-09-28 DIAGNOSIS — I63.9 ACUTE CVA (CEREBROVASCULAR ACCIDENT): ICD-10-CM

## 2022-09-28 PROCEDURE — 3288F PR FALLS RISK ASSESSMENT DOCUMENTED: ICD-10-PCS | Mod: CPTII,S$GLB,, | Performed by: PODIATRIST

## 2022-09-28 PROCEDURE — 1101F PT FALLS ASSESS-DOCD LE1/YR: CPT | Mod: CPTII,S$GLB,, | Performed by: PODIATRIST

## 2022-09-28 PROCEDURE — 99999 PR PBB SHADOW E&M-EST. PATIENT-LVL III: ICD-10-PCS | Mod: PBBFAC,,, | Performed by: PODIATRIST

## 2022-09-28 PROCEDURE — 1111F DSCHRG MED/CURRENT MED MERGE: CPT | Mod: CPTII,S$GLB,, | Performed by: PODIATRIST

## 2022-09-28 PROCEDURE — 1125F PR PAIN SEVERITY QUANTIFIED, PAIN PRESENT: ICD-10-PCS | Mod: CPTII,S$GLB,, | Performed by: PODIATRIST

## 2022-09-28 PROCEDURE — 1160F PR REVIEW ALL MEDS BY PRESCRIBER/CLIN PHARMACIST DOCUMENTED: ICD-10-PCS | Mod: CPTII,S$GLB,, | Performed by: PODIATRIST

## 2022-09-28 PROCEDURE — 1159F MED LIST DOCD IN RCRD: CPT | Mod: CPTII,S$GLB,, | Performed by: PODIATRIST

## 2022-09-28 PROCEDURE — 1160F RVW MEDS BY RX/DR IN RCRD: CPT | Mod: CPTII,S$GLB,, | Performed by: PODIATRIST

## 2022-09-28 PROCEDURE — 1111F PR DISCHARGE MEDS RECONCILED W/ CURRENT OUTPATIENT MED LIST: ICD-10-PCS | Mod: CPTII,S$GLB,, | Performed by: PODIATRIST

## 2022-09-28 PROCEDURE — 99203 PR OFFICE/OUTPT VISIT, NEW, LEVL III, 30-44 MIN: ICD-10-PCS | Mod: 25,S$GLB,, | Performed by: PODIATRIST

## 2022-09-28 PROCEDURE — 1125F AMNT PAIN NOTED PAIN PRSNT: CPT | Mod: CPTII,S$GLB,, | Performed by: PODIATRIST

## 2022-09-28 PROCEDURE — 99999 PR PBB SHADOW E&M-EST. PATIENT-LVL III: CPT | Mod: PBBFAC,,, | Performed by: PODIATRIST

## 2022-09-28 PROCEDURE — 1159F PR MEDICATION LIST DOCUMENTED IN MEDICAL RECORD: ICD-10-PCS | Mod: CPTII,S$GLB,, | Performed by: PODIATRIST

## 2022-09-28 PROCEDURE — 1101F PR PT FALLS ASSESS DOC 0-1 FALLS W/OUT INJ PAST YR: ICD-10-PCS | Mod: CPTII,S$GLB,, | Performed by: PODIATRIST

## 2022-09-28 PROCEDURE — 3288F FALL RISK ASSESSMENT DOCD: CPT | Mod: CPTII,S$GLB,, | Performed by: PODIATRIST

## 2022-09-28 PROCEDURE — 99203 OFFICE O/P NEW LOW 30 MIN: CPT | Mod: 25,S$GLB,, | Performed by: PODIATRIST

## 2022-09-28 NOTE — PROGRESS NOTES
Subjective:     Patient ID: Jovan Del Cid Jr. is a 82 y.o. male.    Chief Complaint: Toe Pain (Pt c/o right hallux toe pain 2/10, diabetic pt wears tennis shoes, PCP Dr. Neal last seen 9-22-22)    Jovan is a 82 y.o. male who presents to the clinic for evaluation and treatment of high risk feet. Jovan has a past medical history of Acute CVA (cerebrovascular accident) (9/8/2022), DANY (acute kidney injury) (9/14/2022), Asthma, Bronchitis chronic, Cancer, Cough, Digestive disorder, Hyperlipidemia, Hypertension, Liver disease, KALYAN (obstructive sleep apnea) (9/8/2022), and Type 2 diabetes mellitus, without long-term current use of insulin (9/7/2022). The patient's chief complaint is long, thick toenails. This patient has documented high risk feet requiring routine maintenance secondary to diabetes mellitis and those secondary complications of diabetes, as mentioned. Patient states he has pain in right big toe. Patient denies pain in toe now. Patient donnie mott has been present since his stroke this year. Patient is in therapy and states the stroke affected his right side. Patient is accompanied his wife today.    PCP: Paul Neal MD    Date Last Seen by PCP: 09/22/2022    Current shoe gear:  Affected Foot: Tennis shoes     Unaffected Foot: Tennis shoes    Hemoglobin A1C   Date Value Ref Range Status   09/13/2022 7.8 (H) 4.0 - 5.6 % Final     Comment:     ADA Screening Guidelines:  5.7-6.4%  Consistent with prediabetes  >or=6.5%  Consistent with diabetes    High levels of fetal hemoglobin interfere with the HbA1C  assay. Heterozygous hemoglobin variants (HbS, HgC, etc)do  not significantly interfere with this assay.   However, presence of multiple variants may affect accuracy.     09/02/2022 8.3 (H) 4.0 - 5.6 % Final     Comment:     ADA Screening Guidelines:  5.7-6.4%  Consistent with prediabetes  >or=6.5%  Consistent with diabetes    High levels of fetal hemoglobin interfere with the  HbA1C  assay. Heterozygous hemoglobin variants (HbS, HgC, etc)do  not significantly interfere with this assay.   However, presence of multiple variants may affect accuracy.     01/21/2021 5.8 (H) 4.0 - 5.6 % Final     Comment:     ADA Screening Guidelines:  5.7-6.4%  Consistent with prediabetes  >or=6.5%  Consistent with diabetes  High levels of fetal hemoglobin interfere with the HbA1C  assay. Heterozygous hemoglobin variants (HbS, HgC, etc)do  not significantly interfere with this assay.   However, presence of multiple variants may affect accuracy.         Patient Active Problem List   Diagnosis    Asthma    Chronic cough    Intolerance of continuous positive airway pressure (CPAP) ventilation    Liver cyst    HTN (hypertension)    Dyslipidemia    Benign prostatic hyperplasia    Lipoma    Thrombocytopenia    Pelvic mass in male    Amyloid disease    Chronic maxillary sinusitis    Tubulovillous adenoma of colon    Adenomatous colon polyp    MCMAHAN (nonalcoholic steatohepatitis)    Mild intermittent asthma without complication    BMI 36.0-36.9,adult    Personal history of colonic polyps    T2DM (type 2 diabetes mellitus)    History of lacunar cerebrovascular accident    Type 2 diabetes mellitus, without long-term current use of insulin    KALYAN (obstructive sleep apnea)    Acute CVA (cerebrovascular accident)    DANY (acute kidney injury)    Metabolic acidosis    Stenosis of left carotid artery       Medication List with Changes/Refills   Current Medications    ALBUTEROL (VENTOLIN HFA) 90 MCG/ACTUATION INHALER    Inhale 2 puffs into the lungs every 6 (six) hours as needed for Wheezing. Rescue    ASPIRIN (ECOTRIN) 81 MG EC TABLET    Take 1 tablet (81 mg total) by mouth once daily.    BLOOD SUGAR DIAGNOSTIC STRP    1 strip by Misc.(Non-Drug; Combo Route) route 2 (two) times daily with meals.    BLOOD-GLUCOSE METER KIT    Use as instructed    CLOPIDOGREL (PLAVIX) 75 MG TABLET    Take 1 tablet (75 mg total) by mouth once  "daily.    FLUTICASONE PROPIONATE (FLONASE) 50 MCG/ACTUATION NASAL SPRAY    SHAKE LIQUID AND USE 2 SPRAYS IN EACH NOSTRIL EVERY DAY    GLIPIZIDE (GLUCOTROL) 2.5 MG TR24    Take 1 tablet (2.5 mg total) by mouth daily with breakfast.    LANCETS MISC    1 each by Misc.(Non-Drug; Combo Route) route 2 (two) times daily with meals.    LANCING DEVICE MISC    1 Device by Misc.(Non-Drug; Combo Route) route 2 (two) times daily with meals.    MONTELUKAST (SINGULAIR) 10 MG TABLET    Take 1 tablet (10 mg total) by mouth every evening.    MULTIVITAMIN (THERAGRAN) PER TABLET    Take 1 tablet by mouth once daily.    PANTOPRAZOLE (PROTONIX) 40 MG TABLET    Take 1 tablet (40 mg total) by mouth daily as needed.    PEN NEEDLE, DIABETIC 31 GAUGE X 5/16" NDLE    1 each by Misc.(Non-Drug; Combo Route) route 2 (two) times daily with meals.    ROSUVASTATIN (CRESTOR) 40 MG TAB    Take 1 tablet (40 mg total) by mouth once daily.       Review of patient's allergies indicates:  No Known Allergies    Past Surgical History:   Procedure Laterality Date    COLONOSCOPY N/A 6/21/2016    Procedure: COLONOSCOPY;  Surgeon: Alonso Dorsey MD;  Location: [facility];  Service: Endoscopy;  Laterality: N/A;    COLONOSCOPY N/A 11/1/2016    Procedure: COLONOSCOPY;  Surgeon: Alonso Dorsey MD;  Location: [facility] ENDO;  Service: Endoscopy;  Laterality: N/A;    COLONOSCOPY N/A 2/3/2017    Procedure: COLONOSCOPY;  Surgeon: Alonso Doresy MD;  Location: [facility] ENDO;  Service: Endoscopy;  Laterality: N/A;    COLONOSCOPY N/A 11/13/2017    Procedure: COLONOSCOPY;  Surgeon: Alonso Dorsey MD;  Location: [facility];  Service: Endoscopy;  Laterality: N/A;    COLONOSCOPY N/A 2/19/2021    Procedure: COLONOSCOPY;  Surgeon: Paula Ricardo MD;  Location: [facility] ENDO;  Service: Endoscopy;  Laterality: N/A;    FLUOROSCOPY N/A 6/15/2018    Procedure: Transjugular liver bx;  Surgeon: Petros Recio MD;  Location: [facility] CATH LAB;  Service: General;  Laterality: " "N/A;    TONSILLECTOMY         Family History   Problem Relation Age of Onset    Cancer Father     Alzheimer's disease Mother     Colon cancer Neg Hx     Melanoma Neg Hx        Social History     Socioeconomic History    Marital status:    Tobacco Use    Smoking status: Former     Years: 3.00     Types: Cigarettes, Pipe     Quit date:      Years since quittin.7    Smokeless tobacco: Never    Tobacco comments:     smoked a pipe - quit smoking    Substance and Sexual Activity    Alcohol use: Yes     Alcohol/week: 2.0 - 3.0 standard drinks     Types: 2 - 3 Cans of beer per week     Comment: daily    Drug use: No    Sexual activity: Never     Social Determinants of Health     Financial Resource Strain: Low Risk     Difficulty of Paying Living Expenses: Not very hard   Food Insecurity: No Food Insecurity    Worried About Running Out of Food in the Last Year: Never true    Ran Out of Food in the Last Year: Never true   Transportation Needs: No Transportation Needs    Lack of Transportation (Medical): No    Lack of Transportation (Non-Medical): No   Physical Activity: Inactive    Days of Exercise per Week: 0 days    Minutes of Exercise per Session: 0 min   Stress: No Stress Concern Present    Feeling of Stress : Not at all   Social Connections: Socially Integrated    Frequency of Communication with Friends and Family: More than three times a week    Frequency of Social Gatherings with Friends and Family: More than three times a week    Attends Synagogue Services: More than 4 times per year    Active Member of Clubs or Organizations: Yes    Attends Club or Organization Meetings: More than 4 times per year    Marital Status:    Housing Stability: Low Risk     Unable to Pay for Housing in the Last Year: No    Number of Places Lived in the Last Year: 1    Unstable Housing in the Last Year: No       Vitals:    22 1519   Weight: 97.7 kg (215 lb 6.2 oz)   Height: 5' 6" (1.676 m)   PainSc:   2 "       Hemoglobin A1C   Date Value Ref Range Status   09/13/2022 7.8 (H) 4.0 - 5.6 % Final     Comment:     ADA Screening Guidelines:  5.7-6.4%  Consistent with prediabetes  >or=6.5%  Consistent with diabetes    High levels of fetal hemoglobin interfere with the HbA1C  assay. Heterozygous hemoglobin variants (HbS, HgC, etc)do  not significantly interfere with this assay.   However, presence of multiple variants may affect accuracy.     09/02/2022 8.3 (H) 4.0 - 5.6 % Final     Comment:     ADA Screening Guidelines:  5.7-6.4%  Consistent with prediabetes  >or=6.5%  Consistent with diabetes    High levels of fetal hemoglobin interfere with the HbA1C  assay. Heterozygous hemoglobin variants (HbS, HgC, etc)do  not significantly interfere with this assay.   However, presence of multiple variants may affect accuracy.     01/21/2021 5.8 (H) 4.0 - 5.6 % Final     Comment:     ADA Screening Guidelines:  5.7-6.4%  Consistent with prediabetes  >or=6.5%  Consistent with diabetes  High levels of fetal hemoglobin interfere with the HbA1C  assay. Heterozygous hemoglobin variants (HbS, HgC, etc)do  not significantly interfere with this assay.   However, presence of multiple variants may affect accuracy.         Review of Systems   Constitutional:  Negative for chills and fever.   Respiratory:  Negative for shortness of breath.    Cardiovascular:  Negative for chest pain, palpitations, orthopnea, claudication and leg swelling.   Gastrointestinal:  Negative for diarrhea, nausea and vomiting.   Musculoskeletal:  Negative for joint pain.   Skin:  Negative for rash.   Neurological:  Negative for dizziness, tingling, sensory change, focal weakness and weakness.   Psychiatric/Behavioral: Negative.             Objective:      PHYSICAL EXAM: Apperance: Alert and orient in no distress,well developed, and with good attention to grooming and body habits  Patient presents ambulating in tennis shoes.   LOWER EXTREMITY EXAM:  VASCULAR: Dorsalis  pedis pulses 2/4 bilateral and Posterior Tibial pulses 1/4 bilateral. Capillary fill time <4 seconds bilateral. No edema observed bilateral. Varicosities absent bilateral. Skin temperature of the lower extremities is warm to cool, proximal to distal. Hair growth dim bilateral.  DERMATOLOGICAL: No skin rashes, subcutaneous nodules, lesions, or ulcers observed bilateral. Nails 1,2,3,4,5 right and 2,3,4,5 left bilateral elongated, thickened, and discolored with subungual debris. Left hallux nail absent.  Webspaces 1,2,3,4 bilateral clean, dry and without evidence of break in skin integrity.   NEUROLOGICAL: Light touch, sharp-dull, proprioception all present and equal bilaterally.  Vibratory sensation diminished at right hallux and intact at left. Protective sensation intact at all 10 sites as tested with a Mullan-Gee 5.07 monofilament.   MUSCULOSKELETAL: Muscle strength is 5/5 for foot inverters, everters, plantarflexors, and dorsiflexors. Muscle tone is normal.         Assessment:       ICD-10-CM ICD-9-CM   1. Encounter for comprehensive diabetic foot examination, type 2 diabetes mellitus  E11.9 250.00   2. Acute CVA (cerebrovascular accident)  I63.9 434.91   3. Pain of toe, unspecified laterality  M79.676 729.5       Plan:   Encounter for comprehensive diabetic foot examination, type 2 diabetes mellitus    Acute CVA (cerebrovascular accident)    Pain of toe, unspecified laterality  -     Ambulatory referral/consult to Podiatry    I counseled the patient on his conditions, regarding findings of my examination, my impressions, and usual treatment plan.   Greater than 50% of this visit spent on counseling and coordination of care.  Greater than 12 minutes of a 15 minute appointment spent on education about the diabetic foot, neuropathy, and prevention of limb loss.  Shoe inspection. Diabetic Foot Education. Patient reminded of the importance of good nutrition and blood sugar control to help prevent podiatric  complications of diabetes. Patient instructed on proper foot hygeine. We discussed wearing proper shoe gear, daily foot inspections, never walking without protective shoe gear, never putting sharp instruments to feet.    Discussed with patient treatments for neuropathy consisting of topical or oral medication.  Recommendations given for over-the-counter medicine such as Two [place] Goats and/or Capsaicin.  Patient  will continue to monitor the areas daily, inspect feet, wear protective shoe gear when ambulatory, moisturizer to maintain skin integrity. Patient reminded of the importance of good nutrition and blood sugar control to help prevent podiatric complications of diabetes.  Patient to return 6 months or sooner if needed.          Shelley Gallardo DPM  [facility]

## 2022-09-29 ENCOUNTER — HOSPITAL ENCOUNTER (OUTPATIENT)
Dept: RADIOLOGY | Facility: HOSPITAL | Age: 82
Discharge: HOME | End: 2022-09-29
Attending: INTERNAL MEDICINE
Payer: COMMERCIAL

## 2022-09-29 DIAGNOSIS — R79.89 LFT ELEVATION: ICD-10-CM

## 2022-09-29 PROCEDURE — 76705 ECHO EXAM OF ABDOMEN: CPT | Mod: TC

## 2022-10-04 ENCOUNTER — TELEPHONE (OUTPATIENT)
Dept: FAMILY MEDICINE | Facility: CLINIC | Age: 82
End: 2022-10-04

## 2022-10-04 ENCOUNTER — OFFICE VISIT (OUTPATIENT)
Dept: NEPHROLOGY | Facility: CLINIC | Age: 82
End: 2022-10-04
Payer: COMMERCIAL

## 2022-10-04 VITALS
BODY MASS INDEX: 34.08 KG/M2 | DIASTOLIC BLOOD PRESSURE: 64 MMHG | RESPIRATION RATE: 20 BRPM | WEIGHT: 212.06 LBS | SYSTOLIC BLOOD PRESSURE: 98 MMHG | HEIGHT: 66 IN | HEART RATE: 100 BPM

## 2022-10-04 DIAGNOSIS — N17.9 AKI (ACUTE KIDNEY INJURY): Primary | ICD-10-CM

## 2022-10-04 DIAGNOSIS — I63.9 CEREBROVASCULAR ACCIDENT (CVA), UNSPECIFIED MECHANISM: Primary | ICD-10-CM

## 2022-10-04 PROCEDURE — 3288F FALL RISK ASSESSMENT DOCD: CPT | Mod: CPTII,S$GLB,, | Performed by: INTERNAL MEDICINE

## 2022-10-04 PROCEDURE — 1111F DSCHRG MED/CURRENT MED MERGE: CPT | Mod: CPTII,S$GLB,, | Performed by: INTERNAL MEDICINE

## 2022-10-04 PROCEDURE — 99999 PR PBB SHADOW E&M-EST. PATIENT-LVL III: CPT | Mod: PBBFAC,,, | Performed by: INTERNAL MEDICINE

## 2022-10-04 PROCEDURE — 3074F PR MOST RECENT SYSTOLIC BLOOD PRESSURE < 130 MM HG: ICD-10-PCS | Mod: CPTII,S$GLB,, | Performed by: INTERNAL MEDICINE

## 2022-10-04 PROCEDURE — 99215 PR OFFICE/OUTPT VISIT, EST, LEVL V, 40-54 MIN: ICD-10-PCS | Mod: S$GLB,,, | Performed by: INTERNAL MEDICINE

## 2022-10-04 PROCEDURE — 99215 OFFICE O/P EST HI 40 MIN: CPT | Mod: S$GLB,,, | Performed by: INTERNAL MEDICINE

## 2022-10-04 PROCEDURE — 1101F PT FALLS ASSESS-DOCD LE1/YR: CPT | Mod: CPTII,S$GLB,, | Performed by: INTERNAL MEDICINE

## 2022-10-04 PROCEDURE — 1101F PR PT FALLS ASSESS DOC 0-1 FALLS W/OUT INJ PAST YR: ICD-10-PCS | Mod: CPTII,S$GLB,, | Performed by: INTERNAL MEDICINE

## 2022-10-04 PROCEDURE — 3288F PR FALLS RISK ASSESSMENT DOCUMENTED: ICD-10-PCS | Mod: CPTII,S$GLB,, | Performed by: INTERNAL MEDICINE

## 2022-10-04 PROCEDURE — 3074F SYST BP LT 130 MM HG: CPT | Mod: CPTII,S$GLB,, | Performed by: INTERNAL MEDICINE

## 2022-10-04 PROCEDURE — 3078F DIAST BP <80 MM HG: CPT | Mod: CPTII,S$GLB,, | Performed by: INTERNAL MEDICINE

## 2022-10-04 PROCEDURE — 1159F PR MEDICATION LIST DOCUMENTED IN MEDICAL RECORD: ICD-10-PCS | Mod: CPTII,S$GLB,, | Performed by: INTERNAL MEDICINE

## 2022-10-04 PROCEDURE — 1111F PR DISCHARGE MEDS RECONCILED W/ CURRENT OUTPATIENT MED LIST: ICD-10-PCS | Mod: CPTII,S$GLB,, | Performed by: INTERNAL MEDICINE

## 2022-10-04 PROCEDURE — 3078F PR MOST RECENT DIASTOLIC BLOOD PRESSURE < 80 MM HG: ICD-10-PCS | Mod: CPTII,S$GLB,, | Performed by: INTERNAL MEDICINE

## 2022-10-04 PROCEDURE — 99999 PR PBB SHADOW E&M-EST. PATIENT-LVL III: ICD-10-PCS | Mod: PBBFAC,,, | Performed by: INTERNAL MEDICINE

## 2022-10-04 PROCEDURE — 1159F MED LIST DOCD IN RCRD: CPT | Mod: CPTII,S$GLB,, | Performed by: INTERNAL MEDICINE

## 2022-10-04 NOTE — PROGRESS NOTES
Jovan Del Cid Jr. is a 82 y.o. male for whom nephrology consult has been requested to evaluate and give opinion.   Renal clinic consult note:  Date of clinic visit: 10/4/22  Reason for consult: post hospital visit for DANY    HPI: Pt was seen and examined. Labs and meds reviewed. Hospital records were reviewed. Pt is 83 y/o male who was sent to hospital for DANY after he had started taking metformin for new ly diagnosed DM. Pt had no abdominal or RUQ symptoms. Cr had worsened from 0.8 to 2.4. Lactic acid was normal. Pt has some soft to water BM and BP was low. On supportive care and with IVF's Cr began to improve. Pt presents for post hospital visit. He no longer takes metformin. Pt has no abdominal c/o's, no diarrhea. He feels well. BP is low, but he has no symptoms, he takes no BP meds.     PAST MEDICAL HISTORY:  DANY Sept 2022, Acute CVA (cerebrovascular accident) (9/8/2022), Asthma, Bronchitis chronic, Hyperlipidemia, Hypertension, KALYAN (obstructive sleep apnea) (9/8/2022), and Type 2 diabetes mellitus (9/7/2022).    PAST SURGICAL HISTORY:  He  has a past surgical history that includes Tonsillectomy; Colonoscopy (N/A, 6/21/2016); Colonoscopy (N/A, 11/1/2016); Colonoscopy (N/A, 2/3/2017); Colonoscopy (N/A, 11/13/2017); Fluoroscopy (N/A, 6/15/2018); and Colonoscopy (N/A, 2/19/2021).    SOCIAL HISTORY:  He  reports that he quit smoking about 62 years ago. His smoking use included cigarettes and pipe. He has never used smokeless tobacco. He reports current alcohol use of about 2.0 - 3.0 standard drinks per week. He reports that he does not use drugs.    FAMILY MEDICAL HISTORY:  His family history includes Alzheimer's disease in his mother; Cancer in his father.    Review of patient's allergies indicates:  No Known Allergies        Prior to Admission medications    Medication Sig Start Date End Date Taking? Authorizing Provider   albuterol (VENTOLIN HFA) 90 mcg/actuation inhaler Inhale 2 puffs into the lungs every 6  "(six) hours as needed for Wheezing. Rescue 6/23/21 10/4/22 Yes Kenia Kruger PA-C   aspirin (ECOTRIN) 81 MG EC tablet Take 1 tablet (81 mg total) by mouth once daily. 9/4/22 9/4/23 Yes Johnny Paris MD   blood sugar diagnostic Strp 1 strip by Misc.(Non-Drug; Combo Route) route 2 (two) times daily with meals. 9/4/22  Yes Johnny Paris MD   blood-glucose meter kit Use as instructed 9/4/22 9/4/23 Yes Johnny Paris MD   fluticasone propionate (FLONASE) 50 mcg/actuation nasal spray SHAKE LIQUID AND USE 2 SPRAYS IN EACH NOSTRIL EVERY DAY 12/30/21  Yes Paul Neal MD   glipiZIDE (GLUCOTROL) 2.5 MG TR24 Take 1 tablet (2.5 mg total) by mouth daily with breakfast. 9/16/22 9/16/23 Yes Deann Wolf, NP   lancets Misc 1 each by Misc.(Non-Drug; Combo Route) route 2 (two) times daily with meals. 9/4/22  Yes Johnny Paris MD   lancing device Misc 1 Device by Misc.(Non-Drug; Combo Route) route 2 (two) times daily with meals. 9/4/22 9/4/23 Yes Johnny Paris MD   montelukast (SINGULAIR) 10 mg tablet Take 1 tablet (10 mg total) by mouth every evening. 9/4/22 12/3/22 Yes Johnny Paris MD   multivitamin (THERAGRAN) per tablet Take 1 tablet by mouth once daily.   Yes Historical Provider   pantoprazole (PROTONIX) 40 MG tablet Take 1 tablet (40 mg total) by mouth daily as needed. 7/11/19 10/4/22 Yes Paul Neal MD   pen needle, diabetic 31 gauge x 5/16" Ndle 1 each by Misc.(Non-Drug; Combo Route) route 2 (two) times daily with meals. 9/4/22  Yes Johnny Paris MD   rosuvastatin (CRESTOR) 40 MG Tab Take 1 tablet (40 mg total) by mouth once daily. 9/4/22  Yes Johnny Paris MD   clopidogreL (PLAVIX) 75 mg tablet Take 1 tablet (75 mg total) by mouth once daily.  Patient not taking: Reported on 10/4/2022 9/4/22 9/4/23  Johnny Paris MD        REVIEW OF SYSTEMS:  Patient has no fever, fatigue, visual changes, chest pain, " "edema, cough, dyspnea, nausea, vomiting, constipation, diarrhea, arthralgias, pruritis, dizziness, weakness, depression, confusion.    PHYSICAL EXAM:   height is 5' 6" (1.676 m) and weight is 96.2 kg (212 lb 1.3 oz). His blood pressure is 98/64 and his pulse is 100. His respiration is 20.   Gen: WDWN male in no apparent distress  Psych: Normal mood and affect  Skin: No rashes or ulcers  Neck: No JVD  Chest: Clear with no rales, rhonchi, wheezing with normal effort  CV: Regular with no murmurs, gallops or rubs  Abd: Soft, nontender, no distension  Ext: No edema    Labs reviewed  BMP  Lab Results   Component Value Date     09/22/2022    K 4.2 09/22/2022     09/22/2022    CO2 24 09/22/2022    BUN 27 (H) 09/22/2022    CREATININE 1.5 (H) 09/22/2022    CALCIUM 9.6 09/22/2022    ANIONGAP 9 09/22/2022    ESTGFRAFRICA >60.0 01/20/2021    EGFRNONAA >60.0 01/20/2021     Lab Results   Component Value Date    WBC 5.56 09/16/2022    HGB 13.2 (L) 09/16/2022    HCT 40.7 09/16/2022    MCV 94 09/16/2022     (L) 09/16/2022         IMPRESSION AND RECOMMENDATIONS: 81 y/o male with new diagnosis of DM and DANY:     DANY (acute kidney injury)  s Cr is improving now. DANY resolving  DANY likely due to diarrhea/soft BM resulting from taking metformin, new start after new dx of DM in Sept 2022  Metabolic acidosis, has resolved     Doubt DANY related to more direct kidney or liver injury: no abdominal symptoms, lactic acid normal, Cr already improving with IVF's and after holding metformin.  Had low urine Na and FENa<1% c/w prerenal azotemia  Pt had elevated total bilirubin, but that is not new (at least 1 year)  Other liver tests unremarkable (slight increase in ALT noted, improving)      Recommend:  Do not re-start metformin     T2DM (type 2 diabetes mellitus)  New and recent diagnosis   Discussed with pt and wife  Informed pt of ADA diet  On low dose of glipizide 2.5 mg po qd     H/o of CVA (cerebrovascular accident)  H/o of " lacunar stroke, has right sided weakness  Reviewed the chart           Plans and recommendations:  As discussed above  Total time spent 40 minutes including time needed to review the records, the   patient evaluation, documentation, face-to-face discussion with the patient,   more than 50% of the time was spent on coordination of care and counseling.    Level V visit.  RTC 3 months    Saúl Avery MD

## 2022-10-04 NOTE — TELEPHONE ENCOUNTER
----- Message from Karen Oh sent at 10/4/2022 11:19 AM CDT -----  Contact: Kartik/ [facility] stated the patient is doing great in his therapy and about to be discharged. He stated the patient wants to continue outpatient therapy with [facility] on [address] in [place]. Please call him back with any questions at [phone]. The start date for the order needs to be 10/10/22 so it doesn't overlap.

## 2022-10-05 ENCOUNTER — PATIENT MESSAGE (OUTPATIENT)
Dept: OPHTHALMOLOGY | Facility: CLINIC | Age: 82
End: 2022-10-05

## 2022-10-05 ENCOUNTER — OFFICE VISIT (OUTPATIENT)
Dept: OPHTHALMOLOGY | Facility: CLINIC | Age: 82
End: 2022-10-05
Payer: COMMERCIAL

## 2022-10-05 DIAGNOSIS — H01.02B SQUAMOUS BLEPHARITIS OF UPPER AND LOWER EYELIDS OF BOTH EYES: Primary | ICD-10-CM

## 2022-10-05 DIAGNOSIS — E11.36 DIABETIC CATARACT: ICD-10-CM

## 2022-10-05 DIAGNOSIS — E11.9 DIABETES MELLITUS TYPE 2 WITHOUT RETINOPATHY: ICD-10-CM

## 2022-10-05 DIAGNOSIS — H52.7 REFRACTIVE ERRORS: ICD-10-CM

## 2022-10-05 DIAGNOSIS — H01.02A SQUAMOUS BLEPHARITIS OF UPPER AND LOWER EYELIDS OF BOTH EYES: Primary | ICD-10-CM

## 2022-10-05 PROCEDURE — 2023F PR DILATED RETINAL EXAM W/O EVID OF RETINOPATHY: ICD-10-PCS | Mod: CPTII,S$GLB,, | Performed by: OPTOMETRIST

## 2022-10-05 PROCEDURE — 92004 PR EYE EXAM, NEW PATIENT,COMPREHESV: ICD-10-PCS | Mod: S$GLB,,, | Performed by: OPTOMETRIST

## 2022-10-05 PROCEDURE — 1159F PR MEDICATION LIST DOCUMENTED IN MEDICAL RECORD: ICD-10-PCS | Mod: CPTII,S$GLB,, | Performed by: OPTOMETRIST

## 2022-10-05 PROCEDURE — 1159F MED LIST DOCD IN RCRD: CPT | Mod: CPTII,S$GLB,, | Performed by: OPTOMETRIST

## 2022-10-05 PROCEDURE — 92004 COMPRE OPH EXAM NEW PT 1/>: CPT | Mod: S$GLB,,, | Performed by: OPTOMETRIST

## 2022-10-05 PROCEDURE — 92015 PR REFRACTION: ICD-10-PCS | Mod: S$GLB,,, | Performed by: OPTOMETRIST

## 2022-10-05 PROCEDURE — 99999 PR PBB SHADOW E&M-EST. PATIENT-LVL III: CPT | Mod: PBBFAC,,, | Performed by: OPTOMETRIST

## 2022-10-05 PROCEDURE — 2023F DILAT RTA XM W/O RTNOPTHY: CPT | Mod: CPTII,S$GLB,, | Performed by: OPTOMETRIST

## 2022-10-05 PROCEDURE — 92015 DETERMINE REFRACTIVE STATE: CPT | Mod: S$GLB,,, | Performed by: OPTOMETRIST

## 2022-10-05 PROCEDURE — 99999 PR PBB SHADOW E&M-EST. PATIENT-LVL III: ICD-10-PCS | Mod: PBBFAC,,, | Performed by: OPTOMETRIST

## 2022-10-05 RX ORDER — ERYTHROMYCIN 5 MG/G
OINTMENT OPHTHALMIC 2 TIMES DAILY
Qty: 1 EACH | Refills: 3 | Status: SHIPPED | OUTPATIENT
Start: 2022-10-05 | End: 2022-12-22

## 2022-10-05 NOTE — PROGRESS NOTES
HPI    NIDDM exam  Both eyes feels scratchy like a lash is in both eyes.  New patient last eye exam 07/27/2016 DNL.  Update glasses RX.  Lab Results       Component                Value               Date                       HGBA1C                   7.8 (H)             09/13/2022               Last edited by Chastity Main MA on 10/5/2022  8:53 AM.            Assessment /Plan     For exam results, see Encounter Report.    Squamous blepharitis of upper and lower eyelids of both eyes  -     erythromycin (ROMYCIN) ophthalmic ointment; Place into both eyes 2 (two) times a day.  Dispense: 1 each; Refill: 3    Diabetes mellitus type 2 without retinopathy    Diabetic cataract    Refractive errors      Worksheet given. Warm compresses followed by lid scrubs, tid.    No Background Diabetic Retinopathy    Moderate cataracts OU, not surgical    Dispense Final Rx for glasses.  RTC 1 year  Discussed above and answered questions.

## 2022-10-06 ENCOUNTER — TELEPHONE (OUTPATIENT)
Dept: FAMILY MEDICINE | Facility: CLINIC | Age: 82
End: 2022-10-06
Payer: COMMERCIAL

## 2022-10-06 NOTE — TELEPHONE ENCOUNTER
----- Message from Miguel Angel Lopez sent at 10/6/2022  9:40 AM CDT -----  Contact: Sukumar (elida )   Sukumar Would like a call back at [phone], in regards to getting pt set up for physical therapy @ [facility].

## 2022-10-13 ENCOUNTER — TELEPHONE (OUTPATIENT)
Dept: FAMILY MEDICINE | Facility: CLINIC | Age: 82
End: 2022-10-13
Payer: COMMERCIAL

## 2022-10-13 DIAGNOSIS — I63.9 CEREBROVASCULAR ACCIDENT (CVA), UNSPECIFIED MECHANISM: Primary | ICD-10-CM

## 2022-10-13 NOTE — TELEPHONE ENCOUNTER
[facility] is discharging pt. From their physical therapy program. Pt. Is requesting to continue therapy with [facility] on [facility].

## 2022-10-13 NOTE — TELEPHONE ENCOUNTER
----- Message from Ani Olsen sent at 10/13/2022  3:22 PM CDT -----  Tari with [facility] called in regards to physical therapy at [facility] outpatient therapy and wants an update on the referral,Please call back at [phone].Thanks

## 2022-10-14 ENCOUNTER — PATIENT MESSAGE (OUTPATIENT)
Dept: INTERNAL MEDICINE | Facility: CLINIC | Age: 82
End: 2022-10-14
Payer: COMMERCIAL

## 2022-10-14 ENCOUNTER — IMMUNIZATION (OUTPATIENT)
Dept: PHARMACY | Facility: CLINIC | Age: 82
End: 2022-10-14
Payer: COMMERCIAL

## 2022-10-14 DIAGNOSIS — Z23 NEED FOR VACCINATION: Primary | ICD-10-CM

## 2022-10-17 ENCOUNTER — CLINICAL SUPPORT (OUTPATIENT)
Dept: DIABETES | Facility: CLINIC | Age: 82
End: 2022-10-17
Payer: COMMERCIAL

## 2022-10-17 DIAGNOSIS — E11.65 TYPE 2 DIABETES MELLITUS WITH HYPERGLYCEMIA, WITHOUT LONG-TERM CURRENT USE OF INSULIN: Primary | ICD-10-CM

## 2022-10-17 PROCEDURE — 99999 PR PBB SHADOW E&M-EST. PATIENT-LVL III: CPT | Mod: PBBFAC,,, | Performed by: DIETITIAN, REGISTERED

## 2022-10-17 PROCEDURE — G0108 DIAB MANAGE TRN  PER INDIV: HCPCS | Mod: S$GLB,,, | Performed by: DIETITIAN, REGISTERED

## 2022-10-17 PROCEDURE — G0108 PR DIAB MANAGE TRN  PER INDIV: ICD-10-PCS | Mod: S$GLB,,, | Performed by: DIETITIAN, REGISTERED

## 2022-10-17 PROCEDURE — 99999 PR PBB SHADOW E&M-EST. PATIENT-LVL III: ICD-10-PCS | Mod: PBBFAC,,, | Performed by: DIETITIAN, REGISTERED

## 2022-10-17 NOTE — PROGRESS NOTES
Diabetes Care Specialist Follow-up Note  Author: Thais Hansen RD, CDE  Date: 10/17/2022    Program Intake  Reason for Diabetes Program Visit:: Intervention  Type of Intervention:: Individual  Individual: Education  Education: Self-Management Skill Review  Current diabetes risk level:: moderate  Permission to speak with others about care:: yes (wife)    Lab Results   Component Value Date    HGBA1C 7.8 (H) 09/13/2022         Clinical       Clinical Assessment  Current Diabetes Treatment: Oral Medication, Diet  Have you ever experienced hypoglycemia (low blood sugar)?: no  Have you ever experienced hyperglycemia (high blood sugar)?: no       Nutritional Status  Diet: Diabetic diet  Meal Plan 24 Hour Recall: Breakfast, Lunch, Dinner, Snack  Meal Plan 24 Hour Recall - Breakfast: egg in hole- on whole wheat bread, water  Meal Plan 24 Hour Recall - Lunch: J Stefan[employer]: Pedro salad with grilled shrimp with [employer] salad dressing, unsweet iced tea  Meal Plan 24 Hour Recall - Dinner: mixed vegetables, water  Meal Plan 24 Hour Recall - Snack: grape tomatoes  Change in appetite?: No  Current nutritional status an area of need that is impacting patient's ability to self-manage diabetes?: No      Diabetes Self-Management Skills Assessment     Current Diabetes Medications:  2.5mg Glipizide in AM         Physical Activity/Exercise  Patient's daily activity level:: lightly active  Patient formally exercises outside of work.: yes  Exercise Type: walking  Frequency: four or more times a week  Patient can identify forms of physical activity.: yes  Stated forms of physical activity:: any movement performed by muscles that uses energy  Patient can identify reasons why exercise/physical activity is important in diabetes management.: yes  Identified reasons:: lowers blood glucose, blood pressure, and cholesterol  Physical Activity/Exercise Skills Assessment Completed: : Yes  Assessment indicates:: Adequate understanding  Area of  need?: No         Home Blood Glucose Monitoring  Patient states that blood sugar is checked at home daily.: yes  Monitoring Method:: home glucometer  How often do you check your blood sugar?: Once a day  When do you check your blood sugar?: Before breakfast  When you check what is your typical blood sugar range? : [id number]  Blood glucose logs:: no  Home Blood Glucose Monitoring Skills Assessment Completed: : Yes  Assessment indicates:: Adequate understanding  Area of need?: No              Psychosocial/Coping  Patient can identify ways of coping with chronic disease.: yes  Patient-stated ways of coping with chronic disease:: support from loved ones  Psychosocial/Coping Skills Assessment Completed: : Yes  Assessment indicates:: Adequate understanding  Area of need?: No      During today's follow-up visit,  the following areas required further assessment and content was provided/reviewed.    Based on today's diabetes care assessment, the following areas of need were identified:      Social 2022   Support No   Access to Mass Media/Tech No   Cognitive/Behavioral Health No   Culture/Restorationism No   Communication No   Health Literacy No        Clinical 10/17/2022   Medication Adherence -   Lab Compliance -   Nutritional Status No        Diabetes Self-Management Skills 10/17/2022   Diabetes Disease Process/Treatment Options -   Nutrition/Healthy Eating See care plan update. Patient met goal.  He is doing well with diet. Limiting carbohydrates to 30g or less per meal. Avoiding added sugars, also limits sodium. Using NuSalt salt substitute on occasion. Advised patient to consult cardiologist and nephrologist due to Potassium content. Recommended using Mrs. SAUL instead.    Physical Activity/Exercise No- patient and wife are walking in evenings.    Medication Taking glipizide as prescribed.    Home Blood Glucose Monitoring No   Acute Complications Reviewed blood glucose goals, prevention, detection, signs and  symptoms, and treatment of hypoglycemia and hyperglycemia, and when to contact the clinic.     Chronic Complications -   Psychosocial/Coping No        Today's interventions were provided through individual discussion, instruction, and written materials were provided.    Patient verbalized understanding of instruction and written materials.  Pt was able to return back demonstration of instructions today. Patient understood key points, needs reinforcement and further instruction.       Diabetes Self-Management Care Plan Review and Evaluation of Progress:    During today's follow-up Jovan's Diabetes Self-Management Care Plan progress was reviewed and progress was evaluated including his/her input. Jovan has agreed to continue his/her journey to improve/maintain overall diabetes control by continuing to set health goals. See care plan progress below.      Care Plan: Diabetes Management   Updates made since 9/17/2022 12:00 AM        Problem: Healthy Eating         Goal: Eat 3 meals daily with 30-45g/2-3 servings of Carbohydrate per meal. Completed 10/17/2022   Start Date: 9/21/2022   Expected End Date: 12/21/2022   This Visit's Progress: Met   Priority: High   Barriers: Knowledge deficit        Task: Reviewed the sources and role of Carbohydrate, Protein, and Fat and how each nutrient impacts blood sugar. Completed 9/21/2022        Task: Provided visual examples using dry measuring cups, food models, and other familiar objects such as computer mouse, deck or cards, tennis ball etc. to help with visualization of portions. Completed 9/21/2022        Task: Discussed strategies for choosing healthier menu options when dining out. Completed 9/21/2022        Task: Review the importance of balancing carbohydrates with each meal using portion control techniques to count servings of carbohydrate and label reading to identify serving size and amount of total carbs per serving. Completed 9/21/2022        Task: Provided Sample  plate method and reviewed the use of the plate to estimate amounts of carbohydrate per meal. Completed 9/21/2022          Follow Up Plan     Follow up if symptoms worsen or fail to improve, for E11.65.    Today's care plan and follow up schedule was discussed with patient.  Jovan verbalized understanding of the care plan, goals, and agrees to follow up plan.        The patient was encouraged to communicate with his/her health care provider/physician and care team regarding his/her condition(s) and treatment.  I provided the patient with my contact information today and encouraged to contact me via phone or [facility] Patient Portal as needed.

## 2022-10-24 ENCOUNTER — OFFICE VISIT (OUTPATIENT)
Dept: FAMILY MEDICINE | Facility: CLINIC | Age: 82
End: 2022-10-24
Payer: COMMERCIAL

## 2022-10-24 ENCOUNTER — TELEPHONE (OUTPATIENT)
Dept: NEUROLOGY | Facility: CLINIC | Age: 82
End: 2022-10-24
Payer: COMMERCIAL

## 2022-10-24 VITALS
DIASTOLIC BLOOD PRESSURE: 62 MMHG | BODY MASS INDEX: 34.19 KG/M2 | HEART RATE: 85 BPM | OXYGEN SATURATION: 100 % | HEIGHT: 66 IN | SYSTOLIC BLOOD PRESSURE: 108 MMHG | TEMPERATURE: 98 F | WEIGHT: 212.75 LBS

## 2022-10-24 DIAGNOSIS — Z79.4 TYPE 2 DIABETES MELLITUS WITH OTHER SPECIFIED COMPLICATION, WITH LONG-TERM CURRENT USE OF INSULIN: Primary | ICD-10-CM

## 2022-10-24 DIAGNOSIS — E78.5 DYSLIPIDEMIA: ICD-10-CM

## 2022-10-24 DIAGNOSIS — R79.89 LFT ELEVATION: ICD-10-CM

## 2022-10-24 DIAGNOSIS — K75.81 NASH (NONALCOHOLIC STEATOHEPATITIS): ICD-10-CM

## 2022-10-24 DIAGNOSIS — E11.69 TYPE 2 DIABETES MELLITUS WITH OTHER SPECIFIED COMPLICATION, WITH LONG-TERM CURRENT USE OF INSULIN: Primary | ICD-10-CM

## 2022-10-24 DIAGNOSIS — N17.9 AKI (ACUTE KIDNEY INJURY): ICD-10-CM

## 2022-10-24 DIAGNOSIS — J45.20 MILD INTERMITTENT ASTHMA WITHOUT COMPLICATION: Chronic | ICD-10-CM

## 2022-10-24 DIAGNOSIS — I63.9 ACUTE CVA (CEREBROVASCULAR ACCIDENT): ICD-10-CM

## 2022-10-24 PROCEDURE — 3074F SYST BP LT 130 MM HG: CPT | Mod: CPTII,S$GLB,, | Performed by: INTERNAL MEDICINE

## 2022-10-24 PROCEDURE — 90677 PCV20 VACCINE IM: CPT | Mod: S$GLB,,, | Performed by: INTERNAL MEDICINE

## 2022-10-24 PROCEDURE — 99214 PR OFFICE/OUTPT VISIT, EST, LEVL IV, 30-39 MIN: ICD-10-PCS | Mod: 25,S$GLB,, | Performed by: INTERNAL MEDICINE

## 2022-10-24 PROCEDURE — 99214 OFFICE O/P EST MOD 30 MIN: CPT | Mod: 25,S$GLB,, | Performed by: INTERNAL MEDICINE

## 2022-10-24 PROCEDURE — 3078F DIAST BP <80 MM HG: CPT | Mod: CPTII,S$GLB,, | Performed by: INTERNAL MEDICINE

## 2022-10-24 PROCEDURE — 90677 PNEUMOCOCCAL CONJUGATE VACCINE 20-VALENT: ICD-10-PCS | Mod: S$GLB,,, | Performed by: INTERNAL MEDICINE

## 2022-10-24 PROCEDURE — 3074F PR MOST RECENT SYSTOLIC BLOOD PRESSURE < 130 MM HG: ICD-10-PCS | Mod: CPTII,S$GLB,, | Performed by: INTERNAL MEDICINE

## 2022-10-24 PROCEDURE — 3288F PR FALLS RISK ASSESSMENT DOCUMENTED: ICD-10-PCS | Mod: CPTII,S$GLB,, | Performed by: INTERNAL MEDICINE

## 2022-10-24 PROCEDURE — 1159F PR MEDICATION LIST DOCUMENTED IN MEDICAL RECORD: ICD-10-PCS | Mod: CPTII,S$GLB,, | Performed by: INTERNAL MEDICINE

## 2022-10-24 PROCEDURE — 1101F PT FALLS ASSESS-DOCD LE1/YR: CPT | Mod: CPTII,S$GLB,, | Performed by: INTERNAL MEDICINE

## 2022-10-24 PROCEDURE — 1126F AMNT PAIN NOTED NONE PRSNT: CPT | Mod: CPTII,S$GLB,, | Performed by: INTERNAL MEDICINE

## 2022-10-24 PROCEDURE — 1101F PR PT FALLS ASSESS DOC 0-1 FALLS W/OUT INJ PAST YR: ICD-10-PCS | Mod: CPTII,S$GLB,, | Performed by: INTERNAL MEDICINE

## 2022-10-24 PROCEDURE — 3288F FALL RISK ASSESSMENT DOCD: CPT | Mod: CPTII,S$GLB,, | Performed by: INTERNAL MEDICINE

## 2022-10-24 PROCEDURE — 1159F MED LIST DOCD IN RCRD: CPT | Mod: CPTII,S$GLB,, | Performed by: INTERNAL MEDICINE

## 2022-10-24 PROCEDURE — 90471 IMMUNIZATION ADMIN: CPT | Mod: S$GLB,,, | Performed by: INTERNAL MEDICINE

## 2022-10-24 PROCEDURE — 99999 PR PBB SHADOW E&M-EST. PATIENT-LVL V: ICD-10-PCS | Mod: PBBFAC,,, | Performed by: INTERNAL MEDICINE

## 2022-10-24 PROCEDURE — 1126F PR PAIN SEVERITY QUANTIFIED, NO PAIN PRESENT: ICD-10-PCS | Mod: CPTII,S$GLB,, | Performed by: INTERNAL MEDICINE

## 2022-10-24 PROCEDURE — 90471 PNEUMOCOCCAL CONJUGATE VACCINE 20-VALENT: ICD-10-PCS | Mod: S$GLB,,, | Performed by: INTERNAL MEDICINE

## 2022-10-24 PROCEDURE — 99999 PR PBB SHADOW E&M-EST. PATIENT-LVL V: CPT | Mod: PBBFAC,,, | Performed by: INTERNAL MEDICINE

## 2022-10-24 PROCEDURE — 3078F PR MOST RECENT DIASTOLIC BLOOD PRESSURE < 80 MM HG: ICD-10-PCS | Mod: CPTII,S$GLB,, | Performed by: INTERNAL MEDICINE

## 2022-10-24 NOTE — TELEPHONE ENCOUNTER
Pt was seen 9/8/22 for stroke , wife want to know when can he start back driving?    [facility]

## 2022-10-24 NOTE — PROGRESS NOTES
Subjective:       Patient ID: Jovan Del Cid Jr. is a 82 y.o. male.    Chief Complaint: Follow-up, Hyperlipidemia, Chronic Kidney Disease, and Cerebrovascular Accident    Follow-up  Associated symptoms include arthralgias. Pertinent negatives include no abdominal pain, chest pain, chills, coughing, diaphoresis, fatigue, fever, headaches, joint swelling, myalgias, nausea, neck pain, numbness, rash, sore throat, vomiting or weakness.   Hyperlipidemia  Pertinent negatives include no chest pain, myalgias or shortness of breath.   Cerebrovascular Accident  Associated symptoms include arthralgias. Pertinent negatives include no abdominal pain, chest pain, chills, coughing, diaphoresis, fatigue, fever, headaches, joint swelling, myalgias, nausea, neck pain, numbness, rash, sore throat, vomiting or weakness.   Past Medical History:   Diagnosis Date    Acute CVA (cerebrovascular accident) 09/08/2022    DANY (acute kidney injury) 09/14/2022    Asthma     Bronchitis chronic     Cancer     Cough     Digestive disorder     Hyperlipidemia     Hypertension     Liver disease     KALYAN (obstructive sleep apnea) 09/08/2022    Type 2 diabetes mellitus, without long-term current use of insulin 09/07/2022    BS didn't check 10/05/2022     Past Surgical History:   Procedure Laterality Date    COLONOSCOPY N/A 6/21/2016    Procedure: COLONOSCOPY;  Surgeon: Alonso Dorsey MD;  Location: [facility];  Service: Endoscopy;  Laterality: N/A;    COLONOSCOPY N/A 11/1/2016    Procedure: COLONOSCOPY;  Surgeon: Alonso Dorsey MD;  Location: [facility];  Service: Endoscopy;  Laterality: N/A;    COLONOSCOPY N/A 2/3/2017    Procedure: COLONOSCOPY;  Surgeon: Alonso Dorsey MD;  Location: [facility];  Service: Endoscopy;  Laterality: N/A;    COLONOSCOPY N/A 11/13/2017    Procedure: COLONOSCOPY;  Surgeon: Alonso Dorsey MD;  Location: [facility];  Service: Endoscopy;  Laterality: N/A;    COLONOSCOPY N/A 2/19/2021     Procedure: COLONOSCOPY;  Surgeon: Paula Ricardo MD;  Location: [facility] ENDO;  Service: Endoscopy;  Laterality: N/A;    FLUOROSCOPY N/A 6/15/2018    Procedure: Transjugular liver bx;  Surgeon: Petros Recio MD;  Location: [facility] CATH LAB;  Service: General;  Laterality: N/A;    TONSILLECTOMY       Family History   Problem Relation Age of Onset    Cancer Father     Alzheimer's disease Mother     Colon cancer Neg Hx     Melanoma Neg Hx      Social History     Socioeconomic History    Marital status:    Tobacco Use    Smoking status: Former     Years: 3.00     Types: Cigarettes, Pipe     Quit date:      Years since quittin.8    Smokeless tobacco: Never    Tobacco comments:     smoked a pipe - quit smoking    Substance and Sexual Activity    Alcohol use: Not Currently     Alcohol/week: 2.0 - 3.0 standard drinks     Types: 2 - 3 Cans of beer per week     Comment: daily    Drug use: No    Sexual activity: Never     Social Determinants of Health     Financial Resource Strain: Low Risk     Difficulty of Paying Living Expenses: Not very hard   Food Insecurity: No Food Insecurity    Worried About Running Out of Food in the Last Year: Never true    Ran Out of Food in the Last Year: Never true   Transportation Needs: No Transportation Needs    Lack of Transportation (Medical): No    Lack of Transportation (Non-Medical): No   Physical Activity: Inactive    Days of Exercise per Week: 0 days    Minutes of Exercise per Session: 0 min   Stress: No Stress Concern Present    Feeling of Stress : Not at all   Social Connections: Socially Integrated    Frequency of Communication with Friends and Family: More than three times a week    Frequency of Social Gatherings with Friends and Family: More than three times a week    Attends Cheondoism Services: More than 4 times per year    Active Member of Clubs or Organizations: Yes    Attends Club or Organization Meetings: More than 4 times per year     Marital Status:    Housing Stability: Low Risk     Unable to Pay for Housing in the Last Year: No    Number of Places Lived in the Last Year: 1    Unstable Housing in the Last Year: No     Review of Systems   Constitutional:  Negative for activity change, appetite change, chills, diaphoresis, fatigue, fever and unexpected weight change.   HENT:  Negative for drooling, ear discharge, ear pain, facial swelling, hearing loss, mouth sores, nosebleeds, postnasal drip, rhinorrhea, sinus pressure, sneezing, sore throat, tinnitus, trouble swallowing and voice change.    Eyes:  Negative for photophobia, redness and visual disturbance.   Respiratory:  Negative for apnea, cough, choking, chest tightness, shortness of breath and wheezing.    Cardiovascular:  Negative for chest pain, palpitations and leg swelling.   Gastrointestinal:  Negative for abdominal distention, abdominal pain, anal bleeding, blood in stool, constipation, diarrhea, nausea, rectal pain and vomiting.   Endocrine: Negative for cold intolerance, heat intolerance, polydipsia, polyphagia and polyuria.   Genitourinary:  Negative for difficulty urinating, dysuria, enuresis, flank pain, frequency, genital sores, hematuria and urgency.   Musculoskeletal:  Positive for arthralgias. Negative for back pain, gait problem, joint swelling, myalgias, neck pain and neck stiffness.   Skin:  Negative for color change, pallor, rash and wound.   Allergic/Immunologic: Negative for food allergies and immunocompromised state.   Neurological:  Negative for dizziness, tremors, seizures, syncope, facial asymmetry, speech difficulty, weakness, light-headedness, numbness and headaches.   Hematological:  Negative for adenopathy. Does not bruise/bleed easily.   Psychiatric/Behavioral:  Negative for agitation, behavioral problems, confusion, decreased concentration, dysphoric mood, hallucinations, self-injury, sleep disturbance and suicidal ideas. The patient is not  nervous/anxious and is not hyperactive.      Objective:      Physical Exam  Vitals and nursing note reviewed.   Constitutional:       General: He is not in acute distress.     Appearance: Normal appearance. He is well-developed. He is not diaphoretic.   HENT:      Head: Normocephalic and atraumatic.      Mouth/Throat:      Pharynx: No oropharyngeal exudate.   Eyes:      General: No scleral icterus.     Pupils: Pupils are equal, round, and reactive to light.   Neck:      Thyroid: No thyromegaly.      Vascular: No carotid bruit or JVD.      Trachea: No tracheal deviation.   Cardiovascular:      Rate and Rhythm: Normal rate and regular rhythm.      Heart sounds: Normal heart sounds.   Pulmonary:      Effort: Pulmonary effort is normal. No respiratory distress.      Breath sounds: Normal breath sounds. No wheezing or rales.   Chest:      Chest wall: No tenderness.   Abdominal:      General: Bowel sounds are normal. There is no distension.      Palpations: Abdomen is soft.      Tenderness: There is no abdominal tenderness. There is no guarding or rebound.   Musculoskeletal:         General: No tenderness. Normal range of motion.      Cervical back: Normal range of motion and neck supple.   Lymphadenopathy:      Cervical: No cervical adenopathy.   Skin:     General: Skin is warm and dry.      Coloration: Skin is not pale.      Findings: No erythema or rash.   Neurological:      Mental Status: He is alert and oriented to person, place, and time.   Psychiatric:         Behavior: Behavior normal.         Thought Content: Thought content normal.         Judgment: Judgment normal.       CMP  Sodium   Date Value Ref Range Status   09/22/2022 138 136 - 145 mmol/L Final     Potassium   Date Value Ref Range Status   09/22/2022 4.2 3.5 - 5.1 mmol/L Final     Chloride   Date Value Ref Range Status   09/22/2022 105 95 - 110 mmol/L Final     CO2   Date Value Ref Range Status   09/22/2022 24 23 - 29 mmol/L Final     Glucose   Date  Value Ref Range Status   09/22/2022 150 (H) 70 - 110 mg/dL Final     BUN   Date Value Ref Range Status   09/22/2022 27 (H) 8 - 23 mg/dL Final     Creatinine   Date Value Ref Range Status   09/22/2022 1.5 (H) 0.5 - 1.4 mg/dL Final     Calcium   Date Value Ref Range Status   09/22/2022 9.6 8.7 - 10.5 mg/dL Final     Total Protein   Date Value Ref Range Status   09/22/2022 7.1 6.0 - 8.4 g/dL Final     Albumin   Date Value Ref Range Status   09/22/2022 4.5 3.5 - 5.2 g/dL Final     Total Bilirubin   Date Value Ref Range Status   09/22/2022 1.9 (H) 0.1 - 1.0 mg/dL Final     Comment:     For infants and newborns, interpretation of results should be based  on gestational age, weight and in agreement with clinical  observations.    Premature Infant recommended reference ranges:  Up to 24 hours.............<8.0 mg/dL  Up to 48 hours............<12.0 mg/dL  3-5 days..................<15.0 mg/dL  6-29 days.................<15.0 mg/dL       Alkaline Phosphatase   Date Value Ref Range Status   09/22/2022 118 55 - 135 U/L Final     AST   Date Value Ref Range Status   09/22/2022 42 (H) 10 - 40 U/L Final     ALT   Date Value Ref Range Status   09/22/2022 60 (H) 10 - 44 U/L Final     Anion Gap   Date Value Ref Range Status   09/22/2022 9 8 - 16 mmol/L Final     eGFR if    Date Value Ref Range Status   01/20/2021 >60.0 >60 mL/min/1.73 m^2 Final     eGFR if non    Date Value Ref Range Status   01/20/2021 >60.0 >60 mL/min/1.73 m^2 Final     Comment:     Calculation used to obtain the estimated glomerular filtration  rate (eGFR) is the CKD-EPI equation.        Lab Results   Component Value Date    WBC 5.56 09/16/2022    HGB 13.2 (L) 09/16/2022    HCT 40.7 09/16/2022    MCV 94 09/16/2022     (L) 09/16/2022     Lab Results   Component Value Date    CHOL 215 (H) 09/13/2022     Lab Results   Component Value Date    HDL 33 (L) 09/13/2022     Lab Results   Component Value Date    LDLCALC 142.4 09/13/2022      Lab Results   Component Value Date    TRIG 198 (H) 09/13/2022     Lab Results   Component Value Date    CHOLHDL 15.3 (L) 09/13/2022     Lab Results   Component Value Date    TSH 1.876 09/02/2022     Lab Results   Component Value Date    HGBA1C 7.8 (H) 09/13/2022     Assessment:       1. Type 2 diabetes mellitus with other specified complication, with long-term current use of insulin    2. MCMAHAN (nonalcoholic steatohepatitis)    3. Mild intermittent asthma without complication    4. Dyslipidemia    5. Acute CVA (cerebrovascular accident)    6. DANY (acute kidney injury)    7. LFT elevation          Plan:   Type 2 diabetes mellitus with other specified complication, with long-term current use of insulin    MCMAHAN (nonalcoholic steatohepatitis)    Mild intermittent asthma without complication    Dyslipidemia    Acute CVA (cerebrovascular accident)    DANY (acute kidney injury)-----------improved------    LFT elevation---------------f/u hepatology-------    Other orders  -     (In Office Administered) Pneumococcal Conjugate Vaccine (20 Valent) (IM)    Stable-------------continue meds-----------------f/u neurology.        F/u 3 months---------

## 2022-10-24 NOTE — TELEPHONE ENCOUNTER
----- Message from Ana Paula Alcocer sent at 10/24/2022  3:34 PM CDT -----  Contact: Mavis/Spouse  Patient's spouse is calling to speak with someone regarding care. Patient's spouse reports patient has had a stroke and request to be informed when patient will be able to drive. Please give Ms. Gamble call back at [phone] to discuss further.  Thank you,  [facility]

## 2022-10-26 ENCOUNTER — LAB VISIT (OUTPATIENT)
Dept: LAB | Facility: HOSPITAL | Age: 82
End: 2022-10-26
Attending: INTERNAL MEDICINE
Payer: COMMERCIAL

## 2022-10-26 ENCOUNTER — OFFICE VISIT (OUTPATIENT)
Dept: HEPATOLOGY | Facility: CLINIC | Age: 82
End: 2022-10-26
Payer: COMMERCIAL

## 2022-10-26 VITALS
HEART RATE: 90 BPM | HEIGHT: 66 IN | DIASTOLIC BLOOD PRESSURE: 70 MMHG | SYSTOLIC BLOOD PRESSURE: 112 MMHG | BODY MASS INDEX: 33.92 KG/M2 | WEIGHT: 211.06 LBS

## 2022-10-26 DIAGNOSIS — E66.9 OBESITY (BMI 30.0-34.9): ICD-10-CM

## 2022-10-26 DIAGNOSIS — R79.89 LFT ELEVATION: ICD-10-CM

## 2022-10-26 DIAGNOSIS — K70.10 STEATOHEPATITIS, ALCOHOLIC: Primary | ICD-10-CM

## 2022-10-26 DIAGNOSIS — K70.10 STEATOHEPATITIS, ALCOHOLIC: ICD-10-CM

## 2022-10-26 LAB
HBV CORE AB SERPL QL IA: NORMAL
HBV SURFACE AG SERPL QL IA: NORMAL
HCV AB SERPL QL IA: NORMAL
IGA SERPL-MCNC: 130 MG/DL (ref 40–350)
IGG SERPL-MCNC: 594 MG/DL (ref 650–1600)
IGM SERPL-MCNC: 45 MG/DL (ref 50–300)

## 2022-10-26 PROCEDURE — 82784 ASSAY IGA/IGD/IGG/IGM EACH: CPT | Performed by: INTERNAL MEDICINE

## 2022-10-26 PROCEDURE — 3078F DIAST BP <80 MM HG: CPT | Mod: CPTII,S$GLB,, | Performed by: INTERNAL MEDICINE

## 2022-10-26 PROCEDURE — 3288F FALL RISK ASSESSMENT DOCD: CPT | Mod: CPTII,S$GLB,, | Performed by: INTERNAL MEDICINE

## 2022-10-26 PROCEDURE — 1126F AMNT PAIN NOTED NONE PRSNT: CPT | Mod: CPTII,S$GLB,, | Performed by: INTERNAL MEDICINE

## 2022-10-26 PROCEDURE — 1101F PR PT FALLS ASSESS DOC 0-1 FALLS W/OUT INJ PAST YR: ICD-10-PCS | Mod: CPTII,S$GLB,, | Performed by: INTERNAL MEDICINE

## 2022-10-26 PROCEDURE — 3078F PR MOST RECENT DIASTOLIC BLOOD PRESSURE < 80 MM HG: ICD-10-PCS | Mod: CPTII,S$GLB,, | Performed by: INTERNAL MEDICINE

## 2022-10-26 PROCEDURE — 86256 FLUORESCENT ANTIBODY TITER: CPT | Performed by: INTERNAL MEDICINE

## 2022-10-26 PROCEDURE — 86704 HEP B CORE ANTIBODY TOTAL: CPT | Performed by: INTERNAL MEDICINE

## 2022-10-26 PROCEDURE — 1101F PT FALLS ASSESS-DOCD LE1/YR: CPT | Mod: CPTII,S$GLB,, | Performed by: INTERNAL MEDICINE

## 2022-10-26 PROCEDURE — 3074F PR MOST RECENT SYSTOLIC BLOOD PRESSURE < 130 MM HG: ICD-10-PCS | Mod: CPTII,S$GLB,, | Performed by: INTERNAL MEDICINE

## 2022-10-26 PROCEDURE — 1159F PR MEDICATION LIST DOCUMENTED IN MEDICAL RECORD: ICD-10-PCS | Mod: CPTII,S$GLB,, | Performed by: INTERNAL MEDICINE

## 2022-10-26 PROCEDURE — 1159F MED LIST DOCD IN RCRD: CPT | Mod: CPTII,S$GLB,, | Performed by: INTERNAL MEDICINE

## 2022-10-26 PROCEDURE — 87340 HEPATITIS B SURFACE AG IA: CPT | Performed by: INTERNAL MEDICINE

## 2022-10-26 PROCEDURE — 99999 PR PBB SHADOW E&M-EST. PATIENT-LVL V: CPT | Mod: PBBFAC,,, | Performed by: INTERNAL MEDICINE

## 2022-10-26 PROCEDURE — 86038 ANTINUCLEAR ANTIBODIES: CPT | Performed by: INTERNAL MEDICINE

## 2022-10-26 PROCEDURE — 99999 PR PBB SHADOW E&M-EST. PATIENT-LVL V: ICD-10-PCS | Mod: PBBFAC,,, | Performed by: INTERNAL MEDICINE

## 2022-10-26 PROCEDURE — 3074F SYST BP LT 130 MM HG: CPT | Mod: CPTII,S$GLB,, | Performed by: INTERNAL MEDICINE

## 2022-10-26 PROCEDURE — 80321 ALCOHOLS BIOMARKERS 1OR 2: CPT | Performed by: INTERNAL MEDICINE

## 2022-10-26 PROCEDURE — 99204 PR OFFICE/OUTPT VISIT, NEW, LEVL IV, 45-59 MIN: ICD-10-PCS | Mod: S$GLB,,, | Performed by: INTERNAL MEDICINE

## 2022-10-26 PROCEDURE — 1126F PR PAIN SEVERITY QUANTIFIED, NO PAIN PRESENT: ICD-10-PCS | Mod: CPTII,S$GLB,, | Performed by: INTERNAL MEDICINE

## 2022-10-26 PROCEDURE — 86803 HEPATITIS C AB TEST: CPT | Performed by: INTERNAL MEDICINE

## 2022-10-26 PROCEDURE — 86381 MITOCHONDRIAL ANTIBODY EACH: CPT | Performed by: INTERNAL MEDICINE

## 2022-10-26 PROCEDURE — 99204 OFFICE O/P NEW MOD 45 MIN: CPT | Mod: S$GLB,,, | Performed by: INTERNAL MEDICINE

## 2022-10-26 PROCEDURE — 3288F PR FALLS RISK ASSESSMENT DOCUMENTED: ICD-10-PCS | Mod: CPTII,S$GLB,, | Performed by: INTERNAL MEDICINE

## 2022-10-26 NOTE — PROGRESS NOTES
Subjective:     Jovan Del Cid Jr. is here for evaluation of abnormal LFTs    History of Present Illness:  Jovan Del Cid Jr. is a 82-year-old male with the abnormal total bilirubin from 2016 through 20 19, from 2020 on worse he also noted to have elevated AST and ALT less than twice the upper limit of normal.  He denies taking herbal supplements or over-the-counter medication.  He is not on any prescription medication that could cause significant elevation of liver enzymes.  On further discussion he is reports drinking 3-4 cans of beer along with the diet Coke every day for 40+ years up until he had stroke in September of this year.  He also has high blood pressure, hyperlipidemia, body mass index of 34.  Denies family history of liver disease.     He had liver biopsy in 2018 that showed F1 fibrosis with the steatohepatitis, 10% macrovascular and 25% microvesicular    Review of Systems   Constitutional:  Negative for fatigue, fever and unexpected weight change.   Gastrointestinal:  Negative for abdominal distention, abdominal pain, blood in stool, nausea and vomiting.   Musculoskeletal:  Negative for arthralgias and gait problem.   Skin:  Negative for pallor and rash.   Neurological:  Negative for dizziness.   Hematological:  Does not bruise/bleed easily.   Psychiatric/Behavioral:  Negative for confusion, hallucinations and sleep disturbance.      Objective:     Physical Exam  Vitals reviewed.   Constitutional:       Appearance: He is obese.   Eyes:      General: No scleral icterus.  Cardiovascular:      Heart sounds: No murmur heard.  Pulmonary:      Effort: Pulmonary effort is normal.      Breath sounds: No wheezing, rhonchi or rales.   Abdominal:      General: Bowel sounds are normal. There is no distension.      Palpations: There is no mass.      Tenderness: There is no abdominal tenderness.      Comments: Truncal obesity present    Musculoskeletal:         General: No tenderness.      Right lower leg: No  edema.      Left lower leg: No edema.   Lymphadenopathy:      Cervical: No cervical adenopathy.   Skin:     Coloration: Skin is not jaundiced.      Findings: No bruising or rash.   Neurological:      Mental Status: He is alert and oriented to person, place, and time.      Coordination: Coordination normal.   Psychiatric:         Behavior: Behavior normal.         Thought Content: Thought content normal.       MELD-Na score: 10 at 9/4/2022  5:09 AM  MELD score: 10 at 9/4/2022  5:09 AM  Calculated from:  Serum Creatinine: 0.8 mg/dL (Using min of 1 mg/dL) at 9/4/2022  5:09 AM  Serum Sodium: 138 mmol/L (Using max of 137 mmol/L) at 9/4/2022  5:09 AM  Total Bilirubin: 2.5 mg/dL at 9/4/2022  5:09 AM  INR(ratio): 0.9 (Using min of 1) at 9/3/2022  3:52 AM  Age: 82 years    WBC   Date Value Ref Range Status   09/16/2022 5.56 3.90 - 12.70 K/uL Final     Hemoglobin   Date Value Ref Range Status   09/16/2022 13.2 (L) 14.0 - 18.0 g/dL Final     Hematocrit   Date Value Ref Range Status   09/16/2022 40.7 40.0 - 54.0 % Final     Platelets   Date Value Ref Range Status   09/16/2022 146 (L) 150 - 450 K/uL Final     BUN   Date Value Ref Range Status   09/22/2022 27 (H) 8 - 23 mg/dL Final     Creatinine   Date Value Ref Range Status   09/22/2022 1.5 (H) 0.5 - 1.4 mg/dL Final     Glucose   Date Value Ref Range Status   09/22/2022 150 (H) 70 - 110 mg/dL Final     Calcium   Date Value Ref Range Status   09/22/2022 9.6 8.7 - 10.5 mg/dL Final     Sodium   Date Value Ref Range Status   09/22/2022 138 136 - 145 mmol/L Final     Potassium   Date Value Ref Range Status   09/22/2022 4.2 3.5 - 5.1 mmol/L Final     Chloride   Date Value Ref Range Status   09/22/2022 105 95 - 110 mmol/L Final     Magnesium   Date Value Ref Range Status   09/03/2022 2.0 1.6 - 2.6 mg/dL Final     AST   Date Value Ref Range Status   09/22/2022 42 (H) 10 - 40 U/L Final     ALT   Date Value Ref Range Status   09/22/2022 60 (H) 10 - 44 U/L Final     Alkaline Phosphatase    Date Value Ref Range Status   09/22/2022 118 55 - 135 U/L Final     Total Bilirubin   Date Value Ref Range Status   09/22/2022 1.9 (H) 0.1 - 1.0 mg/dL Final     Comment:     For infants and newborns, interpretation of results should be based  on gestational age, weight and in agreement with clinical  observations.    Premature Infant recommended reference ranges:  Up to 24 hours.............<8.0 mg/dL  Up to 48 hours............<12.0 mg/dL  3-5 days..................<15.0 mg/dL  6-29 days.................<15.0 mg/dL       Albumin   Date Value Ref Range Status   09/22/2022 4.5 3.5 - 5.2 g/dL Final     INR   Date Value Ref Range Status   09/03/2022 0.9 0.8 - 1.2 Final     Comment:     Coumadin Therapy:  2.0 - 3.0 for INR for all indicators except mechanical heart valves  and antiphospholipid syndromes which should use 2.5 - 3.5.           Assessment/Plan:     1. Steatohepatitis, alcoholic  Phosphatidylethanol (PETH)    Anti-Smooth Muscle Antibody    Immunoglobulins (IgG, IgA, IgM) Quantitative    GUY Screen w/Reflex    Antimitochondrial Antibody    Hepatitis B Surface Antigen    Hepatitis C Antibody    Hepatitis B Core Antibody, Total    US Abdomen Limited    US Elastography Liver      2. LFT elevation  Ambulatory referral/consult to Gastroenterology      3. Obesity (BMI 30.0-34.9)            1.Abnormal LFTs:  Suspect hepatic steatosis/hepatitis  from EtOH ( beer) underlying fatty liver disease is also suspected  Will initiate work up to rule out infectious/autoimmune disorders of the liver  Will obtain US and fibroscan  Discussed harmful  effects of EtOH, there is no known safe level of alcohol consumption for patients with ALD, so recommended to stop all EtOH intake.  Informed a standard drink contains 14 g alcohol (equivalent to 12 oz. Beer,  8-9 oz. malt liquor, 5 oz. table wine, or 1.5 oz. distilled spirits)  Safe level drinking for women is no more than 1/day and for men is not more than 2/day    2.  Obesity:  Discussed dietary recommendations- Low calorie, low carbohydrate, high protein diet with goal of loosing >3-5% to improve steatosis and >7-10% to improve histological changes if present  Discussed daily, consistent exercise      RTC in 3 mo    I have reviewed existing labs, imaging. Educated patient about disease process, prognosis. Ordered required labs, images and discussed treatment plan.     Lauren Bullock MD  Transplant Hepatologist  Dept of Hepatology, [place] [facility]

## 2022-10-27 LAB
MITOCHONDRIA AB TITR SER IF: NORMAL {TITER}
SMOOTH MUSCLE AB TITR SER IF: NORMAL {TITER}

## 2022-10-29 LAB
CLINICAL BIOCHEMIST REVIEW: NORMAL
PETH 16:0/18.1 (POPETH): <10 NG/ML
PETH 16:0/18.2 (PLPETH): <10 NG/ML

## 2022-11-01 LAB — ANA SER QL IF: NORMAL

## 2022-11-02 ENCOUNTER — TELEPHONE (OUTPATIENT)
Dept: FAMILY MEDICINE | Facility: CLINIC | Age: 82
End: 2022-11-02
Payer: COMMERCIAL

## 2022-11-02 NOTE — TELEPHONE ENCOUNTER
----- Message from Mirta Kuhn sent at 11/2/2022 11:45 AM CDT -----  Contact: María Elena/wife  María Elena would like a call back at [phone], Regards to getting clearance to have some dental work done.    Thanks  Td

## 2022-11-03 ENCOUNTER — HOSPITAL ENCOUNTER (OUTPATIENT)
Dept: SLEEP MEDICINE | Facility: HOSPITAL | Age: 82
Discharge: HOME | End: 2022-11-03
Attending: INTERNAL MEDICINE
Payer: COMMERCIAL

## 2022-11-03 DIAGNOSIS — G47.61 PERIODIC LIMB MOVEMENT DISORDER (PLMD): ICD-10-CM

## 2022-11-03 DIAGNOSIS — G47.33 OBSTRUCTIVE SLEEP APNEA: ICD-10-CM

## 2022-11-03 DIAGNOSIS — R05.3 CHRONIC COUGH: ICD-10-CM

## 2022-11-03 DIAGNOSIS — Z72.821 INADEQUATE SLEEP HYGIENE: ICD-10-CM

## 2022-11-03 DIAGNOSIS — Z86.73 HISTORY OF LACUNAR CEREBROVASCULAR ACCIDENT: ICD-10-CM

## 2022-11-03 PROCEDURE — 95811 PR POLYSOMNOGRAPHY W/CPAP: ICD-10-PCS | Mod: 26,,, | Performed by: PSYCHOLOGIST

## 2022-11-03 PROCEDURE — 95811 POLYSOM 6/>YRS CPAP 4/> PARM: CPT | Mod: 26,,, | Performed by: PSYCHOLOGIST

## 2022-11-03 PROCEDURE — 95811 POLYSOM 6/>YRS CPAP 4/> PARM: CPT

## 2022-11-08 ENCOUNTER — TELEPHONE (OUTPATIENT)
Dept: FAMILY MEDICINE | Facility: CLINIC | Age: 82
End: 2022-11-08
Payer: COMMERCIAL

## 2022-11-08 NOTE — TELEPHONE ENCOUNTER
----- Message from Lana Malone sent at 11/8/2022  3:17 PM CST -----  Contact: Mavis/ Wife  Patients wife is calling to speak with the nurse regarding questions and concerns. Reports patient needs a clearance for dental procedure. Please give patients wife a call back at [phone].

## 2022-11-08 NOTE — TELEPHONE ENCOUNTER
Spoke with pts wife she just wanted to know if he should wait on having a dental procedure done until he is feeling better and stronger.  I told her that is a decision that she and the pt need to make and that I will send this note to Dr. Neal for his input.  She said he will be sedated for the procedure

## 2022-11-10 ENCOUNTER — HOSPITAL ENCOUNTER (OUTPATIENT)
Dept: RADIOLOGY | Facility: HOSPITAL | Age: 82
Discharge: HOME | End: 2022-11-10
Attending: INTERNAL MEDICINE
Payer: COMMERCIAL

## 2022-11-10 ENCOUNTER — PROCEDURE VISIT (OUTPATIENT)
Dept: HEPATOLOGY | Facility: CLINIC | Age: 82
End: 2022-11-10
Attending: INTERNAL MEDICINE
Payer: COMMERCIAL

## 2022-11-10 VITALS
HEIGHT: 66 IN | SYSTOLIC BLOOD PRESSURE: 114 MMHG | HEART RATE: 90 BPM | BODY MASS INDEX: 33.7 KG/M2 | DIASTOLIC BLOOD PRESSURE: 72 MMHG | WEIGHT: 209.69 LBS

## 2022-11-10 DIAGNOSIS — K70.10 STEATOHEPATITIS, ALCOHOLIC: ICD-10-CM

## 2022-11-10 PROCEDURE — 76705 ECHO EXAM OF ABDOMEN: CPT | Mod: 26,,, | Performed by: RADIOLOGY

## 2022-11-10 PROCEDURE — 76705 US ABDOMEN LIMITED: ICD-10-PCS | Mod: 26,,, | Performed by: RADIOLOGY

## 2022-11-10 PROCEDURE — 76981 PR US, ELASTOGRAPHY, PARENCHYMA: ICD-10-PCS | Mod: S$GLB,,, | Performed by: NURSE PRACTITIONER

## 2022-11-10 PROCEDURE — 76705 ECHO EXAM OF ABDOMEN: CPT | Mod: TC

## 2022-11-10 PROCEDURE — 76981 USE PARENCHYMA: CPT | Mod: S$GLB,,, | Performed by: NURSE PRACTITIONER

## 2022-11-10 NOTE — PROGRESS NOTES
Patient presented in clinic today for fibroscan examination of their liver. Patient verbalized that they have fasted 4 hours prior to their examination. Patient denies being pregnant or having any active implantable metal devices; such as a pacemaker, defibrillator, or pump.     Giselle Bills, BRANDANN, RN   Registered Nurse Lead- Hepatology

## 2022-11-10 NOTE — PROCEDURES
Fibroscan Procedure     Name: Jovan Del Cid   Date of Procedure : 11/10/2022   :: BOLIVAR Powers  Diagnosis: NAFLD    Probe: XL    Fibroscan readin.2 KPa    Fibrosis:F 0-1     CAP readin dB/m    Steatosis: :S2       Interpretation:   Moderate steatosis without fibrosis

## 2022-11-11 ENCOUNTER — TELEPHONE (OUTPATIENT)
Dept: HEPATOLOGY | Facility: CLINIC | Age: 82
End: 2022-11-11
Payer: COMMERCIAL

## 2022-11-11 NOTE — TELEPHONE ENCOUNTER
Called and spoke with pt wife the fibroscan / us results were given. Pt verbalized understanding and stated she would let her  know.

## 2022-11-11 NOTE — TELEPHONE ENCOUNTER
----- Message from BOLIVAR Ball sent at 11/10/2022 10:39 AM CST -----  Moderate steatosis without fibrosis

## 2022-11-18 NOTE — PROCEDURES
"Patient Name: Jovan Del Cid    Date of Report:     Study Date:  11 /3 /2022  [facility] No.: [id number]    :      Time of Study:  09:03:34 PM - 05:06:38 AM   Sex:  Male   Age:  82 year   Weight:  214.0 lbs    Height:  5' 6"           Type of Study:  Split Night    REASONS FOR REFERRAL: Mr. Del Cid is a 82 year year old male, referred for diagnostic polysomnography by Dr. Stefan Buckner, based on the patient's reported [id number] - night polysomnography diagnosis of mild to moderate obstructive sleep apnea (KALYAN; apnea + hypopnea index = 18.9); the subsequent CPAP titration  found 12 cm H2O pressure to be the most adequate fully tested treatment.  Currently, the patient reports loud snoring, observed respiratory pauses in sleep, unrefreshing sleep and daytime hypersomnolence.  His [employer] Sleepiness Scale score was 11, clinically significant, and his STOP-BANG score was 6, high risk of KALYAN.  Dr. Dagoberto Montes was the originating referring physician and Dr. Channing Neal is the patient's primary care physician.    STUDY PARAMETERS: This diagnostic study involved analysis of the patient's sleep pattern while breathing unassisted. The study was performed with a sleep technologist in attendance for the entire test period, with video monitoring throughout the study, and routine laboratory clinical parameters recorded:  NOTE:  This polysomnographic sleep study was reviewed epoch-by-epoch, interpreted and signed below by an American Academy of Sleep Medicine Board Certified Sleep Specialist    SUMMARY STATEMENTS  DIAGNOSTIC IMPRESSIONS  G47.33  / 327.23 Moderate to Severe Obstructive Sleep Apnea, Adult (OSAHS)  G47.61  / 327.51 Severe Periodic Limb Movement (PLM) Disorder   Z72.821  / V69.4 Inadequate Sleep Hygiene  G47.34  / 327.24 r/o Nocturnal Hypoxemia  F51.04  / 307.42 r/o Psychophysiological Insomnia (stress - related and /or conditioned)      PRIMARY TREATMENT RECOMMENDATIONS  Treat, or " refer to Sleep Disorders Center.  The diagnostic polysomnography revealed a moderate to severe obstructive sleep apnea / hypopnea syndrome (A + H Index = 29.4 events /hr asleep with 1.8 respiratory event - related arousals /hr asleep and no RERAs (respiratory effort -  related arousals) for the study,.  The mean SpO2 value was 92.1 %, moderate, minimum oxygen saturation during sleep was 79.0 %, and the maximum waking baseline SpO2 was 98.0 %.  Note that oxygen saturations were ? 88 % for 14.3 minutes of the time spent asleep; consider diagnosis of nocturnal hypoxemia.  Persistent snoring was noted.   CPAP was initiated at 12: 34 AM.  The CPAP titration polysomnography revealed that 17 cm H2O pressure (C - Flex ?, humidity ?), the most effective pressure most completely tested, was optimal, as it reduced the rate of respiratory events to zero  in 0.9  hrs sleep  with  0.1 hrs REM sleep).  Lower pressures also could be successful: 16 cm A + H I = 0.0, but with no REM sleep, and 14 cm A + H I = 4.1 with adequate time asleep and REM sleep.  Snoring was first reduced over time, and then eliminated, during CPAP.  AutoCPAP 6 cm - 20 cm) could be tried if necessary.          The overall A + H Index was 9.5  / hr asleep, with 0.7 respiratory event - related arousals  / hr asleep  and no RERAs  for the titration trial.  The mean SpO2 value was 94.2 % throughout the study, moderate, with a minimum oxygen saturation during sleep of 86.0 % and a maximum waking baseline SpO2 of 98.0 %).   A medium ResMed AirFit  F30  full - face CPAP mask was used and was well - tolerated.  Please see PAP trial outcomes table, below.    A device to discourage sleep in the supine position is recommended, to further reduce respiratory events and snoring (respiratory events had a significant supine positional tendency pre-CPAP).   Weight loss to the normal range is recommended as it can decrease respiratory events and snoring in overweight  patients.  PAP Device PAP Level O2 Level Time (min) TST (min) NREM (min) REM (min) OA# CA# MA# Hyp# AHI RERA RDI Min SpO2 SpO2 ?88 % (min) Ar. Index   - Off - 211.0 169.5 141.5 28.0 24 19 - 40 29.4 - 29.4 79.0  13.2 22.3   CPAP 6 - 12.5 12.0 12.0 0.0 4 5 - 4 65.0 - 65.0 90.0  0.0 50.0   CPAP 8 - 39.5 39.5 39.5 0.0 1 1 1 13 24.3 - 24.3 87.0  0.1 27.3   CPAP 10 - 39.5 38.0 38.0 0.0 - - - 3 4.7 - 4.7 86.0  0.4 34.7   CPAP 12 - 49.5 26.0 26.0 0.0 - - - 5 11.5 - 11.5 89.0  0.0 18.5   CPAP 14 - 29.5 29.5 16.5 13.0 - - - 2 4.1 - 4.1 90.0  0.0 12.2   CPAP 16 - 44.5 44.5 1.0 43.5 - - - - 0.0 - 0.0 91.0  0.0 5.4   CPAP 17 - 57.5 56.0 49.5 6.5 - - - - 0.0 - 0.0- 90.0  0.0 6.4      Changes in sleep hygiene  / sleep - related behavior are recommended after medical treatments are successful  Limit time for sleep to number of hours of sleep needed for adequate daytime functioning (7.5 to 9.0 hrs /night).  Regular bedtimes and wake times, including weekends: Total sleep time / night should not be more than one hour more than usual, and bedtime or wake time should not be more than one hour earlier or later than usual.    Do not attempt to make up lost sleep by extending sleep periods.    Avoid naps; none longer than 20 min or later than mid - afternoon.    SECONDARY TREATMENT RECOMMENDATIONS  Treat, or refer to SDC if problems are not satisfactorily resolved by the above.  A severe PLM disorder was observed (PLMS Index = 71.6 /hr asleep,  but  treatment might not be optimal because the PLMS were not very disruptive of sleep (4.3 arousals /hr asleep), and there were no signs of restless legs syndrome in the SDI, H & P or PSG.  Consider treatment of PLM disorder if PLMS symptoms are sufficiently bothersome to the patient.  Note that the benzodiazepine medications sometimes used to treat PLM disorder (e.g., clonazepam / Klonopin) may exacerbate some sleep - related respiratory disorders, and that dopaminergic medications such as  Mirapex and Requip can be used in such instances.   If insomnia persists after treatments for medical sleep disorders have proven effective, recommend  follow - up inquiry regarding frequency, duration and nature of reported sleep loss and delayed sleep onset (r/o  stress - related and  / or conditioned psychophysiological insomnia) and referral for behavioral treatment of insomnia, if indicated.  Please see SDI.    See below for a complete interpretation of data from the polysomnography and Sleep Disorders Inventory.     Thank you for referring this patient to the [facility].      Abdias Stark, Ph.D., ABPP; Diplomate, American Board of Sleep Medicine

## 2022-12-01 ENCOUNTER — PATIENT MESSAGE (OUTPATIENT)
Dept: PULMONOLOGY | Facility: CLINIC | Age: 82
End: 2022-12-01
Payer: COMMERCIAL

## 2022-12-13 ENCOUNTER — OFFICE VISIT (OUTPATIENT)
Dept: PULMONOLOGY | Facility: CLINIC | Age: 82
End: 2022-12-13
Payer: COMMERCIAL

## 2022-12-13 ENCOUNTER — CLINICAL SUPPORT (OUTPATIENT)
Dept: PULMONOLOGY | Facility: CLINIC | Age: 82
End: 2022-12-13
Payer: COMMERCIAL

## 2022-12-13 VITALS
DIASTOLIC BLOOD PRESSURE: 66 MMHG | BODY MASS INDEX: 33.59 KG/M2 | HEIGHT: 66 IN | OXYGEN SATURATION: 97 % | SYSTOLIC BLOOD PRESSURE: 124 MMHG | RESPIRATION RATE: 17 BRPM | HEART RATE: 70 BPM | WEIGHT: 209 LBS

## 2022-12-13 DIAGNOSIS — E11.9 TYPE 2 DIABETES MELLITUS WITHOUT COMPLICATION, WITHOUT LONG-TERM CURRENT USE OF INSULIN: ICD-10-CM

## 2022-12-13 DIAGNOSIS — J45.20 MILD INTERMITTENT ASTHMA WITHOUT COMPLICATION: Chronic | ICD-10-CM

## 2022-12-13 DIAGNOSIS — E78.5 DYSLIPIDEMIA: ICD-10-CM

## 2022-12-13 DIAGNOSIS — I10 PRIMARY HYPERTENSION: ICD-10-CM

## 2022-12-13 DIAGNOSIS — J32.0 CHRONIC MAXILLARY SINUSITIS: ICD-10-CM

## 2022-12-13 DIAGNOSIS — G47.33 OBSTRUCTIVE SLEEP APNEA: Primary | ICD-10-CM

## 2022-12-13 DIAGNOSIS — R05.3 CHRONIC COUGH: ICD-10-CM

## 2022-12-13 LAB
BRPFT: NORMAL
FEF 25 75 CHG: 0.7 %
FEF 25 75 LLN: 0.65
FEF 25 75 POST REF: 85.5 %
FEF 25 75 PRE REF: 84.9 %
FEF 25 75 REF: 1.77
FET100 CHG: -12.3 %
FEV1 CHG: -0.5 %
FEV1 FVC CHG: 0 %
FEV1 FVC LLN: 60
FEV1 FVC POST REF: 91.1 %
FEV1 FVC PRE REF: 91 %
FEV1 FVC REF: 75
FEV1 LLN: 1.7
FEV1 POST REF: 97.4 %
FEV1 PRE REF: 97.9 %
FEV1 REF: 2.48
FVC CHG: -0.6 %
FVC LLN: 2.39
FVC POST REF: 106 %
FVC PRE REF: 106.6 %
FVC REF: 3.33
PEF CHG: -11.2 %
PEF LLN: 4.06
PEF POST REF: 85.7 %
PEF PRE REF: 96.5 %
PEF REF: 6.13
POST FEF 25 75: 1.51 L/S
POST FET 100: 11.46 SEC
POST FEV1 FVC: 68.35 %
POST FEV1: 2.41 L
POST FVC: 3.53 L
POST PEF: 5.25 L/S
PRE FEF 25 75: 1.5 L/S
PRE FET 100: 13.06 SEC
PRE FEV1 FVC: 68.33 %
PRE FEV1: 2.42 L
PRE FVC: 3.55 L
PRE PEF: 5.92 L/S

## 2022-12-13 PROCEDURE — 1160F RVW MEDS BY RX/DR IN RCRD: CPT | Mod: CPTII,S$GLB,, | Performed by: INTERNAL MEDICINE

## 2022-12-13 PROCEDURE — 99999 PR PBB SHADOW E&M-EST. PATIENT-LVL V: ICD-10-PCS | Mod: PBBFAC,,, | Performed by: INTERNAL MEDICINE

## 2022-12-13 PROCEDURE — 95012 NITRIC OXIDE EXP GAS DETER: CPT | Mod: S$GLB,,, | Performed by: INTERNAL MEDICINE

## 2022-12-13 PROCEDURE — 3288F PR FALLS RISK ASSESSMENT DOCUMENTED: ICD-10-PCS | Mod: CPTII,S$GLB,, | Performed by: INTERNAL MEDICINE

## 2022-12-13 PROCEDURE — 99214 PR OFFICE/OUTPT VISIT, EST, LEVL IV, 30-39 MIN: ICD-10-PCS | Mod: 25,S$GLB,, | Performed by: INTERNAL MEDICINE

## 2022-12-13 PROCEDURE — 1101F PT FALLS ASSESS-DOCD LE1/YR: CPT | Mod: CPTII,S$GLB,, | Performed by: INTERNAL MEDICINE

## 2022-12-13 PROCEDURE — 95012 PR NITRIC OXIDE EXPIRED GAS DETERMINATION: ICD-10-PCS | Mod: S$GLB,,, | Performed by: INTERNAL MEDICINE

## 2022-12-13 PROCEDURE — 99999 PR PBB SHADOW E&M-EST. PATIENT-LVL V: CPT | Mod: PBBFAC,,, | Performed by: INTERNAL MEDICINE

## 2022-12-13 PROCEDURE — 3074F SYST BP LT 130 MM HG: CPT | Mod: CPTII,S$GLB,, | Performed by: INTERNAL MEDICINE

## 2022-12-13 PROCEDURE — 3078F DIAST BP <80 MM HG: CPT | Mod: CPTII,S$GLB,, | Performed by: INTERNAL MEDICINE

## 2022-12-13 PROCEDURE — 99999 PR PBB SHADOW E&M-EST. PATIENT-LVL I: CPT | Mod: PBBFAC,,,

## 2022-12-13 PROCEDURE — 99999 PR PBB SHADOW E&M-EST. PATIENT-LVL I: ICD-10-PCS | Mod: PBBFAC,,,

## 2022-12-13 PROCEDURE — 1159F PR MEDICATION LIST DOCUMENTED IN MEDICAL RECORD: ICD-10-PCS | Mod: CPTII,S$GLB,, | Performed by: INTERNAL MEDICINE

## 2022-12-13 PROCEDURE — 1101F PR PT FALLS ASSESS DOC 0-1 FALLS W/OUT INJ PAST YR: ICD-10-PCS | Mod: CPTII,S$GLB,, | Performed by: INTERNAL MEDICINE

## 2022-12-13 PROCEDURE — 3288F FALL RISK ASSESSMENT DOCD: CPT | Mod: CPTII,S$GLB,, | Performed by: INTERNAL MEDICINE

## 2022-12-13 PROCEDURE — 94060 PR EVAL OF BRONCHOSPASM: ICD-10-PCS | Mod: S$GLB,,, | Performed by: INTERNAL MEDICINE

## 2022-12-13 PROCEDURE — 3074F PR MOST RECENT SYSTOLIC BLOOD PRESSURE < 130 MM HG: ICD-10-PCS | Mod: CPTII,S$GLB,, | Performed by: INTERNAL MEDICINE

## 2022-12-13 PROCEDURE — 1160F PR REVIEW ALL MEDS BY PRESCRIBER/CLIN PHARMACIST DOCUMENTED: ICD-10-PCS | Mod: CPTII,S$GLB,, | Performed by: INTERNAL MEDICINE

## 2022-12-13 PROCEDURE — 99214 OFFICE O/P EST MOD 30 MIN: CPT | Mod: 25,S$GLB,, | Performed by: INTERNAL MEDICINE

## 2022-12-13 PROCEDURE — 3078F PR MOST RECENT DIASTOLIC BLOOD PRESSURE < 80 MM HG: ICD-10-PCS | Mod: CPTII,S$GLB,, | Performed by: INTERNAL MEDICINE

## 2022-12-13 PROCEDURE — 94060 EVALUATION OF WHEEZING: CPT | Mod: S$GLB,,, | Performed by: INTERNAL MEDICINE

## 2022-12-13 PROCEDURE — 1159F MED LIST DOCD IN RCRD: CPT | Mod: CPTII,S$GLB,, | Performed by: INTERNAL MEDICINE

## 2022-12-13 RX ORDER — FLUTICASONE PROPIONATE AND SALMETEROL XINAFOATE 115; 21 UG/1; UG/1
2 AEROSOL, METERED RESPIRATORY (INHALATION) EVERY 12 HOURS
Qty: 12 G | Refills: 6 | Status: SHIPPED | OUTPATIENT
Start: 2022-12-13 | End: 2024-03-15 | Stop reason: SDUPTHER

## 2022-12-13 NOTE — PROGRESS NOTES
Subjective:       Patient ID: Jovan Del Cid Jr. is a 82 y.o. male.  Patient Active Problem List   Diagnosis    Asthma    Chronic cough    Intolerance of continuous positive airway pressure (CPAP) ventilation    Liver cyst    HTN (hypertension)    Dyslipidemia    Benign prostatic hyperplasia    Lipoma    Thrombocytopenia    Pelvic mass in male    Amyloid disease    Chronic maxillary sinusitis    Tubulovillous adenoma of colon    Adenomatous colon polyp    MCMAHAN (nonalcoholic steatohepatitis)    Mild intermittent asthma without complication    BMI 33.0-33.9,adult    Personal history of colonic polyps    T2DM (type 2 diabetes mellitus)    History of lacunar cerebrovascular accident    Type 2 diabetes mellitus, without long-term current use of insulin    Obstructive sleep apnea    Acute CVA (cerebrovascular accident)    DANY (acute kidney injury)    Metabolic acidosis    Stenosis of left carotid artery    LFT elevation     Immunization History   Administered Date(s) Administered    COVID-19, MRNA, LN-S, PF (Pfizer) (Purple Cap) 01/10/2021, 2021, 2021    COVID-19, mRNA, LNP-S, bivalent booster, PF (PFIZER OMICRON) 10/14/2022    Influenza (FLUAD) - Quadrivalent - Adjuvanted - PF *Preferred* (65+) 10/15/2020, 2022    Influenza - High Dose - PF (65 years and older) 2020    Pneumococcal Conjugate - 20 Valent 10/24/2022     Social History     Tobacco Use   Smoking Status Former    Years: 3.00    Types: Cigarettes, Pipe    Quit date:     Years since quittin.9   Smokeless Tobacco Never   Tobacco Comments    smoked a pipe - quit smoking      Asthma Control Test  In the past 4  weeks, how much of the time did your asthma keep you from getting as much done at work, school or at home?: A little of the time  During the past 4 weeks, how often have you had shortness of breath?: Once a day  During the past 4 weeks, how often did your asthma symptoms (wheezing, couging, shortness of breath, chest  tightness or pain) wake you up at night or earlier than usual in the morning?: Not at all  During the past 4 weeks, how often have you used your rescue inhaler or nebulizer medication (such as albuterol)?: Not at all  How would you rate your asthma control during the past 4 weeks?: Well controlled  If your score is 19 or less, your asthma may not be under control: 20      EPWORTH SLEEPINESS SCALE 12/13/2022   Sitting and reading 2   Watching TV 3   Sitting, inactive in a public place (e.g. a theatre or a meeting) 3   As a passenger in a car for an hour without a break 3   Lying down to rest in the afternoon when circumstances permit 3   Sitting and talking to someone 0   Sitting quietly after a lunch without alcohol 3   In a car, while stopped for a few minutes in traffic 0   Total score 17       Asthma Control Test  In the past 4  weeks, how much of the time did your asthma keep you from getting as much done at work, school or at home?: A little of the time  During the past 4 weeks, how often have you had shortness of breath?: Once a day  During the past 4 weeks, how often did your asthma symptoms (wheezing, couging, shortness of breath, chest tightness or pain) wake you up at night or earlier than usual in the morning?: Not at all  During the past 4 weeks, how often have you used your rescue inhaler or nebulizer medication (such as albuterol)?: Not at all  How would you rate your asthma control during the past 4 weeks?: Well controlled  If your score is 19 or less, your asthma may not be under control: 20     Chief Complaint: Apnea and Asthma        Jovan Del Cid Jr. is a 80 y.o.  male   Follow up, No cough, No congetion.No fever  ACT  20: hardly taking rescue albuterol HFA.   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  No interval asthma exacerbations since last visit, Actually not used rescue albuterol in 12 months.  Spirometry normal         09/21/2022  Last  visit 10/15/2022  Referred by Dr Dagoberto Montes  Recent lacunar CVA  Known KALYAN Moderate KALYAN titrated to CPAP 12 cm  Snoring and apnea  Therapy options discussed  Was in Hospital x 2  Spouse present  Goals and therapy options discussed      12/13/2022  Last seen 09/21/2022  Sleep study reveiwed  Severe KALYAN  Forgetful  Drew: Normal  FeNo 43  ACT score20  Wife says gets SOB walking  Added LABA ICS  Follow 8 weeks    Asthma  There is no cough, shortness of breath, sputum production or wheezing. Pertinent negatives include no postnasal drip. His past medical history is significant for asthma.   Review of Systems   Constitutional: Negative.    HENT:  Negative for postnasal drip and congestion.    Eyes: Negative.    Respiratory:  Negative for cough, sputum production, shortness of breath, wheezing, asthma nighttime symptoms, pleurisy, dyspnea on extertion and use of rescue inhaler.    Cardiovascular: Negative.    Genitourinary: Negative.    Endocrine: endocrine negative    Musculoskeletal: Negative.    Skin: Negative.    Gastrointestinal: Negative.    Neurological: Negative.    Psychiatric/Behavioral: Negative.     All other systems reviewed and are negative.         Goals of Care Treatment Preferences:  Code Status: Full Code       BP Readings from Last 3 Encounters:   12/13/22 124/66   11/10/22 114/72   10/26/22 112/70     Snoring / Sleep:     [place] Questionnaire (validated KALYAN screening questionnaire)    YES -- Snoring/apnea    YES -- Fatigue    Body mass index is 33.73 kg/m².  (>25 is overweight, >30 is obese)    Blood Pressure = Hypertension  (PreHTN 120-139/80-89, Stg1 140-159/90-99, Stg2 >160/>100)  [place] = 3 of three KALYAN categories are positive (high risk is 2-3 positive categories)     [place] Sleepiness Scale TOTAL =  11  (validated sleepiness questionnaire with a higher score indicating greater sleepiness; range 0-24)  No flowsheet data found.    STOP-Bang Questionnaire (validated KALYAN screening  "questionnaire)  Negative unless checked off.  [x] Snoring    [x]  Tired/Fatigued/Sleepy  [x] Obstruction (apneas/choking)  [] Pressure (HTN)  [] BMI >35  [x] Age >50  [x] Neck >40 cm  [x] Gender male   STOP-Bang = 6 (low risk 0-2,high risk 3-8)    Neck circumference  18.5" [?KALYAN risk if >43cm (17in) male or >41cm (15.5 in) female]             The following portions of the patient's history were reviewed and updated as appropriate: He  has a past medical history of Acute CVA (cerebrovascular accident) (09/08/2022), DANY (acute kidney injury) (09/14/2022), Asthma, Bronchitis chronic, Cancer, Cough, Digestive disorder, Hyperlipidemia, Hypertension, Liver disease, KALYAN (obstructive sleep apnea) (09/08/2022), Stroke (09/01/2022), and Type 2 diabetes mellitus, without long-term current use of insulin (09/07/2022).  He does not have any pertinent problems on file.  He  has a past surgical history that includes Tonsillectomy; Colonoscopy (N/A, 6/21/2016); Colonoscopy (N/A, 11/1/2016); Colonoscopy (N/A, 2/3/2017); Colonoscopy (N/A, 11/13/2017); Fluoroscopy (N/A, 6/15/2018); and Colonoscopy (N/A, 2/19/2021).  His family history includes Alzheimer's disease in his mother; Cancer in his father.  He  reports that he quit smoking about 62 years ago. His smoking use included cigarettes and pipe. He has never used smokeless tobacco. He reports that he does not currently use alcohol after a past usage of about 2.0 - 3.0 standard drinks per week. He reports that he does not use drugs.  He has a current medication list which includes the following prescription(s): aspirin, blood sugar diagnostic, blood-glucose meter, fluticasone propionate, glipizide, lancets, lancing device, multivitamin, pen needle, diabetic, rosuvastatin, albuterol, erythromycin, advair hfa, and pantoprazole, and the following Facility-Administered Medications: epinephrine and ondansetron.  Current Outpatient Medications on File Prior to Visit   Medication Sig " "Dispense Refill    aspirin (ECOTRIN) 81 MG EC tablet Take 1 tablet (81 mg total) by mouth once daily. 90 tablet 3    blood sugar diagnostic Strp 1 strip by Misc.(Non-Drug; Combo Route) route 2 (two) times daily with meals. 100 strip 11    blood-glucose meter kit Use as instructed 1 each 0    fluticasone propionate (FLONASE) 50 mcg/actuation nasal spray SHAKE LIQUID AND USE 2 SPRAYS IN EACH NOSTRIL EVERY DAY 48 g 1    glipiZIDE (GLUCOTROL) 2.5 MG TR24 Take 1 tablet (2.5 mg total) by mouth daily with breakfast. 90 tablet 1    lancets Misc 1 each by Misc.(Non-Drug; Combo Route) route 2 (two) times daily with meals. 100 each 11    lancing device Misc 1 Device by Misc.(Non-Drug; Combo Route) route 2 (two) times daily with meals. 1 each 0    multivitamin (THERAGRAN) per tablet Take 1 tablet by mouth once daily.      pen needle, diabetic 31 gauge x 5/16" Ndle 1 each by Misc.(Non-Drug; Combo Route) route 2 (two) times daily with meals. 100 each 11    rosuvastatin (CRESTOR) 40 MG Tab Take 1 tablet (40 mg total) by mouth once daily. 90 tablet 3    albuterol (VENTOLIN HFA) 90 mcg/actuation inhaler Inhale 2 puffs into the lungs every 6 (six) hours as needed for Wheezing. Rescue 8 g 0    erythromycin (ROMYCIN) ophthalmic ointment Place into both eyes 2 (two) times a day. (Patient not taking: Reported on 12/13/2022) 1 each 3    pantoprazole (PROTONIX) 40 MG tablet Take 1 tablet (40 mg total) by mouth daily as needed. 30 tablet 3     Current Facility-Administered Medications on File Prior to Visit   Medication Dose Route Frequency Provider Last Rate Last Admin    EPINEPHrine (EPIPEN) 0.3 mg/0.3 mL pen injection 0.3 mg  0.3 mg Intramuscular PRN Amando Ulrich MD        ondansetron disintegrating tablet 4 mg  4 mg Oral Once PRN Amando Ulrich MD         He has No Known Allergies..    Objective:       Vitals:    12/13/22 0908   BP: 124/66   Pulse: 70   Resp: 17   SpO2: 97%   Weight: 94.8 kg (208 lb 15.9 oz)   Height: 5' 6" " "(1.676 m)           Physical Exam  Vitals and nursing note reviewed.   Constitutional:       Appearance: He is well-developed.   HENT:      Head: Normocephalic and atraumatic.        Right Ear: External ear normal.      Left Ear: External ear normal.      Nose: Nose normal.      Mouth/Throat:      Pharynx: Uvula midline. No oropharyngeal exudate.   Eyes:      General: Lids are normal. No scleral icterus.     Conjunctiva/sclera: Conjunctivae normal.      Pupils: Pupils are equal, round, and reactive to light.   Neck:      Trachea: Trachea and phonation normal.      Comments: Neck 18"  Cardiovascular:      Rate and Rhythm: Normal rate and regular rhythm.      Pulses: Normal pulses.      Heart sounds: Normal heart sounds, S1 normal and S2 normal. No murmur heard.  Pulmonary:      Effort: Pulmonary effort is normal. No respiratory distress.      Breath sounds: Examination of the left-lower field reveals decreased breath sounds. Decreased breath sounds present. No wheezing, rhonchi or rales.   Chest:      Chest wall: No tenderness.   Abdominal:      General: Bowel sounds are normal.      Palpations: Abdomen is soft.   Musculoskeletal:         General: Normal range of motion.      Cervical back: Normal range of motion and neck supple.   Lymphadenopathy:      Cervical: No cervical adenopathy.   Skin:     General: Skin is warm and dry.      Findings: No rash.      Nails: There is no clubbing.   Neurological:      Mental Status: He is alert and oriented to person, place, and time.      GCS: GCS eye subscore is 4. GCS verbal subscore is 5. GCS motor subscore is 6.      Cranial Nerves: No cranial nerve deficit.      Sensory: No sensory deficit.   Psychiatric:         Speech: Speech normal.     Personal Diagnostic Review  [x]  Chest x-ray:       US Abdomen Limited  Narrative: EXAMINATION:  US ABDOMEN LIMITED    CLINICAL HISTORY:  Alcoholic hepatitis without ascites    TECHNIQUE:  Limited ultrasound of the right upper quadrant " of the abdomen (including pancreas, liver, gallbladder, common bile duct, and spleen) was performed.    COMPARISON:  Ultrasound from 09/29/2022    FINDINGS:  Liver: Normal in size, measuring 13.9 cm. Mildly heterogeneous echotexture.  Mildly complex/thinly septated cysts are seen in the liver.  Two of these are in the left hepatic lobe and measures 3.5 and 2 cm respectively.  Complex right hepatic lobe cyst is seen measuring 1.1 cm.    Gallbladder: Limited visualization secondary to shadowing artifact.  No calculi, wall thickening, or pericholecystic fluid.  Visualized no sonographic Senior's sign.    Biliary system: The common duct is not dilated, measuring 3 mm.  No intrahepatic ductal dilatation.    Spleen: Normal in size and echotexture, measuring 10 cm.    Pancreas: Obscured by overlying shadowing artifact.    Miscellaneous: No upper abdominal ascites..  Antegrade flow in the main portal vein is noted.  Impression: Septated/complex left and right hepatic lobe cysts as above.    Electronically signed by: Richard Whatley MD  Date:    11/10/2022  Time:    12:42   .      [x] [place] Normal  FEV1 : 2.42L( 97.9 %), FVC 3.55L( 106.6%)  FEV1/FVC 68  NORMAL SPIROMETRY        PRIMARY TREATMENT RECOMMENDATIONS Treat, or refer to [facility].   1. The diagnostic polysomnography revealed a moderate to severe obstructive sleep apnea / hypopnea syndrome (A + H Index = 29.4 events /hr asleep with 1.8 respiratory event - related arousals /hr asleep and no RERAs (respiratory effort - related arousals) for the study,. The mean SpO2 value was 92.1 %, moderate, minimum oxygen saturation during sleep was 79.0 %, and the maximum waking baseline SpO2 was 98.0 %. Note that oxygen saturations were ? 88 % for 14.3 minutes of the time spent asleep; consider diagnosis of nocturnal hypoxemia. Persistent snoring was noted.   2. CPAP was initiated at 12: 34 AM. The CPAP titration polysomnography revealed that 17 cm H2O pressure (C -  Flex ?, humidity ?), the most effective pressure most completely tested, was optimal, as it reduced the rate of respiratory events to zero in 0.9 hrs sleep with 0.1 hrs REM sleep). Lower pressures also could be successful: 16 cm A + H I = 0.0, but with no REM sleep, and 14 cm A + H I = 4.1 with adequate time asleep and REM sleep. Snoring was first reduced over time, and then eliminated, during CPAP. AutoCPAP 6 cm - 20 cm) could be tried if necessary.     The overall A + H Index was 9.5 / hr asleep, with 0.7 respiratory event - related arousals / hr asleep and no RERAs for the titration trial. The mean SpO2 value was 94.2 % throughout the study, moderate, with a minimum oxygen saturation during sleep of 86.0 % and a maximum waking baseline SpO2 of 98.0 %).   A medium ResMed AirFit F30 full - face CPAP mask was used and was well - tolerated. Please see PAP trial outcomes table, below.   3. A device to discourage sleep in the supine position is recommended, to further reduce respiratory events and snoring (respiratory events had a significant supine positional tendency pre-CPAP).   4. Weight loss to the normal range is recommended as it can decrease respiratory events and snoring in overweight patients.     Feno 43        No flowsheet data found.      Assessment:       Problem List Items Addressed This Visit       Mild intermittent asthma without complication (Chronic)     Asthma ROS:   He is  taking medications regularly as instructed, no medication side effects noted, no significant ongoing wheezing or shortness of breath.     New concerns: None.   FEV1: 2.42L( 97.9%), FEV1/FVC 68  FeNo 43  Exam: appears well, vitals normal, no respiratory distress, acyanotic, normal RR, chest clear, no wheezing, crepitations, rhonchi, normal symmetric air entry.     Assessment: Asthma somewhat controlled.     Plan: START Advair. CONTINUE ALBUTEROL. Orders as documented in EMR.Re evaluate in 3 month          Relevant Medications     fluticasone propion-salmeterol 115-21 mcg/dose (ADVAIR HFA) 115-21 mcg/actuation HFAA inhaler    Chronic maxillary sinusitis    Chronic cough    HTN (hypertension)     Stable         Dyslipidemia     Stable CRESTOR         BMI 33.0-33.9,adult     Weight loss         Type 2 diabetes mellitus, without long-term current use of insulin     Stable Glucotrol         Obstructive sleep apnea - Primary     [place] 17  Sleep study showed Mod to severe KALYAN  CPAP 17 cmwp ordered  Goals of care discussed         Relevant Orders    CPAP FOR HOME USE       Plan:     Stable asthma  Added Advair  New CPAP order    Follow up in about 10 weeks (around 2/21/2023), or Download, adherence, added Advair,.    This note was prepared using voice recognition system and is likely to have sound alike errors that may have been overlooked even after proof reading.  Please call me with any questions    Discussed diagnosis, its evaluation, treatment and usual course. All questions answered.    Thank you for the courtesy of participating in the care of this patient    Stefan Buckner MD    Orders Placed This Encounter   Procedures    CPAP FOR HOME USE     A medium ResMed AirFit F30 full - face CPAP mask was used and was well - tolerated. Please see PAP trial     Order Specific Question:   Length of need (1-99 months):     Answer:   99     Order Specific Question:   Fulfillment Priority:     Answer:   Level 1:   AHI 60 or greater     Order Specific Question:   Type ():     Answer:   CPAP     Order Specific Question:   CPAP setting (cmH20):     Answer:   17     Order Specific Question:   Humidification ():     Answer:   Heated     Order Specific Question:   Choose ONE mask type and its corresponding cushions and/or pillows:     Answer:    Full Face Mask, 1 per 90 days:  Full Face Cushion, (3 per 90 days)     Order Specific Question:   Choose EITHER Heated or Non-Heated Tubjing     Answer:    Non-Heated Tubing, 1 per 90  days     Order Specific Question:   All other supplies as needed as listed below:     Answer:    Headgear, 1 per 180 days     Order Specific Question:   All other supplies as needed as listed below:     Answer:    Disposable Filter, 6 per 90 days     Order Specific Question:   All other supplies as needed as listed below:     Answer:    Non-Disposable Filter, 1 per 180 days     Order Specific Question:   All other supplies as needed as listed below:     Answer:    Exhalation Port, contact payer for quantity/frequency     Order Specific Question:   All other supplies as needed as listed below:     Answer:    Humidifier Chamber, 1 per 180 days

## 2022-12-13 NOTE — ASSESSMENT & PLAN NOTE
Asthma ROS:   He is  taking medications regularly as instructed, no medication side effects noted, no significant ongoing wheezing or shortness of breath.     New concerns: None.   FEV1: 2.42L( 97.9%), FEV1/FVC 68  FeNo 43  Exam: appears well, vitals normal, no respiratory distress, acyanotic, normal RR, chest clear, no wheezing, crepitations, rhonchi, normal symmetric air entry.     Assessment: Asthma somewhat controlled.     Plan: START Advair. CONTINUE ALBUTEROL. Orders as documented in EMR.Re evaluate in 3 month

## 2022-12-13 NOTE — PROCEDURES
"Clinical Guide to Interpretation or FeNO Levels :    FeNO  (ppb) LOW INTERMEDIATE HIGH   ADULT VALUES < 25 25-50          > 50   Th2-driven Inflammation Unlikely Likely Significant     Patients FeNO level at this visit : ___43_ (ppb)     Interpretation of FeNO measurement in adults:   [ ] FENO is less than 25 ppb implies non eosinophilic airway inflammation or the absence of airway inflammation.   Comment: Low FENO (<25 ppb) in adult asthmatics with persistent symptoms suggests other etiologies for these symptoms and a lower likelihood of benefit from adding or increasing inhaled glucocorticoids.     [X ] FENO between 25 and 50 ppb in adults should be interpreted cautiously with reference to the clinical situation (eg, symptomatic, on or off therapy, current smoking).     [ ] FENO greater than 50 ppb in adults  suggests eosinophilic airway inflammation   Comment: High FENO (>50 ppb) in adult asthmatics even with atypical symptoms suggests glucocorticoid responsiveness. High FENO (>50 ppb) can help identify poor adherence or uncontrolled inflammation in asthma patients with otherwise seemingly "controlled" asthma.     Discussion:   ·A FENO less than 25 ppb in adults and less than 20 ppb in children younger than 12 years of age implies noneosinophilic airway inflammation or the absence of airway inflammation.   ·A FENO greater than 50 ppb in adults or greater than 35 ppb in children suggests eosinophilic airway inflammation.   ·Values of FENO between 25 and 50 ppb in adults (20 to 35 ppb in children) should be interpreted cautiously with reference to the clinical situation (eg, symptomatic, on or off therapy, current smoking).   ·A rising FENO with a greater than 20 percent change and more than 25 ppb (20 ppb in children) from a previously stable level suggests increasing eosinophilic airway inflammation, but there are wide inter-individual differences.   ·A decrease in FENO greater than 20 percent for values over 50 " ppb or more than 10 ppb for values less than 50 ppb may be clinically important.   FENO in other respiratory diseases - Several other diseases are associated with altered levels of exhaled NO: low levels of FENO have been noted in cystic fibrosis, current smoking, pulmonary hypertension, hypothermia, primary ciliary dyskinesia, and bronchopulmonary dysplasia. Elevated FENO has been noted in atopy, nonasthmatic eosinophilic bronchitis, COPD exacerbations, noncystic fibrosis bronchiectasis, and viral upper respiratory infections.     REFERENCE:   ATS Board of Directors, December 2004, and by the ERS Executive Committee, June 2004. ATS/ERS Recommendations for Standardized Procedures for the Online and Offline Measurement of Exhaled Lower Respiratory Nitric Oxide and Nasal Nitric Oxide. Guideline 2005

## 2022-12-13 NOTE — ASSESSMENT & PLAN NOTE
[place] 17  Sleep study showed Mod to severe KALYAN  CPAP 17 cmwp ordered  Goals of care discussed

## 2022-12-19 PROBLEM — N17.9 AKI (ACUTE KIDNEY INJURY): Status: RESOLVED | Noted: 2022-09-14 | Resolved: 2022-12-19

## 2022-12-19 PROBLEM — I63.9 ACUTE CVA (CEREBROVASCULAR ACCIDENT): Status: RESOLVED | Noted: 2022-09-08 | Resolved: 2022-12-19

## 2022-12-22 ENCOUNTER — LAB VISIT (OUTPATIENT)
Dept: LAB | Facility: HOSPITAL | Age: 82
End: 2022-12-22
Attending: INTERNAL MEDICINE
Payer: COMMERCIAL

## 2022-12-22 ENCOUNTER — OFFICE VISIT (OUTPATIENT)
Dept: HEPATOLOGY | Facility: CLINIC | Age: 82
End: 2022-12-22
Payer: COMMERCIAL

## 2022-12-22 VITALS
DIASTOLIC BLOOD PRESSURE: 72 MMHG | SYSTOLIC BLOOD PRESSURE: 120 MMHG | WEIGHT: 211.63 LBS | BODY MASS INDEX: 34.01 KG/M2 | HEIGHT: 66 IN | HEART RATE: 72 BPM

## 2022-12-22 DIAGNOSIS — K70.10 STEATOHEPATITIS, ALCOHOLIC: Primary | ICD-10-CM

## 2022-12-22 DIAGNOSIS — K70.10 STEATOHEPATITIS, ALCOHOLIC: ICD-10-CM

## 2022-12-22 PROCEDURE — 99999 PR PBB SHADOW E&M-EST. PATIENT-LVL IV: CPT | Mod: PBBFAC,,, | Performed by: INTERNAL MEDICINE

## 2022-12-22 PROCEDURE — 82105 ALPHA-FETOPROTEIN SERUM: CPT | Performed by: INTERNAL MEDICINE

## 2022-12-22 PROCEDURE — 3288F PR FALLS RISK ASSESSMENT DOCUMENTED: ICD-10-PCS | Mod: CPTII,S$GLB,, | Performed by: INTERNAL MEDICINE

## 2022-12-22 PROCEDURE — 3078F DIAST BP <80 MM HG: CPT | Mod: CPTII,S$GLB,, | Performed by: INTERNAL MEDICINE

## 2022-12-22 PROCEDURE — 1126F AMNT PAIN NOTED NONE PRSNT: CPT | Mod: CPTII,S$GLB,, | Performed by: INTERNAL MEDICINE

## 2022-12-22 PROCEDURE — 80053 COMPREHEN METABOLIC PANEL: CPT | Performed by: INTERNAL MEDICINE

## 2022-12-22 PROCEDURE — 1159F PR MEDICATION LIST DOCUMENTED IN MEDICAL RECORD: ICD-10-PCS | Mod: CPTII,S$GLB,, | Performed by: INTERNAL MEDICINE

## 2022-12-22 PROCEDURE — 1126F PR PAIN SEVERITY QUANTIFIED, NO PAIN PRESENT: ICD-10-PCS | Mod: CPTII,S$GLB,, | Performed by: INTERNAL MEDICINE

## 2022-12-22 PROCEDURE — 3074F PR MOST RECENT SYSTOLIC BLOOD PRESSURE < 130 MM HG: ICD-10-PCS | Mod: CPTII,S$GLB,, | Performed by: INTERNAL MEDICINE

## 2022-12-22 PROCEDURE — 3288F FALL RISK ASSESSMENT DOCD: CPT | Mod: CPTII,S$GLB,, | Performed by: INTERNAL MEDICINE

## 2022-12-22 PROCEDURE — 99999 PR PBB SHADOW E&M-EST. PATIENT-LVL IV: ICD-10-PCS | Mod: PBBFAC,,, | Performed by: INTERNAL MEDICINE

## 2022-12-22 PROCEDURE — 1101F PT FALLS ASSESS-DOCD LE1/YR: CPT | Mod: CPTII,S$GLB,, | Performed by: INTERNAL MEDICINE

## 2022-12-22 PROCEDURE — 99214 PR OFFICE/OUTPT VISIT, EST, LEVL IV, 30-39 MIN: ICD-10-PCS | Mod: S$GLB,,, | Performed by: INTERNAL MEDICINE

## 2022-12-22 PROCEDURE — 1160F PR REVIEW ALL MEDS BY PRESCRIBER/CLIN PHARMACIST DOCUMENTED: ICD-10-PCS | Mod: CPTII,S$GLB,, | Performed by: INTERNAL MEDICINE

## 2022-12-22 PROCEDURE — 1159F MED LIST DOCD IN RCRD: CPT | Mod: CPTII,S$GLB,, | Performed by: INTERNAL MEDICINE

## 2022-12-22 PROCEDURE — 1160F RVW MEDS BY RX/DR IN RCRD: CPT | Mod: CPTII,S$GLB,, | Performed by: INTERNAL MEDICINE

## 2022-12-22 PROCEDURE — 1101F PR PT FALLS ASSESS DOC 0-1 FALLS W/OUT INJ PAST YR: ICD-10-PCS | Mod: CPTII,S$GLB,, | Performed by: INTERNAL MEDICINE

## 2022-12-22 PROCEDURE — 86301 IMMUNOASSAY TUMOR CA 19-9: CPT | Performed by: INTERNAL MEDICINE

## 2022-12-22 PROCEDURE — 3078F PR MOST RECENT DIASTOLIC BLOOD PRESSURE < 80 MM HG: ICD-10-PCS | Mod: CPTII,S$GLB,, | Performed by: INTERNAL MEDICINE

## 2022-12-22 PROCEDURE — 80321 ALCOHOLS BIOMARKERS 1OR 2: CPT | Performed by: INTERNAL MEDICINE

## 2022-12-22 PROCEDURE — 99214 OFFICE O/P EST MOD 30 MIN: CPT | Mod: S$GLB,,, | Performed by: INTERNAL MEDICINE

## 2022-12-22 PROCEDURE — 3074F SYST BP LT 130 MM HG: CPT | Mod: CPTII,S$GLB,, | Performed by: INTERNAL MEDICINE

## 2022-12-22 NOTE — PROGRESS NOTES
Subjective:     Jovan Del Cid Jr. is here for evaluation of abnormal LFTs    History of Present Illness:  Jovan Del Cid Jr. is a 82-year-old male with the abnormal total bilirubin from 2016 through 20 19, from 2020 on worse he also noted to have elevated AST and ALT less than twice the upper limit of normal.  He denies taking herbal supplements or over-the-counter medication.  He is not on any prescription medication that could cause significant elevation of liver enzymes.  On further discussion he is reports drinking 3-4 cans of beer along with the diet Coke every day for 40+ years up until he had stroke in September of this year.  He also has high blood pressure, hyperlipidemia, body mass index of 34.  Denies family history of liver disease.     He had liver biopsy in 2018 that showed F1 fibrosis with the steatohepatitis, 10% macrovascular and 25% microvesicular    Review of Systems   Constitutional:  Negative for fatigue, fever and unexpected weight change.   Gastrointestinal:  Negative for abdominal distention, abdominal pain, blood in stool, nausea and vomiting.   Musculoskeletal:  Negative for arthralgias and gait problem.   Skin:  Negative for pallor and rash.   Neurological:  Negative for dizziness.   Hematological:  Does not bruise/bleed easily.   Psychiatric/Behavioral:  Negative for confusion, hallucinations and sleep disturbance.      Objective:     Physical Exam  Vitals reviewed.   Constitutional:       Appearance: He is obese.   Eyes:      General: No scleral icterus.  Cardiovascular:      Heart sounds: No murmur heard.  Pulmonary:      Effort: Pulmonary effort is normal.      Breath sounds: No wheezing, rhonchi or rales.   Abdominal:      General: Bowel sounds are normal. There is no distension.      Palpations: There is no mass.      Tenderness: There is no abdominal tenderness.      Comments: Truncal obesity present    Musculoskeletal:         General: No tenderness.      Right lower leg: No  edema.      Left lower leg: No edema.   Lymphadenopathy:      Cervical: No cervical adenopathy.   Skin:     Coloration: Skin is not jaundiced.      Findings: No bruising or rash.   Neurological:      Mental Status: He is alert and oriented to person, place, and time.      Coordination: Coordination normal.   Psychiatric:         Behavior: Behavior normal.         Thought Content: Thought content normal.     12/22/22  Denies liver related complaints, no significant events since last visit.  Did stop all alcohol intake, watching his diet, lost 20 lb since last visit    MELD-Na score: 10 at 9/4/2022  5:09 AM  MELD score: 10 at 9/4/2022  5:09 AM  Calculated from:  Serum Creatinine: 0.8 mg/dL (Using min of 1 mg/dL) at 9/4/2022  5:09 AM  Serum Sodium: 138 mmol/L (Using max of 137 mmol/L) at 9/4/2022  5:09 AM  Total Bilirubin: 2.5 mg/dL at 9/4/2022  5:09 AM  INR(ratio): 0.9 (Using min of 1) at 9/3/2022  3:52 AM  Age: 82 years    WBC   Date Value Ref Range Status   09/16/2022 5.56 3.90 - 12.70 K/uL Final     Hemoglobin   Date Value Ref Range Status   09/16/2022 13.2 (L) 14.0 - 18.0 g/dL Final     Hematocrit   Date Value Ref Range Status   09/16/2022 40.7 40.0 - 54.0 % Final     Platelets   Date Value Ref Range Status   09/16/2022 146 (L) 150 - 450 K/uL Final     BUN   Date Value Ref Range Status   09/22/2022 27 (H) 8 - 23 mg/dL Final     Creatinine   Date Value Ref Range Status   09/22/2022 1.5 (H) 0.5 - 1.4 mg/dL Final     Glucose   Date Value Ref Range Status   09/22/2022 150 (H) 70 - 110 mg/dL Final     Calcium   Date Value Ref Range Status   09/22/2022 9.6 8.7 - 10.5 mg/dL Final     Sodium   Date Value Ref Range Status   09/22/2022 138 136 - 145 mmol/L Final     Potassium   Date Value Ref Range Status   09/22/2022 4.2 3.5 - 5.1 mmol/L Final     Chloride   Date Value Ref Range Status   09/22/2022 105 95 - 110 mmol/L Final     Magnesium   Date Value Ref Range Status   09/03/2022 2.0 1.6 - 2.6 mg/dL Final     AST   Date  Value Ref Range Status   09/22/2022 42 (H) 10 - 40 U/L Final     ALT   Date Value Ref Range Status   09/22/2022 60 (H) 10 - 44 U/L Final     Alkaline Phosphatase   Date Value Ref Range Status   09/22/2022 118 55 - 135 U/L Final     Total Bilirubin   Date Value Ref Range Status   09/22/2022 1.9 (H) 0.1 - 1.0 mg/dL Final     Comment:     For infants and newborns, interpretation of results should be based  on gestational age, weight and in agreement with clinical  observations.    Premature Infant recommended reference ranges:  Up to 24 hours.............<8.0 mg/dL  Up to 48 hours............<12.0 mg/dL  3-5 days..................<15.0 mg/dL  6-29 days.................<15.0 mg/dL       Albumin   Date Value Ref Range Status   09/22/2022 4.5 3.5 - 5.2 g/dL Final     INR   Date Value Ref Range Status   09/03/2022 0.9 0.8 - 1.2 Final     Comment:     Coumadin Therapy:  2.0 - 3.0 for INR for all indicators except mechanical heart valves  and antiphospholipid syndromes which should use 2.5 - 3.5.           Assessment/Plan:       1.Abnormal LFTs:  Suspect hepatic steatosis/hepatitis  from EtOH ( beer) underlying fatty liver disease is also suspected  Will initiate work up to rule out infectious/autoimmune disorders of the liver  Will obtain US and fibroscan  Discussed harmful  effects of EtOH, there is no known safe level of alcohol consumption for patients with ALD, so recommended to stop all EtOH intake.  Informed a standard drink contains 14 g alcohol (equivalent to 12 oz. Beer,  8-9 oz. malt liquor, 5 oz. table wine, or 1.5 oz. distilled spirits)  Safe level drinking for women is no more than 1/day and for men is not more than 2/day    2. Obesity:  Discussed dietary recommendations- Low calorie, low carbohydrate, high protein diet with goal of loosing >3-5% to improve steatosis and >7-10% to improve histological changes if present  Discussed daily, consistent exercise    12/22/22  FibroScan shows S2 steatosis, F1  fibrosis  Encouraged to continue abstinence from alcohol except for special occasions, continue Mediterranean diet.  We will repeat LFTs 6 months from now.  I wonder he may have underlying Gilbert's syndrome.  Will repeat ultrasound of abdomen 1 year from now to confirm stability of mildly complex bilateral cysts    US 11/2022  Liver: Normal in size, measuring 13.9 cm. Mildly heterogeneous echotexture.  Mildly complex/thinly septated cysts are seen in the liver.  Two of these are in the left hepatic lobe and measures 3.5 and 2 cm respectively.  Complex right hepatic lobe cyst is seen measuring 1.1 cm.      RTC in 3 mo    I have reviewed existing labs, imaging. Educated patient about disease process, prognosis. Ordered required labs, images and discussed treatment plan.     Lauren Bullock MD  Transplant Hepatologist  Dept of Hepatology, [place] [facility] [place]

## 2022-12-23 LAB
AFP SERPL-MCNC: 4.6 NG/ML (ref 0–8.4)
ALBUMIN SERPL BCP-MCNC: 4.1 G/DL (ref 3.5–5.2)
ALP SERPL-CCNC: 56 U/L (ref 55–135)
ALT SERPL W/O P-5'-P-CCNC: 28 U/L (ref 10–44)
ANION GAP SERPL CALC-SCNC: 10 MMOL/L (ref 8–16)
AST SERPL-CCNC: 26 U/L (ref 10–40)
BILIRUB SERPL-MCNC: 1.7 MG/DL (ref 0.1–1)
BUN SERPL-MCNC: 13 MG/DL (ref 8–23)
CALCIUM SERPL-MCNC: 9.9 MG/DL (ref 8.7–10.5)
CANCER AG19-9 SERPL-ACNC: 26.9 U/ML (ref 0–40)
CHLORIDE SERPL-SCNC: 106 MMOL/L (ref 95–110)
CO2 SERPL-SCNC: 23 MMOL/L (ref 23–29)
CREAT SERPL-MCNC: 1 MG/DL (ref 0.5–1.4)
EST. GFR  (NO RACE VARIABLE): >60 ML/MIN/1.73 M^2
GLUCOSE SERPL-MCNC: 89 MG/DL (ref 70–110)
POTASSIUM SERPL-SCNC: 4.7 MMOL/L (ref 3.5–5.1)
PROT SERPL-MCNC: 6.8 G/DL (ref 6–8.4)
SODIUM SERPL-SCNC: 139 MMOL/L (ref 136–145)

## 2022-12-28 LAB
CLINICAL BIOCHEMIST REVIEW: NORMAL
PLPETH BLD-MCNC: <10 NG/ML
POPETH BLD-MCNC: <10 NG/ML

## 2022-12-30 ENCOUNTER — TELEPHONE (OUTPATIENT)
Dept: FAMILY MEDICINE | Facility: CLINIC | Age: 82
End: 2022-12-30
Payer: COMMERCIAL

## 2022-12-30 NOTE — TELEPHONE ENCOUNTER
----- Message from Jadyn Link sent at 12/30/2022  1:42 PM CST -----  Pt's wife called regarding an upcoming dental appt the pt have. She would like to know if he can have any shots because of a stroke. Please call Mavis back at [phone]. [email or web address]

## 2022-12-30 NOTE — TELEPHONE ENCOUNTER
Spoke with pt's wife, she stated he has to have fillings redone and will need numbing shots in his mouth. Wife wants to know if  feels that is safe with his stroke?

## 2023-01-17 ENCOUNTER — TELEPHONE (OUTPATIENT)
Dept: NEUROLOGY | Facility: CLINIC | Age: 83
End: 2023-01-17
Payer: COMMERCIAL

## 2023-01-17 NOTE — TELEPHONE ENCOUNTER
----- Message from Dagoberto Montes MD sent at 1/17/2023  3:43 PM CST -----  Contact: marvin/wife    Wait 6 months and then driving evaluation ()    ----- Message -----  From: Ginger Main MA  Sent: 1/17/2023   2:48 PM CST  To: Dagoberto Montes MD      ----- Message -----  From: Mirta Kuhn  Sent: 1/17/2023   2:45 PM CST  To: [facility] Staff    Marvin is calling to check in to see if her  can be cleared to drive. He had a stroke September 1st and was told he had to wait 6 months to drive. Please call her at [phone]

## 2023-01-17 NOTE — TELEPHONE ENCOUNTER
Spoke with wife advised her of MD response , reminder set for 3/2023 reminder  sent order for driving eval .

## 2023-01-24 ENCOUNTER — LAB VISIT (OUTPATIENT)
Dept: LAB | Facility: HOSPITAL | Age: 83
End: 2023-01-24
Attending: INTERNAL MEDICINE
Payer: COMMERCIAL

## 2023-01-24 ENCOUNTER — OFFICE VISIT (OUTPATIENT)
Dept: FAMILY MEDICINE | Facility: CLINIC | Age: 83
End: 2023-01-24
Payer: COMMERCIAL

## 2023-01-24 VITALS
HEART RATE: 72 BPM | RESPIRATION RATE: 16 BRPM | WEIGHT: 212.5 LBS | DIASTOLIC BLOOD PRESSURE: 60 MMHG | SYSTOLIC BLOOD PRESSURE: 118 MMHG | TEMPERATURE: 98 F | BODY MASS INDEX: 34.3 KG/M2

## 2023-01-24 DIAGNOSIS — I10 PRIMARY HYPERTENSION: ICD-10-CM

## 2023-01-24 DIAGNOSIS — E11.9 TYPE 2 DIABETES MELLITUS WITHOUT COMPLICATION, WITHOUT LONG-TERM CURRENT USE OF INSULIN: ICD-10-CM

## 2023-01-24 DIAGNOSIS — E78.5 DYSLIPIDEMIA: ICD-10-CM

## 2023-01-24 DIAGNOSIS — Z86.73 HISTORY OF LACUNAR CEREBROVASCULAR ACCIDENT: ICD-10-CM

## 2023-01-24 DIAGNOSIS — K75.81 NASH (NONALCOHOLIC STEATOHEPATITIS): ICD-10-CM

## 2023-01-24 DIAGNOSIS — D69.6 THROMBOCYTOPENIA: ICD-10-CM

## 2023-01-24 DIAGNOSIS — J45.20 MILD INTERMITTENT ASTHMA WITHOUT COMPLICATION: Primary | ICD-10-CM

## 2023-01-24 LAB
BASOPHILS # BLD AUTO: 0.06 K/UL (ref 0–0.2)
BASOPHILS NFR BLD: 0.9 % (ref 0–1.9)
CHOLEST SERPL-MCNC: 113 MG/DL (ref 120–199)
CHOLEST/HDLC SERPL: 2.8 {RATIO} (ref 2–5)
DIFFERENTIAL METHOD: ABNORMAL
EOSINOPHIL # BLD AUTO: 0.3 K/UL (ref 0–0.5)
EOSINOPHIL NFR BLD: 4.7 % (ref 0–8)
ERYTHROCYTE [DISTWIDTH] IN BLOOD BY AUTOMATED COUNT: 14.2 % (ref 11.5–14.5)
HCT VFR BLD AUTO: 41.8 % (ref 40–54)
HDLC SERPL-MCNC: 40 MG/DL (ref 40–75)
HDLC SERPL: 35.4 % (ref 20–50)
HGB BLD-MCNC: 13.3 G/DL (ref 14–18)
IMM GRANULOCYTES # BLD AUTO: 0.01 K/UL (ref 0–0.04)
IMM GRANULOCYTES NFR BLD AUTO: 0.2 % (ref 0–0.5)
LDLC SERPL CALC-MCNC: 8.6 MG/DL (ref 63–159)
LYMPHOCYTES # BLD AUTO: 0.9 K/UL (ref 1–4.8)
LYMPHOCYTES NFR BLD: 14.2 % (ref 18–48)
MCH RBC QN AUTO: 30.2 PG (ref 27–31)
MCHC RBC AUTO-ENTMCNC: 31.8 G/DL (ref 32–36)
MCV RBC AUTO: 95 FL (ref 82–98)
MONOCYTES # BLD AUTO: 0.6 K/UL (ref 0.3–1)
MONOCYTES NFR BLD: 9.5 % (ref 4–15)
NEUTROPHILS # BLD AUTO: 4.5 K/UL (ref 1.8–7.7)
NEUTROPHILS NFR BLD: 70.5 % (ref 38–73)
NONHDLC SERPL-MCNC: 73 MG/DL
NRBC BLD-RTO: 0 /100 WBC
PLATELET # BLD AUTO: 169 K/UL (ref 150–450)
PMV BLD AUTO: 11.1 FL (ref 9.2–12.9)
RBC # BLD AUTO: 4.4 M/UL (ref 4.6–6.2)
TRIGL SERPL-MCNC: 322 MG/DL (ref 30–150)
WBC # BLD AUTO: 6.33 K/UL (ref 3.9–12.7)

## 2023-01-24 PROCEDURE — 3288F PR FALLS RISK ASSESSMENT DOCUMENTED: ICD-10-PCS | Mod: CPTII,S$GLB,, | Performed by: INTERNAL MEDICINE

## 2023-01-24 PROCEDURE — 3078F PR MOST RECENT DIASTOLIC BLOOD PRESSURE < 80 MM HG: ICD-10-PCS | Mod: CPTII,S$GLB,, | Performed by: INTERNAL MEDICINE

## 2023-01-24 PROCEDURE — 3074F PR MOST RECENT SYSTOLIC BLOOD PRESSURE < 130 MM HG: ICD-10-PCS | Mod: CPTII,S$GLB,, | Performed by: INTERNAL MEDICINE

## 2023-01-24 PROCEDURE — 36415 COLL VENOUS BLD VENIPUNCTURE: CPT | Mod: PO | Performed by: INTERNAL MEDICINE

## 2023-01-24 PROCEDURE — 1126F PR PAIN SEVERITY QUANTIFIED, NO PAIN PRESENT: ICD-10-PCS | Mod: CPTII,S$GLB,, | Performed by: INTERNAL MEDICINE

## 2023-01-24 PROCEDURE — 3288F FALL RISK ASSESSMENT DOCD: CPT | Mod: CPTII,S$GLB,, | Performed by: INTERNAL MEDICINE

## 2023-01-24 PROCEDURE — 3074F SYST BP LT 130 MM HG: CPT | Mod: CPTII,S$GLB,, | Performed by: INTERNAL MEDICINE

## 2023-01-24 PROCEDURE — 3072F PR LOW RISK FOR RETINOPATHY: ICD-10-PCS | Mod: CPTII,S$GLB,, | Performed by: INTERNAL MEDICINE

## 2023-01-24 PROCEDURE — 99999 PR PBB SHADOW E&M-EST. PATIENT-LVL IV: ICD-10-PCS | Mod: PBBFAC,,, | Performed by: INTERNAL MEDICINE

## 2023-01-24 PROCEDURE — 1159F PR MEDICATION LIST DOCUMENTED IN MEDICAL RECORD: ICD-10-PCS | Mod: CPTII,S$GLB,, | Performed by: INTERNAL MEDICINE

## 2023-01-24 PROCEDURE — 1126F AMNT PAIN NOTED NONE PRSNT: CPT | Mod: CPTII,S$GLB,, | Performed by: INTERNAL MEDICINE

## 2023-01-24 PROCEDURE — 99214 OFFICE O/P EST MOD 30 MIN: CPT | Mod: 25,S$GLB,, | Performed by: INTERNAL MEDICINE

## 2023-01-24 PROCEDURE — 90750 HZV VACC RECOMBINANT IM: CPT | Mod: S$GLB,,, | Performed by: INTERNAL MEDICINE

## 2023-01-24 PROCEDURE — 99999 PR PBB SHADOW E&M-EST. PATIENT-LVL IV: CPT | Mod: PBBFAC,,, | Performed by: INTERNAL MEDICINE

## 2023-01-24 PROCEDURE — 90471 ZOSTER RECOMBINANT VACCINE: ICD-10-PCS | Mod: S$GLB,,, | Performed by: INTERNAL MEDICINE

## 2023-01-24 PROCEDURE — 99214 PR OFFICE/OUTPT VISIT, EST, LEVL IV, 30-39 MIN: ICD-10-PCS | Mod: 25,S$GLB,, | Performed by: INTERNAL MEDICINE

## 2023-01-24 PROCEDURE — 90750 ZOSTER RECOMBINANT VACCINE: ICD-10-PCS | Mod: S$GLB,,, | Performed by: INTERNAL MEDICINE

## 2023-01-24 PROCEDURE — 1159F MED LIST DOCD IN RCRD: CPT | Mod: CPTII,S$GLB,, | Performed by: INTERNAL MEDICINE

## 2023-01-24 PROCEDURE — 1101F PT FALLS ASSESS-DOCD LE1/YR: CPT | Mod: CPTII,S$GLB,, | Performed by: INTERNAL MEDICINE

## 2023-01-24 PROCEDURE — 1101F PR PT FALLS ASSESS DOC 0-1 FALLS W/OUT INJ PAST YR: ICD-10-PCS | Mod: CPTII,S$GLB,, | Performed by: INTERNAL MEDICINE

## 2023-01-24 PROCEDURE — 90471 IMMUNIZATION ADMIN: CPT | Mod: S$GLB,,, | Performed by: INTERNAL MEDICINE

## 2023-01-24 PROCEDURE — 3078F DIAST BP <80 MM HG: CPT | Mod: CPTII,S$GLB,, | Performed by: INTERNAL MEDICINE

## 2023-01-24 PROCEDURE — 80061 LIPID PANEL: CPT | Performed by: INTERNAL MEDICINE

## 2023-01-24 PROCEDURE — 3072F LOW RISK FOR RETINOPATHY: CPT | Mod: CPTII,S$GLB,, | Performed by: INTERNAL MEDICINE

## 2023-01-24 PROCEDURE — 85025 COMPLETE CBC W/AUTO DIFF WBC: CPT | Performed by: INTERNAL MEDICINE

## 2023-01-24 NOTE — PROGRESS NOTES
Subjective:       Patient ID: Jovan Del Cid Jr. is a 82 y.o. male.    Chief Complaint: Follow-up (3m), Hypertension, Hyperlipidemia, and Cerebrovascular Accident    Follow-up  Associated symptoms include arthralgias, myalgias and numbness. Pertinent negatives include no abdominal pain, chest pain, chills, coughing, diaphoresis, fatigue, fever, headaches, joint swelling, nausea, neck pain, rash, sore throat, vomiting or weakness.   Hypertension  Pertinent negatives include no chest pain, headaches, neck pain, palpitations or shortness of breath.   Hyperlipidemia  Associated symptoms include myalgias. Pertinent negatives include no chest pain or shortness of breath.   Cerebrovascular Accident  Associated symptoms include arthralgias, myalgias and numbness. Pertinent negatives include no abdominal pain, chest pain, chills, coughing, diaphoresis, fatigue, fever, headaches, joint swelling, nausea, neck pain, rash, sore throat, vomiting or weakness.   Past Medical History:   Diagnosis Date    Acute CVA (cerebrovascular accident) 09/08/2022    DANY (acute kidney injury) 09/14/2022    Asthma     Bronchitis chronic     Cancer     Cough     Digestive disorder     Hyperlipidemia     Hypertension     Liver disease     KALYAN (obstructive sleep apnea) 09/08/2022    Stroke 09/01/2022    Type 2 diabetes mellitus, without long-term current use of insulin 09/07/2022    BS didn't check 10/05/2022     Past Surgical History:   Procedure Laterality Date    COLONOSCOPY N/A 6/21/2016    Procedure: COLONOSCOPY;  Surgeon: Alonso Dorsey MD;  Location: [facility];  Service: Endoscopy;  Laterality: N/A;    COLONOSCOPY N/A 11/1/2016    Procedure: COLONOSCOPY;  Surgeon: Alonso Dorsey MD;  Location: [facility];  Service: Endoscopy;  Laterality: N/A;    COLONOSCOPY N/A 2/3/2017    Procedure: COLONOSCOPY;  Surgeon: Alonso Dorsey MD;  Location: [facility];  Service: Endoscopy;  Laterality: N/A;    COLONOSCOPY N/A 11/13/2017     Procedure: COLONOSCOPY;  Surgeon: Alonso Dorsey MD;  Location: [facility] ENDO;  Service: Endoscopy;  Laterality: N/A;    COLONOSCOPY N/A 2021    Procedure: COLONOSCOPY;  Surgeon: Paula Ricardo MD;  Location: [facility] ENDO;  Service: Endoscopy;  Laterality: N/A;    FLUOROSCOPY N/A 6/15/2018    Procedure: Transjugular liver bx;  Surgeon: Petros Recio MD;  Location: [facility] CATH LAB;  Service: General;  Laterality: N/A;    TONSILLECTOMY       Family History   Problem Relation Age of Onset    Cancer Father     Alzheimer's disease Mother     Colon cancer Neg Hx     Melanoma Neg Hx      Social History     Socioeconomic History    Marital status:    Tobacco Use    Smoking status: Former     Years: 3.00     Types: Cigarettes, Pipe     Quit date:      Years since quittin.1    Smokeless tobacco: Never    Tobacco comments:     smoked a pipe - quit smoking    Substance and Sexual Activity    Alcohol use: Not Currently     Alcohol/week: 2.0 - 3.0 standard drinks     Types: 2 - 3 Cans of beer per week     Comment: daily    Drug use: No    Sexual activity: Never     Social Determinants of Health     Financial Resource Strain: Low Risk     Difficulty of Paying Living Expenses: Not very hard   Food Insecurity: No Food Insecurity    Worried About Running Out of Food in the Last Year: Never true    Ran Out of Food in the Last Year: Never true   Transportation Needs: No Transportation Needs    Lack of Transportation (Medical): No    Lack of Transportation (Non-Medical): No   Physical Activity: Inactive    Days of Exercise per Week: 0 days    Minutes of Exercise per Session: 0 min   Stress: No Stress Concern Present    Feeling of Stress : Not at all   Social Connections: Socially Integrated    Frequency of Communication with Friends and Family: More than three times a week    Frequency of Social Gatherings with Friends and Family: More than three times a week    Attends Sabian[employer] Services: More than 4 times  per year    Active Member of Clubs or Organizations: Yes    Attends Club or Organization Meetings: More than 4 times per year    Marital Status:    Housing Stability: Low Risk     Unable to Pay for Housing in the Last Year: No    Number of Places Lived in the Last Year: 1    Unstable Housing in the Last Year: No     Review of Systems   Constitutional:  Negative for activity change, appetite change, chills, diaphoresis, fatigue, fever and unexpected weight change.   HENT:  Negative for drooling, ear discharge, ear pain, facial swelling, hearing loss, mouth sores, nosebleeds, postnasal drip, rhinorrhea, sinus pressure, sneezing, sore throat, tinnitus, trouble swallowing and voice change.    Eyes:  Negative for photophobia, redness and visual disturbance.   Respiratory:  Negative for apnea, cough, choking, chest tightness, shortness of breath and wheezing.    Cardiovascular:  Negative for chest pain, palpitations and leg swelling.   Gastrointestinal:  Negative for abdominal distention, abdominal pain, anal bleeding, blood in stool, constipation, diarrhea, nausea and vomiting.   Endocrine: Negative for cold intolerance, heat intolerance, polydipsia, polyphagia and polyuria.   Genitourinary:  Negative for difficulty urinating, dysuria, enuresis, flank pain, frequency, genital sores, hematuria and urgency.   Musculoskeletal:  Positive for arthralgias and myalgias. Negative for back pain, gait problem, joint swelling, neck pain and neck stiffness.   Skin:  Negative for color change, pallor, rash and wound.   Allergic/Immunologic: Negative for food allergies and immunocompromised state.   Neurological:  Positive for numbness. Negative for dizziness, tremors, seizures, syncope, facial asymmetry, speech difficulty, weakness, light-headedness and headaches.   Hematological:  Negative for adenopathy. Does not bruise/bleed easily.   Psychiatric/Behavioral:  Negative for agitation, behavioral problems, confusion, decreased  concentration, dysphoric mood, hallucinations, self-injury, sleep disturbance and suicidal ideas. The patient is not nervous/anxious and is not hyperactive.      Objective:      Physical Exam  Vitals and nursing note reviewed.   Constitutional:       General: He is not in acute distress.     Appearance: Normal appearance. He is well-developed. He is not diaphoretic.   HENT:      Head: Normocephalic and atraumatic.      Mouth/Throat:      Pharynx: No oropharyngeal exudate.   Eyes:      General: No scleral icterus.     Pupils: Pupils are equal, round, and reactive to light.   Neck:      Thyroid: No thyromegaly.      Vascular: No carotid bruit or JVD.      Trachea: No tracheal deviation.   Cardiovascular:      Rate and Rhythm: Normal rate and regular rhythm.      Heart sounds: Normal heart sounds.   Pulmonary:      Effort: Pulmonary effort is normal. No respiratory distress.      Breath sounds: Normal breath sounds. No wheezing or rales.   Chest:      Chest wall: No tenderness.   Abdominal:      General: Bowel sounds are normal. There is no distension.      Palpations: Abdomen is soft.      Tenderness: There is no abdominal tenderness. There is no guarding or rebound.   Musculoskeletal:         General: No tenderness. Normal range of motion.      Cervical back: Normal range of motion and neck supple.   Lymphadenopathy:      Cervical: No cervical adenopathy.   Skin:     General: Skin is warm and dry.      Coloration: Skin is not pale.      Findings: No erythema or rash.   Neurological:      Mental Status: He is alert and oriented to person, place, and time.   Psychiatric:         Behavior: Behavior normal.         Thought Content: Thought content normal.         Judgment: Judgment normal.       CMP  Sodium   Date Value Ref Range Status   12/22/2022 139 136 - 145 mmol/L Final     Potassium   Date Value Ref Range Status   12/22/2022 4.7 3.5 - 5.1 mmol/L Final     Chloride   Date Value Ref Range Status   12/22/2022 106 95  - 110 mmol/L Final     CO2   Date Value Ref Range Status   12/22/2022 23 23 - 29 mmol/L Final     Glucose   Date Value Ref Range Status   12/22/2022 89 70 - 110 mg/dL Final     BUN   Date Value Ref Range Status   12/22/2022 13 8 - 23 mg/dL Final     Creatinine   Date Value Ref Range Status   12/22/2022 1.0 0.5 - 1.4 mg/dL Final     Calcium   Date Value Ref Range Status   12/22/2022 9.9 8.7 - 10.5 mg/dL Final     Total Protein   Date Value Ref Range Status   12/22/2022 6.8 6.0 - 8.4 g/dL Final     Albumin   Date Value Ref Range Status   12/22/2022 4.1 3.5 - 5.2 g/dL Final     Total Bilirubin   Date Value Ref Range Status   12/22/2022 1.7 (H) 0.1 - 1.0 mg/dL Final     Comment:     For infants and newborns, interpretation of results should be based  on gestational age, weight and in agreement with clinical  observations.    Premature Infant recommended reference ranges:  Up to 24 hours.............<8.0 mg/dL  Up to 48 hours............<12.0 mg/dL  3-5 days..................<15.0 mg/dL  6-29 days.................<15.0 mg/dL       Alkaline Phosphatase   Date Value Ref Range Status   12/22/2022 56 55 - 135 U/L Final     AST   Date Value Ref Range Status   12/22/2022 26 10 - 40 U/L Final     ALT   Date Value Ref Range Status   12/22/2022 28 10 - 44 U/L Final     Anion Gap   Date Value Ref Range Status   12/22/2022 10 8 - 16 mmol/L Final     eGFR if    Date Value Ref Range Status   01/20/2021 >60.0 >60 mL/min/1.73 m^2 Final     eGFR if non    Date Value Ref Range Status   01/20/2021 >60.0 >60 mL/min/1.73 m^2 Final     Comment:     Calculation used to obtain the estimated glomerular filtration  rate (eGFR) is the CKD-EPI equation.        Lab Results   Component Value Date    WBC 5.56 09/16/2022    HGB 13.2 (L) 09/16/2022    HCT 40.7 09/16/2022    MCV 94 09/16/2022     (L) 09/16/2022     Lab Results   Component Value Date    CHOL 215 (H) 09/13/2022     Lab Results   Component Value Date     HDL 33 (L) 09/13/2022     Lab Results   Component Value Date    LDLCALC 142.4 09/13/2022     Lab Results   Component Value Date    TRIG 198 (H) 09/13/2022     Lab Results   Component Value Date    CHOLHDL 15.3 (L) 09/13/2022     Lab Results   Component Value Date    TSH 1.876 09/02/2022     Lab Results   Component Value Date    HGBA1C 7.8 (H) 09/13/2022     Assessment:       1. Mild intermittent asthma without complication    2. Dyslipidemia    3. History of lacunar cerebrovascular accident    4. Primary hypertension    5. MCMAHAN (nonalcoholic steatohepatitis)    6. Type 2 diabetes mellitus without complication, without long-term current use of insulin    7. Thrombocytopenia          Plan:   Mild intermittent asthma without complication    Dyslipidemia  -     Lipid Panel; Future; Expected date: 01/24/2023    History of lacunar cerebrovascular accident  -     CBC Auto Differential; Future; Expected date: 01/24/2023    Primary hypertension    MCMAHAN (nonalcoholic steatohepatitis)    Type 2 diabetes mellitus without complication, without long-term current use of insulin  -     Hemoglobin A1C; Future; Expected date: 01/24/2023    Thrombocytopenia    Other orders  -     (In Office Administered) Zoster Recombinant Vaccine      Stable-------------continue meds-----------------as above----------------f/u 3 months------

## 2023-01-25 ENCOUNTER — PATIENT MESSAGE (OUTPATIENT)
Dept: INTERNAL MEDICINE | Facility: CLINIC | Age: 83
End: 2023-01-25
Payer: COMMERCIAL

## 2023-03-01 ENCOUNTER — TELEPHONE (OUTPATIENT)
Dept: NEUROLOGY | Facility: CLINIC | Age: 83
End: 2023-03-01
Payer: COMMERCIAL

## 2023-03-01 DIAGNOSIS — Z86.73 HISTORY OF LACUNAR CEREBROVASCULAR ACCIDENT: Primary | ICD-10-CM

## 2023-03-01 NOTE — TELEPHONE ENCOUNTER
----- Message from Verenice Villalobos sent at 3/1/2023 10:03 AM CST -----  Pts wife is requesting a call back concerning the pt. Call back number [phone]  [facility] [email or web address]

## 2023-03-07 ENCOUNTER — CLINICAL SUPPORT (OUTPATIENT)
Dept: REHABILITATION | Facility: HOSPITAL | Age: 83
End: 2023-03-07
Payer: COMMERCIAL

## 2023-03-07 DIAGNOSIS — Z86.73 HISTORY OF LACUNAR CEREBROVASCULAR ACCIDENT: ICD-10-CM

## 2023-03-08 NOTE — PROGRESS NOTES
Occupational Therapy   Driving Evaluation    Jovan Del Cid Jr.   MRN: [id number]      Referring Physician: Dagoberto Montes MD  Diagnosis: Lacunar CVA   History: Mr. Del Cid is currently a licensed  but has been referred to Occupational Therapy by his MD for clinical and on-road testing to be used for driving evaluation. Mr. Del Cid suffered a Lacunar CVA on 9/2/2022 and has made a good recovery but reports that his MD wanted him to wait six months before returning to driving. He was accompanied to the session by his wife Mavis.     [facility] [phone]   Exp 8/27/28  Restrictions: None     SUBJECTIVE: Mr. Del Cid stated that his wife and son have been driving him around but feels that he is ready to return to driving on his own.    OBJECTIVE:   Physical Function Testing:    ROM:  WFLs B UE's    Head / Neck ROM: WFL's   Pt is ambidextrous hand dominant   Strength: WFL's B UEs and LE's  Balance:    Static and Dynamic sitting- Normal   Static Standing - Normal    Dynamic standing - Normal   Transfers and Mobility: Modified Independent for slightly increased time   Rapid Pace Walk: 5.6 seconds which is faster and better than the average standard of 8 seconds    Perceptual testing:   Letter cancellation:  33/34 passing score where testing was completed in 1 minute 2 seconds, average time for completion is 1 minute 15 seconds   Line bisection:  WNL's  Maze Test - 29.5 seconds, no error ( Cut point score is 60 seconds or less with 1 error or less)   Trail Making Test A - 48.2  seconds  (Average 29 seconds, Deficient > 78 seconds)    Trail Making Test B -  159 seconds (Average 75 seconds, Deficient > 273 seconds)   Motor Free Visual Perception Test (MVPT) which assesses figure ground, visual discrimination, spatial relationships, visual closure and visual memory,  34/36 , a passing score, where testing was competed in 7 minutes 5 seconds, average time for completion is 7 minutes   Right/Left discrimination: 4/4      Auditory discrimination: [facility]'s     Vision testing: no glasses were worn during testing  Peripheral vision: bilateral nasal vision is intact. Left peripheral vision is intact at 85°, Right is intact at 85°.  Acuity  Acuity Left eye 20/200  Acuity Right eye 20/100  Acuity both eyes 20/100  Color vision: 3/5   Lateral phoria which assesses binocular vision was normal.  Correctly identified 11/12 road signs and was 2/3 for depth perception.  Comment: Visual acuity minimum standards for the [facility] is 20/40 in one eye.    Mr. Del Cid stated that he has glasses at home that are bifocals but he does not like them for driving since the lower lens is too high and makes it more difficult to see when driving.  He only had reading glasses with him today.     Cognitive testing:   Short term memory:  3/4,    Immediate # recall (Digit span) : 6,  WNL's score    Cognitive sequencing of months of year in reverse:  No error, no delay    [facility] Mental Status Exam (UMS) which is used to  detect mild neurocognitive disorder and dementia: Pt scored 25/30 which  scores in the range indicating mild neurocognitive disorder. Most of errors were with STM.      Long term memory:  WNL's     Judgment questions regarding rules of the road:     Insight:  Good as he has not driven due to MD instructions and has agreed to testing.     Communication:   Expressive aphasia:  Not found  Receptive aphasia:  Not found    Reaction time testin/100s or a second (avg of 3 trials) which is faster and better than the average standard of 50/100s of a second.    Although Mr. Del Cid does not meet the [facility] minimum standards for visual acuity, he was taken out on the road with precautions known for his visual limitation.       In-car / on-road assessment:  Mr. Del Cid transferred to car Independently and after orienting to the test vehicle, he was able to adjust mirrors and seat, maria de jesus seat belt and start vehicle.   Nehemias safely pulled car out of parking space and then successfully drove on side streets per instruction stopping correctly at stop signs, checked traffic in all directions before negotiating turns correctly and did well encountering light neighborhood traffic. He then was then directed to a divided highway where he correctly obeyed traffic signs and signals, speed limits, followed at appropriate distances, changed lanes appropriately when asked and had no difficulty stopping vehicle at appropriate distances. He drove at normal speeds keeping up with traffic flow and was comfortable in these traffic conditions. He was then directed to the on ramp of the [address] and after correctly merging into higher speed traffic, he was able to drive at higher speeds, was given instructions and was able to change lanes safely and correctly using turn signals when appropriate.  He was then given instructions to drive into [place] and then make a U turn to return. Since he drives in [place] and [place] he was agreeable to take the [address] to [facility] and then drive across the [facility] [place]. He did so successfully and then turned around to return.   He was given instructions to find a route through traffic situations to return to hospital grounds. He returned the vehicle the designated parking spot in the garage and was able to parallel park with no difficulty.    The limitations in visual acuity noted in clinical testing earlier did not limit his judgement for driving during the On-road portion of testing. After returning, he and his wife participated in a discussion of the testing and it was strongly stated that he is in need of vision correction to drive an automobile. Both agreed for follow up vision testing for him to be done with new glasses that he is comfortable with wearing before returning to driving. These new glasses will need improve his  vision to at least State minimum standards.     ASSESSMENT:   Mr. Del Cid demonstrated the ability to operate an automobile this day and demonstrated good insight as he realized that a driving assessment is necessary to show that he is capable of driving. It is required that the he have a follow up vision test since his glasses are not useful to him and he needs vision correction to at least meet State standards.  He did well enough in all other areas of testing today where he has successfully passed this driving evaluation this day to drive his automobile with no limitations.  Findings were notified to the office of Dagoberto Montes MD. Changes in Pt medical status may warrant retesting in the future as the conclusion reached and the recommendations made reflect findings at the time of this evaluation.    PLAN:   Discharge patient from outpatient Occupational Therapy services as Pt and MD needs being met with completion and reporting of this evaluation.      ASHWINI Sanabria, MORAIMA  Occupational Therapist, Certified  Rehabilitation Specialist  3/7/2023

## 2023-03-09 ENCOUNTER — OFFICE VISIT (OUTPATIENT)
Dept: CARDIOLOGY | Facility: CLINIC | Age: 83
End: 2023-03-09
Payer: COMMERCIAL

## 2023-03-09 VITALS
HEIGHT: 66 IN | DIASTOLIC BLOOD PRESSURE: 80 MMHG | WEIGHT: 213.19 LBS | HEART RATE: 50 BPM | BODY MASS INDEX: 34.26 KG/M2 | SYSTOLIC BLOOD PRESSURE: 108 MMHG

## 2023-03-09 DIAGNOSIS — E78.5 DYSLIPIDEMIA: ICD-10-CM

## 2023-03-09 DIAGNOSIS — I10 PRIMARY HYPERTENSION: Primary | ICD-10-CM

## 2023-03-09 DIAGNOSIS — Z86.73 HISTORY OF LACUNAR CEREBROVASCULAR ACCIDENT: ICD-10-CM

## 2023-03-09 DIAGNOSIS — I65.22 STENOSIS OF LEFT CAROTID ARTERY: ICD-10-CM

## 2023-03-09 PROCEDURE — 1160F RVW MEDS BY RX/DR IN RCRD: CPT | Mod: CPTII,S$GLB,, | Performed by: INTERNAL MEDICINE

## 2023-03-09 PROCEDURE — 99214 PR OFFICE/OUTPT VISIT, EST, LEVL IV, 30-39 MIN: ICD-10-PCS | Mod: S$GLB,,, | Performed by: INTERNAL MEDICINE

## 2023-03-09 PROCEDURE — 99999 PR PBB SHADOW E&M-EST. PATIENT-LVL IV: CPT | Mod: PBBFAC,,, | Performed by: INTERNAL MEDICINE

## 2023-03-09 PROCEDURE — 3072F PR LOW RISK FOR RETINOPATHY: ICD-10-PCS | Mod: CPTII,S$GLB,, | Performed by: INTERNAL MEDICINE

## 2023-03-09 PROCEDURE — 3074F SYST BP LT 130 MM HG: CPT | Mod: CPTII,S$GLB,, | Performed by: INTERNAL MEDICINE

## 2023-03-09 PROCEDURE — 1101F PR PT FALLS ASSESS DOC 0-1 FALLS W/OUT INJ PAST YR: ICD-10-PCS | Mod: CPTII,S$GLB,, | Performed by: INTERNAL MEDICINE

## 2023-03-09 PROCEDURE — 1101F PT FALLS ASSESS-DOCD LE1/YR: CPT | Mod: CPTII,S$GLB,, | Performed by: INTERNAL MEDICINE

## 2023-03-09 PROCEDURE — 99214 OFFICE O/P EST MOD 30 MIN: CPT | Mod: S$GLB,,, | Performed by: INTERNAL MEDICINE

## 2023-03-09 PROCEDURE — 99999 PR PBB SHADOW E&M-EST. PATIENT-LVL IV: ICD-10-PCS | Mod: PBBFAC,,, | Performed by: INTERNAL MEDICINE

## 2023-03-09 PROCEDURE — 3288F PR FALLS RISK ASSESSMENT DOCUMENTED: ICD-10-PCS | Mod: CPTII,S$GLB,, | Performed by: INTERNAL MEDICINE

## 2023-03-09 PROCEDURE — 3074F PR MOST RECENT SYSTOLIC BLOOD PRESSURE < 130 MM HG: ICD-10-PCS | Mod: CPTII,S$GLB,, | Performed by: INTERNAL MEDICINE

## 2023-03-09 PROCEDURE — 1159F MED LIST DOCD IN RCRD: CPT | Mod: CPTII,S$GLB,, | Performed by: INTERNAL MEDICINE

## 2023-03-09 PROCEDURE — 1160F PR REVIEW ALL MEDS BY PRESCRIBER/CLIN PHARMACIST DOCUMENTED: ICD-10-PCS | Mod: CPTII,S$GLB,, | Performed by: INTERNAL MEDICINE

## 2023-03-09 PROCEDURE — 3079F DIAST BP 80-89 MM HG: CPT | Mod: CPTII,S$GLB,, | Performed by: INTERNAL MEDICINE

## 2023-03-09 PROCEDURE — 3072F LOW RISK FOR RETINOPATHY: CPT | Mod: CPTII,S$GLB,, | Performed by: INTERNAL MEDICINE

## 2023-03-09 PROCEDURE — 3288F FALL RISK ASSESSMENT DOCD: CPT | Mod: CPTII,S$GLB,, | Performed by: INTERNAL MEDICINE

## 2023-03-09 PROCEDURE — 3079F PR MOST RECENT DIASTOLIC BLOOD PRESSURE 80-89 MM HG: ICD-10-PCS | Mod: CPTII,S$GLB,, | Performed by: INTERNAL MEDICINE

## 2023-03-09 PROCEDURE — 1159F PR MEDICATION LIST DOCUMENTED IN MEDICAL RECORD: ICD-10-PCS | Mod: CPTII,S$GLB,, | Performed by: INTERNAL MEDICINE

## 2023-03-09 PROCEDURE — 1126F PR PAIN SEVERITY QUANTIFIED, NO PAIN PRESENT: ICD-10-PCS | Mod: CPTII,S$GLB,, | Performed by: INTERNAL MEDICINE

## 2023-03-09 PROCEDURE — 1126F AMNT PAIN NOTED NONE PRSNT: CPT | Mod: CPTII,S$GLB,, | Performed by: INTERNAL MEDICINE

## 2023-03-09 NOTE — PROGRESS NOTES
Subjective:   Patient ID:  Jovan Del Cid Jr. is a 82 y.o. male who presents for follow up of No chief complaint on file.      81 y/o male, 6 months.   PMH acute CVA in , asthma, h/o CVA, HLD, HTN,Hx of Colon Tubulovillous Adenoma, MCMAHAN  and  obesity    admitted for acute CVA. with R-sided paresthesias The BP was significantly high /102    MRI phillip show Acute lacunar infarcts within the left thalamus . CT head and neck show :No evidence of thromboembolism within the visible branches of the [id number]-[id number].Coarse calcific plaque within the left carotid bifurcation resulting in up to 50% stenosis of the origin/proximal aspect of the left internal carotid artery.   TTE did not show any acute finding  with normal  EF .     After discharge. No fall/ still right body numbness. No chest pain dyspnea. Some dizziness and no faint  BP low and valsartan HCTZ decreased the dose by PCP last week and stopped yesterday   Pt had h/o med noncompliance  HH once a week.     Echo     · There is no evidence of intracardiac shunting.  · The left ventricle is normal in size with concentric remodeling and normal systolic function.  · The estimated ejection fraction is 60%.  · Normal left ventricular diastolic function.  · Normal right ventricular size with normal right ventricular systolic function.  · There is bileaflet mitral prolapse.  · Mild-to-moderate mitral regurgitation.  · Normal central venous pressure (3 mmHg).    Interval history  BP controlled. No dizziness faint and chest pain             Past Medical History:   Diagnosis Date    Acute CVA (cerebrovascular accident) 09/08/2022    DANY (acute kidney injury) 09/14/2022    Asthma     Bronchitis chronic     Cancer     Cough     Digestive disorder     Hyperlipidemia     Hypertension     Liver disease     KALYAN (obstructive sleep apnea) 09/08/2022    Stroke 09/01/2022    Type 2 diabetes mellitus, without long-term current use of insulin 09/07/2022    BS  didn't check 10/05/2022       Past Surgical History:   Procedure Laterality Date    COLONOSCOPY N/A 2016    Procedure: COLONOSCOPY;  Surgeon: Alonso Dorsey MD;  Location: [facility] ENDO;  Service: Endoscopy;  Laterality: N/A;    COLONOSCOPY N/A 2016    Procedure: COLONOSCOPY;  Surgeon: Alonso Dorsey MD;  Location: [facility] ENDO;  Service: Endoscopy;  Laterality: N/A;    COLONOSCOPY N/A 2/3/2017    Procedure: COLONOSCOPY;  Surgeon: Alonso Dorsey MD;  Location: [facility] ENDO;  Service: Endoscopy;  Laterality: N/A;    COLONOSCOPY N/A 2017    Procedure: COLONOSCOPY;  Surgeon: Alonso Dorsey MD;  Location: [facility] ENDO;  Service: Endoscopy;  Laterality: N/A;    COLONOSCOPY N/A 2021    Procedure: COLONOSCOPY;  Surgeon: Paula Ricardo MD;  Location: [facility] ENDO;  Service: Endoscopy;  Laterality: N/A;    FLUOROSCOPY N/A 6/15/2018    Procedure: Transjugular liver bx;  Surgeon: Petros Recio MD;  Location: [facility] CATH LAB;  Service: General;  Laterality: N/A;    TONSILLECTOMY         Social History     Tobacco Use    Smoking status: Former     Years: 3.00     Types: Cigarettes, Pipe     Quit date:      Years since quittin.2    Smokeless tobacco: Never    Tobacco comments:     smoked a pipe - quit smoking    Substance Use Topics    Alcohol use: Not Currently     Alcohol/week: 2.0 - 3.0 standard drinks     Types: 2 - 3 Cans of beer per week     Comment: daily    Drug use: No       Family History   Problem Relation Age of Onset    Cancer Father     Alzheimer's disease Mother     Colon cancer Neg Hx     Melanoma Neg Hx          Review of Systems   Constitutional: Negative for decreased appetite, diaphoresis, fever, malaise/fatigue and night sweats.   HENT:  Negative for nosebleeds.    Eyes:  Negative for blurred vision and double vision.   Cardiovascular:  Negative for chest pain, claudication, dyspnea on exertion, irregular heartbeat, leg swelling, near-syncope, orthopnea,  palpitations, paroxysmal nocturnal dyspnea and syncope.   Respiratory:  Negative for cough, shortness of breath, sleep disturbances due to breathing, snoring, sputum production and wheezing.    Endocrine: Negative for cold intolerance and polyuria.   Hematologic/Lymphatic: Does not bruise/bleed easily.   Skin:  Negative for rash.   Musculoskeletal:  Negative for back pain, falls, joint pain, joint swelling and neck pain.   Gastrointestinal:  Negative for abdominal pain, heartburn, nausea and vomiting.   Genitourinary:  Negative for dysuria, frequency and hematuria.   Neurological:  Positive for focal weakness and loss of balance. Negative for difficulty with concentration, dizziness, headaches, light-headedness, numbness, seizures and weakness.   Psychiatric/Behavioral:  Negative for depression, memory loss and substance abuse. The patient does not have insomnia.    Allergic/Immunologic: Negative for HIV exposure and hives.     Objective:   Physical Exam  HENT:      Head: Normocephalic.   Eyes:      Pupils: Pupils are equal, round, and reactive to light.   Neck:      Thyroid: No thyromegaly.      Vascular: Normal carotid pulses. No carotid bruit or JVD.   Cardiovascular:      Rate and Rhythm: Normal rate and regular rhythm. No extrasystoles are present.     Chest Wall: PMI is not displaced.      Pulses: Normal pulses.      Heart sounds: Normal heart sounds. No murmur heard.    No gallop. No S3 sounds.   Pulmonary:      Effort: No respiratory distress.      Breath sounds: Normal breath sounds. No stridor.   Abdominal:      General: Bowel sounds are normal.      Palpations: Abdomen is soft.      Tenderness: There is no abdominal tenderness. There is no rebound.   Musculoskeletal:         General: Normal range of motion.   Skin:     Findings: No rash.   Neurological:      Mental Status: He is alert and oriented to person, place, and time.   Psychiatric:         Behavior: Behavior normal.       Lab Results   Component  Value Date    CHOL 113 (L) 01/24/2023    CHOL 215 (H) 09/13/2022    CHOL 269 (H) 09/02/2022     Lab Results   Component Value Date    HDL 40 01/24/2023    HDL 33 (L) 09/13/2022    HDL 39 (L) 09/02/2022     Lab Results   Component Value Date    LDLCALC 8.6 (L) 01/24/2023    LDLCALC 142.4 09/13/2022    LDLCALC 178.4 (H) 09/02/2022     Lab Results   Component Value Date    TRIG 322 (H) 01/24/2023    TRIG 198 (H) 09/13/2022    TRIG 258 (H) 09/02/2022     Lab Results   Component Value Date    CHOLHDL 35.4 01/24/2023    CHOLHDL 15.3 (L) 09/13/2022    CHOLHDL 14.5 (L) 09/02/2022       Chemistry        Component Value Date/Time     12/22/2022 1148    K 4.7 12/22/2022 1148     12/22/2022 1148    CO2 23 12/22/2022 1148    BUN 13 12/22/2022 1148    CREATININE 1.0 12/22/2022 1148    GLU 89 12/22/2022 1148        Component Value Date/Time    CALCIUM 9.9 12/22/2022 1148    ALKPHOS 56 12/22/2022 1148    AST 26 12/22/2022 1148    ALT 28 12/22/2022 1148    BILITOT 1.7 (H) 12/22/2022 1148    ESTGFRAFRICA >60.0 01/20/2021 1120    EGFRNONAA >60.0 01/20/2021 1120          Lab Results   Component Value Date    HGBA1C 7.8 (H) 09/13/2022     Lab Results   Component Value Date    TSH 1.876 09/02/2022     Lab Results   Component Value Date    INR 0.9 09/03/2022    INR 1.0 09/02/2022    INR 1.0 06/15/2018     Lab Results   Component Value Date    WBC 6.33 01/24/2023    HGB 13.3 (L) 01/24/2023    HCT 41.8 01/24/2023    MCV 95 01/24/2023     01/24/2023     BMP  Sodium   Date Value Ref Range Status   12/22/2022 139 136 - 145 mmol/L Final     Potassium   Date Value Ref Range Status   12/22/2022 4.7 3.5 - 5.1 mmol/L Final     Chloride   Date Value Ref Range Status   12/22/2022 106 95 - 110 mmol/L Final     CO2   Date Value Ref Range Status   12/22/2022 23 23 - 29 mmol/L Final     BUN   Date Value Ref Range Status   12/22/2022 13 8 - 23 mg/dL Final     Creatinine   Date Value Ref Range Status   12/22/2022 1.0 0.5 - 1.4 mg/dL  Final     Calcium   Date Value Ref Range Status   12/22/2022 9.9 8.7 - 10.5 mg/dL Final     Anion Gap   Date Value Ref Range Status   12/22/2022 10 8 - 16 mmol/L Final     eGFR if    Date Value Ref Range Status   01/20/2021 >60.0 >60 mL/min/1.73 m^2 Final     eGFR if non    Date Value Ref Range Status   01/20/2021 >60.0 >60 mL/min/1.73 m^2 Final     Comment:     Calculation used to obtain the estimated glomerular filtration  rate (eGFR) is the CKD-EPI equation.        BNP  @LABRCNTIP(BNP,BNPTRIAGEBLO)@  @LABRCNTIP(troponini)@  CrCl cannot be calculated (Patient's most recent lab result is older than the maximum 7 days allowed.).  No results found in the last 24 hours.  No results found in the last 24 hours.  No results found in the last 24 hours.    Assessment:      1. Primary hypertension    2. Dyslipidemia    3. Stenosis of left carotid artery    4. History of lacunar cerebrovascular accident    5. BMI 33.0-33.9,adult      LDL 8 and     Plan:   Continue crestor and ASA    Counseled DASH  Check Lipid profile with PCP in 6 months  Recommend heart-healthy diet, weight control and regular exercise.  Lety. Risk modification.   I have reviewed all pertinent labs and cardiac studies independently. Plans and recommendations have been formulated under my direct supervision. All questions answered and patient voiced understanding.   If symptoms persist go to the ED  RTC in 6 months

## 2023-03-13 ENCOUNTER — TELEPHONE (OUTPATIENT)
Dept: NEUROLOGY | Facility: CLINIC | Age: 83
End: 2023-03-13
Payer: COMMERCIAL

## 2023-03-13 NOTE — TELEPHONE ENCOUNTER
Good afternoon ,     Patient wife would like to get the clearance for him to drive. He had the driving evaluation done with [place] with [facility]. Its in his chart dated as Driving Evaluation for 03/07/2023. There is also a Eye clinic review in his media.

## 2023-03-13 NOTE — TELEPHONE ENCOUNTER
Advise pt wife of  recommendation on driving evaluation. As long as he wears his glasee he is okay to drive. She did verbalized understanding.

## 2023-03-13 NOTE — TELEPHONE ENCOUNTER
----- Message from Mirta Kuhn sent at 3/13/2023  1:43 PM CDT -----  Contact: Mavis/wife  Mavis needs a call back at [phone], Regards to the status of the paperwork she left with the office regards to him driving again.    Thanks  Td

## 2023-03-14 ENCOUNTER — PATIENT MESSAGE (OUTPATIENT)
Dept: NEUROLOGY | Facility: CLINIC | Age: 83
End: 2023-03-14
Payer: COMMERCIAL

## 2023-03-22 NOTE — TRANSFER OF CARE
"Anesthesia Transfer of Care Note    Patient: Jovan Del Cid Jr.    Procedure(s) Performed: Procedure(s) (LRB):  COLONOSCOPY (N/A)    Patient location: PACU    Anesthesia Type: MAC    Transport from OR: Transported from OR on room air with adequate spontaneous ventilation    Post pain: adequate analgesia    Post assessment: tolerated procedure well and no apparent anesthetic complications    Post vital signs: stable    Level of consciousness: awake    Nausea/Vomiting: no nausea/vomiting    Complications: none    Transfer of care protocol was followed      Last vitals:   Visit Vitals  /80 (BP Location: Left arm, Patient Position: Lying)   Pulse 69   Temp 36.9 °C (98.4 °F) (Oral)   Resp 18   Ht 5' 7" (1.702 m)   Wt 96.6 kg (213 lb)   SpO2 95%   BMI 33.36 kg/m²     " no

## 2023-03-29 RX ORDER — GLIPIZIDE 2.5 MG/1
2.5 TABLET, EXTENDED RELEASE ORAL
Qty: 90 TABLET | Refills: 1 | OUTPATIENT
Start: 2023-03-29 | End: 2024-03-28

## 2023-03-31 ENCOUNTER — TELEPHONE (OUTPATIENT)
Dept: FAMILY MEDICINE | Facility: CLINIC | Age: 83
End: 2023-03-31

## 2023-03-31 RX ORDER — GLIPIZIDE 2.5 MG/1
2.5 TABLET, EXTENDED RELEASE ORAL
Qty: 90 TABLET | Refills: 1 | Status: SHIPPED | OUTPATIENT
Start: 2023-03-31 | End: 2023-10-04

## 2023-03-31 NOTE — TELEPHONE ENCOUNTER
----- Message from Lacey Dc sent at 3/31/2023 11:27 AM CDT -----  Contact: Wife(Mavis)-[phone]  Patients wife is requesting a call back regarding why a medication cannot be refilled until he see's the docotor. Please call her back at [phone]. Thanks

## 2023-03-31 NOTE — TELEPHONE ENCOUNTER
Called and spoke with wife. They are requesting a month of the glipizide 2.5 mg until he sees on you oin 4/24.

## 2023-04-24 ENCOUNTER — LAB VISIT (OUTPATIENT)
Dept: LAB | Facility: HOSPITAL | Age: 83
End: 2023-04-24
Attending: INTERNAL MEDICINE
Payer: COMMERCIAL

## 2023-04-24 ENCOUNTER — OFFICE VISIT (OUTPATIENT)
Dept: FAMILY MEDICINE | Facility: CLINIC | Age: 83
End: 2023-04-24
Payer: COMMERCIAL

## 2023-04-24 VITALS
RESPIRATION RATE: 18 BRPM | DIASTOLIC BLOOD PRESSURE: 66 MMHG | WEIGHT: 216.25 LBS | HEIGHT: 66 IN | BODY MASS INDEX: 34.75 KG/M2 | OXYGEN SATURATION: 97 % | HEART RATE: 67 BPM | SYSTOLIC BLOOD PRESSURE: 110 MMHG | TEMPERATURE: 98 F

## 2023-04-24 DIAGNOSIS — E11.9 TYPE 2 DIABETES MELLITUS WITHOUT COMPLICATION, WITHOUT LONG-TERM CURRENT USE OF INSULIN: ICD-10-CM

## 2023-04-24 DIAGNOSIS — I10 PRIMARY HYPERTENSION: ICD-10-CM

## 2023-04-24 DIAGNOSIS — E78.5 DYSLIPIDEMIA: Primary | ICD-10-CM

## 2023-04-24 DIAGNOSIS — Z78.9 INTOLERANCE OF CONTINUOUS POSITIVE AIRWAY PRESSURE (CPAP) VENTILATION: ICD-10-CM

## 2023-04-24 DIAGNOSIS — E11.69 TYPE 2 DIABETES MELLITUS WITH OTHER SPECIFIED COMPLICATION, WITH LONG-TERM CURRENT USE OF INSULIN: ICD-10-CM

## 2023-04-24 DIAGNOSIS — Z86.73 HISTORY OF LACUNAR CEREBROVASCULAR ACCIDENT: ICD-10-CM

## 2023-04-24 DIAGNOSIS — Z79.4 TYPE 2 DIABETES MELLITUS WITH OTHER SPECIFIED COMPLICATION, WITH LONG-TERM CURRENT USE OF INSULIN: ICD-10-CM

## 2023-04-24 LAB
ESTIMATED AVG GLUCOSE: 111 MG/DL (ref 68–131)
HBA1C MFR BLD: 5.5 % (ref 4–5.6)

## 2023-04-24 PROCEDURE — 83036 HEMOGLOBIN GLYCOSYLATED A1C: CPT | Performed by: INTERNAL MEDICINE

## 2023-04-24 PROCEDURE — 90750 ZOSTER RECOMBINANT VACCINE: ICD-10-PCS | Mod: S$GLB,,, | Performed by: INTERNAL MEDICINE

## 2023-04-24 PROCEDURE — 1101F PR PT FALLS ASSESS DOC 0-1 FALLS W/OUT INJ PAST YR: ICD-10-PCS | Mod: CPTII,S$GLB,, | Performed by: INTERNAL MEDICINE

## 2023-04-24 PROCEDURE — 3074F PR MOST RECENT SYSTOLIC BLOOD PRESSURE < 130 MM HG: ICD-10-PCS | Mod: CPTII,S$GLB,, | Performed by: INTERNAL MEDICINE

## 2023-04-24 PROCEDURE — 3072F LOW RISK FOR RETINOPATHY: CPT | Mod: CPTII,S$GLB,, | Performed by: INTERNAL MEDICINE

## 2023-04-24 PROCEDURE — 90750 HZV VACC RECOMBINANT IM: CPT | Mod: S$GLB,,, | Performed by: INTERNAL MEDICINE

## 2023-04-24 PROCEDURE — 3074F SYST BP LT 130 MM HG: CPT | Mod: CPTII,S$GLB,, | Performed by: INTERNAL MEDICINE

## 2023-04-24 PROCEDURE — 3288F FALL RISK ASSESSMENT DOCD: CPT | Mod: CPTII,S$GLB,, | Performed by: INTERNAL MEDICINE

## 2023-04-24 PROCEDURE — 99214 PR OFFICE/OUTPT VISIT, EST, LEVL IV, 30-39 MIN: ICD-10-PCS | Mod: 25,S$GLB,, | Performed by: INTERNAL MEDICINE

## 2023-04-24 PROCEDURE — 99999 PR PBB SHADOW E&M-EST. PATIENT-LVL V: ICD-10-PCS | Mod: PBBFAC,,, | Performed by: INTERNAL MEDICINE

## 2023-04-24 PROCEDURE — 36415 COLL VENOUS BLD VENIPUNCTURE: CPT | Mod: PO | Performed by: INTERNAL MEDICINE

## 2023-04-24 PROCEDURE — 3288F PR FALLS RISK ASSESSMENT DOCUMENTED: ICD-10-PCS | Mod: CPTII,S$GLB,, | Performed by: INTERNAL MEDICINE

## 2023-04-24 PROCEDURE — 90471 IMMUNIZATION ADMIN: CPT | Mod: S$GLB,,, | Performed by: INTERNAL MEDICINE

## 2023-04-24 PROCEDURE — 3078F DIAST BP <80 MM HG: CPT | Mod: CPTII,S$GLB,, | Performed by: INTERNAL MEDICINE

## 2023-04-24 PROCEDURE — 99999 PR PBB SHADOW E&M-EST. PATIENT-LVL V: CPT | Mod: PBBFAC,,, | Performed by: INTERNAL MEDICINE

## 2023-04-24 PROCEDURE — 1159F PR MEDICATION LIST DOCUMENTED IN MEDICAL RECORD: ICD-10-PCS | Mod: CPTII,S$GLB,, | Performed by: INTERNAL MEDICINE

## 2023-04-24 PROCEDURE — 3078F PR MOST RECENT DIASTOLIC BLOOD PRESSURE < 80 MM HG: ICD-10-PCS | Mod: CPTII,S$GLB,, | Performed by: INTERNAL MEDICINE

## 2023-04-24 PROCEDURE — 90471 ZOSTER RECOMBINANT VACCINE: ICD-10-PCS | Mod: S$GLB,,, | Performed by: INTERNAL MEDICINE

## 2023-04-24 PROCEDURE — 3072F PR LOW RISK FOR RETINOPATHY: ICD-10-PCS | Mod: CPTII,S$GLB,, | Performed by: INTERNAL MEDICINE

## 2023-04-24 PROCEDURE — 1159F MED LIST DOCD IN RCRD: CPT | Mod: CPTII,S$GLB,, | Performed by: INTERNAL MEDICINE

## 2023-04-24 PROCEDURE — 99214 OFFICE O/P EST MOD 30 MIN: CPT | Mod: 25,S$GLB,, | Performed by: INTERNAL MEDICINE

## 2023-04-24 PROCEDURE — 1101F PT FALLS ASSESS-DOCD LE1/YR: CPT | Mod: CPTII,S$GLB,, | Performed by: INTERNAL MEDICINE

## 2023-04-24 NOTE — PROGRESS NOTES
Subjective:       Patient ID: Jovan Del Cid Jr. is a 82 y.o. male.    Chief Complaint: Follow-up, Hypertension, Hyperlipidemia, Diabetes, and Cerebrovascular Accident    Follow-up  Associated symptoms include arthralgias, myalgias and numbness. Pertinent negatives include no abdominal pain, chest pain, chills, coughing, diaphoresis, fatigue, fever, headaches, joint swelling, nausea, neck pain, rash, sore throat, vomiting or weakness.   Hypertension  Pertinent negatives include no chest pain, headaches, neck pain, palpitations or shortness of breath.   Hyperlipidemia  Associated symptoms include myalgias. Pertinent negatives include no chest pain or shortness of breath.   Diabetes  Pertinent negatives for hypoglycemia include no confusion, dizziness, headaches, nervousness/anxiousness, pallor, seizures, speech difficulty or tremors. Pertinent negatives for diabetes include no chest pain, no fatigue, no polydipsia, no polyphagia, no polyuria and no weakness.   Cerebrovascular Accident  Associated symptoms include arthralgias, myalgias and numbness. Pertinent negatives include no abdominal pain, chest pain, chills, coughing, diaphoresis, fatigue, fever, headaches, joint swelling, nausea, neck pain, rash, sore throat, vomiting or weakness.   Past Medical History:   Diagnosis Date    Acute CVA (cerebrovascular accident) 09/08/2022    DANY (acute kidney injury) 09/14/2022    Asthma     Bronchitis chronic     Cancer     Cough     Digestive disorder     Hyperlipidemia     Hypertension     Liver disease     KALYAN (obstructive sleep apnea) 09/08/2022    Stroke 09/01/2022    Type 2 diabetes mellitus, without long-term current use of insulin 09/07/2022    BS didn't check 10/05/2022     Past Surgical History:   Procedure Laterality Date    COLONOSCOPY N/A 6/21/2016    Procedure: COLONOSCOPY;  Surgeon: Alonso Dorsey MD;  Location: [facility];  Service: Endoscopy;  Laterality: N/A;    COLONOSCOPY N/A 11/1/2016    Procedure:  COLONOSCOPY;  Surgeon: Alonso Dorsey MD;  Location: [facility] ENDO;  Service: Endoscopy;  Laterality: N/A;    COLONOSCOPY N/A 2/3/2017    Procedure: COLONOSCOPY;  Surgeon: Alonso Dorsey MD;  Location: [facility] ENDO;  Service: Endoscopy;  Laterality: N/A;    COLONOSCOPY N/A 2017    Procedure: COLONOSCOPY;  Surgeon: Alonso Dorsey MD;  Location: [facility] ENDO;  Service: Endoscopy;  Laterality: N/A;    COLONOSCOPY N/A 2021    Procedure: COLONOSCOPY;  Surgeon: Paula Ricardo MD;  Location: [facility] ENDO;  Service: Endoscopy;  Laterality: N/A;    FLUOROSCOPY N/A 6/15/2018    Procedure: Transjugular liver bx;  Surgeon: Petros Recio MD;  Location: [facility] CATH LAB;  Service: General;  Laterality: N/A;    TONSILLECTOMY       Family History   Problem Relation Age of Onset    Cancer Father     Alzheimer's disease Mother     Colon cancer Neg Hx     Melanoma Neg Hx      Social History     Socioeconomic History    Marital status:    Tobacco Use    Smoking status: Former     Years: 3.00     Types: Cigarettes, Pipe     Quit date:      Years since quittin.3    Smokeless tobacco: Never    Tobacco comments:     smoked a pipe - quit smoking    Substance and Sexual Activity    Alcohol use: Not Currently     Alcohol/week: 2.0 - 3.0 standard drinks     Types: 2 - 3 Cans of beer per week     Comment: daily    Drug use: No    Sexual activity: Never     Social Determinants of Health     Financial Resource Strain: Low Risk     Difficulty of Paying Living Expenses: Not very hard   Food Insecurity: No Food Insecurity    Worried About Running Out of Food in the Last Year: Never true    Ran Out of Food in the Last Year: Never true   Transportation Needs: No Transportation Needs    Lack of Transportation (Medical): No    Lack of Transportation (Non-Medical): No   Physical Activity: Inactive    Days of Exercise per Week: 0 days    Minutes of Exercise per Session: 0 min   Stress: No Stress Concern Present     Feeling of Stress : Not at all   Social Connections: Socially Integrated    Frequency of Communication with Friends and Family: More than three times a week    Frequency of Social Gatherings with Friends and Family: More than three times a week    Attends Oriental orthodox Services: More than 4 times per year    Active Member of Clubs or Organizations: Yes    Attends Club or Organization Meetings: More than 4 times per year    Marital Status:    Housing Stability: Low Risk     Unable to Pay for Housing in the Last Year: No    Number of Places Lived in the Last Year: 1    Unstable Housing in the Last Year: No     Review of Systems   Constitutional:  Negative for activity change, appetite change, chills, diaphoresis, fatigue, fever and unexpected weight change.   HENT:  Negative for drooling, ear discharge, ear pain, facial swelling, hearing loss, mouth sores, nosebleeds, postnasal drip, rhinorrhea, sinus pressure, sneezing, sore throat, tinnitus, trouble swallowing and voice change.    Eyes:  Negative for photophobia, redness and visual disturbance.   Respiratory:  Negative for apnea, cough, choking, chest tightness, shortness of breath and wheezing.    Cardiovascular:  Negative for chest pain, palpitations and leg swelling.   Gastrointestinal:  Negative for abdominal distention, abdominal pain, anal bleeding, blood in stool, constipation, diarrhea, nausea and vomiting.   Endocrine: Negative for cold intolerance, heat intolerance, polydipsia, polyphagia and polyuria.   Genitourinary:  Negative for difficulty urinating, dysuria, enuresis, flank pain, frequency, genital sores, hematuria and urgency.   Musculoskeletal:  Positive for arthralgias and myalgias. Negative for back pain, gait problem, joint swelling, neck pain and neck stiffness.   Skin:  Negative for color change, pallor, rash and wound.   Allergic/Immunologic: Negative for food allergies and immunocompromised state.   Neurological:  Positive for numbness.  Negative for dizziness, tremors, seizures, syncope, facial asymmetry, speech difficulty, weakness, light-headedness and headaches.   Hematological:  Negative for adenopathy. Does not bruise/bleed easily.   Psychiatric/Behavioral:  Negative for agitation, behavioral problems, confusion, decreased concentration, dysphoric mood, hallucinations, self-injury, sleep disturbance and suicidal ideas. The patient is not nervous/anxious and is not hyperactive.      Objective:      Physical Exam  Vitals and nursing note reviewed.   Constitutional:       General: He is not in acute distress.     Appearance: Normal appearance. He is well-developed. He is not diaphoretic.   HENT:      Head: Normocephalic and atraumatic.      Mouth/Throat:      Pharynx: No oropharyngeal exudate.   Eyes:      General: No scleral icterus.     Pupils: Pupils are equal, round, and reactive to light.   Neck:      Thyroid: No thyromegaly.      Vascular: No carotid bruit or JVD.      Trachea: No tracheal deviation.   Cardiovascular:      Rate and Rhythm: Normal rate and regular rhythm.      Heart sounds: Normal heart sounds.   Pulmonary:      Effort: Pulmonary effort is normal. No respiratory distress.      Breath sounds: Normal breath sounds. No wheezing or rales.   Chest:      Chest wall: No tenderness.   Abdominal:      General: Bowel sounds are normal. There is no distension.      Palpations: Abdomen is soft.      Tenderness: There is no abdominal tenderness. There is no guarding or rebound.   Musculoskeletal:         General: No tenderness. Normal range of motion.      Cervical back: Normal range of motion and neck supple.   Lymphadenopathy:      Cervical: No cervical adenopathy.   Skin:     General: Skin is warm and dry.      Coloration: Skin is not pale.      Findings: No erythema or rash.   Neurological:      Mental Status: He is alert and oriented to person, place, and time.   Psychiatric:         Behavior: Behavior normal.         Thought  Content: Thought content normal.         Judgment: Judgment normal.       CMP  Sodium   Date Value Ref Range Status   12/22/2022 139 136 - 145 mmol/L Final     Potassium   Date Value Ref Range Status   12/22/2022 4.7 3.5 - 5.1 mmol/L Final     Chloride   Date Value Ref Range Status   12/22/2022 106 95 - 110 mmol/L Final     CO2   Date Value Ref Range Status   12/22/2022 23 23 - 29 mmol/L Final     Glucose   Date Value Ref Range Status   12/22/2022 89 70 - 110 mg/dL Final     BUN   Date Value Ref Range Status   12/22/2022 13 8 - 23 mg/dL Final     Creatinine   Date Value Ref Range Status   12/22/2022 1.0 0.5 - 1.4 mg/dL Final     Calcium   Date Value Ref Range Status   12/22/2022 9.9 8.7 - 10.5 mg/dL Final     Total Protein   Date Value Ref Range Status   12/22/2022 6.8 6.0 - 8.4 g/dL Final     Albumin   Date Value Ref Range Status   12/22/2022 4.1 3.5 - 5.2 g/dL Final     Total Bilirubin   Date Value Ref Range Status   12/22/2022 1.7 (H) 0.1 - 1.0 mg/dL Final     Comment:     For infants and newborns, interpretation of results should be based  on gestational age, weight and in agreement with clinical  observations.    Premature Infant recommended reference ranges:  Up to 24 hours.............<8.0 mg/dL  Up to 48 hours............<12.0 mg/dL  3-5 days..................<15.0 mg/dL  6-29 days.................<15.0 mg/dL       Alkaline Phosphatase   Date Value Ref Range Status   12/22/2022 56 55 - 135 U/L Final     AST   Date Value Ref Range Status   12/22/2022 26 10 - 40 U/L Final     ALT   Date Value Ref Range Status   12/22/2022 28 10 - 44 U/L Final     Anion Gap   Date Value Ref Range Status   12/22/2022 10 8 - 16 mmol/L Final     eGFR if    Date Value Ref Range Status   01/20/2021 >60.0 >60 mL/min/1.73 m^2 Final     eGFR if non    Date Value Ref Range Status   01/20/2021 >60.0 >60 mL/min/1.73 m^2 Final     Comment:     Calculation used to obtain the estimated glomerular  filtration  rate (eGFR) is the CKD-EPI equation.        Lab Results   Component Value Date    WBC 6.33 01/24/2023    HGB 13.3 (L) 01/24/2023    HCT 41.8 01/24/2023    MCV 95 01/24/2023     01/24/2023     Lab Results   Component Value Date    CHOL 113 (L) 01/24/2023     Lab Results   Component Value Date    HDL 40 01/24/2023     Lab Results   Component Value Date    LDLCALC 8.6 (L) 01/24/2023     Lab Results   Component Value Date    TRIG 322 (H) 01/24/2023     Lab Results   Component Value Date    CHOLHDL 35.4 01/24/2023     Lab Results   Component Value Date    TSH 1.876 09/02/2022     Lab Results   Component Value Date    HGBA1C 7.8 (H) 09/13/2022     Assessment:       1. Dyslipidemia    2. History of lacunar cerebrovascular accident    3. Primary hypertension    4. Intolerance of continuous positive airway pressure (CPAP) ventilation    5. Type 2 diabetes mellitus with other specified complication, with long-term current use of insulin    6. Type 2 diabetes mellitus without complication, without long-term current use of insulin        Plan:   Dyslipidemia    History of lacunar cerebrovascular accident    Primary hypertension    Intolerance of continuous positive airway pressure (CPAP) ventilation    Type 2 diabetes mellitus with other specified complication, with long-term current use of insulin    Type 2 diabetes mellitus without complication, without long-term current use of insulin  -     Hemoglobin A1C; Future; Expected date: 04/24/2023    Other orders  -     (In Office Administered) Zoster Recombinant Vaccine    Stable -----continue meds---------as above------f/u 4 months-------------

## 2023-04-25 ENCOUNTER — PATIENT MESSAGE (OUTPATIENT)
Dept: INTERNAL MEDICINE | Facility: CLINIC | Age: 83
End: 2023-04-25
Payer: COMMERCIAL

## 2023-05-14 ENCOUNTER — PATIENT MESSAGE (OUTPATIENT)
Dept: INTERNAL MEDICINE | Facility: CLINIC | Age: 83
End: 2023-05-14
Payer: COMMERCIAL

## 2023-06-15 ENCOUNTER — HOSPITAL ENCOUNTER (OUTPATIENT)
Dept: RADIOLOGY | Facility: HOSPITAL | Age: 83
Discharge: HOME | End: 2023-06-15
Attending: INTERNAL MEDICINE
Payer: COMMERCIAL

## 2023-06-15 DIAGNOSIS — K70.10 STEATOHEPATITIS, ALCOHOLIC: ICD-10-CM

## 2023-06-15 PROCEDURE — 76705 ECHO EXAM OF ABDOMEN: CPT | Mod: 26,,, | Performed by: RADIOLOGY

## 2023-06-15 PROCEDURE — 76705 US ABDOMEN LIMITED: ICD-10-PCS | Mod: 26,,, | Performed by: RADIOLOGY

## 2023-06-15 PROCEDURE — 76705 ECHO EXAM OF ABDOMEN: CPT | Mod: TC

## 2023-06-27 ENCOUNTER — OFFICE VISIT (OUTPATIENT)
Dept: PODIATRY | Facility: CLINIC | Age: 83
End: 2023-06-27
Payer: COMMERCIAL

## 2023-06-27 VITALS — WEIGHT: 216.25 LBS | HEIGHT: 66 IN | BODY MASS INDEX: 34.75 KG/M2

## 2023-06-27 DIAGNOSIS — E11.9 ENCOUNTER FOR COMPREHENSIVE DIABETIC FOOT EXAMINATION, TYPE 2 DIABETES MELLITUS: Primary | ICD-10-CM

## 2023-06-27 DIAGNOSIS — E11.49 TYPE II DIABETES MELLITUS WITH NEUROLOGICAL MANIFESTATIONS: ICD-10-CM

## 2023-06-27 DIAGNOSIS — B35.1 DERMATOPHYTOSIS OF NAIL: ICD-10-CM

## 2023-06-27 PROCEDURE — 1159F MED LIST DOCD IN RCRD: CPT | Mod: CPTII,S$GLB,, | Performed by: PODIATRIST

## 2023-06-27 PROCEDURE — 1101F PT FALLS ASSESS-DOCD LE1/YR: CPT | Mod: CPTII,S$GLB,, | Performed by: PODIATRIST

## 2023-06-27 PROCEDURE — 1159F PR MEDICATION LIST DOCUMENTED IN MEDICAL RECORD: ICD-10-PCS | Mod: CPTII,S$GLB,, | Performed by: PODIATRIST

## 2023-06-27 PROCEDURE — 3072F PR LOW RISK FOR RETINOPATHY: ICD-10-PCS | Mod: CPTII,S$GLB,, | Performed by: PODIATRIST

## 2023-06-27 PROCEDURE — 3288F FALL RISK ASSESSMENT DOCD: CPT | Mod: CPTII,S$GLB,, | Performed by: PODIATRIST

## 2023-06-27 PROCEDURE — 99213 OFFICE O/P EST LOW 20 MIN: CPT | Mod: 25,S$GLB,, | Performed by: PODIATRIST

## 2023-06-27 PROCEDURE — 1160F PR REVIEW ALL MEDS BY PRESCRIBER/CLIN PHARMACIST DOCUMENTED: ICD-10-PCS | Mod: CPTII,S$GLB,, | Performed by: PODIATRIST

## 2023-06-27 PROCEDURE — 1126F PR PAIN SEVERITY QUANTIFIED, NO PAIN PRESENT: ICD-10-PCS | Mod: CPTII,S$GLB,, | Performed by: PODIATRIST

## 2023-06-27 PROCEDURE — 3072F LOW RISK FOR RETINOPATHY: CPT | Mod: CPTII,S$GLB,, | Performed by: PODIATRIST

## 2023-06-27 PROCEDURE — 1160F RVW MEDS BY RX/DR IN RCRD: CPT | Mod: CPTII,S$GLB,, | Performed by: PODIATRIST

## 2023-06-27 PROCEDURE — 99999 PR PBB SHADOW E&M-EST. PATIENT-LVL IV: ICD-10-PCS | Mod: PBBFAC,,, | Performed by: PODIATRIST

## 2023-06-27 PROCEDURE — 1126F AMNT PAIN NOTED NONE PRSNT: CPT | Mod: CPTII,S$GLB,, | Performed by: PODIATRIST

## 2023-06-27 PROCEDURE — 3288F PR FALLS RISK ASSESSMENT DOCUMENTED: ICD-10-PCS | Mod: CPTII,S$GLB,, | Performed by: PODIATRIST

## 2023-06-27 PROCEDURE — 99213 PR OFFICE/OUTPT VISIT, EST, LEVL III, 20-29 MIN: ICD-10-PCS | Mod: 25,S$GLB,, | Performed by: PODIATRIST

## 2023-06-27 PROCEDURE — 1101F PR PT FALLS ASSESS DOC 0-1 FALLS W/OUT INJ PAST YR: ICD-10-PCS | Mod: CPTII,S$GLB,, | Performed by: PODIATRIST

## 2023-06-27 PROCEDURE — 99999 PR PBB SHADOW E&M-EST. PATIENT-LVL IV: CPT | Mod: PBBFAC,,, | Performed by: PODIATRIST

## 2023-06-27 NOTE — PROGRESS NOTES
Subjective:     Patient ID: Jovan Del Cid Jr. is a 82 y.o. male.    Chief Complaint: Nail Care (Diabetic pt wears tennis shoes, PCP Dr. Neal last seen 4-24-23)    Jovan is a 82 y.o. male who presents to the clinic for evaluation and treatment of high risk feet. Jovan has a past medical history of Acute CVA (cerebrovascular accident) (09/08/2022), DANY (acute kidney injury) (09/14/2022), Asthma, Bronchitis chronic, Cancer, Cough, Digestive disorder, Hyperlipidemia, Hypertension, Liver disease, KALYAN (obstructive sleep apnea) (09/08/2022), Stroke (09/01/2022), and Type 2 diabetes mellitus, without long-term current use of insulin (09/07/2022). The patient's chief complaint is long, thick toenails. This patient has documented high risk feet requiring routine maintenance secondary to diabetes mellitis and those secondary complications of diabetes, as mentioned.     PCP: Paul Neal MD    Date Last Seen by PCP: 04/24/23    Current shoe gear:  Affected Foot: Tennis shoes     Unaffected Foot: Tennis shoes    Hemoglobin A1C   Date Value Ref Range Status   04/24/2023 5.5 4.0 - 5.6 % Final     Comment:     ADA Screening Guidelines:  5.7-6.4%  Consistent with prediabetes  >or=6.5%  Consistent with diabetes    High levels of fetal hemoglobin interfere with the HbA1C  assay. Heterozygous hemoglobin variants (HbS, HgC, etc)do  not significantly interfere with this assay.   However, presence of multiple variants may affect accuracy.     09/13/2022 7.8 (H) 4.0 - 5.6 % Final     Comment:     ADA Screening Guidelines:  5.7-6.4%  Consistent with prediabetes  >or=6.5%  Consistent with diabetes    High levels of fetal hemoglobin interfere with the HbA1C  assay. Heterozygous hemoglobin variants (HbS, HgC, etc)do  not significantly interfere with this assay.   However, presence of multiple variants may affect accuracy.     09/02/2022 8.3 (H) 4.0 - 5.6 % Final     Comment:     ADA Screening Guidelines:  5.7-6.4%   Consistent with prediabetes  >or=6.5%  Consistent with diabetes    High levels of fetal hemoglobin interfere with the HbA1C  assay. Heterozygous hemoglobin variants (HbS, HgC, etc)do  not significantly interfere with this assay.   However, presence of multiple variants may affect accuracy.         Patient Active Problem List   Diagnosis    Asthma    Chronic cough    Intolerance of continuous positive airway pressure (CPAP) ventilation    Liver cyst    HTN (hypertension)    Dyslipidemia    Benign prostatic hyperplasia    Lipoma    Thrombocytopenia    Pelvic mass in male    Amyloid disease    Chronic maxillary sinusitis    Tubulovillous adenoma of colon    Adenomatous colon polyp    MCMAHAN (nonalcoholic steatohepatitis)    Mild intermittent asthma without complication    BMI 33.0-33.9,adult    Personal history of colonic polyps    T2DM (type 2 diabetes mellitus)    History of lacunar cerebrovascular accident    Type 2 diabetes mellitus, without long-term current use of insulin    Obstructive sleep apnea    Metabolic acidosis    Stenosis of left carotid artery    LFT elevation       Medication List with Changes/Refills   Current Medications    ALBUTEROL (VENTOLIN HFA) 90 MCG/ACTUATION INHALER    Inhale 2 puffs into the lungs every 6 (six) hours as needed for Wheezing. Rescue    ASPIRIN (ECOTRIN) 81 MG EC TABLET    Take 1 tablet (81 mg total) by mouth once daily.    BLOOD SUGAR DIAGNOSTIC STRP    1 strip by Misc.(Non-Drug; Combo Route) route 2 (two) times daily with meals.    BLOOD-GLUCOSE METER KIT    Use as instructed    FLUTICASONE PROPION-SALMETEROL 115-21 MCG/DOSE (ADVAIR HFA) 115-21 MCG/ACTUATION HFAA INHALER    Inhale 2 puffs into the lungs every 12 (twelve) hours. Controller    FLUTICASONE PROPIONATE (FLONASE) 50 MCG/ACTUATION NASAL SPRAY    SHAKE LIQUID AND USE 2 SPRAYS IN EACH NOSTRIL EVERY DAY    GLIPIZIDE (GLUCOTROL) 2.5 MG TR24    Take 1 tablet (2.5 mg total) by mouth daily with breakfast.    LANCETS MISC     "1 each by Misc.(Non-Drug; Combo Route) route 2 (two) times daily with meals.    LANCING DEVICE MISC    1 Device by Misc.(Non-Drug; Combo Route) route 2 (two) times daily with meals.    MULTIVITAMIN (THERAGRAN) PER TABLET    Take 1 tablet by mouth once daily.    PANTOPRAZOLE (PROTONIX) 40 MG TABLET    Take 1 tablet (40 mg total) by mouth daily as needed.    PEN NEEDLE, DIABETIC 31 GAUGE X 5/16" NDLE    1 each by Misc.(Non-Drug; Combo Route) route 2 (two) times daily with meals.    ROSUVASTATIN (CRESTOR) 40 MG TAB    Take 1 tablet (40 mg total) by mouth once daily.       Review of patient's allergies indicates:  No Known Allergies    Past Surgical History:   Procedure Laterality Date    COLONOSCOPY N/A 6/21/2016    Procedure: COLONOSCOPY;  Surgeon: Alonso Dorsey MD;  Location: [facility];  Service: Endoscopy;  Laterality: N/A;    COLONOSCOPY N/A 11/1/2016    Procedure: COLONOSCOPY;  Surgeon: Alonso Dorsey MD;  Location: [facility];  Service: Endoscopy;  Laterality: N/A;    COLONOSCOPY N/A 2/3/2017    Procedure: COLONOSCOPY;  Surgeon: Alonso Dorsey MD;  Location: [facility];  Service: Endoscopy;  Laterality: N/A;    COLONOSCOPY N/A 11/13/2017    Procedure: COLONOSCOPY;  Surgeon: Alonso Dorsey MD;  Location: [facility];  Service: Endoscopy;  Laterality: N/A;    COLONOSCOPY N/A 2/19/2021    Procedure: COLONOSCOPY;  Surgeon: aPula Ricardo MD;  Location: [facility];  Service: Endoscopy;  Laterality: N/A;    FLUOROSCOPY N/A 6/15/2018    Procedure: Transjugular liver bx;  Surgeon: Petros Recio MD;  Location: [facility] CATH LAB;  Service: General;  Laterality: N/A;    TONSILLECTOMY         Family History   Problem Relation Age of Onset    Cancer Father     Alzheimer's disease Mother     Colon cancer Neg Hx     Melanoma Neg Hx        Social History     Socioeconomic History    Marital status:    Tobacco Use    Smoking status: Former     Years: 3.00     Types: Cigarettes, Pipe     Quit date: 1960 " "    Years since quittin.5    Smokeless tobacco: Never    Tobacco comments:     smoked a pipe - quit smoking 1960   Substance and Sexual Activity    Alcohol use: Not Currently     Alcohol/week: 2.0 - 3.0 standard drinks     Types: 2 - 3 Cans of beer per week     Comment: daily    Drug use: No    Sexual activity: Never     Social Determinants of Health     Financial Resource Strain: Low Risk     Difficulty of Paying Living Expenses: Not very hard   Food Insecurity: No Food Insecurity    Worried About Running Out of Food in the Last Year: Never true    Ran Out of Food in the Last Year: Never true   Transportation Needs: No Transportation Needs    Lack of Transportation (Medical): No    Lack of Transportation (Non-Medical): No   Physical Activity: Inactive    Days of Exercise per Week: 0 days    Minutes of Exercise per Session: 0 min   Stress: No Stress Concern Present    Feeling of Stress : Not at all   Social Connections: Socially Integrated    Frequency of Communication with Friends and Family: More than three times a week    Frequency of Social Gatherings with Friends and Family: More than three times a week    Attends Ep[employer] Services: More than 4 times per year    Active Member of Clubs or Organizations: Yes    Attends Club or Organization Meetings: More than 4 times per year    Marital Status:    Housing Stability: Low Risk     Unable to Pay for Housing in the Last Year: No    Number of Places Lived in the Last Year: 1    Unstable Housing in the Last Year: No       Vitals:    23 1422   Weight: 98.1 kg (216 lb 4.3 oz)   Height: 5' 6" (1.676 m)   PainSc: 0-No pain       Hemoglobin A1C   Date Value Ref Range Status   2023 5.5 4.0 - 5.6 % Final     Comment:     ADA Screening Guidelines:  5.7-6.4%  Consistent with prediabetes  >or=6.5%  Consistent with diabetes    High levels of fetal hemoglobin interfere with the HbA1C  assay. Heterozygous hemoglobin variants (HbS, HgC, etc)do  not " significantly interfere with this assay.   However, presence of multiple variants may affect accuracy.     09/13/2022 7.8 (H) 4.0 - 5.6 % Final     Comment:     ADA Screening Guidelines:  5.7-6.4%  Consistent with prediabetes  >or=6.5%  Consistent with diabetes    High levels of fetal hemoglobin interfere with the HbA1C  assay. Heterozygous hemoglobin variants (HbS, HgC, etc)do  not significantly interfere with this assay.   However, presence of multiple variants may affect accuracy.     09/02/2022 8.3 (H) 4.0 - 5.6 % Final     Comment:     ADA Screening Guidelines:  5.7-6.4%  Consistent with prediabetes  >or=6.5%  Consistent with diabetes    High levels of fetal hemoglobin interfere with the HbA1C  assay. Heterozygous hemoglobin variants (HbS, HgC, etc)do  not significantly interfere with this assay.   However, presence of multiple variants may affect accuracy.         Review of Systems   Constitutional:  Negative for chills and fever.   Respiratory:  Negative for shortness of breath.    Cardiovascular:  Negative for chest pain, palpitations, orthopnea, claudication and leg swelling.   Gastrointestinal:  Negative for diarrhea, nausea and vomiting.   Musculoskeletal:  Negative for joint pain.   Skin:  Negative for rash.   Neurological:  Negative for dizziness, tingling, sensory change, focal weakness and weakness.   Psychiatric/Behavioral: Negative.             Objective:      PHYSICAL EXAM: Apperance: Alert and orient in no distress,well developed, and with good attention to grooming and body habits  Patient presents ambulating in tennis shoes.   LOWER EXTREMITY EXAM:  VASCULAR: Dorsalis pedis pulses 2/4 bilateral and Posterior Tibial pulses 1/4 bilateral. Capillary fill time <4 seconds bilateral. No edema observed bilateral. Varicosities absent bilateral. Skin temperature of the lower extremities is warm to cool, proximal to distal. Hair growth dim bilateral.  DERMATOLOGICAL: No skin rashes, subcutaneous nodules,  lesions, or ulcers observed bilateral. Nails 1,2,3,4,5 right and 2,3,4,5 left bilateral elongated, thickened, and discolored with subungual debris. Left hallux nail absent.  Webspaces 1,2,3,4 bilateral clean, dry and without evidence of break in skin integrity.   NEUROLOGICAL: Light touch, sharp-dull, proprioception all present and equal bilaterally.  Vibratory sensation diminished at right hallux and intact at left. Protective sensation intact at all 10 sites as tested with a Tuttle-Gee 5.07 monofilament.   MUSCULOSKELETAL: Muscle strength is 5/5 for foot inverters, everters, plantarflexors, and dorsiflexors. Muscle tone is normal.         Assessment:       ICD-10-CM ICD-9-CM   1. Encounter for comprehensive diabetic foot examination, type 2 diabetes mellitus  E11.9 250.00   2. Dermatophytosis of nail  B35.1 110.1   3. Type II diabetes mellitus with neurological manifestations  E11.49 250.60         Plan:   Encounter for comprehensive diabetic foot examination, type 2 diabetes mellitus    Dermatophytosis of nail    Type II diabetes mellitus with neurological manifestations    I counseled the patient on his conditions, regarding findings of my examination, my impressions, and usual treatment plan.   This visit spent on counseling and coordination of care.  Appointment spent on education about the diabetic foot, neuropathy, and prevention of limb loss.  Shoe inspection. Diabetic Foot Education. Patient reminded of the importance of good nutrition and blood sugar control to help prevent podiatric complications of diabetes. Patient instructed on proper foot hygeine. We discussed wearing proper shoe gear, daily foot inspections, never walking without protective shoe gear, never putting sharp instruments to feet.    Discussed with patient treatments for neuropathy consisting of topical or oral medication.  Recommendations given for over-the-counter medicine such as Two Old Goats and/or Capsaicin.  Patient  will  continue to monitor the areas daily, inspect feet, wear protective shoe gear when ambulatory, moisturizer to maintain skin integrity. Patient reminded of the importance of good nutrition and blood sugar control to help prevent podiatric complications of diabetes.  Patient to return 6 months or sooner if needed.          Shelley Gallardo DPM  [facility]

## 2023-06-29 ENCOUNTER — OFFICE VISIT (OUTPATIENT)
Dept: HEPATOLOGY | Facility: CLINIC | Age: 83
End: 2023-06-29
Payer: COMMERCIAL

## 2023-06-29 VITALS
HEART RATE: 70 BPM | SYSTOLIC BLOOD PRESSURE: 118 MMHG | WEIGHT: 224.63 LBS | HEIGHT: 66 IN | DIASTOLIC BLOOD PRESSURE: 62 MMHG | BODY MASS INDEX: 36.1 KG/M2

## 2023-06-29 DIAGNOSIS — K70.10 STEATOHEPATITIS, ALCOHOLIC: Primary | ICD-10-CM

## 2023-06-29 PROCEDURE — 1126F AMNT PAIN NOTED NONE PRSNT: CPT | Mod: CPTII,S$GLB,, | Performed by: INTERNAL MEDICINE

## 2023-06-29 PROCEDURE — 99999 PR PBB SHADOW E&M-EST. PATIENT-LVL III: ICD-10-PCS | Mod: PBBFAC,,, | Performed by: INTERNAL MEDICINE

## 2023-06-29 PROCEDURE — 1159F PR MEDICATION LIST DOCUMENTED IN MEDICAL RECORD: ICD-10-PCS | Mod: CPTII,S$GLB,, | Performed by: INTERNAL MEDICINE

## 2023-06-29 PROCEDURE — 1101F PT FALLS ASSESS-DOCD LE1/YR: CPT | Mod: CPTII,S$GLB,, | Performed by: INTERNAL MEDICINE

## 2023-06-29 PROCEDURE — 3078F PR MOST RECENT DIASTOLIC BLOOD PRESSURE < 80 MM HG: ICD-10-PCS | Mod: CPTII,S$GLB,, | Performed by: INTERNAL MEDICINE

## 2023-06-29 PROCEDURE — 3074F PR MOST RECENT SYSTOLIC BLOOD PRESSURE < 130 MM HG: ICD-10-PCS | Mod: CPTII,S$GLB,, | Performed by: INTERNAL MEDICINE

## 2023-06-29 PROCEDURE — 99999 PR PBB SHADOW E&M-EST. PATIENT-LVL III: CPT | Mod: PBBFAC,,, | Performed by: INTERNAL MEDICINE

## 2023-06-29 PROCEDURE — 99213 PR OFFICE/OUTPT VISIT, EST, LEVL III, 20-29 MIN: ICD-10-PCS | Mod: S$GLB,,, | Performed by: INTERNAL MEDICINE

## 2023-06-29 PROCEDURE — 1159F MED LIST DOCD IN RCRD: CPT | Mod: CPTII,S$GLB,, | Performed by: INTERNAL MEDICINE

## 2023-06-29 PROCEDURE — 1101F PR PT FALLS ASSESS DOC 0-1 FALLS W/OUT INJ PAST YR: ICD-10-PCS | Mod: CPTII,S$GLB,, | Performed by: INTERNAL MEDICINE

## 2023-06-29 PROCEDURE — 3078F DIAST BP <80 MM HG: CPT | Mod: CPTII,S$GLB,, | Performed by: INTERNAL MEDICINE

## 2023-06-29 PROCEDURE — 3072F LOW RISK FOR RETINOPATHY: CPT | Mod: CPTII,S$GLB,, | Performed by: INTERNAL MEDICINE

## 2023-06-29 PROCEDURE — 3072F PR LOW RISK FOR RETINOPATHY: ICD-10-PCS | Mod: CPTII,S$GLB,, | Performed by: INTERNAL MEDICINE

## 2023-06-29 PROCEDURE — 3074F SYST BP LT 130 MM HG: CPT | Mod: CPTII,S$GLB,, | Performed by: INTERNAL MEDICINE

## 2023-06-29 PROCEDURE — 3288F FALL RISK ASSESSMENT DOCD: CPT | Mod: CPTII,S$GLB,, | Performed by: INTERNAL MEDICINE

## 2023-06-29 PROCEDURE — 1126F PR PAIN SEVERITY QUANTIFIED, NO PAIN PRESENT: ICD-10-PCS | Mod: CPTII,S$GLB,, | Performed by: INTERNAL MEDICINE

## 2023-06-29 PROCEDURE — 99213 OFFICE O/P EST LOW 20 MIN: CPT | Mod: S$GLB,,, | Performed by: INTERNAL MEDICINE

## 2023-06-29 PROCEDURE — 1160F PR REVIEW ALL MEDS BY PRESCRIBER/CLIN PHARMACIST DOCUMENTED: ICD-10-PCS | Mod: CPTII,S$GLB,, | Performed by: INTERNAL MEDICINE

## 2023-06-29 PROCEDURE — 1160F RVW MEDS BY RX/DR IN RCRD: CPT | Mod: CPTII,S$GLB,, | Performed by: INTERNAL MEDICINE

## 2023-06-29 PROCEDURE — 3288F PR FALLS RISK ASSESSMENT DOCUMENTED: ICD-10-PCS | Mod: CPTII,S$GLB,, | Performed by: INTERNAL MEDICINE

## 2023-06-29 NOTE — PROGRESS NOTES
Subjective:     Jovan Del Cid Jr. is here for evaluation of abnormal LFTs    History of Present Illness:  Jovan Del Cid Jr. is a 82-year-old male with the abnormal total bilirubin from 2016 through 20 19, from 2020 on worse he also noted to have elevated AST and ALT less than twice the upper limit of normal.  He denies taking herbal supplements or over-the-counter medication.  He is not on any prescription medication that could cause significant elevation of liver enzymes.  On further discussion he is reports drinking 3-4 cans of beer along with the diet Coke every day for 40+ years up until he had stroke in September of this year.  He also has high blood pressure, hyperlipidemia, body mass index of 34.  Denies family history of liver disease.     He had liver biopsy in 2018 that showed F1 fibrosis with the steatohepatitis, 10% macrovascular and 25% microvesicular    6/29/2023  Coke now down to once a week, glass of wine once in a while. Otherwise doing well , no problems Still with right sided tingling since stroke     Review of Systems   Constitutional:  Negative for fatigue, fever and unexpected weight change.   Gastrointestinal:  Negative for abdominal distention, abdominal pain, blood in stool, nausea and vomiting.   Musculoskeletal:  Negative for arthralgias and gait problem.   Skin:  Negative for pallor and rash.   Neurological:  Negative for dizziness.   Hematological:  Does not bruise/bleed easily.   Psychiatric/Behavioral:  Negative for confusion, hallucinations and sleep disturbance.      Objective:     Physical Exam  Vitals reviewed.   Constitutional:       Appearance: He is obese.   Eyes:      General: No scleral icterus.  Cardiovascular:      Heart sounds: No murmur heard.  Pulmonary:      Effort: Pulmonary effort is normal.      Breath sounds: No wheezing, rhonchi or rales.   Abdominal:      General: Bowel sounds are normal. There is no distension.      Palpations: There is no mass.       Tenderness: There is no abdominal tenderness.      Comments: Truncal obesity present    Musculoskeletal:         General: No tenderness.      Right lower leg: No edema.      Left lower leg: No edema.   Lymphadenopathy:      Cervical: No cervical adenopathy.   Skin:     Coloration: Skin is not jaundiced.      Findings: No bruising or rash.   Neurological:      Mental Status: He is alert and oriented to person, place, and time.      Coordination: Coordination normal.   Psychiatric:         Behavior: Behavior normal.         Thought Content: Thought content normal.     12/22/22  Denies liver related complaints, no significant events since last visit.  Did stop all alcohol intake, watching his diet, lost 20 lb since last visit    MELD-Na: 10 at 9/4/2022  5:09 AM  MELD: 10 at 9/4/2022  5:09 AM  Calculated from:  Serum Creatinine: 0.8 mg/dL (Using min of 1 mg/dL) at 9/4/2022  5:09 AM  Serum Sodium: 138 mmol/L (Using max of 137 mmol/L) at 9/4/2022  5:09 AM  Total Bilirubin: 2.5 mg/dL at 9/4/2022  5:09 AM  INR(ratio): 0.9 (Using min of 1) at 9/3/2022  3:52 AM    WBC   Date Value Ref Range Status   01/24/2023 6.33 3.90 - 12.70 K/uL Final     Hemoglobin   Date Value Ref Range Status   01/24/2023 13.3 (L) 14.0 - 18.0 g/dL Final     Hematocrit   Date Value Ref Range Status   01/24/2023 41.8 40.0 - 54.0 % Final     Platelets   Date Value Ref Range Status   01/24/2023 169 150 - 450 K/uL Final     BUN   Date Value Ref Range Status   06/15/2023 17 8 - 23 mg/dL Final     Creatinine   Date Value Ref Range Status   06/15/2023 0.9 0.5 - 1.4 mg/dL Final     Glucose   Date Value Ref Range Status   06/15/2023 143 (H) 70 - 110 mg/dL Final     Calcium   Date Value Ref Range Status   06/15/2023 9.6 8.7 - 10.5 mg/dL Final     Sodium   Date Value Ref Range Status   06/15/2023 140 136 - 145 mmol/L Final     Potassium   Date Value Ref Range Status   06/15/2023 4.3 3.5 - 5.1 mmol/L Final     Chloride   Date Value Ref Range Status   06/15/2023 106  95 - 110 mmol/L Final     Magnesium   Date Value Ref Range Status   09/03/2022 2.0 1.6 - 2.6 mg/dL Final     AST   Date Value Ref Range Status   06/15/2023 22 10 - 40 U/L Final     ALT   Date Value Ref Range Status   06/15/2023 25 10 - 44 U/L Final     Alkaline Phosphatase   Date Value Ref Range Status   06/15/2023 59 55 - 135 U/L Final     Total Bilirubin   Date Value Ref Range Status   06/15/2023 1.4 (H) 0.1 - 1.0 mg/dL Final     Comment:     For infants and newborns, interpretation of results should be based  on gestational age, weight and in agreement with clinical  observations.    Premature Infant recommended reference ranges:  Up to 24 hours.............<8.0 mg/dL  Up to 48 hours............<12.0 mg/dL  3-5 days..................<15.0 mg/dL  6-29 days.................<15.0 mg/dL       Albumin   Date Value Ref Range Status   06/15/2023 3.9 3.5 - 5.2 g/dL Final     INR   Date Value Ref Range Status   09/03/2022 0.9 0.8 - 1.2 Final     Comment:     Coumadin Therapy:  2.0 - 3.0 for INR for all indicators except mechanical heart valves  and antiphospholipid syndromes which should use 2.5 - 3.5.           Assessment/Plan:       1.Abnormal LFTs:  Suspect hepatic steatosis/hepatitis  from EtOH ( beer) underlying fatty liver disease is also suspected  Will initiate work up to rule out infectious/autoimmune disorders of the liver  Will obtain US and fibroscan  Discussed harmful  effects of EtOH, there is no known safe level of alcohol consumption for patients with ALD, so recommended to stop all EtOH intake.  Informed a standard drink contains 14 g alcohol (equivalent to 12 oz. Beer,  8-9 oz. malt liquor, 5 oz. table wine, or 1.5 oz. distilled spirits)  Safe level drinking for women is no more than 1/day and for men is not more than 2/day    2. Obesity:  Discussed dietary recommendations- Low calorie, low carbohydrate, high protein diet with goal of loosing >3-5% to improve steatosis and >7-10% to improve histological  changes if present  Discussed daily, consistent exercise    12/22/22  FibroScan shows S2 steatosis, F1 fibrosis  Encouraged to continue abstinence from alcohol except for special occasions, continue Mediterranean diet.  We will repeat LFTs 6 months from now.  I wonder he may have underlying Gilbert's syndrome.  Will repeat ultrasound of abdomen 1 year from now to confirm stability of mildly complex bilateral cysts    US 11/2022  Liver: Normal in size, measuring 13.9 cm. Mildly heterogeneous echotexture.  Mildly complex/thinly septated cysts are seen in the liver.  Two of these are in the left hepatic lobe and measures 3.5 and 2 cm respectively.  Complex right hepatic lobe cyst is seen measuring 1.1 cm.    6/29/2023    Encouraged to continue to low carb diet, high protein diet, keep alcohol minimum, continue to exercise. Lfts continue to improve. Mild LE edema, albumin normal, may benefit with periodic PRN diuretic.       RTC in 12 mo    Lauren Bullock MD  Dept of Hepatology, [place]

## 2023-07-26 ENCOUNTER — OFFICE VISIT (OUTPATIENT)
Dept: URGENT CARE | Facility: CLINIC | Age: 83
End: 2023-07-26
Payer: COMMERCIAL

## 2023-07-26 VITALS
SYSTOLIC BLOOD PRESSURE: 127 MMHG | BODY MASS INDEX: 34.55 KG/M2 | WEIGHT: 215 LBS | RESPIRATION RATE: 16 BRPM | OXYGEN SATURATION: 98 % | TEMPERATURE: 96 F | HEIGHT: 66 IN | DIASTOLIC BLOOD PRESSURE: 75 MMHG | HEART RATE: 61 BPM

## 2023-07-26 DIAGNOSIS — L29.9 PRURITUS OF BOTH HANDS: ICD-10-CM

## 2023-07-26 DIAGNOSIS — H61.23 IMPACTED CERUMEN OF BOTH EARS: ICD-10-CM

## 2023-07-26 DIAGNOSIS — H16.133 ACTINIC KERATITIS, BILATERAL: Primary | ICD-10-CM

## 2023-07-26 PROCEDURE — 99214 PR OFFICE/OUTPT VISIT, EST, LEVL IV, 30-39 MIN: ICD-10-PCS | Mod: S$GLB,,, | Performed by: PHYSICIAN ASSISTANT

## 2023-07-26 PROCEDURE — 99214 OFFICE O/P EST MOD 30 MIN: CPT | Mod: S$GLB,,, | Performed by: PHYSICIAN ASSISTANT

## 2023-07-26 RX ORDER — TRIAMCINOLONE ACETONIDE 1 MG/G
CREAM TOPICAL 2 TIMES DAILY
Qty: 15 G | Refills: 1 | Status: SHIPPED | OUTPATIENT
Start: 2023-07-26

## 2023-07-26 NOTE — PROGRESS NOTES
"Subjective:      Patient ID: Jovan Del Cid Jr. is a 82 y.o. male.    Vitals:  height is 5' 6" (1.676 m) and weight is 97.5 kg (215 lb). His tympanic temperature is 96.2 °F (35.7 °C). His blood pressure is 127/75 and his pulse is 61. His respiration is 16 and oxygen saturation is 98%.     Chief Complaint: Rash    81 y/o male presents to clinic with c/o bilateral hand swelling, itching, and redness. Patient states he woke up this morning around 4am with both hands severely itching. Patient states he put cortisone 10 and OTC anti-itch cream and gave mild relief that he was able to fall asleep. Reports no exposure to new detergents or chemicals, and has not been working in the yard.   Reports he had a stroke on 09/02/2022 and since then the right side of his body has been tingling and numb with discoloration but the itching and swelling is new.    Pt also requesting to have ears flushed today. Has hx of impacted ear wax.     Rash  This is a new problem. The current episode started today. The problem has been gradually worsening since onset. The affected locations include the right fingers, right hand, right wrist, left hand and left fingers. The rash is characterized by burning, redness, swelling and itchiness. It is unknown if there was an exposure to a precipitant. Pertinent negatives include no anorexia, congestion, cough, diarrhea, eye pain, facial edema, fatigue, fever, joint pain, nail changes, rhinorrhea, shortness of breath, sore throat or vomiting. Past treatments include anti-itch cream. The treatment provided mild relief. There is no history of allergies, asthma, eczema or varicella.     Constitution: Negative for fatigue and fever.   HENT:  Negative for congestion and sore throat.    Eyes:  Negative for eye pain.   Respiratory:  Negative for cough and shortness of breath.    Gastrointestinal:  Negative for vomiting and diarrhea.   Skin:  Positive for rash. Negative for wound and abrasion. "   Allergic/Immunologic: Positive for itching.   Neurological:  Numbness: right side. Tingling: right side.    Objective:     Physical Exam   Constitutional: He is oriented to person, place, and time. He appears well-developed. He does not appear ill. No distress.   HENT:   Head: Normocephalic and atraumatic. Head is without abrasion, without contusion and without laceration.   Ears:   Right Ear: Tympanic membrane, external ear and ear canal normal. impacted cerumen  Left Ear: Tympanic membrane, external ear and ear canal normal. impacted cerumen     Comments: Bilateral ear cerumen removal performed by myself using irrigation and curette. Cerumen completely removed from both ears. TM visualized- no erythema, bulging, or perforation. Pt tolerated procedure well without complication.       Nose: Nose normal.   Mouth/Throat: Oropharynx is clear and moist and mucous membranes are normal.   Eyes: Conjunctivae and EOM are normal.   Neck: Neck supple.   Pulmonary/Chest: Effort normal and breath sounds normal. No stridor. No respiratory distress.   Abdominal: Normal appearance.   Musculoskeletal: Normal range of motion.         General: Normal range of motion.      Right forearm: He exhibits no swelling and no edema.      Left forearm: He exhibits no swelling and no edema.      Right hand: He exhibits no swelling.      Left hand: He exhibits no swelling.   Neurological: He is alert and oriented to person, place, and time.   Skin: Skin is warm, dry, intact, not diaphoretic, not pale, rash, not urticarial, not nodular, not pustular, not purpuric, not vesicular and no abscessed. Capillary refill takes less than 2 seconds. No abrasion and No burn         Comments: Varied discoloration and dryness/flaking of skin to bilateral forearms and hands (extensor surface) with scattered raised lesions (differ in size). Small erythematous papules with pinpoint scabbing on flexural surface of bilateral wrists.    Psychiatric: His speech is  normal and behavior is normal. Judgment and thought content normal.   Nursing note and vitals reviewed.    Assessment:     1. Actinic keratitis, bilateral    2. Pruritus of both hands    3. Impacted cerumen of both ears        Plan:       Actinic keratitis, bilateral  -     triamcinolone acetonide 0.1% (KENALOG) 0.1 % cream; Apply topically 2 (two) times daily.  Dispense: 15 g; Refill: 1  -     Ambulatory referral/consult to Dermatology    Pruritus of both hands  -     triamcinolone acetonide 0.1% (KENALOG) 0.1 % cream; Apply topically 2 (two) times daily.  Dispense: 15 g; Refill: 1  -     Ambulatory referral/consult to Dermatology    Impacted cerumen of both ears  -     Ear wax removal        Medical Decision Making:   History:   I obtained history from: someone other than patient.       <> Summary of History: Pt wife present and provides history.  Initial Assessment:   Pt with rash that is consistent with actinic keratosis. Discoloration and dry skin noted to arms bilaterally. No significant erythema or swelling. No wound noted. Pt not c/o any pain.   Pt also has bilateral cerumen impaction. Ear lavage performed and curettage with complete wax removal bilaterally. Pt tolerated well and reports hearing is much improved after.  Differential Diagnosis:   Actinic keratosis, psoriasis, chronic liver disease (w/symptom of itching), seborrheic dermatitis, contact dermatitis  Urgent Care Management:  Pt prescribed triamcinolone cream to apply to skin for itching. Instructed patient to use only small amount to dorsal side of hands due to thin skin. Advised to take benadryl at night prn for itching. Given severity of chronic skin changes, recommend dermatologist evaluation.   RTC or f/u with PCP if symptoms worsen or do not improve.  Patient and wife verbalized understanding and are in agreement with plan.

## 2023-07-26 NOTE — PATIENT INSTRUCTIONS
A referral has be placed for you to follow up with Dermatology. Someone should be contacting you soon to set up appointment. However, you may call [phone] at anytime to schedule this follow up appointment.

## 2023-07-26 NOTE — PROGRESS NOTES
"Subjective:      Patient ID: Jovan Del Cid Jr. is a 82 y.o. male.    Vitals:  height is 5' 6" (1.676 m) and weight is 97.5 kg (215 lb). His respiration is 16.     Chief Complaint: Rash    83 y/o male presents to clinic with c/o bilateral hand swelling, pain, burning, itching, and redness. Patient states he woke up this morning around 4am with both hand in severe pain. Patient stated he got up and moved around and pain eased but they then started itching. Patient states he used an otch anti-itch cream that didn't do anything. Patient states he had a stroke on 09/02/2022 and since then the right side of his body has been tingling and numb with discoloration.     Rash  This is a new problem. The current episode started today. The problem has been gradually worsening since onset. The affected locations include the right fingers, right hand, right wrist, left hand and left fingers. The rash is characterized by burning, bruising, pain, redness, swelling and itchiness. It is unknown if there was an exposure to a precipitant. Associated symptoms include joint pain and rhinorrhea. Pertinent negatives include no anorexia, congestion, cough, diarrhea, eye pain, facial edema, fatigue, fever, nail changes, shortness of breath, sore throat or vomiting. Past treatments include anti-itch cream. The treatment provided no relief. There is no history of allergies, asthma, eczema or varicella.     Constitution: Negative for fatigue and fever.   HENT:  Negative for congestion and sore throat.    Eyes:  Negative for eye pain.   Respiratory:  Negative for cough and shortness of breath.    Gastrointestinal:  Negative for vomiting and diarrhea.   Skin:  Positive for rash.    Objective:     Physical Exam    Assessment:     No diagnosis found.    Plan:       There are no diagnoses linked to this encounter.                "

## 2023-07-29 ENCOUNTER — TELEPHONE (OUTPATIENT)
Dept: URGENT CARE | Facility: CLINIC | Age: 83
End: 2023-07-29
Payer: COMMERCIAL

## 2023-07-29 NOTE — TELEPHONE ENCOUNTER
Spoke with patients wife. She stated his rash is not healed yet. She is going to make an appointment with PCP to get more in depth care.

## 2023-07-31 ENCOUNTER — OFFICE VISIT (OUTPATIENT)
Dept: URGENT CARE | Facility: CLINIC | Age: 83
End: 2023-07-31
Payer: COMMERCIAL

## 2023-07-31 VITALS
TEMPERATURE: 99 F | OXYGEN SATURATION: 96 % | WEIGHT: 215 LBS | HEART RATE: 69 BPM | HEIGHT: 66 IN | RESPIRATION RATE: 12 BRPM | SYSTOLIC BLOOD PRESSURE: 129 MMHG | BODY MASS INDEX: 34.55 KG/M2 | DIASTOLIC BLOOD PRESSURE: 61 MMHG

## 2023-07-31 DIAGNOSIS — R05.1 ACUTE COUGH: Primary | ICD-10-CM

## 2023-07-31 PROBLEM — R06.2 WHEEZING: Status: ACTIVE | Noted: 2023-07-31

## 2023-07-31 PROCEDURE — 99213 OFFICE O/P EST LOW 20 MIN: CPT | Mod: 25,S$GLB,, | Performed by: NURSE PRACTITIONER

## 2023-07-31 PROCEDURE — 94640 AIRWAY INHALATION TREATMENT: CPT | Mod: S$GLB,,, | Performed by: NURSE PRACTITIONER

## 2023-07-31 PROCEDURE — 94640 PR INHAL RX, AIRWAY OBST/DX SPUTUM INDUCT: ICD-10-PCS | Mod: S$GLB,,, | Performed by: NURSE PRACTITIONER

## 2023-07-31 PROCEDURE — 99213 PR OFFICE/OUTPT VISIT, EST, LEVL III, 20-29 MIN: ICD-10-PCS | Mod: 25,S$GLB,, | Performed by: NURSE PRACTITIONER

## 2023-07-31 RX ORDER — METHYLPREDNISOLONE 4 MG/1
TABLET ORAL
Qty: 1 EACH | Refills: 0 | Status: SHIPPED | OUTPATIENT
Start: 2023-07-31 | End: 2023-07-31 | Stop reason: ALTCHOICE

## 2023-07-31 RX ORDER — BENZONATATE 100 MG/1
200 CAPSULE ORAL 3 TIMES DAILY PRN
Qty: 30 CAPSULE | Refills: 0 | Status: SHIPPED | OUTPATIENT
Start: 2023-07-31 | End: 2024-02-26

## 2023-07-31 RX ORDER — LEVALBUTEROL INHALATION SOLUTION 1.25 MG/3ML
1.25 SOLUTION RESPIRATORY (INHALATION)
Status: COMPLETED | OUTPATIENT
Start: 2023-07-31 | End: 2023-07-31

## 2023-07-31 RX ORDER — ALBUTEROL SULFATE 90 UG/1
2 AEROSOL, METERED RESPIRATORY (INHALATION) EVERY 6 HOURS PRN
Qty: 8 G | Refills: 0 | Status: SHIPPED | OUTPATIENT
Start: 2023-07-31 | End: 2024-03-15 | Stop reason: SDUPTHER

## 2023-07-31 RX ADMIN — LEVALBUTEROL INHALATION SOLUTION 1.25 MG: 1.25 SOLUTION RESPIRATORY (INHALATION) at 09:07

## 2023-07-31 NOTE — PATIENT INSTRUCTIONS
Use proair inhaler as needed excessive coughing  Take Tessalon perles as needed cough  Drink plenty of water

## 2023-07-31 NOTE — PROGRESS NOTES
"Subjective:      Patient ID: Jovan Del Cid Jr. is a 82 y.o. male.    Vitals:  height is 5' 6" (1.676 m) and weight is 97.5 kg (215 lb). His oral temperature is 98.5 °F (36.9 °C). His blood pressure is 129/61 and his pulse is 69. His respiration is 12 and oxygen saturation is 96%.     Chief Complaint: Cough    82 y.o. male  presents today with a excessive coughing after being exposed to asphalt over the past few days. Denies chest pain . Patient states this started yesterday.    Cough  This is a new problem. The current episode started yesterday. The problem has been gradually worsening. The cough is Productive of sputum. Associated symptoms include nasal congestion and rhinorrhea. Pertinent negatives include no chills, ear congestion, ear pain, fever, headaches, myalgias, sore throat or sweats. Associated symptoms comments: Wheezing in chest. Treatments tried: robbitussin, flonase. The treatment provided moderate relief. His past medical history is significant for asthma and bronchitis. There is no history of COPD, emphysema or pneumonia.       Constitution: Negative for chills and fever.   HENT:  Negative for ear pain and sore throat.    Respiratory:  Positive for cough.    Musculoskeletal:  Negative for muscle ache.   Neurological:  Negative for headaches.      Objective:     Physical Exam   Constitutional: He is oriented to person, place, and time. normal  HENT:   Head: Normocephalic and atraumatic.   Ears:   Right Ear: Tympanic membrane, external ear and ear canal normal.   Left Ear: Tympanic membrane, external ear and ear canal normal.   Nose: Nose normal.   Mouth/Throat: Mucous membranes are moist. No oropharyngeal exudate or posterior oropharyngeal erythema. Oropharynx is clear.   Eyes: Pupils are equal, round, and reactive to light. Extraocular movement intact   Neck: Neck supple.   Cardiovascular: Normal rate, regular rhythm, normal heart sounds and normal pulses.   Pulmonary/Chest: Effort normal.       "   Comments: Excessive coughing     Abdominal: Normal appearance.   Neurological: no focal deficit. He is alert, oriented to person, place, and time and at baseline.   Skin: Skin is warm. Capillary refill takes less than 2 seconds.   Psychiatric: His behavior is normal. Mood, judgment and thought content normal.   Vitals reviewed.      Assessment:     1. Acute cough        Plan:       Acute cough  -     albuterol (VENTOLIN HFA) 90 mcg/actuation inhaler; Inhale 2 puffs into the lungs every 6 (six) hours as needed for Wheezing. Rescue  Dispense: 8 g; Refill: 0  -     benzonatate (TESSALON PERLES) 100 MG capsule; Take 2 capsules (200 mg total) by mouth 3 (three) times daily as needed for Cough.  Dispense: 30 capsule; Refill: 0  -     levalbuterol nebulizer solution 1.25 mg    Other orders  -     Discontinue: methylPREDNISolone (MEDROL DOSEPACK) 4 mg tablet; use as directed  Dispense: 1 each; Refill: 0           Levalbuterol treatment given. Patient says he felt better after breathing treatment. Pulse ox 100 % RA  Drink plenty of water

## 2023-08-03 ENCOUNTER — TELEPHONE (OUTPATIENT)
Dept: URGENT CARE | Facility: CLINIC | Age: 83
End: 2023-08-03
Payer: COMMERCIAL

## 2023-08-03 NOTE — TELEPHONE ENCOUNTER
Called patient as a courtesy to recent urgent care visit. Patient's wife answered and stated he wasn't available, but she stated that the treatment he was given helped to improve his symptoms.

## 2023-08-24 ENCOUNTER — OFFICE VISIT (OUTPATIENT)
Dept: FAMILY MEDICINE | Facility: CLINIC | Age: 83
End: 2023-08-24
Payer: COMMERCIAL

## 2023-08-24 ENCOUNTER — LAB VISIT (OUTPATIENT)
Dept: LAB | Facility: HOSPITAL | Age: 83
End: 2023-08-24
Attending: INTERNAL MEDICINE
Payer: COMMERCIAL

## 2023-08-24 VITALS
WEIGHT: 228.38 LBS | TEMPERATURE: 99 F | HEART RATE: 108 BPM | DIASTOLIC BLOOD PRESSURE: 62 MMHG | OXYGEN SATURATION: 95 % | HEIGHT: 66 IN | BODY MASS INDEX: 36.7 KG/M2 | SYSTOLIC BLOOD PRESSURE: 124 MMHG

## 2023-08-24 DIAGNOSIS — E11.9 TYPE 2 DIABETES MELLITUS WITHOUT COMPLICATION, WITHOUT LONG-TERM CURRENT USE OF INSULIN: Primary | ICD-10-CM

## 2023-08-24 DIAGNOSIS — E11.9 TYPE 2 DIABETES MELLITUS WITHOUT COMPLICATION, WITHOUT LONG-TERM CURRENT USE OF INSULIN: ICD-10-CM

## 2023-08-24 DIAGNOSIS — K75.81 NASH (NONALCOHOLIC STEATOHEPATITIS): ICD-10-CM

## 2023-08-24 DIAGNOSIS — I87.2 VENOUS INSUFFICIENCY: ICD-10-CM

## 2023-08-24 DIAGNOSIS — I10 PRIMARY HYPERTENSION: ICD-10-CM

## 2023-08-24 DIAGNOSIS — E78.5 DYSLIPIDEMIA: ICD-10-CM

## 2023-08-24 DIAGNOSIS — Z86.73 HISTORY OF LACUNAR CEREBROVASCULAR ACCIDENT: ICD-10-CM

## 2023-08-24 LAB
BNP SERPL-MCNC: 147 PG/ML (ref 0–99)
CHOLEST SERPL-MCNC: 168 MG/DL (ref 120–199)
CHOLEST/HDLC SERPL: 3.8 {RATIO} (ref 2–5)
ESTIMATED AVG GLUCOSE: 114 MG/DL (ref 68–131)
HBA1C MFR BLD: 5.6 % (ref 4–5.6)
HDLC SERPL-MCNC: 44 MG/DL (ref 40–75)
HDLC SERPL: 26.2 % (ref 20–50)
LDLC SERPL CALC-MCNC: 66.6 MG/DL (ref 63–159)
NONHDLC SERPL-MCNC: 124 MG/DL
TRIGL SERPL-MCNC: 287 MG/DL (ref 30–150)

## 2023-08-24 PROCEDURE — 99999 PR PBB SHADOW E&M-EST. PATIENT-LVL V: ICD-10-PCS | Mod: PBBFAC,,, | Performed by: INTERNAL MEDICINE

## 2023-08-24 PROCEDURE — 83880 ASSAY OF NATRIURETIC PEPTIDE: CPT | Performed by: INTERNAL MEDICINE

## 2023-08-24 PROCEDURE — 3288F FALL RISK ASSESSMENT DOCD: CPT | Mod: CPTII,S$GLB,, | Performed by: INTERNAL MEDICINE

## 2023-08-24 PROCEDURE — 36415 COLL VENOUS BLD VENIPUNCTURE: CPT | Mod: PO | Performed by: INTERNAL MEDICINE

## 2023-08-24 PROCEDURE — 3074F SYST BP LT 130 MM HG: CPT | Mod: CPTII,S$GLB,, | Performed by: INTERNAL MEDICINE

## 2023-08-24 PROCEDURE — 3078F DIAST BP <80 MM HG: CPT | Mod: CPTII,S$GLB,, | Performed by: INTERNAL MEDICINE

## 2023-08-24 PROCEDURE — 1126F AMNT PAIN NOTED NONE PRSNT: CPT | Mod: CPTII,S$GLB,, | Performed by: INTERNAL MEDICINE

## 2023-08-24 PROCEDURE — 3074F PR MOST RECENT SYSTOLIC BLOOD PRESSURE < 130 MM HG: ICD-10-PCS | Mod: CPTII,S$GLB,, | Performed by: INTERNAL MEDICINE

## 2023-08-24 PROCEDURE — 3072F LOW RISK FOR RETINOPATHY: CPT | Mod: CPTII,S$GLB,, | Performed by: INTERNAL MEDICINE

## 2023-08-24 PROCEDURE — 99214 PR OFFICE/OUTPT VISIT, EST, LEVL IV, 30-39 MIN: ICD-10-PCS | Mod: S$GLB,,, | Performed by: INTERNAL MEDICINE

## 2023-08-24 PROCEDURE — 1159F MED LIST DOCD IN RCRD: CPT | Mod: CPTII,S$GLB,, | Performed by: INTERNAL MEDICINE

## 2023-08-24 PROCEDURE — 99214 OFFICE O/P EST MOD 30 MIN: CPT | Mod: S$GLB,,, | Performed by: INTERNAL MEDICINE

## 2023-08-24 PROCEDURE — 3072F PR LOW RISK FOR RETINOPATHY: ICD-10-PCS | Mod: CPTII,S$GLB,, | Performed by: INTERNAL MEDICINE

## 2023-08-24 PROCEDURE — 3288F PR FALLS RISK ASSESSMENT DOCUMENTED: ICD-10-PCS | Mod: CPTII,S$GLB,, | Performed by: INTERNAL MEDICINE

## 2023-08-24 PROCEDURE — 80061 LIPID PANEL: CPT | Performed by: INTERNAL MEDICINE

## 2023-08-24 PROCEDURE — 99999 PR PBB SHADOW E&M-EST. PATIENT-LVL V: CPT | Mod: PBBFAC,,, | Performed by: INTERNAL MEDICINE

## 2023-08-24 PROCEDURE — 3078F PR MOST RECENT DIASTOLIC BLOOD PRESSURE < 80 MM HG: ICD-10-PCS | Mod: CPTII,S$GLB,, | Performed by: INTERNAL MEDICINE

## 2023-08-24 PROCEDURE — 1101F PR PT FALLS ASSESS DOC 0-1 FALLS W/OUT INJ PAST YR: ICD-10-PCS | Mod: CPTII,S$GLB,, | Performed by: INTERNAL MEDICINE

## 2023-08-24 PROCEDURE — 1126F PR PAIN SEVERITY QUANTIFIED, NO PAIN PRESENT: ICD-10-PCS | Mod: CPTII,S$GLB,, | Performed by: INTERNAL MEDICINE

## 2023-08-24 PROCEDURE — 1101F PT FALLS ASSESS-DOCD LE1/YR: CPT | Mod: CPTII,S$GLB,, | Performed by: INTERNAL MEDICINE

## 2023-08-24 PROCEDURE — 83036 HEMOGLOBIN GLYCOSYLATED A1C: CPT | Performed by: INTERNAL MEDICINE

## 2023-08-24 PROCEDURE — 1159F PR MEDICATION LIST DOCUMENTED IN MEDICAL RECORD: ICD-10-PCS | Mod: CPTII,S$GLB,, | Performed by: INTERNAL MEDICINE

## 2023-08-24 RX ORDER — LOSARTAN POTASSIUM 25 MG/1
25 TABLET ORAL DAILY
Qty: 90 TABLET | Refills: 3 | Status: SHIPPED | OUTPATIENT
Start: 2023-08-24 | End: 2024-08-23

## 2023-08-24 NOTE — PROGRESS NOTES
Subjective:       Patient ID: Jovan Del Cid Jr. is a 82 y.o. male.    Chief Complaint: 4 mth f/u , Hypertension, Hyperlipidemia, and Diabetes    Hypertension  Pertinent negatives include no chest pain, headaches, neck pain, palpitations or shortness of breath.   Hyperlipidemia  Associated symptoms include myalgias. Pertinent negatives include no chest pain or shortness of breath.   Diabetes  Pertinent negatives for hypoglycemia include no confusion, dizziness, headaches, nervousness/anxiousness, pallor, seizures, speech difficulty or tremors. Pertinent negatives for diabetes include no chest pain, no fatigue, no polydipsia, no polyphagia, no polyuria and no weakness.     Past Medical History:   Diagnosis Date    Acute CVA (cerebrovascular accident) 09/08/2022    DANY (acute kidney injury) 09/14/2022    Asthma     Bronchitis chronic     Cancer     Cough     Digestive disorder     Hyperlipidemia     Hypertension     Liver disease     KALYAN (obstructive sleep apnea) 09/08/2022    Stroke 09/01/2022    Type 2 diabetes mellitus, without long-term current use of insulin 09/07/2022    BS didn't check 10/05/2022     Past Surgical History:   Procedure Laterality Date    COLONOSCOPY N/A 6/21/2016    Procedure: COLONOSCOPY;  Surgeon: Alonso Dorsey MD;  Location: [facility];  Service: Endoscopy;  Laterality: N/A;    COLONOSCOPY N/A 11/1/2016    Procedure: COLONOSCOPY;  Surgeon: Alonso Dorsey MD;  Location: [facility];  Service: Endoscopy;  Laterality: N/A;    COLONOSCOPY N/A 2/3/2017    Procedure: COLONOSCOPY;  Surgeon: Alonso Dorsey MD;  Location: [facility];  Service: Endoscopy;  Laterality: N/A;    COLONOSCOPY N/A 11/13/2017    Procedure: COLONOSCOPY;  Surgeon: Alonso Dorsey MD;  Location: [facility];  Service: Endoscopy;  Laterality: N/A;    COLONOSCOPY N/A 2/19/2021    Procedure: COLONOSCOPY;  Surgeon: Paula Ricardo MD;  Location: [facility];  Service: Endoscopy;  Laterality: N/A;    FLUOROSCOPY  N/A 6/15/2018    Procedure: Transjugular liver bx;  Surgeon: Petros Recio MD;  Location: [facility] CATH LAB;  Service: General;  Laterality: N/A;    TONSILLECTOMY       Family History   Problem Relation Age of Onset    Cancer Father     Alzheimer's disease Mother     Colon cancer Neg Hx     Melanoma Neg Hx      Social History     Socioeconomic History    Marital status:    Tobacco Use    Smoking status: Former     Current packs/day: 0.00     Types: Cigarettes, Pipe     Start date:      Quit date:      Years since quittin.6     Passive exposure: Past    Smokeless tobacco: Never    Tobacco comments:     smoked a pipe - quit smoking    Substance and Sexual Activity    Alcohol use: Not Currently     Alcohol/week: 2.0 - 3.0 standard drinks of alcohol     Types: 2 - 3 Cans of beer per week     Comment: daily    Drug use: No    Sexual activity: Never     Social Determinants of Health     Financial Resource Strain: Low Risk  (9/15/2022)    Overall Financial Resource Strain (CARDIA)     Difficulty of Paying Living Expenses: Not very hard   Food Insecurity: No Food Insecurity (9/15/2022)    Hunger Vital Sign     Worried About Running Out of Food in the Last Year: Never true     Ran Out of Food in the Last Year: Never true   Transportation Needs: No Transportation Needs (9/15/2022)    PRAPARE - Transportation     Lack of Transportation (Medical): No     Lack of Transportation (Non-Medical): No   Physical Activity: Inactive (9/15/2022)    Exercise Vital Sign     Days of Exercise per Week: 0 days     Minutes of Exercise per Session: 0 min   Stress: No Stress Concern Present (9/15/2022)    [employer] - Occupational Stress Questionnaire     Feeling of Stress : Not at all   Social Connections: Socially Integrated (9/15/2022)    Social Connection and Isolation Panel [NHANES]     Frequency of Communication with Friends and Family: More than three times a week     Frequency of  Social Gatherings with Friends and Family: More than three times a week     Attends Protestant Services: More than 4 times per year     Active Member of Clubs or Organizations: Yes     Attends Club or Organization Meetings: More than 4 times per year     Marital Status:    Housing Stability: Low Risk  (9/15/2022)    Housing Stability Vital Sign     Unable to Pay for Housing in the Last Year: No     Number of Places Lived in the Last Year: 1     Unstable Housing in the Last Year: No     Review of Systems   Constitutional:  Negative for activity change, appetite change, chills, diaphoresis, fatigue, fever and unexpected weight change.   HENT:  Negative for drooling, ear discharge, ear pain, facial swelling, hearing loss, mouth sores, nosebleeds, postnasal drip, rhinorrhea, sinus pressure, sneezing, sore throat, tinnitus, trouble swallowing and voice change.    Eyes:  Negative for photophobia, redness and visual disturbance.   Respiratory:  Negative for apnea, cough, choking, chest tightness, shortness of breath and wheezing.    Cardiovascular:  Negative for chest pain, palpitations and leg swelling.   Gastrointestinal:  Negative for abdominal distention, abdominal pain, anal bleeding, blood in stool, constipation, diarrhea, nausea and vomiting.   Endocrine: Negative for cold intolerance, heat intolerance, polydipsia, polyphagia and polyuria.   Genitourinary:  Negative for difficulty urinating, dysuria, enuresis, flank pain, frequency, genital sores, hematuria and urgency.   Musculoskeletal:  Positive for arthralgias and myalgias. Negative for back pain, gait problem, joint swelling, neck pain and neck stiffness.   Skin:  Negative for color change, pallor, rash and wound.   Allergic/Immunologic: Negative for food allergies and immunocompromised state.   Neurological:  Negative for dizziness, tremors, seizures, syncope, facial asymmetry, speech difficulty, weakness, light-headedness, numbness and headaches.    Hematological:  Negative for adenopathy. Does not bruise/bleed easily.   Psychiatric/Behavioral:  Negative for agitation, behavioral problems, confusion, decreased concentration, dysphoric mood, hallucinations, self-injury, sleep disturbance and suicidal ideas. The patient is not nervous/anxious and is not hyperactive.        Objective:      Physical Exam  Vitals and nursing note reviewed.   Constitutional:       General: He is not in acute distress.     Appearance: Normal appearance. He is well-developed. He is not diaphoretic.   HENT:      Head: Normocephalic and atraumatic.      Mouth/Throat:      Pharynx: No oropharyngeal exudate.   Eyes:      General: No scleral icterus.     Pupils: Pupils are equal, round, and reactive to light.   Neck:      Thyroid: No thyromegaly.      Vascular: No carotid bruit or JVD.      Trachea: No tracheal deviation.   Cardiovascular:      Rate and Rhythm: Normal rate and regular rhythm.      Heart sounds: Normal heart sounds.   Pulmonary:      Effort: Pulmonary effort is normal. No respiratory distress.      Breath sounds: Normal breath sounds. No wheezing or rales.   Chest:      Chest wall: No tenderness.   Abdominal:      General: Bowel sounds are normal. There is no distension.      Palpations: Abdomen is soft.      Tenderness: There is no abdominal tenderness. There is no guarding or rebound.   Musculoskeletal:         General: No tenderness. Normal range of motion.      Cervical back: Normal range of motion and neck supple.   Lymphadenopathy:      Cervical: No cervical adenopathy.   Skin:     General: Skin is warm and dry.      Coloration: Skin is not pale.      Findings: No erythema or rash.   Neurological:      Mental Status: He is alert and oriented to person, place, and time.   Psychiatric:         Behavior: Behavior normal.         Thought Content: Thought content normal.         Judgment: Judgment normal.         CMP  Sodium   Date Value Ref Range Status   06/15/2023 140  136 - 145 mmol/L Final     Potassium   Date Value Ref Range Status   06/15/2023 4.3 3.5 - 5.1 mmol/L Final     Chloride   Date Value Ref Range Status   06/15/2023 106 95 - 110 mmol/L Final     CO2   Date Value Ref Range Status   06/15/2023 24 23 - 29 mmol/L Final     Glucose   Date Value Ref Range Status   06/15/2023 143 (H) 70 - 110 mg/dL Final     BUN   Date Value Ref Range Status   06/15/2023 17 8 - 23 mg/dL Final     Creatinine   Date Value Ref Range Status   06/15/2023 0.9 0.5 - 1.4 mg/dL Final     Calcium   Date Value Ref Range Status   06/15/2023 9.6 8.7 - 10.5 mg/dL Final     Total Protein   Date Value Ref Range Status   06/15/2023 6.9 6.0 - 8.4 g/dL Final     Albumin   Date Value Ref Range Status   06/15/2023 3.9 3.5 - 5.2 g/dL Final     Total Bilirubin   Date Value Ref Range Status   06/15/2023 1.4 (H) 0.1 - 1.0 mg/dL Final     Comment:     For infants and newborns, interpretation of results should be based  on gestational age, weight and in agreement with clinical  observations.    Premature Infant recommended reference ranges:  Up to 24 hours.............<8.0 mg/dL  Up to 48 hours............<12.0 mg/dL  3-5 days..................<15.0 mg/dL  6-29 days.................<15.0 mg/dL       Alkaline Phosphatase   Date Value Ref Range Status   06/15/2023 59 55 - 135 U/L Final     AST   Date Value Ref Range Status   06/15/2023 22 10 - 40 U/L Final     ALT   Date Value Ref Range Status   06/15/2023 25 10 - 44 U/L Final     Anion Gap   Date Value Ref Range Status   06/15/2023 10 8 - 16 mmol/L Final     eGFR if    Date Value Ref Range Status   01/20/2021 >60.0 >60 mL/min/1.73 m^2 Final     eGFR if non    Date Value Ref Range Status   01/20/2021 >60.0 >60 mL/min/1.73 m^2 Final     Comment:     Calculation used to obtain the estimated glomerular filtration  rate (eGFR) is the CKD-EPI equation.        Lab Results   Component Value Date    WBC 6.33 01/24/2023    HGB 13.3 (L) 01/24/2023     HCT 41.8 01/24/2023    MCV 95 01/24/2023     01/24/2023     Lab Results   Component Value Date    CHOL 113 (L) 01/24/2023     Lab Results   Component Value Date    HDL 40 01/24/2023     Lab Results   Component Value Date    LDLCALC 8.6 (L) 01/24/2023     Lab Results   Component Value Date    TRIG 322 (H) 01/24/2023     Lab Results   Component Value Date    CHOLHDL 35.4 01/24/2023     Lab Results   Component Value Date    TSH 1.876 09/02/2022     Lab Results   Component Value Date    HGBA1C 5.5 04/24/2023     Assessment:       1. Type 2 diabetes mellitus without complication, without long-term current use of insulin    2. MCMAHAN (nonalcoholic steatohepatitis)    3. Primary hypertension    4. History of lacunar cerebrovascular accident    5. Dyslipidemia    6. Venous insufficiency        Plan:   Type 2 diabetes mellitus without complication, without long-term current use of insulin  -     B-TYPE NATRIURETIC PEPTIDE; Future; Expected date: 08/24/2023  -     Hemoglobin A1C; Future; Expected date: 08/24/2023    MCMAHAN (nonalcoholic steatohepatitis)    Primary hypertension---------add losartan 25 mg a day----------follow-    History of lacunar cerebrovascular accident    Dyslipidemia  -     Lipid Panel; Future; Expected date: 08/24/2023    Venous insufficiency    Other orders  -     losartan (COZAAR) 25 MG tablet; Take 1 tablet (25 mg total) by mouth once daily.  Dispense: 90 tablet; Refill: 3    Stable-------------continue meds-------------as above-----------------f/u cards----------------f/u 3 months-

## 2023-08-25 ENCOUNTER — TELEPHONE (OUTPATIENT)
Dept: FAMILY MEDICINE | Facility: CLINIC | Age: 83
End: 2023-08-25
Payer: COMMERCIAL

## 2023-08-25 ENCOUNTER — PATIENT MESSAGE (OUTPATIENT)
Dept: FAMILY MEDICINE | Facility: CLINIC | Age: 83
End: 2023-08-25
Payer: COMMERCIAL

## 2023-08-25 DIAGNOSIS — R79.89 ELEVATED BRAIN NATRIURETIC PEPTIDE (BNP) LEVEL: Primary | ICD-10-CM

## 2023-08-25 RX ORDER — HYDROCHLOROTHIAZIDE 12.5 MG/1
12.5 TABLET ORAL
Qty: 15 TABLET | Refills: 0 | Status: SHIPPED | OUTPATIENT
Start: 2023-08-25 | End: 2023-09-12

## 2023-08-25 NOTE — TELEPHONE ENCOUNTER
Cholesterol is good and hga1c is normal---stay on same meds-watch diet.            BNP level slightly elevated---could mean some fluid retention------take hctz 12.5 mg --one-every other day for 3 doses --script sent in and -------------do echo-ordered-----and f/u appt with me next week----------

## 2023-08-30 ENCOUNTER — HOSPITAL ENCOUNTER (OUTPATIENT)
Dept: CARDIOLOGY | Facility: HOSPITAL | Age: 83
Discharge: HOME | End: 2023-08-30
Attending: INTERNAL MEDICINE
Payer: COMMERCIAL

## 2023-08-30 VITALS
HEIGHT: 66 IN | BODY MASS INDEX: 34.55 KG/M2 | WEIGHT: 215 LBS | SYSTOLIC BLOOD PRESSURE: 124 MMHG | DIASTOLIC BLOOD PRESSURE: 62 MMHG

## 2023-08-30 DIAGNOSIS — R79.89 ELEVATED BRAIN NATRIURETIC PEPTIDE (BNP) LEVEL: ICD-10-CM

## 2023-08-30 LAB
AORTIC ROOT ANNULUS: 3.49 CM
AV INDEX (PROSTH): 0.88
AV MEAN GRADIENT: 2 MMHG
AV PEAK GRADIENT: 4 MMHG
AV REGURGITATION PRESSURE HALF TIME: 1284.28 MS
AV VALVE AREA BY VELOCITY RATIO: 2.76 CM²
AV VALVE AREA: 2.68 CM²
AV VELOCITY RATIO: 0.91
BSA FOR ECHO PROCEDURE: 2.13 M2
CV ECHO LV RWT: 0.47 CM
DOP CALC AO PEAK VEL: 0.97 M/S
DOP CALC AO VTI: 22.4 CM
DOP CALC LVOT AREA: 3 CM2
DOP CALC LVOT DIAMETER: 1.97 CM
DOP CALC LVOT PEAK VEL: 0.88 M/S
DOP CALC LVOT STROKE VOLUME: 60.02 CM3
DOP CALC RVOT PEAK VEL: 0.5 M/S
DOP CALC RVOT VTI: 12.9 CM
DOP CALCLVOT PEAK VEL VTI: 19.7 CM
E WAVE DECELERATION TIME: 412.32 MSEC
E/A RATIO: 0.86
E/E' RATIO: 11.83 M/S
ECHO LV POSTERIOR WALL: 0.98 CM (ref 0.6–1.1)
FRACTIONAL SHORTENING: 27 % (ref 28–44)
INTERVENTRICULAR SEPTUM: 1.03 CM (ref 0.6–1.1)
IVRT: 114.18 MSEC
LA MAJOR: 5.23 CM
LA WIDTH: 3.9 CM
LEFT ATRIUM SIZE: 3.26 CM
LEFT ATRIUM VOLUME INDEX MOD: 32.2 ML/M2
LEFT ATRIUM VOLUME MOD: 66.38 CM3
LEFT INTERNAL DIMENSION IN SYSTOLE: 3.03 CM (ref 2.1–4)
LEFT VENTRICLE DIASTOLIC VOLUME INDEX: 37.59 ML/M2
LEFT VENTRICLE DIASTOLIC VOLUME: 77.44 ML
LEFT VENTRICLE MASS INDEX: 66 G/M2
LEFT VENTRICLE SYSTOLIC VOLUME INDEX: 17.4 ML/M2
LEFT VENTRICLE SYSTOLIC VOLUME: 35.88 ML
LEFT VENTRICULAR INTERNAL DIMENSION IN DIASTOLE: 4.17 CM (ref 3.5–6)
LEFT VENTRICULAR MASS: 136.65 G
LV LATERAL E/E' RATIO: 11.83 M/S
LV SEPTAL E/E' RATIO: 11.83 M/S
LVOT MG: 1.45 MMHG
LVOT MV: 0.55 CM/S
MV PEAK A VEL: 0.83 M/S
MV PEAK E VEL: 0.71 M/S
PISA AR MAX VEL: 3.07 M/S
PISA TR MAX VEL: 2.2 M/S
PV MEAN GRADIENT: 1 MMHG
PV MV: 0.62 M/S
PV PEAK GRADIENT: 3 MMHG
PV PEAK VELOCITY: 0.87 M/S
RA MAJOR: 4.57 CM
RA PRESSURE ESTIMATED: 3 MMHG
RA WIDTH: 2.46 CM
RIGHT VENTRICULAR END-DIASTOLIC DIMENSION: 2.04 CM
RV TB RVSP: 5 MMHG
SINUS: 2.94 CM
STJ: 2.51 CM
TDI LATERAL: 0.06 M/S
TDI SEPTAL: 0.06 M/S
TDI: 0.06 M/S
TR MAX PG: 19 MMHG
TRICUSPID ANNULAR PLANE SYSTOLIC EXCURSION: 2.21 CM
TV REST PULMONARY ARTERY PRESSURE: 22 MMHG
Z-SCORE OF LEFT VENTRICULAR DIMENSION IN END DIASTOLE: -4
Z-SCORE OF LEFT VENTRICULAR DIMENSION IN END SYSTOLE: -1.81

## 2023-08-30 PROCEDURE — 93306 TTE W/DOPPLER COMPLETE: CPT | Mod: 26,,, | Performed by: INTERNAL MEDICINE

## 2023-08-30 PROCEDURE — 93306 ECHO (CUPID ONLY): ICD-10-PCS | Mod: 26,,, | Performed by: INTERNAL MEDICINE

## 2023-08-30 PROCEDURE — 93306 TTE W/DOPPLER COMPLETE: CPT

## 2023-08-31 ENCOUNTER — OFFICE VISIT (OUTPATIENT)
Dept: FAMILY MEDICINE | Facility: CLINIC | Age: 83
End: 2023-08-31
Payer: COMMERCIAL

## 2023-08-31 VITALS
RESPIRATION RATE: 16 BRPM | WEIGHT: 226.88 LBS | DIASTOLIC BLOOD PRESSURE: 62 MMHG | BODY MASS INDEX: 36.62 KG/M2 | OXYGEN SATURATION: 98 % | TEMPERATURE: 98 F | HEART RATE: 65 BPM | SYSTOLIC BLOOD PRESSURE: 120 MMHG

## 2023-08-31 DIAGNOSIS — I38 VALVULAR HEART DISEASE: ICD-10-CM

## 2023-08-31 DIAGNOSIS — I10 PRIMARY HYPERTENSION: ICD-10-CM

## 2023-08-31 DIAGNOSIS — I63.9 CEREBROVASCULAR ACCIDENT (CVA), UNSPECIFIED MECHANISM: ICD-10-CM

## 2023-08-31 DIAGNOSIS — E11.9 TYPE 2 DIABETES MELLITUS WITHOUT COMPLICATION, WITHOUT LONG-TERM CURRENT USE OF INSULIN: Primary | ICD-10-CM

## 2023-08-31 DIAGNOSIS — E78.5 DYSLIPIDEMIA: ICD-10-CM

## 2023-08-31 DIAGNOSIS — E78.1 HYPERTRIGLYCERIDEMIA: ICD-10-CM

## 2023-08-31 PROCEDURE — 99214 OFFICE O/P EST MOD 30 MIN: CPT | Mod: S$GLB,,, | Performed by: INTERNAL MEDICINE

## 2023-08-31 PROCEDURE — 1126F AMNT PAIN NOTED NONE PRSNT: CPT | Mod: CPTII,S$GLB,, | Performed by: INTERNAL MEDICINE

## 2023-08-31 PROCEDURE — 3072F LOW RISK FOR RETINOPATHY: CPT | Mod: CPTII,S$GLB,, | Performed by: INTERNAL MEDICINE

## 2023-08-31 PROCEDURE — 1159F PR MEDICATION LIST DOCUMENTED IN MEDICAL RECORD: ICD-10-PCS | Mod: CPTII,S$GLB,, | Performed by: INTERNAL MEDICINE

## 2023-08-31 PROCEDURE — 3288F FALL RISK ASSESSMENT DOCD: CPT | Mod: CPTII,S$GLB,, | Performed by: INTERNAL MEDICINE

## 2023-08-31 PROCEDURE — 99999 PR PBB SHADOW E&M-EST. PATIENT-LVL V: CPT | Mod: PBBFAC,,, | Performed by: INTERNAL MEDICINE

## 2023-08-31 PROCEDURE — 3078F DIAST BP <80 MM HG: CPT | Mod: CPTII,S$GLB,, | Performed by: INTERNAL MEDICINE

## 2023-08-31 PROCEDURE — 1126F PR PAIN SEVERITY QUANTIFIED, NO PAIN PRESENT: ICD-10-PCS | Mod: CPTII,S$GLB,, | Performed by: INTERNAL MEDICINE

## 2023-08-31 PROCEDURE — 3288F PR FALLS RISK ASSESSMENT DOCUMENTED: ICD-10-PCS | Mod: CPTII,S$GLB,, | Performed by: INTERNAL MEDICINE

## 2023-08-31 PROCEDURE — 3074F SYST BP LT 130 MM HG: CPT | Mod: CPTII,S$GLB,, | Performed by: INTERNAL MEDICINE

## 2023-08-31 PROCEDURE — 99214 PR OFFICE/OUTPT VISIT, EST, LEVL IV, 30-39 MIN: ICD-10-PCS | Mod: S$GLB,,, | Performed by: INTERNAL MEDICINE

## 2023-08-31 PROCEDURE — 1101F PR PT FALLS ASSESS DOC 0-1 FALLS W/OUT INJ PAST YR: ICD-10-PCS | Mod: CPTII,S$GLB,, | Performed by: INTERNAL MEDICINE

## 2023-08-31 PROCEDURE — 3074F PR MOST RECENT SYSTOLIC BLOOD PRESSURE < 130 MM HG: ICD-10-PCS | Mod: CPTII,S$GLB,, | Performed by: INTERNAL MEDICINE

## 2023-08-31 PROCEDURE — 3078F PR MOST RECENT DIASTOLIC BLOOD PRESSURE < 80 MM HG: ICD-10-PCS | Mod: CPTII,S$GLB,, | Performed by: INTERNAL MEDICINE

## 2023-08-31 PROCEDURE — 1159F MED LIST DOCD IN RCRD: CPT | Mod: CPTII,S$GLB,, | Performed by: INTERNAL MEDICINE

## 2023-08-31 PROCEDURE — 1101F PT FALLS ASSESS-DOCD LE1/YR: CPT | Mod: CPTII,S$GLB,, | Performed by: INTERNAL MEDICINE

## 2023-08-31 PROCEDURE — 99999 PR PBB SHADOW E&M-EST. PATIENT-LVL V: ICD-10-PCS | Mod: PBBFAC,,, | Performed by: INTERNAL MEDICINE

## 2023-08-31 PROCEDURE — 3072F PR LOW RISK FOR RETINOPATHY: ICD-10-PCS | Mod: CPTII,S$GLB,, | Performed by: INTERNAL MEDICINE

## 2023-08-31 NOTE — PROGRESS NOTES
Subjective:       Patient ID: Jovan Del Cid Jr. is a 83 y.o. male.    Chief Complaint: Follow-up, Hypertension, Hyperlipidemia, and Diabetes    Follow-up  Associated symptoms include arthralgias and myalgias. Pertinent negatives include no abdominal pain, chest pain, chills, coughing, diaphoresis, fatigue, fever, headaches, joint swelling, nausea, neck pain, numbness, rash, sore throat, vomiting or weakness.   Hypertension  Pertinent negatives include no chest pain, headaches, neck pain, palpitations or shortness of breath.   Hyperlipidemia  Associated symptoms include myalgias. Pertinent negatives include no chest pain or shortness of breath.   Diabetes  Pertinent negatives for hypoglycemia include no confusion, dizziness, headaches, nervousness/anxiousness, pallor, seizures, speech difficulty or tremors. Pertinent negatives for diabetes include no chest pain, no fatigue, no polydipsia, no polyphagia, no polyuria and no weakness.     Past Medical History:   Diagnosis Date    Acute CVA (cerebrovascular accident) 09/08/2022    DANY (acute kidney injury) 09/14/2022    Asthma     Bronchitis chronic     Cancer     Cough     Digestive disorder     Hyperlipidemia     Hypertension     Liver disease     KALYAN (obstructive sleep apnea) 09/08/2022    Stroke 09/01/2022    Type 2 diabetes mellitus, without long-term current use of insulin 09/07/2022    BS didn't check 10/05/2022     Past Surgical History:   Procedure Laterality Date    COLONOSCOPY N/A 6/21/2016    Procedure: COLONOSCOPY;  Surgeon: Alonso Dorsey MD;  Location: [facility];  Service: Endoscopy;  Laterality: N/A;    COLONOSCOPY N/A 11/1/2016    Procedure: COLONOSCOPY;  Surgeon: Alonso Dorsey MD;  Location: [facility] ENDO;  Service: Endoscopy;  Laterality: N/A;    COLONOSCOPY N/A 2/3/2017    Procedure: COLONOSCOPY;  Surgeon: Alonso Dorsey MD;  Location: [facility];  Service: Endoscopy;  Laterality: N/A;    COLONOSCOPY N/A 11/13/2017    Procedure:  COLONOSCOPY;  Surgeon: Alonso Dorsey MD;  Location: [facility] ENDO;  Service: Endoscopy;  Laterality: N/A;    COLONOSCOPY N/A 2021    Procedure: COLONOSCOPY;  Surgeon: Paula Ricardo MD;  Location: [facility] ENDO;  Service: Endoscopy;  Laterality: N/A;    FLUOROSCOPY N/A 6/15/2018    Procedure: Transjugular liver bx;  Surgeon: Petros Recio MD;  Location: [facility] CATH LAB;  Service: General;  Laterality: N/A;    TONSILLECTOMY       Family History   Problem Relation Age of Onset    Cancer Father     Alzheimer's disease Mother     Colon cancer Neg Hx     Melanoma Neg Hx      Social History     Socioeconomic History    Marital status:    Tobacco Use    Smoking status: Former     Current packs/day: 0.00     Types: Cigarettes, Pipe     Start date:      Quit date:      Years since quittin.7     Passive exposure: Past    Smokeless tobacco: Never    Tobacco comments:     smoked a pipe - quit smoking    Substance and Sexual Activity    Alcohol use: Not Currently     Alcohol/week: 2.0 - 3.0 standard drinks of alcohol     Types: 2 - 3 Cans of beer per week     Comment: daily    Drug use: No    Sexual activity: Never     Social Determinants of Health     Financial Resource Strain: Low Risk  (9/15/2022)    Overall Financial Resource Strain (CARDIA)     Difficulty of Paying Living Expenses: Not very hard   Food Insecurity: No Food Insecurity (9/15/2022)    Hunger Vital Sign     Worried About Running Out of Food in the Last Year: Never true     Ran Out of Food in the Last Year: Never true   Transportation Needs: No Transportation Needs (9/15/2022)    PRAPARE - Transportation     Lack of Transportation (Medical): No     Lack of Transportation (Non-Medical): No   Physical Activity: Inactive (9/15/2022)    Exercise Vital Sign     Days of Exercise per Week: 0 days     Minutes of Exercise per Session: 0 min   Stress: No Stress Concern Present (9/15/2022)    [employer] -  Occupational Stress Questionnaire     Feeling of Stress : Not at all   Social Connections: Socially Integrated (9/15/2022)    Social Connection and Isolation Panel [NHANES]     Frequency of Communication with Friends and Family: More than three times a week     Frequency of Social Gatherings with Friends and Family: More than three times a week     Attends Nondenominational Services: More than 4 times per year     Active Member of Clubs or Organizations: Yes     Attends Club or Organization Meetings: More than 4 times per year     Marital Status:    Housing Stability: Low Risk  (9/15/2022)    Housing Stability Vital Sign     Unable to Pay for Housing in the Last Year: No     Number of Places Lived in the Last Year: 1     Unstable Housing in the Last Year: No     Review of Systems   Constitutional:  Negative for activity change, appetite change, chills, diaphoresis, fatigue, fever and unexpected weight change.   HENT:  Negative for drooling, ear discharge, ear pain, facial swelling, hearing loss, mouth sores, nosebleeds, postnasal drip, rhinorrhea, sinus pressure, sneezing, sore throat, tinnitus, trouble swallowing and voice change.    Eyes:  Negative for photophobia, redness and visual disturbance.   Respiratory:  Negative for apnea, cough, choking, chest tightness, shortness of breath and wheezing.    Cardiovascular:  Negative for chest pain, palpitations and leg swelling.   Gastrointestinal:  Negative for abdominal distention, abdominal pain, anal bleeding, blood in stool, constipation, diarrhea, nausea and vomiting.   Endocrine: Negative for cold intolerance, heat intolerance, polydipsia, polyphagia and polyuria.   Genitourinary:  Negative for difficulty urinating, dysuria, enuresis, flank pain, frequency, genital sores, hematuria and urgency.   Musculoskeletal:  Positive for arthralgias and myalgias. Negative for back pain, gait problem, joint swelling, neck pain and neck stiffness.   Skin:  Negative for color  change, pallor, rash and wound.   Allergic/Immunologic: Negative for food allergies and immunocompromised state.   Neurological:  Negative for dizziness, tremors, seizures, syncope, facial asymmetry, speech difficulty, weakness, light-headedness, numbness and headaches.   Hematological:  Negative for adenopathy. Does not bruise/bleed easily.   Psychiatric/Behavioral:  Negative for agitation, behavioral problems, confusion, decreased concentration, dysphoric mood, hallucinations, self-injury, sleep disturbance and suicidal ideas. The patient is not nervous/anxious and is not hyperactive.        Objective:      Physical Exam  Vitals and nursing note reviewed.   Constitutional:       General: He is not in acute distress.     Appearance: Normal appearance. He is well-developed. He is not diaphoretic.   HENT:      Head: Normocephalic and atraumatic.      Mouth/Throat:      Pharynx: No oropharyngeal exudate.   Eyes:      General: No scleral icterus.     Pupils: Pupils are equal, round, and reactive to light.   Neck:      Thyroid: No thyromegaly.      Vascular: No carotid bruit or JVD.      Trachea: No tracheal deviation.   Cardiovascular:      Rate and Rhythm: Normal rate and regular rhythm.      Heart sounds: Normal heart sounds.   Pulmonary:      Effort: Pulmonary effort is normal. No respiratory distress.      Breath sounds: Normal breath sounds. No wheezing or rales.   Chest:      Chest wall: No tenderness.   Abdominal:      General: Bowel sounds are normal. There is no distension.      Palpations: Abdomen is soft.      Tenderness: There is no abdominal tenderness. There is no guarding or rebound.   Musculoskeletal:         General: No tenderness. Normal range of motion.      Cervical back: Normal range of motion and neck supple.   Lymphadenopathy:      Cervical: No cervical adenopathy.   Skin:     General: Skin is warm and dry.      Coloration: Skin is not pale.      Findings: No erythema or rash.   Neurological:       Mental Status: He is alert and oriented to person, place, and time.   Psychiatric:         Behavior: Behavior normal.         Thought Content: Thought content normal.         Judgment: Judgment normal.         CMP  Sodium   Date Value Ref Range Status   06/15/2023 140 136 - 145 mmol/L Final     Potassium   Date Value Ref Range Status   06/15/2023 4.3 3.5 - 5.1 mmol/L Final     Chloride   Date Value Ref Range Status   06/15/2023 106 95 - 110 mmol/L Final     CO2   Date Value Ref Range Status   06/15/2023 24 23 - 29 mmol/L Final     Glucose   Date Value Ref Range Status   06/15/2023 143 (H) 70 - 110 mg/dL Final     BUN   Date Value Ref Range Status   06/15/2023 17 8 - 23 mg/dL Final     Creatinine   Date Value Ref Range Status   06/15/2023 0.9 0.5 - 1.4 mg/dL Final     Calcium   Date Value Ref Range Status   06/15/2023 9.6 8.7 - 10.5 mg/dL Final     Total Protein   Date Value Ref Range Status   06/15/2023 6.9 6.0 - 8.4 g/dL Final     Albumin   Date Value Ref Range Status   06/15/2023 3.9 3.5 - 5.2 g/dL Final     Total Bilirubin   Date Value Ref Range Status   06/15/2023 1.4 (H) 0.1 - 1.0 mg/dL Final     Comment:     For infants and newborns, interpretation of results should be based  on gestational age, weight and in agreement with clinical  observations.    Premature Infant recommended reference ranges:  Up to 24 hours.............<8.0 mg/dL  Up to 48 hours............<12.0 mg/dL  3-5 days..................<15.0 mg/dL  6-29 days.................<15.0 mg/dL       Alkaline Phosphatase   Date Value Ref Range Status   06/15/2023 59 55 - 135 U/L Final     AST   Date Value Ref Range Status   06/15/2023 22 10 - 40 U/L Final     ALT   Date Value Ref Range Status   06/15/2023 25 10 - 44 U/L Final     Anion Gap   Date Value Ref Range Status   06/15/2023 10 8 - 16 mmol/L Final     eGFR if    Date Value Ref Range Status   01/20/2021 >60.0 >60 mL/min/1.73 m^2 Final     eGFR if non    Date Value  Ref Range Status   01/20/2021 >60.0 >60 mL/min/1.73 m^2 Final     Comment:     Calculation used to obtain the estimated glomerular filtration  rate (eGFR) is the CKD-EPI equation.        Lab Results   Component Value Date    WBC 6.33 01/24/2023    HGB 13.3 (L) 01/24/2023    HCT 41.8 01/24/2023    MCV 95 01/24/2023     01/24/2023     Lab Results   Component Value Date    CHOL 168 08/24/2023     Lab Results   Component Value Date    HDL 44 08/24/2023     Lab Results   Component Value Date    LDLCALC 66.6 08/24/2023     Lab Results   Component Value Date    TRIG 287 (H) 08/24/2023     Lab Results   Component Value Date    CHOLHDL 26.2 08/24/2023     Lab Results   Component Value Date    TSH 1.876 09/02/2022     Lab Results   Component Value Date    HGBA1C 5.6 08/24/2023     Assessment:       1. Type 2 diabetes mellitus without complication, without long-term current use of insulin    2. Valvular heart disease    3. Dyslipidemia    4. Primary hypertension    5. Cerebrovascular accident (CVA), unspecified mechanism    6. Hypertriglyceridemia        Plan:   Type 2 diabetes mellitus without complication, without long-term current use of insulin    Valvular heart disease------f/u cards-  Dyslipidemia    Primary hypertension    Cerebrovascular accident (CVA), unspecified mechanism----asa, statin-    Hypertriglyceridemia----watch diet----------consider lovaza--      Stable---------------continue current meds-----------as above----------------hctz prn swelling.      ----------f/u 3 months-

## 2023-09-07 DIAGNOSIS — Z00.00 ROUTINE HEALTH MAINTENANCE: ICD-10-CM

## 2023-09-07 DIAGNOSIS — I10 PRIMARY HYPERTENSION: Primary | ICD-10-CM

## 2023-09-09 NOTE — TELEPHONE ENCOUNTER
No care due was identified.  [facility] Embedded Care Due Messages. Reference number: [id number].   9/09/2023 2:14:13 PM CDT

## 2023-09-11 RX ORDER — LANCETS 33 GAUGE
EACH MISCELLANEOUS
Qty: 200 EACH | Refills: 3 | Status: SHIPPED | OUTPATIENT
Start: 2023-09-11

## 2023-09-11 RX ORDER — MONTELUKAST SODIUM 10 MG/1
10 TABLET ORAL NIGHTLY
Qty: 90 TABLET | Refills: 3 | Status: SHIPPED | OUTPATIENT
Start: 2023-09-11

## 2023-09-11 NOTE — TELEPHONE ENCOUNTER
Refill Routing Note   Medication(s) are not appropriate for processing by [facility] for the following reason(s):      No active prescription written by provider    [facility] action(s):  Defer Care Due:  None identified     Medication Therapy Plan: Montelukast: Patient reported not taking 10.26.22        Appointments  past 12m or future 3m with PCP    Date Provider   Last Visit   8/31/2023 Paul eNal MD   Next Visit   12/5/2023 Paul Neal MD   ED visits in past 90 days: 0        Note composed:10:10 AM 09/11/2023

## 2023-09-12 ENCOUNTER — OFFICE VISIT (OUTPATIENT)
Dept: CARDIOLOGY | Facility: CLINIC | Age: 83
End: 2023-09-12
Payer: COMMERCIAL

## 2023-09-12 ENCOUNTER — HOSPITAL ENCOUNTER (OUTPATIENT)
Dept: CARDIOLOGY | Facility: HOSPITAL | Age: 83
Discharge: HOME | End: 2023-09-12
Attending: INTERNAL MEDICINE
Payer: COMMERCIAL

## 2023-09-12 VITALS
BODY MASS INDEX: 36.42 KG/M2 | HEART RATE: 68 BPM | WEIGHT: 226.63 LBS | SYSTOLIC BLOOD PRESSURE: 110 MMHG | DIASTOLIC BLOOD PRESSURE: 62 MMHG | BODY MASS INDEX: 36.58 KG/M2 | OXYGEN SATURATION: 95 % | HEIGHT: 66 IN | WEIGHT: 226.63 LBS

## 2023-09-12 DIAGNOSIS — I10 PRIMARY HYPERTENSION: ICD-10-CM

## 2023-09-12 DIAGNOSIS — E78.5 DYSLIPIDEMIA: ICD-10-CM

## 2023-09-12 DIAGNOSIS — I50.32 CHRONIC DIASTOLIC CONGESTIVE HEART FAILURE: Primary | ICD-10-CM

## 2023-09-12 DIAGNOSIS — I38 VALVULAR HEART DISEASE: ICD-10-CM

## 2023-09-12 DIAGNOSIS — Z00.00 ROUTINE HEALTH MAINTENANCE: ICD-10-CM

## 2023-09-12 DIAGNOSIS — R06.02 SOB (SHORTNESS OF BREATH): ICD-10-CM

## 2023-09-12 DIAGNOSIS — I63.89 CEREBROVASCULAR ACCIDENT (CVA) DUE TO OTHER MECHANISM: ICD-10-CM

## 2023-09-12 DIAGNOSIS — I87.2 VENOUS INSUFFICIENCY: ICD-10-CM

## 2023-09-12 DIAGNOSIS — R09.89 UNEQUAL BLOOD PRESSURES IN PAIRED EXTREMITIES: ICD-10-CM

## 2023-09-12 DIAGNOSIS — I65.22 STENOSIS OF LEFT CAROTID ARTERY: ICD-10-CM

## 2023-09-12 DIAGNOSIS — E78.1 HYPERTRIGLYCERIDEMIA: ICD-10-CM

## 2023-09-12 PROCEDURE — 1101F PR PT FALLS ASSESS DOC 0-1 FALLS W/OUT INJ PAST YR: ICD-10-PCS | Mod: CPTII,S$GLB,, | Performed by: INTERNAL MEDICINE

## 2023-09-12 PROCEDURE — 99215 OFFICE O/P EST HI 40 MIN: CPT | Mod: S$GLB,,, | Performed by: INTERNAL MEDICINE

## 2023-09-12 PROCEDURE — 1101F PT FALLS ASSESS-DOCD LE1/YR: CPT | Mod: CPTII,S$GLB,, | Performed by: INTERNAL MEDICINE

## 2023-09-12 PROCEDURE — 1125F AMNT PAIN NOTED PAIN PRSNT: CPT | Mod: CPTII,S$GLB,, | Performed by: INTERNAL MEDICINE

## 2023-09-12 PROCEDURE — 99215 PR OFFICE/OUTPT VISIT, EST, LEVL V, 40-54 MIN: ICD-10-PCS | Mod: S$GLB,,, | Performed by: INTERNAL MEDICINE

## 2023-09-12 PROCEDURE — 3288F PR FALLS RISK ASSESSMENT DOCUMENTED: ICD-10-PCS | Mod: CPTII,S$GLB,, | Performed by: INTERNAL MEDICINE

## 2023-09-12 PROCEDURE — 1159F MED LIST DOCD IN RCRD: CPT | Mod: CPTII,S$GLB,, | Performed by: INTERNAL MEDICINE

## 2023-09-12 PROCEDURE — 93005 ELECTROCARDIOGRAM TRACING: CPT

## 2023-09-12 PROCEDURE — 93010 ELECTROCARDIOGRAM REPORT: CPT | Mod: ,,, | Performed by: INTERNAL MEDICINE

## 2023-09-12 PROCEDURE — 3078F PR MOST RECENT DIASTOLIC BLOOD PRESSURE < 80 MM HG: ICD-10-PCS | Mod: CPTII,S$GLB,, | Performed by: INTERNAL MEDICINE

## 2023-09-12 PROCEDURE — 3072F LOW RISK FOR RETINOPATHY: CPT | Mod: CPTII,S$GLB,, | Performed by: INTERNAL MEDICINE

## 2023-09-12 PROCEDURE — 93010 EKG 12-LEAD: ICD-10-PCS | Mod: ,,, | Performed by: INTERNAL MEDICINE

## 2023-09-12 PROCEDURE — 1160F RVW MEDS BY RX/DR IN RCRD: CPT | Mod: CPTII,S$GLB,, | Performed by: INTERNAL MEDICINE

## 2023-09-12 PROCEDURE — 3288F FALL RISK ASSESSMENT DOCD: CPT | Mod: CPTII,S$GLB,, | Performed by: INTERNAL MEDICINE

## 2023-09-12 PROCEDURE — 3074F SYST BP LT 130 MM HG: CPT | Mod: CPTII,S$GLB,, | Performed by: INTERNAL MEDICINE

## 2023-09-12 PROCEDURE — 1160F PR REVIEW ALL MEDS BY PRESCRIBER/CLIN PHARMACIST DOCUMENTED: ICD-10-PCS | Mod: CPTII,S$GLB,, | Performed by: INTERNAL MEDICINE

## 2023-09-12 PROCEDURE — 3074F PR MOST RECENT SYSTOLIC BLOOD PRESSURE < 130 MM HG: ICD-10-PCS | Mod: CPTII,S$GLB,, | Performed by: INTERNAL MEDICINE

## 2023-09-12 PROCEDURE — 3078F DIAST BP <80 MM HG: CPT | Mod: CPTII,S$GLB,, | Performed by: INTERNAL MEDICINE

## 2023-09-12 PROCEDURE — 1159F PR MEDICATION LIST DOCUMENTED IN MEDICAL RECORD: ICD-10-PCS | Mod: CPTII,S$GLB,, | Performed by: INTERNAL MEDICINE

## 2023-09-12 PROCEDURE — 1125F PR PAIN SEVERITY QUANTIFIED, PAIN PRESENT: ICD-10-PCS | Mod: CPTII,S$GLB,, | Performed by: INTERNAL MEDICINE

## 2023-09-12 PROCEDURE — 3072F PR LOW RISK FOR RETINOPATHY: ICD-10-PCS | Mod: CPTII,S$GLB,, | Performed by: INTERNAL MEDICINE

## 2023-09-12 PROCEDURE — 99999 PR PBB SHADOW E&M-EST. PATIENT-LVL V: CPT | Mod: PBBFAC,,, | Performed by: INTERNAL MEDICINE

## 2023-09-12 PROCEDURE — 99999 PR PBB SHADOW E&M-EST. PATIENT-LVL V: ICD-10-PCS | Mod: PBBFAC,,, | Performed by: INTERNAL MEDICINE

## 2023-09-12 RX ORDER — FUROSEMIDE 20 MG/1
20 TABLET ORAL
Qty: 20 TABLET | Refills: 11 | Status: SHIPPED | OUTPATIENT
Start: 2023-09-12 | End: 2023-11-13 | Stop reason: SDUPTHER

## 2023-09-12 NOTE — PROGRESS NOTES
Subjective:   Patient ID:  Jovan Del Cid Jr. is a 83 y.o. male who presents for follow up of Shortness of Breath      82 y/o male, 6 months.   PMH MVP mod MR, acute CVA in , asthma, h/o CVA, HLD, HTN,Hx of Colon Tubulovillous Adenoma, MCMAHAN h/o right neck lipoma, and  obesity      admitted for acute CVA. with R-sided paresthesias The BP was significantly high /102    MRI phillip show Acute lacunar infarcts within the left thalamus . CT head and neck show :No evidence of thromboembolism within the visible branches of the hjciki-hl-Yzevqv.Coarse calcific plaque within the left carotid bifurcation resulting in up to 50% stenosis of the origin/proximal aspect of the left internal carotid artery.   TTE did not show any acute finding  with normal  EF .     After discharge. No fall/ still right body numbness. No chest pain dyspnea. Some dizziness and no faint  BP low and valsartan HCTZ decreased the dose by PCP last week and stopped yesterday   Pt had h/o med noncompliance  HH once a week.     Echo     · There is no evidence of intracardiac shunting.  · The left ventricle is normal in size with concentric remodeling and normal systolic function.  · The estimated ejection fraction is 60%.  · Normal left ventricular diastolic function.  · Normal right ventricular size with normal right ventricular systolic function.  · There is bileaflet mitral prolapse.  · Mild-to-moderate mitral regurgitation.  · Normal central venous pressure (3 mmHg).    03/23 visit  BP controlled. No dizziness faint and chest pain    Interval history  Chronic right arm and leg numbness after the cva in 09/22. With right  shoulder pain  Right arm BP 96/60 and left 110 /62  08/23  and repeat echo in 08/23 EF 55% MVP and mod to severe MR. Normal LV LA size and PASP 30 mmHg  C/o GAMINO, leg swelling, sleeps on 1 pillow and no PND  At Home  to 150 mmhg  EKG today reveals NSR nonspecific STT change                   Past  Medical History:   Diagnosis Date    Acute CVA (cerebrovascular accident) 2022    DANY (acute kidney injury) 2022    Asthma     Bronchitis chronic     Cancer     Chronic diastolic congestive heart failure 2023    Cough     Digestive disorder     Hyperlipidemia     Hypertension     Liver disease     KALYAN (obstructive sleep apnea) 2022    Stroke 2022    Type 2 diabetes mellitus, without long-term current use of insulin 2022    BS didn't check 10/05/2022       Past Surgical History:   Procedure Laterality Date    COLONOSCOPY N/A 2016    Procedure: COLONOSCOPY;  Surgeon: Alonso Dorsey MD;  Location: [facility] ENDO;  Service: Endoscopy;  Laterality: N/A;    COLONOSCOPY N/A 2016    Procedure: COLONOSCOPY;  Surgeon: Alonso Dorsey MD;  Location: [facility];  Service: Endoscopy;  Laterality: N/A;    COLONOSCOPY N/A 2/3/2017    Procedure: COLONOSCOPY;  Surgeon: Alonso Dorsey MD;  Location: [facility];  Service: Endoscopy;  Laterality: N/A;    COLONOSCOPY N/A 2017    Procedure: COLONOSCOPY;  Surgeon: Alonso Dorsey MD;  Location: [facility];  Service: Endoscopy;  Laterality: N/A;    COLONOSCOPY N/A 2021    Procedure: COLONOSCOPY;  Surgeon: Paula Ricardo MD;  Location: [facility];  Service: Endoscopy;  Laterality: N/A;    FLUOROSCOPY N/A 6/15/2018    Procedure: Transjugular liver bx;  Surgeon: Petros Recio MD;  Location: [facility] CATH LAB;  Service: General;  Laterality: N/A;    TONSILLECTOMY         Social History     Tobacco Use    Smoking status: Former     Current packs/day: 0.00     Types: Cigarettes, Pipe     Start date:      Quit date:      Years since quittin.7     Passive exposure: Past    Smokeless tobacco: Never    Tobacco comments:     smoked a pipe - quit smoking    Substance Use Topics    Alcohol use: Not Currently     Alcohol/week: 2.0 - 3.0 standard drinks of alcohol     Types: 2 - 3 Cans of beer per week     Comment:  daily    Drug use: No       Family History   Problem Relation Age of Onset    Cancer Father     Alzheimer's disease Mother     Colon cancer Neg Hx     Melanoma Neg Hx          ROS    Objective:   Physical Exam  HENT:      Head: Normocephalic.   Eyes:      Pupils: Pupils are equal, round, and reactive to light.   Neck:      Thyroid: No thyromegaly.      Vascular: Normal carotid pulses. No carotid bruit or JVD.   Cardiovascular:      Rate and Rhythm: Normal rate and regular rhythm. No extrasystoles are present.     Chest Wall: PMI is not displaced.      Pulses: Normal pulses.           Carotid pulses are 2+ on the right side and 2+ on the left side.     Heart sounds: Normal heart sounds. No murmur heard.     No gallop. No S3 sounds.   Pulmonary:      Effort: No respiratory distress.      Breath sounds: Normal breath sounds. No stridor.   Abdominal:      General: Bowel sounds are normal.      Palpations: Abdomen is soft.      Tenderness: There is no abdominal tenderness. There is no rebound.   Musculoskeletal:         General: Normal range of motion.   Skin:     Findings: No rash.   Neurological:      Mental Status: He is alert and oriented to person, place, and time.   Psychiatric:         Behavior: Behavior normal.         Lab Results   Component Value Date    CHOL 168 08/24/2023    CHOL 113 (L) 01/24/2023    CHOL 215 (H) 09/13/2022     Lab Results   Component Value Date    HDL 44 08/24/2023    HDL 40 01/24/2023    HDL 33 (L) 09/13/2022     Lab Results   Component Value Date    LDLCALC 66.6 08/24/2023    LDLCALC 8.6 (L) 01/24/2023    LDLCALC 142.4 09/13/2022     Lab Results   Component Value Date    TRIG 287 (H) 08/24/2023    TRIG 322 (H) 01/24/2023    TRIG 198 (H) 09/13/2022     Lab Results   Component Value Date    CHOLHDL 26.2 08/24/2023    CHOLHDL 35.4 01/24/2023    CHOLHDL 15.3 (L) 09/13/2022       Chemistry        Component Value Date/Time     06/15/2023 0821    K 4.3 06/15/2023 0821     06/15/2023  0821    CO2 24 06/15/2023 0821    BUN 17 06/15/2023 0821    CREATININE 0.9 06/15/2023 0821     (H) 06/15/2023 0821        Component Value Date/Time    CALCIUM 9.6 06/15/2023 0821    ALKPHOS 59 06/15/2023 0821    AST 22 06/15/2023 0821    ALT 25 06/15/2023 0821    BILITOT 1.4 (H) 06/15/2023 0821    ESTGFRAFRICA >60.0 01/20/2021 1120    EGFRNONAA >60.0 01/20/2021 1120          Lab Results   Component Value Date    HGBA1C 5.6 08/24/2023     Lab Results   Component Value Date    TSH 1.876 09/02/2022     Lab Results   Component Value Date    INR 0.9 09/03/2022    INR 1.0 09/02/2022    INR 1.0 06/15/2018     Lab Results   Component Value Date    WBC 6.33 01/24/2023    HGB 13.3 (L) 01/24/2023    HCT 41.8 01/24/2023    MCV 95 01/24/2023     01/24/2023     BMP  Sodium   Date Value Ref Range Status   06/15/2023 140 136 - 145 mmol/L Final     Potassium   Date Value Ref Range Status   06/15/2023 4.3 3.5 - 5.1 mmol/L Final     Chloride   Date Value Ref Range Status   06/15/2023 106 95 - 110 mmol/L Final     CO2   Date Value Ref Range Status   06/15/2023 24 23 - 29 mmol/L Final     BUN   Date Value Ref Range Status   06/15/2023 17 8 - 23 mg/dL Final     Creatinine   Date Value Ref Range Status   06/15/2023 0.9 0.5 - 1.4 mg/dL Final     Calcium   Date Value Ref Range Status   06/15/2023 9.6 8.7 - 10.5 mg/dL Final     Anion Gap   Date Value Ref Range Status   06/15/2023 10 8 - 16 mmol/L Final     eGFR if    Date Value Ref Range Status   01/20/2021 >60.0 >60 mL/min/1.73 m^2 Final     eGFR if non    Date Value Ref Range Status   01/20/2021 >60.0 >60 mL/min/1.73 m^2 Final     Comment:     Calculation used to obtain the estimated glomerular filtration  rate (eGFR) is the CKD-EPI equation.        BNP  @LABRCNTIP(BNP,BNPTRIAGEBLO)@  @LABRCNTIP(troponini)@  CrCl cannot be calculated (Patient's most recent lab result is older than the maximum 7 days allowed.).  No results found in the last 24  hours.  No results found in the last 24 hours.  No results found in the last 24 hours.    Assessment:      1. Chronic diastolic congestive heart failure    2. Cerebrovascular accident (CVA) due to other mechanism    3. Primary hypertension    4. Dyslipidemia    5. Venous insufficiency    6. Stenosis of left carotid artery    7. Hypertriglyceridemia    8. Valvular heart disease    9. Unequal blood pressures in paired extremities    10. SOB (shortness of breath)      Flow overloaded  Plan:   Add lasix 20 mg 5 times a week  BMP in 2 weeks  Arms arterial US for diff BP reading  Phar MPI for SOB and abnl ekg  Continue asa losartan and statin  RTC in 2m

## 2023-09-14 ENCOUNTER — TELEPHONE (OUTPATIENT)
Dept: CARDIOLOGY | Facility: HOSPITAL | Age: 83
End: 2023-09-14
Payer: COMMERCIAL

## 2023-09-14 NOTE — TELEPHONE ENCOUNTER
----- Message from Tania Kuhn sent at 9/14/2023  9:53 AM CDT -----  Contact: Magali Gamble  ..Type:  Patient Requesting Call    Who Called: Mavis, Spouse  Does the patient know what this is regarding?: Rescheduling 10/9 appts  Would the patient rather a call back or a response via [facility]?  Call  Best Call Back Number: [phone]  Additional Information:

## 2023-09-26 ENCOUNTER — LAB VISIT (OUTPATIENT)
Dept: LAB | Facility: HOSPITAL | Age: 83
End: 2023-09-26
Attending: INTERNAL MEDICINE
Payer: COMMERCIAL

## 2023-09-26 DIAGNOSIS — I50.32 CHRONIC DIASTOLIC CONGESTIVE HEART FAILURE: ICD-10-CM

## 2023-09-26 LAB
ANION GAP SERPL CALC-SCNC: 8 MMOL/L (ref 8–16)
BUN SERPL-MCNC: 19 MG/DL (ref 8–23)
CALCIUM SERPL-MCNC: 9.3 MG/DL (ref 8.7–10.5)
CHLORIDE SERPL-SCNC: 107 MMOL/L (ref 95–110)
CO2 SERPL-SCNC: 25 MMOL/L (ref 23–29)
CREAT SERPL-MCNC: 0.9 MG/DL (ref 0.5–1.4)
EST. GFR  (NO RACE VARIABLE): >60 ML/MIN/1.73 M^2
GLUCOSE SERPL-MCNC: 128 MG/DL (ref 70–110)
POTASSIUM SERPL-SCNC: 4.4 MMOL/L (ref 3.5–5.1)
SODIUM SERPL-SCNC: 140 MMOL/L (ref 136–145)

## 2023-09-26 PROCEDURE — 80048 BASIC METABOLIC PNL TOTAL CA: CPT | Performed by: INTERNAL MEDICINE

## 2023-09-26 PROCEDURE — 36415 COLL VENOUS BLD VENIPUNCTURE: CPT | Performed by: INTERNAL MEDICINE

## 2023-09-28 ENCOUNTER — TELEPHONE (OUTPATIENT)
Dept: CARDIOLOGY | Facility: CLINIC | Age: 83
End: 2023-09-28
Payer: COMMERCIAL

## 2023-09-28 RX ORDER — ROSUVASTATIN CALCIUM 40 MG/1
40 TABLET, COATED ORAL DAILY
Qty: 90 TABLET | Refills: 3 | Status: SHIPPED | OUTPATIENT
Start: 2023-09-28 | End: 2023-09-29 | Stop reason: SDUPTHER

## 2023-09-28 NOTE — TELEPHONE ENCOUNTER
No care due was identified.  [facility] Embedded Care Due Messages. Reference number: [id number].   9/28/2023 10:39:18 AM CDT

## 2023-09-28 NOTE — TELEPHONE ENCOUNTER
Contacted patient; Patient received and understood results with no questions or concerns.     ----- Message from Judd Self MD sent at 9/28/2023  9:32 AM CDT -----  The lab showed kidney function normal  Continue current Rx  F/U as scheduled

## 2023-09-29 RX ORDER — ROSUVASTATIN CALCIUM 40 MG/1
40 TABLET, COATED ORAL DAILY
Qty: 90 TABLET | Refills: 3 | Status: SHIPPED | OUTPATIENT
Start: 2023-09-29 | End: 2023-10-02

## 2023-09-29 RX ORDER — HYDROCHLOROTHIAZIDE 12.5 MG/1
TABLET ORAL
Qty: 15 TABLET | Refills: 0 | OUTPATIENT
Start: 2023-09-29

## 2023-09-29 NOTE — TELEPHONE ENCOUNTER
No care due was identified.  [facility] Embedded Care Due Messages. Reference number: [id number].   9/29/2023 2:36:59 PM CDT

## 2023-09-29 NOTE — TELEPHONE ENCOUNTER
----- Message from Vega Goel sent at 9/29/2023  2:12 PM CDT -----  Contact: [phone]  Calling requesting status of pt medication ,rosuvastatin (CRESTOR) 40 MG Tab(pt was seen in [facility] and was told to have PCP prescribe meds) wife states she have been calling/sending messages and no one has reached out . Please call back today at [phone] . Mirna               [id number] - [place] - [address] [facility] [address]  [address] [place], [place] [address] [address]  Phone: [phone] Fax: [phone]

## 2023-09-29 NOTE — TELEPHONE ENCOUNTER
No care due was identified.  [facility] Embedded Care Due Messages. Reference number: [id number].   9/29/2023 1:01:59 PM CDT

## 2023-09-29 NOTE — TELEPHONE ENCOUNTER
Refill Decision Note   Jovan Del Cid  is requesting a refill authorization.  Brief Assessment and Rationale for Refill:  Quick Discontinue     Medication Therapy Plan:  The original prescription was discontinued on 9/12/2023 by Judd Self MD. Renewing this prescription may not be appropriate.      Comments:     Note composed:1:56 PM 09/29/2023

## 2023-09-29 NOTE — TELEPHONE ENCOUNTER
Pt's wife has not called this office for any refills.    Refill request did not go through.  Please send again

## 2023-10-02 ENCOUNTER — TELEPHONE (OUTPATIENT)
Dept: FAMILY MEDICINE | Facility: CLINIC | Age: 83
End: 2023-10-02
Payer: COMMERCIAL

## 2023-10-02 RX ORDER — ROSUVASTATIN CALCIUM 40 MG/1
40 TABLET, COATED ORAL
Qty: 90 TABLET | Refills: 2 | Status: SHIPPED | OUTPATIENT
Start: 2023-10-02

## 2023-10-02 RX ORDER — HYDROCHLOROTHIAZIDE 12.5 MG/1
TABLET ORAL
Qty: 15 TABLET | Refills: 0 | OUTPATIENT
Start: 2023-10-02

## 2023-10-02 NOTE — TELEPHONE ENCOUNTER
----- Message from Yara Mcconnell sent at 10/2/2023  2:01 PM CDT -----  Contact: Wife Mavis [phone]  Requesting an RX refill or new RX.    Is this a refill or new RX: refill    RX name and strength (copy/paste from chart):  rosuvastatin (CRESTOR) 40 MG Tab    Is this a 30 day or 90 day RX: 90    Pharmacy name and phone # (copy/paste from chart):      Wife is requesting a rx script and available for pickup on tomorrow. Wife is requesting a call back.

## 2023-10-02 NOTE — TELEPHONE ENCOUNTER
No care due was identified.  [facility] Embedded Care Due Messages. Reference number: [id number].   10/02/2023 6:16:52 PM CDT

## 2023-10-03 NOTE — TELEPHONE ENCOUNTER
Refill Decision Note   Jovan Del Rioin  is requesting a refill authorization.  Brief Assessment and Rationale for Refill:  Quick Discontinue  Approve     Medication Therapy Plan:  Hydrochlorothiazide jayce'bipin (9/12/23)      Comments:     Note composed:11:06 PM 10/02/2023

## 2023-10-04 RX ORDER — GLIPIZIDE 2.5 MG/1
TABLET, EXTENDED RELEASE ORAL
Qty: 90 TABLET | Refills: 1 | Status: SHIPPED | OUTPATIENT
Start: 2023-10-04

## 2023-10-04 NOTE — TELEPHONE ENCOUNTER
Refill Decision Note   Jovan Del Rioin  is requesting a refill authorization.  Brief Assessment and Rationale for Refill:  Approve     Medication Therapy Plan:         Comments:     Note composed:6:47 PM 10/04/2023

## 2023-10-04 NOTE — TELEPHONE ENCOUNTER
No care due was identified.  [facility] Embedded Care Due Messages. Reference number: [id number].   10/04/2023 6:04:42 PM CDT

## 2023-10-19 ENCOUNTER — HOSPITAL ENCOUNTER (OUTPATIENT)
Dept: RADIOLOGY | Facility: HOSPITAL | Age: 83
Discharge: HOME | End: 2023-10-19
Attending: INTERNAL MEDICINE
Payer: COMMERCIAL

## 2023-10-19 ENCOUNTER — HOSPITAL ENCOUNTER (OUTPATIENT)
Dept: CARDIOLOGY | Facility: HOSPITAL | Age: 83
Discharge: HOME | End: 2023-10-19
Attending: INTERNAL MEDICINE
Payer: COMMERCIAL

## 2023-10-19 ENCOUNTER — TELEPHONE (OUTPATIENT)
Dept: CARDIOLOGY | Facility: CLINIC | Age: 83
End: 2023-10-19
Payer: COMMERCIAL

## 2023-10-19 VITALS
SYSTOLIC BLOOD PRESSURE: 110 MMHG | BODY MASS INDEX: 36.32 KG/M2 | WEIGHT: 226 LBS | DIASTOLIC BLOOD PRESSURE: 62 MMHG | HEIGHT: 66 IN

## 2023-10-19 DIAGNOSIS — R09.89 UNEQUAL BLOOD PRESSURES IN PAIRED EXTREMITIES: ICD-10-CM

## 2023-10-19 DIAGNOSIS — R06.02 SOB (SHORTNESS OF BREATH): ICD-10-CM

## 2023-10-19 LAB
CV STRESS BASE HR: 57 BPM
DIASTOLIC BLOOD PRESSURE: 83 MMHG
LEFT AXILLARY ARTERY: 115 CM/S
LEFT DIST SUBCLAVIAN ARTERY: 50 CM
LEFT MID SUBCLAVIAN ARTERY: 80 CM/S
LEFT PROX SUBCLAVIAN ARTERY: 80 CM/S
NUC REST EJECTION FRACTION: 68
NUC STRESS EJECTION FRACTION: 74 %
OHS CV CPX 85 PERCENT MAX PREDICTED HEART RATE MALE: 116
OHS CV CPX MAX PREDICTED HEART RATE: 137
OHS CV CPX PATIENT IS FEMALE: 0
OHS CV CPX PATIENT IS MALE: 1
OHS CV CPX PEAK DIASTOLIC BLOOD PRESSURE: 65 MMHG
OHS CV CPX PEAK HEAR RATE: 83 BPM
OHS CV CPX PEAK RATE PRESSURE PRODUCT: NORMAL
OHS CV CPX PEAK SYSTOLIC BLOOD PRESSURE: 150 MMHG
OHS CV CPX PERCENT MAX PREDICTED HEART RATE ACHIEVED: 61
OHS CV CPX RATE PRESSURE PRODUCT PRESENTING: 8436
RIGHT AXILLARY ARTERY MAPPING: 76 CM
RIGHT DIST SUBCLAVIAN ARTERY: 80 CM
RIGHT MID SUBCLAVIAN ARTERY: 109 CM
RIGHT PROX SUBCLAVIAN ARTERY: 113 CM
SYSTOLIC BLOOD PRESSURE: 148 MMHG
UPPER ARTERIAL LEFT ARM AXILLARY SYS MAX: 115 CM/S
UPPER ARTERIAL LEFT ARM BRACHIAL SYS MAX: 122 CM/S
UPPER ARTERIAL LEFT ARM RADIAL SYS MAX: 116 CM/S
UPPER ARTERIAL LEFT ARM SUBCLAVIAN SYS MAX: 80 CM/S
UPPER ARTERIAL LEFT ARM ULNAR SYS MAX: 92 CM/S
UPPER ARTERIAL RIGHT ARM AXILLARY SYS MAX: 76 CM/S
UPPER ARTERIAL RIGHT ARM BRACHIAL SYS MAX: 142 CM/S
UPPER ARTERIAL RIGHT ARM RADIAL SYS MAX: 70 CM/S
UPPER ARTERIAL RIGHT ARM SUBCLAVIAN SYS MAX: 109 CM/S
UPPER ARTERIAL RIGHT ARM ULNAR SYS MAX: 134 CM/S

## 2023-10-19 PROCEDURE — 93930 UPPER EXTREMITY STUDY: CPT | Mod: 50

## 2023-10-19 PROCEDURE — 93018 CV STRESS TEST I&R ONLY: CPT | Mod: ,,, | Performed by: INTERNAL MEDICINE

## 2023-10-19 PROCEDURE — 78452 HT MUSCLE IMAGE SPECT MULT: CPT | Mod: 26,,, | Performed by: INTERNAL MEDICINE

## 2023-10-19 PROCEDURE — 93016 CV STRESS TEST SUPVJ ONLY: CPT | Mod: ,,, | Performed by: INTERNAL MEDICINE

## 2023-10-19 PROCEDURE — 93018 NUCLEAR STRESS - CARDIOLOGY INTERPRETED (CUPID ONLY): ICD-10-PCS | Mod: ,,, | Performed by: INTERNAL MEDICINE

## 2023-10-19 PROCEDURE — 93016 NUCLEAR STRESS - CARDIOLOGY INTERPRETED (CUPID ONLY): ICD-10-PCS | Mod: ,,, | Performed by: INTERNAL MEDICINE

## 2023-10-19 PROCEDURE — 63600175 PHARM REV CODE 636 W HCPCS: Performed by: INTERNAL MEDICINE

## 2023-10-19 PROCEDURE — 93017 CV STRESS TEST TRACING ONLY: CPT

## 2023-10-19 PROCEDURE — 93930 CV US DOPPLER ARTERIAL ARMS BILATERAL (CUPID ONLY): ICD-10-PCS | Mod: 26,,, | Performed by: INTERNAL MEDICINE

## 2023-10-19 PROCEDURE — 78452 NUCLEAR STRESS - CARDIOLOGY INTERPRETED (CUPID ONLY): ICD-10-PCS | Mod: 26,,, | Performed by: INTERNAL MEDICINE

## 2023-10-19 PROCEDURE — 78452 HT MUSCLE IMAGE SPECT MULT: CPT

## 2023-10-19 PROCEDURE — 93930 UPPER EXTREMITY STUDY: CPT | Mod: 26,,, | Performed by: INTERNAL MEDICINE

## 2023-10-19 RX ORDER — REGADENOSON 0.08 MG/ML
0.4 INJECTION, SOLUTION INTRAVENOUS ONCE
Status: COMPLETED | OUTPATIENT
Start: 2023-10-19 | End: 2023-10-19

## 2023-10-19 RX ADMIN — REGADENOSON 0.4 MG: 0.08 INJECTION, SOLUTION INTRAVENOUS at 09:10

## 2023-10-19 NOTE — TELEPHONE ENCOUNTER
Contacted patient; Patient received and understood results with no questions or concerns.         ----- Message from Judd Self MD sent at 10/19/2023  2:57 PM CDT -----  Nuke stress test normal. no ischemia  Arm arterial duplex us showed no blockage  Continue current Rx  F/U as scheduled

## 2023-11-10 ENCOUNTER — OFFICE VISIT (OUTPATIENT)
Dept: OTOLARYNGOLOGY | Facility: CLINIC | Age: 83
End: 2023-11-10
Payer: COMMERCIAL

## 2023-11-10 VITALS — BODY MASS INDEX: 37.88 KG/M2 | WEIGHT: 235.69 LBS | HEIGHT: 66 IN

## 2023-11-10 DIAGNOSIS — R05.3 CHRONIC COUGH: Primary | ICD-10-CM

## 2023-11-10 PROCEDURE — 3072F LOW RISK FOR RETINOPATHY: CPT | Mod: CPTII,S$GLB,, | Performed by: STUDENT IN AN ORGANIZED HEALTH CARE EDUCATION/TRAINING PROGRAM

## 2023-11-10 PROCEDURE — 99999 PR PBB SHADOW E&M-EST. PATIENT-LVL IV: CPT | Mod: PBBFAC,,, | Performed by: STUDENT IN AN ORGANIZED HEALTH CARE EDUCATION/TRAINING PROGRAM

## 2023-11-10 PROCEDURE — 1159F PR MEDICATION LIST DOCUMENTED IN MEDICAL RECORD: ICD-10-PCS | Mod: CPTII,S$GLB,, | Performed by: STUDENT IN AN ORGANIZED HEALTH CARE EDUCATION/TRAINING PROGRAM

## 2023-11-10 PROCEDURE — 1101F PR PT FALLS ASSESS DOC 0-1 FALLS W/OUT INJ PAST YR: ICD-10-PCS | Mod: CPTII,S$GLB,, | Performed by: STUDENT IN AN ORGANIZED HEALTH CARE EDUCATION/TRAINING PROGRAM

## 2023-11-10 PROCEDURE — 1126F PR PAIN SEVERITY QUANTIFIED, NO PAIN PRESENT: ICD-10-PCS | Mod: CPTII,S$GLB,, | Performed by: STUDENT IN AN ORGANIZED HEALTH CARE EDUCATION/TRAINING PROGRAM

## 2023-11-10 PROCEDURE — 1101F PT FALLS ASSESS-DOCD LE1/YR: CPT | Mod: CPTII,S$GLB,, | Performed by: STUDENT IN AN ORGANIZED HEALTH CARE EDUCATION/TRAINING PROGRAM

## 2023-11-10 PROCEDURE — 99203 PR OFFICE/OUTPT VISIT, NEW, LEVL III, 30-44 MIN: ICD-10-PCS | Mod: 25,S$GLB,, | Performed by: STUDENT IN AN ORGANIZED HEALTH CARE EDUCATION/TRAINING PROGRAM

## 2023-11-10 PROCEDURE — 1159F MED LIST DOCD IN RCRD: CPT | Mod: CPTII,S$GLB,, | Performed by: STUDENT IN AN ORGANIZED HEALTH CARE EDUCATION/TRAINING PROGRAM

## 2023-11-10 PROCEDURE — 3288F PR FALLS RISK ASSESSMENT DOCUMENTED: ICD-10-PCS | Mod: CPTII,S$GLB,, | Performed by: STUDENT IN AN ORGANIZED HEALTH CARE EDUCATION/TRAINING PROGRAM

## 2023-11-10 PROCEDURE — 31575 PR LARYNGOSCOPY, FLEXIBLE; DIAGNOSTIC: ICD-10-PCS | Mod: S$GLB,,, | Performed by: STUDENT IN AN ORGANIZED HEALTH CARE EDUCATION/TRAINING PROGRAM

## 2023-11-10 PROCEDURE — 1126F AMNT PAIN NOTED NONE PRSNT: CPT | Mod: CPTII,S$GLB,, | Performed by: STUDENT IN AN ORGANIZED HEALTH CARE EDUCATION/TRAINING PROGRAM

## 2023-11-10 PROCEDURE — 99203 OFFICE O/P NEW LOW 30 MIN: CPT | Mod: 25,S$GLB,, | Performed by: STUDENT IN AN ORGANIZED HEALTH CARE EDUCATION/TRAINING PROGRAM

## 2023-11-10 PROCEDURE — 31575 DIAGNOSTIC LARYNGOSCOPY: CPT | Mod: S$GLB,,, | Performed by: STUDENT IN AN ORGANIZED HEALTH CARE EDUCATION/TRAINING PROGRAM

## 2023-11-10 PROCEDURE — 99999 PR PBB SHADOW E&M-EST. PATIENT-LVL IV: ICD-10-PCS | Mod: PBBFAC,,, | Performed by: STUDENT IN AN ORGANIZED HEALTH CARE EDUCATION/TRAINING PROGRAM

## 2023-11-10 PROCEDURE — 3288F FALL RISK ASSESSMENT DOCD: CPT | Mod: CPTII,S$GLB,, | Performed by: STUDENT IN AN ORGANIZED HEALTH CARE EDUCATION/TRAINING PROGRAM

## 2023-11-10 PROCEDURE — 3072F PR LOW RISK FOR RETINOPATHY: ICD-10-PCS | Mod: CPTII,S$GLB,, | Performed by: STUDENT IN AN ORGANIZED HEALTH CARE EDUCATION/TRAINING PROGRAM

## 2023-11-10 RX ORDER — IPRATROPIUM BROMIDE 21 UG/1
2 SPRAY, METERED NASAL 2 TIMES DAILY
Qty: 30 ML | Refills: 0 | Status: SHIPPED | OUTPATIENT
Start: 2023-11-10

## 2023-11-10 NOTE — PROGRESS NOTES
Chief complaint:    Chief Complaint   Patient presents with    Cough     Coughing ongoing for a while. It became worst last week.           Referring Provider:  Self, Aaareferral  No address on file      History of present illness:     Mr. Del Cid is a 83 y.o. w/diastnoic HF, asthma, acute CVA, MVP presenting for evaluation of cough.       Onset of chief complaint was 3-4 years ago. Worsening over last 3-4 weeks.    Inciting incident includes: none apparent.   Frequency of coughing: daily, all day, but it is worse at night and first thing in the morning    Sputum production with coughing: some clear secretions.  Post-tussive vomiting: no.  Associated symptoms include: wheezing (worse over the last week or so), and SOB but that is stable   The patient denies rhinorrhea, facial pressure, nasal congestion, heart burn,  neck mass, odynophagia, dysphagia, otalgia, unusual bleeding, or unintentional weight loss.        History      Past Medical History:   Past Medical History:   Diagnosis Date    Acute CVA (cerebrovascular accident) 09/08/2022    DANY (acute kidney injury) 09/14/2022    Asthma     Bronchitis chronic     Cancer     Chronic diastolic congestive heart failure 9/12/2023    Cough     Digestive disorder     Hyperlipidemia     Hypertension     Liver disease     KALYAN (obstructive sleep apnea) 09/08/2022    Stroke 09/01/2022    Type 2 diabetes mellitus, without long-term current use of insulin 09/07/2022    BS didn't check 10/05/2022         Past Surgical History:  Past Surgical History:   Procedure Laterality Date    COLONOSCOPY N/A 6/21/2016    Procedure: COLONOSCOPY;  Surgeon: Alonso Dorsey MD;  Location: [facility] ENDO;  Service: Endoscopy;  Laterality: N/A;    COLONOSCOPY N/A 11/1/2016    Procedure: COLONOSCOPY;  Surgeon: Alonso Dorsey MD;  Location: [facility] ENDO;  Service: Endoscopy;  Laterality: N/A;    COLONOSCOPY N/A 2/3/2017    Procedure: COLONOSCOPY;  Surgeon: Alonso Dorsey MD;  Location: [facility]  "ENDO;  Service: Endoscopy;  Laterality: N/A;    COLONOSCOPY N/A 11/13/2017    Procedure: COLONOSCOPY;  Surgeon: Alonso Dorsey MD;  Location: [facility] ENDO;  Service: Endoscopy;  Laterality: N/A;    COLONOSCOPY N/A 2/19/2021    Procedure: COLONOSCOPY;  Surgeon: Paula Ricardo MD;  Location: [facility] ENDO;  Service: Endoscopy;  Laterality: N/A;    FLUOROSCOPY N/A 6/15/2018    Procedure: Transjugular liver bx;  Surgeon: Petros Recio MD;  Location: [facility] CATH LAB;  Service: General;  Laterality: N/A;    TONSILLECTOMY           Medications: Medication list reviewed. He  has a current medication list which includes the following prescription(s): benzonatate, blood sugar diagnostic, blood sugar diagnostic, advair hfa, fluticasone propionate, furosemide, glipizide, lancets, losartan, montelukast, multivitamin, onetouch delica plus lancet, pen needle, diabetic, rosuvastatin, triamcinolone acetonide 0.1%, albuterol, aspirin, blood-glucose meter, ipratropium, lancing device, and pantoprazole, and the following Facility-Administered Medications: epinephrine and ondansetron.     Allergies: Review of patient's allergies indicates:  No Known Allergies      Family history: family history includes Alzheimer's disease in his mother; Cancer in his father.         Social History          Alcohol use:  reports that he does not currently use alcohol after a past usage of about 2.0 - 3.0 standard drinks of alcohol per week.            Tobacco:  reports that he quit smoking about 63 years ago. His smoking use included cigarettes and pipe. He started smoking about 66 years ago. He has been exposed to tobacco smoke. He has never used smokeless tobacco.         Physical Examination      Vitals: Height 5' 6" (1.676 m), weight 106.9 kg (235 lb 10.8 oz).      General: Well developed, well nourished, well hydrated.    Voice: no hoarseness, no dysarthria      Head/Face: Normocephalic, atraumatic. No scars or lesions. Facial musculature " equal.     Eyes: No scleral icterus or conjunctival hemorrhage. EOMI. PERRLA.     Ears:     Right ear: No gross deformity. EAC is clear of debris and erythema. TM are intact with a pneumatized middle ear. No signs of retraction, fluid or infection.      Left ear: No gross deformity. EAC is clear of debris and erythema. TM are intact with a pneumatized middle ear. No signs of retraction, fluid or infection.      Nose: No gross deformity or lesions. No purulent discharge. No significant NSD.      Mouth/Oropharynx: Lips without any lesions. No mucosal lesions within the oropharynx. No tonsillar exudate or lesions. Pharyngeal walls symmetrical. Uvula midline. Tongue midline without lesions.     Neck: Trachea midline. No masses. No thyromegaly or nodules palpated.     Lymphatic: No lymphadenopathy in the neck.     Extremities: No cyanosis. Warm and well-perfused.     Skin: No scars or lesions on face or neck.      Neurologic: Moving all extremities without gross abnormality.CN II-XII grossly intact. House-Brackmann 1/6. No signs of nystagmus.          Data reviewed      Review of records:      I reviewed records from the referring provider's office visits.  These describe the history, workup, and/or treatment of this problem thus far:           Procedures:    Procedure -Transnasal fiberoptic laryngoscopy     Surgeon: Mendel Mas M.D. .      Anesthesia: topical 0.05% oxymetazoline with 4% lidocaine      Complications: None.     Description of Procedure: With the patient in the sitting position, topical lidocaine and oxymetazoline was applied to the nose. The scope was passed through the nose. Examination was carried out of the nose, nasopharynx, oropharynx, hypopharynx, and larynx with findings as noted above. Scope was removed. The patient tolerated the procedure well.      Findings: No masses or lesions in the nose, nasopharynx, oropharynx, hypopharynx, or larynx. Vocal fold abduction and adduction is normal. No  pooling of secretions in the piriform sinuses, penetration, or aspiration. Medialized left > right carotid         Assessment/Plan:    1. Chronic cough            Jovan presents today for initial evaluation with me of chronic cough.  We discussed the cough is very complex symptom as it may arise from numerous sources and frequently represents a combination of contributing factors resulting in chronic cough.    Based on the history, prior workup and exam the patient's cough the etiology appears to be most likely related to worsening heart failure or primary lung disease. He does have some post nasal drip and clear rhinorrhea. We discussed a trial of atrovent and continued f/u with cardiology/pulm.      Mendel Mas MD  [facility]   [facility]  [address]  [place] [address]  P: [phone]  F: [phone]

## 2023-11-13 ENCOUNTER — OFFICE VISIT (OUTPATIENT)
Dept: CARDIOLOGY | Facility: CLINIC | Age: 83
End: 2023-11-13
Payer: COMMERCIAL

## 2023-11-13 ENCOUNTER — HOSPITAL ENCOUNTER (OUTPATIENT)
Dept: RADIOLOGY | Facility: HOSPITAL | Age: 83
Discharge: HOME | End: 2023-11-13
Attending: INTERNAL MEDICINE
Payer: COMMERCIAL

## 2023-11-13 VITALS
DIASTOLIC BLOOD PRESSURE: 80 MMHG | SYSTOLIC BLOOD PRESSURE: 122 MMHG | WEIGHT: 233.69 LBS | BODY MASS INDEX: 37.72 KG/M2

## 2023-11-13 DIAGNOSIS — I38 VALVULAR HEART DISEASE: ICD-10-CM

## 2023-11-13 DIAGNOSIS — I10 PRIMARY HYPERTENSION: ICD-10-CM

## 2023-11-13 DIAGNOSIS — I65.22 STENOSIS OF LEFT CAROTID ARTERY: ICD-10-CM

## 2023-11-13 DIAGNOSIS — I87.2 VENOUS INSUFFICIENCY: ICD-10-CM

## 2023-11-13 DIAGNOSIS — E11.9 TYPE 2 DIABETES MELLITUS WITHOUT COMPLICATION, WITHOUT LONG-TERM CURRENT USE OF INSULIN: ICD-10-CM

## 2023-11-13 DIAGNOSIS — E85.9 AMYLOIDOSIS, UNSPECIFIED TYPE: ICD-10-CM

## 2023-11-13 DIAGNOSIS — G47.33 OBSTRUCTIVE SLEEP APNEA: ICD-10-CM

## 2023-11-13 DIAGNOSIS — K75.81 NASH (NONALCOHOLIC STEATOHEPATITIS): ICD-10-CM

## 2023-11-13 DIAGNOSIS — I50.32 CHRONIC DIASTOLIC CONGESTIVE HEART FAILURE: Primary | ICD-10-CM

## 2023-11-13 DIAGNOSIS — E78.5 DYSLIPIDEMIA: ICD-10-CM

## 2023-11-13 DIAGNOSIS — I63.89 CEREBROVASCULAR ACCIDENT (CVA) DUE TO OTHER MECHANISM: ICD-10-CM

## 2023-11-13 PROCEDURE — 99999 PR PBB SHADOW E&M-EST. PATIENT-LVL IV: ICD-10-PCS | Mod: PBBFAC,,, | Performed by: INTERNAL MEDICINE

## 2023-11-13 PROCEDURE — 3072F LOW RISK FOR RETINOPATHY: CPT | Mod: CPTII,S$GLB,, | Performed by: INTERNAL MEDICINE

## 2023-11-13 PROCEDURE — 1101F PR PT FALLS ASSESS DOC 0-1 FALLS W/OUT INJ PAST YR: ICD-10-PCS | Mod: CPTII,S$GLB,, | Performed by: INTERNAL MEDICINE

## 2023-11-13 PROCEDURE — 71046 X-RAY EXAM CHEST 2 VIEWS: CPT | Mod: TC

## 2023-11-13 PROCEDURE — 3288F PR FALLS RISK ASSESSMENT DOCUMENTED: ICD-10-PCS | Mod: CPTII,S$GLB,, | Performed by: INTERNAL MEDICINE

## 2023-11-13 PROCEDURE — 1101F PT FALLS ASSESS-DOCD LE1/YR: CPT | Mod: CPTII,S$GLB,, | Performed by: INTERNAL MEDICINE

## 2023-11-13 PROCEDURE — 1160F PR REVIEW ALL MEDS BY PRESCRIBER/CLIN PHARMACIST DOCUMENTED: ICD-10-PCS | Mod: CPTII,S$GLB,, | Performed by: INTERNAL MEDICINE

## 2023-11-13 PROCEDURE — 3072F PR LOW RISK FOR RETINOPATHY: ICD-10-PCS | Mod: CPTII,S$GLB,, | Performed by: INTERNAL MEDICINE

## 2023-11-13 PROCEDURE — 3079F PR MOST RECENT DIASTOLIC BLOOD PRESSURE 80-89 MM HG: ICD-10-PCS | Mod: CPTII,S$GLB,, | Performed by: INTERNAL MEDICINE

## 2023-11-13 PROCEDURE — 71046 X-RAY EXAM CHEST 2 VIEWS: CPT | Mod: 26,,, | Performed by: RADIOLOGY

## 2023-11-13 PROCEDURE — 3079F DIAST BP 80-89 MM HG: CPT | Mod: CPTII,S$GLB,, | Performed by: INTERNAL MEDICINE

## 2023-11-13 PROCEDURE — 71046 XR CHEST PA AND LATERAL: ICD-10-PCS | Mod: 26,,, | Performed by: RADIOLOGY

## 2023-11-13 PROCEDURE — 1160F RVW MEDS BY RX/DR IN RCRD: CPT | Mod: CPTII,S$GLB,, | Performed by: INTERNAL MEDICINE

## 2023-11-13 PROCEDURE — 99999 PR PBB SHADOW E&M-EST. PATIENT-LVL IV: CPT | Mod: PBBFAC,,, | Performed by: INTERNAL MEDICINE

## 2023-11-13 PROCEDURE — 3288F FALL RISK ASSESSMENT DOCD: CPT | Mod: CPTII,S$GLB,, | Performed by: INTERNAL MEDICINE

## 2023-11-13 PROCEDURE — 1159F MED LIST DOCD IN RCRD: CPT | Mod: CPTII,S$GLB,, | Performed by: INTERNAL MEDICINE

## 2023-11-13 PROCEDURE — 3074F PR MOST RECENT SYSTOLIC BLOOD PRESSURE < 130 MM HG: ICD-10-PCS | Mod: CPTII,S$GLB,, | Performed by: INTERNAL MEDICINE

## 2023-11-13 PROCEDURE — 99214 OFFICE O/P EST MOD 30 MIN: CPT | Mod: S$GLB,,, | Performed by: INTERNAL MEDICINE

## 2023-11-13 PROCEDURE — 3074F SYST BP LT 130 MM HG: CPT | Mod: CPTII,S$GLB,, | Performed by: INTERNAL MEDICINE

## 2023-11-13 PROCEDURE — 1159F PR MEDICATION LIST DOCUMENTED IN MEDICAL RECORD: ICD-10-PCS | Mod: CPTII,S$GLB,, | Performed by: INTERNAL MEDICINE

## 2023-11-13 PROCEDURE — 99214 PR OFFICE/OUTPT VISIT, EST, LEVL IV, 30-39 MIN: ICD-10-PCS | Mod: S$GLB,,, | Performed by: INTERNAL MEDICINE

## 2023-11-13 RX ORDER — FUROSEMIDE 20 MG/1
20 TABLET ORAL
Qty: 20 TABLET | Refills: 11 | Status: SHIPPED | OUTPATIENT
Start: 2023-11-13 | End: 2024-02-26

## 2023-11-13 NOTE — PROGRESS NOTES
Subjective:   Patient ID:  Jovan DelC id Jr. is a 83 y.o. male who presents for follow up of No chief complaint on file.      84 y/o male, 2 months.   PMH MVP mod MR, CHFpEF, acute CVA in , asthma, h/o CVA, HLD, HTN,Hx of Colon Tubulovillous Adenoma, MCMAHAN h/o right neck lipoma, and  obesity      admitted for acute CVA. with R-sided paresthesias The BP was significantly high /102    MRI phillip show Acute lacunar infarcts within the left thalamus . CT head and neck show :No evidence of thromboembolism within the visible branches of the [id number].Coarse calcific plaque within the left carotid bifurcation resulting in up to 50% stenosis of the origin/proximal aspect of the left internal carotid artery.   TTE did not show any acute finding  with normal  EF .     After discharge. No fall/ still right body numbness. No chest pain dyspnea. Some dizziness and no faint  BP low and valsartan HCTZ decreased the dose by PCP last week and stopped yesterday   Pt had h/o med noncompliance  HH once a week.     Echo     · There is no evidence of intracardiac shunting.  · The left ventricle is normal in size with concentric remodeling and normal systolic function.  · The estimated ejection fraction is 60%.  · Normal left ventricular diastolic function.  · Normal right ventricular size with normal right ventricular systolic function.  · There is bileaflet mitral prolapse.  · Mild-to-moderate mitral regurgitation.  · Normal central venous pressure (3 mmHg).    03/23 visit  BP controlled. No dizziness faint and chest pain    09/23 visit  Chronic right arm and leg numbness after the cva in 09/22. With right  shoulder pain  Right arm BP 96/60 and left 110 /62  08/23  and repeat echo in 08/23 EF 55% MVP and mod to severe MR. Normal LV LA size and PASP 30 mmHg  C/o GAMINO, leg swelling, sleeps on 1 pillow and no PND  At Home  to 150 mmhg  EKG today reveals NSR nonspecific STT change    Interval  history  10/23 Phar MPI No ischemia and arm arterial US normal.  Not taking too much lasix, cough and wheezing worse for a week                   Past Medical History:   Diagnosis Date    Acute CVA (cerebrovascular accident) 2022    DANY (acute kidney injury) 2022    Asthma     Bronchitis chronic     Cancer     Chronic diastolic congestive heart failure 2023    Cough     Digestive disorder     Hyperlipidemia     Hypertension     Liver disease     KALYAN (obstructive sleep apnea) 2022    Stroke 2022    Type 2 diabetes mellitus, without long-term current use of insulin 2022    BS didn't check 10/05/2022       Past Surgical History:   Procedure Laterality Date    COLONOSCOPY N/A 2016    Procedure: COLONOSCOPY;  Surgeon: Alonso Dorsey MD;  Location: [facility];  Service: Endoscopy;  Laterality: N/A;    COLONOSCOPY N/A 2016    Procedure: COLONOSCOPY;  Surgeon: Alonso Dorsey MD;  Location: [facility];  Service: Endoscopy;  Laterality: N/A;    COLONOSCOPY N/A 2/3/2017    Procedure: COLONOSCOPY;  Surgeon: Alonso Dorsey MD;  Location: [facility];  Service: Endoscopy;  Laterality: N/A;    COLONOSCOPY N/A 2017    Procedure: COLONOSCOPY;  Surgeon: Alonso Dorsey MD;  Location: [facility];  Service: Endoscopy;  Laterality: N/A;    COLONOSCOPY N/A 2021    Procedure: COLONOSCOPY;  Surgeon: Paula Ricardo MD;  Location: [facility] ENDO;  Service: Endoscopy;  Laterality: N/A;    FLUOROSCOPY N/A 6/15/2018    Procedure: Transjugular liver bx;  Surgeon: Petros Recio MD;  Location: [facility] CATH LAB;  Service: General;  Laterality: N/A;    TONSILLECTOMY         Social History     Tobacco Use    Smoking status: Former     Current packs/day: 0.00     Types: Cigarettes, Pipe     Start date:      Quit date:      Years since quittin.9     Passive exposure: Past    Smokeless tobacco: Never    Tobacco comments:     smoked a pipe - quit smoking 1960   Substance  Use Topics    Alcohol use: Not Currently     Alcohol/week: 2.0 - 3.0 standard drinks of alcohol     Types: 2 - 3 Cans of beer per week     Comment: daily    Drug use: No       Family History   Problem Relation Age of Onset    Cancer Father     Alzheimer's disease Mother     Colon cancer Neg Hx     Melanoma Neg Hx          ROS    Objective:   Physical Exam  HENT:      Head: Normocephalic.   Eyes:      Pupils: Pupils are equal, round, and reactive to light.   Neck:      Thyroid: No thyromegaly.      Vascular: Normal carotid pulses. No carotid bruit or JVD.   Cardiovascular:      Rate and Rhythm: Normal rate and regular rhythm. No extrasystoles are present.     Chest Wall: PMI is not displaced.      Pulses: Normal pulses.           Carotid pulses are 2+ on the right side and 2+ on the left side.     Heart sounds: Normal heart sounds. No murmur heard.     No gallop. No S3 sounds.   Pulmonary:      Effort: No respiratory distress.      Breath sounds: No stridor. Rhonchi present.   Abdominal:      General: Bowel sounds are normal.      Palpations: Abdomen is soft.      Tenderness: There is no abdominal tenderness. There is no rebound.   Musculoskeletal:         General: Normal range of motion.   Skin:     Findings: No rash.   Neurological:      Mental Status: He is alert and oriented to person, place, and time.   Psychiatric:         Behavior: Behavior normal.         Lab Results   Component Value Date    CHOL 168 08/24/2023    CHOL 113 (L) 01/24/2023    CHOL 215 (H) 09/13/2022     Lab Results   Component Value Date    HDL 44 08/24/2023    HDL 40 01/24/2023    HDL 33 (L) 09/13/2022     Lab Results   Component Value Date    LDLCALC 66.6 08/24/2023    LDLCALC 8.6 (L) 01/24/2023    LDLCALC 142.4 09/13/2022     Lab Results   Component Value Date    TRIG 287 (H) 08/24/2023    TRIG 322 (H) 01/24/2023    TRIG 198 (H) 09/13/2022     Lab Results   Component Value Date    CHOLHDL 26.2 08/24/2023    CHOLHDL 35.4 01/24/2023     CHOLHDL 15.3 (L) 09/13/2022       Chemistry        Component Value Date/Time     09/26/2023 0935    K 4.4 09/26/2023 0935     09/26/2023 0935    CO2 25 09/26/2023 0935    BUN 19 09/26/2023 0935    CREATININE 0.9 09/26/2023 0935     (H) 09/26/2023 0935        Component Value Date/Time    CALCIUM 9.3 09/26/2023 0935    ALKPHOS 59 06/15/2023 0821    AST 22 06/15/2023 0821    ALT 25 06/15/2023 0821    BILITOT 1.4 (H) 06/15/2023 0821    ESTGFRAFRICA >60.0 01/20/2021 1120    EGFRNONAA >60.0 01/20/2021 1120          Lab Results   Component Value Date    HGBA1C 5.6 08/24/2023     Lab Results   Component Value Date    TSH 1.876 09/02/2022     Lab Results   Component Value Date    INR 0.9 09/03/2022    INR 1.0 09/02/2022    INR 1.0 06/15/2018     Lab Results   Component Value Date    WBC 6.33 01/24/2023    HGB 13.3 (L) 01/24/2023    HCT 41.8 01/24/2023    MCV 95 01/24/2023     01/24/2023     BMP  Sodium   Date Value Ref Range Status   09/26/2023 140 136 - 145 mmol/L Final     Potassium   Date Value Ref Range Status   09/26/2023 4.4 3.5 - 5.1 mmol/L Final     Chloride   Date Value Ref Range Status   09/26/2023 107 95 - 110 mmol/L Final     CO2   Date Value Ref Range Status   09/26/2023 25 23 - 29 mmol/L Final     BUN   Date Value Ref Range Status   09/26/2023 19 8 - 23 mg/dL Final     Creatinine   Date Value Ref Range Status   09/26/2023 0.9 0.5 - 1.4 mg/dL Final     Calcium   Date Value Ref Range Status   09/26/2023 9.3 8.7 - 10.5 mg/dL Final     Anion Gap   Date Value Ref Range Status   09/26/2023 8 8 - 16 mmol/L Final     eGFR if    Date Value Ref Range Status   01/20/2021 >60.0 >60 mL/min/1.73 m^2 Final     eGFR if non    Date Value Ref Range Status   01/20/2021 >60.0 >60 mL/min/1.73 m^2 Final     Comment:     Calculation used to obtain the estimated glomerular filtration  rate (eGFR) is the CKD-EPI equation.         BNP  @LABRCNTIP(BNP,BNPTRIAGEBLO)@  @LABRCNTIP(troponini)@  CrCl cannot be calculated (Patient's most recent lab result is older than the maximum 7 days allowed.).  No results found in the last 24 hours.  No results found in the last 24 hours.  No results found in the last 24 hours.    Assessment:      1. Chronic diastolic congestive heart failure    2. Primary hypertension    3. Dyslipidemia    4. Stenosis of left carotid artery    5. Venous insufficiency    6. Valvular heart disease    7. Amyloidosis, unspecified type    8. Type 2 diabetes mellitus without complication, without long-term current use of insulin    9. BMI 33.0-33.9,adult    10. MCMAHAN (nonalcoholic steatohepatitis)    11. Obstructive sleep apnea    12. Cerebrovascular accident (CVA) due to other mechanism        Plan:   Discuss to take Lasix 20 mg 5 days a week for CHF and PVD  BMP in 2 weeks  CXR for worsening cough for a week  Continue asa losartan and statin   RTC in 3 M

## 2023-11-16 ENCOUNTER — TELEPHONE (OUTPATIENT)
Dept: CARDIOLOGY | Facility: CLINIC | Age: 83
End: 2023-11-16
Payer: COMMERCIAL

## 2023-11-16 NOTE — TELEPHONE ENCOUNTER
Spoke with pt's wife in regards to test results. She verbalized understanding information.           ----- Message from Judd Self MD sent at 11/15/2023  4:16 PM CST -----  Cxr showed no CHF and PNA

## 2023-11-27 ENCOUNTER — LAB VISIT (OUTPATIENT)
Dept: LAB | Facility: HOSPITAL | Age: 83
End: 2023-11-27
Attending: INTERNAL MEDICINE
Payer: COMMERCIAL

## 2023-11-27 DIAGNOSIS — I50.32 CHRONIC DIASTOLIC CONGESTIVE HEART FAILURE: ICD-10-CM

## 2023-11-27 LAB
ANION GAP SERPL CALC-SCNC: 14 MMOL/L (ref 8–16)
BUN SERPL-MCNC: 12 MG/DL (ref 8–23)
CALCIUM SERPL-MCNC: 9.9 MG/DL (ref 8.7–10.5)
CHLORIDE SERPL-SCNC: 103 MMOL/L (ref 95–110)
CO2 SERPL-SCNC: 26 MMOL/L (ref 23–29)
CREAT SERPL-MCNC: 0.8 MG/DL (ref 0.5–1.4)
EST. GFR  (NO RACE VARIABLE): >60 ML/MIN/1.73 M^2
GLUCOSE SERPL-MCNC: 107 MG/DL (ref 70–110)
POTASSIUM SERPL-SCNC: 4.1 MMOL/L (ref 3.5–5.1)
SODIUM SERPL-SCNC: 143 MMOL/L (ref 136–145)

## 2023-11-27 PROCEDURE — 36415 COLL VENOUS BLD VENIPUNCTURE: CPT | Performed by: INTERNAL MEDICINE

## 2023-11-27 PROCEDURE — 80048 BASIC METABOLIC PNL TOTAL CA: CPT | Performed by: INTERNAL MEDICINE

## 2023-12-04 PROBLEM — I63.9 CVA (CEREBRAL VASCULAR ACCIDENT): Status: RESOLVED | Noted: 2023-08-31 | Resolved: 2023-12-04

## 2023-12-05 ENCOUNTER — LAB VISIT (OUTPATIENT)
Dept: LAB | Facility: HOSPITAL | Age: 83
End: 2023-12-05
Attending: INTERNAL MEDICINE
Payer: COMMERCIAL

## 2023-12-05 ENCOUNTER — OFFICE VISIT (OUTPATIENT)
Dept: FAMILY MEDICINE | Facility: CLINIC | Age: 83
End: 2023-12-05
Payer: COMMERCIAL

## 2023-12-05 VITALS
OXYGEN SATURATION: 95 % | WEIGHT: 227.5 LBS | RESPIRATION RATE: 18 BRPM | TEMPERATURE: 98 F | BODY MASS INDEX: 36.72 KG/M2 | SYSTOLIC BLOOD PRESSURE: 116 MMHG | HEART RATE: 76 BPM | DIASTOLIC BLOOD PRESSURE: 74 MMHG

## 2023-12-05 DIAGNOSIS — Z12.5 ENCOUNTER FOR PROSTATE CANCER SCREENING: ICD-10-CM

## 2023-12-05 DIAGNOSIS — Z86.73 HISTORY OF LACUNAR CEREBROVASCULAR ACCIDENT: ICD-10-CM

## 2023-12-05 DIAGNOSIS — Z23 NEED FOR VACCINATION: ICD-10-CM

## 2023-12-05 DIAGNOSIS — J45.20 MILD INTERMITTENT ASTHMA WITHOUT COMPLICATION: Primary | ICD-10-CM

## 2023-12-05 DIAGNOSIS — Z79.4 TYPE 2 DIABETES MELLITUS WITH OTHER SPECIFIED COMPLICATION, WITH LONG-TERM CURRENT USE OF INSULIN: ICD-10-CM

## 2023-12-05 DIAGNOSIS — E11.69 TYPE 2 DIABETES MELLITUS WITH OTHER SPECIFIED COMPLICATION, WITH LONG-TERM CURRENT USE OF INSULIN: ICD-10-CM

## 2023-12-05 DIAGNOSIS — I50.32 CHRONIC DIASTOLIC CONGESTIVE HEART FAILURE: ICD-10-CM

## 2023-12-05 DIAGNOSIS — G47.33 OBSTRUCTIVE SLEEP APNEA: ICD-10-CM

## 2023-12-05 DIAGNOSIS — I10 PRIMARY HYPERTENSION: ICD-10-CM

## 2023-12-05 DIAGNOSIS — Z78.9 INTOLERANCE OF CONTINUOUS POSITIVE AIRWAY PRESSURE (CPAP) VENTILATION: ICD-10-CM

## 2023-12-05 DIAGNOSIS — E78.5 DYSLIPIDEMIA: ICD-10-CM

## 2023-12-05 LAB
BASOPHILS # BLD AUTO: 0.05 K/UL (ref 0–0.2)
BASOPHILS NFR BLD: 0.7 % (ref 0–1.9)
COMPLEXED PSA SERPL-MCNC: 0.86 NG/ML (ref 0–4)
DIFFERENTIAL METHOD: ABNORMAL
EOSINOPHIL # BLD AUTO: 0.4 K/UL (ref 0–0.5)
EOSINOPHIL NFR BLD: 5.1 % (ref 0–8)
ERYTHROCYTE [DISTWIDTH] IN BLOOD BY AUTOMATED COUNT: 14.5 % (ref 11.5–14.5)
ESTIMATED AVG GLUCOSE: 108 MG/DL (ref 68–131)
HBA1C MFR BLD: 5.4 % (ref 4–5.6)
HCT VFR BLD AUTO: 41.8 % (ref 40–54)
HGB BLD-MCNC: 13.7 G/DL (ref 14–18)
IMM GRANULOCYTES # BLD AUTO: 0.02 K/UL (ref 0–0.04)
IMM GRANULOCYTES NFR BLD AUTO: 0.3 % (ref 0–0.5)
LYMPHOCYTES # BLD AUTO: 1.2 K/UL (ref 1–4.8)
LYMPHOCYTES NFR BLD: 16.8 % (ref 18–48)
MCH RBC QN AUTO: 30.7 PG (ref 27–31)
MCHC RBC AUTO-ENTMCNC: 32.8 G/DL (ref 32–36)
MCV RBC AUTO: 94 FL (ref 82–98)
MONOCYTES # BLD AUTO: 0.9 K/UL (ref 0.3–1)
MONOCYTES NFR BLD: 13.3 % (ref 4–15)
NEUTROPHILS # BLD AUTO: 4.4 K/UL (ref 1.8–7.7)
NEUTROPHILS NFR BLD: 63.8 % (ref 38–73)
NRBC BLD-RTO: 0 /100 WBC
PLATELET # BLD AUTO: 173 K/UL (ref 150–450)
PMV BLD AUTO: 11.1 FL (ref 9.2–12.9)
RBC # BLD AUTO: 4.46 M/UL (ref 4.6–6.2)
TSH SERPL DL<=0.005 MIU/L-ACNC: 2.98 UIU/ML (ref 0.4–4)
WBC # BLD AUTO: 6.83 K/UL (ref 3.9–12.7)

## 2023-12-05 PROCEDURE — 3074F SYST BP LT 130 MM HG: CPT | Mod: CPTII,S$GLB,, | Performed by: INTERNAL MEDICINE

## 2023-12-05 PROCEDURE — 85025 COMPLETE CBC W/AUTO DIFF WBC: CPT | Performed by: INTERNAL MEDICINE

## 2023-12-05 PROCEDURE — 99214 OFFICE O/P EST MOD 30 MIN: CPT | Mod: 25,S$GLB,, | Performed by: INTERNAL MEDICINE

## 2023-12-05 PROCEDURE — 3072F LOW RISK FOR RETINOPATHY: CPT | Mod: CPTII,S$GLB,, | Performed by: INTERNAL MEDICINE

## 2023-12-05 PROCEDURE — 99999 PR PBB SHADOW E&M-EST. PATIENT-LVL V: CPT | Mod: PBBFAC,,, | Performed by: INTERNAL MEDICINE

## 2023-12-05 PROCEDURE — 36415 COLL VENOUS BLD VENIPUNCTURE: CPT | Mod: PO | Performed by: INTERNAL MEDICINE

## 2023-12-05 PROCEDURE — 90694 FLU VACCINE - QUADRIVALENT - ADJUVANTED: ICD-10-PCS | Mod: S$GLB,,, | Performed by: INTERNAL MEDICINE

## 2023-12-05 PROCEDURE — 1159F MED LIST DOCD IN RCRD: CPT | Mod: CPTII,S$GLB,, | Performed by: INTERNAL MEDICINE

## 2023-12-05 PROCEDURE — 1159F PR MEDICATION LIST DOCUMENTED IN MEDICAL RECORD: ICD-10-PCS | Mod: CPTII,S$GLB,, | Performed by: INTERNAL MEDICINE

## 2023-12-05 PROCEDURE — 1101F PT FALLS ASSESS-DOCD LE1/YR: CPT | Mod: CPTII,S$GLB,, | Performed by: INTERNAL MEDICINE

## 2023-12-05 PROCEDURE — 84153 ASSAY OF PSA TOTAL: CPT | Performed by: INTERNAL MEDICINE

## 2023-12-05 PROCEDURE — 83036 HEMOGLOBIN GLYCOSYLATED A1C: CPT | Performed by: INTERNAL MEDICINE

## 2023-12-05 PROCEDURE — 3078F PR MOST RECENT DIASTOLIC BLOOD PRESSURE < 80 MM HG: ICD-10-PCS | Mod: CPTII,S$GLB,, | Performed by: INTERNAL MEDICINE

## 2023-12-05 PROCEDURE — 3288F PR FALLS RISK ASSESSMENT DOCUMENTED: ICD-10-PCS | Mod: CPTII,S$GLB,, | Performed by: INTERNAL MEDICINE

## 2023-12-05 PROCEDURE — 3078F DIAST BP <80 MM HG: CPT | Mod: CPTII,S$GLB,, | Performed by: INTERNAL MEDICINE

## 2023-12-05 PROCEDURE — 1101F PR PT FALLS ASSESS DOC 0-1 FALLS W/OUT INJ PAST YR: ICD-10-PCS | Mod: CPTII,S$GLB,, | Performed by: INTERNAL MEDICINE

## 2023-12-05 PROCEDURE — 99999 PR PBB SHADOW E&M-EST. PATIENT-LVL V: ICD-10-PCS | Mod: PBBFAC,,, | Performed by: INTERNAL MEDICINE

## 2023-12-05 PROCEDURE — 90694 VACC AIIV4 NO PRSRV 0.5ML IM: CPT | Mod: S$GLB,,, | Performed by: INTERNAL MEDICINE

## 2023-12-05 PROCEDURE — 90471 IMMUNIZATION ADMIN: CPT | Mod: S$GLB,,, | Performed by: INTERNAL MEDICINE

## 2023-12-05 PROCEDURE — 90471 FLU VACCINE - QUADRIVALENT - ADJUVANTED: ICD-10-PCS | Mod: S$GLB,,, | Performed by: INTERNAL MEDICINE

## 2023-12-05 PROCEDURE — 3074F PR MOST RECENT SYSTOLIC BLOOD PRESSURE < 130 MM HG: ICD-10-PCS | Mod: CPTII,S$GLB,, | Performed by: INTERNAL MEDICINE

## 2023-12-05 PROCEDURE — 3072F PR LOW RISK FOR RETINOPATHY: ICD-10-PCS | Mod: CPTII,S$GLB,, | Performed by: INTERNAL MEDICINE

## 2023-12-05 PROCEDURE — 3288F FALL RISK ASSESSMENT DOCD: CPT | Mod: CPTII,S$GLB,, | Performed by: INTERNAL MEDICINE

## 2023-12-05 PROCEDURE — 84443 ASSAY THYROID STIM HORMONE: CPT | Performed by: INTERNAL MEDICINE

## 2023-12-05 PROCEDURE — 99214 PR OFFICE/OUTPT VISIT, EST, LEVL IV, 30-39 MIN: ICD-10-PCS | Mod: 25,S$GLB,, | Performed by: INTERNAL MEDICINE

## 2023-12-05 NOTE — PROGRESS NOTES
Subjective:       Patient ID: Jovan Del Cid Jr. is a 83 y.o. male.    Chief Complaint: Follow-up (3m), Hypertension, Hyperlipidemia, and Diabetes    Follow-up  Associated symptoms include arthralgias and fatigue. Pertinent negatives include no abdominal pain, chest pain, chills, coughing, diaphoresis, fever, headaches, joint swelling, myalgias, nausea, neck pain, rash, sore throat or vomiting.   Hypertension  Pertinent negatives include no chest pain, headaches, neck pain, palpitations or shortness of breath.   Hyperlipidemia  Pertinent negatives include no chest pain, myalgias or shortness of breath.   Diabetes  Pertinent negatives for hypoglycemia include no confusion, dizziness, headaches, nervousness/anxiousness, pallor, seizures, speech difficulty or tremors. Associated symptoms include fatigue. Pertinent negatives for diabetes include no chest pain, no polydipsia, no polyphagia and no polyuria.     Past Medical History:   Diagnosis Date    Acute CVA (cerebrovascular accident) 09/08/2022    DANY (acute kidney injury) 09/14/2022    Asthma     Bronchitis chronic     Cancer     Chronic diastolic congestive heart failure 9/12/2023    Cough     Digestive disorder     Hyperlipidemia     Hypertension     Liver disease     KALYAN (obstructive sleep apnea) 09/08/2022    Stroke 09/01/2022    Type 2 diabetes mellitus, without long-term current use of insulin 09/07/2022    BS didn't check 10/05/2022     Past Surgical History:   Procedure Laterality Date    COLONOSCOPY N/A 6/21/2016    Procedure: COLONOSCOPY;  Surgeon: Alonso Dorsey MD;  Location: [facility];  Service: Endoscopy;  Laterality: N/A;    COLONOSCOPY N/A 11/1/2016    Procedure: COLONOSCOPY;  Surgeon: Alonso Dorsey MD;  Location: [facility];  Service: Endoscopy;  Laterality: N/A;    COLONOSCOPY N/A 2/3/2017    Procedure: COLONOSCOPY;  Surgeon: Alonso Dorsey MD;  Location: [facility];  Service: Endoscopy;  Laterality: N/A;    COLONOSCOPY N/A  2017    Procedure: COLONOSCOPY;  Surgeon: Alonso Dorsey MD;  Location: [facility] ENDO;  Service: Endoscopy;  Laterality: N/A;    COLONOSCOPY N/A 2021    Procedure: COLONOSCOPY;  Surgeon: Paula Ricardo MD;  Location: [facility] ENDO;  Service: Endoscopy;  Laterality: N/A;    FLUOROSCOPY N/A 6/15/2018    Procedure: Transjugular liver bx;  Surgeon: Petros Recio MD;  Location: [facility] CATH LAB;  Service: General;  Laterality: N/A;    TONSILLECTOMY       Family History   Problem Relation Age of Onset    Cancer Father     Alzheimer's disease Mother     Colon cancer Neg Hx     Melanoma Neg Hx      Social History     Socioeconomic History    Marital status:    Tobacco Use    Smoking status: Former     Current packs/day: 0.00     Types: Cigarettes, Pipe     Start date:      Quit date:      Years since quittin.9     Passive exposure: Past    Smokeless tobacco: Never    Tobacco comments:     smoked a pipe - quit smoking    Substance and Sexual Activity    Alcohol use: Not Currently     Alcohol/week: 2.0 - 3.0 standard drinks of alcohol     Types: 2 - 3 Cans of beer per week     Comment: daily    Drug use: No    Sexual activity: Never     Social Determinants of Health     Financial Resource Strain: Low Risk  (9/15/2022)    Overall Financial Resource Strain (CARDIA)     Difficulty of Paying Living Expenses: Not very hard   Food Insecurity: No Food Insecurity (9/15/2022)    Hunger Vital Sign     Worried About Running Out of Food in the Last Year: Never true     Ran Out of Food in the Last Year: Never true   Transportation Needs: No Transportation Needs (9/15/2022)    PRAPARE - Transportation     Lack of Transportation (Medical): No     Lack of Transportation (Non-Medical): No   Physical Activity: Inactive (9/15/2022)    Exercise Vital Sign     Days of Exercise per Week: 0 days     Minutes of Exercise per Session: 0 min   Stress: No Stress Concern Present (9/15/2022)    [employer] - Occupational Stress Questionnaire     Feeling of Stress : Not at all   Social Connections: Socially Integrated (9/15/2022)    Social Connection and Isolation Panel [NHANES]     Frequency of Communication with Friends and Family: More than three times a week     Frequency of Social Gatherings with Friends and Family: More than three times a week     Attends Denominational Services: More than 4 times per year     Active Member of Clubs or Organizations: Yes     Attends Club or Organization Meetings: More than 4 times per year     Marital Status:    Housing Stability: Low Risk  (9/15/2022)    Housing Stability Vital Sign     Unable to Pay for Housing in the Last Year: No     Number of Places Lived in the Last Year: 1     Unstable Housing in the Last Year: No     Review of Systems   Constitutional:  Positive for fatigue. Negative for activity change, appetite change, chills, diaphoresis, fever and unexpected weight change.   HENT:  Negative for drooling, ear discharge, ear pain, facial swelling, hearing loss, mouth sores, nosebleeds, postnasal drip, rhinorrhea, sinus pressure, sneezing, sore throat, tinnitus, trouble swallowing and voice change.    Eyes:  Negative for photophobia, redness and visual disturbance.   Respiratory:  Negative for apnea, cough, choking, chest tightness, shortness of breath and wheezing.    Cardiovascular:  Negative for chest pain, palpitations and leg swelling.   Gastrointestinal:  Negative for abdominal distention, abdominal pain, anal bleeding, blood in stool, constipation, diarrhea, nausea and vomiting.   Endocrine: Negative for cold intolerance, heat intolerance, polydipsia, polyphagia and polyuria.   Genitourinary:  Negative for difficulty urinating, dysuria, enuresis, flank pain, frequency, genital sores, hematuria and urgency.   Musculoskeletal:  Positive for arthralgias. Negative for back pain, gait problem, joint swelling, myalgias, neck pain and neck  stiffness.   Skin:  Negative for color change, pallor, rash and wound.   Allergic/Immunologic: Negative for food allergies and immunocompromised state.   Neurological:  Negative for dizziness, tremors, seizures, syncope, facial asymmetry, speech difficulty, light-headedness and headaches.   Hematological:  Negative for adenopathy. Does not bruise/bleed easily.   Psychiatric/Behavioral:  Negative for agitation, behavioral problems, confusion, decreased concentration, dysphoric mood, hallucinations, self-injury, sleep disturbance and suicidal ideas. The patient is not nervous/anxious and is not hyperactive.        Objective:      Physical Exam  Vitals and nursing note reviewed.   Constitutional:       General: He is not in acute distress.     Appearance: Normal appearance. He is well-developed. He is not diaphoretic.   HENT:      Head: Normocephalic and atraumatic.      Mouth/Throat:      Pharynx: No oropharyngeal exudate.   Eyes:      General: No scleral icterus.     Pupils: Pupils are equal, round, and reactive to light.   Neck:      Thyroid: No thyromegaly.      Vascular: No carotid bruit or JVD.      Trachea: No tracheal deviation.   Cardiovascular:      Rate and Rhythm: Normal rate and regular rhythm.      Heart sounds: Normal heart sounds.   Pulmonary:      Effort: Pulmonary effort is normal. No respiratory distress.      Breath sounds: Normal breath sounds. No wheezing or rales.   Chest:      Chest wall: No tenderness.   Abdominal:      General: Bowel sounds are normal. There is no distension.      Palpations: Abdomen is soft.      Tenderness: There is no abdominal tenderness. There is no guarding or rebound.   Musculoskeletal:         General: No tenderness. Normal range of motion.      Cervical back: Normal range of motion and neck supple.   Lymphadenopathy:      Cervical: No cervical adenopathy.   Skin:     General: Skin is warm and dry.      Coloration: Skin is not pale.      Findings: No erythema or rash.    Neurological:      Mental Status: He is alert and oriented to person, place, and time.   Psychiatric:         Behavior: Behavior normal.         Thought Content: Thought content normal.         Judgment: Judgment normal.         CMP  Sodium   Date Value Ref Range Status   11/27/2023 143 136 - 145 mmol/L Final     Potassium   Date Value Ref Range Status   11/27/2023 4.1 3.5 - 5.1 mmol/L Final     Chloride   Date Value Ref Range Status   11/27/2023 103 95 - 110 mmol/L Final     CO2   Date Value Ref Range Status   11/27/2023 26 23 - 29 mmol/L Final     Glucose   Date Value Ref Range Status   11/27/2023 107 70 - 110 mg/dL Final     BUN   Date Value Ref Range Status   11/27/2023 12 8 - 23 mg/dL Final     Creatinine   Date Value Ref Range Status   11/27/2023 0.8 0.5 - 1.4 mg/dL Final     Calcium   Date Value Ref Range Status   11/27/2023 9.9 8.7 - 10.5 mg/dL Final     Total Protein   Date Value Ref Range Status   06/15/2023 6.9 6.0 - 8.4 g/dL Final     Albumin   Date Value Ref Range Status   06/15/2023 3.9 3.5 - 5.2 g/dL Final     Total Bilirubin   Date Value Ref Range Status   06/15/2023 1.4 (H) 0.1 - 1.0 mg/dL Final     Comment:     For infants and newborns, interpretation of results should be based  on gestational age, weight and in agreement with clinical  observations.    Premature Infant recommended reference ranges:  Up to 24 hours.............<8.0 mg/dL  Up to 48 hours............<12.0 mg/dL  3-5 days..................<15.0 mg/dL  6-29 days.................<15.0 mg/dL       Alkaline Phosphatase   Date Value Ref Range Status   06/15/2023 59 55 - 135 U/L Final     AST   Date Value Ref Range Status   06/15/2023 22 10 - 40 U/L Final     ALT   Date Value Ref Range Status   06/15/2023 25 10 - 44 U/L Final     Anion Gap   Date Value Ref Range Status   11/27/2023 14 8 - 16 mmol/L Final     eGFR if    Date Value Ref Range Status   01/20/2021 >60.0 >60 mL/min/1.73 m^2 Final     eGFR if non African American    Date Value Ref Range Status   01/20/2021 >60.0 >60 mL/min/1.73 m^2 Final     Comment:     Calculation used to obtain the estimated glomerular filtration  rate (eGFR) is the CKD-EPI equation.        Lab Results   Component Value Date    WBC 6.33 01/24/2023    HGB 13.3 (L) 01/24/2023    HCT 41.8 01/24/2023    MCV 95 01/24/2023     01/24/2023     Lab Results   Component Value Date    CHOL 168 08/24/2023     Lab Results   Component Value Date    HDL 44 08/24/2023     Lab Results   Component Value Date    LDLCALC 66.6 08/24/2023     Lab Results   Component Value Date    TRIG 287 (H) 08/24/2023     Lab Results   Component Value Date    CHOLHDL 26.2 08/24/2023     Lab Results   Component Value Date    TSH 1.876 09/02/2022     Lab Results   Component Value Date    HGBA1C 5.6 08/24/2023     Assessment:       1. Mild intermittent asthma without complication    2. Intolerance of continuous positive airway pressure (CPAP) ventilation    3. Primary hypertension    4. Dyslipidemia    5. Type 2 diabetes mellitus with other specified complication, with long-term current use of insulin    6. History of lacunar cerebrovascular accident    7. Obstructive sleep apnea    8. Chronic diastolic congestive heart failure    9. Encounter for prostate cancer screening    10. Need for vaccination        Plan:   Mild intermittent asthma without complication  -     Ambulatory referral/consult to Pulmonology; Future; Expected date: 12/12/2023    Intolerance of continuous positive airway pressure (CPAP) ventilation    Primary hypertension  -     CBC Auto Differential; Future; Expected date: 12/05/2023  -     TSH; Future; Expected date: 12/05/2023    Dyslipidemia    Type 2 diabetes mellitus with other specified complication, with long-term current use of insulin  -     Hemoglobin A1C; Future; Expected date: 12/05/2023    History of lacunar cerebrovascular accident    Obstructive sleep apnea  -     Ambulatory referral/consult to Pulmonology;  Future; Expected date: 12/12/2023    Chronic diastolic congestive heart failure    Encounter for prostate cancer screening  -     PSA, Screening; Future; Expected date: 12/05/2023    Need for vaccination  -     Influenza - Quadrivalent (Adjuvanted)      Stable---------------continue current meds-------------------as above--------------------f/u 4 months--------------------

## 2023-12-11 ENCOUNTER — OFFICE VISIT (OUTPATIENT)
Dept: PULMONOLOGY | Facility: CLINIC | Age: 83
End: 2023-12-11
Payer: COMMERCIAL

## 2023-12-11 VITALS
HEIGHT: 66 IN | DIASTOLIC BLOOD PRESSURE: 62 MMHG | HEART RATE: 81 BPM | RESPIRATION RATE: 16 BRPM | WEIGHT: 230.19 LBS | SYSTOLIC BLOOD PRESSURE: 104 MMHG | OXYGEN SATURATION: 95 % | BODY MASS INDEX: 36.99 KG/M2

## 2023-12-11 DIAGNOSIS — G47.33 OBSTRUCTIVE SLEEP APNEA: ICD-10-CM

## 2023-12-11 DIAGNOSIS — J45.20 MILD INTERMITTENT ASTHMA WITHOUT COMPLICATION: Primary | ICD-10-CM

## 2023-12-11 DIAGNOSIS — I10 PRIMARY HYPERTENSION: ICD-10-CM

## 2023-12-11 DIAGNOSIS — E78.5 DYSLIPIDEMIA: ICD-10-CM

## 2023-12-11 DIAGNOSIS — E11.9 TYPE 2 DIABETES MELLITUS WITHOUT COMPLICATION, WITHOUT LONG-TERM CURRENT USE OF INSULIN: ICD-10-CM

## 2023-12-11 DIAGNOSIS — I50.32 CHRONIC DIASTOLIC CONGESTIVE HEART FAILURE: ICD-10-CM

## 2023-12-11 DIAGNOSIS — Z78.9 INTOLERANCE OF CONTINUOUS POSITIVE AIRWAY PRESSURE (CPAP) VENTILATION: ICD-10-CM

## 2023-12-11 PROBLEM — R06.2 WHEEZING: Status: RESOLVED | Noted: 2023-07-31 | Resolved: 2023-12-11

## 2023-12-11 PROCEDURE — 3288F FALL RISK ASSESSMENT DOCD: CPT | Mod: CPTII,S$GLB,, | Performed by: INTERNAL MEDICINE

## 2023-12-11 PROCEDURE — 3072F LOW RISK FOR RETINOPATHY: CPT | Mod: CPTII,S$GLB,, | Performed by: INTERNAL MEDICINE

## 2023-12-11 PROCEDURE — 3072F PR LOW RISK FOR RETINOPATHY: ICD-10-PCS | Mod: CPTII,S$GLB,, | Performed by: INTERNAL MEDICINE

## 2023-12-11 PROCEDURE — 99999 PR PBB SHADOW E&M-EST. PATIENT-LVL V: CPT | Mod: PBBFAC,,, | Performed by: INTERNAL MEDICINE

## 2023-12-11 PROCEDURE — 99999 PR PBB SHADOW E&M-EST. PATIENT-LVL V: ICD-10-PCS | Mod: PBBFAC,,, | Performed by: INTERNAL MEDICINE

## 2023-12-11 PROCEDURE — 3078F PR MOST RECENT DIASTOLIC BLOOD PRESSURE < 80 MM HG: ICD-10-PCS | Mod: CPTII,S$GLB,, | Performed by: INTERNAL MEDICINE

## 2023-12-11 PROCEDURE — 1101F PR PT FALLS ASSESS DOC 0-1 FALLS W/OUT INJ PAST YR: ICD-10-PCS | Mod: CPTII,S$GLB,, | Performed by: INTERNAL MEDICINE

## 2023-12-11 PROCEDURE — 1159F MED LIST DOCD IN RCRD: CPT | Mod: CPTII,S$GLB,, | Performed by: INTERNAL MEDICINE

## 2023-12-11 PROCEDURE — 99214 OFFICE O/P EST MOD 30 MIN: CPT | Mod: S$GLB,,, | Performed by: INTERNAL MEDICINE

## 2023-12-11 PROCEDURE — 1160F RVW MEDS BY RX/DR IN RCRD: CPT | Mod: CPTII,S$GLB,, | Performed by: INTERNAL MEDICINE

## 2023-12-11 PROCEDURE — 3074F SYST BP LT 130 MM HG: CPT | Mod: CPTII,S$GLB,, | Performed by: INTERNAL MEDICINE

## 2023-12-11 PROCEDURE — 3078F DIAST BP <80 MM HG: CPT | Mod: CPTII,S$GLB,, | Performed by: INTERNAL MEDICINE

## 2023-12-11 PROCEDURE — 1159F PR MEDICATION LIST DOCUMENTED IN MEDICAL RECORD: ICD-10-PCS | Mod: CPTII,S$GLB,, | Performed by: INTERNAL MEDICINE

## 2023-12-11 PROCEDURE — 3074F PR MOST RECENT SYSTOLIC BLOOD PRESSURE < 130 MM HG: ICD-10-PCS | Mod: CPTII,S$GLB,, | Performed by: INTERNAL MEDICINE

## 2023-12-11 PROCEDURE — 1101F PT FALLS ASSESS-DOCD LE1/YR: CPT | Mod: CPTII,S$GLB,, | Performed by: INTERNAL MEDICINE

## 2023-12-11 PROCEDURE — 3288F PR FALLS RISK ASSESSMENT DOCUMENTED: ICD-10-PCS | Mod: CPTII,S$GLB,, | Performed by: INTERNAL MEDICINE

## 2023-12-11 PROCEDURE — 1160F PR REVIEW ALL MEDS BY PRESCRIBER/CLIN PHARMACIST DOCUMENTED: ICD-10-PCS | Mod: CPTII,S$GLB,, | Performed by: INTERNAL MEDICINE

## 2023-12-11 PROCEDURE — 99214 PR OFFICE/OUTPT VISIT, EST, LEVL IV, 30-39 MIN: ICD-10-PCS | Mod: S$GLB,,, | Performed by: INTERNAL MEDICINE

## 2023-12-11 RX ORDER — VALSARTAN AND HYDROCHLOROTHIAZIDE 160; 12.5 MG/1; MG/1
1 TABLET, FILM COATED ORAL DAILY
COMMUNITY
Start: 2023-08-29 | End: 2023-12-11

## 2023-12-11 NOTE — PROGRESS NOTES
Subjective:       Patient ID: Jovan Del Cid Jr. is a 83 y.o. male.  Patient Active Problem List   Diagnosis    Asthma    Chronic cough    Intolerance of continuous positive airway pressure (CPAP) ventilation    Liver cyst    HTN (hypertension)    Dyslipidemia    Benign prostatic hyperplasia    Lipoma    Thrombocytopenia    Pelvic mass in male    Amyloid disease    Chronic maxillary sinusitis    Tubulovillous adenoma of colon    Adenomatous colon polyp    MCMAHAN (nonalcoholic steatohepatitis)    Mild intermittent asthma without complication    BMI 33.0-33.9,adult    Personal history of colonic polyps    T2DM (type 2 diabetes mellitus)    History of lacunar cerebrovascular accident    Type 2 diabetes mellitus, without long-term current use of insulin    Obstructive sleep apnea    Metabolic acidosis    Stenosis of left carotid artery    LFT elevation    Venous insufficiency    Valvular heart disease    Hypertriglyceridemia    Chronic diastolic congestive heart failure    Unequal blood pressures in paired extremities     Immunization History   Administered Date(s) Administered    COVID-19, MRNA, LN-S, PF (Pfizer) (Purple Cap) 01/10/2021, 2021, 2021    COVID-19, mRNA, LNP-S, bivalent booster, PF (PFIZER OMICRON) 10/14/2022    Influenza (FLUAD) - Quadrivalent - Adjuvanted - PF *Preferred* (65+) 10/15/2020, 2022, 2023    Influenza - High Dose - PF (65 years and older) 2020    Pneumococcal Conjugate - 20 Valent 10/24/2022    Zoster Recombinant 2023, 2023     Social History     Tobacco Use   Smoking Status Former    Current packs/day: 0.00    Types: Cigarettes, Pipe    Start date:     Quit date:     Years since quittin.9    Passive exposure: Past   Smokeless Tobacco Never   Tobacco Comments    smoked a pipe - quit smoking             EPWORTH SLEEPINESS SCALE 2022   Sitting and reading 2   Watching TV 3   Sitting, inactive in a public place (e.g. a theatre or  a meeting) 3   As a passenger in a car for an hour without a break 3   Lying down to rest in the afternoon when circumstances permit 3   Sitting and talking to someone 0   Sitting quietly after a lunch without alcohol 3   In a car, while stopped for a few minutes in traffic 0   Total score 17             Chief Complaint: Asthma and Apnea        Jovan Del Cid Jr. is a 80 y.o.  male   Follow up, No cough, No congetion.No fever  ACT  20: hardly taking rescue albuterol HFA.   Here to reviewed : CXR and Drew  Normal Drew  Runs asphalt bussiness  Busy lifestyle.  No interval asthma exacerbations since last visit, Actually not used rescue albuterol in 12 months.  Spirometry normal         09/21/2022  Last visit 10/15/2022  Referred by Dr Dagoberto Montes  Recent lacunar CVA  Known KALYAN Moderate KALYAN titrated to CPAP 12 cm  Snoring and apnea  Therapy options discussed  Was in Hospital x 2  Spouse present  Goals and therapy options discussed      12/13/2022  Last seen 09/21/2022  Sleep study reveiwed  Severe KALYAN  Forgetful  Drew: Normal  FeNo 43  ACT score20  Wife says gets SOB walking  Added LABA ICS  Follow 8 weeks    12/11/2023  Followup  Stroke Last year  C/o DTS  [place] 17  Last titration CPAP 17 was adequate  Failed to fully habituated  Study reviewed  No sleepy driving  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 Pertinent negatives include no postnasal drip. His past medical history is significant for asthma.     Review of Systems   Constitutional: Negative.    HENT:  Negative for postnasal drip and congestion.    Eyes: Negative.    Respiratory:  Positive for snoring and somnolence. Negative for cough, sputum production, shortness of breath, wheezing, asthma nighttime symptoms, pleurisy, dyspnea on extertion and use of  "rescue inhaler.    Cardiovascular: Negative.    Genitourinary: Negative.    Endocrine: endocrine negative    Musculoskeletal: Negative.    Skin: Negative.    Gastrointestinal: Negative.    Neurological: Negative.    Psychiatric/Behavioral: Negative.     All other systems reviewed and are negative.           Goals of Care Treatment Preferences:  Code Status: Full Code       BP Readings from Last 3 Encounters:   12/11/23 104/62   12/05/23 116/74   11/13/23 122/80     Snoring / Sleep:     [place] Questionnaire (validated KALYAN screening questionnaire)    YES -- Snoring/apnea    YES -- Fatigue    Body mass index is 37.15 kg/m².  (>25 is overweight, >30 is obese)    Blood Pressure = Hypertension  (PreHTN 120-139/80-89, Stg1 140-159/90-99, Stg2 >160/>100)  [place] = 3 of three KALYAN categories are positive (high risk is 2-3 positive categories)     [place] Sleepiness Scale TOTAL =  11  (validated sleepiness questionnaire with a higher score indicating greater sleepiness; range 0-24)      STOP-Bang Questionnaire (validated KALYAN screening questionnaire)  Negative unless checked off.  [x] Snoring    [x]  Tired/Fatigued/Sleepy  [x] Obstruction (apneas/choking)  [] Pressure (HTN)  [] BMI >35  [x] Age >50  [x] Neck >40 cm  [x] Gender male   STOP-Bang = 6 (low risk 0-2,high risk 3-8)    Neck circumference  18.5" [?KALYAN risk if >43cm (17in) male or >41cm (15.5 in) female]             The following portions of the patient's history were reviewed and updated as appropriate: He  has a past medical history of Acute CVA (cerebrovascular accident) (09/08/2022), DANY (acute kidney injury) (09/14/2022), Asthma, Bronchitis chronic, Cancer, Chronic diastolic congestive heart failure (9/12/2023), Cough, Digestive disorder, Hyperlipidemia, Hypertension, Liver disease, KALYAN (obstructive sleep apnea) (09/08/2022), Stroke (09/01/2022), and Type 2 diabetes mellitus, without long-term current use of insulin (09/07/2022).  He does not have any pertinent " problems on file.  He  has a past surgical history that includes Tonsillectomy; Colonoscopy (N/A, 6/21/2016); Colonoscopy (N/A, 11/1/2016); Colonoscopy (N/A, 2/3/2017); Colonoscopy (N/A, 11/13/2017); Fluoroscopy (N/A, 6/15/2018); and Colonoscopy (N/A, 2/19/2021).  His family history includes Alzheimer's disease in his mother; Cancer in his father.  He  reports that he quit smoking about 63 years ago. His smoking use included cigarettes and pipe. He started smoking about 66 years ago. He has been exposed to tobacco smoke. He has never used smokeless tobacco. He reports that he does not currently use alcohol after a past usage of about 2.0 - 3.0 standard drinks of alcohol per week. He reports that he does not use drugs.  He has a current medication list which includes the following prescription(s): benzonatate, blood sugar diagnostic, blood sugar diagnostic, advair hfa, fluticasone propionate, furosemide, glipizide, ipratropium, lancets, losartan, montelukast, multivitamin, onetouch delica plus lancet, pen needle, diabetic, rosuvastatin, triamcinolone acetonide 0.1%, albuterol, aspirin, blood-glucose meter, lancing device, and pantoprazole, and the following Facility-Administered Medications: epinephrine and ondansetron.  Current Outpatient Medications on File Prior to Visit   Medication Sig Dispense Refill    benzonatate (TESSALON PERLES) 100 MG capsule Take 2 capsules (200 mg total) by mouth 3 (three) times daily as needed for Cough. 30 capsule 0    blood sugar diagnostic Strp 1 strip by Misc.(Non-Drug; Combo Route) route 2 (two) times daily with meals. 200 strip 3    blood sugar diagnostic Strp Test blood glucose 2 times a day 200 strip 3    fluticasone propion-salmeterol 115-21 mcg/dose (ADVAIR HFA) 115-21 mcg/actuation HFAA inhaler Inhale 2 puffs into the lungs every 12 (twelve) hours. Controller 12 g 6    fluticasone propionate (FLONASE) 50 mcg/actuation nasal spray SHAKE LIQUID AND USE 2 SPRAYS IN EACH NOSTRIL  "EVERY DAY 48 g 3    furosemide (LASIX) 20 MG tablet Take 1 tablet (20 mg total) by mouth every Mon, Tues, Wed, Thurs, Fri. 20 tablet 11    glipiZIDE (GLUCOTROL) 2.5 MG TR24 TAKE 1 TABLET(2.5 MG) BY MOUTH DAILY WITH BREAKFAST 90 tablet 1    ipratropium (ATROVENT) 21 mcg (0.03 %) nasal spray 2 sprays by Each Nostril route 2 (two) times daily. 30 mL 0    lancets Misc 1 each by Misc.(Non-Drug; Combo Route) route 2 (two) times daily with meals. 100 each 11    losartan (COZAAR) 25 MG tablet Take 1 tablet (25 mg total) by mouth once daily. 90 tablet 3    montelukast (SINGULAIR) 10 mg tablet Take 1 tablet (10 mg total) by mouth every evening. 90 tablet 3    multivitamin (THERAGRAN) per tablet Take 1 tablet by mouth once daily.      ONETOUCH DELICA PLUS LANCET 33 gauge [facility] USE TO CHECK BLOOD GLUCOSE TWO TIMES A DAY WITH MEALS 200 each 3    pen needle, diabetic 31 gauge x 5/16" Ndle 1 each by Misc.(Non-Drug; Combo Route) route 2 (two) times daily with meals. 100 each 11    rosuvastatin (CRESTOR) 40 MG Tab TAKE 1 TABLET BY MOUTH ONCE DAILY 90 tablet 2    triamcinolone acetonide 0.1% (KENALOG) 0.1 % cream Apply topically 2 (two) times daily. 15 g 1    [DISCONTINUED] valsartan-hydrochlorothiazide (DIOVAN-HCT) 160-12.5 mg per tablet Take 1 tablet by mouth once daily.      albuterol (VENTOLIN HFA) 90 mcg/actuation inhaler Inhale 2 puffs into the lungs every 6 (six) hours as needed for Wheezing. Rescue 8 g 0    aspirin (ECOTRIN) 81 MG EC tablet Take 1 tablet (81 mg total) by mouth once daily. 90 tablet 3    blood-glucose meter kit Use as instructed 1 each 0    lancing device Misc 1 Device by Misc.(Non-Drug; Combo Route) route 2 (two) times daily with meals. 1 each 0    pantoprazole (PROTONIX) 40 MG tablet Take 1 tablet (40 mg total) by mouth daily as needed. 30 tablet 3     Current Facility-Administered Medications on File Prior to Visit   Medication Dose Route Frequency Provider Last Rate Last Admin    EPINEPHrine (EPIPEN) 0.3 " "mg/0.3 mL pen injection 0.3 mg  0.3 mg Intramuscular PRN Amando Ulrich MD        ondansetron disintegrating tablet 4 mg  4 mg Oral Once PRN Amando Ulrich MD         He has No Known Allergies..    Objective:       Vitals:    12/11/23 1251   BP: 104/62   Pulse: 81   Resp: 16   SpO2: 95%   Weight: 104.4 kg (230 lb 2.6 oz)   Height: 5' 6" (1.676 m)             Physical Exam  Vitals and nursing note reviewed.   Constitutional:       Appearance: He is well-developed.      Comments: Nek 19"   HENT:      Head: Normocephalic and atraumatic.        Right Ear: External ear normal.      Left Ear: External ear normal.      Nose: Nose normal.      Mouth/Throat:      Pharynx: Uvula midline. No oropharyngeal exudate.   Eyes:      General: Lids are normal. No scleral icterus.     Conjunctiva/sclera: Conjunctivae normal.      Pupils: Pupils are equal, round, and reactive to light.   Neck:      Trachea: Trachea and phonation normal.      Comments: Neck 18"  Cardiovascular:      Rate and Rhythm: Normal rate and regular rhythm.      Pulses: Normal pulses.      Heart sounds: Normal heart sounds, S1 normal and S2 normal. No murmur heard.  Pulmonary:      Effort: Pulmonary effort is normal. No respiratory distress.      Breath sounds: Examination of the left-lower field reveals decreased breath sounds. Decreased breath sounds present. No wheezing, rhonchi or rales.   Chest:      Chest wall: No tenderness.   Abdominal:      General: Bowel sounds are normal.      Palpations: Abdomen is soft.   Musculoskeletal:         General: Normal range of motion.      Cervical back: Normal range of motion and neck supple.   Lymphadenopathy:      Cervical: No cervical adenopathy.   Skin:     General: Skin is warm and dry.      Findings: No rash.      Nails: There is no clubbing.   Neurological:      Mental Status: He is alert and oriented to person, place, and time.      GCS: GCS eye subscore is 4. GCS verbal subscore is 5. GCS motor subscore is " 6.      Cranial Nerves: No cranial nerve deficit.      Sensory: No sensory deficit.   Psychiatric:         Speech: Speech normal.       Personal Diagnostic Review  [x]  Chest x-ray:       X-Ray Chest PA And Lateral  Narrative: EXAM:   XR CHEST PA AND LATERAL    CLINICAL HISTORY: Chronic obstructive sleep apnea.    COMPARISON: 09/21/2022.    TECHNIQUE: PA and lateral views    FINDINGS:  The lungs are clear.   No pneumothorax.  The cardiac silhouette size and contour is within normal limits.  Aortic atherosclerosis.  Impression: Negative chest radiograph.    Finalized on: 11/13/2023 2:49 PM By:  FREDDIE Cavazos MD, MD  [facility]# [id number]      2023-11-13 14:51:[id number]    [facility]   .      [x] Drew Normal  FEV1 : 2.42L( 97.9 %), FVC 3.55L( 106.6%)  FEV1/FVC 68  NORMAL SPIROMETRY        PRIMARY TREATMENT RECOMMENDATIONS Treat, or refer to [facility].   1. The diagnostic polysomnography revealed a moderate to severe obstructive sleep apnea / hypopnea syndrome (A + H Index = 29.4 events /hr asleep with 1.8 respiratory event - related arousals /hr asleep and no RERAs (respiratory effort - related arousals) for the study,. The mean SpO2 value was 92.1 %, moderate, minimum oxygen saturation during sleep was 79.0 %, and the maximum waking baseline SpO2 was 98.0 %. Note that oxygen saturations were ? 88 % for 14.3 minutes of the time spent asleep; consider diagnosis of nocturnal hypoxemia. Persistent snoring was noted.   2. CPAP was initiated at 12: 34 AM. The CPAP titration polysomnography revealed that 17 cm H2O pressure (C - Flex ?, humidity ?), the most effective pressure most completely tested, was optimal, as it reduced the rate of respiratory events to zero in 0.9 hrs sleep with 0.1 hrs REM sleep). Lower pressures also could be successful: 16 cm A + H I = 0.0, but with no REM sleep, and 14 cm A + H I = 4.1 with adequate time asleep and REM sleep. Snoring was first reduced over time, and then eliminated, during CPAP.  "AutoCPAP 6 cm - 20 cm) could be tried if necessary.     The overall A + H Index was 9.5 / hr asleep, with 0.7 respiratory event - related arousals / hr asleep and no RERAs for the titration trial. The mean SpO2 value was 94.2 % throughout the study, moderate, with a minimum oxygen saturation during sleep of 86.0 % and a maximum waking baseline SpO2 of 98.0 %).   A medium ResMed AirFit F30 full - face CPAP mask was used and was well - tolerated. Please see PAP trial outcomes table, below.   3. A device to discourage sleep in the supine position is recommended, to further reduce respiratory events and snoring (respiratory events had a significant supine positional tendency pre-CPAP).   4. Weight loss to the normal range is recommended as it can decrease respiratory events and snoring in overweight patients.     [place] 43            2023    12:51 PM 2023     1:19 PM 2023     1:56 PM 11/10/2023     2:32 PM 10/19/2023     9:26 AM 2023     1:53 PM 2023     1:00 PM   Pulmonary Function Tests   SpO2 95 % 95 %    95 %    Height 5' 6" (1.676 m)   5' 6" (1.676 m) 5' 6" (1.676 m) 5' 6" (1.676 m)    Weight 104.4 kg (230 lb 2.6 oz) 103.2 kg (227 lb 8.2 oz) 106 kg (233 lb 11 oz) 106.9 kg (235 lb 10.8 oz) 102.5 kg (226 lb) 102.8 kg (226 lb 10.1 oz) 102.8 kg (226 lb 10.1 oz)   BMI (Calculated) 37.2  37.7 38.1 36.5 36.6 36.6         Assessment:       Problem List Items Addressed This Visit       Asthma - Primary    Relevant Orders    Fraction of  Nitric Oxide    Fraction of  Nitric Oxide    Spirometry with/without bronchodilator    PULSE OXIMETRY OVERNIGHT    Intolerance of continuous positive airway pressure (CPAP) ventilation    HTN (hypertension)    Dyslipidemia    BMI 33.0-33.9,adult    Type 2 diabetes mellitus, without long-term current use of insulin    Obstructive sleep apnea    Relevant Orders    BiPAP/CPAP Titration ((Must have dx of KALYAN from previous sleep study)    PULSE OXIMETRY " OVERNIGHT    Chronic diastolic congestive heart failure    Relevant Orders    PULSE OXIMETRY OVERNIGHT       Plan:     Stable asthma  Options of MAD or INSPIRE discussed  Preferes to retry CPAP  Will set up for BIPAP titration    Follow up in about 8 weeks (around 2024), or FeNo, [place],  BIPAP titration ,needs RSV,.    This note was prepared using voice recognition system and is likely to have sound alike errors that may have been overlooked even after proof reading.  Please call me with any questions    Discussed diagnosis, its evaluation, treatment and usual course. All questions answered.    Thank you for the courtesy of participating in the care of this patient    Stefan Buckner MD    Orders Placed This Encounter   Procedures    Fraction of  Nitric Oxide     Standing Status:   Future     Standing Expiration Date:   2024     Order Specific Question:   Release to patient     Answer:   Immediate    Fraction of  Nitric Oxide     Standing Status:   Future     Standing Expiration Date:   2024     Order Specific Question:   Release to patient     Answer:   Immediate    Spirometry with/without bronchodilator     Standing Status:   Future     Standing Expiration Date:   12/10/2024     Order Specific Question:   Release to patient     Answer:   Immediate    BiPAP/CPAP Titration ((Must have dx of KALYAN from previous sleep study)     Standing Status:   Future     Standing Expiration Date:   12/10/2024     Scheduling Instructions:      Prior titration CPAP 17 cm, difficutly habituation      BIPAP titration     Order Specific Question:   Titration Type:     Answer:   BiPAP    PULSE OXIMETRY OVERNIGHT     Standing Status:   Future     Standing Expiration Date:   2024     Order Specific Question:   Reason for Test?     Answer:   O2 Qualification     Order Specific Question:   Symptoms:     Answer:   Sleep disruption

## 2023-12-22 NOTE — TELEPHONE ENCOUNTER
No care due was identified.  [facility] Embedded Care Due Messages. Reference number: [id number].   12/22/2023 2:20:02 PM CST

## 2023-12-24 RX ORDER — FLUTICASONE PROPIONATE 50 MCG
SPRAY, SUSPENSION (ML) NASAL
Qty: 48 G | Refills: 3 | Status: SHIPPED | OUTPATIENT
Start: 2023-12-24

## 2023-12-24 NOTE — TELEPHONE ENCOUNTER
Refill Decision Note   Jovan Del Cid  is requesting a refill authorization.  Brief Assessment and Rationale for Refill:  Approve     Medication Therapy Plan:         Comments:     Note composed:4:16 AM 12/24/2023

## 2023-12-28 ENCOUNTER — OFFICE VISIT (OUTPATIENT)
Dept: PODIATRY | Facility: CLINIC | Age: 83
End: 2023-12-28
Payer: COMMERCIAL

## 2023-12-28 VITALS — HEIGHT: 66 IN | BODY MASS INDEX: 36.99 KG/M2 | WEIGHT: 230.19 LBS

## 2023-12-28 DIAGNOSIS — B35.1 DERMATOPHYTOSIS OF NAIL: ICD-10-CM

## 2023-12-28 DIAGNOSIS — E11.9 ENCOUNTER FOR COMPREHENSIVE DIABETIC FOOT EXAMINATION, TYPE 2 DIABETES MELLITUS: Primary | ICD-10-CM

## 2023-12-28 DIAGNOSIS — E11.49 TYPE II DIABETES MELLITUS WITH NEUROLOGICAL MANIFESTATIONS: ICD-10-CM

## 2023-12-28 PROCEDURE — 1160F RVW MEDS BY RX/DR IN RCRD: CPT | Mod: CPTII,S$GLB,, | Performed by: PODIATRIST

## 2023-12-28 PROCEDURE — 11721 DEBRIDE NAIL 6 OR MORE: CPT | Mod: S$GLB,,, | Performed by: PODIATRIST

## 2023-12-28 PROCEDURE — 99999 PR PBB SHADOW E&M-EST. PATIENT-LVL IV: CPT | Mod: PBBFAC,,, | Performed by: PODIATRIST

## 2023-12-28 PROCEDURE — 1159F MED LIST DOCD IN RCRD: CPT | Mod: CPTII,S$GLB,, | Performed by: PODIATRIST

## 2023-12-28 PROCEDURE — 1101F PT FALLS ASSESS-DOCD LE1/YR: CPT | Mod: CPTII,S$GLB,, | Performed by: PODIATRIST

## 2023-12-28 PROCEDURE — 3288F FALL RISK ASSESSMENT DOCD: CPT | Mod: CPTII,S$GLB,, | Performed by: PODIATRIST

## 2023-12-28 PROCEDURE — 99213 OFFICE O/P EST LOW 20 MIN: CPT | Mod: 25,S$GLB,, | Performed by: PODIATRIST

## 2024-01-07 NOTE — PROGRESS NOTES
Subjective:     Patient ID: Jovan Del Cid Jr. is a 83 y.o. male.    Chief Complaint: Nail Care (Nail care (diabetic pt, wearin tennis and socks, no complaints, last seen PCP Dr. Neal 12/5/23))    Jovan is a 83 y.o. male who presents to the clinic for evaluation and treatment of high risk feet. Jovan has a past medical history of Acute CVA (cerebrovascular accident) (09/08/2022), DANY (acute kidney injury) (09/14/2022), Asthma, Bronchitis chronic, Cancer, Chronic diastolic congestive heart failure (9/12/2023), Cough, Digestive disorder, Hyperlipidemia, Hypertension, Liver disease, KALYAN (obstructive sleep apnea) (09/08/2022), Stroke (09/01/2022), and Type 2 diabetes mellitus, without long-term current use of insulin (09/07/2022). The patient's chief complaint is long, thick toenails. This patient has documented high risk feet requiring routine maintenance secondary to diabetes mellitis and those secondary complications of diabetes, as mentioned.     PCP: Paul Neal MD    Date Last Seen by PCP: 12/05/2023    Current shoe gear:  Affected Foot: Tennis shoes     Unaffected Foot: Tennis shoes    Hemoglobin A1C   Date Value Ref Range Status   12/05/2023 5.4 4.0 - 5.6 % Final     Comment:     ADA Screening Guidelines:  5.7-6.4%  Consistent with prediabetes  >or=6.5%  Consistent with diabetes    High levels of fetal hemoglobin interfere with the HbA1C  assay. Heterozygous hemoglobin variants (HbS, HgC, etc)do  not significantly interfere with this assay.   However, presence of multiple variants may affect accuracy.     08/24/2023 5.6 4.0 - 5.6 % Final     Comment:     ADA Screening Guidelines:  5.7-6.4%  Consistent with prediabetes  >or=6.5%  Consistent with diabetes    High levels of fetal hemoglobin interfere with the HbA1C  assay. Heterozygous hemoglobin variants (HbS, HgC, etc)do  not significantly interfere with this assay.   However, presence of multiple variants may affect accuracy.      04/24/2023 5.5 4.0 - 5.6 % Final     Comment:     ADA Screening Guidelines:  5.7-6.4%  Consistent with prediabetes  >or=6.5%  Consistent with diabetes    High levels of fetal hemoglobin interfere with the HbA1C  assay. Heterozygous hemoglobin variants (HbS, HgC, etc)do  not significantly interfere with this assay.   However, presence of multiple variants may affect accuracy.         Patient Active Problem List   Diagnosis    Asthma    Chronic cough    Intolerance of continuous positive airway pressure (CPAP) ventilation    Liver cyst    HTN (hypertension)    Dyslipidemia    Benign prostatic hyperplasia    Lipoma    Thrombocytopenia    Pelvic mass in male    Amyloid disease    Chronic maxillary sinusitis    Tubulovillous adenoma of colon    Adenomatous colon polyp    MCMAHAN (nonalcoholic steatohepatitis)    Mild intermittent asthma without complication    BMI 33.0-33.9,adult    Personal history of colonic polyps    T2DM (type 2 diabetes mellitus)    History of lacunar cerebrovascular accident    Type 2 diabetes mellitus, without long-term current use of insulin    Obstructive sleep apnea    Metabolic acidosis    Stenosis of left carotid artery    LFT elevation    Venous insufficiency    Valvular heart disease    Hypertriglyceridemia    Chronic diastolic congestive heart failure    Unequal blood pressures in paired extremities       Medication List with Changes/Refills   Current Medications    ALBUTEROL (VENTOLIN HFA) 90 MCG/ACTUATION INHALER    Inhale 2 puffs into the lungs every 6 (six) hours as needed for Wheezing. Rescue    ASPIRIN (ECOTRIN) 81 MG EC TABLET    Take 1 tablet (81 mg total) by mouth once daily.    BENZONATATE (TESSALON PERLES) 100 MG CAPSULE    Take 2 capsules (200 mg total) by mouth 3 (three) times daily as needed for Cough.    BLOOD SUGAR DIAGNOSTIC STRP    1 strip by Misc.(Non-Drug; Combo Route) route 2 (two) times daily with meals.    BLOOD SUGAR DIAGNOSTIC STRP    Test blood glucose 2 times a  "day    BLOOD-GLUCOSE METER KIT    Use as instructed    FLUTICASONE PROPION-SALMETEROL 115-21 MCG/DOSE (ADVAIR HFA) 115-21 MCG/ACTUATION HFAA INHALER    Inhale 2 puffs into the lungs every 12 (twelve) hours. Controller    FLUTICASONE PROPIONATE (FLONASE) 50 MCG/ACTUATION NASAL SPRAY    SHAKE LIQUID AND USE 2 SPRAYS IN EACH NOSTRIL EVERY DAY    FUROSEMIDE (LASIX) 20 MG TABLET    Take 1 tablet (20 mg total) by mouth every Mon, Tues, Wed, Thurs, Fri.    GLIPIZIDE (GLUCOTROL) 2.5 MG TR24    TAKE 1 TABLET(2.5 MG) BY MOUTH DAILY WITH BREAKFAST    IPRATROPIUM (ATROVENT) 21 MCG (0.03 %) NASAL SPRAY    2 sprays by Each Nostril route 2 (two) times daily.    LANCETS MISC    1 each by Misc.(Non-Drug; Combo Route) route 2 (two) times daily with meals.    LANCING DEVICE MISC    1 Device by Misc.(Non-Drug; Combo Route) route 2 (two) times daily with meals.    LOSARTAN (COZAAR) 25 MG TABLET    Take 1 tablet (25 mg total) by mouth once daily.    MONTELUKAST (SINGULAIR) 10 MG TABLET    Take 1 tablet (10 mg total) by mouth every evening.    MULTIVITAMIN (THERAGRAN) PER TABLET    Take 1 tablet by mouth once daily.    ONETOUCH DELICA PLUS LANCET 33 GAUGE [facility]    USE TO CHECK BLOOD GLUCOSE TWO TIMES A DAY WITH MEALS    PANTOPRAZOLE (PROTONIX) 40 MG TABLET    Take 1 tablet (40 mg total) by mouth daily as needed.    PEN NEEDLE, DIABETIC 31 GAUGE X 5/16" NDLE    1 each by Misc.(Non-Drug; Combo Route) route 2 (two) times daily with meals.    ROSUVASTATIN (CRESTOR) 40 MG TAB    TAKE 1 TABLET BY MOUTH ONCE DAILY    TRIAMCINOLONE ACETONIDE 0.1% (KENALOG) 0.1 % CREAM    Apply topically 2 (two) times daily.       Review of patient's allergies indicates:  No Known Allergies    Past Surgical History:   Procedure Laterality Date    COLONOSCOPY N/A 6/21/2016    Procedure: COLONOSCOPY;  Surgeon: Alonso Dorsey MD;  Location: [facility];  Service: Endoscopy;  Laterality: N/A;    COLONOSCOPY N/A 11/1/2016    Procedure: COLONOSCOPY;  Surgeon: Alonso" GARCÍA Dorsey MD;  Location: [facility] ENDO;  Service: Endoscopy;  Laterality: N/A;    COLONOSCOPY N/A 2/3/2017    Procedure: COLONOSCOPY;  Surgeon: Alonso Dorsey MD;  Location: [facility] ENDO;  Service: Endoscopy;  Laterality: N/A;    COLONOSCOPY N/A 2017    Procedure: COLONOSCOPY;  Surgeon: Alonso Dorsey MD;  Location: [facility] ENDO;  Service: Endoscopy;  Laterality: N/A;    COLONOSCOPY N/A 2021    Procedure: COLONOSCOPY;  Surgeon: Paula Ricardo MD;  Location: [facility] ENDO;  Service: Endoscopy;  Laterality: N/A;    FLUOROSCOPY N/A 6/15/2018    Procedure: Transjugular liver bx;  Surgeon: Petros Recio MD;  Location: [facility] CATH LAB;  Service: General;  Laterality: N/A;    TONSILLECTOMY         Family History   Problem Relation Age of Onset    Cancer Father     Alzheimer's disease Mother     Colon cancer Neg Hx     Melanoma Neg Hx        Social History     Socioeconomic History    Marital status:    Tobacco Use    Smoking status: Former     Current packs/day: 0.00     Types: Cigarettes, Pipe     Start date:      Quit date:      Years since quittin.0     Passive exposure: Past    Smokeless tobacco: Never    Tobacco comments:     smoked a pipe - quit smoking    Substance and Sexual Activity    Alcohol use: Not Currently     Alcohol/week: 2.0 - 3.0 standard drinks of alcohol     Types: 2 - 3 Cans of beer per week     Comment: daily    Drug use: No    Sexual activity: Never     Social Determinants of Health     Financial Resource Strain: Low Risk  (9/15/2022)    Overall Financial Resource Strain (CARDIA)     Difficulty of Paying Living Expenses: Not very hard   Food Insecurity: No Food Insecurity (9/15/2022)    Hunger Vital Sign     Worried About Running Out of Food in the Last Year: Never true     Ran Out of Food in the Last Year: Never true   Transportation Needs: No Transportation Needs (9/15/2022)    PRAPARE - Transportation     Lack of Transportation (Medical): No     Lack of  "Transportation (Non-Medical): No   Physical Activity: Inactive (9/15/2022)    Exercise Vital Sign     Days of Exercise per Week: 0 days     Minutes of Exercise per Session: 0 min   Stress: No Stress Concern Present (9/15/2022)    [employer] [place] [employer] - Occupational Stress Questionnaire     Feeling of Stress : Not at all   Social Connections: Socially Integrated (9/15/2022)    Social Connection and Isolation Panel [NHANES]     Frequency of Communication with Friends and Family: More than three times a week     Frequency of Social Gatherings with Friends and Family: More than three times a week     Attends Caodaism Services: More than 4 times per year     Active Member of Clubs or Organizations: Yes     Attends Club or Organization Meetings: More than 4 times per year     Marital Status:    Housing Stability: Low Risk  (9/15/2022)    Housing Stability Vital Sign     Unable to Pay for Housing in the Last Year: No     Number of Places Lived in the Last Year: 1     Unstable Housing in the Last Year: No       Vitals:    12/28/23 1327   Weight: 104.4 kg (230 lb 2.6 oz)   Height: 5' 6" (1.676 m)       Hemoglobin A1C   Date Value Ref Range Status   12/05/2023 5.4 4.0 - 5.6 % Final     Comment:     ADA Screening Guidelines:  5.7-6.4%  Consistent with prediabetes  >or=6.5%  Consistent with diabetes    High levels of fetal hemoglobin interfere with the HbA1C  assay. Heterozygous hemoglobin variants (HbS, HgC, etc)do  not significantly interfere with this assay.   However, presence of multiple variants may affect accuracy.     08/24/2023 5.6 4.0 - 5.6 % Final     Comment:     ADA Screening Guidelines:  5.7-6.4%  Consistent with prediabetes  >or=6.5%  Consistent with diabetes    High levels of fetal hemoglobin interfere with the HbA1C  assay. Heterozygous hemoglobin variants (HbS, HgC, etc)do  not significantly interfere with this assay.   However, presence of multiple variants may affect accuracy.   "   04/24/2023 5.5 4.0 - 5.6 % Final     Comment:     ADA Screening Guidelines:  5.7-6.4%  Consistent with prediabetes  >or=6.5%  Consistent with diabetes    High levels of fetal hemoglobin interfere with the HbA1C  assay. Heterozygous hemoglobin variants (HbS, HgC, etc)do  not significantly interfere with this assay.   However, presence of multiple variants may affect accuracy.         Review of Systems   Constitutional:  Negative for chills and fever.   Respiratory:  Negative for shortness of breath.    Cardiovascular:  Negative for chest pain, palpitations, orthopnea, claudication and leg swelling.   Gastrointestinal:  Negative for diarrhea, nausea and vomiting.   Musculoskeletal:  Negative for joint pain.   Skin:  Negative for rash.   Neurological:  Negative for dizziness, tingling, sensory change, focal weakness and weakness.   Psychiatric/Behavioral: Negative.               Objective:      PHYSICAL EXAM: Apperance: Alert and orient in no distress,well developed, and with good attention to grooming and body habits  Patient presents ambulating in tennis shoes.   LOWER EXTREMITY EXAM:  VASCULAR: Dorsalis pedis pulses 2/4 bilateral and Posterior Tibial pulses 1/4 bilateral. Capillary fill time <4 seconds bilateral. No edema observed bilateral. Varicosities absent bilateral. Skin temperature of the lower extremities is warm to cool, proximal to distal. Hair growth dim bilateral.  DERMATOLOGICAL: No skin rashes, subcutaneous nodules, lesions, or ulcers observed bilateral. Nails 1,2,3,4,5 right and 2,3,4,5 left bilateral elongated, thickened, and discolored with subungual debris. Left hallux nail absent.  Webspaces 1,2,3,4 bilateral clean, dry and without evidence of break in skin integrity.   NEUROLOGICAL: Light touch, sharp-dull, proprioception all present and equal bilaterally.  Vibratory sensation diminished at right hallux and intact at left. Protective sensation intact at all 10 sites as tested with a  Dalton-Gee 5.07 monofilament.   MUSCULOSKELETAL: Muscle strength is 5/5 for foot inverters, everters, plantarflexors, and dorsiflexors. Muscle tone is normal.         Assessment:       ICD-10-CM ICD-9-CM   1. Encounter for comprehensive diabetic foot examination, type 2 diabetes mellitus  E11.9 250.00   2. Dermatophytosis of nail  B35.1 110.1   3. Type II diabetes mellitus with neurological manifestations  E11.49 250.60         Plan:   Encounter for comprehensive diabetic foot examination, type 2 diabetes mellitus    Dermatophytosis of nail    Type II diabetes mellitus with neurological manifestations    I counseled the patient on his conditions, regarding findings of my examination, my impressions, and usual treatment plan.   This visit spent on counseling and coordination of care.  Appointment spent on education about the diabetic foot, neuropathy, and prevention of limb loss.  Shoe inspection. Diabetic Foot Education. Patient reminded of the importance of good nutrition and blood sugar control to help prevent podiatric complications of diabetes. Patient instructed on proper foot hygeine. We discussed wearing proper shoe gear, daily foot inspections, never walking without protective shoe gear, never putting sharp instruments to feet.    With patient's permission, nails as noted above bilateral were debrided/trimmed in length and thickness aggressively to their soft tissue attachment mechanically and with electric , removing all offending nail and debris. Patient relates relief following the procedure.  Discussed with patient treatments for neuropathy consisting of topical or oral medication.  Recommendations given for over-the-counter medicine such as [place] and/or Capsaicin.  Patient  will continue to monitor the areas daily, inspect feet, wear protective shoe gear when ambulatory, moisturizer to maintain skin integrity. Patient reminded of the importance of good nutrition and blood sugar  control to help prevent podiatric complications of diabetes.  Patient to return 6 months or sooner if needed.          Shelley Gallardo DPM  [facility]

## 2024-01-18 ENCOUNTER — HOSPITAL ENCOUNTER (OUTPATIENT)
Dept: SLEEP MEDICINE | Facility: HOSPITAL | Age: 84
Discharge: HOME | End: 2024-01-18
Attending: INTERNAL MEDICINE
Payer: COMMERCIAL

## 2024-01-18 DIAGNOSIS — G47.39 TREATMENT-EMERGENT CENTRAL SLEEP APNEA: ICD-10-CM

## 2024-01-18 DIAGNOSIS — G47.33 OBSTRUCTIVE SLEEP APNEA: Primary | ICD-10-CM

## 2024-01-18 DIAGNOSIS — Z72.821 INADEQUATE SLEEP HYGIENE: ICD-10-CM

## 2024-01-18 DIAGNOSIS — G47.63 SLEEP-RELATED BRUXISM: ICD-10-CM

## 2024-01-18 DIAGNOSIS — G47.61 PERIODIC LIMB MOVEMENT DISORDER (PLMD): ICD-10-CM

## 2024-01-18 DIAGNOSIS — I50.9 CENTRAL SLEEP APNEA SECONDARY TO CONGESTIVE HEART FAILURE (CHF): ICD-10-CM

## 2024-01-18 DIAGNOSIS — G47.37 CENTRAL SLEEP APNEA SECONDARY TO CONGESTIVE HEART FAILURE (CHF): ICD-10-CM

## 2024-01-18 PROCEDURE — 95811 POLYSOM 6/>YRS CPAP 4/> PARM: CPT

## 2024-01-23 PROCEDURE — 95811 POLYSOM 6/>YRS CPAP 4/> PARM: CPT | Mod: 26,,, | Performed by: PSYCHOLOGIST

## 2024-01-23 NOTE — PROCEDURES
"Patient Name: Jovan Del Cid   Date of Report: 24   Study Date:  2024  [facility] No.: [id number]    : 1940    Time of Study:  09:28:37 PM - 04:58:57 AM   Sex:  Male   Age:  83 year   Weight:  230.0 lbs Height:  5' 6"     Type of Study:  CPAP re-titration    REASONS FOR REFERRAL: Mr. Del Cid is a 83 year old male, referred for CPAP retitration polysomnography  by Dr. Stefan Buckner, to determine if he needs a change in CPAP pressure. His split - night dx PSG was on 11-3-22with an A + H Index = 29.4 events / hr asleep, moderate to severe obstructive sleep apnea (KALYAN); the CPAP titration PSG revealed an optimal CPAP pressure of 17 cm, but he was unable to tolerate CPAP sufficiently.  He is now ready to have another PAP titration and try to use CPAP again. Dr. Paul Neal was the originating referring physician, and is the patient's primary care physician.    STUDY PARAMETERS: This study involved analysis of the patient's sleep pattern while breathing was assisted. 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  CPAP was initiated at 09:28:37 PM.  The BiPAP titration polysomnography revealed that BiPAP (Bi - Flex S, humidity 4) of 17 cm IPAP / 13 cm EPAP, was the most effective pressure completely tested, but was not optimal, as it reduced the rate of respiratory events 69 % to A + H Index = 9.2 events / hr asleep in 0.9 hrs sleep 0.28 hrs REM sleep).  Lower pressures also could be successful (12 cm / 8cm A + H I = 9.4  in 1.8 hrs sleep, with 0.3 hrs REM sleep, and 14 cm / 10 cm A + H I = 7.7  in 1.8 hrs sleep, but with no REM sleep).  Note that 12 / 8 cm AHI is only 0.2 events higher than 17 cm / 13 cm but  that 12 cm / 10 cm was much more completely tested on titration time and slightly more in REM time, which could be a reason to test the latter first. (Or, maybe 13 cm / 9 cm should be tested first with AHI = 7.7 the lowest but also with no REM sleep).  Also. both lower pressures had the lesser number of central apneas.  The choice is almost arbitrary.  Snoring was eliminated at all pressures. AutoCPAP 6 cm - 20 cm) could be tried if necessary.          The overall A + H Index was 14.4 / hr asleep, with only 1.7 respiratory event - related arousals / hr asleep (and no RERAs) for the titration trial.  The mean SpO2 value was 91.8 % throughout the study, moderate, with a minimum oxygen saturation during sleep of 86.0 % and a maximum waking baseline SpO2 of 98.0 %).   A medium ResMed AirFit  F30  full - face CPAP mask was used and was tolerated adequately, as sleep during CPAP was slightly reduced .  Please see PAP trial outcomes table, below.     17 cm IPAP / 13 cm EPAP (C - Flex S, humidity 4) is recommended, but  only  after  successful habituation to PAP at home (gradually increased CPAP pressures are used for increasing amounts of time, initially while awake and occupied, and then while asleep).  An adaptive servo- ventilation (ASV) titration polysomnography also could considered if none of the three BiPAP  pressures noted above (17 cm / 13 cm, 14 cm / 10 cm, and 12 cm / 8 cm) was sufficiently effective when tested for an adequate time at home.                                                                          BiPAP Treatment Titration Outcomes Table    Device PAP  Level Time (min) Wake (min) NREM  (min) REM  (min) Sleep Eff% OA# CA# MA# Hyp# AHI RDI Min OSat LM  Index AR  Index   BiLevel 10/6/0 72.5 16.5 56.0 0.0 77.2% 2 4 - 8 15.0 15.0 87.0 19.3 8.6   BiLevel 12/8/0 139.0 31.0 89.0 19.0 77.7% 2 3 - 12 9.4 9.4 86.0 60.6 11.1   BiLevel 14/10/0 88.0 41.0 47.0 0.0 53.4% - 3 1 2 7.7 7.7 86.0 151.9 24.3   BiLevel 13/9/0 42.5 2.0 40.5 0.0 95.3% 8 12 1 6 40.0 40.0 88.0 69.6 37.0   BiLevel 15/11/0 36.0 0.0 3.0 33.0 100.0% 1 1 - 9 18.3 18.3 88.0 10.0 21.7   BiLevel 17/13/0 72.5 1.0 55.0 16.5 98.6% - 8 - 3 9.2 9.2 89.0 0.8 21.0     A device to discourage sleep in the supine position is recommended, to further reduce respiratory events and snoring (respiratory events had a significant supine positional tendency during CPAP).   Weight loss to the normal range is recommended as it can decrease respiratory events and snoring in overweight patients.  The following changes in sleep hygiene / sleep - related behavior are recommended after medical treatments are successful  Regular bedtimes and wake times, including weekends: Total sleep time / night should not be more than one hour more           than usual, and bedtime or wake time should not be more than one hour earlier or later than usual.    Do not attempt to make up lost sleep by extending sleep periods.    Avoid naps; none longer than 20 min or later than mid - afternoon.  Avoid meals or large snacks within 3 hours of bedtime.    SECONDARY TREATMENT RECOMMENDATIONS  Treat, or refer to SDC if problems are not satisfactorily resolved by the above.  A severe PLM disorder was observed (PLMS Index = 49.1 / hr asleep,  but  treatment might not be optimal because the PLMS were  not disruptive of sleep (3.0 arousals / hr asleep), and there were no signs of restless legs syndrome in the SDI, H & P or PSG.  Consider treatment of PLM disorder if PLMS symptoms are sufficiently bothersome to the patient.  Note that the benzodiazepine medications sometimes used to treat PLM disorder (e.g., clonazepam / Klonopin) may exacerbate some sleep - related respiratory disorders, and that dopaminergic medications such as Mirapex and Requip can be used in such instances.    Consider a referral for a dental examination and possible dental splint for sleep bruxism.     Also consider behavioral and cognitive / behavioral treatments for stress; sleep bruxism might be expected to improve.    See below for a complete interpretation of data from the polysomnography and Sleep Disorders Inventory.     Thank you for referring this patient to the [facility].      Abdias Stark, Ph.D., ABPP; Diplomate, American Board of Sleep Medicine

## 2024-02-21 DIAGNOSIS — Z00.00 ROUTINE HEALTH MAINTENANCE: ICD-10-CM

## 2024-02-21 DIAGNOSIS — I10 PRIMARY HYPERTENSION: Primary | ICD-10-CM

## 2024-02-22 ENCOUNTER — OFFICE VISIT (OUTPATIENT)
Dept: CARDIOLOGY | Facility: CLINIC | Age: 84
End: 2024-02-22
Payer: COMMERCIAL

## 2024-02-22 ENCOUNTER — HOSPITAL ENCOUNTER (OUTPATIENT)
Dept: CARDIOLOGY | Facility: HOSPITAL | Age: 84
Discharge: HOME | End: 2024-02-22
Attending: INTERNAL MEDICINE
Payer: COMMERCIAL

## 2024-02-22 VITALS
BODY MASS INDEX: 37.97 KG/M2 | HEART RATE: 71 BPM | OXYGEN SATURATION: 97 % | DIASTOLIC BLOOD PRESSURE: 71 MMHG | SYSTOLIC BLOOD PRESSURE: 115 MMHG | WEIGHT: 235.25 LBS

## 2024-02-22 VITALS — BODY MASS INDEX: 37.97 KG/M2 | WEIGHT: 235.25 LBS

## 2024-02-22 DIAGNOSIS — I10 PRIMARY HYPERTENSION: Primary | ICD-10-CM

## 2024-02-22 DIAGNOSIS — I50.32 CHRONIC DIASTOLIC CONGESTIVE HEART FAILURE: ICD-10-CM

## 2024-02-22 DIAGNOSIS — I65.22 STENOSIS OF LEFT CAROTID ARTERY: ICD-10-CM

## 2024-02-22 DIAGNOSIS — Z00.00 ROUTINE HEALTH MAINTENANCE: ICD-10-CM

## 2024-02-22 DIAGNOSIS — R05.3 CHRONIC COUGH: ICD-10-CM

## 2024-02-22 DIAGNOSIS — E78.5 DYSLIPIDEMIA: ICD-10-CM

## 2024-02-22 DIAGNOSIS — E78.1 HYPERTRIGLYCERIDEMIA: ICD-10-CM

## 2024-02-22 DIAGNOSIS — I38 VALVULAR HEART DISEASE: ICD-10-CM

## 2024-02-22 DIAGNOSIS — I10 PRIMARY HYPERTENSION: ICD-10-CM

## 2024-02-22 DIAGNOSIS — I87.2 VENOUS INSUFFICIENCY: ICD-10-CM

## 2024-02-22 DIAGNOSIS — E11.69 TYPE 2 DIABETES MELLITUS WITH OTHER SPECIFIED COMPLICATION, WITH LONG-TERM CURRENT USE OF INSULIN: ICD-10-CM

## 2024-02-22 DIAGNOSIS — Z79.4 TYPE 2 DIABETES MELLITUS WITH OTHER SPECIFIED COMPLICATION, WITH LONG-TERM CURRENT USE OF INSULIN: ICD-10-CM

## 2024-02-22 LAB
OHS QRS DURATION: 100 MS
OHS QTC CALCULATION: 443 MS

## 2024-02-22 PROCEDURE — 93010 ELECTROCARDIOGRAM REPORT: CPT | Mod: ,,, | Performed by: INTERNAL MEDICINE

## 2024-02-22 PROCEDURE — 1159F MED LIST DOCD IN RCRD: CPT | Mod: CPTII,S$GLB,, | Performed by: INTERNAL MEDICINE

## 2024-02-22 PROCEDURE — 3078F DIAST BP <80 MM HG: CPT | Mod: CPTII,S$GLB,, | Performed by: INTERNAL MEDICINE

## 2024-02-22 PROCEDURE — 1126F AMNT PAIN NOTED NONE PRSNT: CPT | Mod: CPTII,S$GLB,, | Performed by: INTERNAL MEDICINE

## 2024-02-22 PROCEDURE — 1101F PT FALLS ASSESS-DOCD LE1/YR: CPT | Mod: CPTII,S$GLB,, | Performed by: INTERNAL MEDICINE

## 2024-02-22 PROCEDURE — 93005 ELECTROCARDIOGRAM TRACING: CPT

## 2024-02-22 PROCEDURE — 3074F SYST BP LT 130 MM HG: CPT | Mod: CPTII,S$GLB,, | Performed by: INTERNAL MEDICINE

## 2024-02-22 PROCEDURE — 99999 PR PBB SHADOW E&M-EST. PATIENT-LVL V: CPT | Mod: PBBFAC,,, | Performed by: INTERNAL MEDICINE

## 2024-02-22 PROCEDURE — 99214 OFFICE O/P EST MOD 30 MIN: CPT | Mod: S$GLB,,, | Performed by: INTERNAL MEDICINE

## 2024-02-22 PROCEDURE — 1160F RVW MEDS BY RX/DR IN RCRD: CPT | Mod: CPTII,S$GLB,, | Performed by: INTERNAL MEDICINE

## 2024-02-22 PROCEDURE — 3288F FALL RISK ASSESSMENT DOCD: CPT | Mod: CPTII,S$GLB,, | Performed by: INTERNAL MEDICINE

## 2024-02-22 NOTE — PROGRESS NOTES
Subjective:   Patient ID:  Jovan Del Cid Jr. is a 83 y.o. male who presents for follow up of No chief complaint on file.      82 y/o male, 3 months.   PMH MVP mod MR, CHFpEF, acute CVA in , asthma, h/o CVA, HLD, HTN,Hx of Colon Tubulovillous Adenoma, MCMAHAN h/o right neck lipoma, and  obesity      admitted for acute CVA. with R-sided paresthesias The BP was significantly high /102    MRI phillip show Acute lacunar infarcts within the left thalamus . CT head and neck show :No evidence of thromboembolism within the visible branches of the lsemrc-lb-Dautrd.Coarse calcific plaque within the left carotid bifurcation resulting in up to 50% stenosis of the origin/proximal aspect of the left internal carotid artery.   TTE did not show any acute finding  with normal  EF .     After discharge. No fall/ still right body numbness. No chest pain dyspnea. Some dizziness and no faint  BP low and valsartan HCTZ decreased the dose by PCP last week and stopped yesterday   Pt had h/o med noncompliance  HH once a week.     Echo     · There is no evidence of intracardiac shunting.  · The left ventricle is normal in size with concentric remodeling and normal systolic function.  · The estimated ejection fraction is 60%.  · Normal left ventricular diastolic function.  · Normal right ventricular size with normal right ventricular systolic function.  · There is bileaflet mitral prolapse.  · Mild-to-moderate mitral regurgitation.  · Normal central venous pressure (3 mmHg).    03/23 visit  BP controlled. No dizziness faint and chest pain    09/23 visit  Chronic right arm and leg numbness after the cva in 09/22. With right  shoulder pain  Right arm BP 96/60 and left 110 /62  08/23  and repeat echo in 08/23 EF 55% MVP and mod to severe MR. Normal LV LA size and PASP 30 mmHg  C/o GAMINO, leg swelling, sleeps on 1 pillow and no PND  At Home  to 150 mmhg  EKG today reveals NSR nonspecific STT change    10/23  visit  10/23 Phar MPI No ischemia and arm arterial US normal.  Not taking too much lasix, cough and wheezing worse for a week    Interval history  Remains cough and f/u at pulm with Dr. Buckner  10/23 Phar MPI showed no ischemia and echo showed EF nl mod to severe MR with calcification  ARM arterial US nml.  No orthopnea.   Occasional wheezing   LDL 67,  and BP nml  EKG reviewed by myself today reveals NSR nonspecific STT change                     Past Medical History:   Diagnosis Date    Acute CVA (cerebrovascular accident) 09/08/2022    DANY (acute kidney injury) 09/14/2022    Asthma     Bronchitis chronic     Cancer     Chronic diastolic congestive heart failure 9/12/2023    Cough     Digestive disorder     Hyperlipidemia     Hypertension     Liver disease     KALYAN (obstructive sleep apnea) 09/08/2022    Stroke 09/01/2022    Type 2 diabetes mellitus, without long-term current use of insulin 09/07/2022    BS didn't check 10/05/2022       Past Surgical History:   Procedure Laterality Date    COLONOSCOPY N/A 6/21/2016    Procedure: COLONOSCOPY;  Surgeon: Alonso Dorsey MD;  Location: [facility];  Service: Endoscopy;  Laterality: N/A;    COLONOSCOPY N/A 11/1/2016    Procedure: COLONOSCOPY;  Surgeon: Alonso Dorsey MD;  Location: [facility] ENDO;  Service: Endoscopy;  Laterality: N/A;    COLONOSCOPY N/A 2/3/2017    Procedure: COLONOSCOPY;  Surgeon: Alonso Dorsey MD;  Location: [facility];  Service: Endoscopy;  Laterality: N/A;    COLONOSCOPY N/A 11/13/2017    Procedure: COLONOSCOPY;  Surgeon: Alonso Dorsey MD;  Location: [facility];  Service: Endoscopy;  Laterality: N/A;    COLONOSCOPY N/A 2/19/2021    Procedure: COLONOSCOPY;  Surgeon: Paula Ricardo MD;  Location: [facility] ENDO;  Service: Endoscopy;  Laterality: N/A;    FLUOROSCOPY N/A 6/15/2018    Procedure: Transjugular liver bx;  Surgeon: Petros Recio MD;  Location: [facility] CATH LAB;  Service: General;  Laterality: N/A;     TONSILLECTOMY         Social History     Tobacco Use    Smoking status: Former     Current packs/day: 0.00     Types: Cigarettes, Pipe     Start date:      Quit date:      Years since quittin.1     Passive exposure: Past    Smokeless tobacco: Never    Tobacco comments:     smoked a pipe - quit smoking    Substance Use Topics    Alcohol use: Not Currently     Alcohol/week: 2.0 - 3.0 standard drinks of alcohol     Types: 2 - 3 Cans of beer per week     Comment: daily    Drug use: No       Family History   Problem Relation Age of Onset    Cancer Father     Alzheimer's disease Mother     Colon cancer Neg Hx     Melanoma Neg Hx          ROS    Objective:   Physical Exam  HENT:      Head: Normocephalic.   Eyes:      Pupils: Pupils are equal, round, and reactive to light.   Neck:      Thyroid: No thyromegaly.      Vascular: Normal carotid pulses. No carotid bruit or JVD.   Cardiovascular:      Rate and Rhythm: Normal rate and regular rhythm. No extrasystoles are present.     Chest Wall: PMI is not displaced.      Pulses: Normal pulses.           Carotid pulses are 2+ on the right side and 2+ on the left side.     Heart sounds: Normal heart sounds. No murmur heard.     No gallop. No S3 sounds.   Pulmonary:      Effort: No respiratory distress.      Breath sounds: No stridor.   Abdominal:      General: Bowel sounds are normal.      Palpations: Abdomen is soft.      Tenderness: There is no abdominal tenderness. There is no rebound.   Musculoskeletal:         General: Normal range of motion.   Skin:     Findings: No rash.   Neurological:      Mental Status: He is alert and oriented to person, place, and time.   Psychiatric:         Behavior: Behavior normal.       Lab Results   Component Value Date    CHOL 168 2023    CHOL 113 (L) 2023    CHOL 215 (H) 2022     Lab Results   Component Value Date    HDL 44 2023    HDL 40 2023    HDL 33 (L) 2022     Lab Results    Component Value Date    LDLCALC 66.6 08/24/2023    LDLCALC 8.6 (L) 01/24/2023    LDLCALC 142.4 09/13/2022     Lab Results   Component Value Date    TRIG 287 (H) 08/24/2023    TRIG 322 (H) 01/24/2023    TRIG 198 (H) 09/13/2022     Lab Results   Component Value Date    CHOLHDL 26.2 08/24/2023    CHOLHDL 35.4 01/24/2023    CHOLHDL 15.3 (L) 09/13/2022       Chemistry        Component Value Date/Time     11/27/2023 1125    K 4.1 11/27/2023 1125     11/27/2023 1125    CO2 26 11/27/2023 1125    BUN 12 11/27/2023 1125    CREATININE 0.8 11/27/2023 1125     11/27/2023 1125        Component Value Date/Time    CALCIUM 9.9 11/27/2023 1125    ALKPHOS 59 06/15/2023 0821    AST 22 06/15/2023 0821    ALT 25 06/15/2023 0821    BILITOT 1.4 (H) 06/15/2023 0821    ESTGFRAFRICA >60.0 01/20/2021 1120    EGFRNONAA >60.0 01/20/2021 1120          Lab Results   Component Value Date    HGBA1C 5.4 12/05/2023     Lab Results   Component Value Date    TSH 2.979 12/05/2023     Lab Results   Component Value Date    INR 0.9 09/03/2022    INR 1.0 09/02/2022    INR 1.0 06/15/2018     Lab Results   Component Value Date    WBC 6.83 12/05/2023    HGB 13.7 (L) 12/05/2023    HCT 41.8 12/05/2023    MCV 94 12/05/2023     12/05/2023     BMP  Sodium   Date Value Ref Range Status   11/27/2023 143 136 - 145 mmol/L Final     Potassium   Date Value Ref Range Status   11/27/2023 4.1 3.5 - 5.1 mmol/L Final     Chloride   Date Value Ref Range Status   11/27/2023 103 95 - 110 mmol/L Final     CO2   Date Value Ref Range Status   11/27/2023 26 23 - 29 mmol/L Final     BUN   Date Value Ref Range Status   11/27/2023 12 8 - 23 mg/dL Final     Creatinine   Date Value Ref Range Status   11/27/2023 0.8 0.5 - 1.4 mg/dL Final     Calcium   Date Value Ref Range Status   11/27/2023 9.9 8.7 - 10.5 mg/dL Final     Anion Gap   Date Value Ref Range Status   11/27/2023 14 8 - 16 mmol/L Final     eGFR if    Date Value Ref Range Status    01/20/2021 >60.0 >60 mL/min/1.73 m^2 Final     eGFR if non    Date Value Ref Range Status   01/20/2021 >60.0 >60 mL/min/1.73 m^2 Final     Comment:     Calculation used to obtain the estimated glomerular filtration  rate (eGFR) is the CKD-EPI equation.        BNP  @LABRCNTIP(BNP,BNPTRIAGEBLO)@  @LABRCNTIP(troponini)@  CrCl cannot be calculated (Patient's most recent lab result is older than the maximum 7 days allowed.).  No results found in the last 24 hours.  No results found in the last 24 hours.  No results found in the last 24 hours.    Assessment:      1. Primary hypertension    2. Dyslipidemia    3. Stenosis of left carotid artery    4. Venous insufficiency    5. Valvular heart disease    6. Hypertriglyceridemia    7. Chronic diastolic congestive heart failure    8. Type 2 diabetes mellitus with other specified complication, with long-term current use of insulin    9. BMI 33.0-33.9,adult    10. Chronic cough      CHF compensated  Mod to severe MR calcificied valve    Plan:   Continue asa statin lasix and Losartan  DM Rx per PCP  Counseled DASH  Check Lipid profile with PCP in 6 months  Recommend heart-healthy diet, weight control and regular exercise.  Lety. Risk modification.   I have reviewed all pertinent labs and cardiac studies independently. Plans and recommendations have been formulated under my direct supervision. All questions answered and patient voiced understanding.   If symptoms persist go to the ED  RTC in 4 months

## 2024-02-23 ENCOUNTER — TELEPHONE (OUTPATIENT)
Dept: NEUROLOGY | Facility: CLINIC | Age: 84
End: 2024-02-23
Payer: COMMERCIAL

## 2024-02-23 NOTE — TELEPHONE ENCOUNTER
----- Message from Zeeshan Alcantar sent at 2/23/2024 12:14 PM CST -----  Contact: marvin/wife  Type:  Sooner Appointment Request  Name of Caller:Marvin  When is the first available appointment?06/2024  Symptoms:memory issues, stroke patient  Would the patient rather a call back or a response via [facility]? callback  Best Call Back Number:[phone]  Additional Information:

## 2024-02-26 ENCOUNTER — OFFICE VISIT (OUTPATIENT)
Dept: NEUROLOGY | Facility: CLINIC | Age: 84
End: 2024-02-26
Payer: COMMERCIAL

## 2024-02-26 ENCOUNTER — LAB VISIT (OUTPATIENT)
Dept: LAB | Facility: HOSPITAL | Age: 84
End: 2024-02-26
Attending: PSYCHIATRY & NEUROLOGY
Payer: COMMERCIAL

## 2024-02-26 VITALS
HEART RATE: 61 BPM | OXYGEN SATURATION: 99 % | BODY MASS INDEX: 37.12 KG/M2 | WEIGHT: 231 LBS | RESPIRATION RATE: 16 BRPM | SYSTOLIC BLOOD PRESSURE: 144 MMHG | DIASTOLIC BLOOD PRESSURE: 80 MMHG | HEIGHT: 66 IN

## 2024-02-26 DIAGNOSIS — R09.89 UNEQUAL BLOOD PRESSURES IN PAIRED EXTREMITIES: ICD-10-CM

## 2024-02-26 DIAGNOSIS — E11.9 TYPE 2 DIABETES MELLITUS WITHOUT COMPLICATION, WITHOUT LONG-TERM CURRENT USE OF INSULIN: ICD-10-CM

## 2024-02-26 DIAGNOSIS — R41.9 COGNITIVE COMPLAINTS WITH NORMAL EXAM: ICD-10-CM

## 2024-02-26 DIAGNOSIS — R05.3 CHRONIC COUGH: ICD-10-CM

## 2024-02-26 DIAGNOSIS — E78.5 DYSLIPIDEMIA: ICD-10-CM

## 2024-02-26 DIAGNOSIS — I50.32 CHRONIC DIASTOLIC CONGESTIVE HEART FAILURE: ICD-10-CM

## 2024-02-26 DIAGNOSIS — Z78.9 INTOLERANCE OF CONTINUOUS POSITIVE AIRWAY PRESSURE (CPAP) VENTILATION: ICD-10-CM

## 2024-02-26 DIAGNOSIS — I38 VALVULAR HEART DISEASE: ICD-10-CM

## 2024-02-26 DIAGNOSIS — I10 PRIMARY HYPERTENSION: ICD-10-CM

## 2024-02-26 DIAGNOSIS — I65.22 STENOSIS OF LEFT CAROTID ARTERY: ICD-10-CM

## 2024-02-26 DIAGNOSIS — D17.9 LIPOMA, UNSPECIFIED SITE: ICD-10-CM

## 2024-02-26 DIAGNOSIS — I87.2 VENOUS INSUFFICIENCY: ICD-10-CM

## 2024-02-26 DIAGNOSIS — K75.81 NASH (NONALCOHOLIC STEATOHEPATITIS): ICD-10-CM

## 2024-02-26 DIAGNOSIS — E85.9 AMYLOIDOSIS, UNSPECIFIED TYPE: ICD-10-CM

## 2024-02-26 DIAGNOSIS — G47.33 OBSTRUCTIVE SLEEP APNEA: ICD-10-CM

## 2024-02-26 DIAGNOSIS — N40.0 BENIGN PROSTATIC HYPERPLASIA WITHOUT LOWER URINARY TRACT SYMPTOMS: ICD-10-CM

## 2024-02-26 DIAGNOSIS — Z86.73 HISTORY OF LACUNAR CEREBROVASCULAR ACCIDENT: Primary | ICD-10-CM

## 2024-02-26 DIAGNOSIS — J45.20 MILD INTERMITTENT ASTHMA WITHOUT COMPLICATION: Chronic | ICD-10-CM

## 2024-02-26 DIAGNOSIS — Z86.010 PERSONAL HISTORY OF COLONIC POLYPS: ICD-10-CM

## 2024-02-26 DIAGNOSIS — D12.6 TUBULOVILLOUS ADENOMA OF COLON: ICD-10-CM

## 2024-02-26 DIAGNOSIS — J32.0 CHRONIC MAXILLARY SINUSITIS: ICD-10-CM

## 2024-02-26 DIAGNOSIS — K76.89 LIVER CYST: ICD-10-CM

## 2024-02-26 PROBLEM — E78.1 HYPERTRIGLYCERIDEMIA: Status: RESOLVED | Noted: 2023-08-31 | Resolved: 2024-02-26

## 2024-02-26 PROBLEM — E87.20 METABOLIC ACIDOSIS: Status: RESOLVED | Noted: 2022-09-15 | Resolved: 2024-02-26

## 2024-02-26 PROBLEM — R79.89 LFT ELEVATION: Status: RESOLVED | Noted: 2022-10-24 | Resolved: 2024-02-26

## 2024-02-26 LAB
FOLATE SERPL-MCNC: 14 NG/ML (ref 4–24)
T4 SERPL-MCNC: 6.1 UG/DL (ref 4.5–11.5)
VIT B12 SERPL-MCNC: 531 PG/ML (ref 210–950)

## 2024-02-26 PROCEDURE — 36415 COLL VENOUS BLD VENIPUNCTURE: CPT | Performed by: PSYCHIATRY & NEUROLOGY

## 2024-02-26 PROCEDURE — 84436 ASSAY OF TOTAL THYROXINE: CPT | Performed by: PSYCHIATRY & NEUROLOGY

## 2024-02-26 PROCEDURE — 1126F AMNT PAIN NOTED NONE PRSNT: CPT | Mod: CPTII,S$GLB,, | Performed by: PSYCHIATRY & NEUROLOGY

## 2024-02-26 PROCEDURE — 99215 OFFICE O/P EST HI 40 MIN: CPT | Mod: S$GLB,,, | Performed by: PSYCHIATRY & NEUROLOGY

## 2024-02-26 PROCEDURE — 1101F PT FALLS ASSESS-DOCD LE1/YR: CPT | Mod: CPTII,S$GLB,, | Performed by: PSYCHIATRY & NEUROLOGY

## 2024-02-26 PROCEDURE — 99417 PROLNG OP E/M EACH 15 MIN: CPT | Mod: S$GLB,,, | Performed by: PSYCHIATRY & NEUROLOGY

## 2024-02-26 PROCEDURE — 3079F DIAST BP 80-89 MM HG: CPT | Mod: CPTII,S$GLB,, | Performed by: PSYCHIATRY & NEUROLOGY

## 2024-02-26 PROCEDURE — 99999 PR PBB SHADOW E&M-EST. PATIENT-LVL V: CPT | Mod: PBBFAC,,, | Performed by: PSYCHIATRY & NEUROLOGY

## 2024-02-26 PROCEDURE — 3288F FALL RISK ASSESSMENT DOCD: CPT | Mod: CPTII,S$GLB,, | Performed by: PSYCHIATRY & NEUROLOGY

## 2024-02-26 PROCEDURE — 82607 VITAMIN B-12: CPT | Performed by: PSYCHIATRY & NEUROLOGY

## 2024-02-26 PROCEDURE — 1159F MED LIST DOCD IN RCRD: CPT | Mod: CPTII,S$GLB,, | Performed by: PSYCHIATRY & NEUROLOGY

## 2024-02-26 PROCEDURE — 82746 ASSAY OF FOLIC ACID SERUM: CPT | Performed by: PSYCHIATRY & NEUROLOGY

## 2024-02-26 PROCEDURE — 86592 SYPHILIS TEST NON-TREP QUAL: CPT | Performed by: PSYCHIATRY & NEUROLOGY

## 2024-02-26 PROCEDURE — 3077F SYST BP >= 140 MM HG: CPT | Mod: CPTII,S$GLB,, | Performed by: PSYCHIATRY & NEUROLOGY

## 2024-02-26 RX ORDER — MV-MN/LUTEIN/ZEAX/BILBER/HB277 5-1-7.5 MG
CAPSULE ORAL
COMMUNITY

## 2024-02-26 NOTE — PROGRESS NOTES
Subjective:       Patient ID: Jovan Del Cid Jr. is a 83 y.o. male.    Chief Complaint: History of lacunar cerebrovascular accident          HPI        BACKGROUND HISTORY     The patient was admitted to the hospital on 09- with RT side numbness and tingling with loss of balance that started on 09-. INIGUEZ showed /102, CTA H/N  Coarse calcific plaque within the left carotid bifurcation resulting in up to 50% stenosis of the origin/proximal aspect of the left internal carotid artery, CTH/Brain MRI Acute lacunar infarcts within the left thalamus, EKG NSR, TTE EF 60% and HA1C 8.3, . Was discharged on DAPT. VRFs include Uncontrolled HTN, Uncontrolled HLD, Untreated KALYAN, Obesity and a new diagnosis of T2DM. The patient admits to not taking his medications as prescribed in the last year. Feels better compared to 09- but still has significant RT side numbness and tingling. Balance has improved. He will start undergoing Home Health with PT-OT. His wife is with him and will help manage his medications.  Recommended Vascular Risk Factors (VRFs) stratification (BP ('s-140's/70's-80's, BS HA1C <7, BC control LDL <70 and KALYAN control) is the mainstay of stroke prevention (>90%). The benefit of antiplatelets is < 20% stroke risk reduction, continued DAPT for 1 month then ASA Monotherapy, ordered PT-OT with Home Health and recommended Sleep Medicine to treat KALYAN. Referred him to OT for driving evaluation  and on 03- OT Driving Evaluation. He did well in all areas except visual acuity. He did not have his glasses with him for distance vision and does not like them as they are bifocals and he is uncomfortable with the focus. His vision is not good enough to drive without them. His wife was present for a post testing conference and both are fully understanding that he is in need of new glasses before returning to driving.         INTERVAL HISTORY     Presented with his wife on 02-  for follow up.    No new stroke-like symptoms. Returned to driving with no problems and VRFs control has improved dramatically since the stroke. Reported residual RT hand  tingling.      His wife was concerned about his memory since 2021. For example, the patient would forget parts of the story he's telling but it comes back to him. He does not repeat the same question repeatedly. The patient does not excessively forget where placed certain things. The patient also started forgetting names. The patient is driving and denies getting lost. The patient is not losing personal items and does not put them in odd places. No confusion around and inside the house. No trouble remembering the date and time, keeping up with medications and appointments and keeping up with major holidays and political changes but sometimes he asks his wife about his appointments. The patient is independent in handling finances. The patient is still independent with ADLs. No agitation or aggression. No hallucinations or delusions. No seizures. No language problems. No problems handling tools. No history of headaches. No history of hypothyroidism. No history of alcoholism (young onset or old onset). No history of B12 deficiency. No history of depression. No history of bipolar disorder. No history of Untreated Syphilis.  No history of HIV infection. No toxic exposures.  No history of traumatic brain injury. No tremors or abnormal movements. No falls or instability. No urinary incontinence. No change in sleep and appetite. His mother and grandmother had AD. On 02- scored 27/30 on MOCA.           Review of Systems   Constitutional:  Negative for appetite change and fatigue.   HENT:  Negative for hearing loss and tinnitus.    Eyes:  Negative for photophobia and visual disturbance.   Respiratory:  Positive for apnea. Negative for shortness of breath.    Cardiovascular:  Negative for chest pain and palpitations.   Gastrointestinal:  Negative for  nausea and vomiting.   Endocrine: Negative for cold intolerance and heat intolerance.   Genitourinary:  Negative for difficulty urinating and urgency.   Musculoskeletal:  Positive for gait problem. Negative for arthralgias, back pain, joint swelling, myalgias, neck pain and neck stiffness.   Skin:  Negative for color change and rash.   Allergic/Immunologic: Negative for environmental allergies and immunocompromised state.   Neurological:  Positive for numbness. Negative for dizziness, tremors, seizures, syncope, facial asymmetry, speech difficulty, weakness, light-headedness and headaches.   Hematological:  Negative for adenopathy. Does not bruise/bleed easily.   Psychiatric/Behavioral:  Negative for agitation, behavioral problems, confusion, decreased concentration, dysphoric mood, hallucinations, self-injury, sleep disturbance and suicidal ideas. The patient is not hyperactive.                Current Outpatient Medications:     albuterol (VENTOLIN HFA) 90 mcg/actuation inhaler, Inhale 2 puffs into the lungs every 6 (six) hours as needed for Wheezing. Rescue, Disp: 8 g, Rfl: 0    aspirin (ECOTRIN) 81 MG EC tablet, Take 1 tablet (81 mg total) by mouth once daily., Disp: 90 tablet, Rfl: 3    blood sugar diagnostic Strp, 1 strip by Misc.(Non-Drug; Combo Route) route 2 (two) times daily with meals., Disp: 200 strip, Rfl: 3    blood sugar diagnostic Strp, Test blood glucose 2 times a day, Disp: 200 strip, Rfl: 3    fluticasone propion-salmeterol 115-21 mcg/dose (ADVAIR HFA) 115-21 mcg/actuation HFAA inhaler, Inhale 2 puffs into the lungs every 12 (twelve) hours. Controller, Disp: 12 g, Rfl: 6    fluticasone propionate (FLONASE) 50 mcg/actuation nasal spray, SHAKE LIQUID AND USE 2 SPRAYS IN EACH NOSTRIL EVERY DAY, Disp: 48 g, Rfl: 3    glipiZIDE (GLUCOTROL) 2.5 MG TR24, TAKE 1 TABLET(2.5 MG) BY MOUTH DAILY WITH BREAKFAST, Disp: 90 tablet, Rfl: 1    ipratropium (ATROVENT) 21 mcg (0.03 %) nasal spray, 2 sprays by Each  "Nostril route 2 (two) times daily., Disp: 30 mL, Rfl: 0    lancets Misc, 1 each by Misc.(Non-Drug; Combo Route) route 2 (two) times daily with meals., Disp: 100 each, Rfl: 11    losartan (COZAAR) 25 MG tablet, Take 1 tablet (25 mg total) by mouth once daily., Disp: 90 tablet, Rfl: 3    montelukast (SINGULAIR) 10 mg tablet, Take 1 tablet (10 mg total) by mouth every evening., Disp: 90 tablet, Rfl: 3    multivitamin (THERAGRAN) per tablet, Take 1 tablet by mouth once daily., Disp: , Rfl:     [id number] ([facility] FORMULA) 5-1-7.5 mg Cap, Take by mouth., Disp: , Rfl:     ONETOUCH DELICA PLUS LANCET 33 gauge Misc, USE TO CHECK BLOOD GLUCOSE TWO TIMES A DAY WITH MEALS, Disp: 200 each, Rfl: 3    pantoprazole (PROTONIX) 40 MG tablet, Take 1 tablet (40 mg total) by mouth daily as needed., Disp: 30 tablet, Rfl: 3    pen needle, diabetic 31 gauge x 5/16" Ndle, 1 each by Misc.(Non-Drug; Combo Route) route 2 (two) times daily with meals., Disp: 100 each, Rfl: 11    rosuvastatin (CRESTOR) 40 MG Tab, TAKE 1 TABLET BY MOUTH ONCE DAILY, Disp: 90 tablet, Rfl: 2    triamcinolone acetonide 0.1% (KENALOG) 0.1 % cream, Apply topically 2 (two) times daily., Disp: 15 g, Rfl: 1    blood-glucose meter kit, Use as instructed, Disp: 1 each, Rfl: 0    lancing device Misc, 1 Device by Misc.(Non-Drug; Combo Route) route 2 (two) times daily with meals., Disp: 1 each, Rfl: 0    Current Facility-Administered Medications:     EPINEPHrine (EPIPEN) 0.3 mg/0.3 mL pen injection 0.3 mg, 0.3 mg, Intramuscular, PRN, Amando Ulrich MD    ondansetron disintegrating tablet 4 mg, 4 mg, Oral, Once PRN, Amando Ulrich MD  Past Medical History:   Diagnosis Date    Acute CVA (cerebrovascular accident) 09/08/2022    DANY (acute kidney injury) 09/14/2022    Asthma     Bronchitis chronic     Cancer     Chronic diastolic congestive heart failure 9/12/2023    Cough     Digestive disorder     Hyperlipidemia     Hypertension     Liver " disease     KALYAN (obstructive sleep apnea) 2022    Stroke 2022    Type 2 diabetes mellitus, without long-term current use of insulin 2022    BS didn't check 10/05/2022     Past Surgical History:   Procedure Laterality Date    COLONOSCOPY N/A 2016    Procedure: COLONOSCOPY;  Surgeon: Alonso Dorsey MD;  Location: [facility];  Service: Endoscopy;  Laterality: N/A;    COLONOSCOPY N/A 2016    Procedure: COLONOSCOPY;  Surgeon: Alonso Dorsey MD;  Location: [facility];  Service: Endoscopy;  Laterality: N/A;    COLONOSCOPY N/A 2/3/2017    Procedure: COLONOSCOPY;  Surgeon: Alonso Dorsey MD;  Location: [facility];  Service: Endoscopy;  Laterality: N/A;    COLONOSCOPY N/A 2017    Procedure: COLONOSCOPY;  Surgeon: Alonso Dorsey MD;  Location: [facility];  Service: Endoscopy;  Laterality: N/A;    COLONOSCOPY N/A 2021    Procedure: COLONOSCOPY;  Surgeon: Paula Ricardo MD;  Location: [facility];  Service: Endoscopy;  Laterality: N/A;    FLUOROSCOPY N/A 6/15/2018    Procedure: Transjugular liver bx;  Surgeon: Petros Recio MD;  Location: [facility];  Service: General;  Laterality: N/A;    TONSILLECTOMY       Social History     Socioeconomic History    Marital status:    Tobacco Use    Smoking status: Former     Current packs/day: 0.00     Types: Cigarettes, Pipe     Start date:      Quit date: 1960     Years since quittin.1     Passive exposure: Past    Smokeless tobacco: Never    Tobacco comments:     smoked a pipe - quit smoking    Substance and Sexual Activity    Alcohol use: Not Currently     Alcohol/week: 2.0 - 3.0 standard drinks of alcohol     Types: 2 - 3 Cans of beer per week     Comment: daily    Drug use: No    Sexual activity: Not Currently     Partners: Female     Social Determinants of Health     Financial Resource Strain: Low Risk  (9/15/2022)    Overall Financial Resource Strain (CARDIA)     Difficulty of Paying Living Expenses:  Not very hard   Food Insecurity: No Food Insecurity (9/15/2022)    Hunger Vital Sign     Worried About Running Out of Food in the Last Year: Never true     Ran Out of Food in the Last Year: Never true   Transportation Needs: No Transportation Needs (9/15/2022)    PRAPARE - Transportation     Lack of Transportation (Medical): No     Lack of Transportation (Non-Medical): No   Physical Activity: Inactive (9/15/2022)    Exercise Vital Sign     Days of Exercise per Week: 0 days     Minutes of Exercise per Session: 0 min   Stress: No Stress Concern Present (9/15/2022)    [employer] [place] [employer] - Occupational Stress Questionnaire     Feeling of Stress : Not at all   Social Connections: Socially Integrated (9/15/2022)    Social Connection and Isolation Panel [NHANES]     Frequency of Communication with Friends and Family: More than three times a week     Frequency of Social Gatherings with Friends and Family: More than three times a week     Attends Muslim Services: More than 4 times per year     Active Member of Clubs or Organizations: Yes     Attends Club or Organization Meetings: More than 4 times per year     Marital Status:    Housing Stability: Low Risk  (9/15/2022)    Housing Stability Vital Sign     Unable to Pay for Housing in the Last Year: No     Number of Places Lived in the Last Year: 1     Unstable Housing in the Last Year: No             Past/Current Medical/Surgical History, Past/Current Social History, Past/Current Family History and Past/Current Medications were reviewed in detail.        Objective:           VITAL SIGNS WERE REVIEWED      GENERAL APPEARANCE:     The patient looks comfortable.    BMI 34.53     No signs of respiratory distress.    Normal breathing pattern.    No dysmorphic features    Normal eye contact.     GENERAL MEDICAL EXAM:    HEENT:  Head is atraumatic normocephalic.     Funduscopic (Ophthalmoscopic) exam showed no disc edema.      Neck and Axillae: No  JVD. No visible lesions.    Cardiopulmonary: No cyanosis. No tachypnea. Normal respiratory effort.    Gastrointestinal/Urogenital:  No jaundice. No stomas or lesions. No visible hernias. No catheters.     Skin, Hair and Nails: No pathognonomic skin rash. No neurofibromatosis. No visible lesions.No stigmata of autoimmune disease. No clubbing.    Limbs: No varicose veins. No visible swelling.    Muskoskeletal: No visible deformities.No visible lesions.           Neurologic Exam     Mental Status   Oriented to person, place, and time.   Follows 3 step commands.   Attention: normal. Concentration: normal.   Speech: speech is normal   Level of consciousness: alert  Able to name object. Able to repeat. Normal comprehension.     Cranial Nerves     CN II   Visual fields full to confrontation.   Visual acuity: normal  Right visual field deficit: none  Left visual field deficit: none     CN III, IV, VI   Pupils are equal, round, and reactive to light.  Extraocular motions are normal.   Right pupil: Size: 2 mm. Shape: regular. Reactivity: brisk. Consensual response: intact. Accommodation: intact.   Left pupil: Size: 2 mm. Shape: regular. Reactivity: brisk. Consensual response: intact. Accommodation: intact.   CN III: no CN III palsy  CN VI: no CN VI palsy  Nystagmus: none   Diplopia: none  Ophthalmoparesis: none  Upgaze: normal  Downgaze: normal  Conjugate gaze: present  Vestibulo-ocular reflex: present    CN V   Right facial sensation deficit: complete  Left facial sensation deficit: none  Jaw jerk: normal    CN VII   Facial expression full, symmetric.   Right facial weakness: none  Left facial weakness: none    CN VIII   CN VIII normal.   Hearing: intact    CN IX, X   CN IX normal.   CN X normal.   Palate: symmetric    CN XI   CN XI normal.   Right sternocleidomastoid strength: normal  Left sternocleidomastoid strength: normal  Right trapezius strength: normal  Left trapezius strength: normal    CN XII   CN XII normal.    Tongue: not atrophic  Fasciculations: absent  Tongue deviation: none    Motor Exam   Muscle bulk: normal  Overall muscle tone: normal  Right arm tone: normal  Left arm tone: normal  Right arm pronator drift: present  Left arm pronator drift: absent  Right leg tone: normal  Left leg tone: normal    Strength   Strength 5/5 throughout.   Right neck flexion: 5/5  Left neck flexion: 5/5  Right neck extension: 5/5  Left neck extension: 5/5  Right deltoid: 5/5  Left deltoid: 5/5  Right biceps: 5/5  Left biceps: 5/5  Right triceps: 5/5  Left triceps: 5/5  Right wrist flexion: 5/5  Left wrist flexion: 5/5  Right wrist extension: /5  Left wrist extension:   Right interossei:   Left interossei:   Right iliopsoas: /  Left iliopsoas: 5/  Right quadriceps:   Left quadriceps: 5/  Right hamstrin/  Left hamstrin/  Right glutei: /  Left glutei: 5/  Right anterior tibial: 5/5  Left anterior tibial: 5/5  Right posterior tibial: 5/  Left posterior tibial: /  Right peroneal:   Left peroneal: 5/  Right gastroc: /  Left gastroc:     Sensory Exam   Right arm light touch: decreased from elbow  Left arm light touch: normal  Right leg light touch: decreased from knee  Left leg light touch: normal  Right arm vibration: decreased from elbow  Left arm vibration: normal  Right leg vibration: decreased from knee  Left leg vibration: normal  Right arm proprioception: decreased from elbow  Left arm proprioception: normal  Right leg proprioception: decreased from knee  Left leg proprioception: normal  Right arm pinprick: decreased from elbow  Left arm pinprick: normal  Right leg pinprick: decreased from knee  Left leg pinprick: normal  Graphesthesia: abnormal right  Stereognosis: abnormal right    Gait, Coordination, and Reflexes     Gait  Gait: (Slow. Hesitant)    Coordination   Finger to nose coordination: normal  Heel to shin coordination: normal    Tremor   Resting tremor: absent  Intention tremor:  absent  Action tremor: absent    Reflexes   Right brachioradialis: 1+  Left brachioradialis: 1+  Right biceps: 1+  Left biceps: 1+  Right triceps: 1+  Left triceps: 1+  Right patellar: 1+  Left patellar: 1+  Right achilles: 0  Left achilles: 0  Right plantar: normal  Left plantar: normal  Right Singh: absent  Left Singh: absent  Right ankle clonus: absent  Left ankle clonus: absent  Right pendular knee jerk: absent  Left pendular knee jerk: absent        Lab Results   Component Value Date    WBC 6.83 12/05/2023    HGB 13.7 (L) 12/05/2023    HCT 41.8 12/05/2023    MCV 94 12/05/2023     12/05/2023     Sodium   Date Value Ref Range Status   11/27/2023 143 136 - 145 mmol/L Final     Potassium   Date Value Ref Range Status   11/27/2023 4.1 3.5 - 5.1 mmol/L Final     Chloride   Date Value Ref Range Status   11/27/2023 103 95 - 110 mmol/L Final     CO2   Date Value Ref Range Status   11/27/2023 26 23 - 29 mmol/L Final     Glucose   Date Value Ref Range Status   11/27/2023 107 70 - 110 mg/dL Final     BUN   Date Value Ref Range Status   11/27/2023 12 8 - 23 mg/dL Final     Creatinine   Date Value Ref Range Status   11/27/2023 0.8 0.5 - 1.4 mg/dL Final     Calcium   Date Value Ref Range Status   11/27/2023 9.9 8.7 - 10.5 mg/dL Final     Total Protein   Date Value Ref Range Status   06/15/2023 6.9 6.0 - 8.4 g/dL Final     Albumin   Date Value Ref Range Status   06/15/2023 3.9 3.5 - 5.2 g/dL Final     Total Bilirubin   Date Value Ref Range Status   06/15/2023 1.4 (H) 0.1 - 1.0 mg/dL Final     Comment:     For infants and newborns, interpretation of results should be based  on gestational age, weight and in agreement with clinical  observations.    Premature Infant recommended reference ranges:  Up to 24 hours.............<8.0 mg/dL  Up to 48 hours............<12.0 mg/dL  3-5 days..................<15.0 mg/dL  6-29 days.................<15.0 mg/dL       Alkaline Phosphatase   Date Value Ref Range Status   06/15/2023  59 55 - 135 U/L Final     AST   Date Value Ref Range Status   06/15/2023 22 10 - 40 U/L Final     ALT   Date Value Ref Range Status   06/15/2023 25 10 - 44 U/L Final     Anion Gap   Date Value Ref Range Status   11/27/2023 14 8 - 16 mmol/L Final     eGFR if    Date Value Ref Range Status   01/20/2021 >60.0 >60 mL/min/1.73 m^2 Final     eGFR if non    Date Value Ref Range Status   01/20/2021 >60.0 >60 mL/min/1.73 m^2 Final     Comment:     Calculation used to obtain the estimated glomerular filtration  rate (eGFR) is the CKD-EPI equation.        Lab Results   Component Value Date    TOAMZRBH81 465 08/06/2008     Lab Results   Component Value Date    TSH 2.979 12/05/2023 09- EVENT EVALUATION       /102     CTA H/N  Coarse calcific plaque within the left carotid bifurcation resulting in up to 50% stenosis of the origin/proximal aspect of the left internal carotid artery.    CTH/Brain MRI Acute lacunar infarcts within the left thalamus    EKG NSR, TTE EF 60%     HA1C 8.3,              REHABILITATION EVALUATION     03-    OT Driving Evaluation. He did well in all areas except visual acuity. He did not have his glasses with him for distance vision and does not like them as they are bifocals and he is uncomfortable with the focus. His vision is not good enough to drive without them. His wife was present for a post testing conference and both are fully understanding that he is in need of new glasses before returning to driving.         LABORATORY EVALUATION    02-    Labs NL B12, FA, T4 and RPR-ve    NEUROCOGNITIVE EVALUATION       FAYE COGNITIVE ASSESSMENT (MOCA) TOTAL SCORE 30         NORMAL-MILD NCD 26-30    MILD DEMENTIA 20-25    MODERATE DEMENTIA 10-19    SEVERE DEMENTIA <10        DATE 02-       TOTAL SCORE 27       VISUOSPATIAL EXECUTIVE (5) 5       NAMING (3) 3       ATTENTION (6) 6       LANGUAGE (3) 2       ABSTRACTION(2) 2        RECALL (5) 3       ORIENTATION (6) 6             Reviewed the neuroimaging independently       Assessment:       1. History of lacunar cerebrovascular accident    2. Venous insufficiency    3. Valvular heart disease    4. Unequal blood pressures in paired extremities    5. Type 2 diabetes mellitus without complication, without long-term current use of insulin    6. Stenosis of left carotid artery    7. Tubulovillous adenoma of colon    8. Personal history of colonic polyps    9. Obstructive sleep apnea    10. MCMAHAN (nonalcoholic steatohepatitis)    11. Mild intermittent asthma without complication    12. Liver cyst    13. Lipoma, unspecified site    14. Intolerance of continuous positive airway pressure (CPAP) ventilation    15. Primary hypertension    16. Dyslipidemia    17. Chronic maxillary sinusitis    18. Chronic diastolic congestive heart failure    19. Amyloidosis, unspecified type    20. Benign prostatic hyperplasia without lower urinary tract symptoms    21. BMI 37.0-37.9, adult    22. Chronic cough    23. Cognitive complaints with normal exam            Modified [place] Score (m-RS)      0  The patient has no residual symptoms.    1  The patient has no significant disability; able to carry out all pre-stroke activities.    2  The patient has slight disability; unable to carry out all pre-stroke activities but able to look after self without daily help.    3  The patient has moderate disability; requiring some external help but able to walk without the assistance of another individual.    4  The patient has moderately severe disability; unable to walk or attend to bodily functions without assistance of another individual.    5  The patient has severe disability; bedridden, incontinent, requires continuous care.    6  The patient has  (during the hospital stay or after discharge from the hospital).        Plan:                 S/P LACUNAR LEFT THALAMIC STROKE (2022) DUE TO SMALL VESSEL DISEASE,   VASCULAR RISK FACTORS: HTN, HLD, T2DM, KALYAN, OBESITY.         Vascular Risk Factors (VRFs) stratification (BP ('s-140's/70's-80's, BS HA1C <7, BC control LDL <70 and KALYAN control) is the mainstay of stroke prevention (>90%). The benefit of antiplatelets is < 20% stroke risk reduction.       Continue ASA Monotherapy.     Call 911 if any SUDDEN:     Weakness  Numbness  Slurring of speech  Speech difficulty   Vertigo  Loss of balance  Loss of vision  Loss of hearing  Double vision  Trouble swallowing  Trouble breathing  Facial drooping        COGNITIVE CONCERNS WITH FAMILY HISTORY OF DEMENTIA         EVALUATION     TSH-T4, FA, B12, RPR.    Comprehensive Neuropsychological Testing       MANAGEMENT       You may benefit from the use of compensatory strategies to optimize your daily cognitive functioning, including the following:    Focus on one thing at a time and do not try to multitask.  Break down larger tasks into small, manageable tasks.  Face towards people and make eye contact when speaking with them - this helps you stay focused.  Work in a quiet place and block out distractions when possible. It may be helpful to use earplugs or a white noise machine (a fan works too) to reduce noises.   Do your most important tasks during the time of day when you feel most alert/awake.  Ask people to repeat or clarify information as needed. Have them summarize the take home points from a conversation.  Repeat important information back to someone to make sure you got it right and to improve later recall.  Put important items like keys, wallet, cell phone, and glasses in the same place every day so you're less likely to misplace them.  Carry around a small notebook to write down important information you'll need later.  Use Post-Its placed where you're sure to see them to jog your memory.   Set aside some time each week to plan ahead for the tasks you will need to complete. Use this time to prioritize the tasks and set  alarms so you won't forget to complete them before the deadline.  Use alarms, notes, and checklists as needed to keep track of tasks, deadlines, and appointments.  Use self-talk to help you concentrate and keep track of the steps in a task. For example, talk yourself (out loud or internally) though the steps involved in a recipe as you complete them.     There are steps you can take to improve your long-term brain health and reduce your risk for cognitive decline in the future. I encourage you to follow the recommendations in your daily life:  Engage in physical activity (i.e., something that gets your heart rate up) totaling at least 15-30 minutes per day. Regular exercise is very important for both long-term health and stress management. Walking, hiking, elliptical, stationary biking, dancing, and yoga are all good options.   Work closely with your doctor(s) to manage any medical conditions such as high blood pressure, high cholesterol, and heart disease. Follow the management guidelines from the American Heart Association at www.heart.org.  Remain mentally engaged by keeping socially active, reading, learning new skills, playing games, or participating in a hobby.  Get a good night's sleep by following the advice of your sleep medicine physician. I also recommend avoiding screens 30-60 minutes before bed and sticking to a regular sleep schedule.  Eat a balanced, heart-healthy diet that is low in salt, saturated fats, and added sugar. The Mediterranean diet has been shown to be especially healthy; studies show that it may reduce the risk of developing dementia and slow the rate of age-related cognitive decline.          Poor sleep has a negative effect on cognition. Several strategies have been shown to improve sleep:     Caffeine intake in the afternoon and evening, as well as stuffing oneself at supper, can decrease the quality of restful sleep throughout the night.   Bedtime and wake-up times should be  consistent every night and morning so the body becomes used to a single routine, even on the weekends.  Engage in daily physical activity, but not 2-3 hours before bedtime.   No technology use (television, computer, iPad) 1-2 hours before bed.   Have a wind down routine (e.g., soft lights in the house, bath before bed, reduced fluid intake, songs, reading, less noise) to promote sleep readiness.   Visit the www.sleepfoundation.org for more strategies.      Recommend cognitive hygiene as well    Engage in regular exercise, which increases alertness and arousal and can improve attention and focus.  Consider lower impact exercises, such as yoga or light walking.  Get a good nights sleep, as this can enhance alertness and cognition.  Eat healthy foods and balanced meals. It is notable that research indicates certain nutrients may aid in brain function, such as B vitamins (especially B6, B12, and folic acid), antioxidants (such as vitamins C and E, and beta carotene), and Omega-3 fatty acids. Talk with your physician or nutritionist about whats right for you.   Keep your brain active. Find activities to stay mentally active, such as reading, games (cards, checkers), puzzles (crosswords, Sudoku, jig saw), crafts (models, woodworking), gardening, or participating in activities in the community.  Stay socially engaged. Continue staying active with your family and friends.      HERE ARE 10 WAYS TO REDUCE RISK OF DEMENTIA AND SLOW DOWN THE PROGRESSION OF DEMENTIA        1.Be physically active.    2. Avoid smoking and alcohol consumption.    3. Track your numbers. Keep your blood pressure, cholesterol, blood sugar and weight within recommended ranges.    4. Stay socially connected.    5. Make healthy food choices. Eat a well-balance and healthy diet that rich in cereals, fish, legumes, and vegetables.    6. Reduce stress.     7. Challenge your brain by trying something new, playing games, or learning a new language.    8.  Take care of your hearing. Avoid being continuously exposed to loud sounds and wear a hearing aid if hearing does become a problem.    9. Lower risk of falls. Consider installing handrails on all stairs and grab bars in bathrooms.    10. Reduce your exposure to air pollution, such as exposure to exhaust in heavy traffic.         MEDICAL/SURGICAL COMORBIDITIES     All relevant medical comorbidities noted and managed by primary care physician and medical care team.          HEALTHY LIFESTYLE AND PREVENTATIVE CARE    The patient to adhere to the age-appropriate health maintenance guidelines including screening tests and vaccinations. The patient to adhere to  healthy lifestyle, optimal weight, exercise, healthy diet, good sleep hygiene and avoiding drugs including smoking, alcohol and recreational drugs.          RTC PRN       Dagoberto Montes MD, FAAN    Attending Neurologist/Epileptologist         Diplomate, American Board of Psychiatry and Neurology    Diplomate, American Board of Clinical Neurophysiology     Fellow, American Academy of Neurology             I spent a total of 70 minutes on the day of the visit.  This includes face to face time and non-face to face time preparing to see the patient (eg, review of tests), obtaining and/or reviewing separately obtained history, documenting clinical information in the electronic or other health record, independently interpreting results and communicating results to the patient/family/caregiver, or care coordinator.

## 2024-02-27 LAB — RPR SER QL: NORMAL

## 2024-03-08 ENCOUNTER — TELEPHONE (OUTPATIENT)
Dept: PULMONOLOGY | Facility: CLINIC | Age: 84
End: 2024-03-08
Payer: COMMERCIAL

## 2024-03-08 NOTE — TELEPHONE ENCOUNTER
----- Message from Rina Ruiz sent at 3/8/2024 12:31 PM CST -----  Regarding: pt wife called  Name of Who is Calling: SANDIP SONI JR. [[id number]] Mavis ( wife )       What is the request in detail: requesting to see if her  has to stay overnight for test that is scheduled on 03/14. Please advise       Can the clinic reply by [facility]: No      What Number [facility] Back [facility]: [phone]

## 2024-03-14 ENCOUNTER — CLINICAL SUPPORT (OUTPATIENT)
Dept: PULMONOLOGY | Facility: CLINIC | Age: 84
End: 2024-03-14
Payer: COMMERCIAL

## 2024-03-14 DIAGNOSIS — G47.33 OBSTRUCTIVE SLEEP APNEA: ICD-10-CM

## 2024-03-14 DIAGNOSIS — J45.20 MILD INTERMITTENT ASTHMA WITHOUT COMPLICATION: ICD-10-CM

## 2024-03-14 DIAGNOSIS — I50.32 CHRONIC DIASTOLIC CONGESTIVE HEART FAILURE: ICD-10-CM

## 2024-03-14 PROCEDURE — 94762 N-INVAS EAR/PLS OXIMTRY CONT: CPT | Mod: S$GLB,,, | Performed by: INTERNAL MEDICINE

## 2024-03-14 PROCEDURE — 99999 PR PBB SHADOW E&M-EST. PATIENT-LVL I: CPT | Mod: PBBFAC,,,

## 2024-03-14 NOTE — PROGRESS NOTES
Subjective:       Patient ID: Jovan Del Cid Jr. is a 83 y.o. male.  Patient Active Problem List   Diagnosis    Intolerance of continuous positive airway pressure (CPAP) ventilation    Liver cyst    HTN (hypertension)    Dyslipidemia    Benign prostatic hyperplasia    Lipoma    Amyloid disease    Chronic maxillary sinusitis    Tubulovillous adenoma of colon    MCMAHAN (nonalcoholic steatohepatitis)    Mild intermittent asthma without complication    BMI 37.0-37.9, adult    Personal history of colonic polyps    History of lacunar cerebrovascular accident    Type 2 diabetes mellitus, without long-term current use of insulin    Obstructive sleep apnea    Stenosis of left carotid artery    Venous insufficiency    Valvular heart disease    Chronic diastolic congestive heart failure    Unequal blood pressures in paired extremities    Cognitive complaints with normal exam    Nocturnal hypoxemia due to asthma     Immunization History   Administered Date(s) Administered    COVID-19, MRNA, LN-S, PF (Pfizer) (Purple Cap) 01/10/2021, 2021, 2021    COVID-19, mRNA, LNP-S, bivalent booster, PF (PFIZER OMICRON) 10/14/2022    Influenza (FLUAD) - Quadrivalent - Adjuvanted - PF *Preferred* (65+) 10/15/2020, 2022, 2023    Influenza - High Dose - PF (65 years and older) 2020    Pneumococcal Conjugate - 20 Valent 10/24/2022    Zoster Recombinant 2023, 2023     Social History     Tobacco Use   Smoking Status Former    Current packs/day: 0.00    Types: Cigarettes, Pipe    Start date:     Quit date:     Years since quittin.2    Passive exposure: Past   Smokeless Tobacco Never   Tobacco Comments    smoked a pipe - quit smoking      Asthma Control Test  In the past 4  weeks, how much of the time did your asthma keep you from getting as much done at work, school or at home?: Most of the time  During the past 4 weeks, how often have you had shortness of breath?: More than once a  day  During the past 4 weeks, how often did your asthma symptoms (wheezing, couging, shortness of breath, chest tightness or pain) wake you up at night or earlier than usual in the morning?: Once or twice  During the past 4 weeks, how often have you used your rescue inhaler or nebulizer medication (such as albuterol)?: Not at all  How would you rate your asthma control during the past 4 weeks?: Well controlled  If your score is 19 or less, your asthma may not be under control: 16           3/15/2024    11:56 AM   EPWORTH SLEEPINESS SCALE   Sitting and reading 0   Watching TV 2   Sitting, inactive in a public place (e.g. a theatre or a meeting) 0   As a passenger in a car for an hour without a break 0   Lying down to rest in the afternoon when circumstances permit 3   Sitting and talking to someone 0   Sitting quietly after a lunch without alcohol 1   In a car, while stopped for a few minutes in traffic 0   Total score 6        Asthma Control Test  In the past 4  weeks, how much of the time did your asthma keep you from getting as much done at work, school or at home?: Most of the time  During the past 4 weeks, how often have you had shortness of breath?: More than once a day  During the past 4 weeks, how often did your asthma symptoms (wheezing, couging, shortness of breath, chest tightness or pain) wake you up at night or earlier than usual in the morning?: Once or twice  During the past 4 weeks, how often have you used your rescue inhaler or nebulizer medication (such as albuterol)?: Not at all  How would you rate your asthma control during the past 4 weeks?: Well controlled  If your score is 19 or less, your asthma may not be under control: 16        Chief Complaint: Asthma and Apnea        Jovan Del Cid . is a 80 y.o.  male   Follow up, No cough, No congetion.No fever  ACT  20: hardly taking rescue albuterol HFA.   Here to reviewed : CXR and [place]  Normal Drew  Runs asphalt bussiness  Busy lifestyle.  No  interval asthma exacerbations since last visit, Actually not used rescue albuterol in 12 months.  Spirometry normal         09/21/2022  Last visit 10/15/2022  Referred by Dr Dagoberto Montes  Recent lacunar CVA  Known KALYAN Moderate KALYAN titrated to CPAP 12 cm  Snoring and apnea  Therapy options discussed  Was in Hospital x 2  Spouse present  Goals and therapy options discussed      12/13/2022  Last seen 09/21/2022  Sleep study reveiwed  Severe KALYAN  Forgetful  Drew: Normal  FeNo 43  ACT score20  Wife says gets SOB walking  Added LABA ICS  Follow 8 weeks    12/11/2023  Followup  Stroke Last year  C/o DTS  [place]son 17  Last titration CPAP 17 was adequate  Failed to fully habituated  Study reviewed  No sleepy driving  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      Titration reveiwed  BIPAP 17/13 was adequate  After long discussion patient eventually agreed to use a BiPAP.    Not open to other options like mandibular device or inspire  Will order  Time below on titration: Oxygen saturations were . 88 % for 8.8 minutes      Asthma  There is no cough, shortness of breath, sputum production or wheezing. Pertinent negatives include no postnasal drip. His past medical history is significant for asthma.     Review of Systems   Constitutional: Negative.    HENT:  Negative for postnasal drip and congestion.    Eyes: Negative.    Respiratory:  Positive for snoring and somnolence. Negative for cough, sputum production, shortness of breath, wheezing, asthma nighttime symptoms, pleurisy, dyspnea on extertion and use of rescue inhaler.    Cardiovascular: Negative.    Genitourinary: Negative.    Endocrine:  "endocrine negative    Musculoskeletal: Negative.    Skin: Negative.    Gastrointestinal: Negative.    Neurological: Negative.    Psychiatric/Behavioral: Negative.     All other systems reviewed and are negative.           Goals of Care Treatment Preferences:  Code Status: Full Code       BP Readings from Last 3 Encounters:   03/15/24 (!) 140/76   02/26/24 (!) 144/80   02/22/24 115/71          Neck circumference  18.5" [?KALYAN risk if >43cm (17in) male or >41cm (15.5 in) female]             The following portions of the patient's history were reviewed and updated as appropriate: He  has a past medical history of Acute CVA (cerebrovascular accident) (09/08/2022), DANY (acute kidney injury) (09/14/2022), Asthma, Bronchitis chronic, Cancer, Chronic cough (11/15/2013), Chronic diastolic congestive heart failure (09/12/2023), Cough, Digestive disorder, Hyperlipidemia, Hypertension, Liver disease, KALYAN (obstructive sleep apnea) (09/08/2022), Stroke (09/01/2022), and Type 2 diabetes mellitus, without long-term current use of insulin (09/07/2022).  He does not have any pertinent problems on file.  He  has a past surgical history that includes Tonsillectomy; Colonoscopy (N/A, 6/21/2016); Colonoscopy (N/A, 11/1/2016); Colonoscopy (N/A, 2/3/2017); Colonoscopy (N/A, 11/13/2017); Fluoroscopy (N/A, 6/15/2018); and Colonoscopy (N/A, 2/19/2021).  His family history includes Alzheimer's disease in his mother; Cancer in his father.  He  reports that he quit smoking about 64 years ago. His smoking use included cigarettes and pipe. He started smoking about 67 years ago. He has been exposed to tobacco smoke. He has never used smokeless tobacco. He reports that he does not currently use alcohol after a past usage of about 2.0 - 3.0 standard drinks of alcohol per week. He reports that he does not use drugs.  He has a current medication list which includes the following prescription(s): blood sugar diagnostic, blood sugar diagnostic, " "fluticasone propionate, glipizide, ipratropium, lancets, losartan, montelukast, multivitamin, macular health formula, onetouch delica plus lancet, pen needle, diabetic, rosuvastatin, triamcinolone acetonide 0.1%, albuterol, aspirin, blood-glucose meter, fluticasone-salmeterol 230-21 mcg/dose, lancing device, and pantoprazole, and the following Facility-Administered Medications: epinephrine and ondansetron.  Current Outpatient Medications on File Prior to Visit   Medication Sig Dispense Refill    blood sugar diagnostic Strp 1 strip by Misc.(Non-Drug; Combo Route) route 2 (two) times daily with meals. 200 strip 3    blood sugar diagnostic Strp Test blood glucose 2 times a day 200 strip 3    fluticasone propionate (FLONASE) 50 mcg/actuation nasal spray SHAKE LIQUID AND USE 2 SPRAYS IN EACH NOSTRIL EVERY DAY 48 g 3    glipiZIDE (GLUCOTROL) 2.5 MG TR24 TAKE 1 TABLET(2.5 MG) BY MOUTH DAILY WITH BREAKFAST 90 tablet 1    ipratropium (ATROVENT) 21 mcg (0.03 %) nasal spray 2 sprays by Each Nostril route 2 (two) times daily. 30 mL 0    lancets Misc 1 each by Misc.(Non-Drug; Combo Route) route 2 (two) times daily with meals. 100 each 11    losartan (COZAAR) 25 MG tablet Take 1 tablet (25 mg total) by mouth once daily. 90 tablet 3    montelukast (SINGULAIR) 10 mg tablet Take 1 tablet (10 mg total) by mouth every evening. 90 tablet 3    multivitamin (THERAGRAN) per tablet Take 1 tablet by mouth once daily.      [id number] (MACULAR HEALTH FORMULA) 5-1-7.5 mg Cap Take by mouth.      ONETOUCH DELICA PLUS LANCET 33 gauge [facility] TO CHECK BLOOD GLUCOSE TWO TIMES A DAY WITH MEALS 200 each 3    pen needle, diabetic 31 gauge x 5/16" Ndle 1 each by Misc.(Non-Drug; Combo Route) route 2 (two) times daily with meals. 100 each 11    rosuvastatin (CRESTOR) 40 MG Tab TAKE 1 TABLET BY MOUTH ONCE DAILY 90 tablet 2    triamcinolone acetonide 0.1% (KENALOG) 0.1 % cream Apply topically 2 (two) times daily. 15 g 1    aspirin " "(ECOTRIN) 81 MG EC tablet Take 1 tablet (81 mg total) by mouth once daily. 90 tablet 3    blood-glucose meter kit Use as instructed 1 each 0    lancing device Misc 1 Device by Misc.(Non-Drug; Combo Route) route 2 (two) times daily with meals. 1 each 0    pantoprazole (PROTONIX) 40 MG tablet Take 1 tablet (40 mg total) by mouth daily as needed. 30 tablet 3    [DISCONTINUED] albuterol (VENTOLIN HFA) 90 mcg/actuation inhaler Inhale 2 puffs into the lungs every 6 (six) hours as needed for Wheezing. Rescue 8 g 0    [DISCONTINUED] fluticasone propion-salmeterol 115-21 mcg/dose (ADVAIR HFA) 115-21 mcg/actuation HFAA inhaler Inhale 2 puffs into the lungs every 12 (twelve) hours. Controller 12 g 6     Current Facility-Administered Medications on File Prior to Visit   Medication Dose Route Frequency Provider Last Rate Last Admin    EPINEPHrine (EPIPEN) 0.3 mg/0.3 mL pen injection 0.3 mg  0.3 mg Intramuscular PRN Amando Ulrich MD        ondansetron disintegrating tablet 4 mg  4 mg Oral Once PRN Amando Ulrich MD         He has No Known Allergies..    Objective:       Vitals:    03/15/24 1158   BP: (!) 140/76   Pulse: 62   Resp: 20   SpO2: 95%   Weight: 76.9 kg (169 lb 8.5 oz)   Height: 5' 6" (1.676 m)               Physical Exam  Vitals and nursing note reviewed.   Constitutional:       Appearance: He is well-developed.      Comments: Nek 19"   HENT:      Head: Normocephalic and atraumatic.        Right Ear: External ear normal.      Left Ear: External ear normal.      Nose: Nose normal.      Mouth/Throat:      Pharynx: Uvula midline. No oropharyngeal exudate.   Eyes:      General: Lids are normal. No scleral icterus.     Conjunctiva/sclera: Conjunctivae normal.      Pupils: Pupils are equal, round, and reactive to light.   Neck:      Trachea: Trachea and phonation normal.      Comments: Neck 18"  Cardiovascular:      Rate and Rhythm: Normal rate and regular rhythm.      Pulses: Normal pulses.      Heart sounds: " Normal heart sounds, S1 normal and S2 normal. No murmur heard.  Pulmonary:      Effort: Pulmonary effort is normal. No respiratory distress.      Breath sounds: Examination of the left-lower field reveals decreased breath sounds. Decreased breath sounds present. No wheezing, rhonchi or rales.   Chest:      Chest wall: No tenderness.   Abdominal:      General: Bowel sounds are normal.      Palpations: Abdomen is soft.   Musculoskeletal:         General: Normal range of motion.      Cervical back: Normal range of motion and neck supple.   Lymphadenopathy:      Cervical: No cervical adenopathy.   Skin:     General: Skin is warm and dry.      Findings: No rash.      Nails: There is no clubbing.   Neurological:      Mental Status: He is alert and oriented to person, place, and time.      GCS: GCS eye subscore is 4. GCS verbal subscore is 5. GCS motor subscore is 6.      Cranial Nerves: No cranial nerve deficit.      Sensory: No sensory deficit.   Psychiatric:         Speech: Speech normal.       Personal Diagnostic Review  [x]  Chest x-ray:       X-Ray Chest PA And Lateral  Narrative: EXAM:   XR CHEST PA AND LATERAL    CLINICAL HISTORY: Chronic obstructive sleep apnea.    COMPARISON: 09/21/2022.    TECHNIQUE: PA and lateral views    FINDINGS:  The lungs are clear.   No pneumothorax.  The cardiac silhouette size and contour is within normal limits.  Aortic atherosclerosis.  Impression: Negative chest radiograph.    Finalized on: 11/13/2023 2:49 PM By:  FREDDIE Cavazos MD, MD  [facility]# [id number]      2023-11-13 14:[id number]    [facility]   .      [x] Drew Normal  FEV1 : 1.79 L( 73.3 %), FVC 2.67 L( 81.1%)  FEV1/FVC 67  NORMAL SPIROMETRY    FEV1 and FVC declined       Clinical Guide to Interpretation or FeNO Levels :     FeNO  (ppb) LOW INTERMEDIATE HIGH   ADULT VALUES < 25 25-50          > 50   Th2-driven Inflammation Unlikely Likely Significant      Patients FeNO level at this visit : __39__ (ppb)     Interpretation of FeNO  "measurement in adults:  [] FENO is less than 25 ppb implies non eosinophilic airway inflammation or the absence of airway inflammation.               Comment: Low FENO (<25 ppb) in adult asthmatics with persistent symptoms suggests other etiologies for these symptoms and a lower likelihood of benefit from adding or increasing inhaled glucocorticoids.     [x] FENO between 25 and 50 ppb in adults should be interpreted cautiously with reference to the clinical situation (eg, symptomatic, on or off therapy, current smoking).    Feno 39      Oximetry: Summary Report  Comments: On Room Air  Recording time: 06:31:56 Highest pulse: 250 Highest SpO2: 98%  Excluded samplin:10:40 Lowest pulse: 55 Lowest SpO2: 71%  Total valid samplin:21:16 Mean pulse: 68 Mean SpO2: 92.3%  1 S.D.: 7.6 1 S.D.: 3.4  Time with SpO2<90: 0:54:48, 14.4%  Time with SpO2<80: 0:04:56, 1.3%  Time with SpO2<70: 0:00:00, 0.0%  Time with SpO2<60: 0:00:00, 0.0%  Time with SpO2<89: 0:37:28, 9.8%  Time with SpO2 =>90: 5:26:28, 85.6%  Time with SpO2=>80 & <90: 0:49:52, 13.1%  Time with SpO2=>70 & <80: 0:04:56, 1.3%  Time with SpO2=>60 & <70: 0:00:00, 0.0%  The longest continuous time with saturation <=88 was 00:06:08, which started at  24 23:32:59.                3/15/2024    11:58 AM 2024     9:46 AM 2024     1:16 PM 2024    11:00 AM 2023     1:27 PM 2023    12:51 PM 2023     1:19 PM   Pulmonary Function Tests   SpO2 95 % 99 % 97 %   95 % 95 %   Height 5' 6" (1.676 m) 5' 6" (1.676 m)   5' 6" (1.676 m) 5' 6" (1.676 m)    Weight 76.9 kg (169 lb 8.5 oz) 104.8 kg (231 lb) 106.7 kg (235 lb 3.7 oz) 106.7 kg (235 lb 3.7 oz) 104.4 kg (230 lb 2.6 oz) 104.4 kg (230 lb 2.6 oz) 103.2 kg (227 lb 8.2 oz)   BMI (Calculated) 27.4 37.3   37.2 37.2        A medium ResMed AirFit  F30  full - face    Patient Name: Jovan Del Cid                                Date of Report: 24                                  Study Date:  " "2024  [facility] No.: [id number]                                      : 1940                                              Time of Study:  09:28:37 PM - 04:58:57 AM   Sex:  Male   Age:  83 year   Weight:  230.0 lbs          Height:  5' 6"                                                    Type of Study:  CPAP re-titration     REASONS FOR REFERRAL: Mr. Del Cid is a 83 year old male, referred for CPAP retitration polysomnography  by Dr. Stefan Buckner, to determine if he needs a change in CPAP pressure. His split - night dx PSG was on 11-3-22with an A + H Index = 29.4 events / hr asleep, moderate to severe obstructive sleep apnea (KALYAN); the CPAP titration PSG revealed an optimal CPAP pressure of 17 cm, but he was unable to tolerate CPAP sufficiently.  He is now ready to have another PAP titration and try to use CPAP again. Dr. Paul Neal was the originating referring physician, and is the patient's primary care physician.     STUDY PARAMETERS: This study involved analysis of the patient's sleep pattern while breathing was assisted. 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  CPAP was initiated at 09:28:37 PM.  The BiPAP titration polysomnography revealed that BiPAP (Bi - Flex S, humidity 4) of 17 cm IPAP / 13 cm EPAP, was the most effective pressure completely tested, but was not optimal, as it reduced the rate of respiratory events 69 % to A + H Index = 9.2 events / hr asleep in 0.9 hrs sleep 0.28 hrs REM sleep).  Lower pressures also could be successful (12 cm / 8cm A + H I = 9.4  in 1.8 hrs sleep, with 0.3 hrs REM sleep, and 14 cm / 10 cm A + H I = 7.7  in 1.8 hrs sleep, but with no REM sleep).  Note that 12 / 8 cm AHI is only 0.2 events higher than 17 cm / 13 cm but  that 12 cm / 10 cm was much more completely tested on titration time and slightly more in REM time, which could be a reason to test the latter first. (Or, maybe 13 cm / 9 cm should be tested first with AHI = 7.7 the lowest but also with no REM sleep).  Also. both lower pressures had the lesser number of central apneas.  The choice is almost arbitrary.  Snoring was eliminated at all pressures. AutoCPAP 6 cm - 20 cm) could be tried if necessary.          The overall A + H Index was 14.4 / hr asleep, with only 1.7 respiratory event - related arousals / hr asleep (and no RERAs) for the titration trial.  The mean SpO2 value was 91.8 % throughout the study, moderate, with a minimum oxygen saturation during sleep of 86.0 % and a maximum waking baseline SpO2 of 98.0 %).   A medium ResMed AirFit  F30  full - face CPAP mask was used and was tolerated adequately, as sleep during CPAP was slightly reduced .  Please see PAP trial outcomes table, below.     17 cm IPAP / 13 cm EPAP (C - Flex S, humidity 4) is recommended, but  only  after  successful habituation to PAP at home (gradually increased CPAP pressures are used for increasing amounts of time, initially while awake and occupied, and then while asleep).  An adaptive  servo- ventilation (ASV) titration polysomnography also could considered if none of the three BiPAP pressures noted above (17 cm / 13 cm, 14 cm / 10 cm, and 12 cm / 8 cm) was sufficiently effective when tested for an adequate time at home.                                                                             BiPAP Treatment Titration Outcomes Table     Device PAP  Level Time (min) Wake (min) NREM  (min) REM  (min) Sleep Eff% OA# CA# MA# Hyp# AHI RDI Min OSat LM  Index AR  Index   BiLevel 10/6/0 72.5 16.5 56.0 0.0 77.2% 2 4 - 8 15.0 15.0 87.0 19.3 8.6   BiLevel 12/8/0 139.0 31.0 89.0 19.0 77.7% 2 3 - 12 9.4 9.4 86.0 60.6 11.1   BiLevel 14/10/0 88.0 41.0 47.0 0.0 53.4% - 3 1 2 7.7 7.7 86.0 151.9 24.3   BiLevel 13/9/0 42.5 2.0 40.5 0.0 95.3% 8 12 1 6 40.0 40.0 88.0 69.6 37.0   BiLevel 15/11/0 36.0 0.0 3.0 33.0 100.0% 1 1 - 9 18.3 18.3 88.0 10.0 21.7   BiLevel 17/13/0 72.5 1.0 55.0 16.5 98.6% - 8 - 3 9.2 9.2 89.0 0.8 21.0      A device to discourage sleep in the supine position is recommended, to further reduce respiratory events and snoring (respiratory events had a significant supine positional tendency during CPAP).   Weight loss to the normal range is recommended as it can decrease respiratory events and snoring in overweight patients.  The following changes in sleep hygiene / sleep - related behavior are recommended after medical treatments are successful  Regular bedtimes and wake times, including weekends: Total sleep time / night should not be more than one hour more           than usual, and bedtime or wake time should not be more than one hour earlier or later than usual.    Do not attempt to make up lost sleep by extending sleep periods.    Avoid naps; none longer than 20 min or later than mid - afternoon.  Avoid meals or large snacks within 3 hours of bedtime.     SECONDARY TREATMENT RECOMMENDATIONS  Treat, or refer to [facility] if problems are not satisfactorily resolved by the above.  A severe PLM disorder  was observed (PLMS Index = 49.1 / hr asleep,  but  treatment might not be optimal because the PLMS were not disruptive of sleep (3.0 arousals / hr asleep), and there were no signs of restless legs syndrome in the SDI, H & P or PSG.  Consider treatment of PLM disorder if PLMS symptoms are sufficiently bothersome to the patient.  Note that the benzodiazepine medications sometimes used to treat PLM disorder (e.g., clonazepam / Klonopin) may exacerbate some sleep - related respiratory disorders, and that dopaminergic medications such as Mirapex and Requip can be used in such instances.    Consider a referral for a dental examination and possible dental splint for sleep bruxism.     Also consider behavioral and cognitive / behavioral treatments for stress; sleep bruxism might be expected to improve.     See below for a complete interpretation of data from the polysomnography and Sleep Disorders Inventory.     Thank you for referring this patient to the [facility].       Assessment:       Problem List Items Addressed This Visit       Mild intermittent asthma without complication - Primary (Chronic)     Asthma control test score 60   Except nitric oxide was 39   Patient not using his long-acting bronchodilators prescribed in 2022   Restarted Advair as previously ordered   Spacer given   Close follow-up         Relevant Medications    albuterol (VENTOLIN HFA) 90 mcg/actuation inhaler    fluticasone-salmeterol 230-21 mcg/dose (ADVAIR HFA) 230-21 mcg/actuation HFAA inhaler    Other Relevant Orders    Ambulatory referral/consult to Pulmonary Disease Management w/ Respiratory Therapist    Spirometry without Bronchodilator    Fraction of  Nitric Oxide    Chronic maxillary sinusitis    HTN (hypertension)    Dyslipidemia    Type 2 diabetes mellitus, without long-term current use of insulin    Chronic diastolic congestive heart failure    BMI 37.0-37.9, adult    RESOLVED: Chronic cough     "Intolerance of continuous positive airway pressure (CPAP) ventilation     Patient agrees to retry BiPAP         Obstructive sleep apnea     Orders for BiPAP 17/13 placed            Relevant Orders    OXYGEN FOR HOME USE    BIPAP FOR HOME USE    Nocturnal hypoxemia due to asthma     TC 88% was 0:37:28    Oxygen ordered in lieu of CPAP         Relevant Orders    OXYGEN FOR HOME USE     Other Visit Diagnoses       Acute cough                Plan:       Would rather do BIPAP than get INSPIRE  Refilled Advair  Stable asthma  Options of MAD or INSPIRE discussed  Preferes to retry BIPAP       Follow up in about 6 months (around 9/15/2024), or PDM, betty, FeNo, Order O2, Advair, spacer chamber.    This note was prepared using voice recognition system and is likely to have sound alike errors that may have been overlooked even after proof reading.  Please call me with any questions    Discussed diagnosis, its evaluation, treatment and usual course. All questions answered.    Thank you for the courtesy of participating in the care of this patient    Stefan Buckner MD    Orders Placed This Encounter   Procedures    OXYGEN FOR HOME USE     Order Specific Question:   Liter Flow     Answer:   2     Order Specific Question:   Duration     Answer:   With sleep     Order Specific Question:   Qualifying Test Performed at:     Answer:   Rest     Order Specific Question:   Oxygen saturation:     Answer:   71     Order Specific Question:   Portable mode:     Answer:   continuous     Order Specific Question:   Route     Answer:   nasal cannula     Order Specific Question:   Device:     Answer:   home concentrator     Order Specific Question:   Length of need (in months):     Answer:   99 mos     Order Specific Question:   Patient condition with qualifying saturation     Answer:   COPD     Order Specific Question:   Height:     Answer:   5' 6" (1.676 m)     Order Specific Question:   Weight:     Answer:   76.9 kg (169 lb 8.5 oz)     " Order Specific Question:   Alternative treatment measures have been tried or considered and deemed clinically ineffective.     Answer:   Yes    BIPAP FOR HOME USE     1. CPAP was initiated at 09:28:37 PM.  The BiPAP titration polysomnography revealed that BiPAP (Bi - Flex S, humidity 4) of 17 cm IPAP / 13 cm EPAP, was the most effective pressure completely tested, but was not optimal, as it reduced the rate of respiratory events 69 % to A + H Index = 9.2 events / hr asleep in 0.9 hrs sleep 0.28 hrs REM sleep).  Lower pressures also could be successful (12 cm / 8cm A + H I = 9.4  in 1.8 hrs sleep, with 0.3 hrs REM sleep, and 14 cm / 10 cm A + H I = 7.7  in 1.8 hrs sleep, but with no REM sleep).  Note that 12 / 8 cm AHI is only 0.2 events higher than 17 cm / 13 cm but  that 12 cm / 10 cm was much more completely tested on titration time and slightly more in REM time, which could be a reason to test the latter first. (Or, maybe 13 cm / 9 cm should be tested first with AHI = 7.7 the lowest but also with no REM sleep).  Also. both lower pressures had the lesser number of central apneas.  The choice is almost arbitrary.  Snoring was eliminated at all pressures. AutoCPAP 6 cm - 20 cm) could be tried if necessary.          The overall A + H Index was 14.4 / hr asleep, with only 1.7 respiratory event - related arousals / hr asleep (and no RERAs) for the titration trial.  The mean SpO2 value was 91.8 % throughout the study, moderate, with a minimum oxygen saturation during sleep of 86.0 % and a maximum waking baseline SpO2 of 98.0 %).   A medium ResMed AirFit  F30  full - face CPAP mask was used and was tolerated adequately, as sleep during CPAP was slightly reduced .  Please see PAP trial outcomes table, below.     Order Specific Question:   Length of need (1-99 months):     Answer:   99     Order Specific Question:   Type:     Answer:    BiPap     Order Specific Question:   BiPap setting (cmH20) Inspiratory:      Answer:   17     Order Specific Question:   BiPap setting (cmH20) Expiratory:     Answer:   13     Order Specific Question:   Humidification ():     Answer:   Heated     Order Specific Question:   Choose ONE mask type and its corresponding cushions and/or pillows:     Answer:    Full Face Mask, 1 per 90 days:  Full Face Cushion, (3 per 90 days)     Comments:   A medium ResMed AirFit  F30  full - face     Order Specific Question:   Choose EITHER Heated or Non-Heated Tubjing     Answer:    Non-Heated Tubing, 1 per 90 days     Order Specific Question:   All other supplies as needed as listed below:     Answer:    Headgear, 1 per 180 days     Order Specific Question:   All other supplies as needed as listed below:     Answer:    Disposable Filter, 6 per 90 days     Order Specific Question:   All other supplies as needed as listed below:     Answer:    Non-Disposable Filter, 1 per 180 days     Order Specific Question:   All other supplies as needed as listed below:     Answer:    Exhalation Port, contact payer for quantity/frequency     Order Specific Question:   All other supplies as needed as listed below:     Answer:    Humidifier Chamber, 1 per 180 days    Ambulatory referral/consult to Pulmonary Disease Management w/ Respiratory Therapist     Standing Status:   Future     Standing Expiration Date:   4/15/2025     Referral Priority:   Routine     Referral Type:   Consultation     Referral Reason:   Specialty Services Required     Requested Specialty:   Pulmonary Disease     Number of Visits Requested:   1    Spirometry without Bronchodilator     Standing Status:   Future     Standing Expiration Date:   3/15/2025     Order Specific Question:   Release to patient     Answer:   Immediate    Fraction of  Nitric Oxide     Standing Status:   Future     Standing Expiration Date:   3/15/2025     Order Specific Question:   Release to patient     Answer:   Immediate

## 2024-03-15 ENCOUNTER — CLINICAL SUPPORT (OUTPATIENT)
Dept: PULMONOLOGY | Facility: CLINIC | Age: 84
End: 2024-03-15
Payer: COMMERCIAL

## 2024-03-15 ENCOUNTER — OFFICE VISIT (OUTPATIENT)
Dept: PULMONOLOGY | Facility: CLINIC | Age: 84
End: 2024-03-15
Payer: COMMERCIAL

## 2024-03-15 VITALS
HEART RATE: 62 BPM | RESPIRATION RATE: 20 BRPM | BODY MASS INDEX: 27.25 KG/M2 | WEIGHT: 169.56 LBS | HEIGHT: 66 IN | SYSTOLIC BLOOD PRESSURE: 140 MMHG | DIASTOLIC BLOOD PRESSURE: 76 MMHG | OXYGEN SATURATION: 95 %

## 2024-03-15 DIAGNOSIS — J32.0 CHRONIC MAXILLARY SINUSITIS: ICD-10-CM

## 2024-03-15 DIAGNOSIS — J45.909 NOCTURNAL HYPOXEMIA DUE TO ASTHMA: ICD-10-CM

## 2024-03-15 DIAGNOSIS — R05.3 CHRONIC COUGH: ICD-10-CM

## 2024-03-15 DIAGNOSIS — J45.20 MILD INTERMITTENT ASTHMA WITHOUT COMPLICATION: ICD-10-CM

## 2024-03-15 DIAGNOSIS — J45.20 MILD INTERMITTENT ASTHMA WITHOUT COMPLICATION: Primary | Chronic | ICD-10-CM

## 2024-03-15 DIAGNOSIS — R05.1 ACUTE COUGH: ICD-10-CM

## 2024-03-15 DIAGNOSIS — I50.32 CHRONIC DIASTOLIC CONGESTIVE HEART FAILURE: ICD-10-CM

## 2024-03-15 DIAGNOSIS — I10 PRIMARY HYPERTENSION: ICD-10-CM

## 2024-03-15 DIAGNOSIS — E78.5 DYSLIPIDEMIA: ICD-10-CM

## 2024-03-15 DIAGNOSIS — Z78.9 INTOLERANCE OF CONTINUOUS POSITIVE AIRWAY PRESSURE (CPAP) VENTILATION: ICD-10-CM

## 2024-03-15 DIAGNOSIS — E11.9 TYPE 2 DIABETES MELLITUS WITHOUT COMPLICATION, WITHOUT LONG-TERM CURRENT USE OF INSULIN: ICD-10-CM

## 2024-03-15 DIAGNOSIS — G47.33 OBSTRUCTIVE SLEEP APNEA: ICD-10-CM

## 2024-03-15 DIAGNOSIS — R09.02 NOCTURNAL HYPOXEMIA DUE TO ASTHMA: ICD-10-CM

## 2024-03-15 LAB
BRPFT: NORMAL
FEF 25 75 CHG: 9.8 %
FEF 25 75 LLN: 0.62
FEF 25 75 POST REF: 64 %
FEF 25 75 PRE REF: 58.3 %
FEF 25 75 REF: 1.73
FET100 CHG: -9.1 %
FEV1 CHG: 4.2 %
FEV1 FVC CHG: 2.7 %
FEV1 FVC LLN: 60
FEV1 FVC POST REF: 92 %
FEV1 FVC PRE REF: 89.6 %
FEV1 FVC REF: 75
FEV1 LLN: 1.67
FEV1 POST REF: 76.4 %
FEV1 PRE REF: 73.3 %
FEV1 REF: 2.45
FVC CHG: 1.5 %
FVC LLN: 2.35
FVC POST REF: 82.3 %
FVC PRE REF: 81.1 %
FVC REF: 3.3
PEF CHG: 37.6 %
PEF LLN: 3.93
PEF POST REF: 89.2 %
PEF PRE REF: 64.8 %
PEF REF: 6
POST FEF 25 75: 1.11 L/S
POST FET 100: 12.68 SEC
POST FEV1 FVC: 68.92 %
POST FEV1: 1.87 L
POST FVC: 2.71 L
POST PEF: 5.35 L/S
PRE FEF 25 75: 1.01 L/S
PRE FET 100: 13.94 SEC
PRE FEV1 FVC: 67.12 %
PRE FEV1: 1.79 L
PRE FVC: 2.67 L
PRE PEF: 3.89 L/S

## 2024-03-15 PROCEDURE — 1160F RVW MEDS BY RX/DR IN RCRD: CPT | Mod: CPTII,S$GLB,, | Performed by: INTERNAL MEDICINE

## 2024-03-15 PROCEDURE — 3077F SYST BP >= 140 MM HG: CPT | Mod: CPTII,S$GLB,, | Performed by: INTERNAL MEDICINE

## 2024-03-15 PROCEDURE — 95012 NITRIC OXIDE EXP GAS DETER: CPT | Mod: S$GLB,,, | Performed by: INTERNAL MEDICINE

## 2024-03-15 PROCEDURE — 3078F DIAST BP <80 MM HG: CPT | Mod: CPTII,S$GLB,, | Performed by: INTERNAL MEDICINE

## 2024-03-15 PROCEDURE — 3288F FALL RISK ASSESSMENT DOCD: CPT | Mod: CPTII,S$GLB,, | Performed by: INTERNAL MEDICINE

## 2024-03-15 PROCEDURE — 99999 PR PBB SHADOW E&M-EST. PATIENT-LVL V: CPT | Mod: PBBFAC,,, | Performed by: INTERNAL MEDICINE

## 2024-03-15 PROCEDURE — 94060 EVALUATION OF WHEEZING: CPT | Mod: S$GLB,,, | Performed by: INTERNAL MEDICINE

## 2024-03-15 PROCEDURE — 1101F PT FALLS ASSESS-DOCD LE1/YR: CPT | Mod: CPTII,S$GLB,, | Performed by: INTERNAL MEDICINE

## 2024-03-15 PROCEDURE — 99214 OFFICE O/P EST MOD 30 MIN: CPT | Mod: 25,S$GLB,, | Performed by: INTERNAL MEDICINE

## 2024-03-15 PROCEDURE — 1159F MED LIST DOCD IN RCRD: CPT | Mod: CPTII,S$GLB,, | Performed by: INTERNAL MEDICINE

## 2024-03-15 RX ORDER — FLUTICASONE PROPIONATE AND SALMETEROL XINAFOATE 230; 21 UG/1; UG/1
2 AEROSOL, METERED RESPIRATORY (INHALATION) EVERY 12 HOURS
Qty: 12 G | Refills: 11 | Status: SHIPPED | OUTPATIENT
Start: 2024-03-15 | End: 2025-03-15

## 2024-03-15 RX ORDER — ALBUTEROL SULFATE 90 UG/1
2 AEROSOL, METERED RESPIRATORY (INHALATION) EVERY 6 HOURS PRN
Qty: 8 G | Refills: 3 | Status: SHIPPED | OUTPATIENT
Start: 2024-03-15 | End: 2024-06-18

## 2024-03-15 NOTE — ASSESSMENT & PLAN NOTE
Asthma control test score 60   Except nitric oxide was 39   Patient not using his long-acting bronchodilators prescribed in December 2022   Restarted Advair as previously ordered   Spacer given   Close follow-up

## 2024-03-15 NOTE — PROCEDURES
[facility]  [address] [address] [facility]. * [place] [address]  Telephone: [phone]  Test date: 24 Start: 24 22:45:23 Jovan Del Cid Jr  Doctor: CATHLEEN Buckner MD End: 03/15/24 05:17:19 [id number]  Oximetry: Summary Report  Comments: On Room Air  Recording time: 06:31:56 Highest pulse: 250 Highest SpO2: 98%  Excluded samplin:10:40 Lowest pulse: 55 Lowest SpO2: 71%  Total valid samplin:21:16 Mean pulse: 68 Mean SpO2: 92.3%  1 S.D.: 7.6 1 S.D.: 3.4  Time with SpO2<90: 0:54:48, 14.4%  Time with SpO2<80: 0:04:56, 1.3%  Time with SpO2<70: 0:00:00, 0.0%  Time with SpO2<60: 0:00:00, 0.0%  Time with SpO2<89: 0:37:28, 9.8%  Time with SpO2 =>90: 5:26:28, 85.6%  Time with SpO2=>80 & <90: 0:49:52, 13.1%  Time with SpO2=>70 & <80: 0:04:56, 1.3%  Time with SpO2=>60 & <70: 0:00:00, 0.0%  The longest continuous time with saturation <=88 was 00:06:08, which started at  24 23:32:59.  A desaturation event was defined as a decrease of saturation by 4 or more.  No events were excluded due to artifact.  There were 6 desaturation events over 3 minutes duration.  There were 181 desaturation events of less than 3 minutes duration during which:  The mean high was 94.8%. The mean low was 88.8%.  The number of these events that were:  > 0 & <10 seconds: 21 > 0 seconds: 181  =>10 & <20 seconds: 39 =>10 seconds: 160  =>20 & <30 seconds: 33 =>20 seconds: 121  =>30 & <40 seconds: 12 =>30 seconds: 88  =>40 & <50 seconds: 12 =>40 seconds: 76  =>50 & <60 seconds: 11 =>50 seconds: 64  =>60 seconds: 53 =>60 seconds: 53  The mean length of desaturation events that were >=10 sec & <=3 mins was: 48.3 sec.  Desaturation event index (events >=10 sec per sampled hour): 25.2  Desaturation event index (events >= 0 sec per sampled hour): 28.5    OVERNIGHT OXIMETRY REPORT:    Dictated by: Stefan Buckner MD  Test date: 2024  Dictated on: 03/15/2024      Comment: This test was performed on Room Air     A  desaturation event was defined as a decrease of saturation by 4 or more.    REPORT SUMMARY  Total valid samplin:21:16   High SpO2: 98%    Low SpO2: 71%    Mean SpO2  92.3 %  Cumulative time with oxygen saturation less than 88% (TC88): 0:37:28    CLINICAL INTERPRETATION   There is  significant nocturnal oxygen desaturation,  Clinical correlation is advised, and  Recommend overnight polysomnography if clinically indicated    Medicare Criteria Comments:   Oximetry test results suggest the patient falls under Medicare Group 1 Criteria. ( Arterial oxygen saturation at or below 88% for at least 5 minutes taken during sleep)  Stefan Buckner MD    Details about Medicare Group Criteria coverage can be found at http://www.cms.hhs.gov/manuals/downloads/

## 2024-03-15 NOTE — PROCEDURES
Clinical Guide to Interpretation or FeNO Levels :    FeNO  (ppb) LOW INTERMEDIATE HIGH   ADULT VALUES < 25 25-50          > 50   Th2-driven Inflammation Unlikely Likely Significant     Patients FeNO level at this visit : __39__ (ppb)    Interpretation of FeNO measurement in adults:  [] FENO is less than 25 ppb implies non eosinophilic airway inflammation or the absence of airway inflammation.    Comment: Low FENO (<25 ppb) in adult asthmatics with persistent symptoms suggests other etiologies for these symptoms and a lower likelihood of benefit from adding or increasing inhaled glucocorticoids.    [x] FENO between 25 and 50 ppb in adults should be interpreted cautiously with reference to the clinical situation (eg, symptomatic, on or off therapy, current smoking).    [] FENO greater than 50 ppb in adults  suggests eosinophilic airway inflammation   Comment: High FENO (>50 ppb) in adult asthmatics even with atypical symptoms suggests glucocorticoid responsiveness. High FENO (>50 ppb) can help identify poor adherence or uncontrolled inflammation in asthma patients with otherwise seemingly controlled asthma.    Discussion:  A FENO less than 25 ppb in adults and less than 20 ppb in children younger than 12 years of age implies noneosinophilic airway inflammation or the absence of airway inflammation.  A FENO greater than 50 ppb in adults or greater than 35 ppb in children suggests eosinophilic airway inflammation.   Values of FENO between 25 and 50 ppb in adults (20 to 35 ppb in children) should be interpreted cautiously with reference to the clinical situation (eg, symptomatic, on or off therapy, current smoking).  A rising FENO with a greater than 20 percent change and more than 25 ppb (20 ppb in children) from a previously stable level suggests increasing eosinophilic airway inflammation, but there are wide inter-individual differences.  A decrease in FENO greater than 20 percent for values over 50 ppb or more than  10 ppb for values less than 50 ppb may be clinically important.  ?FENO in other respiratory diseases - Several other diseases are associated with altered levels of exhaled NO: low levels of FENO have been noted in cystic fibrosis, current smoking, pulmonary hypertension, hypothermia, primary ciliary dyskinesia, and bronchopulmonary dysplasia. Elevated FENO has been noted in atopy, nonasthmatic eosinophilic bronchitis, COPD exacerbations, noncystic fibrosis bronchiectasis, and viral upper respiratory infections.    REFERENCE:  ATS Board of Directors, December 2004, and by the ERS Executive Committee, June 2004. ATS/ERS Recommendations for Standardized Procedures for the Online and Offline Measurement of Exhaled Lower Respiratory Nitric Oxide and Nasal Nitric Oxide. Guideline 2005

## 2024-03-18 ENCOUNTER — TELEPHONE (OUTPATIENT)
Dept: PULMONOLOGY | Facility: CLINIC | Age: 84
End: 2024-03-18
Payer: COMMERCIAL

## 2024-03-18 ENCOUNTER — CLINICAL SUPPORT (OUTPATIENT)
Dept: PULMONOLOGY | Facility: CLINIC | Age: 84
End: 2024-03-18
Payer: COMMERCIAL

## 2024-03-18 VITALS
RESPIRATION RATE: 16 BRPM | HEIGHT: 66 IN | WEIGHT: 234.81 LBS | HEART RATE: 96 BPM | BODY MASS INDEX: 37.74 KG/M2 | OXYGEN SATURATION: 95 %

## 2024-03-18 DIAGNOSIS — J45.20 MILD INTERMITTENT ASTHMA WITHOUT COMPLICATION: Chronic | ICD-10-CM

## 2024-03-18 PROCEDURE — 99999 PR PBB SHADOW E&M-EST. PATIENT-LVL III: CPT | Mod: PBBFAC,,,

## 2024-03-18 NOTE — TELEPHONE ENCOUNTER
Chronic Disease Management:  Called patient to schedule initial Pulmonary Disease Management appointment.   Left message.    Time spent on call    mins

## 2024-03-18 NOTE — PROGRESS NOTES
Pulmonary Disease Management  [facility]  Initial Visit    Referring Provider: MD Dudley  Diagnosis: Mild Asthma   Last Hospital Admission: 9/14/22  Last provider visit: 3/15/24      Current Outpatient Medications:     albuterol (VENTOLIN HFA) 90 mcg/actuation inhaler, Inhale 2 puffs into the lungs every 6 (six) hours as needed for Wheezing. Rescue, Disp: 8 g, Rfl: 3    aspirin (ECOTRIN) 81 MG EC tablet, Take 1 tablet (81 mg total) by mouth once daily., Disp: 90 tablet, Rfl: 3    blood sugar diagnostic Strp, 1 strip by Misc.(Non-Drug; Combo Route) route 2 (two) times daily with meals., Disp: 200 strip, Rfl: 3    blood sugar diagnostic Strp, Test blood glucose 2 times a day, Disp: 200 strip, Rfl: 3    blood-glucose meter kit, Use as instructed, Disp: 1 each, Rfl: 0    fluticasone propionate (FLONASE) 50 mcg/actuation nasal spray, SHAKE LIQUID AND USE 2 SPRAYS IN EACH NOSTRIL EVERY DAY, Disp: 48 g, Rfl: 3    fluticasone-salmeterol 230-21 mcg/dose (ADVAIR HFA) 230-21 mcg/actuation HFAA inhaler, Inhale 2 puffs into the lungs every 12 (twelve) hours. Controller, Disp: 12 g, Rfl: 11    glipiZIDE (GLUCOTROL) 2.5 MG TR24, TAKE 1 TABLET(2.5 MG) BY MOUTH DAILY WITH BREAKFAST, Disp: 90 tablet, Rfl: 1    ipratropium (ATROVENT) 21 mcg (0.03 %) nasal spray, 2 sprays by Each Nostril route 2 (two) times daily., Disp: 30 mL, Rfl: 0    lancets Misc, 1 each by Misc.(Non-Drug; Combo Route) route 2 (two) times daily with meals., Disp: 100 each, Rfl: 11    lancing device Misc, 1 Device by Misc.(Non-Drug; Combo Route) route 2 (two) times daily with meals., Disp: 1 each, Rfl: 0    losartan (COZAAR) 25 MG tablet, Take 1 tablet (25 mg total) by mouth once daily., Disp: 90 tablet, Rfl: 3    montelukast (SINGULAIR) 10 mg tablet, Take 1 tablet (10 mg total) by mouth every evening., Disp: 90 tablet, Rfl: 3    multivitamin (THERAGRAN) per tablet, Take 1 tablet by mouth once daily., Disp: , Rfl:     [id number]  "(MACULAR HEALTH FORMULA) 5-1-7.5 mg Cap, Take by mouth., Disp: , Rfl:     ONETOUCH DELICA PLUS LANCET 33 gauge Misc, USE TO CHECK BLOOD GLUCOSE TWO TIMES A DAY WITH MEALS, Disp: 200 each, Rfl: 3    pantoprazole (PROTONIX) 40 MG tablet, Take 1 tablet (40 mg total) by mouth daily as needed., Disp: 30 tablet, Rfl: 3    pen needle, diabetic 31 gauge x 5/16" Ndle, 1 each by Misc.(Non-Drug; Combo Route) route 2 (two) times daily with meals., Disp: 100 each, Rfl: 11    rosuvastatin (CRESTOR) 40 MG Tab, TAKE 1 TABLET BY MOUTH ONCE DAILY, Disp: 90 tablet, Rfl: 2    triamcinolone acetonide 0.1% (KENALOG) 0.1 % cream, Apply topically 2 (two) times daily., Disp: 15 g, Rfl: 1    Current Facility-Administered Medications:     EPINEPHrine (EPIPEN) 0.3 mg/0.3 mL pen injection 0.3 mg, 0.3 mg, Intramuscular, PRN, Amando Ulrich MD    ondansetron disintegrating tablet 4 mg, 4 mg, Oral, Once PRN, Amando Ulrich MD    Review of patient's allergies indicates:  No Known Allergies    Smoking history:   Social History     Tobacco Use   Smoking Status Former    Current packs/day: 0.00    Types: Cigarettes, Pipe    Start date:     Quit date:     Years since quittin.2    Passive exposure: Past   Smokeless Tobacco Never   Tobacco Comments    smoked a pipe - quit smoking        Pulse: 96  SpO2: 95 %  Resp: 16  Height: 5' 6" (167.6 cm)  Weight: 106.5 kg (234 lb 12.6 oz)  BMI (kg/m2): 37.98  Resting Arabella Dyspnea Rating : 0   Current Oxygen Use: Yes  Device: stationary concentrator  Liter Flow: 2  Oxygen Usage Duration: Only while sleeping  DME Provider: [facility]   Does the patient use BiPAP, CPAP or Trilogy?: Yes    ACT Questionnaire:  Asthma Control Test  In the past 4  weeks, how much of the time did your asthma keep you from getting as much done at work, school or at home?: A little of the time  During the past 4 weeks, how often have you had shortness of breath?: 3 to 6 times a week  During the past 4 weeks, how often did " your asthma symptoms (wheezing, couging, shortness of breath, chest tightness or pain) wake you up at night or earlier than usual in the morning?: Not at all  During the past 4 weeks, how often have you used your rescue inhaler or nebulizer medication (such as albuterol)?: Not at all  How would you rate your asthma control during the past 4 weeks?: Well controlled  If your score is 19 or less, your asthma may not be under control: 21  Has this patient had any pulmonary studies (PFTS)?: Yes  PFT Results:  Pre FVC   Date/Time Value Ref Range Status   03/15/2024 01:03 PM 2.67 L Final     Pre FEV1   Date/Time Value Ref Range Status   03/15/2024 01:03 PM 1.79 L Final     Pre FEV1 FVC   Date/Time Value Ref Range Status   03/15/2024 01:03 PM 67.12 % Final     Is this patient a candidate for pulmonary rehab?: No  Method Used for Education: Literature, Demonstration, Verbal  Did the patient demonstrate understanding?: Yes  What tests has the patient had done today?: Spirometry, Six Minute Walk      Educational assessment:   [x]            Good  []            Fair  []            Poor    Readiness to learn:   [x]            Good  []            Fair  []            Poor    Vision Status:   [x]            Good  []            Fair  []            Poor    Reading Ability:  [x]            Good  []            Fair  []            Poor    Knowledge of condition:   [x]            Good  []            Fair  []            Poor    Language Barriers:   []            Good  []            Fair  []            Poor  [x]            None    Cognitive/ Physical Barriers:   []            Good  []            Fair  []            Poor  [x]            None    Learning best by:                       [x]            Seeing  []            Hearing  []            Reading                         [x]            Doing    Describe any barrier /Limitation or financial implications of care choices identified     []            Financial  []            Emotional  []             Education  []            Vision/Hearing  []            Physical  [x]            None  []                TOPIC /CONTENT FOR TODAY:    [x]            MDI with or without spacer  []            Dry powder inhaler  []            Acapella   []           Peak Flow meter  [x]           Asthma action plan  []            Nebulizer use  [x]            Oxygen use safety  []            Breathing and cough techniques  [x]            Energy conservation  [x]            Infection prevention  [x]           Bipap use      Learner:    [x]            Patient   []            Caregiver    Method:    [x]            Verbal explanation  [x]            Audio visual    [x]            Literature  [x]            Teach back      Evaluation:    [x]            Teach back  [x]            Demonstrate  [x]            Follow up phone call    []            2 weeks     [x]            4 weeks   [x]            PRN      Therapist Comments:   Patient was seen today to review respiratory medication purpose and proper technique for use of inhalers. Patient practiced proper technique using MDI with valved holding chamber (spacer) and DPI inhalers. Provided patient with a spacer. Patient demonstrated understanding. Literature was given to patient.     Discussed therapeutic goals for positive airway pressure therapy(CPAP or BiPAP): Ideal is usage 100% of nights for 6 - 8 hours per night. Minimum usage is 70% of night for at least 4 hours per night used. Reviewed CPAP habituation plan with patient. Patient expressed understanding.      Discussed complications of untreated sleep apnea with patient, including but not limited to high blood pressure, heart attack, stroke, obesity, diabetes and worsening heart failure. Patient voiced understanding.      Asthma trigger checklist was verbally reviewed and literature given to patient.     Infection prevention was discussed. Patient was reminded to get RSV vaccine. Hand hygiene and cleaning of respiratory  equipment was also discussed. Patient verbalized understanding.      Asthma action plan was reviewed and literature was given to patient. Patient verbalized understanding.     Plan:  Monthly Pulmonary Disease Management Questionnaire  Follow-up PDM appointment scheduled for 8/14/24  Reinforce education  Meds: Advair HFA, albuterol   DME Needs: [facility]  Action Plan: Asthma   Immunization: Pneumococcal- current, Flu-current, RSV- DUE   Next Provider Visit: 5/24/24  Next Spirometry/CPFT: 9/20/24  Approximate time spent with patient: 60 mins

## 2024-03-18 NOTE — PATIENT INSTRUCTIONS
Understanding Asthma         Asthma is a long-term (chronic) lung condition. It involves the airways (bronchial tubes). It happens when a trigger causes your airways to swell and become narrow. The muscles around your airways start to tighten. When your airways start to narrow, air can't move in and out of your lungs very well. Mucus also builds up along the airways. This makes it even harder to move air in and out of your lungs.  Experts are not exactly sure what causes asthma. It may be caused by a mix of inherited and environmental factors. People with asthma may have no symptoms until they are exposed to an allergen or trigger.  Healthy lungs  Inside your lungs there are branching airways made of stretchy tissue. Each airway is wrapped with bands of muscle. The airways are smaller as they go deeper into the lungs. The smallest airways end in clusters of tiny balloon-like air sacs (alveoli). These clusters are surrounded by blood vessels. When you breathe in (inhale), air enters the lungs. It travels down through the airways until it reaches the air sacs. When you breathe out (exhale), air travels up through the airways and out of the lungs. The airways make mucus that traps particles you breathe in. Normally, the mucus is then swept out of the lungs by tiny hairs (cilia) that line the airways. The mucus is swallowed or coughed up during your day.    What the lungs do  The air you inhale contains oxygen. When oxygen reaches the air sacs, it passes into the blood vessels around the sacs. Your blood then sends oxygen to all of your cells. As you exhale, carbon dioxide is removed in a similar way from the blood in the air sacs, and from your body.    When you have asthma  People with asthma have very sensitive airways. This means the airways react to certain things called triggers. Triggers can include pollen, dust, or smoke. Triggers cause inflammation. This makes the airways swell and become narrow. This is a  long-lasting (chronic) problem. Your airways may not always be narrow enough so that you notice breathing problems.  Symptoms of chronic inflammation include:   Coughing (chronic)   A feeling of tightness in your chest   Feeling short of breath   Wheezing (a whistling noise, especially when breathing out)   Low energy or feeling tired   In some people, over time chronic mild inflammation can lead to lasting (permanent) scarring of airways and loss of lung function.    Asthma flare-ups  When sensitive airways are irritated by a trigger, the muscles around the airways tighten. The lining of the airways swells. Thick, sticky mucus increases and partly clogs the airways. All of this makes it harder to breathe.  Symptoms of flare-ups may include:  Coughing, especially at night. You may not be able to sleep because of coughing.   Getting tired or out of breath easily   Wheezing   Chest tightness   Faster breathing when at rest   Flare-ups can be life-threatening. In a severe flare-up, the muscle tightening, swelling, and mucus are worse. Its very hard to breathe. Your body can't get enough oxygen and can't remove carbon dioxide. Waste gas is trapped in the alveoli. Gas exchange cant occur. The body is not getting enough oxygen. Without oxygen, body tissues, especially brain tissue, begin to get damaged. If this goes on for long, it can lead to severe brain damage or death.  Call 911 (or have someone call for you) if you have any of these symptoms and they are not relieved right away by taking your quick-relief medicine as prescribed:  Trouble breathing   Feeling too short of breath to talk or walk   Lips or fingers turning blue   Feeling lightheaded or dizzy, as though you are about to pass out   Peak flow less than 50% of your personal best, if you use a peak flow meter     Managing your asthma  Asthma is a long-term condition. So its important to work with your healthcare provider to manage it. If you have asthma,  you can prevent flare-ups. Develop an asthma action plan with your healthcare provider. It can help control your asthma and manage your symptoms. An asthma action plan also tells you and your family or friends what to do if your asthma flares up or gets worse.  Take your medicine as prescribed. Also learn about your asthma triggers. Knowing what causes your asthma to flare up in the first place can help you prevent future breathing problems.  If you smoke, get help to quit.Using an Inhaler with a Spacer  To control asthma, you need to use your medicines the right way. Some medicines are inhaled using a device called a metered-dose inhaler (MDI). Metered-dose inhalers deliver medicine with a fine spray. You may be asked to use a spacer (holding tube) with your inhaler. The spacer helps make sure all the medicine you need goes into your lungs.   Steps for using an inhaler with a spacer  Step 1:  Remove the caps from the inhaler and spacer.  Shake the inhaler well and attach the spacer. If the inhaler is being used for the first time or has not been used for a while, prime it as directed by the product maker.  Step 2:  Breathe out normally.  Put the spacer between your teeth. Close your lips tightly around it.  Keep your chin up.  Step 3:  Spray 1 puff into the spacer by pressing down on the inhaler.  Then breathe in through your mouth as slowly and deeply as you can. This should take about 5-10 seconds. If you breathe too quickly, you may hear a whistling sound in certain spacers.  Step 4:  Take the spacer out of your mouth.  Hold your breath for a count of 10.  Then hold your lips together and slowly breathe out through your mouth  Asthma Trigger Checklist  Allergens, irritants, and other things may trigger your asthma. Check the box next to each of your triggers. After each trigger is a list of ways to avoid it.   Dust mites. Dust mites live in mattresses, bedding, carpets, curtains, and indoor dust.  To kill dust  mites, wash bedding in hot water (130°F) each week.  Cover mattress and pillows with special dust-mite-proof cases.  Don't use upholstered furniture like sofas or chairs in the bedroom.  Use allergy-proof filters for air conditioners and furnaces. Replace or clean them as instructed.  If you can, replace carpeting with wood or tile yvrose, especially in the bedroom.  Animals. Animals with fur or feathers shed dander (allergens).  It's best to choose a pet that doesn't have fur or feathers, such as a fish or a reptile.  If you have pets, keep them off your bed and out of your bedroom.  Wash your hands and clothes after handling pets.    Mold. Mold grows in damp places, such as bathrooms, basements, and closets.  Ask someone to clean damp areas in your home every week. Or try wearing a face mask while you clean.  Run an exhaust fan while bathing. Or leave a window open in the bathroom.  Repair water leaks in or around your home.  Have someone else cut grass or rake leaves, if possible.  Don't use vaporizers or humidifiers. They encourage mold growth.    Pollen. Pollen from trees, grasses, and weeds is a common allergen. (Flower pollens are generally not a problem).  Try to learn what types of pollen affect you most. Pollen levels vary depending on the plant, the season, and the time of day.  If possible, use air conditioning instead of opening the windows in your home or car.  Have someone else do yard work, if possible.    Cockroaches. Roaches are found in many homes and produce allergens.  Keep your kitchen clean and dry. A leaky faucet or drain can attract roaches.  Remove garbage from your home daily.  Store food in tightly sealed containers. Wash dishes as soon as they are used.  Use bait stations or traps to control roaches. Avoid using chemical sprays.    Smoke. Smoke may be from cigarettes, cigars, pipes, incense or candles, barbecues or grills, and fireplaces.  Don't smoke. And don't let people smoke in  your home or car.  When you travel, ask for nonsmoking rental cars and hotel rooms.  Avoid fireplaces and wood stoves. If you can't, sit away from them. Make sure the smoke is directed outside.  Don't burn incense or use candles.  Move away from smoky outdoor cooking grills.    Smog.  Smog is from car exhaust and other pollution.  Read or listen to local air-quality reports. These let you know when air quality is poor.  Stay indoors as much as you can on smoggy days. If possible, use air conditioning instead of opening the windows.  In your car, set air conditioning to recirculate air, so less pollution gets in.    Strong odors. These include air fresheners, deodorizers, and cleaning products; perfume, deodorant, and other beauty products; incense and candles; and insect sprays and other sprays.  Use scent-free products like deodorant or body lotion.  Avoid using cleaning products with bleach and ammonia. Make your own cleaning solution with white vinegar, baking soda, or mild dish soap.  Use exhaust fans while cooking. Or open a window, if possible.   Avoid perfumes, air fresheners, potpourri, and other scented products.          Other irritants. These include dust, aerosol sprays, and powders.  Wear a face mask while doing tasks like sanding, dusting, sweeping, and yard work. Open doors and windows if working indoors.  Use pump spray bottles instead of aerosols.  Pour liquid  onto a rag or cloth instead of spraying them.    Weather. Weather conditions can trigger symptoms or make them worse.  Watch for very high or low temperatures, very humid conditions, or a lot of wind, as these conditions can make symptoms worse.  Limit outdoor activity during the type of weather that affects you.  Wear a scarf over your mouth and nose in cold weather.    Colds, flu, and sinus infections. Upper respiratory infections can trigger asthma.  Wash your hands often with soap and warm water or use a hand  containing  alcohol.  Get a yearly flu shot. And ask if you should get a pneumonia vaccine.  Take care of your general health. Get plenty of sleep. And eat a variety of healthy foods.    Food additives. Food additives can trigger asthma flare-ups in some people.  Check food labels for sulfites or other similar ingredients. These are often found in foods such as wine, beer, and dried fruits.  Avoid foods that contain these additives.    Medicine. Aspirin, NSAIDS like ibuprofen and naproxen, and heart medicines like beta-blockers may be triggers.  Tell your health care provider if you think certain medicines trigger symptoms.   Be sure to read the labels on over-the-counter medicines. They may have ingredients that cause symptoms for you.   , Emotions. Laughing, crying, or feeling excited are triggers for some people.   To help you stay calm: Try breathing in slowly through your nose for a count of 2 seconds. Close your lips and breathe out for 4 seconds. Repeat.  Try to focus on a soothing image in your mind. This will help relax you and calm your breathing.  Remember to take your daily controller medicines. When you're upset or under stress, it's easy to forget.    Exercise. For some people, exercise can trigger symptoms. Don't let this keep you from being active.   If you have not been exercising regularly, start slow and work up gradually.  Take all of your medicines as prescribed.  If you use quick-relief medicine, make sure you have it with you when you exercise.  Stop if you have any symptoms. Make sure you talk with your provider about these symptoms.  © 7425-1194 [employer]. [address] [place] [address], [place] [address]. All rights reserved. This information is not intended as a substitute for professional medical care. Always follow your healthcare professional's instructions.          ACTION PLAN    GREEN ZONE  My sputum is clear/white/usual color and easily cleared.  My breathing is no harder than usual.  I  can do my usual activities.  I can think clearly.   Take your usual medicines, including oxygen, as you are told to do so by your health care provider.   YELLOW ZONE  My sputum has change (color, thickness, amount).  I am more short of breath than usual.  I cough or wheeze more.  I weigh more and my legs/feet swell.  I cannot do my usual activities without resting.   Call your health care provider. You will probably be told to begin taking an antibiotic and prednisone. Have your pharmacy phone number available.   RED ZONE  I cannot cough out my sputum.  I am much more short of breath than normal.  I need to sit up to breathe  I cannot do my usual activities.  I am unable to speak more than one or two words at a time.  I am confused.   Call your health care provider. You may be asked to come in to be seen, told to go to the emergency room, or told to call [id number].

## 2024-04-08 ENCOUNTER — LAB VISIT (OUTPATIENT)
Dept: LAB | Facility: HOSPITAL | Age: 84
End: 2024-04-08
Attending: INTERNAL MEDICINE
Payer: COMMERCIAL

## 2024-04-08 ENCOUNTER — OFFICE VISIT (OUTPATIENT)
Dept: FAMILY MEDICINE | Facility: CLINIC | Age: 84
End: 2024-04-08
Payer: COMMERCIAL

## 2024-04-08 VITALS
OXYGEN SATURATION: 95 % | SYSTOLIC BLOOD PRESSURE: 132 MMHG | HEART RATE: 82 BPM | TEMPERATURE: 99 F | HEIGHT: 66 IN | WEIGHT: 235.88 LBS | DIASTOLIC BLOOD PRESSURE: 80 MMHG | RESPIRATION RATE: 18 BRPM | BODY MASS INDEX: 37.91 KG/M2

## 2024-04-08 DIAGNOSIS — G89.29 CHRONIC MIDLINE LOW BACK PAIN WITHOUT SCIATICA: ICD-10-CM

## 2024-04-08 DIAGNOSIS — I10 PRIMARY HYPERTENSION: ICD-10-CM

## 2024-04-08 DIAGNOSIS — M54.50 CHRONIC MIDLINE LOW BACK PAIN WITHOUT SCIATICA: ICD-10-CM

## 2024-04-08 DIAGNOSIS — R09.02 NOCTURNAL HYPOXEMIA DUE TO ASTHMA: ICD-10-CM

## 2024-04-08 DIAGNOSIS — Z86.73 HISTORY OF LACUNAR CEREBROVASCULAR ACCIDENT: ICD-10-CM

## 2024-04-08 DIAGNOSIS — E11.9 TYPE 2 DIABETES MELLITUS WITHOUT COMPLICATION, WITHOUT LONG-TERM CURRENT USE OF INSULIN: ICD-10-CM

## 2024-04-08 DIAGNOSIS — I10 PRIMARY HYPERTENSION: Primary | ICD-10-CM

## 2024-04-08 DIAGNOSIS — R41.9 COGNITIVE COMPLAINTS WITH NORMAL EXAM: ICD-10-CM

## 2024-04-08 DIAGNOSIS — J45.909 NOCTURNAL HYPOXEMIA DUE TO ASTHMA: ICD-10-CM

## 2024-04-08 DIAGNOSIS — E78.5 DYSLIPIDEMIA: ICD-10-CM

## 2024-04-08 DIAGNOSIS — I50.32 CHRONIC DIASTOLIC CONGESTIVE HEART FAILURE: ICD-10-CM

## 2024-04-08 LAB
ALBUMIN SERPL BCP-MCNC: 4 G/DL (ref 3.5–5.2)
ALP SERPL-CCNC: 59 U/L (ref 55–135)
ALT SERPL W/O P-5'-P-CCNC: 23 U/L (ref 10–44)
ANION GAP SERPL CALC-SCNC: 10 MMOL/L (ref 8–16)
AST SERPL-CCNC: 22 U/L (ref 10–40)
BILIRUB SERPL-MCNC: 1.8 MG/DL (ref 0.1–1)
BUN SERPL-MCNC: 17 MG/DL (ref 8–23)
CALCIUM SERPL-MCNC: 9.8 MG/DL (ref 8.7–10.5)
CHLORIDE SERPL-SCNC: 105 MMOL/L (ref 95–110)
CO2 SERPL-SCNC: 27 MMOL/L (ref 23–29)
CREAT SERPL-MCNC: 0.9 MG/DL (ref 0.5–1.4)
EST. GFR  (NO RACE VARIABLE): >60 ML/MIN/1.73 M^2
ESTIMATED AVG GLUCOSE: 117 MG/DL (ref 68–131)
GLUCOSE SERPL-MCNC: 91 MG/DL (ref 70–110)
HBA1C MFR BLD: 5.7 % (ref 4–5.6)
POTASSIUM SERPL-SCNC: 4.5 MMOL/L (ref 3.5–5.1)
PROT SERPL-MCNC: 7 G/DL (ref 6–8.4)
SODIUM SERPL-SCNC: 142 MMOL/L (ref 136–145)

## 2024-04-08 PROCEDURE — 3288F FALL RISK ASSESSMENT DOCD: CPT | Mod: CPTII,S$GLB,, | Performed by: INTERNAL MEDICINE

## 2024-04-08 PROCEDURE — 99215 OFFICE O/P EST HI 40 MIN: CPT | Mod: S$GLB,,, | Performed by: INTERNAL MEDICINE

## 2024-04-08 PROCEDURE — 36415 COLL VENOUS BLD VENIPUNCTURE: CPT | Mod: PO | Performed by: INTERNAL MEDICINE

## 2024-04-08 PROCEDURE — 83036 HEMOGLOBIN GLYCOSYLATED A1C: CPT | Performed by: INTERNAL MEDICINE

## 2024-04-08 PROCEDURE — 3075F SYST BP GE 130 - 139MM HG: CPT | Mod: CPTII,S$GLB,, | Performed by: INTERNAL MEDICINE

## 2024-04-08 PROCEDURE — 1101F PT FALLS ASSESS-DOCD LE1/YR: CPT | Mod: CPTII,S$GLB,, | Performed by: INTERNAL MEDICINE

## 2024-04-08 PROCEDURE — 1126F AMNT PAIN NOTED NONE PRSNT: CPT | Mod: CPTII,S$GLB,, | Performed by: INTERNAL MEDICINE

## 2024-04-08 PROCEDURE — 3079F DIAST BP 80-89 MM HG: CPT | Mod: CPTII,S$GLB,, | Performed by: INTERNAL MEDICINE

## 2024-04-08 PROCEDURE — 80053 COMPREHEN METABOLIC PANEL: CPT | Performed by: INTERNAL MEDICINE

## 2024-04-08 PROCEDURE — 99999 PR PBB SHADOW E&M-EST. PATIENT-LVL V: CPT | Mod: PBBFAC,,, | Performed by: INTERNAL MEDICINE

## 2024-04-08 PROCEDURE — 1159F MED LIST DOCD IN RCRD: CPT | Mod: CPTII,S$GLB,, | Performed by: INTERNAL MEDICINE

## 2024-04-08 NOTE — PROGRESS NOTES
Subjective:       Patient ID: Jovan Del Cid Jr. is a 83 y.o. male.    Chief Complaint: Follow-up, Diabetes, Hypertension, and Hyperlipidemia    Follow-up  Associated symptoms include arthralgias. Pertinent negatives include no abdominal pain, chest pain, chills, coughing, diaphoresis, fatigue, fever, headaches, joint swelling, myalgias, nausea, neck pain, rash, sore throat, vomiting or weakness.   Diabetes  Pertinent negatives for hypoglycemia include no confusion, dizziness, headaches, nervousness/anxiousness, pallor, seizures, speech difficulty or tremors. Pertinent negatives for diabetes include no chest pain, no fatigue, no polydipsia, no polyphagia, no polyuria and no weakness.   Hypertension  Pertinent negatives include no chest pain, headaches, neck pain, palpitations or shortness of breath.   Hyperlipidemia  Pertinent negatives include no chest pain, myalgias or shortness of breath.     Past Medical History:   Diagnosis Date    Acute CVA (cerebrovascular accident) 09/08/2022    DANY (acute kidney injury) 09/14/2022    Asthma     Bronchitis chronic     Cancer     Chronic cough 11/15/2013    Chronic diastolic congestive heart failure 09/12/2023    Cough     Digestive disorder     Hyperlipidemia     Hypertension     Liver disease     KALYAN (obstructive sleep apnea) 09/08/2022    Stroke 09/01/2022    Type 2 diabetes mellitus, without long-term current use of insulin 09/07/2022    BS didn't check 10/05/2022     Past Surgical History:   Procedure Laterality Date    COLONOSCOPY N/A 6/21/2016    Procedure: COLONOSCOPY;  Surgeon: Alonso Dorsey MD;  Location: [facility];  Service: Endoscopy;  Laterality: N/A;    COLONOSCOPY N/A 11/1/2016    Procedure: COLONOSCOPY;  Surgeon: Alonso Dorsey MD;  Location: [facility];  Service: Endoscopy;  Laterality: N/A;    COLONOSCOPY N/A 2/3/2017    Procedure: COLONOSCOPY;  Surgeon: Alonso Dorsey MD;  Location: [facility];  Service: Endoscopy;  Laterality: N/A;     COLONOSCOPY N/A 2017    Procedure: COLONOSCOPY;  Surgeon: Alonso Dorsey MD;  Location: [facility] ENDO;  Service: Endoscopy;  Laterality: N/A;    COLONOSCOPY N/A 2021    Procedure: COLONOSCOPY;  Surgeon: Paula Ricardo MD;  Location: [facility] ENDO;  Service: Endoscopy;  Laterality: N/A;    FLUOROSCOPY N/A 6/15/2018    Procedure: Transjugular liver bx;  Surgeon: Petros Recio MD;  Location: [facility] CATH LAB;  Service: General;  Laterality: N/A;    TONSILLECTOMY       Family History   Problem Relation Age of Onset    Cancer Father     Alzheimer's disease Mother     Colon cancer Neg Hx     Melanoma Neg Hx      Social History     Socioeconomic History    Marital status:    Tobacco Use    Smoking status: Former     Current packs/day: 0.00     Types: Cigarettes, Pipe     Start date:      Quit date:      Years since quittin.3     Passive exposure: Past    Smokeless tobacco: Never    Tobacco comments:     smoked a pipe - quit smoking    Substance and Sexual Activity    Alcohol use: Not Currently     Alcohol/week: 2.0 - 3.0 standard drinks of alcohol     Types: 2 - 3 Cans of beer per week     Comment: daily    Drug use: No    Sexual activity: Not Currently     Partners: Female     Social Determinants of Health     Financial Resource Strain: Low Risk  (9/15/2022)    Overall Financial Resource Strain (CARDIA)     Difficulty of Paying Living Expenses: Not very hard   Food Insecurity: No Food Insecurity (9/15/2022)    Hunger Vital Sign     Worried About Running Out of Food in the Last Year: Never true     Ran Out of Food in the Last Year: Never true   Transportation Needs: No Transportation Needs (9/15/2022)    PRAPARE - Transportation     Lack of Transportation (Medical): No     Lack of Transportation (Non-Medical): No   Physical Activity: Inactive (9/15/2022)    Exercise Vital Sign     Days of Exercise per Week: 0 days     Minutes of Exercise per Session: 0 min   Stress: No Stress Concern  Present (9/15/2022)    [facility]ational [facility] - Occupational Stress Questionnaire     Feeling of Stress : Not at all   Social Connections: Socially Integrated (9/15/2022)    Social Connection and Isolation Panel [NHANES]     Frequency of Communication with Friends and Family: More than three times a week     Frequency of Social Gatherings with Friends and Family: More than three times a week     Attends [employer] Services: More than 4 times per year     Active Member of Clubs or Organizations: Yes     Attends Club or Organization Meetings: More than 4 times per year     Marital Status:    Housing Stability: Low Risk  (9/15/2022)    Housing Stability Vital Sign     Unable to Pay for Housing in the Last Year: No     Number of Places Lived in the Last Year: 1     Unstable Housing in the Last Year: No     Review of Systems   Constitutional:  Negative for activity change, appetite change, chills, diaphoresis, fatigue, fever and unexpected weight change.   HENT:  Negative for drooling, ear discharge, ear pain, facial swelling, hearing loss, mouth sores, nosebleeds, postnasal drip, rhinorrhea, sinus pressure, sneezing, sore throat, tinnitus, trouble swallowing and voice change.    Eyes:  Negative for photophobia, redness and visual disturbance.   Respiratory:  Negative for apnea, cough, choking, chest tightness, shortness of breath and wheezing.    Cardiovascular:  Negative for chest pain and palpitations.   Gastrointestinal:  Negative for abdominal distention, abdominal pain, anal bleeding, blood in stool, constipation, diarrhea, nausea, rectal pain and vomiting.   Endocrine: Negative for cold intolerance, heat intolerance, polydipsia, polyphagia and polyuria.   Genitourinary:  Negative for difficulty urinating, dysuria, enuresis, flank pain, frequency, genital sores, hematuria and urgency.   Musculoskeletal:  Positive for arthralgias and back pain. Negative for gait problem, joint swelling,  myalgias, neck pain and neck stiffness.   Skin:  Negative for color change, pallor, rash and wound.   Allergic/Immunologic: Negative for food allergies and immunocompromised state.   Neurological:  Negative for dizziness, tremors, seizures, syncope, facial asymmetry, speech difficulty, weakness, light-headedness and headaches.   Hematological:  Negative for adenopathy. Does not bruise/bleed easily.   Psychiatric/Behavioral:  Negative for agitation, behavioral problems, confusion, decreased concentration, dysphoric mood, hallucinations, self-injury, sleep disturbance and suicidal ideas. The patient is not nervous/anxious and is not hyperactive.        Objective:      Physical Exam  Vitals and nursing note reviewed.   Constitutional:       General: He is not in acute distress.     Appearance: Normal appearance. He is well-developed. He is not diaphoretic.   HENT:      Head: Normocephalic and atraumatic.      Mouth/Throat:      Pharynx: No oropharyngeal exudate.   Eyes:      General: No scleral icterus.     Pupils: Pupils are equal, round, and reactive to light.   Neck:      Thyroid: No thyromegaly.      Vascular: No carotid bruit or JVD.      Trachea: No tracheal deviation.   Cardiovascular:      Rate and Rhythm: Normal rate and regular rhythm.      Heart sounds: Normal heart sounds.   Pulmonary:      Effort: Pulmonary effort is normal. No respiratory distress.      Breath sounds: Normal breath sounds. No wheezing or rales.   Chest:      Chest wall: No tenderness.   Abdominal:      General: Bowel sounds are normal. There is no distension.      Palpations: Abdomen is soft.      Tenderness: There is no abdominal tenderness. There is no guarding or rebound.   Musculoskeletal:         General: No tenderness. Normal range of motion.      Cervical back: Normal range of motion and neck supple.   Lymphadenopathy:      Cervical: No cervical adenopathy.   Skin:     General: Skin is warm and dry.      Coloration: Skin is not  pale.      Findings: No erythema or rash.   Neurological:      Mental Status: He is alert and oriented to person, place, and time.   Psychiatric:         Behavior: Behavior normal.         Thought Content: Thought content normal.         Judgment: Judgment normal.         CMP  Sodium   Date Value Ref Range Status   11/27/2023 143 136 - 145 mmol/L Final     Potassium   Date Value Ref Range Status   11/27/2023 4.1 3.5 - 5.1 mmol/L Final     Chloride   Date Value Ref Range Status   11/27/2023 103 95 - 110 mmol/L Final     CO2   Date Value Ref Range Status   11/27/2023 26 23 - 29 mmol/L Final     Glucose   Date Value Ref Range Status   11/27/2023 107 70 - 110 mg/dL Final     BUN   Date Value Ref Range Status   11/27/2023 12 8 - 23 mg/dL Final     Creatinine   Date Value Ref Range Status   11/27/2023 0.8 0.5 - 1.4 mg/dL Final     Calcium   Date Value Ref Range Status   11/27/2023 9.9 8.7 - 10.5 mg/dL Final     Total Protein   Date Value Ref Range Status   06/15/2023 6.9 6.0 - 8.4 g/dL Final     Albumin   Date Value Ref Range Status   06/15/2023 3.9 3.5 - 5.2 g/dL Final     Total Bilirubin   Date Value Ref Range Status   06/15/2023 1.4 (H) 0.1 - 1.0 mg/dL Final     Comment:     For infants and newborns, interpretation of results should be based  on gestational age, weight and in agreement with clinical  observations.    Premature Infant recommended reference ranges:  Up to 24 hours.............<8.0 mg/dL  Up to 48 hours............<12.0 mg/dL  3-5 days..................<15.0 mg/dL  6-29 days.................<15.0 mg/dL       Alkaline Phosphatase   Date Value Ref Range Status   06/15/2023 59 55 - 135 U/L Final     AST   Date Value Ref Range Status   06/15/2023 22 10 - 40 U/L Final     ALT   Date Value Ref Range Status   06/15/2023 25 10 - 44 U/L Final     Anion Gap   Date Value Ref Range Status   11/27/2023 14 8 - 16 mmol/L Final     eGFR if    Date Value Ref Range Status   01/20/2021 >60.0 >60 mL/min/1.73  m^2 Final     eGFR if non    Date Value Ref Range Status   01/20/2021 >60.0 >60 mL/min/1.73 m^2 Final     Comment:     Calculation used to obtain the estimated glomerular filtration  rate (eGFR) is the CKD-EPI equation.        Lab Results   Component Value Date    WBC 6.83 12/05/2023    HGB 13.7 (L) 12/05/2023    HCT 41.8 12/05/2023    MCV 94 12/05/2023     12/05/2023     Lab Results   Component Value Date    CHOL 168 08/24/2023     Lab Results   Component Value Date    HDL 44 08/24/2023     Lab Results   Component Value Date    LDLCALC 66.6 08/24/2023     Lab Results   Component Value Date    TRIG 287 (H) 08/24/2023     Lab Results   Component Value Date    CHOLHDL 26.2 08/24/2023     Lab Results   Component Value Date    TSH 2.979 12/05/2023     Lab Results   Component Value Date    HGBA1C 5.4 12/05/2023     Assessment:       1. Primary hypertension    2. Dyslipidemia    3. History of lacunar cerebrovascular accident    4. Type 2 diabetes mellitus without complication, without long-term current use of insulin    5. Chronic diastolic congestive heart failure    6. Cognitive complaints with normal exam    7. Nocturnal hypoxemia due to asthma    8. Chronic midline low back pain without sciatica        Plan:   Primary hypertension  -     Comprehensive Metabolic Panel; Future; Expected date: 04/08/2024    Dyslipidemia    History of lacunar cerebrovascular accident    Type 2 diabetes mellitus without complication, without long-term current use of insulin  -     Hemoglobin A1C; Future; Expected date: 04/08/2024    Chronic diastolic congestive heart failure--------sees cards-    Cognitive complaints with normal exam    Nocturnal hypoxemia due to asthma--------sees pulm-    Chronic midline low back pain without sciatica----tylenol prn----------    Continue meds--------------as above--------f/u 4 months

## 2024-05-24 ENCOUNTER — OFFICE VISIT (OUTPATIENT)
Dept: PULMONOLOGY | Facility: CLINIC | Age: 84
End: 2024-05-24
Payer: COMMERCIAL

## 2024-05-24 VITALS
OXYGEN SATURATION: 97 % | RESPIRATION RATE: 17 BRPM | DIASTOLIC BLOOD PRESSURE: 78 MMHG | HEART RATE: 67 BPM | BODY MASS INDEX: 38.02 KG/M2 | SYSTOLIC BLOOD PRESSURE: 119 MMHG | HEIGHT: 66 IN | WEIGHT: 236.56 LBS

## 2024-05-24 DIAGNOSIS — Z78.9 INTOLERANCE OF CONTINUOUS POSITIVE AIRWAY PRESSURE (CPAP) VENTILATION: ICD-10-CM

## 2024-05-24 DIAGNOSIS — J45.909 NOCTURNAL HYPOXEMIA DUE TO ASTHMA: Primary | ICD-10-CM

## 2024-05-24 DIAGNOSIS — I50.32 CHRONIC DIASTOLIC CONGESTIVE HEART FAILURE: ICD-10-CM

## 2024-05-24 DIAGNOSIS — R09.02 NOCTURNAL HYPOXEMIA DUE TO ASTHMA: Primary | ICD-10-CM

## 2024-05-24 DIAGNOSIS — G47.33 OBSTRUCTIVE SLEEP APNEA: ICD-10-CM

## 2024-05-24 DIAGNOSIS — J45.20 MILD INTERMITTENT ASTHMA WITHOUT COMPLICATION: Chronic | ICD-10-CM

## 2024-05-24 DIAGNOSIS — E11.9 TYPE 2 DIABETES MELLITUS WITHOUT COMPLICATION, WITHOUT LONG-TERM CURRENT USE OF INSULIN: ICD-10-CM

## 2024-05-24 DIAGNOSIS — I10 PRIMARY HYPERTENSION: ICD-10-CM

## 2024-05-24 DIAGNOSIS — E78.5 DYSLIPIDEMIA: ICD-10-CM

## 2024-05-24 PROCEDURE — 3288F FALL RISK ASSESSMENT DOCD: CPT | Mod: CPTII,S$GLB,, | Performed by: INTERNAL MEDICINE

## 2024-05-24 PROCEDURE — 1126F AMNT PAIN NOTED NONE PRSNT: CPT | Mod: CPTII,S$GLB,, | Performed by: INTERNAL MEDICINE

## 2024-05-24 PROCEDURE — 1160F RVW MEDS BY RX/DR IN RCRD: CPT | Mod: CPTII,S$GLB,, | Performed by: INTERNAL MEDICINE

## 2024-05-24 PROCEDURE — 3074F SYST BP LT 130 MM HG: CPT | Mod: CPTII,S$GLB,, | Performed by: INTERNAL MEDICINE

## 2024-05-24 PROCEDURE — 1159F MED LIST DOCD IN RCRD: CPT | Mod: CPTII,S$GLB,, | Performed by: INTERNAL MEDICINE

## 2024-05-24 PROCEDURE — 1101F PT FALLS ASSESS-DOCD LE1/YR: CPT | Mod: CPTII,S$GLB,, | Performed by: INTERNAL MEDICINE

## 2024-05-24 PROCEDURE — 3078F DIAST BP <80 MM HG: CPT | Mod: CPTII,S$GLB,, | Performed by: INTERNAL MEDICINE

## 2024-05-24 PROCEDURE — 99999 PR PBB SHADOW E&M-EST. PATIENT-LVL V: CPT | Mod: PBBFAC,,, | Performed by: INTERNAL MEDICINE

## 2024-05-24 PROCEDURE — 99214 OFFICE O/P EST MOD 30 MIN: CPT | Mod: S$GLB,,, | Performed by: INTERNAL MEDICINE

## 2024-05-24 NOTE — PROGRESS NOTES
Subjective:       Patient ID: Jovan Del Cid Jr. is a 83 y.o. male.  Patient Active Problem List   Diagnosis    Intolerance of continuous positive airway pressure (CPAP) ventilation    Liver cyst    HTN (hypertension)    Dyslipidemia    Benign prostatic hyperplasia    Lipoma    Amyloid disease    Chronic maxillary sinusitis    Tubulovillous adenoma of colon    MCMAHAN (nonalcoholic steatohepatitis)    Mild intermittent asthma without complication    BMI 37.0-37.9, adult    Personal history of colonic polyps    History of lacunar cerebrovascular accident    Type 2 diabetes mellitus, without long-term current use of insulin    Obstructive sleep apnea    Stenosis of left carotid artery    Venous insufficiency    Valvular heart disease    Chronic diastolic congestive heart failure    Unequal blood pressures in paired extremities    Cognitive complaints with normal exam    Nocturnal hypoxemia due to asthma     Immunization History   Administered Date(s) Administered    COVID-19, MRNA, LN-S, PF (Pfizer) (Purple Cap) 01/10/2021, 2021, 2021    COVID-19, mRNA, LNP-S, bivalent booster, PF (PFIZER OMICRON) 10/14/2022    Influenza (FLUAD) - Quadrivalent - Adjuvanted - PF *Preferred* (65+) 10/15/2020, 2022, 2023    Influenza - High Dose - PF (65 years and older) 2020    Pneumococcal Conjugate - 20 Valent 10/24/2022    Zoster Recombinant 2023, 2023     Social History     Tobacco Use   Smoking Status Former    Current packs/day: 0.00    Types: Cigarettes, Pipe    Start date:     Quit date:     Years since quittin.4    Passive exposure: Past   Smokeless Tobacco Never   Tobacco Comments    smoked a pipe - quit smoking      Asthma Control Test  In the past 4  weeks, how much of the time did your asthma keep you from getting as much done at work, school or at home?: Some of the time  During the past 4 weeks, how often have you had shortness of breath?: Once a day  During the  past 4 weeks, how often did your asthma symptoms (wheezing, couging, shortness of breath, chest tightness or pain) wake you up at night or earlier than usual in the morning?: Not at all  During the past 4 weeks, how often have you used your rescue inhaler or nebulizer medication (such as albuterol)?: Not at all  How would you rate your asthma control during the past 4 weeks?: Somewhat controlled  If your score is 19 or less, your asthma may not be under control: 18         5/24/2024    12:40 PM   EPWORTH SLEEPINESS SCALE   Sitting and reading 1   Watching TV 2   Sitting, inactive in a public place (e.g. a theatre or a meeting) 0   As a passenger in a car for an hour without a break 0   Lying down to rest in the afternoon when circumstances permit 1   Sitting and talking to someone 0   Sitting quietly after a lunch without alcohol 0   In a car, while stopped for a few minutes in traffic 0   Total score 4           Asthma Control Test  In the past 4  weeks, how much of the time did your asthma keep you from getting as much done at work, school or at home?: Some of the time  During the past 4 weeks, how often have you had shortness of breath?: Once a day  During the past 4 weeks, how often did your asthma symptoms (wheezing, couging, shortness of breath, chest tightness or pain) wake you up at night or earlier than usual in the morning?: Not at all  During the past 4 weeks, how often have you used your rescue inhaler or nebulizer medication (such as albuterol)?: Not at all  How would you rate your asthma control during the past 4 weeks?: Somewhat controlled  If your score is 19 or less, your asthma may not be under control: 18        Chief Complaint: Asthma and Sleep Apnea        Jovan Del Cid Jr. is a 80 y.o.  male   Follow up, No cough, No congetion.No fever  ACT  20: hardly taking rescue albuterol HFA.   Here to reviewed : CXR and [place]  Normal [place]  Runs asphalt bussiness  Busy lifestyle.  No interval asthma  exacerbations since last visit, Actually not used rescue albuterol in 12 months.  Spirometry normal         09/21/2022  Last visit 10/15/2022  Referred by Dr Dagoberto Montes  Recent lacunar CVA  Known KALYAN Moderate KALYAN titrated to CPAP 12 cm  Snoring and apnea  Therapy options discussed  Was in Hospital x 2  Spouse present  Goals and therapy options discussed      12/13/2022  Last seen 09/21/2022  Sleep study reveiwed  Severe KALYAN  Forgetful  [place]: Normal  FeNo 43  ACT score20  Wife says gets SOB walking  Added LABA ICS  Follow 8 weeks    12/11/2023  Followup  Stroke Last year  C/o DTS  [place] 17  Last titration CPAP 17 was adequate  Failed to fully habituated  Study reviewed  No sleepy driving  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      Titration reveiwed  BIPAP 17/13 was adequate  After long discussion patient eventually agreed to use a BiPAP.    Not open to other options like mandibular device or inspire  Will order  Time below on titration: Oxygen saturations were . 88 % for 8.8 minutes    05/24/2024  Followup  Not using BIPAP here with spouse  Not attempted to use BIPAP  Wearing O2 in lieu  Discussed INSPIRE  Not willing  Riks untreated KALYAN discussed      Asthma  There is no cough, shortness of breath, sputum production or wheezing. Pertinent negatives include no postnasal drip. His past medical history is significant for asthma.     Review of Systems   Constitutional: Negative.    HENT:  Negative for postnasal drip and congestion.    Eyes: Negative.    Respiratory:  Positive for snoring and somnolence. Negative for cough, sputum production, shortness of breath, wheezing, asthma  "nighttime symptoms, pleurisy, dyspnea on extertion and use of rescue inhaler.    Cardiovascular: Negative.    Genitourinary: Negative.    Endocrine: endocrine negative    Musculoskeletal: Negative.    Skin: Negative.    Gastrointestinal: Negative.    Neurological: Negative.    Psychiatric/Behavioral: Negative.     All other systems reviewed and are negative.           Goals of Care Treatment Preferences:  Code Status: Full Code       BP Readings from Last 3 Encounters:   05/24/24 119/78   04/08/24 132/80   03/15/24 (!) 140/76          Neck circumference  18.5" [?KALYAN risk if >43cm (17in) male or >41cm (15.5 in) female]             The following portions of the patient's history were reviewed and updated as appropriate: He  has a past medical history of Acute CVA (cerebrovascular accident) (09/08/2022), DANY (acute kidney injury) (09/14/2022), Asthma, Bronchitis chronic, Cancer, Chronic cough (11/15/2013), Chronic diastolic congestive heart failure (09/12/2023), Cough, Digestive disorder, Hyperlipidemia, Hypertension, Liver disease, KALYAN (obstructive sleep apnea) (09/08/2022), Stroke (09/01/2022), and Type 2 diabetes mellitus, without long-term current use of insulin (09/07/2022).  He does not have any pertinent problems on file.  He  has a past surgical history that includes Tonsillectomy; Colonoscopy (N/A, 6/21/2016); Colonoscopy (N/A, 11/1/2016); Colonoscopy (N/A, 2/3/2017); Colonoscopy (N/A, 11/13/2017); Fluoroscopy (N/A, 6/15/2018); and Colonoscopy (N/A, 2/19/2021).  His family history includes Alzheimer's disease in his mother; Cancer in his father.  He  reports that he quit smoking about 64 years ago. His smoking use included cigarettes and pipe. He started smoking about 67 years ago. He has been exposed to tobacco smoke. He has never used smokeless tobacco. He reports that he does not currently use alcohol after a past usage of about 2.0 - 3.0 standard drinks of alcohol per week. He reports that he does not use " drugs.  He has a current medication list which includes the following prescription(s): albuterol, aspirin, blood sugar diagnostic, blood sugar diagnostic, blood-glucose meter, fluticasone propionate, fluticasone-salmeterol 230-21 mcg/dose, glipizide, ipratropium, lancets, lancing device, losartan, montelukast, multivitamin, macular health formula, onetouch delica plus lancet, pantoprazole, pen needle, diabetic, rosuvastatin, and triamcinolone acetonide 0.1%, and the following Facility-Administered Medications: epinephrine and ondansetron.  Current Outpatient Medications on File Prior to Visit   Medication Sig Dispense Refill    albuterol (VENTOLIN HFA) 90 mcg/actuation inhaler Inhale 2 puffs into the lungs every 6 (six) hours as needed for Wheezing. Rescue 8 g 3    aspirin (ECOTRIN) 81 MG EC tablet Take 1 tablet (81 mg total) by mouth once daily. 90 tablet 3    blood sugar diagnostic Strp 1 strip by Misc.(Non-Drug; Combo Route) route 2 (two) times daily with meals. 200 strip 3    blood sugar diagnostic Strp Test blood glucose 2 times a day 200 strip 3    blood-glucose meter kit Use as instructed 1 each 0    fluticasone propionate (FLONASE) 50 mcg/actuation nasal spray SHAKE LIQUID AND USE 2 SPRAYS IN EACH NOSTRIL EVERY DAY 48 g 3    fluticasone-salmeterol 230-21 mcg/dose (ADVAIR HFA) 230-21 mcg/actuation HFAA inhaler Inhale 2 puffs into the lungs every 12 (twelve) hours. Controller 12 g 11    glipiZIDE (GLUCOTROL) 2.5 MG TR24 TAKE 1 TABLET(2.5 MG) BY MOUTH DAILY WITH BREAKFAST 90 tablet 1    ipratropium (ATROVENT) 21 mcg (0.03 %) nasal spray 2 sprays by Each Nostril route 2 (two) times daily. 30 mL 0    lancets Misc 1 each by Misc.(Non-Drug; Combo Route) route 2 (two) times daily with meals. 100 each 11    lancing device Misc 1 Device by Misc.(Non-Drug; Combo Route) route 2 (two) times daily with meals. 1 each 0    losartan (COZAAR) 25 MG tablet Take 1 tablet (25 mg total) by mouth once daily. 90 tablet 3     "montelukast (SINGULAIR) 10 mg tablet Take 1 tablet (10 mg total) by mouth every evening. 90 tablet 3    multivitamin (THERAGRAN) per tablet Take 1 tablet by mouth once daily.      [id number] (MACULAR HEALTH FORMULA) 5-1-7.5 mg Cap Take by mouth.      ONETOUCH DELICA PLUS LANCET 33 gauge Misc USE TO CHECK BLOOD GLUCOSE TWO TIMES A DAY WITH MEALS 200 each 3    pantoprazole (PROTONIX) 40 MG tablet Take 1 tablet (40 mg total) by mouth daily as needed. 30 tablet 3    pen needle, diabetic 31 gauge x 5/16" Ndle 1 each by Misc.(Non-Drug; Combo Route) route 2 (two) times daily with meals. 100 each 11    rosuvastatin (CRESTOR) 40 MG Tab TAKE 1 TABLET BY MOUTH ONCE DAILY 90 tablet 2    triamcinolone acetonide 0.1% (KENALOG) 0.1 % cream Apply topically 2 (two) times daily. 15 g 1     Current Facility-Administered Medications on File Prior to Visit   Medication Dose Route Frequency Provider Last Rate Last Admin    EPINEPHrine (EPIPEN) 0.3 mg/0.3 mL pen injection 0.3 mg  0.3 mg Intramuscular PRN Amando Ulrich MD        ondansetron disintegrating tablet 4 mg  4 mg Oral Once PRN Amando Ulrich MD         He has No Known Allergies..    Objective:       Vitals:    05/24/24 1237   BP: 119/78   Pulse: 67   Resp: 17   SpO2: 97%   Weight: 107.3 kg (236 lb 8.9 oz)   Height: 5' 6" (1.676 m)                 Physical Exam  Vitals and nursing note reviewed.   Constitutional:       Appearance: He is well-developed.      Comments: Nek 19"   HENT:      Head: Normocephalic and atraumatic.        Right Ear: External ear normal.      Left Ear: External ear normal.      Nose: Nose normal.      Mouth/Throat:      Pharynx: Uvula midline. No oropharyngeal exudate.   Eyes:      General: Lids are normal. No scleral icterus.     Conjunctiva/sclera: Conjunctivae normal.      Pupils: Pupils are equal, round, and reactive to light.   Neck:      Trachea: Trachea and phonation normal.      Comments: Neck 18"  Cardiovascular:      " "Rate and Rhythm: Normal rate and regular rhythm.      Pulses: Normal pulses.      Heart sounds: Normal heart sounds, S1 normal and S2 normal. No murmur heard.  Pulmonary:      Effort: Pulmonary effort is normal. No respiratory distress.      Breath sounds: Examination of the left-lower field reveals decreased breath sounds. Decreased breath sounds present. No wheezing, rhonchi or rales.   Chest:      Chest wall: No tenderness.   Abdominal:      General: Bowel sounds are normal.      Palpations: Abdomen is soft.   Musculoskeletal:         General: Normal range of motion.      Cervical back: Normal range of motion and neck supple.   Lymphadenopathy:      Cervical: No cervical adenopathy.   Skin:     General: Skin is warm and dry.      Findings: No rash.      Nails: There is no clubbing.   Neurological:      Mental Status: He is alert and oriented to person, place, and time.      GCS: GCS eye subscore is 4. GCS verbal subscore is 5. GCS motor subscore is 6.      Cranial Nerves: No cranial nerve deficit.      Sensory: No sensory deficit.   Psychiatric:         Speech: Speech normal.       Personal Diagnostic Review  [x]  Chest x-ray:       X-Ray Chest PA And Lateral  Narrative: EXAM:   XR CHEST PA AND LATERAL    CLINICAL HISTORY: Chronic obstructive sleep apnea.    COMPARISON: 2022.    TECHNIQUE: PA and lateral views    FINDINGS:  The lungs are clear.   No pneumothorax.  The cardiac silhouette size and contour is within normal limits.  Aortic atherosclerosis.  Impression: Negative chest radiograph.    Finalized on: 2023 2:49 PM By:  FREDDIE Cavaozs MD, MD  [facility]# [id number]      2023 14:51:[id number]    [facility]   .       Patient Name: Jovan Del Cid Date of Report: 24 Study Date: 2024   [facility] No.: [id number] : 1940 Time of Study: 09:28:37 PM - 04:58:57 AM   Sex: Male Age: 83 year Weight: 230.0 lbs Height: 5' 6" Type of Study: CPAP re-titration   REASONS FOR REFERRAL: Mr. Del Cid is a 83 year " old male, referred for CPAP retitration polysomnography by Dr. Stefan Buckner, to determine if he needs a change in CPAP pressure. His split - night dx PSG was on 11-3-22with an A + H Index = 29.4 events / hr asleep, moderate to severe obstructive sleep apnea (KALYAN); the CPAP titration PSG revealed an optimal CPAP pressure of 17 cm, but he was unable to tolerate CPAP sufficiently. He is now ready to have another PAP titration and try to use CPAP again. Dr. Paul Neal was the originating referring physician, and is the patient's primary care physician.   STUDY PARAMETERS: This study involved analysis of the patient's sleep pattern while breathing was assisted. 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   1. CPAP was initiated at 09:28:37 PM. The BiPAP titration polysomnography revealed that BiPAP (Bi - Flex S, humidity 4) of 17 cm IPAP / 13 cm EPAP, was the most effective pressure completely tested, but was not optimal, as it  reduced the rate of respiratory events 69 % to A + H Index = 9.2 events / hr asleep in 0.9 hrs sleep 0.28 hrs REM sleep). Lower pressures also could be successful (12 cm / 8cm A + H I = 9.4 in 1.8 hrs sleep, with 0.3 hrs REM sleep, and 14 cm / 10 cm A + H I = 7.7 in 1.8 hrs sleep, but with no REM sleep). Note that 12 / 8 cm AHI is only 0.2 events higher than 17 cm / 13 cm but that 12 cm / 10 cm was much more completely tested on titration time and slightly more in REM time, which could be a reason to test the latter first. (Or, maybe 13 cm / 9 cm should be tested first with AHI = 7.7 the lowest but also with no REM sleep). Also. both lower pressures had the lesser number of central apneas. The choice is almost arbitrary. Snoring was eliminated at all pressures. AutoCPAP 6 cm - 20 cm) could be tried if necessary.     The overall A + H Index was 14.4 / hr asleep, with only 1.7 respiratory event - related arousals / hr asleep (and no RERAs) for the titration trial. The mean SpO2 value was 91.8 % throughout the study, moderate, with a minimum oxygen saturation during sleep of 86.0 % and a maximum waking baseline SpO2 of 98.0 %).   A medium ResMed AirFit F30 full - face CPAP mask was used and was tolerated adequately, as sleep during CPAP was slightly reduced . Please see PAP trial outcomes table, below.   2. 17 cm IPAP / 13 cm EPAP (C - Flex S, humidity 4) is recommended, but only after successful habituation to PAP at home (gradually increased CPAP pressures are used for increasing amounts of time, initially while awake and occupied, and then while asleep). An adaptive servo- ventilation (ASV) titration polysomnography also could considered if none of the three BiPAP pressures noted above (17 cm / 13 cm, 14 cm / 10 cm, and 12 cm / 8 cm) was sufficiently effective when tested for an adequate time at home.   [facility] CPAP Re -EVALUAT ION REPORT Patient Name: Nehemias  "Jovan Subject Code: [id number] Study Date: 2024   Page 2     BiPAP Treatment Titration Outcomes Table   Device  PAP   Level  Time (min)  Wake (min)  NREM   (min)  REM   (min)  Sleep Eff%  OA#  CA#  MA#  Hyp#  AHI  RDI  Min OSat  LM   Index  AR   Index    BiLevel  10/6/0  72.5  16.5  56.0  0.0  77.2%  2  4  -  8  15.0  15.0  87.0  19.3  8.6    BiLevel  12/8/0  139.0  31.0  89.0  19.0  77.7%  2  3  -  12  9.4  9.4  86.0  60.6  11.1    BiLevel  14/10/0  88.0  41.0  47.0  0.0  53.4%  -  3  1  2  7.7  7.7  86.0  151.9  24.3    BiLevel  13/9/0  42.5  2.0  40.5  0.0  95.3%  8  12  1  6  40.0  40.0  88.0  69.6  37.0    BiLevel  15/11/0  36.0  0.0  3.0  33.0  100.0%  1  1  -  9  18.3  18.3  88.0  10.0  21.7    BiLevel  17/13/0  72.5  1.0  55.0  16.5  98.6%  -  8  -  3  9.2  9.2  89.0  0.8  21.0                  2024    12:37 PM 2024    12:49 PM 3/18/2024     3:47 PM 3/15/2024    11:58 AM 2024     9:46 AM 2024     1:16 PM 2024    11:00 AM   Pulmonary Function Tests   SpO2 97 % 95 % 95 % 95 % 99 % 97 %    Height 5' 6" (1.676 m) 5' 6" (1.676 m) 5' 6" (1.676 m) 5' 6" (1.676 m) 5' 6" (1.676 m)     Weight 107.3 kg (236 lb 8.9 oz) 107 kg (235 lb 14.3 oz) 106.5 kg (234 lb 12.6 oz) 76.9 kg (169 lb 8.5 oz) 104.8 kg (231 lb) 106.7 kg (235 lb 3.7 oz) 106.7 kg (235 lb 3.7 oz)   BMI (Calculated) 38.2 38.1 37.9 27.4 37.3         A medium ResMed AirFit  F30  full - face    Patient Name: Jovan Del Cid                                Date of Report: 24                                  Study Date:  2024  [facility] No.: [id number]                                      : 1940                                              Time of Study:  09:28:37 PM - 04:58:57 AM   Sex:  Male   Age:  83 year   Weight:  230.0 lbs          Height:  5' 6"                                                    Type of Study:  CPAP re-titration     REASONS FOR REFERRAL: Mr. Del Cid is a 83 year old male, referred for CPAP " retitration polysomnography  by Dr. Stefan Buckner, to determine if he needs a change in CPAP pressure. His split - night dx PSG was on 11-3-22with an A + H Index = 29.4 events / hr asleep, moderate to severe obstructive sleep apnea (KALYAN); the CPAP titration PSG revealed an optimal CPAP pressure of 17 cm, but he was unable to tolerate CPAP sufficiently.  He is now ready to have another PAP titration and try to use CPAP again. Dr. Paul Neal was the originating referring physician, and is the patient's primary care physician.     STUDY PARAMETERS: This study involved analysis of the patient's sleep pattern while breathing was assisted. 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  CPAP was initiated at 09:28:37 PM.  The BiPAP titration polysomnography revealed that BiPAP (Bi - Flex S, humidity 4) of 17 cm IPAP / 13 cm EPAP, was the most effective pressure completely tested, but was not optimal, as it reduced the rate of respiratory events 69 % to A + H Index = 9.2 events / hr  asleep in 0.9 hrs sleep 0.28 hrs REM sleep).  Lower pressures also could be successful (12 cm / 8cm A + H I = 9.4  in 1.8 hrs sleep, with 0.3 hrs REM sleep, and 14 cm / 10 cm A + H I = 7.7  in 1.8 hrs sleep, but with no REM sleep).  Note that 12 / 8 cm AHI is only 0.2 events higher than 17 cm / 13 cm but  that 12 cm / 10 cm was much more completely tested on titration time and slightly more in REM time, which could be a reason to test the latter first. (Or, maybe 13 cm / 9 cm should be tested first with AHI = 7.7 the lowest but also with no REM sleep).  Also. both lower pressures had the lesser number of central apneas.  The choice is almost arbitrary.  Snoring was eliminated at all pressures. AutoCPAP 6 cm - 20 cm) could be tried if necessary.          The overall A + H Index was 14.4 / hr asleep, with only 1.7 respiratory event - related arousals / hr asleep (and no RERAs) for the titration trial.  The mean SpO2 value was 91.8 % throughout the study, moderate, with a minimum oxygen saturation during sleep of 86.0 % and a maximum waking baseline SpO2 of 98.0 %).   A medium ResMed AirFit  F30  full - face CPAP mask was used and was tolerated adequately, as sleep during CPAP was slightly reduced .  Please see PAP trial outcomes table, below.     17 cm IPAP / 13 cm EPAP (C - Flex S, humidity 4) is recommended, but  only  after  successful habituation to PAP at home (gradually increased CPAP pressures are used for increasing amounts of time, initially while awake and occupied, and then while asleep).  An adaptive servo- ventilation (ASV) titration polysomnography also could considered if none of the three BiPAP pressures noted above (17 cm / 13 cm, 14 cm / 10 cm, and 12 cm / 8 cm) was sufficiently effective when tested for an adequate time at home.                                                                             BiPAP Treatment Titration Outcomes Table     Device PAP  Level Time (min) Wake (min) NREM  (min)  REM  (min) Sleep Eff% OA# CA# MA# Hyp# AHI RDI Min OSat LM  Index AR  Index   BiLevel 10/6/0 72.5 16.5 56.0 0.0 77.2% 2 4 - 8 15.0 15.0 87.0 19.3 8.6   BiLevel 12/8/0 139.0 31.0 89.0 19.0 77.7% 2 3 - 12 9.4 9.4 86.0 60.6 11.1   BiLevel 14/10/0 88.0 41.0 47.0 0.0 53.4% - 3 1 2 7.7 7.7 86.0 151.9 24.3   BiLevel 13/9/0 42.5 2.0 40.5 0.0 95.3% 8 12 1 6 40.0 40.0 88.0 69.6 37.0   BiLevel 15/11/0 36.0 0.0 3.0 33.0 100.0% 1 1 - 9 18.3 18.3 88.0 10.0 21.7   BiLevel 17/13/0 72.5 1.0 55.0 16.5 98.6% - 8 - 3 9.2 9.2 89.0 0.8 21.0      A device to discourage sleep in the supine position is recommended, to further reduce respiratory events and snoring (respiratory events had a significant supine positional tendency during CPAP).   Weight loss to the normal range is recommended as it can decrease respiratory events and snoring in overweight patients.  The following changes in sleep hygiene / sleep - related behavior are recommended after medical treatments are successful  Regular bedtimes and wake times, including weekends: Total sleep time / night should not be more than one hour more           than usual, and bedtime or wake time should not be more than one hour earlier or later than usual.    Do not attempt to make up lost sleep by extending sleep periods.    Avoid naps; none longer than 20 min or later than mid - afternoon.  Avoid meals or large snacks within 3 hours of bedtime.     SECONDARY TREATMENT RECOMMENDATIONS  Treat, or refer to SDC if problems are not satisfactorily resolved by the above.  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  Consider treatment of PLM disorder if PLMS symptoms are sufficiently bothersome to the patient.  Note that the benzodiazepine medications sometimes used to treat PLM disorder  (e.g., clonazepam / Klonopin) may exacerbate some sleep - related respiratory disorders, and that dopaminergic medications such as Mirapex and Requip can be used in such instances.    Consider a referral for a dental examination and possible dental splint for sleep bruxism.     Also consider behavioral and cognitive / behavioral treatments for stress; sleep bruxism might be expected to improve.     See below for a complete interpretation of data from the polysomnography and Sleep Disorders Inventory.     Thank you for referring this patient to the [facility].       Assessment:       Problem List Items Addressed This Visit       Mild intermittent asthma without complication (Chronic)    HTN (hypertension)    Dyslipidemia    Type 2 diabetes mellitus, without long-term current use of insulin    Chronic diastolic congestive heart failure    BMI 37.0-37.9, adult    Intolerance of continuous positive airway pressure (CPAP) ventilation    Obstructive sleep apnea    Nocturnal hypoxemia due to asthma - Primary       Plan:       Would rather do BIPAP than get INSPIRE  Refilled Advair  Stable asthma  Options of MAD or INSPIRE discussed  Preferes to retry BIPAP       Follow up in about 6 weeks (around 7/5/2024), or see Jennifer for O2 enrichment device, followup with NP.    This note was prepared using voice recognition system and is likely to have sound alike errors that may have been overlooked even after proof reading.  Please call me with any questions    Discussed diagnosis, its evaluation, treatment and usual course. All questions answered.    Thank you for the courtesy of participating in the care of this patient    Stefan Buckner MD    No orders of the defined types were placed in this encounter.

## 2024-06-18 ENCOUNTER — OFFICE VISIT (OUTPATIENT)
Dept: CARDIOLOGY | Facility: CLINIC | Age: 84
End: 2024-06-18
Payer: COMMERCIAL

## 2024-06-18 VITALS
DIASTOLIC BLOOD PRESSURE: 68 MMHG | OXYGEN SATURATION: 96 % | HEART RATE: 64 BPM | HEIGHT: 66 IN | SYSTOLIC BLOOD PRESSURE: 114 MMHG | WEIGHT: 242.06 LBS | BODY MASS INDEX: 38.9 KG/M2

## 2024-06-18 DIAGNOSIS — E11.9 TYPE 2 DIABETES MELLITUS WITHOUT COMPLICATION, WITHOUT LONG-TERM CURRENT USE OF INSULIN: ICD-10-CM

## 2024-06-18 DIAGNOSIS — E78.5 DYSLIPIDEMIA: ICD-10-CM

## 2024-06-18 DIAGNOSIS — G47.33 OBSTRUCTIVE SLEEP APNEA: ICD-10-CM

## 2024-06-18 DIAGNOSIS — I87.2 VENOUS INSUFFICIENCY: ICD-10-CM

## 2024-06-18 DIAGNOSIS — I10 PRIMARY HYPERTENSION: ICD-10-CM

## 2024-06-18 DIAGNOSIS — I38 VALVULAR HEART DISEASE: ICD-10-CM

## 2024-06-18 DIAGNOSIS — I50.32 CHRONIC DIASTOLIC CONGESTIVE HEART FAILURE: Primary | ICD-10-CM

## 2024-06-18 DIAGNOSIS — I65.22 STENOSIS OF LEFT CAROTID ARTERY: ICD-10-CM

## 2024-06-18 PROCEDURE — 1101F PT FALLS ASSESS-DOCD LE1/YR: CPT | Mod: CPTII,S$GLB,, | Performed by: INTERNAL MEDICINE

## 2024-06-18 PROCEDURE — 3078F DIAST BP <80 MM HG: CPT | Mod: CPTII,S$GLB,, | Performed by: INTERNAL MEDICINE

## 2024-06-18 PROCEDURE — 1126F AMNT PAIN NOTED NONE PRSNT: CPT | Mod: CPTII,S$GLB,, | Performed by: INTERNAL MEDICINE

## 2024-06-18 PROCEDURE — 99214 OFFICE O/P EST MOD 30 MIN: CPT | Mod: S$GLB,,, | Performed by: INTERNAL MEDICINE

## 2024-06-18 PROCEDURE — 3288F FALL RISK ASSESSMENT DOCD: CPT | Mod: CPTII,S$GLB,, | Performed by: INTERNAL MEDICINE

## 2024-06-18 PROCEDURE — 1159F MED LIST DOCD IN RCRD: CPT | Mod: CPTII,S$GLB,, | Performed by: INTERNAL MEDICINE

## 2024-06-18 PROCEDURE — 1160F RVW MEDS BY RX/DR IN RCRD: CPT | Mod: CPTII,S$GLB,, | Performed by: INTERNAL MEDICINE

## 2024-06-18 PROCEDURE — 99999 PR PBB SHADOW E&M-EST. PATIENT-LVL V: CPT | Mod: PBBFAC,,, | Performed by: INTERNAL MEDICINE

## 2024-06-18 PROCEDURE — 3074F SYST BP LT 130 MM HG: CPT | Mod: CPTII,S$GLB,, | Performed by: INTERNAL MEDICINE

## 2024-06-18 RX ORDER — FUROSEMIDE 20 MG/1
20 TABLET ORAL
Qty: 60 TABLET | Refills: 3 | Status: SHIPPED | OUTPATIENT
Start: 2024-06-18 | End: 2025-06-18

## 2024-06-18 NOTE — PROGRESS NOTES
Subjective:   Patient ID:  Jovan Del Cid Jr. is a 83 y.o. male who presents for follow up of Shortness of Breath (Sob with some exertion)      84 y/o male, 4 months.   PMH MVP mod MR, CHFpEF, acute CVA in , asthma, h/o CVA, HLD, HTN,Hx of Colon Tubulovillous Adenoma, MCMAHAN h/o right neck lipoma, and  obesity      admitted for acute CVA. with R-sided paresthesias The BP was significantly high /102    MRI phillip show Acute lacunar infarcts within the left thalamus . CT head and neck show :No evidence of thromboembolism within the visible branches of the [id number].Coarse calcific plaque within the left carotid bifurcation resulting in up to 50% stenosis of the origin/proximal aspect of the left internal carotid artery.   TTE did not show any acute finding  with normal  EF .     After discharge. No fall/ still right body numbness. No chest pain dyspnea. Some dizziness and no faint  BP low and valsartan HCTZ decreased the dose by PCP last week and stopped yesterday   Pt had h/o med noncompliance  HH once a week.     Echo     · There is no evidence of intracardiac shunting.  · The left ventricle is normal in size with concentric remodeling and normal systolic function.  · The estimated ejection fraction is 60%.  · Normal left ventricular diastolic function.  · Normal right ventricular size with normal right ventricular systolic function.  · There is bileaflet mitral prolapse.  · Mild-to-moderate mitral regurgitation.  · Normal central venous pressure (3 mmHg).    03/23 visit  BP controlled. No dizziness faint and chest pain    09/23 visit  Chronic right arm and leg numbness after the cva in 09/22. With right  shoulder pain  Right arm BP 96/60 and left 110 /62  08/23  and repeat echo in 08/23 EF 55% MVP and mod to severe MR. Normal LV LA size and PASP 30 mmHg  C/o GAMINO, leg swelling, sleeps on 1 pillow and no PND  At Home  to 150 mmhg  EKG today reveals NSR nonspecific STT  change    10/23 visit  10/23 Phar MPI No ischemia and arm arterial US normal.  Not taking too much lasix, cough and wheezing worse for a week    02/24 visit  Remains cough and f/u at pulm with Dr. Buckner  10/23 Phar MPI showed no ischemia and echo showed EF nl mod to severe MR with calcification  ARM arterial US nml.  No orthopnea.   Occasional wheezing   LDL 67,  and BP nml  EKG reviewed by myself today reveals NSR nonspecific STT change    Interval history  Works daily. No regular activity, no orthopnea pND   Some leg swelling. No dizziness faint and SOB                       Past Medical History:   Diagnosis Date    Acute CVA (cerebrovascular accident) 09/08/2022    DANY (acute kidney injury) 09/14/2022    Asthma     Bronchitis chronic     Cancer     Chronic cough 11/15/2013    Chronic diastolic congestive heart failure 09/12/2023    Cough     Digestive disorder     Hyperlipidemia     Hypertension     Liver disease     KALYAN (obstructive sleep apnea) 09/08/2022    Stroke 09/01/2022    Type 2 diabetes mellitus, without long-term current use of insulin 09/07/2022    BS didn't check 10/05/2022       Past Surgical History:   Procedure Laterality Date    COLONOSCOPY N/A 6/21/2016    Procedure: COLONOSCOPY;  Surgeon: Alonso Dorsey MD;  Location: [facility];  Service: Endoscopy;  Laterality: N/A;    COLONOSCOPY N/A 11/1/2016    Procedure: COLONOSCOPY;  Surgeon: Alonso Dorsey MD;  Location: [facility];  Service: Endoscopy;  Laterality: N/A;    COLONOSCOPY N/A 2/3/2017    Procedure: COLONOSCOPY;  Surgeon: Alonso Dorsey MD;  Location: [facility];  Service: Endoscopy;  Laterality: N/A;    COLONOSCOPY N/A 11/13/2017    Procedure: COLONOSCOPY;  Surgeon: Alonso Dorsey MD;  Location: [facility];  Service: Endoscopy;  Laterality: N/A;    COLONOSCOPY N/A 2/19/2021    Procedure: COLONOSCOPY;  Surgeon: Paula Ricardo MD;  Location: [facility];  Service: Endoscopy;  Laterality: N/A;    FLUOROSCOPY N/A  6/15/2018    Procedure: Transjugular liver bx;  Surgeon: Petros Recio MD;  Location: [facility] CATH LAB;  Service: General;  Laterality: N/A;    TONSILLECTOMY         Social History     Tobacco Use    Smoking status: Former     Current packs/day: 0.00     Types: Cigarettes, Pipe     Start date:      Quit date:      Years since quittin.5     Passive exposure: Past    Smokeless tobacco: Never    Tobacco comments:     smoked a pipe - quit smoking    Substance Use Topics    Alcohol use: Not Currently     Alcohol/week: 2.0 - 3.0 standard drinks of alcohol     Types: 2 - 3 Cans of beer per week     Comment: daily    Drug use: No       Family History   Problem Relation Name Age of Onset    Cancer Father      Alzheimer's disease Mother      Colon cancer Neg Hx      Melanoma Neg Hx           ROS    Objective:   Physical Exam  HENT:      Head: Normocephalic.   Eyes:      Pupils: Pupils are equal, round, and reactive to light.   Neck:      Thyroid: No thyromegaly.      Vascular: Normal carotid pulses. No carotid bruit or JVD.   Cardiovascular:      Rate and Rhythm: Normal rate and regular rhythm. No extrasystoles are present.     Chest Wall: PMI is not displaced.      Pulses: Normal pulses.           Carotid pulses are 2+ on the right side and 2+ on the left side.     Heart sounds: Normal heart sounds. No murmur heard.     No gallop. No S3 sounds.   Pulmonary:      Effort: No respiratory distress.      Breath sounds: No stridor.   Abdominal:      General: Bowel sounds are normal.      Palpations: Abdomen is soft.      Tenderness: There is no abdominal tenderness. There is no rebound.   Musculoskeletal:         General: Swelling present.   Skin:     Findings: No rash.   Neurological:      Mental Status: He is alert and oriented to person, place, and time.   Psychiatric:         Behavior: Behavior normal.         Lab Results   Component Value Date    CHOL 168 2023    CHOL 113 (L) 2023    CHOL 215 (H)  09/13/2022     Lab Results   Component Value Date    HDL 44 08/24/2023    HDL 40 01/24/2023    HDL 33 (L) 09/13/2022     Lab Results   Component Value Date    LDLCALC 66.6 08/24/2023    LDLCALC 8.6 (L) 01/24/2023    LDLCALC 142.4 09/13/2022     Lab Results   Component Value Date    TRIG 287 (H) 08/24/2023    TRIG 322 (H) 01/24/2023    TRIG 198 (H) 09/13/2022     Lab Results   Component Value Date    CHOLHDL 26.2 08/24/2023    CHOLHDL 35.4 01/24/2023    CHOLHDL 15.3 (L) 09/13/2022       Chemistry        Component Value Date/Time     04/08/2024 1033    K 4.5 04/08/2024 1033     04/08/2024 1033    CO2 27 04/08/2024 1033    BUN 17 04/08/2024 1033    CREATININE 0.9 04/08/2024 1033    GLU 91 04/08/2024 1033        Component Value Date/Time    CALCIUM 9.8 04/08/2024 1033    ALKPHOS 59 04/08/2024 1033    AST 22 04/08/2024 1033    ALT 23 04/08/2024 1033    BILITOT 1.8 (H) 04/08/2024 1033    ESTGFRAFRICA >60.0 01/20/2021 1120    EGFRNONAA >60.0 01/20/2021 1120          Lab Results   Component Value Date    HGBA1C 5.7 (H) 04/08/2024     Lab Results   Component Value Date    TSH 2.979 12/05/2023     Lab Results   Component Value Date    INR 0.9 09/03/2022    INR 1.0 09/02/2022    INR 1.0 06/15/2018     Lab Results   Component Value Date    WBC 6.83 12/05/2023    HGB 13.7 (L) 12/05/2023    HCT 41.8 12/05/2023    MCV 94 12/05/2023     12/05/2023     BMP  Sodium   Date Value Ref Range Status   04/08/2024 142 136 - 145 mmol/L Final     Potassium   Date Value Ref Range Status   04/08/2024 4.5 3.5 - 5.1 mmol/L Final     Chloride   Date Value Ref Range Status   04/08/2024 105 95 - 110 mmol/L Final     CO2   Date Value Ref Range Status   04/08/2024 27 23 - 29 mmol/L Final     BUN   Date Value Ref Range Status   04/08/2024 17 8 - 23 mg/dL Final     Creatinine   Date Value Ref Range Status   04/08/2024 0.9 0.5 - 1.4 mg/dL Final     Calcium   Date Value Ref Range Status   04/08/2024 9.8 8.7 - 10.5 mg/dL Final     Anion  Gap   Date Value Ref Range Status   04/08/2024 10 8 - 16 mmol/L Final     eGFR if    Date Value Ref Range Status   01/20/2021 >60.0 >60 mL/min/1.73 m^2 Final     eGFR if non    Date Value Ref Range Status   01/20/2021 >60.0 >60 mL/min/1.73 m^2 Final     Comment:     Calculation used to obtain the estimated glomerular filtration  rate (eGFR) is the CKD-EPI equation.        BNP  @LABRCNTIP(BNP,BNPTRIAGEBLO)@  @LABRCNTIP(troponini)@  CrCl cannot be calculated (Patient's most recent lab result is older than the maximum 7 days allowed.).  No results found in the last 24 hours.  No results found in the last 24 hours.  No results found in the last 24 hours.    Assessment:      1. Chronic diastolic congestive heart failure    2. Dyslipidemia    3. Primary hypertension    4. Venous insufficiency    5. Valvular heart disease    6. Stenosis of left carotid artery    7. Amyloidosis, unspecified type    8. Type 2 diabetes mellitus without complication, without long-term current use of insulin    9. BMI 37.0-37.9, adult    10. Obstructive sleep apnea        Plan:   Add Lasix 20mg daily for 5 days a week for PVD and SOB  Repeat BMP and BNP in 2 to 4 weeks  Echo and RTC in 4 M   Continue Losartan and statin    DM Rx per PCP  Counseled DASH  Check Lipid profile with PCP in 6 months  Recommend heart-healthy diet, weight control and regular exercise.  Lety. Risk modification.   I have reviewed all pertinent labs and cardiac studies independently. Plans and recommendations have been formulated under my direct supervision. All questions answered and patient voiced understanding.   If symptoms persist go to the ED

## 2024-07-09 ENCOUNTER — LAB VISIT (OUTPATIENT)
Dept: LAB | Facility: HOSPITAL | Age: 84
End: 2024-07-09
Attending: INTERNAL MEDICINE
Payer: COMMERCIAL

## 2024-07-09 ENCOUNTER — OFFICE VISIT (OUTPATIENT)
Dept: PULMONOLOGY | Facility: CLINIC | Age: 84
End: 2024-07-09
Payer: COMMERCIAL

## 2024-07-09 VITALS
HEIGHT: 66 IN | RESPIRATION RATE: 16 BRPM | BODY MASS INDEX: 39.32 KG/M2 | HEART RATE: 65 BPM | OXYGEN SATURATION: 97 % | SYSTOLIC BLOOD PRESSURE: 160 MMHG | WEIGHT: 244.69 LBS | DIASTOLIC BLOOD PRESSURE: 80 MMHG

## 2024-07-09 DIAGNOSIS — G47.33 OBSTRUCTIVE SLEEP APNEA: Primary | ICD-10-CM

## 2024-07-09 DIAGNOSIS — I50.32 CHRONIC DIASTOLIC CONGESTIVE HEART FAILURE: ICD-10-CM

## 2024-07-09 DIAGNOSIS — I10 PRIMARY HYPERTENSION: ICD-10-CM

## 2024-07-09 LAB
ANION GAP SERPL CALC-SCNC: 10 MMOL/L (ref 8–16)
BNP SERPL-MCNC: 107 PG/ML (ref 0–99)
BUN SERPL-MCNC: 16 MG/DL (ref 8–23)
CALCIUM SERPL-MCNC: 9.2 MG/DL (ref 8.7–10.5)
CHLORIDE SERPL-SCNC: 107 MMOL/L (ref 95–110)
CO2 SERPL-SCNC: 22 MMOL/L (ref 23–29)
CREAT SERPL-MCNC: 0.8 MG/DL (ref 0.5–1.4)
EST. GFR  (NO RACE VARIABLE): >60 ML/MIN/1.73 M^2
GLUCOSE SERPL-MCNC: 124 MG/DL (ref 70–110)
POTASSIUM SERPL-SCNC: 4.3 MMOL/L (ref 3.5–5.1)
SODIUM SERPL-SCNC: 139 MMOL/L (ref 136–145)

## 2024-07-09 PROCEDURE — 36415 COLL VENOUS BLD VENIPUNCTURE: CPT | Performed by: INTERNAL MEDICINE

## 2024-07-09 PROCEDURE — 1101F PT FALLS ASSESS-DOCD LE1/YR: CPT | Mod: CPTII,S$GLB,, | Performed by: HOSPITALIST

## 2024-07-09 PROCEDURE — 3288F FALL RISK ASSESSMENT DOCD: CPT | Mod: CPTII,S$GLB,, | Performed by: HOSPITALIST

## 2024-07-09 PROCEDURE — 80048 BASIC METABOLIC PNL TOTAL CA: CPT | Performed by: INTERNAL MEDICINE

## 2024-07-09 PROCEDURE — 99213 OFFICE O/P EST LOW 20 MIN: CPT | Mod: S$GLB,,, | Performed by: HOSPITALIST

## 2024-07-09 PROCEDURE — 1160F RVW MEDS BY RX/DR IN RCRD: CPT | Mod: CPTII,S$GLB,, | Performed by: HOSPITALIST

## 2024-07-09 PROCEDURE — 83880 ASSAY OF NATRIURETIC PEPTIDE: CPT | Performed by: INTERNAL MEDICINE

## 2024-07-09 PROCEDURE — 1159F MED LIST DOCD IN RCRD: CPT | Mod: CPTII,S$GLB,, | Performed by: HOSPITALIST

## 2024-07-09 PROCEDURE — 3079F DIAST BP 80-89 MM HG: CPT | Mod: CPTII,S$GLB,, | Performed by: HOSPITALIST

## 2024-07-09 PROCEDURE — 99999 PR PBB SHADOW E&M-EST. PATIENT-LVL V: CPT | Mod: PBBFAC,,, | Performed by: HOSPITALIST

## 2024-07-09 PROCEDURE — 3077F SYST BP >= 140 MM HG: CPT | Mod: CPTII,S$GLB,, | Performed by: HOSPITALIST

## 2024-07-09 NOTE — ASSESSMENT & PLAN NOTE
- pressure elevated today, took meds right before coming to appt  - pt to take a daily blood pressure over the next week and notify pcp if consistently high

## 2024-07-09 NOTE — PROGRESS NOTES
"Subjective:      Patient ID: Jovan Del Cid Jr. is a 83 y.o. male.    Chief Complaint: KALYAN    HPI 7/9/24:    83 year old male with history of HTN, HLD, chronic diastolic HF, DM2, asthma, KALYAN who presents to Pulmonary clinic for follow up asthma. He was last seen 5/2024 by Dr. Buckner- pt reported not using bipap, but was encouraged to try to use.    Mr. Del Cid is accompanied by his spouse. He has been putting in a good effort with the BiPAP, but has been experiencing dry mouth with a residue around his lips when he wakes up to use the restroom after about 2-3 hours of use. This jahaira him from replacing the mask when he is back in bed. Denies issues with pressures feeling too high or waking him up. He does have humidity turned on, not running out of water during use.    Pertinent Work Up:  - Titration 1/2024- BiPAP 14/10, 12/8, or 17/13 recommended  - Split night 11/2022- AHI 29.4, CPAP 17, positional  - Nuclear stress 10/2023- Normal  - ECHO 8/2023- EF 55-70%, diastolic dysfunction, mod-severe MR, ePASP 22mmhg  Review of Systems   Respiratory:  Positive for somnolence.      Objective:     Physical Exam   Constitutional: He is oriented to person, place, and time. He appears well-developed and well-nourished. He is obese.   Cardiovascular: Normal rate and regular rhythm.   Pulmonary/Chest: Normal expansion, effort normal and breath sounds normal.   Neurological: He is alert and oriented to person, place, and time.   Skin: Skin is warm and dry.     Personal Diagnostic Review  As Above      6/18/2024     1:35 PM 5/24/2024    12:37 PM 4/8/2024    12:49 PM 3/18/2024     3:47 PM 3/15/2024    11:58 AM 2/26/2024     9:46 AM 2/22/2024     1:16 PM   Pulmonary Function Tests   SpO2 96 % 97 % 95 % 95 % 95 % 99 % 97 %   Height 5' 6" (1.676 m) 5' 6" (1.676 m) 5' 6" (1.676 m) 5' 6" (1.676 m) 5' 6" (1.676 m) 5' 6" (1.676 m)    Weight 109.8 kg (242 lb 1 oz) 107.3 kg (236 lb 8.9 oz) 107 kg (235 lb 14.3 oz) 106.5 kg (234 lb 12.6 " oz) 76.9 kg (169 lb 8.5 oz) 104.8 kg (231 lb) 106.7 kg (235 lb 3.7 oz)   BMI (Calculated) 39.1 38.2 38.1 37.9 27.4 37.3         Assessment:     No diagnosis found.     Outpatient Encounter Medications as of 7/9/2024   Medication Sig Dispense Refill    albuterol (VENTOLIN HFA) 90 mcg/actuation inhaler Inhale 2 puffs into the lungs every 6 (six) hours as needed for Wheezing. Rescue 8 g 3    aspirin (ECOTRIN) 81 MG EC tablet Take 1 tablet (81 mg total) by mouth once daily. 90 tablet 3    blood sugar diagnostic Strp 1 strip by Misc.(Non-Drug; Combo Route) route 2 (two) times daily with meals. 200 strip 3    blood sugar diagnostic Strp Test blood glucose 2 times a day 200 strip 3    blood-glucose meter kit Use as instructed 1 each 0    fluticasone propionate (FLONASE) 50 mcg/actuation nasal spray SHAKE LIQUID AND USE 2 SPRAYS IN EACH NOSTRIL EVERY DAY (Patient not taking: Reported on 6/18/2024) 48 g 3    fluticasone-salmeterol 230-21 mcg/dose (ADVAIR HFA) 230-21 mcg/actuation HFAA inhaler Inhale 2 puffs into the lungs every 12 (twelve) hours. Controller 12 g 11    furosemide (LASIX) 20 MG tablet Take 1 tablet (20 mg total) by mouth every Mon, Tues, Wed, Thurs, Fri. 60 tablet 3    glipiZIDE (GLUCOTROL) 2.5 MG TR24 TAKE 1 TABLET(2.5 MG) BY MOUTH DAILY WITH BREAKFAST 90 tablet 1    ipratropium (ATROVENT) 21 mcg (0.03 %) nasal spray 2 sprays by Each Nostril route 2 (two) times daily. (Patient not taking: Reported on 6/18/2024) 30 mL 0    lancets Misc 1 each by Misc.(Non-Drug; Combo Route) route 2 (two) times daily with meals. 100 each 11    lancing device Misc 1 Device by Misc.(Non-Drug; Combo Route) route 2 (two) times daily with meals. 1 each 0    losartan (COZAAR) 25 MG tablet Take 1 tablet (25 mg total) by mouth once daily. 90 tablet 3    montelukast (SINGULAIR) 10 mg tablet Take 1 tablet (10 mg total) by mouth every evening. 90 tablet 3    multivitamin (THERAGRAN) per tablet Take 1 tablet by mouth once daily.       "[id number] (MACULAR HEALTH FORMULA) 5-1-7.5 mg Cap Take by mouth.      ONETOUCH DELICA PLUS LANCET 33 gauge Misc USE TO CHECK BLOOD GLUCOSE TWO TIMES A DAY WITH MEALS 200 each 3    pantoprazole (PROTONIX) 40 MG tablet Take 1 tablet (40 mg total) by mouth daily as needed. 30 tablet 3    pen needle, diabetic 31 gauge x 5/16" Ndle 1 each by Misc.(Non-Drug; Combo Route) route 2 (two) times daily with meals. 100 each 11    rosuvastatin (CRESTOR) 40 MG Tab TAKE 1 TABLET BY MOUTH ONCE DAILY 90 tablet 2    triamcinolone acetonide 0.1% (KENALOG) 0.1 % cream Apply topically 2 (two) times daily. (Patient not taking: Reported on 6/18/2024) 15 g 1     Facility-Administered Encounter Medications as of 7/9/2024   Medication Dose Route Frequency Provider Last Rate Last Admin    EPINEPHrine (EPIPEN) 0.3 mg/0.3 mL pen injection 0.3 mg  0.3 mg Intramuscular PRN Amando Ulrich MD        ondansetron disintegrating tablet 4 mg  4 mg Oral Once PRN Amando Ulrich MD         No orders of the defined types were placed in this encounter.        Plan:     Problem List Items Addressed This Visit          Cardiac/Vascular    HTN (hypertension)     - pressure elevated today, took meds right before coming to appt  - pt to take a daily blood pressure over the next week and notify pcp if consistently high            Other    Obstructive sleep apnea - Primary     - still within 30 day exchange- to meet with Jennifer today to discuss mask exchange/fit   - can also consider heating tubing in the future if dry mouth persists despite improved mask fit  - discussed insurance guidelines of 70% of nights with >4 hours  - pt is motivated to increase nightly use          Plan discussed with pt and he expressed understanding, all questions answered. RTC 6 weeks for 30-90 day compliance review.     "

## 2024-07-09 NOTE — ASSESSMENT & PLAN NOTE
- still within 30 day exchange- to meet with Jennifer today to discuss mask exchange/fit   - can also consider heating tubing in the future if dry mouth persists despite improved mask fit  - discussed insurance guidelines of 70% of nights with >4 hours  - pt is motivated to increase nightly use

## 2024-07-11 ENCOUNTER — TELEPHONE (OUTPATIENT)
Dept: CARDIOLOGY | Facility: CLINIC | Age: 84
End: 2024-07-11
Payer: COMMERCIAL

## 2024-07-11 RX ORDER — LOSARTAN POTASSIUM 50 MG/1
50 TABLET ORAL DAILY
Qty: 90 TABLET | Refills: 3 | Status: SHIPPED | OUTPATIENT
Start: 2024-07-11 | End: 2025-07-11

## 2024-07-11 NOTE — TELEPHONE ENCOUNTER
Contacted patient regarding results patient stated understanding. Patient stated that his blood pressures have been high his systolic has been between 150-160 he does not remember the diastolic numbers he wants to know can he up the dosage on his blood pressure medication right now he is taking 25mg.    ----- Message from Judd Self MD sent at 7/10/2024  4:26 PM CDT -----  The lab showed CHF controlled  Continue current Rx.   F/U as scheduled

## 2024-07-15 ENCOUNTER — OFFICE VISIT (OUTPATIENT)
Dept: PODIATRY | Facility: CLINIC | Age: 84
End: 2024-07-15
Payer: COMMERCIAL

## 2024-07-15 VITALS — HEIGHT: 66 IN | WEIGHT: 244.69 LBS | BODY MASS INDEX: 39.32 KG/M2

## 2024-07-15 DIAGNOSIS — B35.1 DERMATOPHYTOSIS OF NAIL: ICD-10-CM

## 2024-07-15 DIAGNOSIS — E11.49 TYPE II DIABETES MELLITUS WITH NEUROLOGICAL MANIFESTATIONS: ICD-10-CM

## 2024-07-15 DIAGNOSIS — E11.9 ENCOUNTER FOR COMPREHENSIVE DIABETIC FOOT EXAMINATION, TYPE 2 DIABETES MELLITUS: Primary | ICD-10-CM

## 2024-07-15 PROCEDURE — 1159F MED LIST DOCD IN RCRD: CPT | Mod: CPTII,S$GLB,, | Performed by: PODIATRIST

## 2024-07-15 PROCEDURE — 1160F RVW MEDS BY RX/DR IN RCRD: CPT | Mod: CPTII,S$GLB,, | Performed by: PODIATRIST

## 2024-07-15 PROCEDURE — 1101F PT FALLS ASSESS-DOCD LE1/YR: CPT | Mod: CPTII,S$GLB,, | Performed by: PODIATRIST

## 2024-07-15 PROCEDURE — 1126F AMNT PAIN NOTED NONE PRSNT: CPT | Mod: CPTII,S$GLB,, | Performed by: PODIATRIST

## 2024-07-15 PROCEDURE — 11721 DEBRIDE NAIL 6 OR MORE: CPT | Mod: S$GLB,,, | Performed by: PODIATRIST

## 2024-07-15 PROCEDURE — 3288F FALL RISK ASSESSMENT DOCD: CPT | Mod: CPTII,S$GLB,, | Performed by: PODIATRIST

## 2024-07-15 PROCEDURE — 99999 PR PBB SHADOW E&M-EST. PATIENT-LVL IV: CPT | Mod: PBBFAC,,, | Performed by: PODIATRIST

## 2024-07-15 PROCEDURE — 99213 OFFICE O/P EST LOW 20 MIN: CPT | Mod: 25,S$GLB,, | Performed by: PODIATRIST

## 2024-07-15 NOTE — PROGRESS NOTES
Subjective:     Patient ID: Jovan Del Cid Jr. is a 83 y.o. male.    Chief Complaint: Nail Care (6 mo nail care, pt rates pain 0/10, pt is diabetic, pt last seen pcp Paul Neal MD 4/8/24, pt wears tennis shoes and socks/)    Jovan is a 83 y.o. male who presents to the clinic for evaluation and treatment of high risk feet. Jovan has a past medical history of Acute CVA (cerebrovascular accident) (09/08/2022), DANY (acute kidney injury) (09/14/2022), Asthma, Bronchitis chronic, Cancer, Chronic cough (11/15/2013), Chronic diastolic congestive heart failure (09/12/2023), Cough, Digestive disorder, Hyperlipidemia, Hypertension, Liver disease, KALYAN (obstructive sleep apnea) (09/08/2022), Stroke (09/01/2022), and Type 2 diabetes mellitus, without long-term current use of insulin (09/07/2022). The patient's chief complaint is long, thick toenails. This patient has documented high risk feet requiring routine maintenance secondary to diabetes mellitis and those secondary complications of diabetes, as mentioned.     PCP: Paul Neal MD    Date Last Seen by PCP: 04/08/2024    Current shoe gear:  Affected Foot: Tennis shoes     Unaffected Foot: Tennis shoes    Hemoglobin A1C   Date Value Ref Range Status   04/08/2024 5.7 (H) 4.0 - 5.6 % Final     Comment:     ADA Screening Guidelines:  5.7-6.4%  Consistent with prediabetes  >or=6.5%  Consistent with diabetes    High levels of fetal hemoglobin interfere with the HbA1C  assay. Heterozygous hemoglobin variants (HbS, HgC, etc)do  not significantly interfere with this assay.   However, presence of multiple variants may affect accuracy.     12/05/2023 5.4 4.0 - 5.6 % Final     Comment:     ADA Screening Guidelines:  5.7-6.4%  Consistent with prediabetes  >or=6.5%  Consistent with diabetes    High levels of fetal hemoglobin interfere with the HbA1C  assay. Heterozygous hemoglobin variants (HbS, HgC, etc)do  not significantly interfere with this assay.    However, presence of multiple variants may affect accuracy.     08/24/2023 5.6 4.0 - 5.6 % Final     Comment:     ADA Screening Guidelines:  5.7-6.4%  Consistent with prediabetes  >or=6.5%  Consistent with diabetes    High levels of fetal hemoglobin interfere with the HbA1C  assay. Heterozygous hemoglobin variants (HbS, HgC, etc)do  not significantly interfere with this assay.   However, presence of multiple variants may affect accuracy.         Patient Active Problem List   Diagnosis    Intolerance of continuous positive airway pressure (CPAP) ventilation    Liver cyst    HTN (hypertension)    Dyslipidemia    Benign prostatic hyperplasia    Lipoma    Amyloid disease    Chronic maxillary sinusitis    Tubulovillous adenoma of colon    MCMAHAN (nonalcoholic steatohepatitis)    Mild intermittent asthma without complication    BMI 37.0-37.9, adult    Personal history of colonic polyps    History of lacunar cerebrovascular accident    Type 2 diabetes mellitus, without long-term current use of insulin    Obstructive sleep apnea    Stenosis of left carotid artery    Venous insufficiency    Valvular heart disease    Chronic diastolic congestive heart failure    Unequal blood pressures in paired extremities    Cognitive complaints with normal exam    Nocturnal hypoxemia due to asthma       Medication List with Changes/Refills   Current Medications    ALBUTEROL (VENTOLIN HFA) 90 MCG/ACTUATION INHALER    Inhale 2 puffs into the lungs every 6 (six) hours as needed for Wheezing. Rescue    ASPIRIN (ECOTRIN) 81 MG EC TABLET    Take 1 tablet (81 mg total) by mouth once daily.    BLOOD SUGAR DIAGNOSTIC STRP    1 strip by Misc.(Non-Drug; Combo Route) route 2 (two) times daily with meals.    BLOOD SUGAR DIAGNOSTIC STRP    Test blood glucose 2 times a day    BLOOD-GLUCOSE METER KIT    Use as instructed    FLUTICASONE PROPIONATE (FLONASE) 50 MCG/ACTUATION NASAL SPRAY    SHAKE LIQUID AND USE 2 SPRAYS IN EACH NOSTRIL EVERY DAY     "FLUTICASONE-SALMETEROL 230-21 MCG/DOSE (ADVAIR HFA) 230-21 MCG/ACTUATION HFAA INHALER    Inhale 2 puffs into the lungs every 12 (twelve) hours. Controller    FUROSEMIDE (LASIX) 20 MG TABLET    Take 1 tablet (20 mg total) by mouth every Mon, Tues, Wed, Thurs, Fri.    GLIPIZIDE (GLUCOTROL) 2.5 MG TR24    TAKE 1 TABLET(2.5 MG) BY MOUTH DAILY WITH BREAKFAST    IPRATROPIUM (ATROVENT) 21 MCG (0.03 %) NASAL SPRAY    2 sprays by Each Nostril route 2 (two) times daily.    LANCETS MISC    1 each by Misc.(Non-Drug; Combo Route) route 2 (two) times daily with meals.    LANCING DEVICE MISC    1 Device by Misc.(Non-Drug; Combo Route) route 2 (two) times daily with meals.    LOSARTAN (COZAAR) 50 MG TABLET    Take 1 tablet (50 mg total) by mouth once daily.    MONTELUKAST (SINGULAIR) 10 MG TABLET    Take 1 tablet (10 mg total) by mouth every evening.    MULTIVITAMIN (THERAGRAN) PER TABLET    Take 1 tablet by mouth once daily.    MV-MN-LUTEIN-[id number]-[id number]- (MACULAR HEALTH FORMULA) 5-1-7.5 MG CAP    Take by mouth.    ONETOUCH DELICA PLUS LANCET 33 GAUGE MISC    USE TO CHECK BLOOD GLUCOSE TWO TIMES A DAY WITH MEALS    PANTOPRAZOLE (PROTONIX) 40 MG TABLET    Take 1 tablet (40 mg total) by mouth daily as needed.    PEN NEEDLE, DIABETIC 31 GAUGE X 5/16" NDLE    1 each by Misc.(Non-Drug; Combo Route) route 2 (two) times daily with meals.    ROSUVASTATIN (CRESTOR) 40 MG TAB    TAKE 1 TABLET BY MOUTH ONCE DAILY    TRIAMCINOLONE ACETONIDE 0.1% (KENALOG) 0.1 % CREAM    Apply topically 2 (two) times daily.       Review of patient's allergies indicates:  No Known Allergies    Past Surgical History:   Procedure Laterality Date    COLONOSCOPY N/A 6/21/2016    Procedure: COLONOSCOPY;  Surgeon: Alonso Dorsey MD;  Location: [facility];  Service: Endoscopy;  Laterality: N/A;    COLONOSCOPY N/A 11/1/2016    Procedure: COLONOSCOPY;  Surgeon: Alonso Dorsey MD;  Location: [facility];  Service: Endoscopy;  Laterality: N/A;    COLONOSCOPY N/A " 2/3/2017    Procedure: COLONOSCOPY;  Surgeon: Alonso Dorsey MD;  Location: [facility] ENDO;  Service: Endoscopy;  Laterality: N/A;    COLONOSCOPY N/A 2017    Procedure: COLONOSCOPY;  Surgeon: Alonso Dorsey MD;  Location: [facility] ENDO;  Service: Endoscopy;  Laterality: N/A;    COLONOSCOPY N/A 2021    Procedure: COLONOSCOPY;  Surgeon: Paula Ricardo MD;  Location: [facility] ENDO;  Service: Endoscopy;  Laterality: N/A;    FLUOROSCOPY N/A 6/15/2018    Procedure: Transjugular liver bx;  Surgeon: Petros Recio MD;  Location: [facility] CATH LAB;  Service: General;  Laterality: N/A;    TONSILLECTOMY         Family History   Problem Relation Name Age of Onset    Cancer Father      Alzheimer's disease Mother      Colon cancer Neg Hx      Melanoma Neg Hx         Social History     Socioeconomic History    Marital status:    Tobacco Use    Smoking status: Former     Current packs/day: 0.00     Types: Cigarettes, Pipe     Start date:      Quit date:      Years since quittin.5     Passive exposure: Past    Smokeless tobacco: Never    Tobacco comments:     smoked a pipe - quit smoking    Substance and Sexual Activity    Alcohol use: Not Currently     Alcohol/week: 2.0 - 3.0 standard drinks of alcohol     Types: 2 - 3 Cans of beer per week     Comment: daily    Drug use: No    Sexual activity: Not Currently     Partners: Female     Social Determinants of Health     Financial Resource Strain: Low Risk  (9/15/2022)    Overall Financial Resource Strain (CARDIA)     Difficulty of Paying Living Expenses: Not very hard   Food Insecurity: No Food Insecurity (9/15/2022)    Hunger Vital Sign     Worried About Running Out of Food in the Last Year: Never true     Ran Out of Food in the Last Year: Never true   Transportation Needs: No Transportation Needs (9/15/2022)    PRAPARE - Transportation     Lack of Transportation (Medical): No     Lack of Transportation (Non-Medical): No   Physical Activity: Inactive  "(9/15/2022)    Exercise Vital Sign     Days of Exercise per Week: 0 days     Minutes of Exercise per Session: 0 min   Stress: No Stress Concern Present (9/15/2022)    [employer] - Occupational Stress Questionnaire     Feeling of Stress : Not at all   Housing Stability: Low Risk  (9/15/2022)    Housing Stability Vital Sign     Unable to Pay for Housing in the Last Year: No     Number of Places Lived in the Last Year: 1     Unstable Housing in the Last Year: No       Vitals:    07/15/24 1505   Weight: 111 kg (244 lb 11.4 oz)   Height: 5' 6" (1.676 m)   PainSc: 0-No pain       Hemoglobin A1C   Date Value Ref Range Status   04/08/2024 5.7 (H) 4.0 - 5.6 % Final     Comment:     ADA Screening Guidelines:  5.7-6.4%  Consistent with prediabetes  >or=6.5%  Consistent with diabetes    High levels of fetal hemoglobin interfere with the HbA1C  assay. Heterozygous hemoglobin variants (HbS, HgC, etc)do  not significantly interfere with this assay.   However, presence of multiple variants may affect accuracy.     12/05/2023 5.4 4.0 - 5.6 % Final     Comment:     ADA Screening Guidelines:  5.7-6.4%  Consistent with prediabetes  >or=6.5%  Consistent with diabetes    High levels of fetal hemoglobin interfere with the HbA1C  assay. Heterozygous hemoglobin variants (HbS, HgC, etc)do  not significantly interfere with this assay.   However, presence of multiple variants may affect accuracy.     08/24/2023 5.6 4.0 - 5.6 % Final     Comment:     ADA Screening Guidelines:  5.7-6.4%  Consistent with prediabetes  >or=6.5%  Consistent with diabetes    High levels of fetal hemoglobin interfere with the HbA1C  assay. Heterozygous hemoglobin variants (HbS, HgC, etc)do  not significantly interfere with this assay.   However, presence of multiple variants may affect accuracy.         Review of Systems   Constitutional:  Negative for chills and fever.   Respiratory:  Negative for shortness of breath.    Cardiovascular:  " Negative for chest pain, palpitations, orthopnea, claudication and leg swelling.   Gastrointestinal:  Negative for diarrhea, nausea and vomiting.   Musculoskeletal:  Negative for joint pain.   Skin:  Negative for rash.   Neurological:  Negative for dizziness, tingling, sensory change, focal weakness and weakness.   Psychiatric/Behavioral: Negative.               Objective:      PHYSICAL EXAM: Apperance: Alert and orient in no distress,well developed, and with good attention to grooming and body habits  Patient presents ambulating in tennis shoes.   LOWER EXTREMITY EXAM:  VASCULAR: Dorsalis pedis pulses 2/4 bilateral and Posterior Tibial pulses 1/4 bilateral. Capillary fill time <4 seconds bilateral. No edema observed bilateral. Varicosities absent bilateral. Skin temperature of the lower extremities is warm to cool, proximal to distal. Hair growth dim bilateral.  DERMATOLOGICAL: No skin rashes, subcutaneous nodules, lesions, or ulcers observed bilateral. Nails 1,2,3,4,5 right and 2,3,4,5 left bilateral elongated, thickened, and discolored with subungual debris. Left hallux nail absent.  Webspaces 1,2,3,4 bilateral clean, dry and without evidence of break in skin integrity.   NEUROLOGICAL: Light touch, sharp-dull, proprioception all present and equal bilaterally.  Vibratory sensation diminished at right hallux and intact at left. Protective sensation intact at all 10 sites as tested with a [place] 5.07 monofilament.   MUSCULOSKELETAL: Muscle strength is 5/5 for foot inverters, everters, plantarflexors, and dorsiflexors. Muscle tone is normal.         Assessment:       ICD-10-CM ICD-9-CM   1. Encounter for comprehensive diabetic foot examination, type 2 diabetes mellitus  E11.9 250.00   2. Type II diabetes mellitus with neurological manifestations  E11.49 250.60   3. Dermatophytosis of nail  B35.1 110.1           Plan:   Encounter for comprehensive diabetic foot examination, type 2 diabetes mellitus    Type  II diabetes mellitus with neurological manifestations    Dermatophytosis of nail      I counseled the patient on his conditions, regarding findings of my examination, my impressions, and usual treatment plan.   This visit spent on counseling and coordination of care.  Appointment spent on education about the diabetic foot, neuropathy, and prevention of limb loss.  Shoe inspection. Diabetic Foot Education. Patient reminded of the importance of good nutrition and blood sugar control to help prevent podiatric complications of diabetes. Patient instructed on proper foot hygeine. We discussed wearing proper shoe gear, daily foot inspections, never walking without protective shoe gear, never putting sharp instruments to feet.    With patient's permission, nails as noted above bilateral were debrided/trimmed in length and thickness aggressively to their soft tissue attachment mechanically and with electric , removing all offending nail and debris. Patient relates relief following the procedure.  Discussed with patient treatments for neuropathy consisting of topical or oral medication.  Recommendations given for over-the-counter medicine such as Two [place] Go[place] and/or Capsaicin.  Patient  will continue to monitor the areas daily, inspect feet, wear protective shoe gear when ambulatory, moisturizer to maintain skin integrity. Patient reminded of the importance of good nutrition and blood sugar control to help prevent podiatric complications of diabetes.  Patient to return 6 months or sooner if needed.          Shelley Gallardo DPM  [facility]

## 2024-08-08 ENCOUNTER — LAB VISIT (OUTPATIENT)
Dept: LAB | Facility: HOSPITAL | Age: 84
End: 2024-08-08
Attending: INTERNAL MEDICINE
Payer: COMMERCIAL

## 2024-08-08 ENCOUNTER — OFFICE VISIT (OUTPATIENT)
Dept: FAMILY MEDICINE | Facility: CLINIC | Age: 84
End: 2024-08-08
Payer: COMMERCIAL

## 2024-08-08 VITALS
WEIGHT: 232.94 LBS | SYSTOLIC BLOOD PRESSURE: 138 MMHG | HEART RATE: 100 BPM | BODY MASS INDEX: 37.44 KG/M2 | DIASTOLIC BLOOD PRESSURE: 86 MMHG | TEMPERATURE: 98 F | OXYGEN SATURATION: 95 % | HEIGHT: 66 IN

## 2024-08-08 DIAGNOSIS — E11.9 TYPE 2 DIABETES MELLITUS WITHOUT COMPLICATION, WITHOUT LONG-TERM CURRENT USE OF INSULIN: ICD-10-CM

## 2024-08-08 DIAGNOSIS — E78.5 DYSLIPIDEMIA: ICD-10-CM

## 2024-08-08 DIAGNOSIS — Z86.73 HISTORY OF LACUNAR CEREBROVASCULAR ACCIDENT: ICD-10-CM

## 2024-08-08 DIAGNOSIS — K75.81 NASH (NONALCOHOLIC STEATOHEPATITIS): ICD-10-CM

## 2024-08-08 DIAGNOSIS — Z12.11 COLON CANCER SCREENING: ICD-10-CM

## 2024-08-08 DIAGNOSIS — I50.32 CHRONIC DIASTOLIC CONGESTIVE HEART FAILURE: ICD-10-CM

## 2024-08-08 DIAGNOSIS — I10 PRIMARY HYPERTENSION: ICD-10-CM

## 2024-08-08 DIAGNOSIS — J45.20 MILD INTERMITTENT ASTHMA WITHOUT COMPLICATION: Chronic | ICD-10-CM

## 2024-08-08 DIAGNOSIS — G47.33 OBSTRUCTIVE SLEEP APNEA: ICD-10-CM

## 2024-08-08 DIAGNOSIS — E85.9 AMYLOIDOSIS, UNSPECIFIED TYPE: Primary | ICD-10-CM

## 2024-08-08 LAB
ALBUMIN SERPL BCP-MCNC: 4.2 G/DL (ref 3.5–5.2)
ALP SERPL-CCNC: 61 U/L (ref 55–135)
ALT SERPL W/O P-5'-P-CCNC: 30 U/L (ref 10–44)
AST SERPL-CCNC: 27 U/L (ref 10–40)
BASOPHILS # BLD AUTO: 0.07 K/UL (ref 0–0.2)
BASOPHILS NFR BLD: 1.1 % (ref 0–1.9)
BILIRUB DIRECT SERPL-MCNC: 0.6 MG/DL (ref 0.1–0.3)
BILIRUB SERPL-MCNC: 1.9 MG/DL (ref 0.1–1)
CHOLEST SERPL-MCNC: 132 MG/DL (ref 120–199)
CHOLEST/HDLC SERPL: 2.8 {RATIO} (ref 2–5)
DIFFERENTIAL METHOD BLD: ABNORMAL
EOSINOPHIL # BLD AUTO: 0.3 K/UL (ref 0–0.5)
EOSINOPHIL NFR BLD: 5 % (ref 0–8)
ERYTHROCYTE [DISTWIDTH] IN BLOOD BY AUTOMATED COUNT: 15 % (ref 11.5–14.5)
ESTIMATED AVG GLUCOSE: 123 MG/DL (ref 68–131)
HBA1C MFR BLD: 5.9 % (ref 4–5.6)
HCT VFR BLD AUTO: 41.7 % (ref 40–54)
HDLC SERPL-MCNC: 47 MG/DL (ref 40–75)
HDLC SERPL: 35.6 % (ref 20–50)
HGB BLD-MCNC: 12.9 G/DL (ref 14–18)
IMM GRANULOCYTES # BLD AUTO: 0.02 K/UL (ref 0–0.04)
IMM GRANULOCYTES NFR BLD AUTO: 0.3 % (ref 0–0.5)
LDLC SERPL CALC-MCNC: 47.4 MG/DL (ref 63–159)
LYMPHOCYTES # BLD AUTO: 0.9 K/UL (ref 1–4.8)
LYMPHOCYTES NFR BLD: 14.6 % (ref 18–48)
MCH RBC QN AUTO: 30 PG (ref 27–31)
MCHC RBC AUTO-ENTMCNC: 30.9 G/DL (ref 32–36)
MCV RBC AUTO: 97 FL (ref 82–98)
MONOCYTES # BLD AUTO: 0.8 K/UL (ref 0.3–1)
MONOCYTES NFR BLD: 11.7 % (ref 4–15)
NEUTROPHILS # BLD AUTO: 4.3 K/UL (ref 1.8–7.7)
NEUTROPHILS NFR BLD: 67.3 % (ref 38–73)
NONHDLC SERPL-MCNC: 85 MG/DL
NRBC BLD-RTO: 0 /100 WBC
PLATELET # BLD AUTO: 158 K/UL (ref 150–450)
PMV BLD AUTO: 11.3 FL (ref 9.2–12.9)
PROT SERPL-MCNC: 7.2 G/DL (ref 6–8.4)
RBC # BLD AUTO: 4.3 M/UL (ref 4.6–6.2)
TRIGL SERPL-MCNC: 188 MG/DL (ref 30–150)
WBC # BLD AUTO: 6.39 K/UL (ref 3.9–12.7)

## 2024-08-08 PROCEDURE — 1159F MED LIST DOCD IN RCRD: CPT | Mod: CPTII,S$GLB,, | Performed by: INTERNAL MEDICINE

## 2024-08-08 PROCEDURE — 1125F AMNT PAIN NOTED PAIN PRSNT: CPT | Mod: CPTII,S$GLB,, | Performed by: INTERNAL MEDICINE

## 2024-08-08 PROCEDURE — 3075F SYST BP GE 130 - 139MM HG: CPT | Mod: CPTII,S$GLB,, | Performed by: INTERNAL MEDICINE

## 2024-08-08 PROCEDURE — 99999 PR PBB SHADOW E&M-EST. PATIENT-LVL V: CPT | Mod: PBBFAC,,, | Performed by: INTERNAL MEDICINE

## 2024-08-08 PROCEDURE — 36415 COLL VENOUS BLD VENIPUNCTURE: CPT | Mod: PO | Performed by: INTERNAL MEDICINE

## 2024-08-08 PROCEDURE — 80061 LIPID PANEL: CPT | Performed by: INTERNAL MEDICINE

## 2024-08-08 PROCEDURE — 85025 COMPLETE CBC W/AUTO DIFF WBC: CPT | Performed by: INTERNAL MEDICINE

## 2024-08-08 PROCEDURE — 83036 HEMOGLOBIN GLYCOSYLATED A1C: CPT | Performed by: INTERNAL MEDICINE

## 2024-08-08 PROCEDURE — 3288F FALL RISK ASSESSMENT DOCD: CPT | Mod: CPTII,S$GLB,, | Performed by: INTERNAL MEDICINE

## 2024-08-08 PROCEDURE — G2211 COMPLEX E/M VISIT ADD ON: HCPCS | Mod: S$GLB,,, | Performed by: INTERNAL MEDICINE

## 2024-08-08 PROCEDURE — 3079F DIAST BP 80-89 MM HG: CPT | Mod: CPTII,S$GLB,, | Performed by: INTERNAL MEDICINE

## 2024-08-08 PROCEDURE — 80076 HEPATIC FUNCTION PANEL: CPT | Performed by: INTERNAL MEDICINE

## 2024-08-08 PROCEDURE — 99214 OFFICE O/P EST MOD 30 MIN: CPT | Mod: S$GLB,,, | Performed by: INTERNAL MEDICINE

## 2024-08-08 PROCEDURE — 1101F PT FALLS ASSESS-DOCD LE1/YR: CPT | Mod: CPTII,S$GLB,, | Performed by: INTERNAL MEDICINE

## 2024-08-09 ENCOUNTER — TELEPHONE (OUTPATIENT)
Dept: FAMILY MEDICINE | Facility: CLINIC | Age: 84
End: 2024-08-09
Payer: COMMERCIAL

## 2024-08-09 DIAGNOSIS — D64.9 ANEMIA, UNSPECIFIED TYPE: Primary | ICD-10-CM

## 2024-08-13 ENCOUNTER — TELEPHONE (OUTPATIENT)
Dept: PULMONOLOGY | Facility: CLINIC | Age: 84
End: 2024-08-13
Payer: COMMERCIAL

## 2024-08-14 ENCOUNTER — CLINICAL SUPPORT (OUTPATIENT)
Dept: PULMONOLOGY | Facility: CLINIC | Age: 84
End: 2024-08-14
Payer: COMMERCIAL

## 2024-08-14 VITALS
WEIGHT: 243.63 LBS | OXYGEN SATURATION: 96 % | HEART RATE: 110 BPM | BODY MASS INDEX: 39.15 KG/M2 | RESPIRATION RATE: 16 BRPM | HEIGHT: 66 IN

## 2024-08-14 DIAGNOSIS — J45.20 MILD INTERMITTENT ASTHMA WITHOUT COMPLICATION: Primary | ICD-10-CM

## 2024-08-14 PROCEDURE — 99999 PR PBB SHADOW E&M-EST. PATIENT-LVL II: CPT | Mod: PBBFAC,,,

## 2024-08-14 NOTE — PATIENT INSTRUCTIONS
Understanding Asthma         Asthma is a long-term (chronic) lung condition. It involves the airways (bronchial tubes). It happens when a trigger causes your airways to swell and become narrow. The muscles around your airways start to tighten. When your airways start to narrow, air can't move in and out of your lungs very well. Mucus also builds up along the airways. This makes it even harder to move air in and out of your lungs.  Experts are not exactly sure what causes asthma. It may be caused by a mix of inherited and environmental factors. People with asthma may have no symptoms until they are exposed to an allergen or trigger.  Healthy lungs  Inside your lungs there are branching airways made of stretchy tissue. Each airway is wrapped with bands of muscle. The airways are smaller as they go deeper into the lungs. The smallest airways end in clusters of tiny balloon-like air sacs (alveoli). These clusters are surrounded by blood vessels. When you breathe in (inhale), air enters the lungs. It travels down through the airways until it reaches the air sacs. When you breathe out (exhale), air travels up through the airways and out of the lungs. The airways make mucus that traps particles you breathe in. Normally, the mucus is then swept out of the lungs by tiny hairs (cilia) that line the airways. The mucus is swallowed or coughed up during your day.    What the lungs do  The air you inhale contains oxygen. When oxygen reaches the air sacs, it passes into the blood vessels around the sacs. Your blood then sends oxygen to all of your cells. As you exhale, carbon dioxide is removed in a similar way from the blood in the air sacs, and from your body.    When you have asthma  People with asthma have very sensitive airways. This means the airways react to certain things called triggers. Triggers can include pollen, dust, or smoke. Triggers cause inflammation. This makes the airways swell and become narrow. This is a  long-lasting (chronic) problem. Your airways may not always be narrow enough so that you notice breathing problems.  Symptoms of chronic inflammation include:   Coughing (chronic)   A feeling of tightness in your chest   Feeling short of breath   Wheezing (a whistling noise, especially when breathing out)   Low energy or feeling tired   In some people, over time chronic mild inflammation can lead to lasting (permanent) scarring of airways and loss of lung function.    Asthma flare-ups  When sensitive airways are irritated by a trigger, the muscles around the airways tighten. The lining of the airways swells. Thick, sticky mucus increases and partly clogs the airways. All of this makes it harder to breathe.  Symptoms of flare-ups may include:  Coughing, especially at night. You may not be able to sleep because of coughing.   Getting tired or out of breath easily   Wheezing   Chest tightness   Faster breathing when at rest   Flare-ups can be life-threatening. In a severe flare-up, the muscle tightening, swelling, and mucus are worse. Its very hard to breathe. Your body can't get enough oxygen and can't remove carbon dioxide. Waste gas is trapped in the alveoli. Gas exchange cant occur. The body is not getting enough oxygen. Without oxygen, body tissues, especially brain tissue, begin to get damaged. If this goes on for long, it can lead to severe brain damage or death.  Call 911 (or have someone call for you) if you have any of these symptoms and they are not relieved right away by taking your quick-relief medicine as prescribed:  Trouble breathing   Feeling too short of breath to talk or walk   Lips or fingers turning blue   Feeling lightheaded or dizzy, as though you are about to pass out   Peak flow less than 50% of your personal best, if you use a peak flow meter     Managing your asthma  Asthma is a long-term condition. So its important to work with your healthcare provider to manage it. If you have asthma,  you can prevent flare-ups. Develop an asthma action plan with your healthcare provider. It can help control your asthma and manage your symptoms. An asthma action plan also tells you and your family or friends what to do if your asthma flares up or gets worse.  Take your medicine as prescribed. Also learn about your asthma triggers. Knowing what causes your asthma to flare up in the first place can help you prevent future breathing problems.  If you smoke, get help to quit.Using an Inhaler with a Spacer  To control asthma, you need to use your medicines the right way. Some medicines are inhaled using a device called a metered-dose inhaler (MDI). Metered-dose inhalers deliver medicine with a fine spray. You may be asked to use a spacer (holding tube) with your inhaler. The spacer helps make sure all the medicine you need goes into your lungs.   Steps for using an inhaler with a spacer  Step 1:  Remove the caps from the inhaler and spacer.  Shake the inhaler well and attach the spacer. If the inhaler is being used for the first time or has not been used for a while, prime it as directed by the product maker.  Step 2:  Breathe out normally.  Put the spacer between your teeth. Close your lips tightly around it.  Keep your chin up.  Step 3:  Spray 1 puff into the spacer by pressing down on the inhaler.  Then breathe in through your mouth as slowly and deeply as you can. This should take about 5-10 seconds. If you breathe too quickly, you may hear a whistling sound in certain spacers.  Step 4:  Take the spacer out of your mouth.  Hold your breath for a count of 10.  Then hold your lips together and slowly breathe out through your mouth.         Step-by-Step     If youre prescribed more than 1 puff of medicine at a time, wait at least 30 seconds between puffs. This number may be different for different medicines. Shake the inhaler again. Then repeat steps 2 to 4.   Chronic Lung Disease: Preventing Lung Infections  Chronic  lung diseases include chronic obstructive pulmonary disease (COPD), which includes chronic bronchitis and emphysema. Other chronic lung diseases include pulmonary fibrosis, sarcoidosis, and other conditions. When you have chronic lung diseases, it's very important to protect yourself from respiratory infections, like colds, the flu, and lung infections. Infections may cause your lung condition to worsen. Although you can't completely avoid them, there are things you can do to lessen the chance of infections.    Take precautions  Taking the following precautions can help you avoid illness:  Remember to keep your hands away from your nose and mouth. Germs on your hands get into your respiratory system this way.  Wash your hands often. When you wash them:  Use soap and warm water.  Rub your hands together well for at least 20 seconds.  Make sure to rinse them well.  Dry your hands using clean towels or air-dry them.  Use hand  containing alcohol, if you are unable to wash your hands. Use the  after touching doorknobs, handles, and supermarket carts, for example, since lots of people touch them. Then wash your hands as soon as you can.  To help prevent the flu, get a flu vaccination every year. This may be given at your healthcare provider's office, a drugstore, or pharmacy, or at work. Get your flu shot as soon as the vaccines are available in your area. This is usually around September each year.  To help prevent pneumococcal pneumonia, get pneumonia vaccinations. Talk with your healthcare provider about which pneumococcal vaccinations you need.  Try to stay away from people with respiratory infections, such as colds or the flu. Stay away from crowded places, like shopping centers or movie theatres during cold and flu season.  If you smoke, think about quitting. In addition to causing or worsening many lung conditions, the lung damage from smoking increases your risk of infections. Stay away from  others who smoke, too. This is also harmful and increases your chance of infections.  © 9919-5489 [employer]. [address] [place] [address], [place] [address]. All rights reserved. This information is not intended as a substitute for professional medical care. Always follow your healthcare professional's instructions.   Asthma Trigger Checklist  Allergens, irritants, and other things may trigger your asthma. Check the box next to each of your triggers. After each trigger is a list of ways to avoid it.   Dust mites. Dust mites live in mattresses, bedding, carpets, curtains, and indoor dust.  To kill dust mites, wash bedding in hot water (130°F) each week.  Cover mattress and pillows with special dust-mite-proof cases.  Don't use upholstered furniture like sofas or chairs in the bedroom.  Use allergy-proof filters for air conditioners and furnaces. Replace or clean them as instructed.  If you can, replace carpeting with wood or tile yvrose, especially in the bedroom.  Animals. Animals with fur or feathers shed dander (allergens).  It's best to choose a pet that doesn't have fur or feathers, such as a fish or a reptile.  If you have pets, keep them off your bed and out of your bedroom.  Wash your hands and clothes after handling pets.    Mold. Mold grows in damp places, such as bathrooms, basements, and closets.  Ask someone to clean damp areas in your home every week. Or try wearing a face mask while you clean.  Run an exhaust fan while bathing. Or leave a window open in the bathroom.  Repair water leaks in or around your home.  Have someone else cut grass or rake leaves, if possible.  Don't use vaporizers or humidifiers. They encourage mold growth.    Pollen. Pollen from trees, grasses, and weeds is a common allergen. (Flower pollens are generally not a problem).  Try to learn what types of pollen affect you most. Pollen levels vary depending on the plant, the season, and the time of day.  If possible, use  air conditioning instead of opening the windows in your home or car.  Have someone else do yard work, if possible.    Cockroaches. Roaches are found in many homes and produce allergens.  Keep your kitchen clean and dry. A leaky faucet or drain can attract roaches.  Remove garbage from your home daily.  Store food in tightly sealed containers. Wash dishes as soon as they are used.  Use bait stations or traps to control roaches. Avoid using chemical sprays.    Smoke. Smoke may be from cigarettes, cigars, pipes, incense or candles, barbecues or grills, and fireplaces.  Don't smoke. And don't let people smoke in your home or car.  When you travel, ask for nonsmoking rental cars and hotel rooms.  Avoid fireplaces and wood stoves. If you can't, sit away from them. Make sure the smoke is directed outside.  Don't burn incense or use candles.  Move away from smoky outdoor cooking grills.    Smog.  Smog is from car exhaust and other pollution.  Read or listen to local air-quality reports. These let you know when air quality is poor.  Stay indoors as much as you can on smoggy days. If possible, use air conditioning instead of opening the windows.  In your car, set air conditioning to recirculate air, so less pollution gets in.    Strong odors. These include air fresheners, deodorizers, and cleaning products; perfume, deodorant, and other beauty products; incense and candles; and insect sprays and other sprays.  Use scent-free products like deodorant or body lotion.  Avoid using cleaning products with bleach and ammonia. Make your own cleaning solution with white vinegar, baking soda, or mild dish soap.  Use exhaust fans while cooking. Or open a window, if possible.   Avoid perfumes, air fresheners, potpourri, and other scented products.          Other irritants. These include dust, aerosol sprays, and powders.  Wear a face mask while doing tasks like sanding, dusting, sweeping, and yard work. Open doors and windows if working  indoors.  Use pump spray bottles instead of aerosols.  Pour liquid  onto a rag or cloth instead of spraying them.    Weather. Weather conditions can trigger symptoms or make them worse.  Watch for very high or low temperatures, very humid conditions, or a lot of wind, as these conditions can make symptoms worse.  Limit outdoor activity during the type of weather that affects you.  Wear a scarf over your mouth and nose in cold weather.    Colds, flu, and sinus infections. Upper respiratory infections can trigger asthma.  Wash your hands often with soap and warm water or use a hand  containing alcohol.  Get a yearly flu shot. And ask if you should get a pneumonia vaccine.  Take care of your general health. Get plenty of sleep. And eat a variety of healthy foods.    Food additives. Food additives can trigger asthma flare-ups in some people.  Check food labels for sulfites or other similar ingredients. These are often found in foods such as wine, beer, and dried fruits.  Avoid foods that contain these additives.    Medicine. Aspirin, NSAIDS like ibuprofen and naproxen, and heart medicines like beta-blockers may be triggers.  Tell your health care provider if you think certain medicines trigger symptoms.   Be sure to read the labels on over-the-counter medicines. They may have ingredients that cause symptoms for you.   , Emotions. Laughing, crying, or feeling excited are triggers for some people.   To help you stay calm: Try breathing in slowly through your nose for a count of 2 seconds. Close your lips and breathe out for 4 seconds. Repeat.  Try to focus on a soothing image in your mind. This will help relax you and calm your breathing.  Remember to take your daily controller medicines. When you're upset or under stress, it's easy to forget.    Exercise. For some people, exercise can trigger symptoms. Don't let this keep you from being active.   If you have not been exercising regularly, start slow and  work up gradually.  Take all of your medicines as prescribed.  If you use quick-relief medicine, make sure you have it with you when you exercise.  Stop if you have any symptoms. Make sure you talk with your provider about these symptoms.  © 1220-9900 [employer]. [address] [place] [address], [place] [address]. All rights reserved. This information is not intended as a substitute for professional medical care. Always follow your healthcare professional's instructions.          ACTION PLAN    GREEN ZONE  My sputum is clear/white/usual color and easily cleared.  My breathing is no harder than usual.  I can do my usual activities.  I can think clearly.   Take your usual medicines, including oxygen, as you are told to do so by your health care provider.   YELLOW ZONE  My sputum has change (color, thickness, amount).  I am more short of breath than usual.  I cough or wheeze more.  I weigh more and my legs/feet swell.  I cannot do my usual activities without resting.   Call your health care provider. You will probably be told to begin taking an antibiotic and prednisone. Have your pharmacy phone number available.   RED ZONE  I cannot cough out my sputum.  I am much more short of breath than normal.  I need to sit up to breathe  I cannot do my usual activities.  I am unable to speak more than one or two words at a time.  I am confused.   Call your health care provider. You may be asked to come in to be seen, told to go to the emergency room, or told to call 9-1-1.

## 2024-08-14 NOTE — PROGRESS NOTES
Pulmonary Disease Management  [facility]  Follow up Visit  Chronic Care Management    Jovan Del Cid Jr.  was seen 8/14/2024  3:00 PM in the [facility] for evaluation, education, reinforcement of self management techniques and exacerbation action plan.    Duglas Silvestre    Past Medical History:   Diagnosis Date    Acute CVA (cerebrovascular accident) 09/08/2022    DANY (acute kidney injury) 09/14/2022    Asthma     Bronchitis chronic     Cancer     Chronic cough 11/15/2013    Chronic diastolic congestive heart failure 09/12/2023    Cough     Digestive disorder     Hyperlipidemia     Hypertension     Liver disease     KALYAN (obstructive sleep apnea) 09/08/2022    Stroke 09/01/2022    Type 2 diabetes mellitus, without long-term current use of insulin 09/07/2022    BS didn't check 10/05/2022       Patient's Medications   New Prescriptions    No medications on file   Previous Medications    ALBUTEROL (VENTOLIN HFA) 90 MCG/ACTUATION INHALER    Inhale 2 puffs into the lungs every 6 (six) hours as needed for Wheezing. Rescue    ASPIRIN (ECOTRIN) 81 MG EC TABLET    Take 1 tablet (81 mg total) by mouth once daily.    BLOOD SUGAR DIAGNOSTIC STRP    1 strip by Misc.(Non-Drug; Combo Route) route 2 (two) times daily with meals.    BLOOD SUGAR DIAGNOSTIC STRP    Test blood glucose 2 times a day    BLOOD-GLUCOSE METER KIT    Use as instructed    FLUTICASONE PROPIONATE (FLONASE) 50 MCG/ACTUATION NASAL SPRAY    SHAKE LIQUID AND USE 2 SPRAYS IN EACH NOSTRIL EVERY DAY    FLUTICASONE-SALMETEROL 230-21 MCG/DOSE (ADVAIR HFA) 230-21 MCG/ACTUATION HFAA INHALER    Inhale 2 puffs into the lungs every 12 (twelve) hours. Controller    FUROSEMIDE (LASIX) 20 MG TABLET    Take 1 tablet (20 mg total) by mouth every Mon, Tues, Wed, Thurs, Fri.    GLIPIZIDE (GLUCOTROL) 2.5 MG TR24    TAKE 1 TABLET(2.5 MG) BY MOUTH DAILY WITH BREAKFAST    IPRATROPIUM (ATROVENT) 21 MCG (0.03 %) NASAL SPRAY    2 sprays by Each  "Nostril route 2 (two) times daily.    LANCETS MISC    1 each by Misc.(Non-Drug; Combo Route) route 2 (two) times daily with meals.    LANCING DEVICE MISC    1 Device by Misc.(Non-Drug; Combo Route) route 2 (two) times daily with meals.    LOSARTAN (COZAAR) 50 MG TABLET    Take 1 tablet (50 mg total) by mouth once daily.    MONTELUKAST (SINGULAIR) 10 MG TABLET    Take 1 tablet (10 mg total) by mouth every evening.    MULTIVITAMIN (THERAGRAN) PER TABLET    Take 1 tablet by mouth once daily.    MV-MN-LUTEIN-VFYF-OOGHER- (MACULAR HEALTH FORMULA) 5-1-7.5 MG CAP    Take by mouth.    ONETOUCH DELICA PLUS LANCET 33 GAUGE [facility]    USE TO CHECK BLOOD GLUCOSE TWO TIMES A DAY WITH MEALS    PANTOPRAZOLE (PROTONIX) 40 MG TABLET    Take 1 tablet (40 mg total) by mouth daily as needed.    PEN NEEDLE, DIABETIC 31 GAUGE X 5/16" NDLE    1 each by Misc.(Non-Drug; Combo Route) route 2 (two) times daily with meals.    ROSUVASTATIN (CRESTOR) 40 MG TAB    TAKE 1 TABLET BY MOUTH ONCE DAILY    TRIAMCINOLONE ACETONIDE 0.1% (KENALOG) 0.1 % CREAM    Apply topically 2 (two) times daily.   Modified Medications    No medications on file   Discontinued Medications    No medications on file             Educational assessment:   [x]            Good  []            Fair  []            Poor    Readiness to learn:   [x]            Good  []            Fair  []            Poor    Vision Status:   [x]            Good  []            Fair  []            Poor    Reading Ability:  [x]            Good  []            Fair  []            Poor    Knowledge of condition:   [x]            Good  []            Fair  []            Poor    Language Barriers:   []            Good  []            Fair  []            Poor  [x]            None    Cognitive/ Physical Barriers:   []            Good  []            Fair  []            Poor  [x]            None    Learning best by:                       [x]            Seeing  []            Hearing  []            Reading        "                  [x]            Doing    Describe any barrier /Limitation or financial implications of care choices identified     []            Financial  []            Emotional  []            Education  []            Vision/Hearing  []            Physical  [x]            None  []                TOPIC /CONTENT FOR TODAY:    [x]            MDI with or without spacer  []            Dry powder inhaler  []            Acapella   []           Peak Flow meter  [x]            Asthma action plan  []            Nebulizer use  []            Oxygen use safety  []            Breathing and cough techniques  [x]            Energy conservation  [x]            Infection prevention  []           OTHER________________________        Learner:    [x]            Patient   []            Caregiver    Method:    [x]            Verbal explanation  [x]            Audio visual    [x]            Literature  [x]            Teach back      Evaluation:    [x]            Teach back  [x]            Demonstrate  [x]            Follow up phone call    []            2 weeks     [x]            4 weeks   [x]            PRN    Additional comments:   Patient was seen today to review respiratory medication purpose and proper technique for use of inhalers. Patient practiced proper technique using MDI with valved holding chamber (spacer) and DPI inhalers. Patient demonstrated understanding. Literature was given to patient.     Asthma trigger checklist was verbally reviewed and literature given to patient.     Infection prevention was discussed. Patient was reminded to get influenza vaccine. Hand hygiene and cleaning of respiratory equipment was also discussed. Patient verbalized understanding.      Asthma action plan was reviewed and literature was given to patient. Patient verbalized understanding.     Plan:  Monthly Pulmonary Disease Management Questionnaire  Follow-up PDM appointment scheduled for 2/17/25  Reinforce education  Meds: Advair HFA, albuterol  HFA   DME Needs: [facility]   Action Plan: Asthma   Immunization: Pneumococcal- current , Flu-current  , Covid 19- current   Next Provider Visit: 8/20/24  Next Spirometry/CPFT: 9/20/24  Approximate time spent with patient: 30 mins

## 2024-08-20 ENCOUNTER — OFFICE VISIT (OUTPATIENT)
Dept: PULMONOLOGY | Facility: CLINIC | Age: 84
End: 2024-08-20
Payer: COMMERCIAL

## 2024-08-20 VITALS
RESPIRATION RATE: 10 BRPM | HEART RATE: 69 BPM | DIASTOLIC BLOOD PRESSURE: 80 MMHG | SYSTOLIC BLOOD PRESSURE: 160 MMHG | HEIGHT: 66 IN | OXYGEN SATURATION: 97 % | WEIGHT: 242.94 LBS | BODY MASS INDEX: 39.04 KG/M2

## 2024-08-20 DIAGNOSIS — G47.33 OBSTRUCTIVE SLEEP APNEA: Primary | ICD-10-CM

## 2024-08-20 PROCEDURE — 3077F SYST BP >= 140 MM HG: CPT | Mod: CPTII,S$GLB,, | Performed by: HOSPITALIST

## 2024-08-20 PROCEDURE — 99212 OFFICE O/P EST SF 10 MIN: CPT | Mod: S$GLB,,, | Performed by: HOSPITALIST

## 2024-08-20 PROCEDURE — 1101F PT FALLS ASSESS-DOCD LE1/YR: CPT | Mod: CPTII,S$GLB,, | Performed by: HOSPITALIST

## 2024-08-20 PROCEDURE — 1159F MED LIST DOCD IN RCRD: CPT | Mod: CPTII,S$GLB,, | Performed by: HOSPITALIST

## 2024-08-20 PROCEDURE — 3079F DIAST BP 80-89 MM HG: CPT | Mod: CPTII,S$GLB,, | Performed by: HOSPITALIST

## 2024-08-20 PROCEDURE — 99999 PR PBB SHADOW E&M-EST. PATIENT-LVL V: CPT | Mod: PBBFAC,,, | Performed by: HOSPITALIST

## 2024-08-20 PROCEDURE — 3288F FALL RISK ASSESSMENT DOCD: CPT | Mod: CPTII,S$GLB,, | Performed by: HOSPITALIST

## 2024-08-20 PROCEDURE — 1160F RVW MEDS BY RX/DR IN RCRD: CPT | Mod: CPTII,S$GLB,, | Performed by: HOSPITALIST

## 2024-08-20 NOTE — ASSESSMENT & PLAN NOTE
- checking with Jennifer to see if pt met with her between last visit and today  - seeing if he can get a 10ft hose----not eligible till 9/11  - discussed with pt that he needs to increase use to 4 hrs

## 2024-08-20 NOTE — PROGRESS NOTES
Subjective:      Patient ID: Jovan Del Cid Jr. is a 83 y.o. male.    Chief Complaint: Sleep Apnea    Interval Hx 8/20/24:    Mr. Del Cid presents to Pulmonary clinic for follow up sleep apnea. He was last seen 7/2024- encouraged to exchange mask, needed to increase hourly use.     Has full face mask, hose may not be long enough. Having issues fitting oxygen tubing onto bipap. Pressure feels good, doesn't take long to go to sleep. Benefits from use when has a good night. Taking off when he goes to the restroom in the night and then not always putting back on.    HPI 7/9/24:     83 year old male with history of HTN, HLD, chronic diastolic HF, DM2, asthma, KALYAN who presents to Pulmonary clinic for follow up asthma. He was last seen 5/2024 by Dr. Buckner- pt reported not using bipap, but was encouraged to try to use.     Mr. Del Cid is accompanied by his spouse. He has been putting in a good effort with the BiPAP, but has been experiencing dry mouth with a residue around his lips when he wakes up to use the restroom after about 2-3 hours of use. This jahaira him from replacing the mask when he is back in bed. Denies issues with pressures feeling too high or waking him up. He does have humidity turned on, not running out of water during use.     Pertinent Work Up:  - Titration 1/2024- BiPAP 14/10, 12/8, or 17/13 recommended  - Split night 11/2022- AHI 29.4, CPAP 17, positional  - Nuclear stress 10/2023- Normal  - ECHO 8/2023- EF 55-70%, diastolic dysfunction, mod-severe MR, ePASP 22mmhg    Review of Systems   Respiratory:  Positive for snoring and somnolence.      Objective:     Physical Exam   Constitutional: He is oriented to person, place, and time. He appears well-developed and well-nourished. He is obese.   Pulmonary/Chest: Effort normal.   Neurological: He is alert and oriented to person, place, and time.   Skin: Skin is warm and dry.     Personal Diagnostic Review  AS Above      8/20/2024    11:16 AM  "8/14/2024     3:00 PM 8/8/2024    12:50 PM 7/15/2024     3:05 PM 7/9/2024    11:28 AM 6/18/2024     1:35 PM 5/24/2024    12:37 PM   Pulmonary Function Tests   SpO2 97 % 96 % 95 %  97 % 96 % 97 %   Height 5' 6" (1.676 m) 5' 6" (1.676 m) 5' 6" (1.676 m) 5' 6" (1.676 m) 5' 6" (1.676 m) 5' 6" (1.676 m) 5' 6" (1.676 m)   Weight 110.2 kg (242 lb 15.2 oz) 110.5 kg (243 lb 9.7 oz) 105.7 kg (232 lb 14.7 oz) 111 kg (244 lb 11.4 oz) 111 kg (244 lb 11.4 oz) 109.8 kg (242 lb 1 oz) 107.3 kg (236 lb 8.9 oz)   BMI (Calculated) 39.2 39.3 37.6 39.5 39.5 39.1 38.2        Assessment:     No diagnosis found.     Outpatient Encounter Medications as of 8/20/2024   Medication Sig Dispense Refill    blood sugar diagnostic Strp 1 strip by Misc.(Non-Drug; Combo Route) route 2 (two) times daily with meals. 200 strip 3    blood sugar diagnostic Strp Test blood glucose 2 times a day 200 strip 3    fluticasone propionate (FLONASE) 50 mcg/actuation nasal spray SHAKE LIQUID AND USE 2 SPRAYS IN EACH NOSTRIL EVERY DAY 48 g 3    fluticasone-salmeterol 230-21 mcg/dose (ADVAIR HFA) 230-21 mcg/actuation HFAA inhaler Inhale 2 puffs into the lungs every 12 (twelve) hours. Controller 12 g 11    furosemide (LASIX) 20 MG tablet Take 1 tablet (20 mg total) by mouth every Mon, Tues, Wed, Thurs, Fri. 60 tablet 3    glipiZIDE (GLUCOTROL) 2.5 MG TR24 TAKE 1 TABLET(2.5 MG) BY MOUTH DAILY WITH BREAKFAST 90 tablet 1    ipratropium (ATROVENT) 21 mcg (0.03 %) nasal spray 2 sprays by Each Nostril route 2 (two) times daily. 30 mL 0    lancets Misc 1 each by Misc.(Non-Drug; Combo Route) route 2 (two) times daily with meals. 100 each 11    losartan (COZAAR) 50 MG tablet Take 1 tablet (50 mg total) by mouth once daily. 90 tablet 3    montelukast (SINGULAIR) 10 mg tablet Take 1 tablet (10 mg total) by mouth every evening. 90 tablet 3    multivitamin (THERAGRAN) per tablet Take 1 tablet by mouth once daily.      [id number] ([employer] FORMULA) 5-1-7.5 " "mg Cap Take by mouth.      ONETOUCH DELICA PLUS LANCET 33 gauge [facility] [place] USE TO CHECK BLOOD GLUCOSE TWO TIMES A DAY WITH MEALS 200 each 3    pen needle, diabetic 31 gauge x 5/16" Ndle 1 each by Misc.(Non-Drug; Combo Route) route 2 (two) times daily with meals. 100 each 11    rosuvastatin (CRESTOR) 40 MG Tab TAKE 1 TABLET BY MOUTH ONCE DAILY 90 tablet 2    triamcinolone acetonide 0.1% (KENALOG) 0.1 % cream Apply topically 2 (two) times daily. 15 g 1    albuterol (VENTOLIN HFA) 90 mcg/actuation inhaler Inhale 2 puffs into the lungs every 6 (six) hours as needed for Wheezing. Rescue 8 g 3    aspirin (ECOTRIN) 81 MG EC tablet Take 1 tablet (81 mg total) by mouth once daily. 90 tablet 3    blood-glucose meter kit Use as instructed 1 each 0    lancing device Misc 1 Device by Misc.(Non-Drug; Combo Route) route 2 (two) times daily with meals. 1 each 0    pantoprazole (PROTONIX) 40 MG tablet Take 1 tablet (40 mg total) by mouth daily as needed. 30 tablet 3     Facility-Administered Encounter Medications as of 8/20/2024   Medication Dose Route Frequency Provider Last Rate Last Admin    EPINEPHrine (EPIPEN) 0.3 mg/0.3 mL pen injection 0.3 mg  0.3 mg Intramuscular PRN Amando Ulrich MD        ondansetron disintegrating tablet 4 mg  4 mg Oral Once PRN Amando Ulrich MD         No orders of the defined types were placed in this encounter.        Plan:     Problem List Items Addressed This Visit          Other    Obstructive sleep apnea - Primary     - checking with Jennifer to see if pt met with her between last visit and today  - seeing if he can get a 10ft hose----not eligible till 9/11  - discussed with pt that he needs to increase use to 4 hrs          Follow up as scheduled in September with Dr. Buckner.     "

## 2024-08-26 RX ORDER — ROSUVASTATIN CALCIUM 40 MG/1
40 TABLET, COATED ORAL
Qty: 90 TABLET | Refills: 3 | Status: SHIPPED | OUTPATIENT
Start: 2024-08-26

## 2024-08-26 NOTE — TELEPHONE ENCOUNTER
No care due was identified.  [facility] Embedded Care Due Messages. Reference number: [id number].   8/26/2024 8:28:07 AM CDT

## 2024-08-26 NOTE — TELEPHONE ENCOUNTER
Refill Decision Note   Jovan Del Cid  is requesting a refill authorization.  Brief Assessment and Rationale for Refill:  Approve     Medication Therapy Plan:         Comments:     Note composed:3:12 PM 08/26/2024             Appointments     Last Visit   8/8/2024 Paul Neal MD   Next Visit   12/10/2024 Paul Neal MD

## 2024-09-15 NOTE — TELEPHONE ENCOUNTER
No care due was identified.  [facility] Embedded Care Due Messages. Reference number: [id number].   9/15/2024 8:29:35 AM CDT

## 2024-09-16 ENCOUNTER — PATIENT OUTREACH (OUTPATIENT)
Dept: ADMINISTRATIVE | Facility: HOSPITAL | Age: 84
End: 2024-09-16
Payer: COMMERCIAL

## 2024-09-16 DIAGNOSIS — E11.9 TYPE 2 DIABETES MELLITUS WITHOUT COMPLICATION, WITHOUT LONG-TERM CURRENT USE OF INSULIN: Primary | ICD-10-CM

## 2024-09-16 RX ORDER — GLIPIZIDE 2.5 MG/1
TABLET, EXTENDED RELEASE ORAL
Qty: 90 TABLET | Refills: 1 | Status: SHIPPED | OUTPATIENT
Start: 2024-09-16

## 2024-09-16 NOTE — TELEPHONE ENCOUNTER
Refill Decision Note   Jovan Muhammadisi  is requesting a refill authorization.  Brief Assessment and Rationale for Refill:  Approve     Medication Therapy Plan:        Comments:     Note composed:12:54 PM 09/16/2024

## 2024-09-16 NOTE — PROGRESS NOTES
Working DM Lab Report.     Pt has lab appt scheduled 9/20/24.  Micro albumin urine ordered and linked to appt.      
Patrica Turner)

## 2024-09-20 ENCOUNTER — HOSPITAL ENCOUNTER (OUTPATIENT)
Dept: RADIOLOGY | Facility: HOSPITAL | Age: 84
Discharge: HOME | End: 2024-09-20
Attending: INTERNAL MEDICINE
Payer: COMMERCIAL

## 2024-09-20 ENCOUNTER — OFFICE VISIT (OUTPATIENT)
Dept: PULMONOLOGY | Facility: CLINIC | Age: 84
End: 2024-09-20
Payer: COMMERCIAL

## 2024-09-20 ENCOUNTER — CLINICAL SUPPORT (OUTPATIENT)
Dept: PULMONOLOGY | Facility: CLINIC | Age: 84
End: 2024-09-20
Payer: COMMERCIAL

## 2024-09-20 ENCOUNTER — TELEPHONE (OUTPATIENT)
Dept: PULMONOLOGY | Facility: CLINIC | Age: 84
End: 2024-09-20

## 2024-09-20 VITALS
HEART RATE: 80 BPM | DIASTOLIC BLOOD PRESSURE: 70 MMHG | BODY MASS INDEX: 38.97 KG/M2 | WEIGHT: 242.5 LBS | RESPIRATION RATE: 20 BRPM | SYSTOLIC BLOOD PRESSURE: 112 MMHG | HEIGHT: 66 IN | OXYGEN SATURATION: 96 %

## 2024-09-20 DIAGNOSIS — J45.901 EXACERBATION OF ASTHMA, UNSPECIFIED ASTHMA SEVERITY, UNSPECIFIED WHETHER PERSISTENT: ICD-10-CM

## 2024-09-20 DIAGNOSIS — J45.20 MILD INTERMITTENT ASTHMA WITHOUT COMPLICATION: Primary | Chronic | ICD-10-CM

## 2024-09-20 DIAGNOSIS — J45.909 NOCTURNAL HYPOXEMIA DUE TO ASTHMA: ICD-10-CM

## 2024-09-20 DIAGNOSIS — J45.909 ASTHMA, UNSPECIFIED ASTHMA SEVERITY, UNSPECIFIED WHETHER COMPLICATED, UNSPECIFIED WHETHER PERSISTENT: Primary | ICD-10-CM

## 2024-09-20 DIAGNOSIS — R09.02 NOCTURNAL HYPOXEMIA DUE TO ASTHMA: ICD-10-CM

## 2024-09-20 DIAGNOSIS — Z78.9 INTOLERANCE OF CONTINUOUS POSITIVE AIRWAY PRESSURE (CPAP) VENTILATION: ICD-10-CM

## 2024-09-20 DIAGNOSIS — E78.5 DYSLIPIDEMIA: ICD-10-CM

## 2024-09-20 DIAGNOSIS — J45.20 MILD INTERMITTENT ASTHMA WITHOUT COMPLICATION: Chronic | ICD-10-CM

## 2024-09-20 DIAGNOSIS — G47.33 OBSTRUCTIVE SLEEP APNEA: ICD-10-CM

## 2024-09-20 DIAGNOSIS — J45.909 ASTHMA, UNSPECIFIED ASTHMA SEVERITY, UNSPECIFIED WHETHER COMPLICATED, UNSPECIFIED WHETHER PERSISTENT: ICD-10-CM

## 2024-09-20 DIAGNOSIS — I10 PRIMARY HYPERTENSION: ICD-10-CM

## 2024-09-20 DIAGNOSIS — I50.32 CHRONIC DIASTOLIC CONGESTIVE HEART FAILURE: ICD-10-CM

## 2024-09-20 DIAGNOSIS — E11.9 TYPE 2 DIABETES MELLITUS WITHOUT COMPLICATION, WITHOUT LONG-TERM CURRENT USE OF INSULIN: ICD-10-CM

## 2024-09-20 LAB
BRPFT: NORMAL
FEF 25 75 LLN: 0.64
FEF 25 75 PRE REF: 43.5 %
FEF 25 75 REF: 2.35
FEV1 FVC LLN: 59
FEV1 FVC PRE REF: 90.9 %
FEV1 FVC REF: 75
FEV1 LLN: 1.66
FEV1 PRE REF: 70.3 %
FEV1 REF: 2.43
FVC LLN: 2.34
FVC PRE REF: 76.6 %
FVC REF: 3.28
PEF LLN: 3.8
PEF PRE REF: 85.9 %
PEF REF: 5.87
PRE FEF 25 75: 1.02 L/S
PRE FET 100: 6.94 SEC
PRE FEV1 FVC: 68.02 %
PRE FEV1: 1.71 L
PRE FVC: 2.51 L
PRE PEF: 5.04 L/S

## 2024-09-20 PROCEDURE — 70220 X-RAY EXAM OF SINUSES: CPT | Mod: 26,,, | Performed by: RADIOLOGY

## 2024-09-20 PROCEDURE — 99999 PR PBB SHADOW E&M-EST. PATIENT-LVL V: CPT | Mod: PBBFAC,,, | Performed by: INTERNAL MEDICINE

## 2024-09-20 PROCEDURE — 71046 X-RAY EXAM CHEST 2 VIEWS: CPT | Mod: 26,,, | Performed by: RADIOLOGY

## 2024-09-20 PROCEDURE — 70220 X-RAY EXAM OF SINUSES: CPT | Mod: TC

## 2024-09-20 PROCEDURE — 71046 X-RAY EXAM CHEST 2 VIEWS: CPT | Mod: TC

## 2024-09-20 RX ORDER — PREDNISONE 5 MG/1
TABLET ORAL
Qty: 74 TABLET | Refills: 0 | Status: SHIPPED | OUTPATIENT
Start: 2024-09-20 | End: 2024-10-25

## 2024-09-20 RX ORDER — ALBUTEROL SULFATE 90 UG/1
1-2 AEROSOL, METERED RESPIRATORY (INHALATION) EVERY 6 HOURS PRN
Qty: 18 G | Refills: 3 | Status: SHIPPED | OUTPATIENT
Start: 2024-09-20 | End: 2024-09-25

## 2024-09-20 RX ORDER — FLUTICASONE PROPIONATE AND SALMETEROL XINAFOATE 230; 21 UG/1; UG/1
2 AEROSOL, METERED RESPIRATORY (INHALATION) EVERY 12 HOURS
Qty: 12 G | Refills: 11 | Status: SHIPPED | OUTPATIENT
Start: 2024-09-20 | End: 2025-09-20

## 2024-09-20 NOTE — PROGRESS NOTES
Subjective:       Patient ID: Jovan Del Cid Jr. is a 84 y.o. male.  Patient Active Problem List   Diagnosis    Intolerance of continuous positive airway pressure (CPAP) ventilation    Liver cyst    HTN (hypertension)    Dyslipidemia    Benign prostatic hyperplasia    Lipoma    Amyloid disease    Chronic maxillary sinusitis    Tubulovillous adenoma of colon    MCMAHAN (nonalcoholic steatohepatitis)    Mild intermittent asthma without complication    BMI 37.0-37.9, adult    Personal history of colonic polyps    History of lacunar cerebrovascular accident    Type 2 diabetes mellitus, without long-term current use of insulin    Obstructive sleep apnea    Stenosis of left carotid artery    Venous insufficiency    Valvular heart disease    Chronic diastolic congestive heart failure    Unequal blood pressures in paired extremities    Cognitive complaints with normal exam    Nocturnal hypoxemia due to asthma     Immunization History   Administered Date(s) Administered    COVID-19, MRNA, LN-S, PF (Pfizer) (Purple Cap) 01/10/2021, 2021, 2021    COVID-19, mRNA, LNP-S, bivalent booster, PF (PFIZER OMICRON) 10/14/2022    Influenza (FLUAD) - Quadrivalent - Adjuvanted - PF *Preferred* (65+) 10/15/2020, 2022, 2023    Influenza - Trivalent - Fluzone High Dose - PF (65 years and older) 2020    Pneumococcal Conjugate - 20 Valent 10/24/2022    Zoster Recombinant 2023, 2023     Social History     Tobacco Use   Smoking Status Former    Current packs/day: 0.00    Types: Cigarettes, Pipe    Start date:     Quit date:     Years since quittin.7    Passive exposure: Past   Smokeless Tobacco Never   Tobacco Comments    smoked a pipe - quit smoking      Asthma Control Test  In the past 4  weeks, how much of the time did your asthma keep you from getting as much done at work, school or at home?: Most of the time  During the past 4 weeks, how often have you had shortness of breath?: 3  to 6 times a week  During the past 4 weeks, how often did your asthma symptoms (wheezing, couging, shortness of breath, chest tightness or pain) wake you up at night or earlier than usual in the morning?: Not at all  During the past 4 weeks, how often have you used your rescue inhaler or nebulizer medication (such as albuterol)?: Not at all  How would you rate your asthma control during the past 4 weeks?: Poorly controlled  If your score is 19 or less, your asthma may not be under control: 17         9/20/2024     1:04 PM   EPWORTH SLEEPINESS SCALE   Sitting and reading 2   Watching TV 2   Sitting, inactive in a public place (e.g. a theatre or a meeting) 1   As a passenger in a car for an hour without a break 2   Lying down to rest in the afternoon when circumstances permit 2   Sitting and talking to someone 0   Sitting quietly after a lunch without alcohol 2   In a car, while stopped for a few minutes in traffic 0   Total score 11      Asthma Control Test  In the past 4  weeks, how much of the time did your asthma keep you from getting as much done at work, school or at home?: Most of the time  During the past 4 weeks, how often have you had shortness of breath?: 3 to 6 times a week  During the past 4 weeks, how often did your asthma symptoms (wheezing, couging, shortness of breath, chest tightness or pain) wake you up at night or earlier than usual in the morning?: Not at all  During the past 4 weeks, how often have you used your rescue inhaler or nebulizer medication (such as albuterol)?: Not at all  How would you rate your asthma control during the past 4 weeks?: Poorly controlled  If your score is 19 or less, your asthma may not be under control: 17           Chief Complaint: Asthma        Jovan Del Cid Jr. is a 80 y.o.  male   Follow up, No cough, No congetion.No fever  ACT  20: hardly taking rescue albuterol HFA.   Here to reviewed : CXR and Drew  Normal Cora  Runs asphalt bussiness  Busy lifestyle.  No  interval asthma exacerbations since last visit, Actually not used rescue albuterol in 12 months.  Spirometry normal         09/21/2022  Last visit 10/15/2022  Referred by Dr Dagoberto Montes  Recent lacunar CVA  Known KALYAN Moderate KALYAN titrated to CPAP 12 cm  Snoring and apnea  Therapy options discussed  Was in Hospital x 2  Spouse present  Goals and therapy options discussed      12/13/2022  Last seen 09/21/2022  Sleep study reveiwed  Severe KALYAN  Forgetful  Drew: Normal  FeNo 43  ACT score20  Wife says gets SOB walking  Added LABA ICS  Follow 8 weeks    12/11/2023  Followup  Stroke Last year  C/o DTS  [place] 17  Last titration CPAP 17 was adequate  Failed to fully habituated  Study reviewed  No sleepy driving  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      Titration reveiwed  BIPAP 17/13 was adequate  After long discussion patient eventually agreed to use a BiPAP.    Not open to other options like mandibular device or inspire  Will order  Time below on titration: Oxygen saturations were . 88 % for 8.8 minutes    05/24/2024  Followup  Not using BIPAP here with spouse  Not attempted to use BIPAP  Wearing O2 in lieu  Discussed INSPIRE  Not willing  Riks untreated KALYAN discussed      09/20/2024  Followup  Accompanied by his wife   Four weeks ago   Cough congestion mucus disrupting sleep at night last seen was 08/20/2024  Cough and wheezing   Patient attests using his asthma medications well   Sleep also disrupted by nocturia   Bedtime 9.  Time 7:00 a.m.   Naps for 1 hour   FEV1 1.71 L (70.3% predicted)  Exhaled nitric oxide was 69 ppb    Asthma  He complains of cough. There is no shortness of  "breath, sputum production or wheezing. Pertinent negatives include no postnasal drip. His past medical history is significant for asthma.     Review of Systems   Constitutional: Negative.    HENT:  Negative for postnasal drip and congestion.    Eyes: Negative.    Respiratory:  Positive for cough. Negative for snoring, sputum production, shortness of breath, wheezing, asthma nighttime symptoms, pleurisy, dyspnea on extertion, use of rescue inhaler and somnolence.    Cardiovascular: Negative.    Genitourinary: Negative.    Endocrine: endocrine negative    Musculoskeletal: Negative.    Skin: Negative.    Gastrointestinal: Negative.    Neurological: Negative.    Psychiatric/Behavioral: Negative.     All other systems reviewed and are negative.           Goals of Care Treatment Preferences:  Code Status: Full Code       BP Readings from Last 3 Encounters:   09/20/24 112/70   08/20/24 (!) 160/80   08/08/24 138/86          Neck circumference  18.5" [?KALYAN risk if >43cm (17in) male or >41cm (15.5 in) female]             The following portions of the patient's history were reviewed and updated as appropriate: He  has a past medical history of Acute CVA (cerebrovascular accident) (09/08/2022), DANY (acute kidney injury) (09/14/2022), Asthma, Bronchitis chronic, Cancer, Chronic cough (11/15/2013), Chronic diastolic congestive heart failure (09/12/2023), Cough, Digestive disorder, Hyperlipidemia, Hypertension, Liver disease, KALYAN (obstructive sleep apnea) (09/08/2022), Stroke (09/01/2022), and Type 2 diabetes mellitus, without long-term current use of insulin (09/07/2022).  He does not have any pertinent problems on file.  He  has a past surgical history that includes Tonsillectomy; Colonoscopy (N/A, 6/21/2016); Colonoscopy (N/A, 11/1/2016); Colonoscopy (N/A, 2/3/2017); Colonoscopy (N/A, 11/13/2017); Fluoroscopy (N/A, 6/15/2018); and Colonoscopy (N/A, 2/19/2021).  His family history includes Alzheimer's disease in his mother; " Cancer in his father.  He  reports that he quit smoking about 64 years ago. His smoking use included cigarettes and pipe. He started smoking about 67 years ago. He has been exposed to tobacco smoke. He has never used smokeless tobacco. He reports that he does not currently use alcohol after a past usage of about 2.0 - 3.0 standard drinks of alcohol per week. He reports that he does not use drugs.  He has a current medication list which includes the following prescription(s): blood sugar diagnostic, blood sugar diagnostic, fluticasone propionate, furosemide, glipizide, ipratropium, lancets, losartan, montelukast, multivitamin, macular health formula, onetouch delica plus lancet, pen needle, diabetic, rosuvastatin, triamcinolone acetonide 0.1%, ventolin hfa, aspirin, blood-glucose meter, fluticasone-salmeterol 230-21 mcg/dose, lancing device, pantoprazole, and prednisone, and the following Facility-Administered Medications: epinephrine and ondansetron.  Current Outpatient Medications on File Prior to Visit   Medication Sig Dispense Refill    blood sugar diagnostic Strp 1 strip by Misc.(Non-Drug; Combo Route) route 2 (two) times daily with meals. 200 strip 3    blood sugar diagnostic Strp Test blood glucose 2 times a day 200 strip 3    fluticasone propionate (FLONASE) 50 mcg/actuation nasal spray SHAKE LIQUID AND USE 2 SPRAYS IN EACH NOSTRIL EVERY DAY 48 g 3    furosemide (LASIX) 20 MG tablet Take 1 tablet (20 mg total) by mouth every Mon, Tues, Wed, Thurs, Fri. 60 tablet 3    glipiZIDE (GLUCOTROL) 2.5 MG TR24 TAKE 1 TABLET(2.5 MG) BY MOUTH DAILY WITH BREAKFAST 90 tablet 1    ipratropium (ATROVENT) 21 mcg (0.03 %) nasal spray 2 sprays by Each Nostril route 2 (two) times daily. 30 mL 0    lancets Misc 1 each by Misc.(Non-Drug; Combo Route) route 2 (two) times daily with meals. 100 each 11    losartan (COZAAR) 50 MG tablet Take 1 tablet (50 mg total) by mouth once daily. 90 tablet 3    montelukast (SINGULAIR) 10 mg  "tablet Take 1 tablet (10 mg total) by mouth every evening. 90 tablet 3    multivitamin (THERAGRAN) per tablet Take 1 tablet by mouth once daily.      [id number] (MACULAR HEALTH FORMULA) 5-1-7.5 mg Cap Take by mouth.      ONETOUCH DELICA PLUS LANCET 33 gauge Misc USE TO CHECK BLOOD GLUCOSE TWO TIMES A DAY WITH MEALS 200 each 3    pen needle, diabetic 31 gauge x 5/16" Ndle 1 each by Misc.(Non-Drug; Combo Route) route 2 (two) times daily with meals. 100 each 11    rosuvastatin (CRESTOR) 40 MG Tab TAKE 1 TABLET BY MOUTH EVERY DAY 90 tablet 3    triamcinolone acetonide 0.1% (KENALOG) 0.1 % cream Apply topically 2 (two) times daily. 15 g 1    [DISCONTINUED] fluticasone-salmeterol 230-21 mcg/dose (ADVAIR HFA) 230-21 mcg/actuation HFAA inhaler Inhale 2 puffs into the lungs every 12 (twelve) hours. Controller 12 g 11    aspirin (ECOTRIN) 81 MG EC tablet Take 1 tablet (81 mg total) by mouth once daily. 90 tablet 3    blood-glucose meter kit Use as instructed 1 each 0    lancing device Misc 1 Device by Misc.(Non-Drug; Combo Route) route 2 (two) times daily with meals. 1 each 0    pantoprazole (PROTONIX) 40 MG tablet Take 1 tablet (40 mg total) by mouth daily as needed. 30 tablet 3    [DISCONTINUED] albuterol (VENTOLIN HFA) 90 mcg/actuation inhaler Inhale 2 puffs into the lungs every 6 (six) hours as needed for Wheezing. Rescue 8 g 3     Current Facility-Administered Medications on File Prior to Visit   Medication Dose Route Frequency Provider Last Rate Last Admin    EPINEPHrine (EPIPEN) 0.3 mg/0.3 mL pen injection 0.3 mg  0.3 mg Intramuscular PRN Amando Ulrich MD        ondansetron disintegrating tablet 4 mg  4 mg Oral Once PRN Amando Ulrich MD         He has No Known Allergies..    Objective:       Vitals:    09/20/24 1302   BP: 112/70   Pulse: 80   Resp: 20   SpO2: 96%   Weight: 110 kg (242 lb 8 oz)   Height: 5' 6" (1.676 m)                   Physical Exam  Vitals and nursing note reviewed. " "  Constitutional:       Appearance: He is well-developed.      Comments: Nek 19"   HENT:      Head: Normocephalic and atraumatic.        Right Ear: External ear normal.      Left Ear: External ear normal.      Nose: Nose normal.      Mouth/Throat:      Pharynx: Uvula midline. No oropharyngeal exudate.   Eyes:      General: Lids are normal. No scleral icterus.     Conjunctiva/sclera: Conjunctivae normal.      Pupils: Pupils are equal, round, and reactive to light.   Neck:      Trachea: Trachea and phonation normal.      Comments: Neck 18"  Cardiovascular:      Rate and Rhythm: Normal rate and regular rhythm.      Pulses: Normal pulses.      Heart sounds: Normal heart sounds, S1 normal and S2 normal. No murmur heard.  Pulmonary:      Effort: Pulmonary effort is normal. No respiratory distress.      Breath sounds: Examination of the left-lower field reveals decreased breath sounds. Decreased breath sounds present. No wheezing, rhonchi or rales.   Chest:      Chest wall: No tenderness.   Abdominal:      General: Bowel sounds are normal.      Palpations: Abdomen is soft.   Musculoskeletal:         General: Normal range of motion.      Cervical back: Normal range of motion and neck supple.   Lymphadenopathy:      Cervical: No cervical adenopathy.   Skin:     General: Skin is warm and dry.      Findings: No rash.      Nails: There is no clubbing.   Neurological:      Mental Status: He is alert and oriented to person, place, and time.      GCS: GCS eye subscore is 4. GCS verbal subscore is 5. GCS motor subscore is 6.      Cranial Nerves: No cranial nerve deficit.      Sensory: No sensory deficit.   Psychiatric:         Speech: Speech normal.       Personal Diagnostic Review  [x]  Chest x-ray:       X-Ray Chest PA And Lateral  Narrative: EXAM:   XR CHEST PA AND LATERAL    CLINICAL HISTORY: Chronic obstructive sleep apnea.    COMPARISON: 09/21/2022.    TECHNIQUE: PA and lateral views    FINDINGS:  The lungs are clear.   No " "pneumothorax.  The cardiac silhouette size and contour is within normal limits.  Aortic atherosclerosis.  Impression: Negative chest radiograph.    Finalized on: 2023 2:49 PM By:  FREDDIE Cavazos MD, MD  [facility]# [id number]      2023 14:[id number]    [facility]   .       Patient Name: Jovan Del Cid Date of Report: 24 Study Date: 2024   [facility] Clinic No.: [id number] : 1940 Time of Study: 09:28:37 PM - 04:58:57 AM   Sex: Male Age: 83 year Weight: 230.0 lbs Height: 5' 6" Type of Study: CPAP re-titration   REASONS FOR REFERRAL: Mr. Del Cid is a 83 year old male, referred for CPAP retitration polysomnography by Dr. Stefan Buckner, to determine if he needs a change in CPAP pressure. His split - night dx PSG was on 11-3-22with an A + H Index = 29.4 events / hr asleep, moderate to severe obstructive sleep apnea (KALYAN); the CPAP titration PSG revealed an optimal CPAP pressure of 17 cm, but he was unable to tolerate CPAP sufficiently. He is now ready to have another PAP titration and try to use CPAP again. Dr. Paul Neal was the originating referring physician, and is the patient's primary care physician.   STUDY PARAMETERS: This study involved analysis of the patient's sleep pattern while breathing was assisted. 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   1. CPAP was initiated at 09:28:37 PM. The BiPAP titration polysomnography revealed that BiPAP (Bi - Flex S, humidity 4) of 17 cm IPAP / 13 cm EPAP, was the most effective pressure completely tested, but was not optimal, as it reduced the rate of respiratory events 69 % to A + H Index = 9.2 events / hr asleep in 0.9 hrs sleep 0.28 hrs REM sleep). Lower pressures also could be successful (12 cm / 8cm A + H I = 9.4 in 1.8 hrs sleep, with 0.3 hrs REM sleep, and 14 cm / 10 cm A + H I = 7.7 in 1.8 hrs sleep, but with no REM sleep). Note that 12 / 8 cm AHI is only 0.2 events higher than 17 cm / 13 cm but that 12 cm / 10 cm was much more completely tested on titration time and slightly more in REM time, which could be a reason to test the latter first. (Or, maybe 13 cm / 9 cm should be tested first with AHI = 7.7 the lowest but also with no REM sleep). Also. both lower pressures had the lesser number of central apneas. The choice is almost arbitrary. Snoring was eliminated at all pressures. AutoCPAP 6 cm - 20 cm) could be tried if necessary.     The overall A + H Index was 14.4 / hr asleep, with only 1.7 respiratory event - related arousals / hr asleep (and no RERAs) for the titration trial. The mean SpO2 value was 91.8 % throughout the study, moderate, with a minimum oxygen saturation during sleep of 86.0 % and a maximum waking baseline SpO2 of 98.0 %).   A medium ResMed AirFit F30 full - face CPAP mask was used and was tolerated adequately, as sleep during CPAP was slightly reduced . Please see PAP trial outcomes table, below.   2. 17 cm IPAP / 13 cm EPAP (C - Flex S, humidity 4) is  "recommended, but only after successful habituation to PAP at home (gradually increased CPAP pressures are used for increasing amounts of time, initially while awake and occupied, and then while asleep). An adaptive servo- ventilation (ASV) titration polysomnography also could considered if none of the three BiPAP pressures noted above (17 cm / 13 cm, 14 cm / 10 cm, and 12 cm / 8 cm) was sufficiently effective when tested for an adequate time at home.   [facility] [place] CPAP Re -EVALUAT ION REPORT Patient Name: Jovan Del Cid Subject Code: [id number] Study Date: 1/18/2024   Page 2     BiPAP Treatment Titration Outcomes Table   Device  PAP   Level  Time (min)  Wake (min)  NREM   (min)  REM   (min)  Sleep Eff%  OA#  CA#  MA#  Hyp#  AHI  RDI  Min OSat  LM   Index  AR   Index    BiLevel  10/6/0  72.5  16.5  56.0  0.0  77.2%  2  4  -  8  15.0  15.0  87.0  19.3  8.6    BiLevel  12/8/0  139.0  31.0  89.0  19.0  77.7%  2  3  -  12  9.4  9.4  86.0  60.6  11.1    BiLevel  14/10/0  88.0  41.0  47.0  0.0  53.4%  -  3  1  2  7.7  7.7  86.0  151.9  24.3    BiLevel  13/9/0  42.5  2.0  40.5  0.0  95.3%  8  12  1  6  40.0  40.0  88.0  69.6  37.0    BiLevel  15/11/0  36.0  0.0  3.0  33.0  100.0%  1  1  -  9  18.3  18.3  88.0  10.0  21.7    BiLevel  17/13/0  72.5  1.0  55.0  16.5  98.6%  -  8  -  3  9.2  9.2  89.0  0.8  21.0                  9/20/2024     1:02 PM 8/20/2024    11:16 AM 8/14/2024     3:00 PM 8/8/2024    12:50 PM 7/15/2024     3:05 PM 7/9/2024    11:28 AM 6/18/2024     1:35 PM   Pulmonary Function Tests   SpO2 96 % 97 % 96 % 95 %  97 % 96 %   Height 5' 6" (1.676 m) 5' 6" (1.676 m) 5' 6" (1.676 m) 5' 6" (1.676 m) 5' 6" (1.676 m) 5' 6" (1.676 m) 5' 6" (1.676 m)   Weight 110 kg (242 lb 8 oz) 110.2 kg (242 lb 15.2 oz) 110.5 kg (243 lb 9.7 oz) 105.7 kg (232 lb 14.7 oz) 111 kg (244 lb 11.4 oz) 111 kg (244 lb 11.4 oz) 109.8 kg (242 lb 1 oz)   BMI (Calculated) 39.2 39.2 39.3 37.6 39.5 39.5 " "39.1       A medium ResMed AirFit  F30  full - face    Patient Name: Jovan Del Cid                                Date of Report: 24                                  Study Date:  2024  [facility] No.: [id number]                                      : 1940                                              Time of Study:  09:28:37 PM - 04:58:57 AM   Sex:  Male   Age:  83 year   Weight:  230.0 lbs          Height:  5' 6"                                                    Type of Study:  CPAP re-titration     REASONS FOR REFERRAL: Mr. Del Cid is a 83 year old male, referred for CPAP retitration polysomnography  by Dr. Stefan Buckner, to determine if he needs a change in CPAP pressure. His split - night dx PSG was on 11-3-22with an A + H Index = 29.4 events / hr asleep, moderate to severe obstructive sleep apnea (KALYAN); the CPAP titration PSG revealed an optimal CPAP pressure of 17 cm, but he was unable to tolerate CPAP sufficiently.  He is now ready to have another PAP titration and try to use CPAP again. Dr. Paul Neal was the originating referring physician, and is the patient's primary care physician.     STUDY PARAMETERS: This study involved analysis of the patient's sleep pattern while breathing was assisted. 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  CPAP was initiated at 09:28:37 PM.  The BiPAP titration polysomnography revealed that BiPAP (Bi - Flex S, humidity 4) of 17 cm IPAP / 13 cm EPAP, was the most effective pressure completely tested, but was not optimal, as it reduced the rate of respiratory events 69 % to A + H Index = 9.2 events / hr asleep in 0.9 hrs sleep 0.28 hrs REM sleep).  Lower pressures also could be successful (12 cm / 8cm A + H I = 9.4  in 1.8 hrs sleep, with 0.3 hrs REM sleep, and 14 cm / 10 cm A + H I = 7.7  in 1.8 hrs sleep, but with no REM sleep).  Note that 12 / 8 cm AHI is only 0.2 events higher than 17 cm / 13 cm but  that 12 cm / 10 cm was much more completely tested on titration time and slightly more in REM time, which could be a reason to test the latter first. (Or, maybe 13 cm / 9 cm should be tested first with AHI = 7.7 the lowest but also with no REM sleep).  Also. both lower pressures had the lesser number of central apneas.  The choice is almost arbitrary.  Snoring was eliminated at all pressures. AutoCPAP 6 cm - 20 cm) could be tried if necessary.          The overall A + H Index was 14.4 / hr asleep, with only 1.7 respiratory event - related arousals / hr asleep (and no RERAs) for the titration trial.  The mean SpO2 value was 91.8 % throughout the study, moderate, with a minimum oxygen saturation during sleep of 86.0 % and a maximum waking baseline SpO2 of 98.0 %).   A medium ResMed AirFit  F30  full - face CPAP mask was used and was tolerated adequately, as sleep during CPAP was slightly reduced .  Please see PAP trial outcomes table, below.     17 cm IPAP / 13 cm EPAP (C - Flex S, humidity 4) is recommended, but  only  after   successful habituation to PAP at home (gradually increased CPAP pressures are used for increasing amounts of time, initially while awake and occupied, and then while asleep).  An adaptive servo- ventilation (ASV) titration polysomnography also could considered if none of the three BiPAP pressures noted above (17 cm / 13 cm, 14 cm / 10 cm, and 12 cm / 8 cm) was sufficiently effective when tested for an adequate time at home.                                                                             BiPAP Treatment Titration Outcomes Table     Device PAP  Level Time (min) Wake (min) NREM  (min) REM  (min) Sleep Eff% OA# CA# MA# Hyp# AHI RDI Min OSat LM  Index AR  Index   BiLevel 10/6/0 72.5 16.5 56.0 0.0 77.2% 2 4 - 8 15.0 15.0 87.0 19.3 8.6   BiLevel 12/8/0 139.0 31.0 89.0 19.0 77.7% 2 3 - 12 9.4 9.4 86.0 60.6 11.1   BiLevel 14/10/0 88.0 41.0 47.0 0.0 53.4% - 3 1 2 7.7 7.7 86.0 151.9 24.3   BiLevel 13/9/0 42.5 2.0 40.5 0.0 95.3% 8 12 1 6 40.0 40.0 88.0 69.6 37.0   BiLevel 15/11/0 36.0 0.0 3.0 33.0 100.0% 1 1 - 9 18.3 18.3 88.0 10.0 21.7   BiLevel 17/13/0 72.5 1.0 55.0 16.5 98.6% - 8 - 3 9.2 9.2 89.0 0.8 21.0      A device to discourage sleep in the supine position is recommended, to further reduce respiratory events and snoring (respiratory events had a significant supine positional tendency during CPAP).   Weight loss to the normal range is recommended as it can decrease respiratory events and snoring in overweight patients.  The following changes in sleep hygiene / sleep - related behavior are recommended after medical treatments are successful  Regular bedtimes and wake times, including weekends: Total sleep time / night should not be more than one hour more           than usual, and bedtime or wake time should not be more than one hour earlier or later than usual.    Do not attempt to make up lost sleep by extending sleep periods.    Avoid naps; none longer than 20 min or later than mid - afternoon.  Avoid meals  or large snacks within 3 hours of bedtime.     SECONDARY TREATMENT RECOMMENDATIONS  Treat, or refer to SDC if problems are not satisfactorily resolved by the above.  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  Consider treatment of PLM disorder if PLMS symptoms are sufficiently bothersome to the patient.  Note that the benzodiazepine medications sometimes used to treat PLM disorder (e.g., clonazepam / Klonopin) may exacerbate some sleep - related respiratory disorders, and that dopaminergic medications such as Mirapex and Requip can be used in such instances.    Consider a referral for a dental examination and possible dental splint for sleep bruxism.     Also consider behavioral and cognitive / behavioral treatments for stress; sleep bruxism might be expected to improve.     See below for a complete interpretation of data from the polysomnography and Sleep Disorders Inventory.     Thank you for referring this patient to the [facility].       Jovan Del Cid  2024 - 2024  Patient ID: [id number]  : 1940  Age: 83 years  [facility]  [address] [facility], [address]  Compliance Report  Compliance  Payor Standard  Usage 2024 - 2024  Usage days 27/30 days (90%)  >= 4 hours 2 days (7%)  < 4 hours 25 days (83%)  Usage hours 53 hours 4 minutes  Average usage (total days) 1 hours 46 minutes  Average usage (days used) 1 hours 58 minutes  Median usage (days used) 1 hours 43 minutes  Total used hours (value since last reset - 2024) 144 hours  [employer] [facility]  Serial number [id number]  Mode Spont  IPAP 17 cmH2O  EPAP 13 cmH2O  Easy-Breathe On  Therapy  Leaks - L/min Median: 18.0 95th percentile: 67.6 Maximum: 100.0  Events per hour AI: 13.0  HI: 0.4 AHI: 13.4  Apnea Index Central: 4.7 Obstructive: 1.7 Unknown: 6.6  Usage - hours  Printed    2024 - 2024  Patient ID: [id number]  : 1940  Age: 84 years  [facility]  [address] [facility], [address]  Compliance Report  Compliance  Payor Standard  Usage 2024 - 2024  Usage days 25/30 days (83%)  >= 4 hours 1 days (3%)  < 4 hours 24 days (80%)  Usage hours 50 hours 39 minutes  Average usage (total days) 1 hours 41 minutes  Average usage (days used) 2 hours 2 minutes  Median usage (days used) 1 hours 47 minutes  Total used hours (value since last reset - 2024) 202 hours  [employer]  Serial number [id number]  Mode Spont  IPAP 17 cmH2O  EPAP 13 cmH2O  Easy-Breathe On  Therapy  Leaks - L/min Median: 13.4 95th percentile: 60.4 Maximum: 118.9  Events per hour AI: 9.6 HI: 0.4 AHI: 10.0  Apnea Index Central: 4.2 Obstructive: 1.2 Unknown: 4.1  Usage - hours  Printed on    FeNo 69    Spirometry  FEV1: 1.71L( 70.3%)  FVC 2.51L( 76.6%)  FEV1/FVC 68  Assessment:       Problem List Items Addressed This Visit       Mild intermittent asthma without complication - Primary (Chronic)    Relevant Medications    albuterol (VENTOLIN HFA) 90 mcg/actuation inhaler    fluticasone-salmeterol 230-21 mcg/dose (ADVAIR HFA) 230-21 mcg/actuation HFAA inhaler    predniSONE (DELTASONE) 5 MG tablet    Dyslipidemia    Type 2 diabetes mellitus, without long-term current use of insulin    Chronic diastolic congestive heart failure    BMI 37.0-37.9, adult    Intolerance of continuous positive airway pressure (CPAP) ventilation    Nocturnal hypoxemia due to asthma    HTN (hypertension)    Obstructive sleep apnea     Bed time : 9 pm  Wake 7 am  Naps  1 hr        2024     1:04 PM   EPWORTH SLEEPINESS SCALE   Sitting and reading 2   Watching TV 2   Sitting, inactive in a public place (e.g. a theatre or a meeting) 1   As a passenger in a car for an hour without a break 2   Lying  down to rest in the afternoon when circumstances permit 2   Sitting and talking to someone 0   Sitting quietly after a lunch without alcohol 2   In a car, while stopped for a few minutes in traffic 0   Total score 11        Data 2024 to 2024  Usage > 4 hrs: 3%  BIPAP     AHI 10           Other Visit Diagnoses       Exacerbation of asthma, unspecified asthma severity, unspecified whether persistent        Relevant Medications    predniSONE (DELTASONE) 5 MG tablet    Other Relevant Orders    Fraction of  Nitric Oxide    IgE            Plan:       Would rather do BIPAP than get INSPIRE and is determined to give it a good trial  Refilled Advair: inhaler technique reveiwed and spacer given  Steriod taper     Options of MAD or INSPIRE discussed          Follow up in about 8 weeks (around 11/15/2024), or cxr, FeNo, adherence with inhaler.    This note was prepared using voice recognition system and is likely to have sound alike errors that may have been overlooked even after proof reading.  Please call me with any questions    Discussed diagnosis, its evaluation, treatment and usual course. All questions answered.    Thank you for the courtesy of participating in the care of this patient    Stefan Buckner MD    Orders Placed This Encounter   Procedures    IgE     Standing Status:   Future     Number of Occurrences:   1     Standing Expiration Date:   2025    Fraction of  Nitric Oxide     Standing Status:   Future     Standing Expiration Date:   2025     Order Specific Question:   Release to patient     Answer:   Immediate

## 2024-09-20 NOTE — PROCEDURES
Clinical Guide to Interpretation or FeNO Levels :    FeNO  (ppb) LOW INTERMEDIATE HIGH   ADULT VALUES < 25 25-50          > 50   Th2-driven Inflammation Unlikely Likely Significant     Patients FeNO level at this visit : __69__ (ppb)    Interpretation of FeNO measurement in adults:  [] FENO is less than 25 ppb implies non eosinophilic airway inflammation or the absence of airway inflammation.    Comment: Low FENO (<25 ppb) in adult asthmatics with persistent symptoms suggests other etiologies for these symptoms and a lower likelihood of benefit from adding or increasing inhaled glucocorticoids.    [] FENO between 25 and 50 ppb in adults should be interpreted cautiously with reference to the clinical situation (eg, symptomatic, on or off therapy, current smoking).    [x] FENO greater than 50 ppb in adults  suggests eosinophilic airway inflammation   Comment: High FENO (>50 ppb) in adult asthmatics even with atypical symptoms suggests glucocorticoid responsiveness. High FENO (>50 ppb) can help identify poor adherence or uncontrolled inflammation in asthma patients with otherwise seemingly controlled asthma.    Discussion:  A FENO less than 25 ppb in adults and less than 20 ppb in children younger than 12 years of age implies noneosinophilic airway inflammation or the absence of airway inflammation.  A FENO greater than 50 ppb in adults or greater than 35 ppb in children suggests eosinophilic airway inflammation.   Values of FENO between 25 and 50 ppb in adults (20 to 35 ppb in children) should be interpreted cautiously with reference to the clinical situation (eg, symptomatic, on or off therapy, current smoking).  A rising FENO with a greater than 20 percent change and more than 25 ppb (20 ppb in children) from a previously stable level suggests increasing eosinophilic airway inflammation, but there are wide inter-individual differences.  A decrease in FENO greater than 20 percent for values over 50 ppb or more than  10 ppb for values less than 50 ppb may be clinically important.  ?FENO in other respiratory diseases - Several other diseases are associated with altered levels of exhaled NO: low levels of FENO have been noted in cystic fibrosis, current smoking, pulmonary hypertension, hypothermia, primary ciliary dyskinesia, and bronchopulmonary dysplasia. Elevated FENO has been noted in atopy, nonasthmatic eosinophilic bronchitis, COPD exacerbations, noncystic fibrosis bronchiectasis, and viral upper respiratory infections.    REFERENCE:  ATS Board of Directors, December 2004, and by the ERS Executive Committee, June 2004. ATS/ERS Recommendations for Standardized Procedures for the Online and Offline Measurement of Exhaled Lower Respiratory Nitric Oxide and Nasal Nitric Oxide. Guideline 2005

## 2024-09-20 NOTE — ASSESSMENT & PLAN NOTE
Bed time : 9 pm  Wake 7 am  Naps  1 hr        9/20/2024     1:04 PM   EPWORTH SLEEPINESS SCALE   Sitting and reading 2   Watching TV 2   Sitting, inactive in a public place (e.g. a theatre or a meeting) 1   As a passenger in a car for an hour without a break 2   Lying down to rest in the afternoon when circumstances permit 2   Sitting and talking to someone 0   Sitting quietly after a lunch without alcohol 2   In a car, while stopped for a few minutes in traffic 0   Total score 11        Data 08/21/2024 to 09/19/2024  Usage > 4 hrs: 3%  BIPAP 17/13    AHI 10

## 2024-09-20 NOTE — TELEPHONE ENCOUNTER
Called patient to schedule follow up PDM visit to discuss maintenance therapy and adherence per Dr. Buckner.     No answer. Left message.

## 2024-09-21 ENCOUNTER — PATIENT MESSAGE (OUTPATIENT)
Dept: FAMILY MEDICINE | Facility: CLINIC | Age: 84
End: 2024-09-21
Payer: COMMERCIAL

## 2024-09-25 ENCOUNTER — TELEPHONE (OUTPATIENT)
Dept: PULMONOLOGY | Facility: CLINIC | Age: 84
End: 2024-09-25
Payer: COMMERCIAL

## 2024-09-26 ENCOUNTER — CLINICAL SUPPORT (OUTPATIENT)
Dept: PULMONOLOGY | Facility: CLINIC | Age: 84
End: 2024-09-26
Payer: COMMERCIAL

## 2024-09-26 VITALS — RESPIRATION RATE: 16 BRPM | HEART RATE: 113 BPM | OXYGEN SATURATION: 95 %

## 2024-09-26 DIAGNOSIS — J45.909 ASTHMA, UNSPECIFIED ASTHMA SEVERITY, UNSPECIFIED WHETHER COMPLICATED, UNSPECIFIED WHETHER PERSISTENT: Primary | ICD-10-CM

## 2024-09-26 PROCEDURE — 99999 PR PBB SHADOW E&M-EST. PATIENT-LVL II: CPT | Mod: PBBFAC,,,

## 2024-09-26 NOTE — PROGRESS NOTES
Pulmonary Disease Management  [facility]  Follow up Visit  Chronic Care Management    Jovan Del Cid Jr.  was seen 9/26/2024  2:00 PM in the Pulmonary Disease Management Clinic for evaluation, education, reinforcement of self management techniques and exacerbation action plan.    Duglas Silvestre    Past Medical History:   Diagnosis Date    Acute CVA (cerebrovascular accident) 09/08/2022    DANY (acute kidney injury) 09/14/2022    Asthma     Bronchitis chronic     Cancer     Chronic cough 11/15/2013    Chronic diastolic congestive heart failure 09/12/2023    Cough     Digestive disorder     Hyperlipidemia     Hypertension     Liver disease     KALYAN (obstructive sleep apnea) 09/08/2022    Stroke 09/01/2022    Type 2 diabetes mellitus, without long-term current use of insulin 09/07/2022    BS didn't check 10/05/2022       Patient's Medications   New Prescriptions    No medications on file   Previous Medications    ALBUTEROL (VENTOLIN HFA) 90 MCG/ACTUATION INHALER    Inhale 1-2 puffs into the lungs every 6 (six) hours as needed for Wheezing. Rescue    ASPIRIN (ECOTRIN) 81 MG EC TABLET    Take 1 tablet (81 mg total) by mouth once daily.    BLOOD SUGAR DIAGNOSTIC STRP    1 strip by Misc.(Non-Drug; Combo Route) route 2 (two) times daily with meals.    BLOOD SUGAR DIAGNOSTIC STRP    Test blood glucose 2 times a day    BLOOD-GLUCOSE METER KIT    Use as instructed    FLUTICASONE PROPIONATE (FLONASE) 50 MCG/ACTUATION NASAL SPRAY    SHAKE LIQUID AND USE 2 SPRAYS IN EACH NOSTRIL EVERY DAY    FLUTICASONE-SALMETEROL 230-21 MCG/DOSE (ADVAIR HFA) 230-21 MCG/ACTUATION HFAA INHALER    Inhale 2 puffs into the lungs every 12 (twelve) hours. Controller    FUROSEMIDE (LASIX) 20 MG TABLET    Take 1 tablet (20 mg total) by mouth every Mon, Tues, Wed, Thurs, Fri.    GLIPIZIDE (GLUCOTROL) 2.5 MG TR24    TAKE 1 TABLET(2.5 MG) BY MOUTH DAILY WITH BREAKFAST    IPRATROPIUM (ATROVENT) 21 MCG (0.03 %) NASAL SPRAY    2 sprays by Each  "Nostril route 2 (two) times daily.    LANCETS MISC    1 each by Misc.(Non-Drug; Combo Route) route 2 (two) times daily with meals.    LANCING DEVICE MISC    1 Device by Misc.(Non-Drug; Combo Route) route 2 (two) times daily with meals.    LOSARTAN (COZAAR) 50 MG TABLET    Take 1 tablet (50 mg total) by mouth once daily.    MONTELUKAST (SINGULAIR) 10 MG TABLET    Take 1 tablet (10 mg total) by mouth every evening.    MULTIVITAMIN (THERAGRAN) PER TABLET    Take 1 tablet by mouth once daily.    MV-MN-[id number]- (MACULAR HEALTH FORMULA) 5-1-7.5 MG CAP    Take by mouth.    ONETOUCH DELICA PLUS LANCET 33 GAUGE [facility] [place] [place]    USE TO CHECK BLOOD GLUCOSE TWO TIMES A DAY WITH MEALS    PANTOPRAZOLE (PROTONIX) 40 MG TABLET    Take 1 tablet (40 mg total) by mouth daily as needed.    PEN NEEDLE, DIABETIC 31 GAUGE X 5/16" NDLE    1 each by Misc.(Non-Drug; Combo Route) route 2 (two) times daily with meals.    PREDNISONE (DELTASONE) 5 MG TABLET    Take 4 tablets (20 mg total) by mouth once daily for 7 days, THEN 3 tablets (15 mg total) once daily for 7 days, THEN 2 tablets (10 mg total) once daily for 7 days, THEN 1 tablet (5 mg total) once daily for 7 days, THEN 0.5 tablets (2.5 mg total) once daily for 7 days.    ROSUVASTATIN (CRESTOR) 40 MG TAB    TAKE 1 TABLET BY MOUTH EVERY DAY    TRIAMCINOLONE ACETONIDE 0.1% (KENALOG) 0.1 % CREAM    Apply topically 2 (two) times daily.   Modified Medications    No medications on file   Discontinued Medications    No medications on file             Educational assessment:   [x]            Good  []            Fair  []            Poor    Readiness to learn:   [x]            Good  []            Fair  []            Poor    Vision Status:   [x]            Good  []            Fair  []            Poor    Reading Ability:  [x]            Good  []            Fair  []            Poor    Knowledge of condition:   [x]            Good  []            Fair  []            Poor    Language Barriers:   [] "            Good  []            Fair  []            Poor  [x]            None    Cognitive/ Physical Barriers:   []            Good  []            Fair  []            Poor  [x]            None    Learning best by:                       [x]            Seeing  []            Hearing  []            Reading                         [x]            Doing    Describe any barrier /Limitation or financial implications of care choices identified     []            Financial  []            Emotional  []            Education  []            Vision/Hearing  []            Physical  [x]            None  []                TOPIC /CONTENT FOR TODAY:    [x]            MDI with or without spacer  []            Dry powder inhaler  []            Acapella   []           Peak Flow meter  [x]            COPD action plan  []            Nebulizer use  []            Oxygen use safety  []            Breathing and cough techniques  [x]            Energy conservation  [x]            Infection prevention  []           OTHER________________________        Learner:    [x]            Patient   []            Caregiver    Method:    [x]            Verbal explanation  [x]            Audio visual    [x]            Literature  [x]            Teach back      Evaluation:    [x]            Teach back  [x]            Demonstrate  [x]            Follow up phone call    []            2 weeks     []            4 weeks   [x]            PRN    Additional comments:   Patient was seen today to review respiratory medication purpose and proper technique for use of inhalers. Patient practiced proper technique using MDI with valved holding chamber (spacer) and DPI inhalers. Patient demonstrated understanding. Literature was given to patient.     Patient reports taking Advair HFA once in the morning and then albuterol HFA in the evening.   Reviewed prescribed frequency of use for controller vs. Rescue medication. Reminded patient to rinse mouth thoroughly after ICS use.  Patient stated understanding.  Wrote instructions for controller and rescue medications down for the patient for future reference.      Asthma trigger checklist was verbally reviewed and literature given to patient.     Infection prevention was discussed. Patient was reminded to get influenza and RSV vaccines. Hand hygiene and cleaning of respiratory equipment was also discussed. Patient verbalized understanding.      Asthma action plan was reviewed and literature was given to patient. Patient verbalized understanding.     Plan:  Monthly Pulmonary Disease Management Questionnaire  Follow-up PDM appointment scheduled for 2/17/25  Reinforce education  Meds: Advair HFA, albuterol   DME Needs: [facility]  Action Plan  Immunization: Pneumococcal- current, Flu-DUE , Covid 19- current   Next Provider Visit: 11/15/24  Next Spirometry/CPFT: FeNO 11/15/24  Approximate time spent with patient: 30 mins

## 2024-10-21 DIAGNOSIS — I50.32 CHRONIC DIASTOLIC CONGESTIVE HEART FAILURE: ICD-10-CM

## 2024-10-21 DIAGNOSIS — I10 PRIMARY HYPERTENSION: Primary | ICD-10-CM

## 2024-10-22 ENCOUNTER — HOSPITAL ENCOUNTER (OUTPATIENT)
Dept: CARDIOLOGY | Facility: HOSPITAL | Age: 84
Discharge: HOME | End: 2024-10-22
Attending: INTERNAL MEDICINE
Payer: COMMERCIAL

## 2024-10-22 ENCOUNTER — OFFICE VISIT (OUTPATIENT)
Dept: CARDIOLOGY | Facility: CLINIC | Age: 84
End: 2024-10-22
Payer: COMMERCIAL

## 2024-10-22 VITALS
WEIGHT: 242 LBS | WEIGHT: 240.31 LBS | DIASTOLIC BLOOD PRESSURE: 70 MMHG | HEART RATE: 73 BPM | HEART RATE: 80 BPM | BODY MASS INDEX: 38.89 KG/M2 | OXYGEN SATURATION: 95 % | SYSTOLIC BLOOD PRESSURE: 112 MMHG | BODY MASS INDEX: 38.79 KG/M2 | SYSTOLIC BLOOD PRESSURE: 118 MMHG | DIASTOLIC BLOOD PRESSURE: 68 MMHG | HEIGHT: 66 IN

## 2024-10-22 DIAGNOSIS — I65.22 STENOSIS OF LEFT CAROTID ARTERY: ICD-10-CM

## 2024-10-22 DIAGNOSIS — E78.5 DYSLIPIDEMIA: ICD-10-CM

## 2024-10-22 DIAGNOSIS — I38 VALVULAR HEART DISEASE: ICD-10-CM

## 2024-10-22 DIAGNOSIS — I10 PRIMARY HYPERTENSION: ICD-10-CM

## 2024-10-22 DIAGNOSIS — E11.9 TYPE 2 DIABETES MELLITUS WITHOUT COMPLICATION, WITHOUT LONG-TERM CURRENT USE OF INSULIN: ICD-10-CM

## 2024-10-22 DIAGNOSIS — I50.32 CHRONIC DIASTOLIC CONGESTIVE HEART FAILURE: ICD-10-CM

## 2024-10-22 DIAGNOSIS — I50.32 CHRONIC DIASTOLIC CONGESTIVE HEART FAILURE: Primary | ICD-10-CM

## 2024-10-22 LAB
AORTIC ROOT ANNULUS: 3.71 CM
AV INDEX (PROSTH): 0.72
AV MEAN GRADIENT: 4.6 MMHG
AV PEAK GRADIENT: 7.8 MMHG
AV VALVE AREA BY VELOCITY RATIO: 2.7 CM²
AV VALVE AREA: 2.3 CM²
AV VELOCITY RATIO: 0.86
BSA FOR ECHO PROCEDURE: 2.26 M2
CV ECHO LV RWT: 0.5 CM
DOP CALC AO PEAK VEL: 1.4 M/S
DOP CALC AO VTI: 34.5 CM
DOP CALC LVOT AREA: 3.1 CM2
DOP CALC LVOT DIAMETER: 2 CM
DOP CALC LVOT PEAK VEL: 1.2 M/S
DOP CALC LVOT STROKE VOLUME: 78.2 CM3
DOP CALC RVOT PEAK VEL: 0.67 M/S
DOP CALC RVOT VTI: 17.1 CM
DOP CALCLVOT PEAK VEL VTI: 24.9 CM
E WAVE DECELERATION TIME: 200.35 MSEC
E/A RATIO: 0.86
E/E' RATIO: 7.65 M/S
ECHO LV POSTERIOR WALL: 1 CM (ref 0.6–1.1)
FRACTIONAL SHORTENING: 37.5 % (ref 28–44)
INTERVENTRICULAR SEPTUM: 1 CM (ref 0.6–1.1)
IVRT: 140.82 MSEC
LA MAJOR: 3.8 CM
LA MINOR: 4.56 CM
LA WIDTH: 3.6 CM
LEFT ATRIUM AREA SYSTOLIC (APICAL 2 CHAMBER): 21.58 CM2
LEFT ATRIUM AREA SYSTOLIC (APICAL 4 CHAMBER): 21.72 CM2
LEFT ATRIUM SIZE: 3.89 CM
LEFT ATRIUM VOLUME INDEX MOD: 33.4 ML/M2
LEFT ATRIUM VOLUME INDEX: 22.7 ML/M2
LEFT ATRIUM VOLUME MOD: 72.43 ML
LEFT ATRIUM VOLUME: 49.35 CM3
LEFT INTERNAL DIMENSION IN SYSTOLE: 2.5 CM (ref 2.1–4)
LEFT VENTRICLE DIASTOLIC VOLUME INDEX: 33.04 ML/M2
LEFT VENTRICLE DIASTOLIC VOLUME: 71.69 ML
LEFT VENTRICLE END SYSTOLIC VOLUME APICAL 2 CHAMBER: 65.02 ML
LEFT VENTRICLE END SYSTOLIC VOLUME APICAL 4 CHAMBER: 71.72 ML
LEFT VENTRICLE MASS INDEX: 58.6 G/M2
LEFT VENTRICLE SYSTOLIC VOLUME INDEX: 10.6 ML/M2
LEFT VENTRICLE SYSTOLIC VOLUME: 23.05 ML
LEFT VENTRICULAR INTERNAL DIMENSION IN DIASTOLE: 4 CM (ref 3.5–6)
LEFT VENTRICULAR MASS: 127.1 G
LV LATERAL E/E' RATIO: 6.5 M/S
LV SEPTAL E/E' RATIO: 9.29 M/S
LVED V (TEICH): 71.69 ML
LVES V (TEICH): 23.05 ML
LVOT MG: 2.46 MMHG
LVOT MV: 0.73 CM/S
MV PEAK A VEL: 0.76 M/S
MV PEAK E VEL: 0.65 M/S
MV STENOSIS PRESSURE HALF TIME: 58.1 MS
MV VALVE AREA P 1/2 METHOD: 3.79 CM2
OHS QRS DURATION: 98 MS
OHS QTC CALCULATION: 453 MS
PISA TR MAX VEL: 2.3 M/S
PV MEAN GRADIENT: 1 MMHG
PV PEAK GRADIENT: 6 MMHG
PV PEAK VELOCITY: 1.18 M/S
RA MAJOR: 3.82 CM
RA WIDTH: 3.47 CM
RIGHT VENTRICULAR END-DIASTOLIC DIMENSION: 3.68 CM
SINUS: 3.56 CM
STJ: 3.57 CM
TDI LATERAL: 0.1 M/S
TDI SEPTAL: 0.07 M/S
TDI: 0.09 M/S
TR MAX PG: 21 MMHG
TRICUSPID ANNULAR PLANE SYSTOLIC EXCURSION: 1.84 CM
Z-SCORE OF LEFT VENTRICULAR DIMENSION IN END DIASTOLE: -5.85
Z-SCORE OF LEFT VENTRICULAR DIMENSION IN END SYSTOLE: -4.42

## 2024-10-22 PROCEDURE — 93306 TTE W/DOPPLER COMPLETE: CPT | Mod: 26,,, | Performed by: INTERNAL MEDICINE

## 2024-10-22 PROCEDURE — 99214 OFFICE O/P EST MOD 30 MIN: CPT | Mod: S$GLB,,, | Performed by: INTERNAL MEDICINE

## 2024-10-22 PROCEDURE — 3288F FALL RISK ASSESSMENT DOCD: CPT | Mod: CPTII,S$GLB,, | Performed by: INTERNAL MEDICINE

## 2024-10-22 PROCEDURE — 99999 PR PBB SHADOW E&M-EST. PATIENT-LVL V: CPT | Mod: PBBFAC,,, | Performed by: INTERNAL MEDICINE

## 2024-10-22 PROCEDURE — 93306 TTE W/DOPPLER COMPLETE: CPT

## 2024-10-22 PROCEDURE — 1126F AMNT PAIN NOTED NONE PRSNT: CPT | Mod: CPTII,S$GLB,, | Performed by: INTERNAL MEDICINE

## 2024-10-22 PROCEDURE — 1101F PT FALLS ASSESS-DOCD LE1/YR: CPT | Mod: CPTII,S$GLB,, | Performed by: INTERNAL MEDICINE

## 2024-10-22 PROCEDURE — 93005 ELECTROCARDIOGRAM TRACING: CPT

## 2024-10-22 PROCEDURE — 1160F RVW MEDS BY RX/DR IN RCRD: CPT | Mod: CPTII,S$GLB,, | Performed by: INTERNAL MEDICINE

## 2024-10-22 PROCEDURE — 93010 ELECTROCARDIOGRAM REPORT: CPT | Mod: ,,, | Performed by: INTERNAL MEDICINE

## 2024-10-22 PROCEDURE — 3074F SYST BP LT 130 MM HG: CPT | Mod: CPTII,S$GLB,, | Performed by: INTERNAL MEDICINE

## 2024-10-22 PROCEDURE — 3078F DIAST BP <80 MM HG: CPT | Mod: CPTII,S$GLB,, | Performed by: INTERNAL MEDICINE

## 2024-10-22 PROCEDURE — 1159F MED LIST DOCD IN RCRD: CPT | Mod: CPTII,S$GLB,, | Performed by: INTERNAL MEDICINE

## 2024-10-22 NOTE — PROGRESS NOTES
Subjective:   Patient ID:  Jovan Del Cid Jr. is a 84 y.o. male who presents for follow up of No chief complaint on file.      84 y/o male, 4 months.   PMH MVP mod MR, CHFpEF, acute CVA in , asthma, h/o CVA, HLD, HTN,Hx of Colon Tubulovillous Adenoma, MCMAHAN h/o right neck lipoma, and  obesity      admitted for acute CVA. with R-sided paresthesias The BP was significantly high /102    MRI phillip show Acute lacunar infarcts within the left thalamus . CT head and neck show :No evidence of thromboembolism within the visible branches of the [id number].Coarse calcific plaque within the left carotid bifurcation resulting in up to 50% stenosis of the origin/proximal aspect of the left internal carotid artery.   TTE did not show any acute finding  with normal  EF .     After discharge. No fall/ still right body numbness. No chest pain dyspnea. Some dizziness and no faint  BP low and valsartan HCTZ decreased the dose by PCP last week and stopped yesterday   Pt had h/o med noncompliance  HH once a week.     Echo     · There is no evidence of intracardiac shunting.  · The left ventricle is normal in size with concentric remodeling and normal systolic function.  · The estimated ejection fraction is 60%.  · Normal left ventricular diastolic function.  · Normal right ventricular size with normal right ventricular systolic function.  · There is bileaflet mitral prolapse.  · Mild-to-moderate mitral regurgitation.  · Normal central venous pressure (3 mmHg).    03/23 visit  BP controlled. No dizziness faint and chest pain    09/23 visit  Chronic right arm and leg numbness after the cva in 09/22. With right  shoulder pain  Right arm BP 96/60 and left 110 /62  08/23  and repeat echo in 08/23 EF 55% MVP and mod to severe MR. Normal LV LA size and PASP 30 mmHg  C/o GAMINO, leg swelling, sleeps on 1 pillow and no PND  At Home  to 150 mmhg  EKG today reveals NSR nonspecific STT change    10/23  visit  10/23 Phar MPI No ischemia and arm arterial US normal.  Not taking too much lasix, cough and wheezing worse for a week    02/24 visit  Remains cough and f/u at pulm with Dr. Buckner  10/23 Phar MPI showed no ischemia and echo showed EF nl mod to severe MR with calcification  ARM arterial US nml.  No orthopnea.   Occasional wheezing   LDL 67,  and BP nml  EKG reviewed by myself today reveals NSR nonspecific STT change    06/24 visit  Works daily. No regular activity, no orthopnea pND   Some leg swelling. No dizziness faint and SOB      Interval history  Chronic SOBOE stable, sleeps on 2 pillows and no PND. On CPAP  On lasix Rx and leg swelling controlled  EKG reviewed by myself today reveals NSR nonspecific STT change  Echo pending  BP LDL and A1c controled                          Past Medical History:   Diagnosis Date    Acute CVA (cerebrovascular accident) 09/08/2022    DANY (acute kidney injury) 09/14/2022    Asthma     Bronchitis chronic     Cancer     Chronic cough 11/15/2013    Chronic diastolic congestive heart failure 09/12/2023    Cough     Digestive disorder     Hyperlipidemia     Hypertension     Liver disease     KALYAN (obstructive sleep apnea) 09/08/2022    Stroke 09/01/2022    Type 2 diabetes mellitus, without long-term current use of insulin 09/07/2022    BS didn't check 10/05/2022       Past Surgical History:   Procedure Laterality Date    COLONOSCOPY N/A 6/21/2016    Procedure: COLONOSCOPY;  Surgeon: Alonso Dorsey MD;  Location: [facility] ENDO;  Service: Endoscopy;  Laterality: N/A;    COLONOSCOPY N/A 11/1/2016    Procedure: COLONOSCOPY;  Surgeon: Alonso Dorsey MD;  Location: [facility] ENDO;  Service: Endoscopy;  Laterality: N/A;    COLONOSCOPY N/A 2/3/2017    Procedure: COLONOSCOPY;  Surgeon: Alonso Dorsey MD;  Location: [facility] ENDO;  Service: Endoscopy;  Laterality: N/A;    COLONOSCOPY N/A 11/13/2017    Procedure: COLONOSCOPY;  Surgeon: Alonso Dorsey MD;  Location: [facility]  ENDO;  Service: Endoscopy;  Laterality: N/A;    COLONOSCOPY N/A 2021    Procedure: COLONOSCOPY;  Surgeon: Paula Ricardo MD;  Location: [facility] ENDO;  Service: Endoscopy;  Laterality: N/A;    FLUOROSCOPY N/A 6/15/2018    Procedure: Transjugular liver bx;  Surgeon: Petros Recio MD;  Location: [facility] CATH LAB;  Service: General;  Laterality: N/A;    TONSILLECTOMY         Social History     Tobacco Use    Smoking status: Former     Current packs/day: 0.00     Types: Cigarettes, Pipe     Start date:      Quit date:      Years since quittin.8     Passive exposure: Past    Smokeless tobacco: Never    Tobacco comments:     smoked a pipe - quit smoking    Substance Use Topics    Alcohol use: Not Currently     Alcohol/week: 2.0 - 3.0 standard drinks of alcohol     Types: 2 - 3 Cans of beer per week     Comment: daily    Drug use: No       Family History   Problem Relation Name Age of Onset    Cancer Father      Alzheimer's disease Mother      Colon cancer Neg Hx      Melanoma Neg Hx           ROS    Objective:   Physical Exam  HENT:      Head: Normocephalic.   Eyes:      Pupils: Pupils are equal, round, and reactive to light.   Neck:      Thyroid: No thyromegaly.      Vascular: Normal carotid pulses. No carotid bruit or JVD.   Cardiovascular:      Rate and Rhythm: Normal rate and regular rhythm. No extrasystoles are present.     Chest Wall: PMI is not displaced.      Pulses: Normal pulses.           Carotid pulses are 2+ on the right side and 2+ on the left side.     Heart sounds: Normal heart sounds. No murmur heard.     No gallop. No S3 sounds.   Pulmonary:      Effort: No respiratory distress.      Breath sounds: No stridor.   Abdominal:      General: Bowel sounds are normal.      Palpations: Abdomen is soft.      Tenderness: There is no abdominal tenderness. There is no rebound.   Musculoskeletal:         General: Swelling present.   Skin:     Findings: No rash.   Neurological:      Mental  Status: He is alert and oriented to person, place, and time.   Psychiatric:         Behavior: Behavior normal.         Lab Results   Component Value Date    CHOL 132 08/08/2024    CHOL 168 08/24/2023    CHOL 113 (L) 01/24/2023     Lab Results   Component Value Date    HDL 47 08/08/2024    HDL 44 08/24/2023    HDL 40 01/24/2023     Lab Results   Component Value Date    LDLCALC 47.4 (L) 08/08/2024    LDLCALC 66.6 08/24/2023    LDLCALC 8.6 (L) 01/24/2023     Lab Results   Component Value Date    TRIG 188 (H) 08/08/2024    TRIG 287 (H) 08/24/2023    TRIG 322 (H) 01/24/2023     Lab Results   Component Value Date    CHOLHDL 35.6 08/08/2024    CHOLHDL 26.2 08/24/2023    CHOLHDL 35.4 01/24/2023       Chemistry        Component Value Date/Time     07/09/2024 1105    K 4.3 07/09/2024 1105     07/09/2024 1105    CO2 22 (L) 07/09/2024 1105    BUN 16 07/09/2024 1105    CREATININE 0.8 07/09/2024 1105     (H) 07/09/2024 1105        Component Value Date/Time    CALCIUM 9.2 07/09/2024 1105    ALKPHOS 61 08/08/2024 1331    AST 27 08/08/2024 1331    ALT 30 08/08/2024 1331    BILITOT 1.9 (H) 08/08/2024 1331    ESTGFRAFRICA >60.0 01/20/2021 1120    EGFRNONAA >60.0 01/20/2021 1120          Lab Results   Component Value Date    HGBA1C 5.9 (H) 08/08/2024     Lab Results   Component Value Date    TSH 2.979 12/05/2023     Lab Results   Component Value Date    INR 0.9 09/03/2022    INR 1.0 09/02/2022    INR 1.0 06/15/2018     Lab Results   Component Value Date    WBC 6.81 09/20/2024    HGB 12.9 (L) 09/20/2024    HCT 42.1 09/20/2024    MCV 98 09/20/2024     09/20/2024     BMP  Sodium   Date Value Ref Range Status   07/09/2024 139 136 - 145 mmol/L Final     Potassium   Date Value Ref Range Status   07/09/2024 4.3 3.5 - 5.1 mmol/L Final     Chloride   Date Value Ref Range Status   07/09/2024 107 95 - 110 mmol/L Final     CO2   Date Value Ref Range Status   07/09/2024 22 (L) 23 - 29 mmol/L Final     BUN   Date Value Ref  Range Status   07/09/2024 16 8 - 23 mg/dL Final     Creatinine   Date Value Ref Range Status   07/09/2024 0.8 0.5 - 1.4 mg/dL Final     Calcium   Date Value Ref Range Status   07/09/2024 9.2 8.7 - 10.5 mg/dL Final     Anion Gap   Date Value Ref Range Status   07/09/2024 10 8 - 16 mmol/L Final     eGFR if    Date Value Ref Range Status   01/20/2021 >60.0 >60 mL/min/1.73 m^2 Final     eGFR if non    Date Value Ref Range Status   01/20/2021 >60.0 >60 mL/min/1.73 m^2 Final     Comment:     Calculation used to obtain the estimated glomerular filtration  rate (eGFR) is the CKD-EPI equation.        BNP  @LABRCNTIP(BNP,BNPTRIAGEBLO)@  @LABRCNTIP(troponini)@  CrCl cannot be calculated (Patient's most recent lab result is older than the maximum 7 days allowed.).  No results found in the last 24 hours.  No results found in the last 24 hours.  No results found in the last 24 hours.    Assessment:      1. Chronic diastolic congestive heart failure    2. Dyslipidemia    3. Primary hypertension    4. Stenosis of left carotid artery    5. BMI 37.0-37.9, adult    6. Type 2 diabetes mellitus without complication, without long-term current use of insulin      CHF compensated    Plan:     Continue asa losartan and statin for HTN HLD DM, CHF  DM Rx per PCP  Counseled DASH  Check Lipid profile with PCP in 6 months  Recommend heart-healthy diet, weight control and regular exercise.  Lety. Risk modification.   I have reviewed all pertinent labs and cardiac studies independently. Plans and recommendations have been formulated under my direct supervision. All questions answered and patient voiced understanding.   If symptoms persist go to the ED  RTC in 6 months

## 2024-11-08 ENCOUNTER — OFFICE VISIT (OUTPATIENT)
Dept: PULMONOLOGY | Facility: CLINIC | Age: 84
End: 2024-11-08
Payer: COMMERCIAL

## 2024-11-08 ENCOUNTER — CLINICAL SUPPORT (OUTPATIENT)
Dept: PULMONOLOGY | Facility: CLINIC | Age: 84
End: 2024-11-08
Payer: COMMERCIAL

## 2024-11-08 VITALS
RESPIRATION RATE: 20 BRPM | HEART RATE: 72 BPM | OXYGEN SATURATION: 96 % | SYSTOLIC BLOOD PRESSURE: 124 MMHG | HEIGHT: 66 IN | WEIGHT: 247.56 LBS | BODY MASS INDEX: 39.79 KG/M2 | DIASTOLIC BLOOD PRESSURE: 80 MMHG

## 2024-11-08 DIAGNOSIS — E78.5 DYSLIPIDEMIA: ICD-10-CM

## 2024-11-08 DIAGNOSIS — G47.33 OBSTRUCTIVE SLEEP APNEA: ICD-10-CM

## 2024-11-08 DIAGNOSIS — J45.20 MILD INTERMITTENT ASTHMA WITHOUT COMPLICATION: Primary | Chronic | ICD-10-CM

## 2024-11-08 DIAGNOSIS — Z78.9 INTOLERANCE OF CONTINUOUS POSITIVE AIRWAY PRESSURE (CPAP) VENTILATION: ICD-10-CM

## 2024-11-08 DIAGNOSIS — E11.9 TYPE 2 DIABETES MELLITUS WITHOUT COMPLICATION, WITHOUT LONG-TERM CURRENT USE OF INSULIN: ICD-10-CM

## 2024-11-08 DIAGNOSIS — J45.909 NOCTURNAL HYPOXEMIA DUE TO ASTHMA: ICD-10-CM

## 2024-11-08 DIAGNOSIS — I50.32 CHRONIC DIASTOLIC CONGESTIVE HEART FAILURE: ICD-10-CM

## 2024-11-08 DIAGNOSIS — R06.02 SOB (SHORTNESS OF BREATH): Primary | ICD-10-CM

## 2024-11-08 DIAGNOSIS — R09.02 NOCTURNAL HYPOXEMIA DUE TO ASTHMA: ICD-10-CM

## 2024-11-08 DIAGNOSIS — J45.901 EXACERBATION OF ASTHMA, UNSPECIFIED ASTHMA SEVERITY, UNSPECIFIED WHETHER PERSISTENT: ICD-10-CM

## 2024-11-08 PROCEDURE — 99999 PR PBB SHADOW E&M-EST. PATIENT-LVL V: CPT | Mod: PBBFAC,,, | Performed by: INTERNAL MEDICINE

## 2024-11-08 RX ORDER — ALBUTEROL SULFATE 90 UG/1
1-2 INHALANT RESPIRATORY (INHALATION) EVERY 6 HOURS PRN
Qty: 18 G | Refills: 3 | Status: SHIPPED | OUTPATIENT
Start: 2024-11-08 | End: 2024-11-13

## 2024-11-08 RX ORDER — MONTELUKAST SODIUM 10 MG/1
10 TABLET ORAL NIGHTLY
Qty: 90 TABLET | Refills: 3 | Status: SHIPPED | OUTPATIENT
Start: 2024-11-08

## 2024-11-08 RX ORDER — FLUTICASONE PROPIONATE AND SALMETEROL XINAFOATE 230; 21 UG/1; UG/1
2 AEROSOL, METERED RESPIRATORY (INHALATION) EVERY 12 HOURS
Qty: 12 G | Refills: 11 | Status: SHIPPED | OUTPATIENT
Start: 2024-11-08 | End: 2025-11-08

## 2024-11-08 NOTE — ASSESSMENT & PLAN NOTE
Bed time : 9 pm  Wake 7 am  Naps  1 hr       Data 08/10/2024 to 11/07/2024  Usage > 4 hrs: 9%  BIPAP 17/13    AHI 11.1    Improve adherence

## 2024-11-08 NOTE — PROGRESS NOTES
Subjective:       Patient ID: Jovan Del Cid Jr. is a 84 y.o. male.  Patient Active Problem List   Diagnosis    Intolerance of continuous positive airway pressure (CPAP) ventilation    Liver cyst    HTN (hypertension)    Dyslipidemia    Benign prostatic hyperplasia    Lipoma    Amyloid disease    Chronic maxillary sinusitis    Tubulovillous adenoma of colon    MCMAHAN (nonalcoholic steatohepatitis)    Mild intermittent asthma without complication    BMI 37.0-37.9, adult    Personal history of colonic polyps    History of lacunar cerebrovascular accident    Type 2 diabetes mellitus, without long-term current use of insulin    Obstructive sleep apnea    Stenosis of left carotid artery    Venous insufficiency    Valvular heart disease    Chronic diastolic congestive heart failure    Unequal blood pressures in paired extremities    Cognitive complaints with normal exam    Nocturnal hypoxemia due to asthma     Immunization History   Administered Date(s) Administered    COVID-19, MRNA, LN-S, PF (Pfizer) (Purple Cap) 01/10/2021, 2021, 2021    COVID-19, mRNA, LNP-S, bivalent booster, PF (PFIZER OMICRON) 10/14/2022    Influenza (FLUAD) - Quadrivalent - Adjuvanted - PF *Preferred* (65+) 10/15/2020, 2022, 2023    Influenza - Trivalent - Fluzone High Dose - PF (65 years and older) 2020    Pneumococcal Conjugate - 20 Valent 10/24/2022    Zoster Recombinant 2023, 2023     Social History     Tobacco Use   Smoking Status Former    Current packs/day: 0.00    Types: Cigarettes, Pipe    Start date:     Quit date:     Years since quittin.8    Passive exposure: Past   Smokeless Tobacco Never   Tobacco Comments    smoked a pipe - quit smoking      Asthma Control Test  In the past 4  weeks, how much of the time did your asthma keep you from getting as much done at work, school or at home?: Some of the time  During the past 4 weeks, how often have you had shortness of breath?: 3  to 6 times a week  During the past 4 weeks, how often did your asthma symptoms (wheezing, couging, shortness of breath, chest tightness or pain) wake you up at night or earlier than usual in the morning?: Not at all  During the past 4 weeks, how often have you used your rescue inhaler or nebulizer medication (such as albuterol)?: Once a week or less  How would you rate your asthma control during the past 4 weeks?: Somewhat controlled  If your score is 19 or less, your asthma may not be under control: 18                   11/8/2024     9:22 AM   EPWORTH SLEEPINESS SCALE   Sitting and reading 0   Watching TV 3   Sitting, inactive in a public place (e.g. a theatre or a meeting) 1   As a passenger in a car for an hour without a break 0   Lying down to rest in the afternoon when circumstances permit 0   Sitting and talking to someone 0   Sitting quietly after a lunch without alcohol 3   In a car, while stopped for a few minutes in traffic 0   Total score 7      Asthma Control Test  In the past 4  weeks, how much of the time did your asthma keep you from getting as much done at work, school or at home?: Some of the time  During the past 4 weeks, how often have you had shortness of breath?: 3 to 6 times a week  During the past 4 weeks, how often did your asthma symptoms (wheezing, couging, shortness of breath, chest tightness or pain) wake you up at night or earlier than usual in the morning?: Not at all  During the past 4 weeks, how often have you used your rescue inhaler or nebulizer medication (such as albuterol)?: Once a week or less  How would you rate your asthma control during the past 4 weeks?: Somewhat controlled  If your score is 19 or less, your asthma may not be under control: 18           Chief Complaint: Asthma and Sleep Apnea (/)        Jovan Del Riosandhya Mahmood is a 80 y.o.  male   Follow up, No cough, No congetion.No fever  ACT  20: hardly taking rescue albuterol HFA.   Here to reviewed : CXR and [place]  Normal  Drew  Runs asphalt bussiness  Busy lifestyle.  No interval asthma exacerbations since last visit, Actually not used rescue albuterol in 12 months.  Spirometry normal         09/21/2022  Last visit 10/15/2022  Referred by Dr Dagoberto Montes  Recent lacunar CVA  Known KALYAN Moderate KALYAN titrated to CPAP 12 cm  Snoring and apnea  Therapy options discussed  Was in Hospital x 2  Spouse present  Goals and therapy options discussed      12/13/2022  Last seen 09/21/2022  Sleep study reveiwed  Severe KALYAN  Forgetful  [place]: Normal  FeNo 43  ACT score20  Wife says gets SOB walking  Added LABA ICS  Follow 8 weeks    12/11/2023  Followup  Stroke Last year  C/o DTS  Queen 17  Last titration CPAP 17 was adequate  Failed to fully habituated  Study reviewed  No sleepy driving  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      Titration reveiwed  BIPAP 17/13 was adequate  After long discussion patient eventually agreed to use a BiPAP.    Not open to other options like mandibular device or inspire  Will order  Time below on titration: Oxygen saturations were . 88 % for 8.8 minutes    05/24/2024  Followup  Not using BIPAP here with spouse  Not attempted to use BIPAP  Wearing O2 in lieu  Discussed INSPIRE  Not willing  Riks untreated KALYAN discussed      09/20/2024  Followup  Accompanied by his wife   Four weeks ago   Cough congestion mucus disrupting sleep at night last seen was 08/20/2024  Cough and wheezing   Patient attests using his asthma medications well   Sleep also disrupted by nocturia   Bedtime 9.  Time 7:00 a.m.   Naps for 1 hour   FEV1 1.71 L (70.3% predicted)  Exhaled nitric oxide was 69  "ppb    11/08/2024  Followup  Doing well  Spouse here  FeNo dropped from 69 to 15 parts per billion  Advair 230-21  Download reviewed  He is using the device more consistently but not long enough   Adherence discussed    Asthma  He complains of cough. There is no shortness of breath, sputum production or wheezing. Pertinent negatives include no postnasal drip. His past medical history is significant for asthma.     Review of Systems   Constitutional: Negative.    HENT:  Negative for postnasal drip and congestion.    Eyes: Negative.    Respiratory:  Positive for cough. Negative for snoring, sputum production, shortness of breath, wheezing, asthma nighttime symptoms, pleurisy, dyspnea on extertion, use of rescue inhaler and somnolence.    Cardiovascular: Negative.    Genitourinary: Negative.    Endocrine: endocrine negative    Musculoskeletal: Negative.    Skin: Negative.    Gastrointestinal: Negative.    Neurological: Negative.    Psychiatric/Behavioral: Negative.     All other systems reviewed and are negative.           Goals of Care Treatment Preferences:  Code Status: Full Code       BP Readings from Last 3 Encounters:   11/08/24 124/80   10/22/24 118/68   10/22/24 112/70          Neck circumference  18.5" [?KALYAN risk if >43cm (17in) male or >41cm (15.5 in) female]             The following portions of the patient's history were reviewed and updated as appropriate: He  has a past medical history of Acute CVA (cerebrovascular accident) (09/08/2022), DANY (acute kidney injury) (09/14/2022), Asthma, Bronchitis chronic, Cancer, Chronic cough (11/15/2013), Chronic diastolic congestive heart failure (09/12/2023), Cough, Digestive disorder, Hyperlipidemia, Hypertension, Liver disease, KALYAN (obstructive sleep apnea) (09/08/2022), Stroke (09/01/2022), and Type 2 diabetes mellitus, without long-term current use of insulin (09/07/2022).  He does not have any pertinent problems on file.  He  has a past surgical history that " includes Tonsillectomy; Colonoscopy (N/A, 6/21/2016); Colonoscopy (N/A, 11/1/2016); Colonoscopy (N/A, 2/3/2017); Colonoscopy (N/A, 11/13/2017); Fluoroscopy (N/A, 6/15/2018); and Colonoscopy (N/A, 2/19/2021).  His family history includes Alzheimer's disease in his mother; Cancer in his father.  He  reports that he quit smoking about 64 years ago. His smoking use included cigarettes and pipe. He started smoking about 67 years ago. He has been exposed to tobacco smoke. He has never used smokeless tobacco. He reports that he does not currently use alcohol after a past usage of about 2.0 - 3.0 standard drinks of alcohol per week. He reports that he does not use drugs.  He has a current medication list which includes the following prescription(s): blood sugar diagnostic, blood sugar diagnostic, fluticasone propionate, furosemide, glipizide, ipratropium, lancets, losartan, multivitamin, macular health formula, onetouch delica plus lancet, pen needle, diabetic, rosuvastatin, triamcinolone acetonide 0.1%, albuterol, aspirin, blood-glucose meter, fluticasone-salmeterol 230-21 mcg/dose, lancing device, montelukast, and pantoprazole, and the following Facility-Administered Medications: epinephrine and ondansetron.  Current Outpatient Medications on File Prior to Visit   Medication Sig Dispense Refill    blood sugar diagnostic Strp 1 strip by Misc.(Non-Drug; Combo Route) route 2 (two) times daily with meals. 200 strip 3    blood sugar diagnostic Strp Test blood glucose 2 times a day 200 strip 3    fluticasone propionate (FLONASE) 50 mcg/actuation nasal spray SHAKE LIQUID AND USE 2 SPRAYS IN EACH NOSTRIL EVERY DAY 48 g 3    furosemide (LASIX) 20 MG tablet Take 1 tablet (20 mg total) by mouth every Mon, Tues, Wed, Thurs, Fri. 60 tablet 3    glipiZIDE (GLUCOTROL) 2.5 MG TR24 TAKE 1 TABLET(2.5 MG) BY MOUTH DAILY WITH BREAKFAST 90 tablet 1    ipratropium (ATROVENT) 21 mcg (0.03 %) nasal spray 2 sprays by Each Nostril route 2 (two)  "times daily. 30 mL 0    lancets Misc 1 each by Misc.(Non-Drug; Combo Route) route 2 (two) times daily with meals. 100 each 11    losartan (COZAAR) 50 MG tablet Take 1 tablet (50 mg total) by mouth once daily. 90 tablet 3    multivitamin (THERAGRAN) per tablet Take 1 tablet by mouth once daily.      [id number] (MACULAR HEALTH FORMULA) 5-1-7.5 mg Cap Take by mouth.      ONETOUCH DELICA PLUS LANCET 33 gauge [facility] [place] TO CHECK BLOOD GLUCOSE TWO TIMES A DAY WITH MEALS 200 each 3    pen needle, diabetic 31 gauge x 5/16" Ndle 1 each by Misc.(Non-Drug; Combo Route) route 2 (two) times daily with meals. 100 each 11    rosuvastatin (CRESTOR) 40 MG Tab TAKE 1 TABLET BY MOUTH EVERY DAY 90 tablet 3    triamcinolone acetonide 0.1% (KENALOG) 0.1 % cream Apply topically 2 (two) times daily. 15 g 1    [DISCONTINUED] fluticasone-salmeterol 230-21 mcg/dose (ADVAIR HFA) 230-21 mcg/actuation HFAA inhaler Inhale 2 puffs into the lungs every 12 (twelve) hours. Controller 12 g 11    [DISCONTINUED] montelukast (SINGULAIR) 10 mg tablet Take 1 tablet (10 mg total) by mouth every evening. 90 tablet 3    aspirin (ECOTRIN) 81 MG EC tablet Take 1 tablet (81 mg total) by mouth once daily. 90 tablet 3    blood-glucose meter kit Use as instructed 1 each 0    lancing device Misc 1 Device by Misc.(Non-Drug; Combo Route) route 2 (two) times daily with meals. 1 each 0    pantoprazole (PROTONIX) 40 MG tablet Take 1 tablet (40 mg total) by mouth daily as needed. 30 tablet 3    [DISCONTINUED] albuterol (VENTOLIN HFA) 90 mcg/actuation inhaler Inhale 1-2 puffs into the lungs every 6 (six) hours as needed for Wheezing. Rescue 18 g 3     Current Facility-Administered Medications on File Prior to Visit   Medication Dose Route Frequency Provider Last Rate Last Admin    EPINEPHrine (EPIPEN) 0.3 mg/0.3 mL pen injection 0.3 mg  0.3 mg Intramuscular PRN Amando Ulrich MD        ondansetron disintegrating tablet 4 mg  4 mg Oral Once PRN " "Amando Ulrich MD         He has No Known Allergies..    Objective:       Vitals:    11/08/24 0920   BP: 124/80   Pulse: 72   Resp: 20   SpO2: 96%   Weight: 112.3 kg (247 lb 9.2 oz)   Height: 5' 6" (1.676 m)                     Physical Exam  Vitals and nursing note reviewed.   Constitutional:       Appearance: He is well-developed.      Comments: Nek 19"   HENT:      Head: Normocephalic and atraumatic.        Right Ear: External ear normal.      Left Ear: External ear normal.      Nose: Nose normal.      Mouth/Throat:      Pharynx: Uvula midline. No oropharyngeal exudate.   Eyes:      General: Lids are normal. No scleral icterus.     Conjunctiva/sclera: Conjunctivae normal.      Pupils: Pupils are equal, round, and reactive to light.   Neck:      Trachea: Trachea and phonation normal.      Comments: Neck 18"  Cardiovascular:      Rate and Rhythm: Normal rate and regular rhythm.      Pulses: Normal pulses.      Heart sounds: Normal heart sounds, S1 normal and S2 normal. No murmur heard.  Pulmonary:      Effort: Pulmonary effort is normal. No respiratory distress.      Breath sounds: Examination of the left-lower field reveals decreased breath sounds. Decreased breath sounds present. No wheezing, rhonchi or rales.   Chest:      Chest wall: No tenderness.   Abdominal:      General: Bowel sounds are normal.      Palpations: Abdomen is soft.   Musculoskeletal:         General: Normal range of motion.      Cervical back: Normal range of motion and neck supple.   Lymphadenopathy:      Cervical: No cervical adenopathy.   Skin:     General: Skin is warm and dry.      Findings: No rash.      Nails: There is no clubbing.   Neurological:      Mental Status: He is alert and oriented to person, place, and time.      GCS: GCS eye subscore is 4. GCS verbal subscore is 5. GCS motor subscore is 6.      Cranial Nerves: No cranial nerve deficit.      Sensory: No sensory deficit.   Psychiatric:         Speech: Speech normal. "       Personal Diagnostic Review  [x]  Chest x-ray:       Echo    Left Ventricle: The left ventricle is normal in size. Normal wall   thickness. There is normal systolic function with a visually estimated   ejection fraction of 60 - 65%. There is normal diastolic function.    Right Ventricle: Normal right ventricular cavity size. Wall thickness   is normal. Systolic function is normal.    Mitral Valve: There is moderate to severe regurgitation with an   eccentric jet and a wall hugging jet.    Tricuspid Valve: There is mild regurgitation.    Pulmonary Artery: Pulmonary artery pressure could not be estimated.   .        FeNo was 15      08/10/2024 - 2024  Patient ID: [id number]  : 1940  Age: 84 years  [facility]  [address] [facility], [id number]  Compliance Report  Compliance  Payor Standard  Usage 08/10/2024 - 2024  Usage days 84/90 days (93%)  >= 4 hours 8 days (9%)  < 4 hours 76 days (84%)  Usage hours 189 hours 55 minutes  Average usage (total days) 2 hours 7 minutes  Average usage (days used) 2 hours 16 minutes  Median usage (days used) 1 hours 58 minutes  Total used hours (value since last reset - 2024) 317 hours  [address]  Serial number [id number]  Mode Spont  IPAP 17 cmH2O  EPAP 13 cmH2O  Easy-Breathe On  Therapy  Leaks - L/min Median: 17.2 95th percentile: 65.4 Maximum: 115.4  Events per hour AI: 10.7 HI: 0.4 AHI: 11.1  Problem List Items Addressed This Visit       Mild intermittent asthma without complication - Primary (Chronic)    Relevant Medications    albuterol (VENTOLIN HFA) 90 mcg/actuation inhaler    fluticasone-salmeterol 230-21 mcg/dose (ADVAIR HFA) 230-21 mcg/actuation HFAA inhaler    montelukast (SINGULAIR) 10 mg tablet    Other Relevant Orders    Fraction of  Nitric Oxide    Spirometry without Bronchodilator    Dyslipidemia    Type 2 diabetes mellitus, without long-term current use of insulin    Chronic diastolic congestive heart  failure    BMI 37.0-37.9, adult    Intolerance of continuous positive airway pressure (CPAP) ventilation    Nocturnal hypoxemia due to asthma    Obstructive sleep apnea     Bed time : 9 pm  Wake 7 am  Naps  1 hr       Data 08/10/2024 to 2024  Usage > 4 hrs: 9%  BIPAP     AHI 11.1    Improve adherence             Plan:       Adherence to LABA ICS     Options of MAD or INSPIRE discussed      Improve daily adherence with CPAP    Follow up in about 3 months (around 2025), or cxr, betty.    This note was prepared using voice recognition system and is likely to have sound alike errors that may have been overlooked even after proof reading.  Please call me with any questions    Discussed diagnosis, its evaluation, treatment and usual course. All questions answered.    Thank you for the courtesy of participating in the care of this patient    Stefan Buckner MD    Orders Placed This Encounter   Procedures    Fraction of  Nitric Oxide     Standing Status:   Future     Standing Expiration Date:   2025     Order Specific Question:   Release to patient     Answer:   Immediate    Spirometry without Bronchodilator     Standing Status:   Future     Standing Expiration Date:   2025     Order Specific Question:   Release to patient     Answer:   Immediate

## 2024-11-08 NOTE — PROCEDURES
Clinical Guide to Interpretation or FeNO Levels :    FeNO  (ppb) LOW INTERMEDIATE HIGH   ADULT VALUES < 25 25-50          > 50   Th2-driven Inflammation Unlikely Likely Significant     Patients FeNO level at this visit : __15__ (ppb)    Interpretation of FeNO measurement in adults:  [x] FENO is less than 25 ppb implies non eosinophilic airway inflammation or the absence of airway inflammation.    Comment: Low FENO (<25 ppb) in adult asthmatics with persistent symptoms suggests other etiologies for these symptoms and a lower likelihood of benefit from adding or increasing inhaled glucocorticoids.    [] FENO between 25 and 50 ppb in adults should be interpreted cautiously with reference to the clinical situation (eg, symptomatic, on or off therapy, current smoking).    [] FENO greater than 50 ppb in adults  suggests eosinophilic airway inflammation   Comment: High FENO (>50 ppb) in adult asthmatics even with atypical symptoms suggests glucocorticoid responsiveness. High FENO (>50 ppb) can help identify poor adherence or uncontrolled inflammation in asthma patients with otherwise seemingly controlled asthma.    Discussion:  A FENO less than 25 ppb in adults and less than 20 ppb in children younger than 12 years of age implies noneosinophilic airway inflammation or the absence of airway inflammation.  A FENO greater than 50 ppb in adults or greater than 35 ppb in children suggests eosinophilic airway inflammation.   Values of FENO between 25 and 50 ppb in adults (20 to 35 ppb in children) should be interpreted cautiously with reference to the clinical situation (eg, symptomatic, on or off therapy, current smoking).  A rising FENO with a greater than 20 percent change and more than 25 ppb (20 ppb in children) from a previously stable level suggests increasing eosinophilic airway inflammation, but there are wide inter-individual differences.  A decrease in FENO greater than 20 percent for values over 50 ppb or more than  10 ppb for values less than 50 ppb may be clinically important.  ?FENO in other respiratory diseases - Several other diseases are associated with altered levels of exhaled NO: low levels of FENO have been noted in cystic fibrosis, current smoking, pulmonary hypertension, hypothermia, primary ciliary dyskinesia, and bronchopulmonary dysplasia. Elevated FENO has been noted in atopy, nonasthmatic eosinophilic bronchitis, COPD exacerbations, noncystic fibrosis bronchiectasis, and viral upper respiratory infections.    REFERENCE:  ATS Board of Directors, December 2004, and by the ERS Executive Committee, June 2004. ATS/ERS Recommendations for Standardized Procedures for the Online and Offline Measurement of Exhaled Lower Respiratory Nitric Oxide and Nasal Nitric Oxide. Guideline 2005

## 2024-12-10 ENCOUNTER — LAB VISIT (OUTPATIENT)
Dept: LAB | Facility: HOSPITAL | Age: 84
End: 2024-12-10
Attending: INTERNAL MEDICINE
Payer: COMMERCIAL

## 2024-12-10 ENCOUNTER — OFFICE VISIT (OUTPATIENT)
Dept: FAMILY MEDICINE | Facility: CLINIC | Age: 84
End: 2024-12-10
Payer: COMMERCIAL

## 2024-12-10 VITALS
HEIGHT: 66 IN | SYSTOLIC BLOOD PRESSURE: 122 MMHG | WEIGHT: 241.06 LBS | HEART RATE: 74 BPM | TEMPERATURE: 97 F | OXYGEN SATURATION: 98 % | BODY MASS INDEX: 38.74 KG/M2 | DIASTOLIC BLOOD PRESSURE: 84 MMHG

## 2024-12-10 DIAGNOSIS — E11.9 TYPE 2 DIABETES MELLITUS WITHOUT COMPLICATION, WITHOUT LONG-TERM CURRENT USE OF INSULIN: ICD-10-CM

## 2024-12-10 DIAGNOSIS — I10 PRIMARY HYPERTENSION: ICD-10-CM

## 2024-12-10 DIAGNOSIS — I50.32 CHRONIC DIASTOLIC CONGESTIVE HEART FAILURE: ICD-10-CM

## 2024-12-10 DIAGNOSIS — Z23 NEED FOR VACCINATION: ICD-10-CM

## 2024-12-10 DIAGNOSIS — E78.5 DYSLIPIDEMIA: ICD-10-CM

## 2024-12-10 DIAGNOSIS — R19.5 LOOSE STOOLS: ICD-10-CM

## 2024-12-10 DIAGNOSIS — Z86.73 HISTORY OF LACUNAR CEREBROVASCULAR ACCIDENT: ICD-10-CM

## 2024-12-10 DIAGNOSIS — D64.9 ANEMIA, UNSPECIFIED TYPE: Primary | ICD-10-CM

## 2024-12-10 DIAGNOSIS — G47.33 OBSTRUCTIVE SLEEP APNEA: ICD-10-CM

## 2024-12-10 DIAGNOSIS — J45.20 MILD INTERMITTENT ASTHMA WITHOUT COMPLICATION: Chronic | ICD-10-CM

## 2024-12-10 DIAGNOSIS — D64.9 ANEMIA, UNSPECIFIED TYPE: ICD-10-CM

## 2024-12-10 LAB
BASOPHILS # BLD AUTO: 0.06 K/UL (ref 0–0.2)
BASOPHILS NFR BLD: 0.9 % (ref 0–1.9)
DIFFERENTIAL METHOD BLD: ABNORMAL
EOSINOPHIL # BLD AUTO: 0.2 K/UL (ref 0–0.5)
EOSINOPHIL NFR BLD: 2.7 % (ref 0–8)
ERYTHROCYTE [DISTWIDTH] IN BLOOD BY AUTOMATED COUNT: 15.5 % (ref 11.5–14.5)
ESTIMATED AVG GLUCOSE: 131 MG/DL (ref 68–131)
HBA1C MFR BLD: 6.2 % (ref 4–5.6)
HCT VFR BLD AUTO: 40.2 % (ref 40–54)
HGB BLD-MCNC: 13.1 G/DL (ref 14–18)
IMM GRANULOCYTES # BLD AUTO: 0.02 K/UL (ref 0–0.04)
IMM GRANULOCYTES NFR BLD AUTO: 0.3 % (ref 0–0.5)
LYMPHOCYTES # BLD AUTO: 0.8 K/UL (ref 1–4.8)
LYMPHOCYTES NFR BLD: 12.8 % (ref 18–48)
MCH RBC QN AUTO: 31.5 PG (ref 27–31)
MCHC RBC AUTO-ENTMCNC: 32.6 G/DL (ref 32–36)
MCV RBC AUTO: 97 FL (ref 82–98)
MONOCYTES # BLD AUTO: 0.8 K/UL (ref 0.3–1)
MONOCYTES NFR BLD: 11.9 % (ref 4–15)
NEUTROPHILS # BLD AUTO: 4.7 K/UL (ref 1.8–7.7)
NEUTROPHILS NFR BLD: 71.4 % (ref 38–73)
NRBC BLD-RTO: 0 /100 WBC
PLATELET # BLD AUTO: 160 K/UL (ref 150–450)
PMV BLD AUTO: 11.3 FL (ref 9.2–12.9)
RBC # BLD AUTO: 4.16 M/UL (ref 4.6–6.2)
WBC # BLD AUTO: 6.58 K/UL (ref 3.9–12.7)

## 2024-12-10 PROCEDURE — 3074F SYST BP LT 130 MM HG: CPT | Mod: CPTII,S$GLB,, | Performed by: INTERNAL MEDICINE

## 2024-12-10 PROCEDURE — 85025 COMPLETE CBC W/AUTO DIFF WBC: CPT | Performed by: INTERNAL MEDICINE

## 2024-12-10 PROCEDURE — 1126F AMNT PAIN NOTED NONE PRSNT: CPT | Mod: CPTII,S$GLB,, | Performed by: INTERNAL MEDICINE

## 2024-12-10 PROCEDURE — 3079F DIAST BP 80-89 MM HG: CPT | Mod: CPTII,S$GLB,, | Performed by: INTERNAL MEDICINE

## 2024-12-10 PROCEDURE — 90653 IIV ADJUVANT VACCINE IM: CPT | Mod: S$GLB,,, | Performed by: INTERNAL MEDICINE

## 2024-12-10 PROCEDURE — 1101F PT FALLS ASSESS-DOCD LE1/YR: CPT | Mod: CPTII,S$GLB,, | Performed by: INTERNAL MEDICINE

## 2024-12-10 PROCEDURE — 83036 HEMOGLOBIN GLYCOSYLATED A1C: CPT | Performed by: INTERNAL MEDICINE

## 2024-12-10 PROCEDURE — 36415 COLL VENOUS BLD VENIPUNCTURE: CPT | Mod: PO | Performed by: INTERNAL MEDICINE

## 2024-12-10 PROCEDURE — 1159F MED LIST DOCD IN RCRD: CPT | Mod: CPTII,S$GLB,, | Performed by: INTERNAL MEDICINE

## 2024-12-10 PROCEDURE — 99999 PR PBB SHADOW E&M-EST. PATIENT-LVL V: CPT | Mod: PBBFAC,,, | Performed by: INTERNAL MEDICINE

## 2024-12-10 PROCEDURE — 3288F FALL RISK ASSESSMENT DOCD: CPT | Mod: CPTII,S$GLB,, | Performed by: INTERNAL MEDICINE

## 2024-12-10 PROCEDURE — 99214 OFFICE O/P EST MOD 30 MIN: CPT | Mod: 25,S$GLB,, | Performed by: INTERNAL MEDICINE

## 2024-12-10 PROCEDURE — 90471 IMMUNIZATION ADMIN: CPT | Mod: S$GLB,,, | Performed by: INTERNAL MEDICINE

## 2024-12-10 NOTE — PROGRESS NOTES
Subjective:       Patient ID: Jovan Del Cid Jr. is a 84 y.o. male.    Chief Complaint: Follow-up, Hypertension, Hyperlipidemia, and Diabetes    Follow-up  Associated symptoms include arthralgias and myalgias. Pertinent negatives include no abdominal pain, chest pain, chills, coughing, diaphoresis, fatigue, fever, headaches, joint swelling, nausea, neck pain, numbness, rash, sore throat, vomiting or weakness.   Hypertension  Pertinent negatives include no chest pain, headaches, neck pain, palpitations or shortness of breath.   Hyperlipidemia  Associated symptoms include myalgias. Pertinent negatives include no chest pain or shortness of breath.   Diabetes  Pertinent negatives for hypoglycemia include no confusion, dizziness, headaches, nervousness/anxiousness, pallor, seizures, speech difficulty or tremors. Pertinent negatives for diabetes include no chest pain, no fatigue, no polydipsia, no polyphagia, no polyuria and no weakness.     Past Medical History:   Diagnosis Date    Acute CVA (cerebrovascular accident) 09/08/2022    DANY (acute kidney injury) 09/14/2022    Asthma     Bronchitis chronic     Cancer     Chronic cough 11/15/2013    Chronic diastolic congestive heart failure 09/12/2023    Cough     Digestive disorder     Hyperlipidemia     Hypertension     Liver disease     KALYAN (obstructive sleep apnea) 09/08/2022    Stroke 09/01/2022    Type 2 diabetes mellitus, without long-term current use of insulin 09/07/2022    BS didn't check 10/05/2022     Past Surgical History:   Procedure Laterality Date    COLONOSCOPY N/A 6/21/2016    Procedure: COLONOSCOPY;  Surgeon: Alonso Dorsey MD;  Location: [facility];  Service: Endoscopy;  Laterality: N/A;    COLONOSCOPY N/A 11/1/2016    Procedure: COLONOSCOPY;  Surgeon: Alonso Dorsey MD;  Location: [facility];  Service: Endoscopy;  Laterality: N/A;    COLONOSCOPY N/A 2/3/2017    Procedure: COLONOSCOPY;  Surgeon: Alonso Dorsey MD;  Location: [facility];   Service: Endoscopy;  Laterality: N/A;    COLONOSCOPY N/A 2017    Procedure: COLONOSCOPY;  Surgeon: Alonso Dorsey MD;  Location: [facility] ENDO;  Service: Endoscopy;  Laterality: N/A;    COLONOSCOPY N/A 2021    Procedure: COLONOSCOPY;  Surgeon: Paula Ricardo MD;  Location: [facility] ENDO;  Service: Endoscopy;  Laterality: N/A;    FLUOROSCOPY N/A 6/15/2018    Procedure: Transjugular liver bx;  Surgeon: Petros Recio MD;  Location: [facility] CATH LAB;  Service: General;  Laterality: N/A;    TONSILLECTOMY       Family History   Problem Relation Name Age of Onset    Cancer Father      Alzheimer's disease Mother      Colon cancer Neg Hx      Melanoma Neg Hx       Social History     Socioeconomic History    Marital status:    Tobacco Use    Smoking status: Former     Current packs/day: 0.00     Types: Cigarettes, Pipe     Start date:      Quit date:      Years since quittin.9     Passive exposure: Past    Smokeless tobacco: Never    Tobacco comments:     smoked a pipe - quit smoking    Substance and Sexual Activity    Alcohol use: Not Currently     Alcohol/week: 2.0 - 3.0 standard drinks of alcohol     Types: 2 - 3 Cans of beer per week     Comment: daily    Drug use: No    Sexual activity: Not Currently     Partners: Female     Social Drivers of Health     Financial Resource Strain: Low Risk  (9/15/2022)    Overall Financial Resource Strain (CARDIA)     Difficulty of Paying Living Expenses: Not very hard   Food Insecurity: No Food Insecurity (9/15/2022)    Hunger Vital Sign     Worried About Running Out of Food in the Last Year: Never true     Ran Out of Food in the Last Year: Never true   Transportation Needs: No Transportation Needs (9/15/2022)    PRAPARE - Transportation     Lack of Transportation (Medical): No     Lack of Transportation (Non-Medical): No   Physical Activity: Inactive (9/15/2022)    Exercise Vital Sign     Days of Exercise per Week: 0 days     Minutes of Exercise per  Session: 0 min   Stress: No Stress Concern Present (9/15/2022)    [employer] - Occupational Stress Questionnaire     Feeling of Stress : Not at all   Housing Stability: Low Risk  (9/15/2022)    Housing Stability Vital Sign     Unable to Pay for Housing in the Last Year: No     Number of Places Lived in the Last Year: 1     Unstable Housing in the Last Year: No     Review of Systems   Constitutional:  Negative for activity change, appetite change, chills, diaphoresis, fatigue, fever and unexpected weight change.   HENT:  Negative for drooling, ear discharge, ear pain, facial swelling, hearing loss, mouth sores, nosebleeds, postnasal drip, rhinorrhea, sinus pressure, sneezing, sore throat, tinnitus, trouble swallowing and voice change.    Eyes:  Negative for photophobia, redness and visual disturbance.   Respiratory:  Negative for apnea, cough, choking, chest tightness, shortness of breath and wheezing.    Cardiovascular:  Negative for chest pain, palpitations and leg swelling.   Gastrointestinal:  Negative for abdominal distention, abdominal pain, anal bleeding, blood in stool, constipation, diarrhea, nausea and vomiting.        1 year----------episodes of loose stools-------?diet related-   Endocrine: Negative for cold intolerance, heat intolerance, polydipsia, polyphagia and polyuria.   Genitourinary:  Negative for difficulty urinating, dysuria, enuresis, flank pain, frequency, genital sores, hematuria and urgency.   Musculoskeletal:  Positive for arthralgias and myalgias. Negative for back pain, gait problem, joint swelling, neck pain and neck stiffness.   Skin:  Negative for color change, pallor, rash and wound.   Allergic/Immunologic: Negative for food allergies and immunocompromised state.   Neurological:  Negative for dizziness, tremors, seizures, syncope, facial asymmetry, speech difficulty, weakness, light-headedness, numbness and headaches.   Hematological:  Negative for  adenopathy. Does not bruise/bleed easily.   Psychiatric/Behavioral:  Negative for agitation, behavioral problems, confusion, decreased concentration, dysphoric mood, hallucinations, self-injury, sleep disturbance and suicidal ideas. The patient is not nervous/anxious and is not hyperactive.        Objective:      Physical Exam  Vitals and nursing note reviewed.   Constitutional:       General: He is not in acute distress.     Appearance: Normal appearance. He is well-developed. He is not diaphoretic.   HENT:      Head: Normocephalic and atraumatic.      Mouth/Throat:      Pharynx: No oropharyngeal exudate.   Eyes:      General: No scleral icterus.     Pupils: Pupils are equal, round, and reactive to light.   Neck:      Thyroid: No thyromegaly.      Vascular: No carotid bruit or JVD.      Trachea: No tracheal deviation.   Cardiovascular:      Rate and Rhythm: Normal rate and regular rhythm.      Heart sounds: Normal heart sounds.   Pulmonary:      Effort: Pulmonary effort is normal. No respiratory distress.      Breath sounds: Normal breath sounds. No wheezing or rales.   Chest:      Chest wall: No tenderness.   Abdominal:      General: Bowel sounds are normal. There is no distension.      Palpations: Abdomen is soft.      Tenderness: There is no abdominal tenderness. There is no guarding or rebound.   Musculoskeletal:         General: No tenderness. Normal range of motion.      Cervical back: Normal range of motion and neck supple.   Lymphadenopathy:      Cervical: No cervical adenopathy.   Skin:     General: Skin is warm and dry.      Coloration: Skin is not pale.      Findings: No erythema or rash.   Neurological:      Mental Status: He is alert and oriented to person, place, and time.      Cranial Nerves: No cranial nerve deficit.      Coordination: Coordination normal.   Psychiatric:         Behavior: Behavior normal.         Thought Content: Thought content normal.         Judgment: Judgment normal.          CMP  Sodium   Date Value Ref Range Status   07/09/2024 139 136 - 145 mmol/L Final     Potassium   Date Value Ref Range Status   07/09/2024 4.3 3.5 - 5.1 mmol/L Final     Chloride   Date Value Ref Range Status   07/09/2024 107 95 - 110 mmol/L Final     CO2   Date Value Ref Range Status   07/09/2024 22 (L) 23 - 29 mmol/L Final     Glucose   Date Value Ref Range Status   07/09/2024 124 (H) 70 - 110 mg/dL Final     BUN   Date Value Ref Range Status   07/09/2024 16 8 - 23 mg/dL Final     Creatinine   Date Value Ref Range Status   07/09/2024 0.8 0.5 - 1.4 mg/dL Final     Calcium   Date Value Ref Range Status   07/09/2024 9.2 8.7 - 10.5 mg/dL Final     Total Protein   Date Value Ref Range Status   08/08/2024 7.2 6.0 - 8.4 g/dL Final     Albumin   Date Value Ref Range Status   08/08/2024 4.2 3.5 - 5.2 g/dL Final     Total Bilirubin   Date Value Ref Range Status   08/08/2024 1.9 (H) 0.1 - 1.0 mg/dL Final     Comment:     For infants and newborns, interpretation of results should be based  on gestational age, weight and in agreement with clinical  observations.    Premature Infant recommended reference ranges:  Up to 24 hours.............<8.0 mg/dL  Up to 48 hours............<12.0 mg/dL  3-5 days..................<15.0 mg/dL  6-29 days.................<15.0 mg/dL       Alkaline Phosphatase   Date Value Ref Range Status   08/08/2024 61 55 - 135 U/L Final     AST   Date Value Ref Range Status   08/08/2024 27 10 - 40 U/L Final     ALT   Date Value Ref Range Status   08/08/2024 30 10 - 44 U/L Final     Anion Gap   Date Value Ref Range Status   07/09/2024 10 8 - 16 mmol/L Final     eGFR if    Date Value Ref Range Status   01/20/2021 >60.0 >60 mL/min/1.73 m^2 Final     eGFR if non    Date Value Ref Range Status   01/20/2021 >60.0 >60 mL/min/1.73 m^2 Final     Comment:     Calculation used to obtain the estimated glomerular filtration  rate (eGFR) is the CKD-EPI equation.        Lab Results    Component Value Date    WBC 6.81 09/20/2024    HGB 12.9 (L) 09/20/2024    HCT 42.1 09/20/2024    MCV 98 09/20/2024     09/20/2024     Lab Results   Component Value Date    CHOL 132 08/08/2024     Lab Results   Component Value Date    HDL 47 08/08/2024     Lab Results   Component Value Date    LDLCALC 47.4 (L) 08/08/2024     Lab Results   Component Value Date    TRIG 188 (H) 08/08/2024     Lab Results   Component Value Date    CHOLHDL 35.6 08/08/2024     Lab Results   Component Value Date    TSH 2.979 12/05/2023     Lab Results   Component Value Date    HGBA1C 5.9 (H) 08/08/2024     Assessment:       1. Anemia, unspecified type    2. Chronic diastolic congestive heart failure    3. Dyslipidemia    4. Primary hypertension    5. History of lacunar cerebrovascular accident    6. Mild intermittent asthma without complication    7. Obstructive sleep apnea    8. Type 2 diabetes mellitus without complication, without long-term current use of insulin    9. Loose stools    10. Need for vaccination        Plan:   Anemia, unspecified type------------stable-  -     CBC Auto Differential; Future; Expected date: 12/10/2024    Chronic diastolic congestive heart failure------------------sees cards-    Dyslipidemia--------------statin-----------    Primary hypertension    History of lacunar cerebrovascular accident    Mild intermittent asthma without complication    Obstructive sleep apnea    Type 2 diabetes mellitus without complication, without long-term current use of insulin--------------pt. Now considering mounjaro   -     Hemoglobin A1C; Future; Expected date: 12/10/2024    Loose stools-----will call if wants to see CRS in consultation-----------    Need for vaccination  -     Influenza - Trivalent (Adjuvanted)      Stable-------------------continue meds------------------as above------------------------------------f/u 4 months---------------

## 2024-12-11 ENCOUNTER — PATIENT MESSAGE (OUTPATIENT)
Dept: FAMILY MEDICINE | Facility: CLINIC | Age: 84
End: 2024-12-11
Payer: COMMERCIAL

## 2025-01-15 ENCOUNTER — OFFICE VISIT (OUTPATIENT)
Dept: PODIATRY | Facility: CLINIC | Age: 85
End: 2025-01-15
Payer: COMMERCIAL

## 2025-01-15 VITALS — BODY MASS INDEX: 38.76 KG/M2 | HEIGHT: 66 IN | WEIGHT: 241.19 LBS

## 2025-01-15 DIAGNOSIS — E11.49 TYPE II DIABETES MELLITUS WITH NEUROLOGICAL MANIFESTATIONS: ICD-10-CM

## 2025-01-15 DIAGNOSIS — B35.1 DERMATOPHYTOSIS OF NAIL: ICD-10-CM

## 2025-01-15 DIAGNOSIS — E11.9 ENCOUNTER FOR COMPREHENSIVE DIABETIC FOOT EXAMINATION, TYPE 2 DIABETES MELLITUS: Primary | ICD-10-CM

## 2025-01-15 PROCEDURE — 1160F RVW MEDS BY RX/DR IN RCRD: CPT | Mod: CPTII,S$GLB,, | Performed by: PODIATRIST

## 2025-01-15 PROCEDURE — 3288F FALL RISK ASSESSMENT DOCD: CPT | Mod: CPTII,S$GLB,, | Performed by: PODIATRIST

## 2025-01-15 PROCEDURE — 11721 DEBRIDE NAIL 6 OR MORE: CPT | Mod: S$GLB,,, | Performed by: PODIATRIST

## 2025-01-15 PROCEDURE — 99999 PR PBB SHADOW E&M-EST. PATIENT-LVL IV: CPT | Mod: PBBFAC,,, | Performed by: PODIATRIST

## 2025-01-15 PROCEDURE — 1126F AMNT PAIN NOTED NONE PRSNT: CPT | Mod: CPTII,S$GLB,, | Performed by: PODIATRIST

## 2025-01-15 PROCEDURE — 99213 OFFICE O/P EST LOW 20 MIN: CPT | Mod: 25,S$GLB,, | Performed by: PODIATRIST

## 2025-01-15 PROCEDURE — 1101F PT FALLS ASSESS-DOCD LE1/YR: CPT | Mod: CPTII,S$GLB,, | Performed by: PODIATRIST

## 2025-01-15 PROCEDURE — 1159F MED LIST DOCD IN RCRD: CPT | Mod: CPTII,S$GLB,, | Performed by: PODIATRIST

## 2025-01-15 NOTE — PROGRESS NOTES
Subjective:     Patient ID: Jovan Del Cid Jr. is a 84 y.o. male.    Chief Complaint: Nail Care (6 month Nail care. Diabetic. Pt rates pain 0/10. Pt wears tennis shoes and socks. Last PCP appt was 12/10/2024 with Dr Paul Neal.)    Jovan is a 84 y.o. male who presents to the clinic for evaluation and treatment of high risk feet. Jovan has a past medical history of Acute CVA (cerebrovascular accident) (09/08/2022), DANY (acute kidney injury) (09/14/2022), Asthma, Bronchitis chronic, Cancer, Chronic cough (11/15/2013), Chronic diastolic congestive heart failure (09/12/2023), Cough, Digestive disorder, Hyperlipidemia, Hypertension, Liver disease, KALYAN (obstructive sleep apnea) (09/08/2022), Stroke (09/01/2022), and Type 2 diabetes mellitus, without long-term current use of insulin (09/07/2022). The patient's chief complaint is long, thick toenails. This patient has documented high risk feet requiring routine maintenance secondary to diabetes mellitis and those secondary complications of diabetes, as mentioned.     PCP: Paul Neal MD    Date Last Seen by PCP: 12/10/2024    Current shoe gear:  Affected Foot: Tennis shoes     Unaffected Foot: Tennis shoes    Hemoglobin A1C   Date Value Ref Range Status   12/10/2024 6.2 (H) 4.0 - 5.6 % Final     Comment:     ADA Screening Guidelines:  5.7-6.4%  Consistent with prediabetes  >or=6.5%  Consistent with diabetes    High levels of fetal hemoglobin interfere with the HbA1C  assay. Heterozygous hemoglobin variants (HbS, HgC, etc)do  not significantly interfere with this assay.   However, presence of multiple variants may affect accuracy.     08/08/2024 5.9 (H) 4.0 - 5.6 % Final     Comment:     ADA Screening Guidelines:  5.7-6.4%  Consistent with prediabetes  >or=6.5%  Consistent with diabetes    High levels of fetal hemoglobin interfere with the HbA1C  assay. Heterozygous hemoglobin variants (HbS, HgC, etc)do  not significantly interfere with this  assay.   However, presence of multiple variants may affect accuracy.     04/08/2024 5.7 (H) 4.0 - 5.6 % Final     Comment:     ADA Screening Guidelines:  5.7-6.4%  Consistent with prediabetes  >or=6.5%  Consistent with diabetes    High levels of fetal hemoglobin interfere with the HbA1C  assay. Heterozygous hemoglobin variants (HbS, HgC, etc)do  not significantly interfere with this assay.   However, presence of multiple variants may affect accuracy.         Patient Active Problem List   Diagnosis    Intolerance of continuous positive airway pressure (CPAP) ventilation    Liver cyst    HTN (hypertension)    Dyslipidemia    Benign prostatic hyperplasia    Lipoma    Amyloid disease    Chronic maxillary sinusitis    Tubulovillous adenoma of colon    MCMAHAN (nonalcoholic steatohepatitis)    Mild intermittent asthma without complication    BMI 37.0-37.9, adult    Personal history of colonic polyps    History of lacunar cerebrovascular accident    Type 2 diabetes mellitus, without long-term current use of insulin    Obstructive sleep apnea    Stenosis of left carotid artery    Venous insufficiency    Valvular heart disease    Chronic diastolic congestive heart failure    Unequal blood pressures in paired extremities    Cognitive complaints with normal exam    Nocturnal hypoxemia due to asthma    Anemia       Medication List with Changes/Refills   Current Medications    ALBUTEROL (VENTOLIN HFA) 90 MCG/ACTUATION INHALER    Inhale 1-2 puffs into the lungs every 6 (six) hours as needed for Wheezing. Rescue    ASPIRIN (ECOTRIN) 81 MG EC TABLET    Take 1 tablet (81 mg total) by mouth once daily.    BLOOD SUGAR DIAGNOSTIC STRP    1 strip by Misc.(Non-Drug; Combo Route) route 2 (two) times daily with meals.    BLOOD SUGAR DIAGNOSTIC STRP    Test blood glucose 2 times a day    BLOOD-GLUCOSE METER KIT    Use as instructed    FLUTICASONE PROPIONATE (FLONASE) 50 MCG/ACTUATION NASAL SPRAY    SHAKE LIQUID AND USE 2 SPRAYS IN EACH NOSTRIL  "EVERY DAY    FLUTICASONE-SALMETEROL 230-21 MCG/DOSE (ADVAIR HFA) 230-21 MCG/ACTUATION HFAA INHALER    Inhale 2 puffs into the lungs every 12 (twelve) hours. Controller    FUROSEMIDE (LASIX) 20 MG TABLET    Take 1 tablet (20 mg total) by mouth every Mon, Tues, Wed, Thurs, Fri.    GLIPIZIDE (GLUCOTROL) 2.5 MG TR24    TAKE 1 TABLET(2.5 MG) BY MOUTH DAILY WITH BREAKFAST    IPRATROPIUM (ATROVENT) 21 MCG (0.03 %) NASAL SPRAY    2 sprays by Each Nostril route 2 (two) times daily.    LANCETS MISC    1 each by Misc.(Non-Drug; Combo Route) route 2 (two) times daily with meals.    LANCING DEVICE MISC    1 Device by Misc.(Non-Drug; Combo Route) route 2 (two) times daily with meals.    LOSARTAN (COZAAR) 50 MG TABLET    Take 1 tablet (50 mg total) by mouth once daily.    MONTELUKAST (SINGULAIR) 10 MG TABLET    Take 1 tablet (10 mg total) by mouth every evening.    MULTIVITAMIN (THERAGRAN) PER TABLET    Take 1 tablet by mouth once daily.    MV-MN-LUTEIN-RIQI-[id number]- (MACULAR HEALTH FORMULA) 5-1-7.5 MG CAP    Take by mouth.    ONETOUCH DELICA PLUS LANCET 33 GAUGE MISC    USE TO CHECK BLOOD GLUCOSE TWO TIMES A DAY WITH MEALS    PANTOPRAZOLE (PROTONIX) 40 MG TABLET    Take 1 tablet (40 mg total) by mouth daily as needed.    PEN NEEDLE, DIABETIC 31 GAUGE X 5/16" NDLE    1 each by Misc.(Non-Drug; Combo Route) route 2 (two) times daily with meals.    ROSUVASTATIN (CRESTOR) 40 MG TAB    TAKE 1 TABLET BY MOUTH EVERY DAY    TRIAMCINOLONE ACETONIDE 0.1% (KENALOG) 0.1 % CREAM    Apply topically 2 (two) times daily.       Review of patient's allergies indicates:  No Known Allergies    Past Surgical History:   Procedure Laterality Date    COLONOSCOPY N/A 6/21/2016    Procedure: COLONOSCOPY;  Surgeon: Alonso Dorsey MD;  Location: [facility];  Service: Endoscopy;  Laterality: N/A;    COLONOSCOPY N/A 11/1/2016    Procedure: COLONOSCOPY;  Surgeon: Alonso Dorsey MD;  Location: [facility];  Service: Endoscopy;  Laterality: N/A;    " COLONOSCOPY N/A 2/3/2017    Procedure: COLONOSCOPY;  Surgeon: Alonso Dorsey MD;  Location: [facility] ENDO;  Service: Endoscopy;  Laterality: N/A;    COLONOSCOPY N/A 2017    Procedure: COLONOSCOPY;  Surgeon: Alonso Dorsey MD;  Location: [facility] ENDO;  Service: Endoscopy;  Laterality: N/A;    COLONOSCOPY N/A 2021    Procedure: COLONOSCOPY;  Surgeon: Paula Ricardo MD;  Location: [facility] ENDO;  Service: Endoscopy;  Laterality: N/A;    FLUOROSCOPY N/A 6/15/2018    Procedure: Transjugular liver bx;  Surgeon: Petros Recio MD;  Location: [facility] CATH LAB;  Service: General;  Laterality: N/A;    TONSILLECTOMY         Family History   Problem Relation Name Age of Onset    Cancer Father      Alzheimer's disease Mother      Colon cancer Neg Hx      Melanoma Neg Hx         Social History     Socioeconomic History    Marital status:    Tobacco Use    Smoking status: Former     Current packs/day: 0.00     Types: Cigarettes, Pipe     Start date:      Quit date:      Years since quittin.0     Passive exposure: Past    Smokeless tobacco: Never    Tobacco comments:     smoked a pipe - quit smoking    Substance and Sexual Activity    Alcohol use: Not Currently     Alcohol/week: 2.0 - 3.0 standard drinks of alcohol     Types: 2 - 3 Cans of beer per week     Comment: daily    Drug use: No    Sexual activity: Not Currently     Partners: Female     Social Drivers of Health     Financial Resource Strain: Low Risk  (9/15/2022)    Overall Financial Resource Strain (CARDIA)     Difficulty of Paying Living Expenses: Not very hard   Food Insecurity: No Food Insecurity (9/15/2022)    Hunger Vital Sign     Worried About Running Out of Food in the Last Year: Never true     Ran Out of Food in the Last Year: Never true   Transportation Needs: No Transportation Needs (9/15/2022)    PRAPARE - Transportation     Lack of Transportation (Medical): No     Lack of Transportation (Non-Medical): No   Physical  "Activity: Inactive (9/15/2022)    Exercise Vital Sign     Days of Exercise per Week: 0 days     Minutes of Exercise per Session: 0 min   Stress: No Stress Concern Present (9/15/2022)    [employer] [place] [employer] - Occupational Stress Questionnaire     Feeling of Stress : Not at all   Housing Stability: Low Risk  (9/15/2022)    Housing Stability Vital Sign     Unable to Pay for Housing in the Last Year: No     Number of Places Lived in the Last Year: 1     Unstable Housing in the Last Year: No       Vitals:    01/15/25 1451   Weight: 109.4 kg (241 lb 2.9 oz)   Height: 5' 6" (1.676 m)   PainSc: 0-No pain       Hemoglobin A1C   Date Value Ref Range Status   12/10/2024 6.2 (H) 4.0 - 5.6 % Final     Comment:     ADA Screening Guidelines:  5.7-6.4%  Consistent with prediabetes  >or=6.5%  Consistent with diabetes    High levels of fetal hemoglobin interfere with the HbA1C  assay. Heterozygous hemoglobin variants (HbS, HgC, etc)do  not significantly interfere with this assay.   However, presence of multiple variants may affect accuracy.     08/08/2024 5.9 (H) 4.0 - 5.6 % Final     Comment:     ADA Screening Guidelines:  5.7-6.4%  Consistent with prediabetes  >or=6.5%  Consistent with diabetes    High levels of fetal hemoglobin interfere with the HbA1C  assay. Heterozygous hemoglobin variants (HbS, HgC, etc)do  not significantly interfere with this assay.   However, presence of multiple variants may affect accuracy.     04/08/2024 5.7 (H) 4.0 - 5.6 % Final     Comment:     ADA Screening Guidelines:  5.7-6.4%  Consistent with prediabetes  >or=6.5%  Consistent with diabetes    High levels of fetal hemoglobin interfere with the HbA1C  assay. Heterozygous hemoglobin variants (HbS, HgC, etc)do  not significantly interfere with this assay.   However, presence of multiple variants may affect accuracy.         Review of Systems   Constitutional:  Negative for chills and fever.   Respiratory:  Negative for shortness of " breath.    Cardiovascular:  Negative for chest pain, palpitations, orthopnea, claudication and leg swelling.   Gastrointestinal:  Negative for diarrhea, nausea and vomiting.   Musculoskeletal:  Negative for joint pain.   Skin:  Negative for rash.   Neurological:  Negative for dizziness, tingling, sensory change, focal weakness and weakness.   Psychiatric/Behavioral: Negative.               Objective:      PHYSICAL EXAM: Apperance: Alert and orient in no distress,well developed, and with good attention to grooming and body habits  Patient presents ambulating in tennis shoes.   LOWER EXTREMITY EXAM:  VASCULAR: Dorsalis pedis pulses 2/4 bilateral and Posterior Tibial pulses 1/4 bilateral. Capillary fill time <4 seconds bilateral. No edema observed bilateral. Varicosities absent bilateral. Skin temperature of the lower extremities is warm to cool, proximal to distal. Hair growth dim bilateral.  DERMATOLOGICAL: No skin rashes, subcutaneous nodules, lesions, or ulcers observed bilateral. Nails 1,2,3,4,5 right and 2,3,4,5 left bilateral elongated, thickened, and discolored with subungual debris. Left hallux nail absent.  Webspaces 1,2,3,4 bilateral clean, dry and without evidence of break in skin integrity.   NEUROLOGICAL: Light touch, sharp-dull, proprioception all present and equal bilaterally.  Vibratory sensation diminished at right hallux and intact at left. Protective sensation intact at all 10 sites as tested with a [place] 5.07 monofilament.   MUSCULOSKELETAL: Muscle strength is 5/5 for foot inverters, everters, plantarflexors, and dorsiflexors. Muscle tone is normal.         Assessment:       ICD-10-CM ICD-9-CM   1. Encounter for comprehensive diabetic foot examination, type 2 diabetes mellitus  E11.9 250.00   2. Type II diabetes mellitus with neurological manifestations  E11.49 250.60   3. Dermatophytosis of nail  B35.1 110.1             Plan:   Encounter for comprehensive diabetic foot examination, type  2 diabetes mellitus    Type II diabetes mellitus with neurological manifestations    Dermatophytosis of nail    I counseled the patient on his conditions, regarding findings of my examination, my impressions, and usual treatment plan.   This visit spent on counseling and coordination of care.  Appointment spent on education about the diabetic foot, neuropathy, and prevention of limb loss.  Shoe inspection. Diabetic Foot Education. Patient reminded of the importance of good nutrition and blood sugar control to help prevent podiatric complications of diabetes. Patient instructed on proper foot hygeine. We discussed wearing proper shoe gear, daily foot inspections, never walking without protective shoe gear, never putting sharp instruments to feet.    With patient's permission, nails as noted above bilateral were debrided/trimmed in length and thickness aggressively to their soft tissue attachment mechanically and with electric , removing all offending nail and debris. Patient relates relief following the procedure.  Discussed with patient treatments for neuropathy consisting of topical or oral medication.  Recommendations given for over-the-counter medicine such as Two [place] Goats and/or Capsaicin.  Patient  will continue to monitor the areas daily, inspect feet, wear protective shoe gear when ambulatory, moisturizer to maintain skin integrity. Patient reminded of the importance of good nutrition and blood sugar control to help prevent podiatric complications of diabetes.  Patient to return 6 months or sooner if needed.          Shelley Gallardo DPM  [facility]

## 2025-02-07 ENCOUNTER — CLINICAL SUPPORT (OUTPATIENT)
Dept: PULMONOLOGY | Facility: CLINIC | Age: 85
End: 2025-02-07
Attending: INTERNAL MEDICINE
Payer: COMMERCIAL

## 2025-02-07 ENCOUNTER — HOSPITAL ENCOUNTER (OUTPATIENT)
Dept: RADIOLOGY | Facility: HOSPITAL | Age: 85
Discharge: HOME | End: 2025-02-07
Attending: INTERNAL MEDICINE
Payer: COMMERCIAL

## 2025-02-07 VITALS
RESPIRATION RATE: 20 BRPM | DIASTOLIC BLOOD PRESSURE: 80 MMHG | WEIGHT: 245.69 LBS | OXYGEN SATURATION: 96 % | HEART RATE: 64 BPM | SYSTOLIC BLOOD PRESSURE: 136 MMHG | BODY MASS INDEX: 39.48 KG/M2 | HEIGHT: 66 IN

## 2025-02-07 DIAGNOSIS — E78.5 DYSLIPIDEMIA: ICD-10-CM

## 2025-02-07 DIAGNOSIS — Z78.9 INTOLERANCE OF CONTINUOUS POSITIVE AIRWAY PRESSURE (CPAP) VENTILATION: ICD-10-CM

## 2025-02-07 DIAGNOSIS — I10 PRIMARY HYPERTENSION: ICD-10-CM

## 2025-02-07 DIAGNOSIS — J45.20 MILD INTERMITTENT ASTHMA WITHOUT COMPLICATION: ICD-10-CM

## 2025-02-07 DIAGNOSIS — G47.33 OBSTRUCTIVE SLEEP APNEA: ICD-10-CM

## 2025-02-07 DIAGNOSIS — J32.0 CHRONIC MAXILLARY SINUSITIS: ICD-10-CM

## 2025-02-07 DIAGNOSIS — I50.32 CHRONIC DIASTOLIC CONGESTIVE HEART FAILURE: ICD-10-CM

## 2025-02-07 DIAGNOSIS — J45.40 MODERATE PERSISTENT ASTHMA WITHOUT COMPLICATION: Primary | ICD-10-CM

## 2025-02-07 DIAGNOSIS — J45.20 MILD INTERMITTENT ASTHMA WITHOUT COMPLICATION: Chronic | ICD-10-CM

## 2025-02-07 DIAGNOSIS — J45.909 NOCTURNAL HYPOXEMIA DUE TO ASTHMA: ICD-10-CM

## 2025-02-07 DIAGNOSIS — R06.02 SOB (SHORTNESS OF BREATH): ICD-10-CM

## 2025-02-07 DIAGNOSIS — E11.9 TYPE 2 DIABETES MELLITUS WITHOUT COMPLICATION, WITHOUT LONG-TERM CURRENT USE OF INSULIN: ICD-10-CM

## 2025-02-07 DIAGNOSIS — R09.02 NOCTURNAL HYPOXEMIA DUE TO ASTHMA: ICD-10-CM

## 2025-02-07 LAB
BRPFT: NORMAL
FEF 25 75 LLN: 0.63
FEF 25 75 PRE REF: 46.4 %
FEF 25 75 REF: 2.34
FEV1 FVC LLN: 59
FEV1 FVC PRE REF: 91 %
FEV1 FVC REF: 75
FEV1 LLN: 1.64
FEV1 PRE REF: 79.7 %
FEV1 REF: 2.41
FVC LLN: 2.32
FVC PRE REF: 86.8 %
FVC REF: 3.25
PEF LLN: 3.78
PEF PRE REF: 99.6 %
PEF REF: 5.84
PRE FEF 25 75: 1.09 L/S (ref 0.63–4.05)
PRE FET 100: 11.34 SEC
PRE FEV1 FVC: 68.09 % (ref 59.34–89.02)
PRE FEV1: 1.92 L (ref 1.64–3.12)
PRE FVC: 2.82 L (ref 2.32–4.2)
PRE PEF: 5.82 L/S (ref 3.78–7.9)

## 2025-02-07 PROCEDURE — 95012 NITRIC OXIDE EXP GAS DETER: CPT | Mod: S$GLB,,, | Performed by: INTERNAL MEDICINE

## 2025-02-07 PROCEDURE — 3288F FALL RISK ASSESSMENT DOCD: CPT | Mod: CPTII,S$GLB,, | Performed by: INTERNAL MEDICINE

## 2025-02-07 PROCEDURE — 99999 PR PBB SHADOW E&M-EST. PATIENT-LVL V: CPT | Mod: PBBFAC,,, | Performed by: INTERNAL MEDICINE

## 2025-02-07 PROCEDURE — 71046 X-RAY EXAM CHEST 2 VIEWS: CPT | Mod: 26,,, | Performed by: RADIOLOGY

## 2025-02-07 PROCEDURE — 1101F PT FALLS ASSESS-DOCD LE1/YR: CPT | Mod: CPTII,S$GLB,, | Performed by: INTERNAL MEDICINE

## 2025-02-07 PROCEDURE — 3079F DIAST BP 80-89 MM HG: CPT | Mod: CPTII,S$GLB,, | Performed by: INTERNAL MEDICINE

## 2025-02-07 PROCEDURE — 1126F AMNT PAIN NOTED NONE PRSNT: CPT | Mod: CPTII,S$GLB,, | Performed by: INTERNAL MEDICINE

## 2025-02-07 PROCEDURE — 1159F MED LIST DOCD IN RCRD: CPT | Mod: CPTII,S$GLB,, | Performed by: INTERNAL MEDICINE

## 2025-02-07 PROCEDURE — 3075F SYST BP GE 130 - 139MM HG: CPT | Mod: CPTII,S$GLB,, | Performed by: INTERNAL MEDICINE

## 2025-02-07 PROCEDURE — 94010 BREATHING CAPACITY TEST: CPT | Mod: S$GLB,,, | Performed by: INTERNAL MEDICINE

## 2025-02-07 PROCEDURE — 1160F RVW MEDS BY RX/DR IN RCRD: CPT | Mod: CPTII,S$GLB,, | Performed by: INTERNAL MEDICINE

## 2025-02-07 PROCEDURE — 71046 X-RAY EXAM CHEST 2 VIEWS: CPT | Mod: TC

## 2025-02-07 PROCEDURE — 99214 OFFICE O/P EST MOD 30 MIN: CPT | Mod: 25,S$GLB,, | Performed by: INTERNAL MEDICINE

## 2025-02-07 RX ORDER — ALBUTEROL SULFATE 90 UG/1
1-2 INHALANT RESPIRATORY (INHALATION) EVERY 6 HOURS PRN
Qty: 18 G | Refills: 3 | Status: SHIPPED | OUTPATIENT
Start: 2025-02-07 | End: 2025-02-12

## 2025-02-07 RX ORDER — BUDESONIDE 0.5 MG/2ML
0.5 INHALANT ORAL DAILY
Qty: 180 ML | Refills: 0 | Status: SHIPPED | OUTPATIENT
Start: 2025-02-07 | End: 2025-05-08

## 2025-02-07 NOTE — PROGRESS NOTES
Subjective:       Patient ID: Jovan Del Cid Jr. is a 84 y.o. male.  Patient Active Problem List   Diagnosis    Intolerance of continuous positive airway pressure (CPAP) ventilation    Liver cyst    HTN (hypertension)    Dyslipidemia    Benign prostatic hyperplasia    Lipoma    Amyloid disease    Chronic maxillary sinusitis    Tubulovillous adenoma of colon    MCMAHAN (nonalcoholic steatohepatitis)    Moderate persistent asthma without complication    BMI 37.0-37.9, adult    Personal history of colonic polyps    History of lacunar cerebrovascular accident    Type 2 diabetes mellitus, without long-term current use of insulin    Obstructive sleep apnea    Stenosis of left carotid artery    Venous insufficiency    Valvular heart disease    Chronic diastolic congestive heart failure    Unequal blood pressures in paired extremities    Cognitive complaints with normal exam    Nocturnal hypoxemia due to asthma    Anemia     Immunization History   Administered Date(s) Administered    COVID-19, MRNA, LN-S, PF (Pfizer) (Purple Cap) 01/10/2021, 2021, 2021    COVID-19, mRNA, LNP-S, bivalent booster, PF (PFIZER OMICRON) 10/14/2022    Influenza (FLUAD) - Quadrivalent - Adjuvanted - PF *Preferred* (65+) 10/15/2020, 2022, 2023    Influenza - Trivalent - Fluad - Adjuvanted - PF (65 years and older 12/10/2024    Influenza - Trivalent - Fluzone High Dose - PF (65 years and older) 2020    Pneumococcal Conjugate - 20 Valent 10/24/2022    Zoster Recombinant 2023, 2023     Social History     Tobacco Use   Smoking Status Former    Current packs/day: 0.00    Types: Cigarettes, Pipe    Start date:     Quit date:     Years since quittin.1    Passive exposure: Past   Smokeless Tobacco Never   Tobacco Comments    smoked a pipe - quit smoking      Asthma Control Test  In the past 4  weeks, how much of the time did your asthma keep you from getting as much done at work, school or at  home?: A little of the time  During the past 4 weeks, how often have you had shortness of breath?: 3 to 6 times a week  During the past 4 weeks, how often did your asthma symptoms (wheezing, couging, shortness of breath, chest tightness or pain) wake you up at night or earlier than usual in the morning?: Once or twice  During the past 4 weeks, how often have you used your rescue inhaler or nebulizer medication (such as albuterol)?: Not at all  How would you rate your asthma control during the past 4 weeks?: Somewhat controlled  If your score is 19 or less, your asthma may not be under control: 19              Asthma Control Test  In the past 4  weeks, how much of the time did your asthma keep you from getting as much done at work, school or at home?: A little of the time  During the past 4 weeks, how often have you had shortness of breath?: 3 to 6 times a week  During the past 4 weeks, how often did your asthma symptoms (wheezing, couging, shortness of breath, chest tightness or pain) wake you up at night or earlier than usual in the morning?: Once or twice  During the past 4 weeks, how often have you used your rescue inhaler or nebulizer medication (such as albuterol)?: Not at all  How would you rate your asthma control during the past 4 weeks?: Somewhat controlled  If your score is 19 or less, your asthma may not be under control: 19          2/7/2025   EPWORTH SLEEPINESS SCALE   Sitting and reading 2   Watching TV 2   Sitting, inactive in a public place (e.g. a theatre or a meeting) 2   As a passenger in a car for an hour without a break 0   Lying down to rest in the afternoon when circumstances permit 3   Sitting and talking to someone 3   Sitting quietly after a lunch without alcohol 3   In a car, while stopped for a few minutes in traffic 0   Total score 15      Asthma Control Test  In the past 4  weeks, how much of the time did your asthma keep you from getting as much done at work, school or at home?: A  little of the time  During the past 4 weeks, how often have you had shortness of breath?: 3 to 6 times a week  During the past 4 weeks, how often did your asthma symptoms (wheezing, couging, shortness of breath, chest tightness or pain) wake you up at night or earlier than usual in the morning?: Once or twice  During the past 4 weeks, how often have you used your rescue inhaler or nebulizer medication (such as albuterol)?: Not at all  How would you rate your asthma control during the past 4 weeks?: Somewhat controlled  If your score is 19 or less, your asthma may not be under control: 19           Chief Complaint: Sleep Apnea and Asthma        Jovan Del Cid Jr. is a 80 y.o.  male   Follow up, No cough, No congetion.No fever  ACT  20: hardly taking rescue albuterol HFA.   Here to reviewed : CXR and Drew  Normal [place]  Runs asphalt bussiness  Busy lifestyle.  No interval asthma exacerbations since last visit, Actually not used rescue albuterol in 12 months.  Spirometry normal         09/21/2022  Last visit 10/15/2022  Referred by Dr Dagoberto Montes  Recent lacunar CVA  Known KALYAN Moderate KALYAN titrated to CPAP 12 cm  Snoring and apnea  Therapy options discussed  Was in Hospital x 2  Spouse present  Goals and therapy options discussed      12/13/2022  Last seen 09/21/2022  Sleep study reveiwed  Severe KALYAN  Forgetful  [place]: Normal  FeNo 43  ACT score20  Wife says gets SOB walking  Added LABA ICS  Follow 8 weeks    12/11/2023  Followup  Stroke Last year  C/o DTS  [place] 17  Last titration CPAP 17 was adequate  Failed to fully habituated  Study reviewed  No sleepy driving  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      Titration reveiwed  BIPAP 17/13 was adequate  After long discussion patient eventually agreed to use a BiPAP.    Not open to other options like mandibular device or inspire  Will order  Time below on titration: Oxygen saturations were . 88 % for 8.8 minutes    05/24/2024  Followup  Not using BIPAP here with spouse  Not attempted to use BIPAP  Wearing O2 in lieu  Discussed INSPIRE  Not willing  Riks untreated KALYAN discussed      09/20/2024  Followup  Accompanied by his wife   Four weeks ago   Cough congestion mucus disrupting sleep at night last seen was 08/20/2024  Cough and wheezing   Patient attests using his asthma medications well   Sleep also disrupted by nocturia   Bedtime 9.  Time 7:00 a.m.   Naps for 1 hour   FEV1 1.71 L (70.3% predicted)  Exhaled nitric oxide was 69 ppb    11/08/2024  Followup  Doing well  Spouse here  FeNo dropped from 69 to 15 parts per billion  Advair 230-21  Download reviewed  He is using the device more consistently but not long enough   Adherence discussed    02/07/2025   Follow-up visit   Follow up to review results   Apparently taking his Advair 3 puff BID or More  ACT score 19  FeNo has trended up again from 15 PPB to 63 PPB  No fever  Spirometry reviewed NORMAL  Missed using BIPAP: [place] of [place]  BIPAP adherence has been poor  Doesn't like mask  Wants nasal  Will purchase from sleep apnea store  C/o dry mouth          Asthma  He complains of cough. There is no shortness of breath, sputum production or wheezing. Pertinent negatives include no postnasal drip. His past medical history is significant for asthma.     Review of Systems   Constitutional: Negative.    HENT:  Negative for postnasal drip and congestion.    Eyes: Negative.    Respiratory:  Positive for cough. Negative for snoring, sputum production, shortness of breath, wheezing, asthma nighttime symptoms, pleurisy, dyspnea on extertion, use of rescue inhaler and somnolence.   "  Cardiovascular: Negative.    Genitourinary: Negative.    Endocrine: endocrine negative    Musculoskeletal: Negative.    Skin: Negative.    Gastrointestinal: Negative.    Neurological: Negative.    Psychiatric/Behavioral: Negative.     All other systems reviewed and are negative.           Goals of Care Treatment Preferences:  Code Status: Full Code       BP Readings from Last 3 Encounters:   02/07/25 136/80   12/10/24 122/84   11/08/24 124/80          Neck circumference  18.5" [?KALYAN risk if >43cm (17in) male or >41cm (15.5 in) female]             The following portions of the patient's history were reviewed and updated as appropriate: He  has a past medical history of Acute CVA (cerebrovascular accident) (09/08/2022), DANY (acute kidney injury) (09/14/2022), Asthma, Bronchitis chronic, Cancer, Chronic cough (11/15/2013), Chronic diastolic congestive heart failure (09/12/2023), Cough, Digestive disorder, Hyperlipidemia, Hypertension, Liver disease, KALYAN (obstructive sleep apnea) (09/08/2022), Stroke (09/01/2022), and Type 2 diabetes mellitus, without long-term current use of insulin (09/07/2022).  He does not have any pertinent problems on file.  He  has a past surgical history that includes Tonsillectomy; Colonoscopy (N/A, 6/21/2016); Colonoscopy (N/A, 11/1/2016); Colonoscopy (N/A, 2/3/2017); Colonoscopy (N/A, 11/13/2017); Fluoroscopy (N/A, 6/15/2018); and Colonoscopy (N/A, 2/19/2021).  His family history includes Alzheimer's disease in his mother; Cancer in his father.  He  reports that he quit smoking about 65 years ago. His smoking use included cigarettes and pipe. He started smoking about 68 years ago. He has been exposed to tobacco smoke. He has never used smokeless tobacco. He reports that he does not currently use alcohol after a past usage of about 2.0 - 3.0 standard drinks of alcohol per week. He reports that he does not use drugs.  He has a current medication list which includes the following " prescription(s): albuterol, aspirin, blood sugar diagnostic, blood sugar diagnostic, blood-glucose meter, budesonide, fluticasone propionate, fluticasone-salmeterol 230-21 mcg/dose, furosemide, glipizide, ipratropium, lancets, lancing device, losartan, montelukast, multivitamin, macular health formula, onetouch delica plus lancet, pantoprazole, pen needle, diabetic, rosuvastatin, and triamcinolone acetonide 0.1%, and the following Facility-Administered Medications: epinephrine and ondansetron.  Current Outpatient Medications on File Prior to Visit   Medication Sig Dispense Refill    aspirin (ECOTRIN) 81 MG EC tablet Take 1 tablet (81 mg total) by mouth once daily. 90 tablet 3    blood sugar diagnostic Strp 1 strip by Misc.(Non-Drug; Combo Route) route 2 (two) times daily with meals. 200 strip 3    blood sugar diagnostic Strp Test blood glucose 2 times a day 200 strip 3    blood-glucose meter kit Use as instructed 1 each 0    fluticasone propionate (FLONASE) 50 mcg/actuation nasal spray SHAKE LIQUID AND USE 2 SPRAYS IN EACH NOSTRIL EVERY DAY 48 g 3    fluticasone-salmeterol 230-21 mcg/dose (ADVAIR HFA) 230-21 mcg/actuation HFAA inhaler Inhale 2 puffs into the lungs every 12 (twelve) hours. Controller 12 g 11    furosemide (LASIX) 20 MG tablet Take 1 tablet (20 mg total) by mouth every Mon, Tues, Wed, Thurs, Fri. 60 tablet 3    glipiZIDE (GLUCOTROL) 2.5 MG TR24 TAKE 1 TABLET(2.5 MG) BY MOUTH DAILY WITH BREAKFAST 90 tablet 1    ipratropium (ATROVENT) 21 mcg (0.03 %) nasal spray 2 sprays by Each Nostril route 2 (two) times daily. 30 mL 0    lancets Misc 1 each by Misc.(Non-Drug; Combo Route) route 2 (two) times daily with meals. 100 each 11    lancing device Misc 1 Device by Misc.(Non-Drug; Combo Route) route 2 (two) times daily with meals. 1 each 0    losartan (COZAAR) 50 MG tablet Take 1 tablet (50 mg total) by mouth once daily. 90 tablet 3    montelukast (SINGULAIR) 10 mg tablet Take 1 tablet (10 mg total) by mouth  "every evening. 90 tablet 3    multivitamin (THERAGRAN) per tablet Take 1 tablet by mouth once daily.      [id number] (MACULAR HEALTH FORMULA) 5-1-7.5 mg Cap Take by mouth.      ONETOUCH DELICA PLUS LANCET 33 gauge Misc USE TO CHECK BLOOD GLUCOSE TWO TIMES A DAY WITH MEALS 200 each 3    pantoprazole (PROTONIX) 40 MG tablet Take 1 tablet (40 mg total) by mouth daily as needed. 30 tablet 3    pen needle, diabetic 31 gauge x 5/16" Ndle 1 each by Misc.(Non-Drug; Combo Route) route 2 (two) times daily with meals. 100 each 11    rosuvastatin (CRESTOR) 40 MG Tab TAKE 1 TABLET BY MOUTH EVERY DAY 90 tablet 3    triamcinolone acetonide 0.1% (KENALOG) 0.1 % cream Apply topically 2 (two) times daily. 15 g 1    [DISCONTINUED] albuterol (VENTOLIN HFA) 90 mcg/actuation inhaler Inhale 1-2 puffs into the lungs every 6 (six) hours as needed for Wheezing. Rescue 18 g 3     Current Facility-Administered Medications on File Prior to Visit   Medication Dose Route Frequency Provider Last Rate Last Admin    EPINEPHrine (EPIPEN) 0.3 mg/0.3 mL pen injection 0.3 mg  0.3 mg Intramuscular PRN Amando Ulrich MD        ondansetron disintegrating tablet 4 mg  4 mg Oral Once PRN Amando Ulrich MD         He has No Known Allergies..    Objective:       Vitals:    02/07/25 1122   BP: 136/80   Pulse: 64   Resp: 20   SpO2: 96%   Weight: 111.4 kg (245 lb 11.2 oz)   Height: 5' 6" (1.676 m)                       Physical Exam  Vitals and nursing note reviewed.   Constitutional:       Appearance: He is well-developed.      Comments: Nek 19"   HENT:      Head: Normocephalic and atraumatic.        Right Ear: External ear normal.      Left Ear: External ear normal.      Nose: Nose normal.      Mouth/Throat:      Pharynx: Uvula midline. No oropharyngeal exudate.   Eyes:      General: Lids are normal. No scleral icterus.     Conjunctiva/sclera: Conjunctivae normal.      Pupils: Pupils are equal, round, and reactive to light.   Neck: " "     Trachea: Trachea and phonation normal.      Comments: Neck 18"  Cardiovascular:      Rate and Rhythm: Normal rate and regular rhythm.      Pulses: Normal pulses.      Heart sounds: Normal heart sounds, S1 normal and S2 normal. No murmur heard.  Pulmonary:      Effort: Pulmonary effort is normal. No respiratory distress.      Breath sounds: Examination of the left-lower field reveals decreased breath sounds. Decreased breath sounds present. No wheezing, rhonchi or rales.   Chest:      Chest wall: No tenderness.   Abdominal:      General: Bowel sounds are normal.      Palpations: Abdomen is soft.   Musculoskeletal:         General: Normal range of motion.      Cervical back: Normal range of motion and neck supple.   Lymphadenopathy:      Cervical: No cervical adenopathy.   Skin:     General: Skin is warm and dry.      Findings: No rash.      Nails: There is no clubbing.   Neurological:      Mental Status: He is alert and oriented to person, place, and time.      GCS: GCS eye subscore is 4. GCS verbal subscore is 5. GCS motor subscore is 6.      Cranial Nerves: No cranial nerve deficit.      Sensory: No sensory deficit.   Psychiatric:         Speech: Speech normal.       Personal Diagnostic Review  [x]  Chest x-ray:       X-Ray Chest PA And Lateral  Narrative: EXAMINATION:  XR CHEST PA AND LATERAL    CLINICAL HISTORY:  Shortness of breath    TECHNIQUE:  PA and lateral views of the chest were performed.    COMPARISON:  09/20/2024    FINDINGS:  The lungs are clear and free of infiltrate.  No pleural effusion or pneumothorax. The heart is not enlarged. There is tortuosity of the descending thoracic aorta.  Impression: 1.  No acute cardiopulmonary process.    Electronically signed by: Geovanny Walker DO  Date:    02/07/2025  Time:    11:10   .  [place]  FEV1: 1.92L( 79.7%) FVC 2.82L( 86.8%)  FEV1/FVC 68    PFT    Lab Results   Component Value Date    BRPFT Spirometry is normal.   Â   Notes:  Â    02/07/2025    BRPFT  " 2024     Spirogram Seems to plateau somewhat slowly  Flow volume loop: Somewhat scooped appearance   FEV1/FVC ratio: LLN*  FVC: Reduced  FEV1: Reduced  Â   Interpretation  Spirometry suggest restrictive physiology, lung volumes are needed for confirmation.  Note the normal distribution for octogenarians is not well described, interpret findings with caution.       [facility]  03/15/2024     Spirometry is normal. Spirometry remains unimproved following bronchodilator.   Â   Notes:  Â   The failure to demonstrate improvement in spirometry does not preclude a clinical response to a trial of bronchodilators.          Jovan Del Cid  2025 - 2025  Patient ID: [id number]  : 1940  Age: 84 years  [facility]  [id number] [facility], [id number]  Compliance Report  Compliance  Payor Standard  Usage 2025 - 2025  Usage days 21/30 days (70%)  >= 4 hours 0 days (0%)  < 4 hours 21 days (70%)  Usage hours 36 hours 16 minutes  Average usage (total days) 1 hours 13 minutes  Average usage (days used) 1 hours 44 minutes  Median usage (days used) 1 hours 39 minutes  Total used hours (value since last reset - 2025) 422 hours  [facility]  Serial number [id number]  Mode Spont  IPAP 17 cmH2O  EPAP 13 cmH2O  Easy-Breathe On  Therapy  Leaks - L/min Median: 20.5 95th percentile: 78.5 Maximum: 119.0  Events per hour AI: 6.4 HI: 0.7 AHI: 7.1  Apnea Index Central: 2.7 Obstructive: 0.8 Unknown: 2.9    FENO 63 PPD         Problem List Items Addressed This Visit       Chronic maxillary sinusitis    Dyslipidemia    Type 2 diabetes mellitus, without long-term current use of insulin    Chronic diastolic congestive heart failure    BMI 37.0-37.9, adult    Intolerance of continuous positive airway pressure (CPAP) ventilation    Nocturnal hypoxemia due to asthma    HTN (hypertension)    Moderate persistent asthma without complication - Primary     ACT score 19  FeNo now 69 was  15PPB  Congestion and mucous    Taking advair 3 pff BID contrary to instruction    Will add nebulizer         Relevant Medications    budesonide (PULMICORT) 0.5 mg/2 mL nebulizer solution    albuterol (VENTOLIN HFA) 90 mcg/actuation inhaler    Other Relevant Orders    NEBULIZER FOR HOME USE    NEBULIZER KIT (SUPPLIES) FOR HOME USE    Fraction of  Nitric Oxide    IgE    Fraction of  Nitric Oxide    Ambulatory referral/consult to Pulmonary Disease Management w/ Respiratory Therapist    Obstructive sleep apnea         2025   EPWORTH SLEEPINESS SCALE   Sitting and reading 2   Watching TV 2   Sitting, inactive in a public place (e.g. a theatre or a meeting) 2   As a passenger in a car for an hour without a break 0   Lying down to rest in the afternoon when circumstances permit 3   Sitting and talking to someone 3   Sitting quietly after a lunch without alcohol 3   In a car, while stopped for a few minutes in traffic 0   Total score 15     Bed time : 9 pm  Wake 7 am  Naps  1 hr     Dry mouth    Data 2025 to 2025  Usage > 4 hrs: 0%  BIPAP     AHI 7.1    Improve adherence     Would like to try nasal mask    Close f/u          Other Visit Diagnoses       Mild intermittent asthma without complication                Plan:       Adherence to LABA ICS: Use a spacer     Options of MAD or INSPIRE discussed      Improve daily adherence with BIPAP    Patient we will try a nasal mask or by 1 from the sleep apnea stone   Once daily budesonide added via nebulizer to see whether FeNo can trend down    Check IgE to see whether he is a candidate for biologic medication, previously this was normal     Follow up in about 8 weeks (around 2025), or Nasal mask, nebulizer meds, Download BIPAP, medication adherence, FeNo, PDM.    This note was prepared using voice recognition system and is likely to have sound alike errors that may have been overlooked even after proof reading.  Please call me with any  "questions    Discussed diagnosis, its evaluation, treatment and usual course. All questions answered.    Thank you for the courtesy of participating in the care of this patient    Stefan Buckner MD    Orders Placed This Encounter   Procedures    NEBULIZER FOR HOME USE     Order Specific Question:   Height:     Answer:   5' 6" (1.676 m)     Order Specific Question:   Weight:     Answer:   111.4 kg (245 lb 11.2 oz)     Order Specific Question:   Length of need (1-99 months):     Answer:   99    NEBULIZER KIT (SUPPLIES) FOR HOME USE     Order Specific Question:   Height:     Answer:   5' 6" (1.676 m)     Order Specific Question:   Weight:     Answer:   111.4 kg (245 lb 11.2 oz)     Order Specific Question:   Length of need (1-99 months):     Answer:   99     Order Specific Question:   Mask or Mouthpiece?     Answer:   Mouthpiece    IgE     Standing Status:   Future     Number of Occurrences:   1     Standing Expiration Date:   2026     Order Specific Question:   Send normal result to authorizing provider's In Basket if patient is active on MyChart:     Answer:   Yes    Ambulatory referral/consult to Pulmonary Disease Management w/ Respiratory Therapist     Standing Status:   Future     Standing Expiration Date:   3/7/2026     Referral Priority:   Routine     Referral Type:   Consultation     Referral Reason:   Specialty Services Required     Requested Specialty:   Pulmonary Disease     Number of Visits Requested:   1    Fraction of  Nitric Oxide     Standing Status:   Future     Standing Expiration Date:   2026     Order Specific Question:   Release to patient     Answer:   Immediate    Fraction of  Nitric Oxide     Standing Status:   Future     Standing Expiration Date:   2026     Order Specific Question:   Release to patient     Answer:   Immediate                     "

## 2025-02-07 NOTE — ASSESSMENT & PLAN NOTE
2/7/2025   EPWORTH SLEEPINESS SCALE   Sitting and reading 2   Watching TV 2   Sitting, inactive in a public place (e.g. a theatre or a meeting) 2   As a passenger in a car for an hour without a break 0   Lying down to rest in the afternoon when circumstances permit 3   Sitting and talking to someone 3   Sitting quietly after a lunch without alcohol 3   In a car, while stopped for a few minutes in traffic 0   Total score 15     Bed time : 9 pm  Wake 7 am  Naps  1 hr     Dry mouth    Data 01/08/2025 to 02/06/2025  Usage > 4 hrs: 0%  BIPAP 17/13    AHI 7.1    Improve adherence     Would like to try nasal mask    Close f/u

## 2025-02-07 NOTE — ASSESSMENT & PLAN NOTE
ACT score 19  FeNo now 69 was 15PPB  Congestion and mucous    Taking advair 3 pff BID contrary to instruction    Will add nebulizer

## 2025-02-07 NOTE — PROCEDURES
Clinical Guide to Interpretation or FeNO Levels :    FeNO  (ppb) LOW INTERMEDIATE HIGH   ADULT VALUES < 25 25-50          > 50   Th2-driven Inflammation Unlikely Likely Significant     Patients FeNO level at this visit : __63__ (ppb)    Interpretation of FeNO measurement in adults:  [] FENO is less than 25 ppb implies non eosinophilic airway inflammation or the absence of airway inflammation.    Comment: Low FENO (<25 ppb) in adult asthmatics with persistent symptoms suggests other etiologies for these symptoms and a lower likelihood of benefit from adding or increasing inhaled glucocorticoids.    [] FENO between 25 and 50 ppb in adults should be interpreted cautiously with reference to the clinical situation (eg, symptomatic, on or off therapy, current smoking).    [x] FENO greater than 50 ppb in adults  suggests eosinophilic airway inflammation   Comment: High FENO (>50 ppb) in adult asthmatics even with atypical symptoms suggests glucocorticoid responsiveness. High FENO (>50 ppb) can help identify poor adherence or uncontrolled inflammation in asthma patients with otherwise seemingly controlled asthma.    Discussion:  A FENO less than 25 ppb in adults and less than 20 ppb in children younger than 12 years of age implies noneosinophilic airway inflammation or the absence of airway inflammation.  A FENO greater than 50 ppb in adults or greater than 35 ppb in children suggests eosinophilic airway inflammation.   Values of FENO between 25 and 50 ppb in adults (20 to 35 ppb in children) should be interpreted cautiously with reference to the clinical situation (eg, symptomatic, on or off therapy, current smoking).  A rising FENO with a greater than 20 percent change and more than 25 ppb (20 ppb in children) from a previously stable level suggests increasing eosinophilic airway inflammation, but there are wide inter-individual differences.  A decrease in FENO greater than 20 percent for values over 50 ppb or more than  10 ppb for values less than 50 ppb may be clinically important.  ?FENO in other respiratory diseases - Several other diseases are associated with altered levels of exhaled NO: low levels of FENO have been noted in cystic fibrosis, current smoking, pulmonary hypertension, hypothermia, primary ciliary dyskinesia, and bronchopulmonary dysplasia. Elevated FENO has been noted in atopy, nonasthmatic eosinophilic bronchitis, COPD exacerbations, noncystic fibrosis bronchiectasis, and viral upper respiratory infections.    REFERENCE:  ATS Board of Directors, December 2004, and by the ERS Executive Committee, June 2004. ATS/ERS Recommendations for Standardized Procedures for the Online and Offline Measurement of Exhaled Lower Respiratory Nitric Oxide and Nasal Nitric Oxide. Guideline 2005

## 2025-02-11 RX ORDER — FLUTICASONE PROPIONATE 50 MCG
2 SPRAY, SUSPENSION (ML) NASAL DAILY
Qty: 48 G | Refills: 3 | Status: SHIPPED | OUTPATIENT
Start: 2025-02-11

## 2025-02-11 NOTE — TELEPHONE ENCOUNTER
No care due was identified.  [employer] Embedded Care Due Messages. Reference number: [id number].   2/11/2025 11:33:49 AM CST

## 2025-02-11 NOTE — TELEPHONE ENCOUNTER
----- Message from Marilin sent at 2025 11:27 AM CST -----  Contact: Jovan  Type:  RX Refill Request    Who Called: Jovan  Refill or New Rx:Refill  RX Name and Strength:fluticasone propionate (FLONASE) 50 mcg/actuation nasal spray  How is the patient currently taking it? (ex. 1XDay): daily  Is this a 30 day or 90 day RX:30  Preferred Pharmacy with phone number:  [id number] - [place], [place] - [address] AT [address] [address]  Phone: [phone] Fax: [phone]  Local or Mail Order:local  Ordering Provider:Dr. Neal  Would the patient rather a call back or a response via [facility]? Call back  Best Call Back Number:[phone]  Additional Information: Jovan is calling to request a new prescription because the old one have     Thanks  KENNY

## 2025-02-12 DIAGNOSIS — J45.20 MILD INTERMITTENT ASTHMA WITHOUT COMPLICATION: Chronic | ICD-10-CM

## 2025-02-12 RX ORDER — MONTELUKAST SODIUM 10 MG/1
10 TABLET ORAL NIGHTLY
Qty: 90 TABLET | Refills: 3 | Status: SHIPPED | OUTPATIENT
Start: 2025-02-12

## 2025-02-12 NOTE — TELEPHONE ENCOUNTER
Refill Decision Note   Jovan Muhammadisi  is requesting a refill authorization.  Brief Assessment and Rationale for Refill:  Approve     Medication Therapy Plan:         Comments:     Note composed:12:49 PM 02/12/2025

## 2025-02-12 NOTE — TELEPHONE ENCOUNTER
No care due was identified.  [facility] Embedded Care Due Messages. Reference number: [id number].   2/12/2025 10:36:52 AM CST

## 2025-02-17 ENCOUNTER — CLINICAL SUPPORT (OUTPATIENT)
Dept: PULMONOLOGY | Facility: CLINIC | Age: 85
End: 2025-02-17
Payer: COMMERCIAL

## 2025-02-17 DIAGNOSIS — J45.20 MILD INTERMITTENT ASTHMA WITHOUT COMPLICATION: Primary | ICD-10-CM

## 2025-02-17 NOTE — PROGRESS NOTES
Pulmonary Disease Management  [facility]  Follow up Visit  Chronic Care Management    Jovan Del Cid Jr.  was seen 2/17/2025  2:30 PM in the Pulmonary Disease Management Clinic for evaluation, education, reinforcement of self management techniques and exacerbation action plan.    Duglas Yee    Past Medical History:   Diagnosis Date    Acute CVA (cerebrovascular accident) 09/08/2022    DANY (acute kidney injury) 09/14/2022    Asthma     Bronchitis chronic     Cancer     Chronic cough 11/15/2013    Chronic diastolic congestive heart failure 09/12/2023    Cough     Digestive disorder     Hyperlipidemia     Hypertension     Liver disease     KALYAN (obstructive sleep apnea) 09/08/2022    Stroke 09/01/2022    Type 2 diabetes mellitus, without long-term current use of insulin 09/07/2022    BS didn't check 10/05/2022       Patient's Medications   New Prescriptions    No medications on file   Previous Medications    ALBUTEROL (VENTOLIN HFA) 90 MCG/ACTUATION INHALER    Inhale 1-2 puffs into the lungs every 6 (six) hours as needed for Wheezing. Rescue    ASPIRIN (ECOTRIN) 81 MG EC TABLET    Take 1 tablet (81 mg total) by mouth once daily.    BLOOD SUGAR DIAGNOSTIC STRP    1 strip by Misc.(Non-Drug; Combo Route) route 2 (two) times daily with meals.    BLOOD SUGAR DIAGNOSTIC STRP    Test blood glucose 2 times a day    BLOOD-GLUCOSE METER KIT    Use as instructed    BUDESONIDE (PULMICORT) 0.5 MG/2 ML NEBULIZER SOLUTION    Take 2 mLs (0.5 mg total) by nebulization Daily. Controller    FLUTICASONE PROPIONATE (FLONASE) 50 MCG/ACTUATION NASAL SPRAY    2 sprays (100 mcg total) by Each Nostril route once daily. Shake Liquid and use    FLUTICASONE-SALMETEROL 230-21 MCG/DOSE (ADVAIR HFA) 230-21 MCG/ACTUATION HFAA INHALER    Inhale 2 puffs into the lungs every 12 (twelve) hours. Controller    FUROSEMIDE (LASIX) 20 MG TABLET    Take 1 tablet (20 mg total) by mouth every Mon, Tues, Wed, Thurs, Fri.    GLIPIZIDE (GLUCOTROL) 2.5  "MG TR24    TAKE 1 TABLET(2.5 MG) BY MOUTH DAILY WITH BREAKFAST    IPRATROPIUM (ATROVENT) 21 MCG (0.03 %) NASAL SPRAY    2 sprays by Each Nostril route 2 (two) times daily.    LANCETS MISC    1 each by Misc.(Non-Drug; Combo Route) route 2 (two) times daily with meals.    LANCING DEVICE MISC    1 Device by Misc.(Non-Drug; Combo Route) route 2 (two) times daily with meals.    LOSARTAN (COZAAR) 50 MG TABLET    Take 1 tablet (50 mg total) by mouth once daily.    MONTELUKAST (SINGULAIR) 10 MG TABLET    TAKE 1 TABLET(10 MG) BY MOUTH EVERY EVENING    MULTIVITAMIN (THERAGRAN) PER TABLET    Take 1 tablet by mouth once daily.    MV-MN-LUTEIN-YXAR-CRGDRS- (MACULAR HEALTH FORMULA) 5-1-7.5 MG CAP    Take by mouth.    ONETOUCH DELICA PLUS LANCET 33 GAUGE MIS    USE TO CHECK BLOOD GLUCOSE TWO TIMES A DAY WITH MEALS    PANTOPRAZOLE (PROTONIX) 40 MG TABLET    Take 1 tablet (40 mg total) by mouth daily as needed.    PEN NEEDLE, DIABETIC 31 GAUGE X 5/16" NDLE    1 each by Misc.(Non-Drug; Combo Route) route 2 (two) times daily with meals.    ROSUVASTATIN (CRESTOR) 40 MG TAB    TAKE 1 TABLET BY MOUTH EVERY DAY    TRIAMCINOLONE ACETONIDE 0.1% (KENALOG) 0.1 % CREAM    Apply topically 2 (two) times daily.   Modified Medications    No medications on file   Discontinued Medications    No medications on file             Educational assessment:   [x]            Good  []            Fair  []            Poor    Readiness to learn:   [x]            Good  []            Fair  []            Poor    Vision Status:   [x]            Good  []            Fair  []            Poor    Reading Ability:  [x]            Good  []            Fair  []            Poor    Knowledge of condition:   [x]            Good  []            Fair  []            Poor    Language Barriers:   []            Good  []            Fair  []            Poor  [x]            None    Cognitive/ Physical Barriers:   []            Good  []            Fair  []            Poor  [x]       "      None    Learning best by:                       [x]            Seeing  []            Hearing  []            Reading                         [x]            Doing    Describe any barrier /Limitation or financial implications of care choices identified     []            Financial  []            Emotional  []            Education  []            Vision/Hearing  []            Physical  [x]            None  []                TOPIC /CONTENT FOR TODAY:    [x]            MDI with or without spacer  []            Dry powder inhaler  []            Acapella   []           Peak Flow meter  [x]             action plan  [x]            Nebulizer use  [x]            CPAP use  []            Breathing and cough techniques  [x]            Energy conservation  [x]            Infection prevention  []           OTHER________________________        Learner:    [x]            Patient   []            Caregiver    Method:    [x]            Verbal explanation  [x]            Audio visual    [x]            Literature  [x]            Teach back      Evaluation:    [x]            Teach back  [x]            Demonstrate  [x]            Follow up phone call    []            2 weeks     [x]            4 weeks   []            PRN    Additional comments:   Patient was seen today to review respiratory medication purpose and proper technique for use of inhalers. Patient practiced proper technique using MDI with valved holding chamber (spacer) and DPI inhalers. Patient demonstrated understanding. Literature was given to patient.     Patient was recently prescribed budesonide nebulizer solution to take BID. Patient has not yet initiated therapy. Discussed the importance of adhering to ICS daily and rinsing thoroughly after use. Understanding was stated.      Asthma trigger checklist was verbally reviewed and literature given to patient.     Infection prevention was discussed. Patient was reminded to get RSV vaccine. Hand hygiene and cleaning of  respiratory equipment was also discussed. Patient verbalized understanding.      Asthma action plan was reviewed and literature was given to patient. Patient verbalized understanding.     Plan:  Monthly Pulmonary Disease Management Questionnaire  Follow-up PDM appointment scheduled for 4/4/25  Reinforce education  Meds: Advair HFA, budesonide, albuterol   DME Needs: [facility]   Action Plan  Immunization: Pneumococcal- current, Flu-current , Covid 19- current   Next Provider Visit: 4/4/25  Next Spirometry/CPFT: not scheduled   Approximate time spent with patient: 30 mins

## 2025-03-15 DIAGNOSIS — J45.20 MILD INTERMITTENT ASTHMA WITHOUT COMPLICATION: Chronic | ICD-10-CM

## 2025-03-18 RX ORDER — FLUTICASONE PROPIONATE AND SALMETEROL XINAFOATE 230; 21 UG/1; UG/1
2 AEROSOL, METERED RESPIRATORY (INHALATION) EVERY 12 HOURS
Qty: 12 G | Refills: 11 | Status: SHIPPED | OUTPATIENT
Start: 2025-03-18

## 2025-03-19 ENCOUNTER — TELEPHONE (OUTPATIENT)
Dept: CARDIOLOGY | Facility: CLINIC | Age: 85
End: 2025-03-19
Payer: COMMERCIAL

## 2025-03-19 NOTE — TELEPHONE ENCOUNTER
Spoke with pt in regards to scheduling sooner appt due to wife having knee replacement surgery.               ----- Message from Eve sent at 3/19/2025  3:45 PM CDT -----  Contact: [phone]   Patient  Patient is returning a phone call.Who left a message for the patient:  [facility] patient know what this is regarding:  Would you like a call back, or a response through your [facility] portal?:   Call Back Please. Thank youComments: Pt needs to reschedule his appointment due to his spouse getting knee surgery on 04/16/2025

## 2025-03-19 NOTE — TELEPHONE ENCOUNTER
LVM for pt to call back in regards to rescheduling appt.                ----- Message from Verenice sent at 3/19/2025  3:21 PM CDT -----  Contact: SANDIP SONI JR. [[id number]]  .Type:  Patient Requesting CallWho Called:SANDIP SONI JR. [[id number]]Does the patient know what this is regarding?:Patient requesting a call back to reschedule appointment before April 16 if possibleWould the patient rather a call back or a response via [facility]? Call [facility] Call Back Number:.[phone] (home) Additional Information:

## 2025-03-23 RX ORDER — GLIPIZIDE 2.5 MG/1
TABLET, EXTENDED RELEASE ORAL
Qty: 90 TABLET | Refills: 0 | Status: SHIPPED | OUTPATIENT
Start: 2025-03-23

## 2025-03-23 NOTE — TELEPHONE ENCOUNTER
Care Due:                  Date            Visit Type   Department     Provider  --------------------------------------------------------------------------------                                EP -                              PRIMARY      [facility]  Last Visit: 12-      CARE ([facility])   MEDICINE       Paul Neal  Next Visit: None Scheduled  None         None Found                                                            Last  Test          Frequency    Reason                     Performed    Due Date  --------------------------------------------------------------------------------    HBA1C.......  6 months...  glipiZIDE................  12- 06-    [facility] Embedded Care Due Messages. Reference number: [id number].   3/23/2025 5:57:54 AM CDT

## 2025-03-23 NOTE — TELEPHONE ENCOUNTER
Provider Staff:  Action required for this patient    Requires labs      Please see care gap opportunities below in Care Due Message.    Thanks!  [facility]     Appointments      Date Provider   Last Visit   12/10/2024 Paul Neal MD   Next Visit   Visit date not found Paul Neal MD     Refill Decision Note   Jovan Del Cid  is requesting a refill authorization.  Brief Assessment and Rationale for Refill:  Approve     Medication Therapy Plan:        Comments:     Note composed:10:41 AM 03/23/2025

## 2025-03-25 ENCOUNTER — PATIENT MESSAGE (OUTPATIENT)
Dept: CARDIOLOGY | Facility: CLINIC | Age: 85
End: 2025-03-25
Payer: COMMERCIAL

## 2025-04-02 ENCOUNTER — TELEPHONE (OUTPATIENT)
Dept: CARDIOLOGY | Facility: CLINIC | Age: 85
End: 2025-04-02
Payer: COMMERCIAL

## 2025-04-02 NOTE — TELEPHONE ENCOUNTER
Spoke with pt's son in regards to questions he had about canceled appt.               ----- Message from Greasebook sent at 4/2/2025  2:56 PM CDT -----  Contact: [phone]  Type:  Needs Medical AdviceWho Called: son Symptoms (please be specific): appt today Would the patient rather a call back or a response via [facility]? Call back Best Call Back Number: [phone] Information: trying to figure out if pt is rescheduling appt, I did let son know that pt rescheduled he is asking to speak with someone in office

## 2025-04-04 ENCOUNTER — CLINICAL SUPPORT (OUTPATIENT)
Dept: PULMONOLOGY | Facility: CLINIC | Age: 85
End: 2025-04-04
Payer: COMMERCIAL

## 2025-04-04 ENCOUNTER — OFFICE VISIT (OUTPATIENT)
Dept: PULMONOLOGY | Facility: CLINIC | Age: 85
End: 2025-04-04
Payer: COMMERCIAL

## 2025-04-04 VITALS
BODY MASS INDEX: 38.79 KG/M2 | HEART RATE: 69 BPM | SYSTOLIC BLOOD PRESSURE: 134 MMHG | HEIGHT: 66 IN | RESPIRATION RATE: 18 BRPM | OXYGEN SATURATION: 97 % | DIASTOLIC BLOOD PRESSURE: 68 MMHG | WEIGHT: 241.38 LBS

## 2025-04-04 VITALS — BODY MASS INDEX: 38.96 KG/M2 | WEIGHT: 241.38 LBS

## 2025-04-04 DIAGNOSIS — Z78.9 INTOLERANCE OF CONTINUOUS POSITIVE AIRWAY PRESSURE (CPAP) VENTILATION: ICD-10-CM

## 2025-04-04 DIAGNOSIS — E78.5 DYSLIPIDEMIA: ICD-10-CM

## 2025-04-04 DIAGNOSIS — G47.33 OBSTRUCTIVE SLEEP APNEA: ICD-10-CM

## 2025-04-04 DIAGNOSIS — I10 PRIMARY HYPERTENSION: ICD-10-CM

## 2025-04-04 DIAGNOSIS — I50.32 CHRONIC DIASTOLIC CONGESTIVE HEART FAILURE: ICD-10-CM

## 2025-04-04 DIAGNOSIS — J45.20 MILD INTERMITTENT ASTHMA WITHOUT COMPLICATION: Primary | ICD-10-CM

## 2025-04-04 DIAGNOSIS — E11.9 TYPE 2 DIABETES MELLITUS WITHOUT COMPLICATION, WITHOUT LONG-TERM CURRENT USE OF INSULIN: ICD-10-CM

## 2025-04-04 DIAGNOSIS — J45.40 MODERATE PERSISTENT ASTHMA WITHOUT COMPLICATION: ICD-10-CM

## 2025-04-04 DIAGNOSIS — R09.02 NOCTURNAL HYPOXEMIA DUE TO ASTHMA: Primary | ICD-10-CM

## 2025-04-04 DIAGNOSIS — J45.909 NOCTURNAL HYPOXEMIA DUE TO ASTHMA: Primary | ICD-10-CM

## 2025-04-04 PROCEDURE — 99999 PR PBB SHADOW E&M-EST. PATIENT-LVL III: CPT | Mod: PBBFAC,,,

## 2025-04-04 PROCEDURE — 99999 PR PBB SHADOW E&M-EST. PATIENT-LVL V: CPT | Mod: PBBFAC,,, | Performed by: INTERNAL MEDICINE

## 2025-04-04 NOTE — PATIENT INSTRUCTIONS
Understanding Asthma         Asthma is a long-term (chronic) lung condition. It involves the airways (bronchial tubes). It happens when a trigger causes your airways to swell and become narrow. The muscles around your airways start to tighten. When your airways start to narrow, air can't move in and out of your lungs very well. Mucus also builds up along the airways. This makes it even harder to move air in and out of your lungs.  Experts are not exactly sure what causes asthma. It may be caused by a mix of inherited and environmental factors. People with asthma may have no symptoms until they are exposed to an allergen or trigger.  Healthy lungs  Inside your lungs there are branching airways made of stretchy tissue. Each airway is wrapped with bands of muscle. The airways are smaller as they go deeper into the lungs. The smallest airways end in clusters of tiny balloon-like air sacs (alveoli). These clusters are surrounded by blood vessels. When you breathe in (inhale), air enters the lungs. It travels down through the airways until it reaches the air sacs. When you breathe out (exhale), air travels up through the airways and out of the lungs. The airways make mucus that traps particles you breathe in. Normally, the mucus is then swept out of the lungs by tiny hairs (cilia) that line the airways. The mucus is swallowed or coughed up during your day.    What the lungs do  The air you inhale contains oxygen. When oxygen reaches the air sacs, it passes into the blood vessels around the sacs. Your blood then sends oxygen to all of your cells. As you exhale, carbon dioxide is removed in a similar way from the blood in the air sacs, and from your body.    When you have asthma  People with asthma have very sensitive airways. This means the airways react to certain things called triggers. Triggers can include pollen, dust, or smoke. Triggers cause inflammation. This makes the airways swell and become narrow. This is a  long-lasting (chronic) problem. Your airways may not always be narrow enough so that you notice breathing problems.  Symptoms of chronic inflammation include:   Coughing (chronic)   A feeling of tightness in your chest   Feeling short of breath   Wheezing (a whistling noise, especially when breathing out)   Low energy or feeling tired   In some people, over time chronic mild inflammation can lead to lasting (permanent) scarring of airways and loss of lung function.    Asthma flare-ups  When sensitive airways are irritated by a trigger, the muscles around the airways tighten. The lining of the airways swells. Thick, sticky mucus increases and partly clogs the airways. All of this makes it harder to breathe.  Symptoms of flare-ups may include:  Coughing, especially at night. You may not be able to sleep because of coughing.   Getting tired or out of breath easily   Wheezing   Chest tightness   Faster breathing when at rest   Flare-ups can be life-threatening. In a severe flare-up, the muscle tightening, swelling, and mucus are worse. Its very hard to breathe. Your body can't get enough oxygen and can't remove carbon dioxide. Waste gas is trapped in the alveoli. Gas exchange cant occur. The body is not getting enough oxygen. Without oxygen, body tissues, especially brain tissue, begin to get damaged. If this goes on for long, it can lead to severe brain damage or death.  Call 911 (or have someone call for you) if you have any of these symptoms and they are not relieved right away by taking your quick-relief medicine as prescribed:  Trouble breathing   Feeling too short of breath to talk or walk   Lips or fingers turning blue   Feeling lightheaded or dizzy, as though you are about to pass out   Peak flow less than 50% of your personal best, if you use a peak flow meter     Managing your asthma  Asthma is a long-term condition. So its important to work with your healthcare provider to manage it. If you have asthma,  you can prevent flare-ups. Develop an asthma action plan with your healthcare provider. It can help control your asthma and manage your symptoms. An asthma action plan also tells you and your family or friends what to do if your asthma flares up or gets worse.  Take your medicine as prescribed. Also learn about your asthma triggers. Knowing what causes your asthma to flare up in the first place can help you prevent future breathing problems.  If you smoke, get help to quit.Using an Inhaler with a Spacer  To control asthma, you need to use your medicines the right way. Some medicines are inhaled using a device called a metered-dose inhaler (MDI). Metered-dose inhalers deliver medicine with a fine spray. You may be asked to use a spacer (holding tube) with your inhaler. The spacer helps make sure all the medicine you need goes into your lungs.   Steps for using an inhaler with a spacer  Step 1:  Remove the caps from the inhaler and spacer.  Shake the inhaler well and attach the spacer. If the inhaler is being used for the first time or has not been used for a while, prime it as directed by the product maker.  Step 2:  Breathe out normally.  Put the spacer between your teeth. Close your lips tightly around it.  Keep your chin up.  Step 3:  Spray 1 puff into the spacer by pressing down on the inhaler.  Then breathe in through your mouth as slowly and deeply as you can. This should take about 5-10 seconds. If you breathe too quickly, you may hear a whistling sound in certain spacers.  Step 4:  Take the spacer out of your mouth.  Hold your breath for a count of 10.  Then hold your lips together and slowly breathe out through your mouth  Asthma Trigger Checklist  Allergens, irritants, and other things may trigger your asthma. Check the box next to each of your triggers. After each trigger is a list of ways to avoid it.   Dust mites. Dust mites live in mattresses, bedding, carpets, curtains, and indoor dust.  To kill dust  mites, wash bedding in hot water (130°F) each week.  Cover mattress and pillows with special dust-mite-proof cases.  Don't use upholstered furniture like sofas or chairs in the bedroom.  Use allergy-proof filters for air conditioners and furnaces. Replace or clean them as instructed.  If you can, replace carpeting with wood or tile yvrose, especially in the bedroom.  Animals. Animals with fur or feathers shed dander (allergens).  It's best to choose a pet that doesn't have fur or feathers, such as a fish or a reptile.  If you have pets, keep them off your bed and out of your bedroom.  Wash your hands and clothes after handling pets.    Mold. Mold grows in damp places, such as bathrooms, basements, and closets.  Ask someone to clean damp areas in your home every week. Or try wearing a face mask while you clean.  Run an exhaust fan while bathing. Or leave a window open in the bathroom.  Repair water leaks in or around your home.  Have someone else cut grass or rake leaves, if possible.  Don't use vaporizers or humidifiers. They encourage mold growth.    Pollen. Pollen from trees, grasses, and weeds is a common allergen. (Flower pollens are generally not a problem).  Try to learn what types of pollen affect you most. Pollen levels vary depending on the plant, the season, and the time of day.  If possible, use air conditioning instead of opening the windows in your home or car.  Have someone else do yard work, if possible.    Cockroaches. Roaches are found in many homes and produce allergens.  Keep your kitchen clean and dry. A leaky faucet or drain can attract roaches.  Remove garbage from your home daily.  Store food in tightly sealed containers. Wash dishes as soon as they are used.  Use bait stations or traps to control roaches. Avoid using chemical sprays.    Smoke. Smoke may be from cigarettes, cigars, pipes, incense or candles, barbecues or grills, and fireplaces.  Don't smoke. And don't let people smoke in  your home or car.  When you travel, ask for nonsmoking rental cars and hotel rooms.  Avoid fireplaces and wood stoves. If you can't, sit away from them. Make sure the smoke is directed outside.  Don't burn incense or use candles.  Move away from smoky outdoor cooking grills.    Smog.  Smog is from car exhaust and other pollution.  Read or listen to local air-quality reports. These let you know when air quality is poor.  Stay indoors as much as you can on smoggy days. If possible, use air conditioning instead of opening the windows.  In your car, set air conditioning to recirculate air, so less pollution gets in.    Strong odors. These include air fresheners, deodorizers, and cleaning products; perfume, deodorant, and other beauty products; incense and candles; and insect sprays and other sprays.  Use scent-free products like deodorant or body lotion.  Avoid using cleaning products with bleach and ammonia. Make your own cleaning solution with white vinegar, baking soda, or mild dish soap.  Use exhaust fans while cooking. Or open a window, if possible.   Avoid perfumes, air fresheners, potpourri, and other scented products.          Other irritants. These include dust, aerosol sprays, and powders.  Wear a face mask while doing tasks like sanding, dusting, sweeping, and yard work. Open doors and windows if working indoors.  Use pump spray bottles instead of aerosols.  Pour liquid  onto a rag or cloth instead of spraying them.    Weather. Weather conditions can trigger symptoms or make them worse.  Watch for very high or low temperatures, very humid conditions, or a lot of wind, as these conditions can make symptoms worse.  Limit outdoor activity during the type of weather that affects you.  Wear a scarf over your mouth and nose in cold weather.    Colds, flu, and sinus infections. Upper respiratory infections can trigger asthma.  Wash your hands often with soap and warm water or use a hand  containing  alcohol.  Get a yearly flu shot. And ask if you should get a pneumonia vaccine.  Take care of your general health. Get plenty of sleep. And eat a variety of healthy foods.    Food additives. Food additives can trigger asthma flare-ups in some people.  Check food labels for sulfites or other similar ingredients. These are often found in foods such as wine, beer, and dried fruits.  Avoid foods that contain these additives.    Medicine. Aspirin, NSAIDS like ibuprofen and naproxen, and heart medicines like beta-blockers may be triggers.  Tell your health care provider if you think certain medicines trigger symptoms.   Be sure to read the labels on over-the-counter medicines. They may have ingredients that cause symptoms for you.   , Emotions. Laughing, crying, or feeling excited are triggers for some people.   To help you stay calm: Try breathing in slowly through your nose for a count of 2 seconds. Close your lips and breathe out for 4 seconds. Repeat.  Try to focus on a soothing image in your mind. This will help relax you and calm your breathing.  Remember to take your daily controller medicines. When you're upset or under stress, it's easy to forget.    Exercise. For some people, exercise can trigger symptoms. Don't let this keep you from being active.   If you have not been exercising regularly, start slow and work up gradually.  Take all of your medicines as prescribed.  If you use quick-relief medicine, make sure you have it with you when you exercise.  Stop if you have any symptoms. Make sure you talk with your provider about these symptoms.  © 6342-1650 [employer]. [address] [place] [address], [place] [address]. All rights reserved. This information is not intended as a substitute for professional medical care. Always follow your healthcare professional's instructions.          ACTION PLAN    GREEN ZONE  My sputum is clear/white/usual color and easily cleared.  My breathing is no harder than usual.  I  can do my usual activities.  I can think clearly.   Take your usual medicines, including oxygen, as you are told to do so by your health care provider.   YELLOW ZONE  My sputum has change (color, thickness, amount).  I am more short of breath than usual.  I cough or wheeze more.  I weigh more and my legs/feet swell.  I cannot do my usual activities without resting.   Call your health care provider. You will probably be told to begin taking an antibiotic and prednisone. Have your pharmacy phone number available.   RED ZONE  I cannot cough out my sputum.  I am much more short of breath than normal.  I need to sit up to breathe  I cannot do my usual activities.  I am unable to speak more than one or two words at a time.  I am confused.   Call your health care provider. You may be asked to come in to be seen, told to go to the emergency room, or told to call 9-1-1.

## 2025-04-04 NOTE — ASSESSMENT & PLAN NOTE
Fractional exhaled nitric oxide as improved   Continue Advair HFA montelukast and albuterol with a nebulizer daily

## 2025-04-04 NOTE — ASSESSMENT & PLAN NOTE
4/4/2025   EPWORTH SLEEPINESS SCALE   Sitting and reading 1   Watching TV 1   Sitting, inactive in a public place (e.g. a theatre or a meeting) 0   As a passenger in a car for an hour without a break 0   Lying down to rest in the afternoon when circumstances permit 3   Sitting and talking to someone 0   Sitting quietly after a lunch without alcohol 3   In a car, while stopped for a few minutes in traffic 0   Total score 8       Poorly adherent   Hopefully now that the asthma is well controlled he will improved   Close follow up

## 2025-04-04 NOTE — PROGRESS NOTES
Pulmonary Disease Management  [facility]  Final Follow up Visit  Chronic Care Management    Jovan Del Cid Jr.  was seen 4/4/2025  11:30 AM in the [facility] for evaluation, education, reinforcement of self management techniques and exacerbation action plan.    Duglas Yee    Past Medical History:   Diagnosis Date    Acute CVA (cerebrovascular accident) 09/08/2022    DANY (acute kidney injury) 09/14/2022    Asthma     Bronchitis chronic     Cancer     Chronic cough 11/15/2013    Chronic diastolic congestive heart failure 09/12/2023    Cough     Digestive disorder     Hyperlipidemia     Hypertension     Liver disease     KALYAN (obstructive sleep apnea) 09/08/2022    Stroke 09/01/2022    Type 2 diabetes mellitus, without long-term current use of insulin 09/07/2022    BS didn't check 10/05/2022       Patient's Medications   New Prescriptions    No medications on file   Previous Medications    ADVAIR -21 MCG/ACTUATION HFAA INHALER    INHALE 2 PUFFS INTO THE LUNGS EVERY 12 HOURS    ALBUTEROL (VENTOLIN HFA) 90 MCG/ACTUATION INHALER    Inhale 1-2 puffs into the lungs every 6 (six) hours as needed for Wheezing. Rescue    ASPIRIN (ECOTRIN) 81 MG EC TABLET    Take 1 tablet (81 mg total) by mouth once daily.    BLOOD SUGAR DIAGNOSTIC STRP    1 strip by Misc.(Non-Drug; Combo Route) route 2 (two) times daily with meals.    BLOOD SUGAR DIAGNOSTIC STRP    Test blood glucose 2 times a day    BLOOD-GLUCOSE METER KIT    Use as instructed    FLUTICASONE PROPIONATE (FLONASE) 50 MCG/ACTUATION NASAL SPRAY    2 sprays (100 mcg total) by Each Nostril route once daily. Shake Liquid and use    FUROSEMIDE (LASIX) 20 MG TABLET    Take 1 tablet (20 mg total) by mouth every Mon, Tues, Wed, Thurs, Fri.    GLIPIZIDE (GLUCOTROL) 2.5 MG TR24    TAKE 1 TABLET(2.5 MG) BY MOUTH DAILY WITH BREAKFAST    IPRATROPIUM (ATROVENT) 21 MCG (0.03 %) NASAL SPRAY    2 sprays by Each Nostril route 2 (two) times daily.     "LANCETS MISC    1 each by Misc.(Non-Drug; Combo Route) route 2 (two) times daily with meals.    LANCING DEVICE MISC    1 Device by Misc.(Non-Drug; Combo Route) route 2 (two) times daily with meals.    LOSARTAN (COZAAR) 50 MG TABLET    Take 1 tablet (50 mg total) by mouth once daily.    MONTELUKAST (SINGULAIR) 10 MG TABLET    TAKE 1 TABLET(10 MG) BY MOUTH EVERY EVENING    MULTIVITAMIN (THERAGRAN) PER TABLET    Take 1 tablet by mouth once daily.    [id number]-[id number]- (MACULAR HEALTH FORMULA) 5-1-7.5 MG CAP    Take by mouth.    ONETOUCH DELICA PLUS LANCET 33 GAUGE MISC    USE TO CHECK BLOOD GLUCOSE TWO TIMES A DAY WITH MEALS    PANTOPRAZOLE (PROTONIX) 40 MG TABLET    Take 1 tablet (40 mg total) by mouth daily as needed.    PEN NEEDLE, DIABETIC 31 GAUGE X 5/16" NDLE    1 each by Misc.(Non-Drug; Combo Route) route 2 (two) times daily with meals.    ROSUVASTATIN (CRESTOR) 40 MG TAB    TAKE 1 TABLET BY MOUTH EVERY DAY    TRIAMCINOLONE ACETONIDE 0.1% (KENALOG) 0.1 % CREAM    Apply topically 2 (two) times daily.   Modified Medications    No medications on file   Discontinued Medications    No medications on file             Educational assessment:   [x]            Good  []            Fair  []            Poor    Readiness to learn:   [x]            Good  []            Fair  []            Poor    Vision Status:   [x]            Good  []            Fair  []            Poor    Reading Ability:  [x]            Good  []            Fair  []            Poor    Knowledge of condition:   [x]            Good  []            Fair  []            Poor    Language Barriers:   []            Good  []            Fair  []            Poor  [x]            None    Cognitive/ Physical Barriers:   []            Good  []            Fair  []            Poor  [x]            None    Learning best by:                       [x]            Seeing  [x]            Hearing  [x]            Reading                         [x]            " Doing    Describe any barrier /Limitation or financial implications of care choices identified     []            Financial  []            Emotional  []            Education  []            Vision/Hearing  []            Physical  [x]            None  []                TOPIC /CONTENT FOR TODAY:    [x]            MDI with or without spacer  []            Dry powder inhaler  []            Acapella   []           Peak Flow meter  [x]            Action plan  [x]            Nebulizer use  []            Oxygen use safety  []            Breathing and cough techniques  [x]            Energy conservation  [x]            Infection prevention  []           OTHER________________________        Learner:    [x]            Patient   []            Caregiver    Method:    [x]            Verbal explanation  [x]            Audio visual    [x]            Literature  [x]            Teach back      Evaluation:    [x]            Teach back  [x]            Demonstrate  [x]            Follow up phone call    []            2 weeks     []            4 weeks   [x]            PRN    Additional comments:   Patient was seen today to review respiratory medication purpose and proper technique for use of inhalers. Patient practiced proper technique using MDI with valved holding chamber (spacer) and DPI inhalers. Patient demonstrated understanding. Literature was given to patient.      Patient has a peak flow meter at home. Patient was instructed to record peak flow for one week to determine best peak flow. Will follow up with patient in 1 week to get personal best peak flow.     Asthma trigger checklist was verbally reviewed and literature given to patient.     Infection prevention was discussed. Patient was reminded to get influenza vaccine. Hand hygiene and cleaning of respiratory equipment was also discussed. Patient verbalized understanding.      Asthma action plan was reviewed and literature was given to patient. Patient verbalized understanding.      Plan:  Monthly Pulmonary Disease Management Questionnaire  Follow-up PDM appointment scheduled as needed   Reinforce education  Meds: budesonide, Advair HFA, albuterol   DME Needs: [facility]   Action Plan   Immunization: Pneumococcal- current, Flu-current , Covid 19- current   Next Provider Visit: tbd   Next Spirometry/CPFT: not scheduled   Approximate time spent with patient: 20 mins

## 2025-04-04 NOTE — PROGRESS NOTES
Subjective:       Patient ID: Jovan Del Cid Jr. is a 84 y.o. male.  Patient Active Problem List   Diagnosis    Intolerance of continuous positive airway pressure (CPAP) ventilation    Liver cyst    HTN (hypertension)    Dyslipidemia    Benign prostatic hyperplasia    Lipoma    Amyloid disease    Chronic maxillary sinusitis    Tubulovillous adenoma of colon    MCMAHAN (nonalcoholic steatohepatitis)    Moderate persistent asthma without complication    BMI 37.0-37.9, adult    Personal history of colonic polyps    History of lacunar cerebrovascular accident    Type 2 diabetes mellitus, without long-term current use of insulin    Obstructive sleep apnea    Stenosis of left carotid artery    Venous insufficiency    Valvular heart disease    Chronic diastolic congestive heart failure    Unequal blood pressures in paired extremities    Cognitive complaints with normal exam    Nocturnal hypoxemia due to asthma    Anemia     Immunization History   Administered Date(s) Administered    COVID-19, MRNA, LN-S, PF (Pfizer) (Purple Cap) 01/10/2021, 2021, 2021    COVID-19, mRNA, LNP-S, bivalent booster, PF (PFIZER OMICRON) 10/14/2022    Influenza (FLUAD) - Quadrivalent - Adjuvanted - PF *Preferred* (65+) 10/15/2020, 2022, 2023    Influenza - Trivalent - Fluad - Adjuvanted - PF (65 years and older 12/10/2024    Influenza - Trivalent - Fluzone High Dose - PF (65 years and older) 2020    Pneumococcal Conjugate - 20 Valent 10/24/2022    Zoster Recombinant 2023, 2023     Social History     Tobacco Use   Smoking Status Former    Current packs/day: 0.00    Types: Cigarettes, Pipe    Start date:     Quit date:     Years since quittin.3    Passive exposure: Past   Smokeless Tobacco Never   Tobacco Comments    smoked a pipe - quit smoking      Asthma Control Test  In the past 4  weeks, how much of the time did your asthma keep you from getting as much done at work, school or at  home?: Some of the time  During the past 4 weeks, how often have you had shortness of breath?: Once a day  During the past 4 weeks, how often did your asthma symptoms (wheezing, couging, shortness of breath, chest tightness or pain) wake you up at night or earlier than usual in the morning?: Not at all  During the past 4 weeks, how often have you used your rescue inhaler or nebulizer medication (such as albuterol)?: Not at all  How would you rate your asthma control during the past 4 weeks?: Well controlled  If your score is 19 or less, your asthma may not be under control: 19       Asthma Control Test  In the past 4  weeks, how much of the time did your asthma keep you from getting as much done at work, school or at home?: Some of the time  During the past 4 weeks, how often have you had shortness of breath?: Once a day  During the past 4 weeks, how often did your asthma symptoms (wheezing, couging, shortness of breath, chest tightness or pain) wake you up at night or earlier than usual in the morning?: Not at all  During the past 4 weeks, how often have you used your rescue inhaler or nebulizer medication (such as albuterol)?: Not at all  How would you rate your asthma control during the past 4 weeks?: Well controlled  If your score is 19 or less, your asthma may not be under control: 19   Asthma Control Test  In the past 4  weeks, how much of the time did your asthma keep you from getting as much done at work, school or at home?: Some of the time  During the past 4 weeks, how often have you had shortness of breath?: Once a day  During the past 4 weeks, how often did your asthma symptoms (wheezing, couging, shortness of breath, chest tightness or pain) wake you up at night or earlier than usual in the morning?: Not at all  During the past 4 weeks, how often have you used your rescue inhaler or nebulizer medication (such as albuterol)?: Not at all  How would you rate your asthma control during the past 4 weeks?:  Well controlled  If your score is 19 or less, your asthma may not be under control: 19        4/4/2025   EPWORTH SLEEPINESS SCALE   Sitting and reading 1   Watching TV 1   Sitting, inactive in a public place (e.g. a theatre or a meeting) 0   As a passenger in a car for an hour without a break 0   Lying down to rest in the afternoon when circumstances permit 3   Sitting and talking to someone 0   Sitting quietly after a lunch without alcohol 3   In a car, while stopped for a few minutes in traffic 0   Total score 8      Asthma Control Test  In the past 4  weeks, how much of the time did your asthma keep you from getting as much done at work, school or at home?: Some of the time  During the past 4 weeks, how often have you had shortness of breath?: Once a day  During the past 4 weeks, how often did your asthma symptoms (wheezing, couging, shortness of breath, chest tightness or pain) wake you up at night or earlier than usual in the morning?: Not at all  During the past 4 weeks, how often have you used your rescue inhaler or nebulizer medication (such as albuterol)?: Not at all  How would you rate your asthma control during the past 4 weeks?: Well controlled  If your score is 19 or less, your asthma may not be under control: 19           Chief Complaint: Asthma and Apnea        Jovan Del Cid Jr. is a 80 y.o.  male   Follow up, No cough, No congetion.No fever  ACT  20: hardly taking rescue albuterol HFA.   Here to reviewed : CXR and [place]  Normal Drew  Runs asphalt bussiness  Busy lifestyle.  No interval asthma exacerbations since last visit, Actually not used rescue albuterol in 12 months.  Spirometry normal         09/21/2022  Last visit 10/15/2022  Referred by Dr Dagoberto Montes  Recent lacunar CVA  Known KALYAN Moderate KALYAN titrated to CPAP 12 cm  Snoring and apnea  Therapy options discussed  Was in Hospital x 2  Spouse present  Goals and therapy options discussed      12/13/2022  Last seen 09/21/2022  Sleep study  reveiwed  Severe KALYAN  Forgetful  Drew: Normal  FeNo 43  ACT score20  Wife says gets SOB walking  Added LABA ICS  Follow 8 weeks    12/11/2023  Followup  Stroke Last year  C/o DTS  Warren 17  Last titration CPAP 17 was adequate  Failed to fully habituated  Study reviewed  No sleepy driving  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      Titration reveiwed  BIPAP 17/13 was adequate  After long discussion patient eventually agreed to use a BiPAP.    Not open to other options like mandibular device or inspire  Will order  Time below on titration: Oxygen saturations were . 88 % for 8.8 minutes    05/24/2024  Followup  Not using BIPAP here with spouse  Not attempted to use BIPAP  Wearing O2 in lieu  Discussed INSPIRE  Not willing  Riks untreated KALYAN discussed      09/20/2024  Followup  Accompanied by his wife   Four weeks ago   Cough congestion mucus disrupting sleep at night last seen was 08/20/2024  Cough and wheezing   Patient attests using his asthma medications well   Sleep also disrupted by nocturia   Bedtime 9.  Time 7:00 a.m.   Naps for 1 hour   FEV1 1.71 L (70.3% predicted)  Exhaled nitric oxide was 69 ppb    11/08/2024  Followup  Doing well  Spouse here  FeNo dropped from 69 to 15 parts per billion  Advair 230-21  Download reviewed  He is using the device more consistently but not long enough   Adherence discussed    02/07/2025   Follow-up visit   Follow up to review results   Apparently taking his Advair 3 puff BID or More  ACT score 19  FeNo has trended up again from 15 PPB to 63 PPB  No fever  Spirometry reviewed NORMAL  Missed using BIPAP: [place] of [place]  BIPAP  "adherence has been poor  Doesn't like mask  Wants nasal  Will purchase from sleep apnea store  C/o dry mouth    04/04/2025  Followup  Feno dropped to 63 PPB to 18 PPB  No cough No whezing  For feels much better  We will continue current regimen   Fractional exhaled nitric oxide improved         Asthma  There is no cough, shortness of breath, sputum production or wheezing. Pertinent negatives include no postnasal drip. His past medical history is significant for asthma.     Review of Systems   Constitutional: Negative.    HENT:  Negative for postnasal drip and congestion.    Eyes: Negative.    Respiratory:  Negative for snoring, cough, sputum production, shortness of breath, wheezing, asthma nighttime symptoms, pleurisy, dyspnea on extertion, use of rescue inhaler and somnolence.    Cardiovascular: Negative.    Genitourinary: Negative.    Endocrine: endocrine negative    Musculoskeletal: Negative.    Skin: Negative.    Gastrointestinal: Negative.    Neurological: Negative.    Psychiatric/Behavioral: Negative.     All other systems reviewed and are negative.           Goals of Care Treatment Preferences:  Code Status: Full Code       BP Readings from Last 3 Encounters:   04/04/25 134/68   02/07/25 136/80   12/10/24 122/84          Neck circumference  18.5" [?KALYAN risk if >43cm (17in) male or >41cm (15.5 in) female]             The following portions of the patient's history were reviewed and updated as appropriate: He  has a past medical history of Acute CVA (cerebrovascular accident) (09/08/2022), DANY (acute kidney injury) (09/14/2022), Asthma, Bronchitis chronic, Cancer, Chronic cough (11/15/2013), Chronic diastolic congestive heart failure (09/12/2023), Cough, Digestive disorder, Hyperlipidemia, Hypertension, Liver disease, KALYAN (obstructive sleep apnea) (09/08/2022), Stroke (09/01/2022), and Type 2 diabetes mellitus, without long-term current use of insulin (09/07/2022).  He does not have any pertinent problems on " file.  He  has a past surgical history that includes Tonsillectomy; Colonoscopy (N/A, 6/21/2016); Colonoscopy (N/A, 11/1/2016); Colonoscopy (N/A, 2/3/2017); Colonoscopy (N/A, 11/13/2017); Fluoroscopy (N/A, 6/15/2018); and Colonoscopy (N/A, 2/19/2021).  His family history includes Alzheimer's disease in his mother; Cancer in his father.  He  reports that he quit smoking about 65 years ago. His smoking use included cigarettes and pipe. He started smoking about 68 years ago. He has been exposed to tobacco smoke. He has never used smokeless tobacco. He reports that he does not currently use alcohol after a past usage of about 2.0 - 3.0 standard drinks of alcohol per week. He reports that he does not use drugs.  He has a current medication list which includes the following prescription(s): advair hfa, blood sugar diagnostic, blood sugar diagnostic, fluticasone propionate, furosemide, glipizide, ipratropium, lancets, losartan, montelukast, multivitamin, macular health formula, onetouch delica plus lancet, pen needle, diabetic, rosuvastatin, triamcinolone acetonide 0.1%, albuterol, aspirin, blood-glucose meter, lancing device, and pantoprazole, and the following Facility-Administered Medications: epinephrine and ondansetron.  Current Outpatient Medications on File Prior to Visit   Medication Sig Dispense Refill    ADVAIR -21 mcg/actuation HFAA inhaler INHALE 2 PUFFS INTO THE LUNGS EVERY 12 HOURS 12 g 11    blood sugar diagnostic Strp 1 strip by Misc.(Non-Drug; Combo Route) route 2 (two) times daily with meals. 200 strip 3    blood sugar diagnostic Strp Test blood glucose 2 times a day 200 strip 3    fluticasone propionate (FLONASE) 50 mcg/actuation nasal spray 2 sprays (100 mcg total) by Each Nostril route once daily. Shake Liquid and use 48 g 3    furosemide (LASIX) 20 MG tablet Take 1 tablet (20 mg total) by mouth every Mon, Tues, Wed, Thurs, Fri. 60 tablet 3    glipiZIDE (GLUCOTROL) 2.5 MG TR24 TAKE 1 TABLET(2.5  "MG) BY MOUTH DAILY WITH BREAKFAST 90 tablet 0    ipratropium (ATROVENT) 21 mcg (0.03 %) nasal spray 2 sprays by Each Nostril route 2 (two) times daily. 30 mL 0    lancets Misc 1 each by Misc.(Non-Drug; Combo Route) route 2 (two) times daily with meals. 100 each 11    losartan (COZAAR) 50 MG tablet Take 1 tablet (50 mg total) by mouth once daily. 90 tablet 3    montelukast (SINGULAIR) 10 mg tablet TAKE 1 TABLET(10 MG) BY MOUTH EVERY EVENING 90 tablet 3    multivitamin (THERAGRAN) per tablet Take 1 tablet by mouth once daily.      [id number] (MACULAR HEALTH FORMULA) 5-1-7.5 mg Cap Take by mouth.      ONETOUCH DELICA PLUS LANCET 33 gauge Misc USE TO CHECK BLOOD GLUCOSE TWO TIMES A DAY WITH MEALS 200 each 3    pen needle, diabetic 31 gauge x 5/16" Ndle 1 each by Misc.(Non-Drug; Combo Route) route 2 (two) times daily with meals. 100 each 11    rosuvastatin (CRESTOR) 40 MG Tab TAKE 1 TABLET BY MOUTH EVERY DAY 90 tablet 3    triamcinolone acetonide 0.1% (KENALOG) 0.1 % cream Apply topically 2 (two) times daily. 15 g 1    albuterol (VENTOLIN HFA) 90 mcg/actuation inhaler Inhale 1-2 puffs into the lungs every 6 (six) hours as needed for Wheezing. Rescue 18 g 3    aspirin (ECOTRIN) 81 MG EC tablet Take 1 tablet (81 mg total) by mouth once daily. 90 tablet 3    blood-glucose meter kit Use as instructed 1 each 0    lancing device Misc 1 Device by Misc.(Non-Drug; Combo Route) route 2 (two) times daily with meals. 1 each 0    pantoprazole (PROTONIX) 40 MG tablet Take 1 tablet (40 mg total) by mouth daily as needed. 30 tablet 3     Current Facility-Administered Medications on File Prior to Visit   Medication Dose Route Frequency Provider Last Rate Last Admin    EPINEPHrine (EPIPEN) 0.3 mg/0.3 mL pen injection 0.3 mg  0.3 mg Intramuscular PRN Amando Ulrich MD        ondansetron disintegrating tablet 4 mg  4 mg Oral Once PRN Mojgan, Amando A., MD         He has no known allergies..    Objective:     " "  Vitals:    04/04/25 1203   BP: 134/68   Pulse: 69   Resp: 18   SpO2: 97%   Weight: 109.5 kg (241 lb 6.5 oz)   Height: 5' 6" (1.676 m)            Physical Exam  Vitals and nursing note reviewed.   Constitutional:       Appearance: He is well-developed.      Comments: Nek 19"   HENT:      Head: Normocephalic and atraumatic.        Right Ear: External ear normal.      Left Ear: External ear normal.      Nose: Nose normal.      Mouth/Throat:      Pharynx: Uvula midline. No oropharyngeal exudate.   Eyes:      General: Lids are normal. No scleral icterus.     Conjunctiva/sclera: Conjunctivae normal.      Pupils: Pupils are equal, round, and reactive to light.   Neck:      Trachea: Trachea and phonation normal.      Comments: Neck 18"  Cardiovascular:      Rate and Rhythm: Normal rate and regular rhythm.      Pulses: Normal pulses.      Heart sounds: Normal heart sounds, S1 normal and S2 normal. No murmur heard.  Pulmonary:      Effort: Pulmonary effort is normal. No respiratory distress.      Breath sounds: Examination of the left-lower field reveals decreased breath sounds. Decreased breath sounds present. No wheezing, rhonchi or rales.   Chest:      Chest wall: No tenderness.   Abdominal:      General: Bowel sounds are normal.      Palpations: Abdomen is soft.   Musculoskeletal:         General: Normal range of motion.      Cervical back: Normal range of motion and neck supple.   Lymphadenopathy:      Cervical: No cervical adenopathy.   Skin:     General: Skin is warm and dry.      Findings: No rash.      Nails: There is no clubbing.   Neurological:      Mental Status: He is alert and oriented to person, place, and time.      GCS: GCS eye subscore is 4. GCS verbal subscore is 5. GCS motor subscore is 6.      Cranial Nerves: No cranial nerve deficit.      Sensory: No sensory deficit.   Psychiatric:         Speech: Speech normal.       Personal Diagnostic Review  [x]  Chest x-ray:       X-Ray Chest PA And " Lateral  Narrative: EXAMINATION:  XR CHEST PA AND LATERAL    CLINICAL HISTORY:  Shortness of breath    TECHNIQUE:  PA and lateral views of the chest were performed.    COMPARISON:  09/20/2024    FINDINGS:  The lungs are clear and free of infiltrate.  No pleural effusion or pneumothorax. The heart is not enlarged. There is tortuosity of the descending thoracic aorta.  Impression: 1.  No acute cardiopulmonary process.    Electronically signed by: Geovanny Walker DO  Date:    02/07/2025  Time:    11:10   .  Drew  FEV1: 1.92L( 79.7%) FVC 2.82L( 86.8%)  FEV1/FVC 68    PFT    Lab Results   Component Value Date    [facility]   Â   Notes:  Â    02/07/2025    BRPFT  09/20/2024     Spirogram Seems to plateau somewhat slowly  Flow volume loop: Somewhat scooped appearance   FEV1/FVC ratio: LLN*  FVC: Reduced  FEV1: Reduced  Â   Interpretation  Spirometry suggest restrictive physiology, lung volumes are needed for confirmation.  Note the normal distribution for octogenarians is not well described, interpret findings with caution.       [facility]  03/15/2024     Spirometry is normal. Spirometry remains unimproved following bronchodilator.   Â   Notes:  Â   The failure to demonstrate improvement in spirometry does not preclude a clinical response to a trial of bronchodilators.         Clinical Guide to Interpretation or FeNO Levels :     FeNO  (ppb) LOW INTERMEDIATE HIGH   ADULT VALUES < 25 25-50          > 50   Th2-driven Inflammation Unlikely Likely Significant      Patients FeNO level at this visit : __18__ (ppb)     Interpretation of FeNO measurement in adults:  [] FENO is less than 25 ppb implies non eosinophilic airway inflammation or the absence of airway inflammation.               Comment: Low FENO (<25 ppb) in adult asthmatics with persistent symptoms suggests other etiologies for these symptoms and a lower likelihood of benefit from adding or increasing inhaled glucocorticoids.       Patient ID:  [id number]  : 1940  Age: 84 years  [facility]  [address] [address] [facility], [id number]  Compliance Report  Compliance  Payor Standard  Usage 2025 - 2025  Usage days 5/30 days (17%)  >= 4 hours 0 days (0%)  < 4 hours 5 days (17%)  Usage hours 5 hours 51 minutes  Average usage (total days) 12 minutes  Average usage (days used) 1 hours 10 minutes  Median usage (days used) 50 minutes  Total used hours (value since last reset - 2025) 460 hours  [employer] [place] - It's what's in storeo  Serial number [id number]  Mode Spont  IPAP 17 cmH2O  EPAP 13 cmH2O  Easy-Breathe On  Therapy  Leaks - L/min Median: 43.7 95th percentile: 116.4 Maximum: 119.3  Events per hour AI: 6.0 HI: 1.2 AHI: 7.2  Apnea Index Central: 1.1 Obstructive: 0.0 Unknown: 4.9  Problem List Items Addressed This Visit       Dyslipidemia    Type 2 diabetes mellitus, without long-term current use of insulin    Chronic diastolic congestive heart failure    Nocturnal hypoxemia due to asthma - Primary    HTN (hypertension)    Intolerance of continuous positive airway pressure (CPAP) ventilation    Currently poorly adherent to BiPAP.    Most recent data shows used only 5/30 days            Moderate persistent asthma without complication    Fractional exhaled nitric oxide as improved   Continue Advair HFA montelukast and albuterol with a nebulizer daily         BMI 37.0-37.9, adult    Weight loss and exercise         Obstructive sleep apnea        2025   EPWORTH SLEEPINESS SCALE   Sitting and reading 1   Watching TV 1   Sitting, inactive in a public place (e.g. a theatre or a meeting) 0   As a passenger in a car for an hour without a break 0   Lying down to rest in the afternoon when circumstances permit 3   Sitting and talking to someone 0   Sitting quietly after a lunch without alcohol 3   In a car, while stopped for a few minutes in traffic 0   Total score 8       Poorly adherent   Hopefully now that the asthma is well controlled he will  improved   Close follow up            Plan:       Adherence to LABA ICS: Use a spacer continue nebulizer       Improve daily adherence with BIPAP      Follow up in about 6 months (around 10/4/2025), or Download.    This note was prepared using voice recognition system and is likely to have sound alike errors that may have been overlooked even after proof reading.  Please call me with any questions    Discussed diagnosis, its evaluation, treatment and usual course. All questions answered.    Thank you for the courtesy of participating in the care of this patient    Stefan Buckner MD    No orders of the defined types were placed in this encounter.

## 2025-04-11 NOTE — PROGRESS NOTES
"Jovan Del Cid Jr.  MRN:  [id number]  84 y.o. male      Patient Care Team:  Paul Neal MD as PCP - General (Internal Medicine)  Thais Hansen RD, CDE as Diabetes Educator (Diabetes)  Duglas Yee, BARBRA as Day Respiratory Care Practitioner  Judd Self MD as Consulting Physician (Cardiology)  Stefan Buckner MD as Consulting Physician (Pulmonary Disease)  Gus Andrews, NP as Nurse Practitioner (Family Medicine)  Shelley Gallardo DPM as Consulting Physician (Podiatry)      SUBJECTIVE:     CHIEF COMPLAINT:  - Follow-up for chronic hypertension and diabetes    HPI:  Mr. Del Cid reports his blood pressure runs higher at home than at doctor's office. Reports average readings of 140/80. He is on HTN medication as ordered. Reports chronic lower leg swelling, on Lasix, has h/o chronic CHF. Does not monitor home glucose levels, on diabetes med as ordered. He acknowledges consuming excessive salt, states "I'm a saltaholic". Does report adequate intake of water daily.    He reports fecal incontinence for approximately 1 year, describing loose stools occurring unexpectedly, even during urination. He denies constipation, melena, BRB from rectum or anal pain. He does not use protective briefs for this issue.      Review of Systems   Constitutional: Negative.    Respiratory: Negative.     Cardiovascular:  Positive for leg swelling. Negative for chest pain, palpitations and claudication.   Gastrointestinal:  Positive for diarrhea (chronic w/ fecal incontinence). Negative for abdominal pain, blood in stool, constipation, heartburn, melena, nausea and vomiting.   Genitourinary: Negative.    Neurological:  Negative for dizziness, tingling, tremors and headaches.       Review of patient's allergies indicates:  No Known Allergies      Problem List[1]    Current Outpatient Medications   Medication Instructions    ADVAIR -21 mcg/actuation HFAA inhaler 2 puffs, Inhalation, Every 12 hours    albuterol " "(VENTOLIN HFA) 90 mcg/actuation inhaler 1-2 puffs, Inhalation, Every 6 hours PRN, Rescue    aspirin (ECOTRIN) 81 mg, Oral, Daily    blood sugar diagnostic Strp 1 strip, Misc.(Non-Drug; Combo Route), 2 times daily with meals    blood sugar diagnostic Strp Test blood glucose 2 times a day    blood-glucose meter kit Use as instructed    fluticasone propionate (FLONASE) 100 mcg, Each Nostril, Daily, Shake Liquid and use    furosemide (LASIX) 20 mg, Oral, Every Mon, Tues, Wed, Thurs, Fri    glipiZIDE (GLUCOTROL) 2.5 MG TR24 TAKE 1 TABLET(2.5 MG) BY MOUTH DAILY WITH BREAKFAST    ipratropium (ATROVENT) 21 mcg (0.03 %) nasal spray 2 sprays, Each Nostril, 2 times daily    lancets Misc 1 each, Misc.(Non-Drug; Combo Route), 2 times daily with meals    lancing device Misc 1 Device, Misc.(Non-Drug; Combo Route), 2 times daily with meals    losartan (COZAAR) 50 mg, Oral, Daily    montelukast (SINGULAIR) 10 mg, Oral, Nightly    multivitamin (THERAGRAN) per tablet 1 tablet, Daily    [id number] (MACULAR HEALTH FORMULA) 5-1-7.5 mg Cap Take by mouth.    ONETOUCH DELICA PLUS LANCET 33 gauge Misc USE TO CHECK BLOOD GLUCOSE TWO TIMES A DAY WITH MEALS    pantoprazole (PROTONIX) 40 mg, Oral, Daily PRN    pen needle, diabetic 31 gauge x 5/16" Ndle 1 each, Misc.(Non-Drug; Combo Route), 2 times daily with meals    rosuvastatin (CRESTOR) 40 mg, Oral    triamcinolone acetonide 0.1% (KENALOG) 0.1 % cream Topical (Top), 2 times daily         Past medical, surgical, family and social histories have been reviewed today.      OBJECTIVE:     Vitals:    04/14/25 1419   BP: 132/70   Pulse: 84   Resp: 18   Temp: 97.9 °F (36.6 °C)   TempSrc: Tympanic   SpO2: 97%   Weight: 109.9 kg (242 lb 4.6 oz)   Height: 5' 6" (1.676 m)     Vitals:    04/14/25 1419   PainSc: 0-No pain       Physical Exam  Vitals reviewed.   Constitutional:       General: He is not in acute distress.  HENT:      Head: Normocephalic and atraumatic.   Cardiovascular: "      Rate and Rhythm: Normal rate and regular rhythm.   Pulmonary:      Effort: Pulmonary effort is normal.      Breath sounds: Normal breath sounds.   Musculoskeletal:      Right lower leg: Edema present.      Left lower leg: Edema present.   Skin:     Capillary Refill: Capillary refill takes less than 2 seconds.   Neurological:      Mental Status: He is alert and oriented to person, place, and time.      Sensory: No sensory deficit.      Motor: No weakness.      Coordination: Coordination normal.      Gait: Gait normal.   Psychiatric:         Mood and Affect: Mood normal.         Behavior: Behavior normal.         Thought Content: Thought content normal.         Judgment: Judgment normal.           Lab Results   Component Value Date     07/09/2024    K 4.3 07/09/2024     07/09/2024    CO2 22 (L) 07/09/2024    BUN 16 07/09/2024    CREATININE 0.8 07/09/2024    CALCIUM 9.2 07/09/2024    ANIONGAP 10 07/09/2024    EGFRNORACEVR >60.0 07/09/2024       Lab Results   Component Value Date    HGBA1C 6.2 (H) 12/10/2024       Microalb/Creat Ratio   Date Value Ref Range Status   09/20/2024 27.9 0.0 - 30.0 ug/mg Final       ASSESSMENT:     1. Hypertension, unspecified type ---- chronic issue, on med as ordered.  DASH diet discussed, home bp readings with arm cuff but may be elevated depending on when salt intake occurred at home.  Normal reading today in office.  Followed by PCP and Card.  -     Hemoglobin A1C; Future; Expected date: 06/10/2025  -     Basic Metabolic Panel; Future; Expected date: 06/10/2025    2. Type 2 diabetes mellitus without complication, without long-term current use of insulin --- chronic issue, stable on med.  No home glucose checks.  -     Hemoglobin A1C; Future; Expected date: 06/10/2025  -     Basic Metabolic Panel; Future; Expected date: 06/10/2025    3. Chronic diastolic congestive heart failure ------ chronic issue with reports of chronic lower leg edema.  On diuretic as ordered.  Cut  back on salt intake.  Denies cp or sob.  Per Card.    4. Incontinence of feces, unspecified fecal incontinence type ---- chronic issue ongoing >= 1 year.  To GI.  -     Ambulatory referral/consult to Gastroenterology; Future; Expected date: 04/21/2025    5. Class 2 severe obesity with serious comorbidity and body mass index (BMI) of 39.0 to 39.9 in adult, unspecified obesity type ---- healthy diet and lifestyle changes        PLAN:     To GI.  Lab ordered, to be done on 6/10/25.  RTC as directed and/or prn.        LINDA Maya  [facility]       40 minutes of total time spent on the encounter, which includes face to face time and non-face to face time preparing to see the patient.  This includes obtaining and/or reviewing separately obtained history, performing a medically appropriate examination and/or evaluation, and counseling and educating the patient/family/caregiver.  Includes documenting clinical information in the electronic or other health record, independently interpreting results (not separately reported) and communicating results to the patient/family/caregiver, with care coordination (not separately reported).  Medications, tests and/or procedures ordered as necessary along with referring and communicating with other health professionals (when not separately reported).         [1]   Patient Active Problem List  Diagnosis    Intolerance of continuous positive airway pressure (CPAP) ventilation    Liver cyst    HTN (hypertension)    Dyslipidemia    Benign prostatic hyperplasia    Lipoma    Amyloid disease    Chronic maxillary sinusitis    Tubulovillous adenoma of colon    MCMAHAN (nonalcoholic steatohepatitis)    Moderate persistent asthma without complication    Personal history of colonic polyps    History of lacunar cerebrovascular accident    Type 2 diabetes mellitus, without long-term current use of insulin    Obstructive sleep apnea    Stenosis of left carotid artery     Venous insufficiency    Valvular heart disease    Chronic diastolic congestive heart failure    Unequal blood pressures in paired extremities    Cognitive complaints with normal exam    Nocturnal hypoxemia due to asthma    Anemia    Class 2 severe obesity with serious comorbidity and body mass index (BMI) of 39.0 to 39.9 in adult

## 2025-04-14 ENCOUNTER — OFFICE VISIT (OUTPATIENT)
Dept: FAMILY MEDICINE | Facility: CLINIC | Age: 85
End: 2025-04-14
Payer: COMMERCIAL

## 2025-04-14 VITALS
RESPIRATION RATE: 18 BRPM | DIASTOLIC BLOOD PRESSURE: 70 MMHG | HEIGHT: 66 IN | BODY MASS INDEX: 38.94 KG/M2 | OXYGEN SATURATION: 97 % | HEART RATE: 84 BPM | SYSTOLIC BLOOD PRESSURE: 132 MMHG | TEMPERATURE: 98 F | WEIGHT: 242.31 LBS

## 2025-04-14 DIAGNOSIS — I50.32 CHRONIC DIASTOLIC CONGESTIVE HEART FAILURE: ICD-10-CM

## 2025-04-14 DIAGNOSIS — E66.812 CLASS 2 SEVERE OBESITY WITH SERIOUS COMORBIDITY AND BODY MASS INDEX (BMI) OF 39.0 TO 39.9 IN ADULT, UNSPECIFIED OBESITY TYPE: ICD-10-CM

## 2025-04-14 DIAGNOSIS — E66.01 CLASS 2 SEVERE OBESITY WITH SERIOUS COMORBIDITY AND BODY MASS INDEX (BMI) OF 39.0 TO 39.9 IN ADULT, UNSPECIFIED OBESITY TYPE: ICD-10-CM

## 2025-04-14 DIAGNOSIS — E11.9 TYPE 2 DIABETES MELLITUS WITHOUT COMPLICATION, WITHOUT LONG-TERM CURRENT USE OF INSULIN: ICD-10-CM

## 2025-04-14 DIAGNOSIS — I10 HYPERTENSION, UNSPECIFIED TYPE: Primary | ICD-10-CM

## 2025-04-14 DIAGNOSIS — R15.9 INCONTINENCE OF FECES, UNSPECIFIED FECAL INCONTINENCE TYPE: ICD-10-CM

## 2025-04-14 PROCEDURE — 1159F MED LIST DOCD IN RCRD: CPT | Mod: CPTII,S$GLB,, | Performed by: REGISTERED NURSE

## 2025-04-14 PROCEDURE — 1101F PT FALLS ASSESS-DOCD LE1/YR: CPT | Mod: CPTII,S$GLB,, | Performed by: REGISTERED NURSE

## 2025-04-14 PROCEDURE — 99215 OFFICE O/P EST HI 40 MIN: CPT | Mod: S$GLB,,, | Performed by: REGISTERED NURSE

## 2025-04-14 PROCEDURE — 3078F DIAST BP <80 MM HG: CPT | Mod: CPTII,S$GLB,, | Performed by: REGISTERED NURSE

## 2025-04-14 PROCEDURE — 99999 PR PBB SHADOW E&M-EST. PATIENT-LVL V: CPT | Mod: PBBFAC,,, | Performed by: REGISTERED NURSE

## 2025-04-14 PROCEDURE — 3075F SYST BP GE 130 - 139MM HG: CPT | Mod: CPTII,S$GLB,, | Performed by: REGISTERED NURSE

## 2025-04-14 PROCEDURE — 3288F FALL RISK ASSESSMENT DOCD: CPT | Mod: CPTII,S$GLB,, | Performed by: REGISTERED NURSE

## 2025-04-14 PROCEDURE — 1126F AMNT PAIN NOTED NONE PRSNT: CPT | Mod: CPTII,S$GLB,, | Performed by: REGISTERED NURSE

## 2025-04-14 PROCEDURE — G2211 COMPLEX E/M VISIT ADD ON: HCPCS | Mod: S$GLB,,, | Performed by: REGISTERED NURSE

## 2025-04-23 ENCOUNTER — OFFICE VISIT (OUTPATIENT)
Dept: CARDIOLOGY | Facility: CLINIC | Age: 85
End: 2025-04-23
Payer: COMMERCIAL

## 2025-04-23 VITALS
HEIGHT: 66 IN | OXYGEN SATURATION: 95 % | RESPIRATION RATE: 16 BRPM | HEART RATE: 77 BPM | DIASTOLIC BLOOD PRESSURE: 72 MMHG | WEIGHT: 236.25 LBS | SYSTOLIC BLOOD PRESSURE: 118 MMHG | BODY MASS INDEX: 37.97 KG/M2

## 2025-04-23 DIAGNOSIS — E78.5 DYSLIPIDEMIA: ICD-10-CM

## 2025-04-23 DIAGNOSIS — I50.32 CHRONIC DIASTOLIC CONGESTIVE HEART FAILURE: ICD-10-CM

## 2025-04-23 DIAGNOSIS — I10 HYPERTENSION, UNSPECIFIED TYPE: Primary | ICD-10-CM

## 2025-04-23 DIAGNOSIS — I87.2 VENOUS INSUFFICIENCY: ICD-10-CM

## 2025-04-23 PROCEDURE — 3074F SYST BP LT 130 MM HG: CPT | Mod: CPTII,S$GLB,,

## 2025-04-23 PROCEDURE — 1101F PT FALLS ASSESS-DOCD LE1/YR: CPT | Mod: CPTII,S$GLB,,

## 2025-04-23 PROCEDURE — 3288F FALL RISK ASSESSMENT DOCD: CPT | Mod: CPTII,S$GLB,,

## 2025-04-23 PROCEDURE — 1159F MED LIST DOCD IN RCRD: CPT | Mod: CPTII,S$GLB,,

## 2025-04-23 PROCEDURE — 1160F RVW MEDS BY RX/DR IN RCRD: CPT | Mod: CPTII,S$GLB,,

## 2025-04-23 PROCEDURE — 99214 OFFICE O/P EST MOD 30 MIN: CPT | Mod: S$GLB,,,

## 2025-04-23 PROCEDURE — 3078F DIAST BP <80 MM HG: CPT | Mod: CPTII,S$GLB,,

## 2025-04-23 PROCEDURE — 99999 PR PBB SHADOW E&M-EST. PATIENT-LVL V: CPT | Mod: PBBFAC,,,

## 2025-04-23 NOTE — PROGRESS NOTES
Subjective:   Patient ID:  Jovan Del Cid Jr. is a 84 y.o. male who presents for evaluation of No chief complaint on file.      HPI a 4-year-old male whose current medical conditions include MVP mod MR, CHFpEF, acute CVA in , asthma, h/o CVA, HLD, HTN,Hx of Colon Tubulovillous Adenoma, MCMAHAN h/o right neck lipoma, and  obesity previously followed in cardiology clinic by Dr. Self here today for CV follow-up denies any chest pain, angina or anginal equivalent at this time.  Reports compliance with medications.  Blood pressure stable in clinic today, reports this is low for him asymptomatic.  Weight stable from previous visit down 3 lb      Hg A1c 12/24' 6.2  LDL 8/24' 47    Echo 10/24' with normal EF  Lab Results   Component Value Date    HGBA1C 6.2 (H) 12/10/2024       Past Medical History:   Diagnosis Date    Acute CVA (cerebrovascular accident) 09/08/2022    DANY (acute kidney injury) 09/14/2022    Asthma     Bronchitis chronic     Cancer     Chronic cough 11/15/2013    Chronic diastolic congestive heart failure 09/12/2023    Cough     Digestive disorder     Hyperlipidemia     Hypertension     Liver disease     KALYAN (obstructive sleep apnea) 09/08/2022    Stroke 09/01/2022    Type 2 diabetes mellitus, without long-term current use of insulin 09/07/2022    BS didn't check 10/05/2022       Past Surgical History:   Procedure Laterality Date    COLONOSCOPY N/A 6/21/2016    Procedure: COLONOSCOPY;  Surgeon: Alonso Dorsey MD;  Location: [facility];  Service: Endoscopy;  Laterality: N/A;    COLONOSCOPY N/A 11/1/2016    Procedure: COLONOSCOPY;  Surgeon: Alonso Dorsey MD;  Location: [facility];  Service: Endoscopy;  Laterality: N/A;    COLONOSCOPY N/A 2/3/2017    Procedure: COLONOSCOPY;  Surgeon: Alonso Dorsey MD;  Location: [facility];  Service: Endoscopy;  Laterality: N/A;    COLONOSCOPY N/A 11/13/2017    Procedure: COLONOSCOPY;  Surgeon: Alonso Dorsey MD;  Location: [facility];  Service:  Endoscopy;  Laterality: N/A;    COLONOSCOPY N/A 2/19/2021    Procedure: COLONOSCOPY;  Surgeon: Paula Ricardo MD;  Location: [facility];  Service: Endoscopy;  Laterality: N/A;    FLUOROSCOPY N/A 6/15/2018    Procedure: Transjugular liver bx;  Surgeon: Petros Recio MD;  Location: [facility] CATH LAB;  Service: General;  Laterality: N/A;    TONSILLECTOMY         Social History[1]    Family History   Problem Relation Name Age of Onset    Cancer Father      Alzheimer's disease Mother      Colon cancer Neg Hx      Melanoma Neg Hx         Medications Ordered Prior to Encounter[2]   Wt Readings from Last 3 Encounters:   04/23/25 107.2 kg (236 lb 3.6 oz)   04/14/25 109.9 kg (242 lb 4.6 oz)   04/04/25 109.5 kg (241 lb 6.5 oz)     Temp Readings from Last 3 Encounters:   04/14/25 97.9 °F (36.6 °C) (Tympanic)   12/10/24 97.1 °F (36.2 °C) (Tympanic)   08/08/24 98.4 °F (36.9 °C) (Tympanic)     BP Readings from Last 3 Encounters:   04/23/25 118/72   04/14/25 132/70   04/04/25 134/68     Pulse Readings from Last 3 Encounters:   04/23/25 77   04/14/25 84   04/04/25 69        Review of Systems   Constitutional: Negative.   HENT: Negative.     Eyes: Negative.    Cardiovascular:  Positive for leg swelling.   Respiratory: Negative.     Skin:  Positive for color change.   Musculoskeletal: Negative.    Gastrointestinal: Negative.    Genitourinary: Negative.    Neurological: Negative.    Psychiatric/Behavioral: Negative.         Objective:   Physical Exam  Vitals and nursing note reviewed.   Constitutional:       Appearance: Normal appearance. He is obese.   HENT:      Head: Normocephalic and atraumatic.   Eyes:      General:         Right eye: No discharge.         Left eye: No discharge.      Pupils: Pupils are equal, round, and reactive to light.   Cardiovascular:      Rate and Rhythm: Normal rate and regular rhythm.      Heart sounds: S1 normal and S2 normal. No murmur heard.     No friction rub.   Pulmonary:      Effort: Pulmonary  effort is normal. No respiratory distress.      Breath sounds: Normal breath sounds. No rales.   Abdominal:      Palpations: Abdomen is soft.      Tenderness: There is no abdominal tenderness.   Musculoskeletal:      Cervical back: Neck supple.      Right lower leg: No edema.      Left lower leg: No edema.   Skin:     General: Skin is warm and dry.      Findings: Erythema present.   Neurological:      General: No focal deficit present.      Mental Status: He is alert and oriented to person, place, and time.   Psychiatric:         Mood and Affect: Mood normal.         Behavior: Behavior normal.         Thought Content: Thought content normal.         Lab Results   Component Value Date    CHOL 132 08/08/2024    CHOL 168 08/24/2023    CHOL 113 (L) 01/24/2023     Lab Results   Component Value Date    HDL 47 08/08/2024    HDL 44 08/24/2023    HDL 40 01/24/2023     Lab Results   Component Value Date    LDLCALC 47.4 (L) 08/08/2024    LDLCALC 66.6 08/24/2023    LDLCALC 8.6 (L) 01/24/2023     Lab Results   Component Value Date    TRIG 188 (H) 08/08/2024    TRIG 287 (H) 08/24/2023    TRIG 322 (H) 01/24/2023     Lab Results   Component Value Date    CHOLHDL 35.6 08/08/2024    CHOLHDL 26.2 08/24/2023    CHOLHDL 35.4 01/24/2023       Chemistry        Component Value Date/Time     07/09/2024 1105    K 4.3 07/09/2024 1105     07/09/2024 1105    CO2 22 (L) 07/09/2024 1105    BUN 16 07/09/2024 1105    CREATININE 0.8 07/09/2024 1105     (H) 07/09/2024 1105        Component Value Date/Time    CALCIUM 9.2 07/09/2024 1105    ALKPHOS 61 08/08/2024 1331    AST 27 08/08/2024 1331    ALT 30 08/08/2024 1331    BILITOT 1.9 (H) 08/08/2024 1331    ESTGFRAFRICA >60.0 01/20/2021 1120    EGFRNONAA >60.0 01/20/2021 1120          Lab Results   Component Value Date    TSH 2.979 12/05/2023     Lab Results   Component Value Date    INR 0.9 09/03/2022    INR 1.0 09/02/2022    INR 1.0 06/15/2018      @RESUFAST(WBC,HGB,HCT,MCV,PLT)  @LABRCNTIP(BNP,BNPTRIAGEBLO)@  CrCl cannot be calculated (Patient's most recent lab result is older than the maximum 7 days allowed.).     Results for orders placed during the hospital encounter of 10/22/24    Echo    Interpretation Summary    Left Ventricle: The left ventricle is normal in size. Normal wall thickness. There is normal systolic function with a visually estimated ejection fraction of 60 - 65%. There is normal diastolic function.    Right Ventricle: Normal right ventricular cavity size. Wall thickness is normal. Systolic function is normal.    Mitral Valve: There is moderate to severe regurgitation with an eccentric jet and a wall hugging jet.    Tricuspid Valve: There is mild regurgitation.    Pulmonary Artery: Pulmonary artery pressure could not be estimated.     Results for orders placed during the hospital encounter of 10/19/23    Nuclear Stress - Cardiology Interpreted    Interpretation Summary    Normal myocardial perfusion scan. There is no evidence of myocardial ischemia or infarction.    There is a  mild intensity fixed perfusion abnormality in the inferolateral wall of the left ventricle secondary to diaphragm attenuation.    The gated perfusion images showed an ejection fraction of 68% at rest. The gated perfusion images showed an ejection fraction of 74% post stress.    The ECG portion of the study is negative for ischemia.    The patient reported no chest pain during the stress test.    Asymptomatic pre, during, and post stress     Assessment:      1. Hypertension, unspecified type    2. Dyslipidemia    3. Venous insufficiency    4. Chronic diastolic congestive heart failure        Plan:   Hypertension, unspecified type    Dyslipidemia    Venous insufficiency    Chronic diastolic congestive heart failure      Lasix, losartan, low-sodium diet, fluid restriction, daily weights, euvolemic by exam today-CHF   Rosuvastatin, low-fat diet-HLP  Losartan,  low-sodium diet, profile blood pressure-HTN   Risk factor modifications   Daily exercise as tolerated     Return to clinic in 6 months or sooner if needed    Courtney Guillot, FNP-C [facility]         [1]   Social History  Tobacco Use    Smoking status: Former     Current packs/day: 0.00     Types: Cigarettes, Pipe     Start date:      Quit date:      Years since quittin.3     Passive exposure: Past    Smokeless tobacco: Never    Tobacco comments:     smoked a pipe - quit smoking    Substance Use Topics    Alcohol use: Not Currently     Alcohol/week: 2.0 - 3.0 standard drinks of alcohol     Types: 2 - 3 Cans of beer per week     Comment: daily    Drug use: No   [2]   Current Outpatient Medications on File Prior to Visit   Medication Sig Dispense Refill    ADVAIR -21 mcg/actuation HFAA inhaler INHALE 2 PUFFS INTO THE LUNGS EVERY 12 HOURS 12 g 11    blood sugar diagnostic Strp 1 strip by Misc.(Non-Drug; Combo Route) route 2 (two) times daily with meals. 200 strip 3    blood sugar diagnostic Strp Test blood glucose 2 times a day 200 strip 3    fluticasone propionate (FLONASE) 50 mcg/actuation nasal spray 2 sprays (100 mcg total) by Each Nostril route once daily. Shake Liquid and use 48 g 3    furosemide (LASIX) 20 MG tablet Take 1 tablet (20 mg total) by mouth every Mon, Tues, Wed, Thurs, Fri. 60 tablet 3    glipiZIDE (GLUCOTROL) 2.5 MG TR24 TAKE 1 TABLET(2.5 MG) BY MOUTH DAILY WITH BREAKFAST 90 tablet 0    ipratropium (ATROVENT) 21 mcg (0.03 %) nasal spray 2 sprays by Each Nostril route 2 (two) times daily. 30 mL 0    lancets Misc 1 each by Misc.(Non-Drug; Combo Route) route 2 (two) times daily with meals. 100 each 11    losartan (COZAAR) 50 MG tablet Take 1 tablet (50 mg total) by mouth once daily. 90 tablet 3    montelukast (SINGULAIR) 10 mg tablet TAKE 1 TABLET(10 MG) BY MOUTH EVERY EVENING 90 tablet 3    multivitamin (THERAGRAN) per tablet Take 1 tablet by mouth once daily.       "[id number] (MACULAR HEALTH FORMULA) 5-1-7.5 mg Cap Take by mouth.      ONETOUCH DELICA PLUS LANCET 33 gauge [facility] USE TO CHECK BLOOD GLUCOSE TWO TIMES A DAY WITH MEALS 200 each 3    pen needle, diabetic 31 gauge x 5/16" Ndle 1 each by Misc.(Non-Drug; Combo Route) route 2 (two) times daily with meals. 100 each 11    rosuvastatin (CRESTOR) 40 MG Tab TAKE 1 TABLET BY MOUTH EVERY DAY 90 tablet 3    triamcinolone acetonide 0.1% (KENALOG) 0.1 % cream Apply topically 2 (two) times daily. 15 g 1    albuterol (VENTOLIN HFA) 90 mcg/actuation inhaler Inhale 1-2 puffs into the lungs every 6 (six) hours as needed for Wheezing. Rescue 18 g 3    aspirin (ECOTRIN) 81 MG EC tablet Take 1 tablet (81 mg total) by mouth once daily. 90 tablet 3    blood-glucose meter kit Use as instructed 1 each 0    lancing device Misc 1 Device by Misc.(Non-Drug; Combo Route) route 2 (two) times daily with meals. 1 each 0    pantoprazole (PROTONIX) 40 MG tablet Take 1 tablet (40 mg total) by mouth daily as needed. 30 tablet 3     Current Facility-Administered Medications on File Prior to Visit   Medication Dose Route Frequency Provider Last Rate Last Admin    EPINEPHrine (EPIPEN) 0.3 mg/0.3 mL pen injection 0.3 mg  0.3 mg Intramuscular PRN Amando Ulrich MD         "

## 2025-06-04 ENCOUNTER — OFFICE VISIT (OUTPATIENT)
Dept: DERMATOLOGY | Facility: CLINIC | Age: 85
End: 2025-06-04
Payer: COMMERCIAL

## 2025-06-04 DIAGNOSIS — I87.2 STASIS DERMATITIS OF BOTH LEGS: Primary | ICD-10-CM

## 2025-06-04 DIAGNOSIS — L29.9 PRURITUS OF BOTH HANDS: ICD-10-CM

## 2025-06-04 PROCEDURE — 87070 CULTURE OTHR SPECIMN AEROBIC: CPT | Performed by: PHYSICIAN ASSISTANT

## 2025-06-04 PROCEDURE — 99999 PR PBB SHADOW E&M-EST. PATIENT-LVL III: CPT | Mod: PBBFAC,,, | Performed by: PHYSICIAN ASSISTANT

## 2025-06-04 PROCEDURE — 3288F FALL RISK ASSESSMENT DOCD: CPT | Mod: CPTII,S$GLB,, | Performed by: PHYSICIAN ASSISTANT

## 2025-06-04 PROCEDURE — 1101F PT FALLS ASSESS-DOCD LE1/YR: CPT | Mod: CPTII,S$GLB,, | Performed by: PHYSICIAN ASSISTANT

## 2025-06-04 PROCEDURE — 99204 OFFICE O/P NEW MOD 45 MIN: CPT | Mod: S$GLB,,, | Performed by: PHYSICIAN ASSISTANT

## 2025-06-04 PROCEDURE — G2211 COMPLEX E/M VISIT ADD ON: HCPCS | Mod: S$GLB,,, | Performed by: PHYSICIAN ASSISTANT

## 2025-06-04 RX ORDER — TRIAMCINOLONE ACETONIDE 1 MG/G
CREAM TOPICAL 2 TIMES DAILY
Qty: 454 G | Refills: 1 | Status: SHIPPED | OUTPATIENT
Start: 2025-06-04

## 2025-06-04 RX ORDER — MUPIROCIN 20 MG/G
OINTMENT TOPICAL 2 TIMES DAILY
Qty: 30 G | Refills: 2 | Status: SHIPPED | OUTPATIENT
Start: 2025-06-04

## 2025-06-04 NOTE — PROGRESS NOTES
Subjective:      Patient ID:  Jovan Del Cid Jr. is a 84 y.o. male who presents for   Chief Complaint   Patient presents with    Rash     83 yo male w/chronic diastolic CHF (on lasix 20 mg qd x 5 days weekly, followed by Cards) here w/wife for c/o rash of bilateral lower legs and swelling. Notes h/o swelling of leg, but worsening in last 4-6 months.  States he is fairly consistent with lasix. Does not wear compression. Rash onset a few months ago. +Redness, dryness, itching. Denies new meds, soaps or detergents or lotions.  No new exposures.  No other rashes.    Not consistent with daily weights.  No h/o eczema    Cr 0.8 ( 1 year ago)        Review of Systems   Constitutional:  Negative for fever and chills.   Gastrointestinal:  Negative for nausea and vomiting.   Skin:  Positive for itching, rash, dry skin and activity-related sunscreen use. Negative for sun sensitivity, daily sunscreen use, recent sunburn and dry lips.   Hematologic/Lymphatic: Does not bruise/bleed easily.       Objective:   Physical Exam     Diagram Legend     Erythematous scaling macule/papule c/w actinic keratosis       Vascular papule c/w angioma      Pigmented verrucoid papule/plaque c/w seborrheic keratosis      Yellow umbilicated papule c/w sebaceous hyperplasia      Irregularly shaped tan macule c/w lentigo     1-2 mm smooth white papules consistent with Milia      Movable subcutaneous cyst with punctum c/w epidermal inclusion cyst      Subcutaneous movable cyst c/w pilar cyst      Firm pink to brown papule c/w dermatofibroma      Pedunculated fleshy papule(s) c/w skin tag(s)      Evenly pigmented macule c/w junctional nevus     Mildly variegated pigmented, slightly irregular-bordered macule c/w mildly atypical nevus      Flesh colored to evenly pigmented papule c/w intradermal nevus       Pink pearly papule/plaque c/w basal cell carcinoma      Erythematous hyperkeratotic cursted plaque c/w SCC      Surgical scar with no sign of skin  cancer recurrence      Open and closed comedones      Inflammatory papules and pustules      Verrucoid papule consistent consistent with wart     Erythematous eczematous patches and plaques     Dystrophic onycholytic nail with subungual debris c/w onychomycosis     Umbilicated papule    Erythematous-base heme-crusted tan verrucoid plaque consistent with inflamed seborrheic keratosis     Erythematous Silvery Scaling Plaque c/w Psoriasis     See annotation      Assessment / Plan:        Stasis dermatitis of both legs  -     triamcinolone acetonide 0.1% (KENALOG) 0.1 % cream; Apply topically 2 (two) times daily. PRN rash. Avoid broken skin.  Steroid- limit to 2 weeks then taper.  Dispense: 454 g; Refill: 1  -     mupirocin (BACTROBAN) 2 % ointment; Apply topically 2 (two) times daily. PRN broken skin.  Dispense: 30 g; Refill: 2  -     Aerobic culture  Discussed dx, tx, risk of ACD in setting of stasis. Encourged low salt diet, walking as tolerated, leg elevation above level of heart. Consider otc low grade compression as tolerated. Would reconsider trial of elidel in future vs. Lower potency TCS. Encouraged to f/u with cards regarding management of leg swelling/ CHF.    Pruritus of both hands  No obvious rash. Encouraged gentle skin care and emollients.            No follow-ups on file.

## 2025-06-04 NOTE — PATIENT INSTRUCTIONS
Hibiclens wash may be used 1-2 times weekly. Lather a quarter sized dollop on washcloth, wash affected area, then rinse well.     New Skin Care Regimen  Soap: Dove sensitive skin bar or liquid  Moisturizer: ceraVe or cetaphil cream  Detergent: Tide Free, All Free, or Cheer Free   Fabric softener: do not use  Colognes/Perfumes/Fragrances: do not use  Bathing: shower or bathe with lukewarm water < 10 minutes    Elevate legs above heart level when sitting.

## 2025-06-05 ENCOUNTER — OFFICE VISIT (OUTPATIENT)
Dept: CARDIOLOGY | Facility: CLINIC | Age: 85
End: 2025-06-05
Payer: COMMERCIAL

## 2025-06-05 ENCOUNTER — HOSPITAL ENCOUNTER (OUTPATIENT)
Dept: RADIOLOGY | Facility: HOSPITAL | Age: 85
Discharge: HOME | End: 2025-06-05
Attending: INTERNAL MEDICINE
Payer: COMMERCIAL

## 2025-06-05 ENCOUNTER — OFFICE VISIT (OUTPATIENT)
Dept: GASTROENTEROLOGY | Facility: CLINIC | Age: 85
End: 2025-06-05
Payer: COMMERCIAL

## 2025-06-05 ENCOUNTER — HOSPITAL ENCOUNTER (OUTPATIENT)
Dept: CARDIOLOGY | Facility: HOSPITAL | Age: 85
Discharge: HOME | End: 2025-06-05
Attending: INTERNAL MEDICINE
Payer: COMMERCIAL

## 2025-06-05 VITALS
SYSTOLIC BLOOD PRESSURE: 148 MMHG | BODY MASS INDEX: 38.16 KG/M2 | DIASTOLIC BLOOD PRESSURE: 85 MMHG | WEIGHT: 237.44 LBS | HEART RATE: 67 BPM | HEIGHT: 66 IN

## 2025-06-05 VITALS
DIASTOLIC BLOOD PRESSURE: 82 MMHG | HEIGHT: 66 IN | OXYGEN SATURATION: 97 % | WEIGHT: 239.63 LBS | SYSTOLIC BLOOD PRESSURE: 128 MMHG | HEART RATE: 113 BPM | BODY MASS INDEX: 38.51 KG/M2

## 2025-06-05 DIAGNOSIS — R15.9 INCONTINENCE OF FECES, UNSPECIFIED FECAL INCONTINENCE TYPE: Primary | ICD-10-CM

## 2025-06-05 DIAGNOSIS — I10 HYPERTENSION, UNSPECIFIED TYPE: Primary | ICD-10-CM

## 2025-06-05 DIAGNOSIS — M79.89 LEG SWELLING: Primary | ICD-10-CM

## 2025-06-05 DIAGNOSIS — Z86.0100 HISTORY OF COLONIC POLYPS: ICD-10-CM

## 2025-06-05 DIAGNOSIS — E11.9 TYPE 2 DIABETES MELLITUS WITHOUT COMPLICATION, WITHOUT LONG-TERM CURRENT USE OF INSULIN: ICD-10-CM

## 2025-06-05 DIAGNOSIS — I50.32 CHRONIC DIASTOLIC CONGESTIVE HEART FAILURE: ICD-10-CM

## 2025-06-05 DIAGNOSIS — E78.5 DYSLIPIDEMIA: ICD-10-CM

## 2025-06-05 DIAGNOSIS — R15.9 INCONTINENCE OF FECES, UNSPECIFIED FECAL INCONTINENCE TYPE: ICD-10-CM

## 2025-06-05 DIAGNOSIS — I65.22 STENOSIS OF LEFT CAROTID ARTERY: ICD-10-CM

## 2025-06-05 DIAGNOSIS — I38 VALVULAR HEART DISEASE: ICD-10-CM

## 2025-06-05 DIAGNOSIS — I10 HYPERTENSION, UNSPECIFIED TYPE: ICD-10-CM

## 2025-06-05 DIAGNOSIS — I87.2 VENOUS INSUFFICIENCY: ICD-10-CM

## 2025-06-05 DIAGNOSIS — E66.812 CLASS 2 SEVERE OBESITY DUE TO EXCESS CALORIES WITH SERIOUS COMORBIDITY AND BODY MASS INDEX (BMI) OF 39.0 TO 39.9 IN ADULT: ICD-10-CM

## 2025-06-05 DIAGNOSIS — I10 PRIMARY HYPERTENSION: ICD-10-CM

## 2025-06-05 DIAGNOSIS — E85.9 AMYLOIDOSIS, UNSPECIFIED TYPE: ICD-10-CM

## 2025-06-05 DIAGNOSIS — E66.01 CLASS 2 SEVERE OBESITY DUE TO EXCESS CALORIES WITH SERIOUS COMORBIDITY AND BODY MASS INDEX (BMI) OF 39.0 TO 39.9 IN ADULT: ICD-10-CM

## 2025-06-05 PROCEDURE — 99999 PR PBB SHADOW E&M-EST. PATIENT-LVL V: CPT | Mod: PBBFAC,,, | Performed by: INTERNAL MEDICINE

## 2025-06-05 PROCEDURE — 93005 ELECTROCARDIOGRAM TRACING: CPT

## 2025-06-05 PROCEDURE — 74018 RADEX ABDOMEN 1 VIEW: CPT | Mod: 26,,, | Performed by: RADIOLOGY

## 2025-06-05 PROCEDURE — 74018 RADEX ABDOMEN 1 VIEW: CPT | Mod: TC

## 2025-06-05 PROCEDURE — 93010 ELECTROCARDIOGRAM REPORT: CPT | Mod: ,,, | Performed by: INTERNAL MEDICINE

## 2025-06-05 RX ORDER — FUROSEMIDE 40 MG/1
40 TABLET ORAL DAILY
Qty: 30 TABLET | Refills: 11 | Status: SHIPPED | OUTPATIENT
Start: 2025-06-05 | End: 2026-06-05

## 2025-06-06 ENCOUNTER — RESULTS FOLLOW-UP (OUTPATIENT)
Dept: GASTROENTEROLOGY | Facility: CLINIC | Age: 85
End: 2025-06-06

## 2025-06-06 DIAGNOSIS — R15.9 INCONTINENCE OF FECES, UNSPECIFIED FECAL INCONTINENCE TYPE: Primary | ICD-10-CM

## 2025-06-06 LAB
OHS QRS DURATION: 134 MS
OHS QTC CALCULATION: 493 MS

## 2025-06-06 RX ORDER — SODIUM, POTASSIUM,MAG SULFATES 17.5-3.13G
1 SOLUTION, RECONSTITUTED, ORAL ORAL DAILY
Qty: 1 EACH | Refills: 0 | Status: SHIPPED | OUTPATIENT
Start: 2025-06-06 | End: 2025-06-08

## 2025-06-07 LAB — BACTERIA SPEC AEROBE CULT: NORMAL

## 2025-06-08 ENCOUNTER — RESULTS FOLLOW-UP (OUTPATIENT)
Dept: CARDIOLOGY | Facility: CLINIC | Age: 85
End: 2025-06-08

## 2025-06-09 ENCOUNTER — HOSPITAL ENCOUNTER (OUTPATIENT)
Dept: PREADMISSION TESTING | Facility: HOSPITAL | Age: 85
Discharge: HOME | End: 2025-06-09
Attending: INTERNAL MEDICINE
Payer: COMMERCIAL

## 2025-06-09 ENCOUNTER — RESULTS FOLLOW-UP (OUTPATIENT)
Dept: DERMATOLOGY | Facility: CLINIC | Age: 85
End: 2025-06-09

## 2025-06-09 ENCOUNTER — TELEPHONE (OUTPATIENT)
Dept: CARDIOLOGY | Facility: CLINIC | Age: 85
End: 2025-06-09
Payer: COMMERCIAL

## 2025-06-09 ENCOUNTER — E-CONSULT (OUTPATIENT)
Dept: CARDIOLOGY | Facility: CLINIC | Age: 85
End: 2025-06-09
Payer: COMMERCIAL

## 2025-06-09 DIAGNOSIS — R15.9 INCONTINENCE OF FECES, UNSPECIFIED FECAL INCONTINENCE TYPE: Primary | ICD-10-CM

## 2025-06-09 DIAGNOSIS — Z01.810 PREOP CARDIOVASCULAR EXAM: Primary | ICD-10-CM

## 2025-06-09 NOTE — CONSULTS
[facility]  Response for E-Consult     Patient Name: Jovan Del Cid Jr.  MRN: [id number]  Primary Care Provider: Paul Neal MD   Requesting Provider: Cassie Gallardo FNP  E-Consult to General Cardiology  Consult performed by: Judd Self MD  Consult ordered by: Cassie Gallardo FNP          E consult for preop clearance of colonoscopy  The chart reviewed.  PMH pvd carotid A Dz, HLD CHF    Plan  Elevated periop risk of CV events for non-high risk procedure.  Ok to proceed the scheduled procedure without further cardiac study.  OK to hold ASA 4 days before the procedure and resume postop once hemodynamically stable      Total time of Consultation: 10 minute    I did not speak to the requesting provider verbally about this.     *This eConsult is based on the clinical data available to me and is furnished without benefit of a physical examination. The eConsult will need to be interpreted in light of any clinical issues or changes in patient status not available to me at the time of filing this eConsults. Significant changes in patient condition or level of acuity should result in immediate formal consultation and reevaluation. Please alert me if you have further questions.    Thank you for this eConsult referral.     Judd Self MD  [facility]

## 2025-06-09 NOTE — TELEPHONE ENCOUNTER
Contacted Pt in regards to results pt stated understanding had no further questions or concerns.  ----- Message from Judd Self MD sent at 6/8/2025  8:42 PM CDT -----  The lab stable.  Continue current Rx.   F/U as scheduled    ----- Message -----  From: Lab, Background User  Sent: 6/6/2025   2:47 PM CDT  To: Judd Self MD

## 2025-06-10 ENCOUNTER — HOSPITAL ENCOUNTER (OUTPATIENT)
Dept: CARDIOLOGY | Facility: HOSPITAL | Age: 85
Discharge: HOME | End: 2025-06-10
Attending: INTERNAL MEDICINE
Payer: COMMERCIAL

## 2025-06-10 ENCOUNTER — RESULTS FOLLOW-UP (OUTPATIENT)
Dept: CARDIOLOGY | Facility: CLINIC | Age: 85
End: 2025-06-10

## 2025-06-10 VITALS — BODY MASS INDEX: 38.41 KG/M2 | HEIGHT: 66 IN | WEIGHT: 239 LBS

## 2025-06-10 DIAGNOSIS — E11.9 TYPE 2 DIABETES MELLITUS WITHOUT COMPLICATION, WITHOUT LONG-TERM CURRENT USE OF INSULIN: Primary | ICD-10-CM

## 2025-06-10 DIAGNOSIS — M79.89 LEG SWELLING: ICD-10-CM

## 2025-06-10 PROCEDURE — 93970 EXTREMITY STUDY: CPT | Mod: 26,,, | Performed by: INTERNAL MEDICINE

## 2025-06-10 PROCEDURE — 93970 EXTREMITY STUDY: CPT

## 2025-06-11 ENCOUNTER — TELEPHONE (OUTPATIENT)
Dept: CARDIOLOGY | Facility: CLINIC | Age: 85
End: 2025-06-11
Payer: COMMERCIAL

## 2025-06-11 NOTE — TELEPHONE ENCOUNTER
Spoke with pt in regards to test results. Pt verbalized understanding information without any concerns.               ----- Message from Judd Self MD sent at 6/10/2025  9:51 PM CDT -----  The leg venous us showed no DVT    ----- Message -----  From: Osmar Mcconnell MD  Sent: 6/10/2025   3:41 PM CDT  To: Judd Self MD

## 2025-06-11 NOTE — TELEPHONE ENCOUNTER
Spoke with pt in regards to test results. Pt verbalized understanding information without any concerns.           ----- Message from Judd Self MD sent at 6/10/2025  9:34 PM CDT -----  The lab is stable  Continue current Rx.   F/U as scheduled    ----- Message -----  From: Lab, Background User  Sent: 6/10/2025   8:03 PM CDT  To: Judd Self MD

## 2025-06-13 ENCOUNTER — PATIENT MESSAGE (OUTPATIENT)
Dept: DERMATOLOGY | Facility: CLINIC | Age: 85
End: 2025-06-13
Payer: COMMERCIAL

## 2025-06-16 RX ORDER — GLIPIZIDE 5 MG/1
5 TABLET, FILM COATED, EXTENDED RELEASE ORAL
Qty: 90 TABLET | Refills: 1 | Status: SHIPPED | OUTPATIENT
Start: 2025-06-16 | End: 2026-06-16

## 2025-07-16 ENCOUNTER — OFFICE VISIT (OUTPATIENT)
Dept: PODIATRY | Facility: CLINIC | Age: 85
End: 2025-07-16
Payer: COMMERCIAL

## 2025-07-16 VITALS — HEIGHT: 66 IN | BODY MASS INDEX: 38.41 KG/M2 | WEIGHT: 239 LBS

## 2025-07-16 DIAGNOSIS — B35.1 DERMATOPHYTOSIS OF NAIL: ICD-10-CM

## 2025-07-16 DIAGNOSIS — E11.9 ENCOUNTER FOR COMPREHENSIVE DIABETIC FOOT EXAMINATION, TYPE 2 DIABETES MELLITUS: Primary | ICD-10-CM

## 2025-07-16 DIAGNOSIS — E11.49 TYPE II DIABETES MELLITUS WITH NEUROLOGICAL MANIFESTATIONS: ICD-10-CM

## 2025-07-16 PROCEDURE — 99999 PR PBB SHADOW E&M-EST. PATIENT-LVL IV: CPT | Mod: PBBFAC,,, | Performed by: PODIATRIST

## 2025-07-16 NOTE — PROGRESS NOTES
Subjective:     Patient ID: Jovan Del Cid Jr. is a 84 y.o. male.    Chief Complaint: Nail Care (Diabetic pt wears tennis shoes, PCP Paul Neal last seen 4/14/2025)    Jovan is a 84 y.o. male who presents to the clinic for evaluation and treatment of high risk feet. Jovan has a past medical history of Acute CVA (cerebrovascular accident) (09/08/2022), DANY (acute kidney injury) (09/14/2022), Asthma, Bronchitis chronic, Cancer, Chronic cough (11/15/2013), Chronic diastolic congestive heart failure (09/12/2023), Cough, Digestive disorder, Hyperlipidemia, Hypertension, Liver disease, KALYAN (obstructive sleep apnea) (09/08/2022), Preop cardiovascular exam (6/9/2025), Stroke (09/01/2022), and Type 2 diabetes mellitus, without long-term current use of insulin (09/07/2022). The patient's chief complaint is long, thick toenails. This patient has documented high risk feet requiring routine maintenance secondary to diabetes mellitis and those secondary complications of diabetes, as mentioned.     PCP: Paul Neal MD    Date Last Seen by PCP: 04/14/2025    Current shoe gear:  Affected Foot: Tennis shoes     Unaffected Foot: Tennis shoes    Hemoglobin A1C   Date Value Ref Range Status   12/10/2024 6.2 (H) 4.0 - 5.6 % Final     Comment:     ADA Screening Guidelines:  5.7-6.4%  Consistent with prediabetes  >or=6.5%  Consistent with diabetes    High levels of fetal hemoglobin interfere with the HbA1C  assay. Heterozygous hemoglobin variants (HbS, HgC, etc)do  not significantly interfere with this assay.   However, presence of multiple variants may affect accuracy.     08/08/2024 5.9 (H) 4.0 - 5.6 % Final     Comment:     ADA Screening Guidelines:  5.7-6.4%  Consistent with prediabetes  >or=6.5%  Consistent with diabetes    High levels of fetal hemoglobin interfere with the HbA1C  assay. Heterozygous hemoglobin variants (HbS, HgC, etc)do  not significantly interfere with this assay.   However, presence of  multiple variants may affect accuracy.     04/08/2024 5.7 (H) 4.0 - 5.6 % Final     Comment:     ADA Screening Guidelines:  5.7-6.4%  Consistent with prediabetes  >or=6.5%  Consistent with diabetes    High levels of fetal hemoglobin interfere with the HbA1C  assay. Heterozygous hemoglobin variants (HbS, HgC, etc)do  not significantly interfere with this assay.   However, presence of multiple variants may affect accuracy.       Hemoglobin A1c   Date Value Ref Range Status   06/10/2025 6.9 (H) 4.0 - 5.6 % Final     Comment:     ADA Screening Guidelines:  5.7-6.4%  Consistent with prediabetes  >=6.5%  Consistent with diabetes    High levels of fetal hemoglobin interfere with the HbA1C  assay. Heterozygous hemoglobin variants (HbS, HgC, etc)do  not significantly interfere with this assay.   However, presence of multiple variants may affect accuracy.       Patient Active Problem List   Diagnosis    Intolerance of continuous positive airway pressure (CPAP) ventilation    Liver cyst    HTN (hypertension)    Dyslipidemia    Benign prostatic hyperplasia    Lipoma    Amyloid disease    Chronic maxillary sinusitis    Tubulovillous adenoma of colon    MCMAHAN (nonalcoholic steatohepatitis)    Moderate persistent asthma without complication    Personal history of colonic polyps    History of lacunar cerebrovascular accident    Type 2 diabetes mellitus, without long-term current use of insulin    Obstructive sleep apnea    Stenosis of left carotid artery    Venous insufficiency    Valvular heart disease    Chronic diastolic congestive heart failure    Unequal blood pressures in paired extremities    Cognitive complaints with normal exam    Nocturnal hypoxemia due to asthma    Anemia    Class 2 severe obesity with serious comorbidity and body mass index (BMI) of 39.0 to 39.9 in adult    Preop cardiovascular exam       Medication List with Changes/Refills   Current Medications    ADVAIR -21 MCG/ACTUATION HFAA INHALER    INHALE  "2 PUFFS INTO THE LUNGS EVERY 12 HOURS    ALBUTEROL (VENTOLIN HFA) 90 MCG/ACTUATION INHALER    Inhale 1-2 puffs into the lungs every 6 (six) hours as needed for Wheezing. Rescue    ASPIRIN (ECOTRIN) 81 MG EC TABLET    Take 1 tablet (81 mg total) by mouth once daily.    BLOOD SUGAR DIAGNOSTIC STRP    1 strip by Misc.(Non-Drug; Combo Route) route 2 (two) times daily with meals.    BLOOD SUGAR DIAGNOSTIC STRP    Test blood glucose 2 times a day    BLOOD-GLUCOSE METER KIT    Use as instructed    FLUTICASONE PROPIONATE (FLONASE) 50 MCG/ACTUATION NASAL SPRAY    2 sprays (100 mcg total) by Each Nostril route once daily. Shake Liquid and use    FUROSEMIDE (LASIX) 40 MG TABLET    Take 1 tablet (40 mg total) by mouth once daily.    GLIPIZIDE 5 MG TR24    Take 1 tablet (5 mg total) by mouth daily with breakfast.    IPRATROPIUM (ATROVENT) 21 MCG (0.03 %) NASAL SPRAY    2 sprays by Each Nostril route 2 (two) times daily.    LANCETS MISC    1 each by Misc.(Non-Drug; Combo Route) route 2 (two) times daily with meals.    LANCING DEVICE MISC    1 Device by Misc.(Non-Drug; Combo Route) route 2 (two) times daily with meals.    LOSARTAN (COZAAR) 50 MG TABLET    Take 1 tablet (50 mg total) by mouth once daily.    MONTELUKAST (SINGULAIR) 10 MG TABLET    TAKE 1 TABLET(10 MG) BY MOUTH EVERY EVENING    MULTIVITAMIN (THERAGRAN) PER TABLET    Take 1 tablet by mouth once daily.    MUPIROCIN (BACTROBAN) 2 % OINTMENT    Apply topically 2 (two) times daily. PRN broken skin.    MV-MN-LUTEIN-YIMU-XKAOSD- (MACULAR HEALTH FORMULA) 5-1-7.5 MG CAP    Take by mouth.    ONETOUCH DELICA PLUS LANCET 33 GAUGE [facility]    USE TO CHECK BLOOD GLUCOSE TWO TIMES A DAY WITH MEALS    PANTOPRAZOLE (PROTONIX) 40 MG TABLET    Take 1 tablet (40 mg total) by mouth daily as needed.    PEN NEEDLE, DIABETIC 31 GAUGE X 5/16" NDLE    1 each by Misc.(Non-Drug; Combo Route) route 2 (two) times daily with meals.    ROSUVASTATIN (CRESTOR) 40 MG TAB    TAKE 1 TABLET BY MOUTH " EVERY DAY    TRIAMCINOLONE ACETONIDE 0.1% (KENALOG) 0.1 % CREAM    Apply topically 2 (two) times daily. PRN rash. Avoid broken skin.  Steroid- limit to 2 weeks then taper.       Review of patient's allergies indicates:  No Known Allergies    Past Surgical History:   Procedure Laterality Date    COLONOSCOPY N/A 2016    Procedure: COLONOSCOPY;  Surgeon: Alonso Dorsey MD;  Location: [facility] ENDO;  Service: Endoscopy;  Laterality: N/A;    COLONOSCOPY N/A 2016    Procedure: COLONOSCOPY;  Surgeon: Alonso Dorsey MD;  Location: [facility];  Service: Endoscopy;  Laterality: N/A;    COLONOSCOPY N/A 2/3/2017    Procedure: COLONOSCOPY;  Surgeon: Alonso Dorsey MD;  Location: [facility];  Service: Endoscopy;  Laterality: N/A;    COLONOSCOPY N/A 2017    Procedure: COLONOSCOPY;  Surgeon: Alonso Dorsey MD;  Location: [facility];  Service: Endoscopy;  Laterality: N/A;    COLONOSCOPY N/A 2021    Procedure: COLONOSCOPY;  Surgeon: Paula Ricardo MD;  Location: [facility] ENDO;  Service: Endoscopy;  Laterality: N/A;    FLUOROSCOPY N/A 6/15/2018    Procedure: Transjugular liver bx;  Surgeon: Petros Recio MD;  Location: [facility] CATH LAB;  Service: General;  Laterality: N/A;    TONSILLECTOMY         Family History   Problem Relation Name Age of Onset    Cancer Father      Alzheimer's disease Mother      Colon cancer Neg Hx      Melanoma Neg Hx         Social History     Socioeconomic History    Marital status:    Tobacco Use    Smoking status: Former     Current packs/day: 0.00     Types: Cigarettes, Pipe     Start date:      Quit date:      Years since quittin.5     Passive exposure: Past    Smokeless tobacco: Never    Tobacco comments:     smoked a pipe - quit smoking    Substance and Sexual Activity    Alcohol use: Not Currently     Alcohol/week: 2.0 - 3.0 standard drinks of alcohol     Types: 2 - 3 Cans of beer per week     Comment: daily    Drug use: No    Sexual activity:  "Not Currently     Partners: Female     Social Drivers of Health     Financial Resource Strain: Low Risk  (9/15/2022)    Overall Financial Resource Strain (CARDIA)     Difficulty of Paying Living Expenses: Not very hard   Food Insecurity: No Food Insecurity (9/15/2022)    Hunger Vital Sign     Worried About Running Out of Food in the Last Year: Never true     Ran Out of Food in the Last Year: Never true   Transportation Needs: No Transportation Needs (9/15/2022)    PRAPARE - Transportation     Lack of Transportation (Medical): No     Lack of Transportation (Non-Medical): No   Physical Activity: Inactive (9/15/2022)    Exercise Vital Sign     Days of Exercise per Week: 0 days     Minutes of Exercise per Session: 0 min   Stress: No Stress Concern Present (9/15/2022)    [employer] - Occupational Stress Questionnaire     Feeling of Stress : Not at all   Housing Stability: Low Risk  (9/15/2022)    Housing Stability Vital Sign     Unable to Pay for Housing in the Last Year: No     Number of Places Lived in the Last Year: 1     Unstable Housing in the Last Year: No       Vitals:    07/16/25 1424   Weight: 108.4 kg (238 lb 15.7 oz)   Height: 5' 6" (1.676 m)   PainSc: 0-No pain       Hemoglobin A1C   Date Value Ref Range Status   12/10/2024 6.2 (H) 4.0 - 5.6 % Final     Comment:     ADA Screening Guidelines:  5.7-6.4%  Consistent with prediabetes  >or=6.5%  Consistent with diabetes    High levels of fetal hemoglobin interfere with the HbA1C  assay. Heterozygous hemoglobin variants (HbS, HgC, etc)do  not significantly interfere with this assay.   However, presence of multiple variants may affect accuracy.     08/08/2024 5.9 (H) 4.0 - 5.6 % Final     Comment:     ADA Screening Guidelines:  5.7-6.4%  Consistent with prediabetes  >or=6.5%  Consistent with diabetes    High levels of fetal hemoglobin interfere with the HbA1C  assay. Heterozygous hemoglobin variants (HbS, HgC, etc)do  not significantly " interfere with this assay.   However, presence of multiple variants may affect accuracy.     04/08/2024 5.7 (H) 4.0 - 5.6 % Final     Comment:     ADA Screening Guidelines:  5.7-6.4%  Consistent with prediabetes  >or=6.5%  Consistent with diabetes    High levels of fetal hemoglobin interfere with the HbA1C  assay. Heterozygous hemoglobin variants (HbS, HgC, etc)do  not significantly interfere with this assay.   However, presence of multiple variants may affect accuracy.       Hemoglobin A1c   Date Value Ref Range Status   06/10/2025 6.9 (H) 4.0 - 5.6 % Final     Comment:     ADA Screening Guidelines:  5.7-6.4%  Consistent with prediabetes  >=6.5%  Consistent with diabetes    High levels of fetal hemoglobin interfere with the HbA1C  assay. Heterozygous hemoglobin variants (HbS, HgC, etc)do  not significantly interfere with this assay.   However, presence of multiple variants may affect accuracy.       Review of Systems   Constitutional:  Negative for chills and fever.   Respiratory:  Negative for shortness of breath.    Cardiovascular:  Negative for chest pain, palpitations, orthopnea, claudication and leg swelling.   Gastrointestinal:  Negative for diarrhea, nausea and vomiting.   Musculoskeletal:  Negative for joint pain.   Skin:  Negative for rash.   Neurological:  Negative for dizziness, tingling, sensory change, focal weakness and weakness.   Psychiatric/Behavioral: Negative.               Objective:      PHYSICAL EXAM: Apperance: Alert and orient in no distress,well developed, and with good attention to grooming and body habits  Patient presents ambulating in tennis shoes.   LOWER EXTREMITY EXAM:  VASCULAR: Dorsalis pedis pulses 2/4 bilateral and Posterior Tibial pulses 1/4 bilateral. Capillary fill time <4 seconds bilateral. No edema observed bilateral. Varicosities absent bilateral. Skin temperature of the lower extremities is warm to cool, proximal to distal. Hair growth dim bilateral.  DERMATOLOGICAL: No  skin rashes, subcutaneous nodules, lesions, or ulcers observed bilateral. Nails 1,2,3,4,5 right and 2,3,4,5 left bilateral elongated, thickened, and discolored with subungual debris. Left hallux nail absent.  Webspaces 1,2,3,4 bilateral clean, dry and without evidence of break in skin integrity.   NEUROLOGICAL: Light touch, sharp-dull, proprioception all present and equal bilaterally.  Vibratory sensation diminished at right hallux and intact at left. Protective sensation intact at all 10 sites as tested with a [place] 5.07 monofilament.   MUSCULOSKELETAL: Muscle strength is 5/5 for foot inverters, everters, plantarflexors, and dorsiflexors. Muscle tone is normal.         Assessment:       ICD-10-CM ICD-9-CM   1. Encounter for comprehensive diabetic foot examination, type 2 diabetes mellitus  E11.9 250.00   2. Type II diabetes mellitus with neurological manifestations  E11.49 250.60   3. Dermatophytosis of nail  B35.1 110.1               Plan:   Encounter for comprehensive diabetic foot examination, type 2 diabetes mellitus    Type II diabetes mellitus with neurological manifestations    Dermatophytosis of nail      I counseled the patient on his conditions, regarding findings of my examination, my impressions, and usual treatment plan.   This visit spent on counseling and coordination of care.  Appointment spent on education about the diabetic foot, neuropathy, and prevention of limb loss.  Shoe inspection. Diabetic Foot Education. Patient reminded of the importance of good nutrition and blood sugar control to help prevent podiatric complications of diabetes. Patient instructed on proper foot hygeine. We discussed wearing proper shoe gear, daily foot inspections, never walking without protective shoe gear, never putting sharp instruments to feet.    With patient's permission, nails as noted above bilateral were debrided/trimmed in length and thickness aggressively to their soft tissue attachment  mechanically and with electric , removing all offending nail and debris. Patient relates relief following the procedure.  Discussed with patient treatments for neuropathy consisting of topical or oral medication.  Recommendations given for over-the-counter medicine such as [place] and/or Capsaicin.  Patient  will continue to monitor the areas daily, inspect feet, wear protective shoe gear when ambulatory, moisturizer to maintain skin integrity. Patient reminded of the importance of good nutrition and blood sugar control to help prevent podiatric complications of diabetes.  Patient to return 6 months or sooner if needed.          Sharnette Miles, DPM [facility]

## 2025-07-29 RX ORDER — FLUTICASONE PROPIONATE 50 MCG
2 SPRAY, SUSPENSION (ML) NASAL
Qty: 48 G | Refills: 1 | Status: SHIPPED | OUTPATIENT
Start: 2025-07-29

## 2025-07-29 NOTE — TELEPHONE ENCOUNTER
Provider Staff:  Action required for this patient    Requires labs      Please see care gap opportunities below in Care Due Message.    Thanks!  [facility]     Appointments      Date Provider   Last Visit   12/10/2024 Paul Neal MD   Next Visit   Visit date not found Paul Neal MD     Refill Decision Note   Jovan Del Cid  is requesting a refill authorization.  Brief Assessment and Rationale for Refill:  Approve     Medication Therapy Plan:        Comments:     Note composed:1:35 PM 07/29/2025

## 2025-07-29 NOTE — TELEPHONE ENCOUNTER
Care Due:                  Date            Visit Type   Department     Provider  --------------------------------------------------------------------------------                                EP -                              PRIMARY      [facility]  Last Visit: 12-      CARE ([facility])   MEDICINE       Paul Neal  Next Visit: None Scheduled  None         None Found                                                            Last  Test          Frequency    Reason                     Performed    Due Date  --------------------------------------------------------------------------------    CMP.........  12 months..  rosuvastatin.............  04- 08-    Lipid Panel.  12 months..  rosuvastatin.............  08- 08-    [employer] Embedded Care Due Messages. Reference number: [id number].   7/29/2025 11:23:33 AM CDT

## 2025-07-31 ENCOUNTER — OFFICE VISIT (OUTPATIENT)
Dept: CARDIOLOGY | Facility: CLINIC | Age: 85
End: 2025-07-31
Payer: COMMERCIAL

## 2025-07-31 VITALS
BODY MASS INDEX: 37.61 KG/M2 | DIASTOLIC BLOOD PRESSURE: 70 MMHG | WEIGHT: 233 LBS | OXYGEN SATURATION: 98 % | SYSTOLIC BLOOD PRESSURE: 120 MMHG | HEART RATE: 80 BPM

## 2025-07-31 DIAGNOSIS — E11.9 TYPE 2 DIABETES MELLITUS WITHOUT COMPLICATION, WITHOUT LONG-TERM CURRENT USE OF INSULIN: ICD-10-CM

## 2025-07-31 DIAGNOSIS — I38 VALVULAR HEART DISEASE: ICD-10-CM

## 2025-07-31 DIAGNOSIS — I10 PRIMARY HYPERTENSION: ICD-10-CM

## 2025-07-31 DIAGNOSIS — I65.22 STENOSIS OF LEFT CAROTID ARTERY: ICD-10-CM

## 2025-07-31 DIAGNOSIS — I87.2 VENOUS INSUFFICIENCY: Primary | ICD-10-CM

## 2025-07-31 DIAGNOSIS — E78.5 DYSLIPIDEMIA: ICD-10-CM

## 2025-07-31 PROCEDURE — 1101F PT FALLS ASSESS-DOCD LE1/YR: CPT | Mod: CPTII,S$GLB,, | Performed by: INTERNAL MEDICINE

## 2025-07-31 PROCEDURE — 3074F SYST BP LT 130 MM HG: CPT | Mod: CPTII,S$GLB,, | Performed by: INTERNAL MEDICINE

## 2025-07-31 PROCEDURE — 1160F RVW MEDS BY RX/DR IN RCRD: CPT | Mod: CPTII,S$GLB,, | Performed by: INTERNAL MEDICINE

## 2025-07-31 PROCEDURE — 1159F MED LIST DOCD IN RCRD: CPT | Mod: CPTII,S$GLB,, | Performed by: INTERNAL MEDICINE

## 2025-07-31 PROCEDURE — 99214 OFFICE O/P EST MOD 30 MIN: CPT | Mod: S$GLB,,, | Performed by: INTERNAL MEDICINE

## 2025-07-31 PROCEDURE — 3078F DIAST BP <80 MM HG: CPT | Mod: CPTII,S$GLB,, | Performed by: INTERNAL MEDICINE

## 2025-07-31 PROCEDURE — 99999 PR PBB SHADOW E&M-EST. PATIENT-LVL V: CPT | Mod: PBBFAC,,, | Performed by: INTERNAL MEDICINE

## 2025-07-31 PROCEDURE — 3288F FALL RISK ASSESSMENT DOCD: CPT | Mod: CPTII,S$GLB,, | Performed by: INTERNAL MEDICINE

## 2025-07-31 RX ORDER — FUROSEMIDE 40 MG/1
40 TABLET ORAL 2 TIMES DAILY
Qty: 60 TABLET | Refills: 11 | Status: SHIPPED | OUTPATIENT
Start: 2025-07-31 | End: 2026-07-31

## 2025-07-31 NOTE — PROGRESS NOTES
Subjective:   Patient ID:  Jovan Del Cid Jr. is a 84 y.o. male who presents for follow up of No chief complaint on file.      85 y/o male, came in for leg swelling  PMH MVP mod MR, CHFpEF, acute CVA in , asthma, HLD, HTN,Hx of Colon Tubulovillous Adenoma, MCMAHAN h/o right neck lipoma, and  obesity      admitted for acute CVA. with R-sided paresthesias The BP was significantly high /102    MRI phillip show Acute lacunar infarcts within the left thalamus . CT head and neck show :No evidence of thromboembolism within the visible branches of the [id number].Coarse calcific plaque within the left carotid bifurcation resulting in up to 50% stenosis of the origin/proximal aspect of the left internal carotid artery.   TTE did not show any acute finding  with normal  EF .     After discharge. No fall/ still right body numbness. No chest pain dyspnea. Some dizziness and no faint  BP low and valsartan HCTZ decreased the dose by PCP last week and stopped yesterday   Pt had h/o med noncompliance  HH once a week.     Echo     · There is no evidence of intracardiac shunting.  · The left ventricle is normal in size with concentric remodeling and normal systolic function.  · The estimated ejection fraction is 60%.  · Normal left ventricular diastolic function.  · Normal right ventricular size with normal right ventricular systolic function.  · There is bileaflet mitral prolapse.  · Mild-to-moderate mitral regurgitation.  · Normal central venous pressure (3 mmHg).    03/23 visit  BP controlled. No dizziness faint and chest pain    09/23 visit  Chronic right arm and leg numbness after the cva in 09/22. With right  shoulder pain  Right arm BP 96/60 and left 110 /62  08/23  and repeat echo in 08/23 EF 55% MVP and mod to severe MR. Normal LV LA size and PASP 30 mmHg  C/o GAMINO, leg swelling, sleeps on 1 pillow and no PND  At Home  to 150 mmhg  EKG today reveals NSR nonspecific STT  change    10/23 visit  10/23 Phar MPI No ischemia and arm arterial US normal.  Not taking too much lasix, cough and wheezing worse for a week    02/24 visit  Remains cough and f/u at pulm with Dr. Buckner  10/23 Phar MPI showed no ischemia and echo showed EF nl mod to severe MR with calcification  ARM arterial US nml.  No orthopnea.   Occasional wheezing   LDL 67,  and BP nml  EKG reviewed by myself today reveals NSR nonspecific STT change    06/24 visit  Works daily. No regular activity, no orthopnea pND   Some leg swelling. No dizziness faint and SOB    10/24 visit  Chronic SOBOE stable, sleeps on 2 pillows and no PND. On CPAP  On lasix Rx and leg swelling controlled  EKG reviewed by myself today reveals NSR nonspecific STT change  Echo pending  BP LDL and A1c controled     05/25 visit  Chronic leg swelling  Recent leg erythema and rash f/u at dermatology  No orthopnea and pND      Interval history  Leg swelling and itching                   History of Present Illness    CHIEF COMPLAINT:  - Jovan presents for follow-up regarding worsening leg swelling.    HPI:  - reports worsening leg swelling, more pronounced during the daytime, particularly in the afternoon, and improving somewhat in the morning after lying flat overnight  - lower extremities become edematous after a day of standing, sitting, and walking  - mentions pruritus in the affected area, leading to scratching and resulting in erythema  - leg swelling is described as a multi-factorial issue, potentially related to his age and DM  - denies using compression socks and a history of blood clots, as confirmed by a recent Venus Doppler test    CARDIAC HISTORY:  - Sharon Doppler: no blood clots  - Leg US: unremarkable    MEDICATIONS:  - Furosemide (Lasix) twice daily for leg swelling, increased from daily    MEDICAL HISTORY:  - Diabetes          Past Medical History:   Diagnosis Date    Acute CVA (cerebrovascular accident) 09/08/2022    DANY (acute kidney  injury) 09/14/2022    Asthma     Bronchitis chronic     Cancer     Chronic cough 11/15/2013    Chronic diastolic congestive heart failure 09/12/2023    Cough     Digestive disorder     Hyperlipidemia     Hypertension     Liver disease     KALYAN (obstructive sleep apnea) 09/08/2022    Preop cardiovascular exam 6/9/2025    Stroke 09/01/2022    Type 2 diabetes mellitus, without long-term current use of insulin 09/07/2022    BS didn't check 10/05/2022       Past Surgical History:   Procedure Laterality Date    COLONOSCOPY N/A 6/21/2016    Procedure: COLONOSCOPY;  Surgeon: Alonso Dorsey MD;  Location: [facility];  Service: Endoscopy;  Laterality: N/A;    COLONOSCOPY N/A 11/1/2016    Procedure: COLONOSCOPY;  Surgeon: Alonso Dorsey MD;  Location: [facility];  Service: Endoscopy;  Laterality: N/A;    COLONOSCOPY N/A 2/3/2017    Procedure: COLONOSCOPY;  Surgeon: Alonso Dorsey MD;  Location: [facility];  Service: Endoscopy;  Laterality: N/A;    COLONOSCOPY N/A 11/13/2017    Procedure: COLONOSCOPY;  Surgeon: Alonso Dorsey MD;  Location: [facility];  Service: Endoscopy;  Laterality: N/A;    COLONOSCOPY N/A 2/19/2021    Procedure: COLONOSCOPY;  Surgeon: Paula Ricardo MD;  Location: [facility];  Service: Endoscopy;  Laterality: N/A;    FLUOROSCOPY N/A 6/15/2018    Procedure: Transjugular liver bx;  Surgeon: Petros Recio MD;  Location: [facility] CATH LAB;  Service: General;  Laterality: N/A;    TONSILLECTOMY         Social History[1]    Family History   Problem Relation Name Age of Onset    Cancer Father      Alzheimer's disease Mother      Colon cancer Neg Hx      Melanoma Neg Hx           ROS    Objective:   Physical Exam  HENT:      Head: Normocephalic.   Eyes:      Pupils: Pupils are equal, round, and reactive to light.   Neck:      Thyroid: No thyromegaly.      Vascular: Normal carotid pulses. No carotid bruit or JVD.   Cardiovascular:      Rate and Rhythm: Normal rate and regular rhythm. No  extrasystoles are present.     Chest Wall: PMI is not displaced.      Pulses: Normal pulses.           Carotid pulses are 2+ on the right side and 2+ on the left side.     Heart sounds: Normal heart sounds. No murmur heard.     No gallop. No S3 sounds.   Pulmonary:      Effort: No respiratory distress.      Breath sounds: No stridor.   Abdominal:      General: Bowel sounds are normal.      Palpations: Abdomen is soft.      Tenderness: There is no abdominal tenderness. There is no rebound.   Musculoskeletal:         General: Swelling present.   Skin:     Findings: No rash.   Neurological:      Mental Status: He is alert and oriented to person, place, and time.   Psychiatric:         Behavior: Behavior normal.       Lab Results   Component Value Date    CHOL 132 08/08/2024    CHOL 168 08/24/2023    CHOL 113 (L) 01/24/2023     Lab Results   Component Value Date    HDL 47 08/08/2024    HDL 44 08/24/2023    HDL 40 01/24/2023     Lab Results   Component Value Date    LDLCALC 47.4 (L) 08/08/2024    LDLCALC 66.6 08/24/2023    LDLCALC 8.6 (L) 01/24/2023     Lab Results   Component Value Date    TRIG 188 (H) 08/08/2024    TRIG 287 (H) 08/24/2023    TRIG 322 (H) 01/24/2023     Lab Results   Component Value Date    CHOLHDL 35.6 08/08/2024    CHOLHDL 26.2 08/24/2023    CHOLHDL 35.4 01/24/2023       Chemistry        Component Value Date/Time     06/10/2025 0822     07/09/2024 1105    K 4.3 06/10/2025 0822    K 4.3 07/09/2024 1105     06/10/2025 0822     07/09/2024 1105    CO2 24 06/10/2025 0822    CO2 22 (L) 07/09/2024 1105    BUN 21 06/10/2025 0822    CREATININE 0.9 06/10/2025 0822     (H) 06/10/2025 0822     (H) 07/09/2024 1105        Component Value Date/Time    CALCIUM 8.8 06/10/2025 0822    CALCIUM 9.2 07/09/2024 1105    ALKPHOS 61 08/08/2024 1331    AST 27 08/08/2024 1331    ALT 30 08/08/2024 1331    BILITOT 1.9 (H) 08/08/2024 1331    ESTGFRAFRICA >60.0 01/20/2021 1120    EGFRNONAA  >60.0 01/20/2021 1120          Lab Results   Component Value Date    HGBA1C 6.9 (H) 06/10/2025     Lab Results   Component Value Date    TSH 2.979 12/05/2023     Lab Results   Component Value Date    INR 0.9 09/03/2022    INR 1.0 09/02/2022    INR 1.0 06/15/2018     Lab Results   Component Value Date    WBC 6.58 12/10/2024    HGB 13.1 (L) 12/10/2024    HCT 40.2 12/10/2024    MCV 97 12/10/2024     12/10/2024     BMP  Sodium   Date Value Ref Range Status   06/10/2025 138 136 - 145 mmol/L Final   07/09/2024 139 136 - 145 mmol/L Final     Potassium   Date Value Ref Range Status   06/10/2025 4.3 3.5 - 5.1 mmol/L Final   07/09/2024 4.3 3.5 - 5.1 mmol/L Final     Chloride   Date Value Ref Range Status   06/10/2025 105 95 - 110 mmol/L Final   07/09/2024 107 95 - 110 mmol/L Final     CO2   Date Value Ref Range Status   06/10/2025 24 23 - 29 mmol/L Final   07/09/2024 22 (L) 23 - 29 mmol/L Final     BUN   Date Value Ref Range Status   06/10/2025 21 8 - 23 mg/dL Final     Creatinine   Date Value Ref Range Status   06/10/2025 0.9 0.5 - 1.4 mg/dL Final     Calcium   Date Value Ref Range Status   06/10/2025 8.8 8.7 - 10.5 mg/dL Final   07/09/2024 9.2 8.7 - 10.5 mg/dL Final     Anion Gap   Date Value Ref Range Status   06/10/2025 9 8 - 16 mmol/L Final     eGFR if    Date Value Ref Range Status   01/20/2021 >60.0 >60 mL/min/1.73 m^2 Final     eGFR if non    Date Value Ref Range Status   01/20/2021 >60.0 >60 mL/min/1.73 m^2 Final     Comment:     Calculation used to obtain the estimated glomerular filtration  rate (eGFR) is the CKD-EPI equation.        BNP  @LABRCNTIP(BNP,BNPTRIAGEBLO)@  @LABRCNTIP(troponini)@  CrCl cannot be calculated (Patient's most recent lab result is older than the maximum 7 days allowed.).  No results found in the last 24 hours.  No results found in the last 24 hours.  No results found in the last 24 hours.    Assessment:      1. Venous insufficiency    2. Type 2 diabetes  mellitus without complication, without long-term current use of insulin    3. Primary hypertension    4. Valvular heart disease    5. Stenosis of left carotid artery    6. Dyslipidemia        Plan:     Assessment & Plan    IMPRESSION:  - Leg swelling is multifactorial, likely due to degenerative changes   - Venus Doppler results confirm absence of blood clots.  - Peripheral neuropathy and medication side effects are potential contributing factors.    HEART FAILURE:  - Explained multifactorial nature of leg swelling, including gravity effects and potential valve dysfunction in veins.  - Discussed reasons for diurnal variation in swelling (worse in afternoon, better in morning).  - Educated on the role of sodium in fluid retention.  - Jovan to elevate legs during daytime when sitting, use pillows to elevate heels slightly at night, follow a low sodium diet, avoid prolonged standing or sitting.  - Increased Furosemide (Lasix) from daily to twice daily with lab work ordered in 2 weeks to monitor effects.    EDEMA:  - Jovan to elevate legs during daytime when sitting, use pillows to elevate heels slightly at night, follow a low sodium diet, avoid prolonged standing or sitting.  - Keep legs clean to reduce infection risk.  - Ordered Doppler US legs for reassessment.    PRURITUS:  - Apply lotion to legs for itching.    FOLLOW-UP:  - Follow up in 3 months.          For PVD  Increase Lasix to 40 mg bid  Compression socks  Low Na diet  BMP in 2 weeks  SHIVAM and LE arterial US ordered    This note was generated with the assistance of ambient listening technology. Verbal consent was obtained by the patient and accompanying visitor(s) for the recording of patient appointment to facilitate this note. I attest to having reviewed and edited the generated note for accuracy, though some syntax or spelling errors may persist. Please contact the author of this note for any clarification.            [1]   Social History  Tobacco Use     Smoking status: Former     Current packs/day: 0.00     Types: Cigarettes, Pipe     Start date:      Quit date:      Years since quittin.6     Passive exposure: Past    Smokeless tobacco: Never    Tobacco comments:     smoked a pipe - quit smoking    Substance Use Topics    Alcohol use: Not Currently     Alcohol/week: 2.0 - 3.0 standard drinks of alcohol     Types: 2 - 3 Cans of beer per week     Comment: daily    Drug use: No

## 2025-08-01 ENCOUNTER — ANESTHESIA EVENT (OUTPATIENT)
Dept: ENDOSCOPY | Facility: HOSPITAL | Age: 85
End: 2025-08-01
Payer: COMMERCIAL

## 2025-08-01 ENCOUNTER — ANESTHESIA (OUTPATIENT)
Dept: ENDOSCOPY | Facility: HOSPITAL | Age: 85
End: 2025-08-01
Payer: COMMERCIAL

## 2025-08-01 ENCOUNTER — HOSPITAL ENCOUNTER (OUTPATIENT)
Dept: ENDOSCOPY | Facility: HOSPITAL | Age: 85
Discharge: HOME | End: 2025-08-01
Attending: INTERNAL MEDICINE | Admitting: INTERNAL MEDICINE
Payer: COMMERCIAL

## 2025-08-01 VITALS
RESPIRATION RATE: 20 BRPM | HEART RATE: 76 BPM | DIASTOLIC BLOOD PRESSURE: 57 MMHG | BODY MASS INDEX: 37.61 KG/M2 | SYSTOLIC BLOOD PRESSURE: 100 MMHG | HEIGHT: 66 IN | TEMPERATURE: 97 F | OXYGEN SATURATION: 96 %

## 2025-08-01 DIAGNOSIS — R15.9 INCONTINENCE OF FECES, UNSPECIFIED FECAL INCONTINENCE TYPE: Primary | ICD-10-CM

## 2025-08-01 LAB — POCT GLUCOSE: 192 MG/DL (ref 70–110)

## 2025-08-01 PROCEDURE — 45385 COLONOSCOPY W/LESION REMOVAL: CPT | Performed by: INTERNAL MEDICINE

## 2025-08-01 PROCEDURE — 27201089 HC SNARE, DISP (ANY): Performed by: INTERNAL MEDICINE

## 2025-08-01 PROCEDURE — 88305 TISSUE EXAM BY PATHOLOGIST: CPT | Mod: TC | Performed by: INTERNAL MEDICINE

## 2025-08-01 PROCEDURE — 37000008 HC ANESTHESIA 1ST 15 MINUTES

## 2025-08-01 PROCEDURE — 25000003 PHARM REV CODE 250: Performed by: STUDENT IN AN ORGANIZED HEALTH CARE EDUCATION/TRAINING PROGRAM

## 2025-08-01 PROCEDURE — 45380 COLONOSCOPY AND BIOPSY: CPT | Mod: XS,,, | Performed by: INTERNAL MEDICINE

## 2025-08-01 PROCEDURE — 37000009 HC ANESTHESIA EA ADD 15 MINS

## 2025-08-01 PROCEDURE — 63600175 PHARM REV CODE 636 W HCPCS: Performed by: STUDENT IN AN ORGANIZED HEALTH CARE EDUCATION/TRAINING PROGRAM

## 2025-08-01 PROCEDURE — 27201012 HC FORCEPS, HOT/COLD, DISP: Performed by: INTERNAL MEDICINE

## 2025-08-01 PROCEDURE — 25000003 PHARM REV CODE 250: Performed by: INTERNAL MEDICINE

## 2025-08-01 PROCEDURE — 45385 COLONOSCOPY W/LESION REMOVAL: CPT | Mod: ,,, | Performed by: INTERNAL MEDICINE

## 2025-08-01 PROCEDURE — 45380 COLONOSCOPY AND BIOPSY: CPT | Mod: XS | Performed by: INTERNAL MEDICINE

## 2025-08-01 RX ORDER — LIDOCAINE HYDROCHLORIDE 10 MG/ML
INJECTION, SOLUTION EPIDURAL; INFILTRATION; INTRACAUDAL; PERINEURAL
Status: DISCONTINUED | OUTPATIENT
Start: 2025-08-01 | End: 2025-08-01

## 2025-08-01 RX ORDER — PROPOFOL 10 MG/ML
VIAL (ML) INTRAVENOUS
Status: DISCONTINUED | OUTPATIENT
Start: 2025-08-01 | End: 2025-08-01

## 2025-08-01 RX ORDER — DEXTROMETHORPHAN/PSEUDOEPHED 2.5-7.5/.8
DROPS ORAL
Status: COMPLETED | OUTPATIENT
Start: 2025-08-01 | End: 2025-08-01

## 2025-08-01 RX ADMIN — LIDOCAINE HYDROCHLORIDE 50 MG: 10 SOLUTION INTRAVENOUS at 08:08

## 2025-08-01 RX ADMIN — LIDOCAINE HYDROCHLORIDE 50 MG: 10 SOLUTION INTRAVENOUS at 07:08

## 2025-08-01 RX ADMIN — PROPOFOL 40 MG: 10 INJECTION, EMULSION INTRAVENOUS at 08:08

## 2025-08-01 RX ADMIN — SIMETHICONE 40 MG: 20 SUSPENSION/ DROPS ORAL at 08:08

## 2025-08-01 RX ADMIN — SODIUM CHLORIDE: 9 INJECTION, SOLUTION INTRAVENOUS at 08:08

## 2025-08-01 RX ADMIN — PROPOFOL 30 MG: 10 INJECTION, EMULSION INTRAVENOUS at 08:08

## 2025-08-01 RX ADMIN — PROPOFOL 40 MG: 10 INJECTION, EMULSION INTRAVENOUS at 07:08

## 2025-08-01 RX ADMIN — PROPOFOL 100 MG: 10 INJECTION, EMULSION INTRAVENOUS at 08:08

## 2025-08-01 RX ADMIN — PROPOFOL 80 MG: 10 INJECTION, EMULSION INTRAVENOUS at 07:08

## 2025-08-01 NOTE — DISCHARGE SUMMARY
[facility] (Hospital)  Discharge Note  Short Stay    Colonoscopy      OUTCOME: Patient tolerated treatment/procedure well without complication and is now ready for discharge.    DISPOSITION: Home or Self Care    FINAL DIAGNOSIS:  Fecal incontinence    FOLLOWUP: With primary care provider    DISCHARGE INSTRUCTIONS:  No discharge procedures on file.

## 2025-08-01 NOTE — ANESTHESIA POSTPROCEDURE EVALUATION
Anesthesia Post Evaluation    Patient: Jovan Del Cid Jr.    Procedure(s) Performed: * No procedures listed *    Final Anesthesia Type: MAC      Patient location during evaluation: PACU  Patient participation: Yes- Able to Participate  Level of consciousness: awake and alert and oriented  Post-procedure vital signs: reviewed and stable  Pain management: adequate  Airway patency: patent  KALYAN mitigation strategies: Multimodal analgesia and Extubation and recovery carried out in lateral, semiupright, or other nonsupine position  PONV status at discharge: No PONV  Anesthetic complications: no      Cardiovascular status: blood pressure returned to baseline and hemodynamically stable  Respiratory status: unassisted and spontaneous ventilation  Hydration status: euvolemic  Follow-up not needed.  Comments: Report given to PACU RN. Hand Off Tool Used. RN given opportunity to ask questions or clarify concerns. No Concerns verbalized. Pt. Left in stable condition. SV. Vital Signs Return to Near Baseline. No s/s of distress noted.               Vitals Value Taken Time   /57 08/01/25 08:41   Temp 36.1 °C (97 °F) 08/01/25 08:21   Pulse 76 08/01/25 08:41   Resp 20 08/01/25 08:41   SpO2 96 % 08/01/25 08:41         Event Time   Out of Recovery 08:44:18         Pain/Roxie Score: Roxie Score: 10 (8/1/2025  8:41 AM)

## 2025-08-01 NOTE — TRANSFER OF CARE
"Anesthesia Transfer of Care Note    Patient: Jovan Del Cid Jr.    Procedure(s) Performed: * No procedures listed *    Patient location: Other: Endo PACU    Anesthesia Type: MAC    Transport from OR: Transported from OR on room air with adequate spontaneous ventilation    Post pain: adequate analgesia    Post assessment: no apparent anesthetic complications    Post vital signs: stable    Level of consciousness: responds to stimulation and awake    Nausea/Vomiting: no nausea/vomiting    Complications: none    Transfer of care protocol was followedComments: Report given to PACU RN at bedside. Hand off tool used. RN given opportunity to ask questions or clarify concerns. No Concerns verbalized. RN was asked if ready to assume care of patient. RN verbally confirmed. Pt. left in stable condition. SV. Vital Signs Return to Near Baseline. No s/s of distress noted.       Last vitals: Visit Vitals  BP 91/63 (BP Location: Left arm, Patient Position: Lying)   Pulse 74   Temp 36.1 °C (97 °F)   Resp 15   Ht 5' 6" (1.676 m)   SpO2 95%   BMI 37.61 kg/m²     "

## 2025-08-01 NOTE — ANESTHESIA PREPROCEDURE EVALUATION
08/01/2025  Jovan Del Cid Jr. is a 84 y.o., male.      Pre-op Assessment    I have reviewed the Patient Summary Reports.    I have reviewed the NPO Status.      Review of Systems  Anesthesia Hx:  No problems with previous Anesthesia                Cardiovascular:     Hypertension       CHF    hyperlipidemia    Vent. Rate : 107 BPM     Atrial Rate : 107 BPM      P-R Int : 170 ms          QRS Dur : 134 ms       QT Int : 370 ms       P-R-T Axes :  20  50  -3 degrees     QTcB Int : 493 ms     Sinus tachycardia   Right bundle branch block   Abnormal ECG   When compared with ECG of 22-Oct-2024 11:45,   Vent. rate has increased by  43 bpm   Right bundle branch block is now Present   Confirmed by Aysha Gaming ([id number]) on 6/6/2025 1:15:30 PM     Summary       ·  Left Ventricle: The left ventricle is normal in size. Normal wall thickness. There is normal systolic function with a visually estimated ejection fraction of 60 - 65%. There is normal diastolic function.  ·  Right Ventricle: Normal right ventricular cavity size. Wall thickness is normal. Systolic function is normal.  ·  Mitral Valve: There is moderate to severe regurgitation with an eccentric jet and a wall hugging jet.  ·  Tricuspid Valve: There is mild regurgitation.  ·  Pulmonary Artery: Pulmonary artery pressure could not be estimated.                             Pulmonary:    Asthma    Sleep Apnea                Hepatic/GI:      Liver Disease,               Neurological:   CVA                                    Endocrine:  Diabetes, type 2         Obesity / BMI > 30         Anesthesia Plan  Type of Anesthesia, risks & benefits discussed:    Anesthesia Type: MAC  Intra-op Monitoring Plan: Standard ASA Monitors  Post Op Pain Control Plan: multimodal analgesia  Induction:  IV  Informed Consent: Informed consent signed with the Patient and all parties  understand the risks and agree with anesthesia plan.  All questions answered. Patient consented to blood products? Yes  ASA Score: 3  Day of Surgery Review of History & Physical: H&P Update referred to the surgeon/provider.    Ready For Surgery From Anesthesia Perspective.     .

## 2025-08-01 NOTE — PLAN OF CARE
DR Ricardo  at bedside to speak with pt  and spouse about the results of the procedure. All questions answered with no further questions at this time.

## 2025-08-01 NOTE — H&P
Short Stay Endoscopy History and Physical    PCP - Paul Neal MD    Procedure - Colonoscopy  ASA - per anesthesia  Mallampati - per anesthesia  History of Anesthesia problems - no  Family history Anesthesia problems -  no     HPI:  This is a 84 y.o. male here for evaluation of :   Active Hospital Problems    Diagnosis  POA    *Fecal incontinence [R15.9]  No      Resolved Hospital Problems   No resolved problems to display.         Health Maintenance         Date Due Completion Date    RSV Vaccine (Age 60+ and Pregnant patients) (1 - 1-dose 75+ series) Never done ---    Diabetic Eye Exam 10/05/2023 10/5/2022    PROSTATE-SPECIFIC ANTIGEN 12/05/2024 12/5/2023    Lipid Panel 08/08/2025 8/8/2024    Influenza Vaccine (1) 09/01/2025 12/10/2024    Diabetes Urine Screening 09/20/2025 9/20/2024    Hemoglobin A1c 12/10/2025 6/10/2025    Foot Exam 07/16/2026 7/16/2025 (Done)    Override on 7/16/2025: Done    Override on 1/15/2025: Done    Override on 7/15/2024: Done    Override on 12/28/2023: Done    Override on 6/27/2023: Done    Override on 9/28/2022: Done    Aspirin/Antiplatelet Therapy 08/01/2026 8/1/2025              ROS:  CONSTITUTIONAL: Denies weight change,  fatigue, fevers, chills, night sweats.  CARDIOVASCULAR: Denies chest pain, shortness of breath, orthopnea and edema.  RESPIRATORY: Denies cough, hemoptysis, dyspnea, and wheezing.  GI: See HPI.    Medical History:   Past Medical History:   Diagnosis Date    Acute CVA (cerebrovascular accident) 09/08/2022    DANY (acute kidney injury) 09/14/2022    Asthma     Bronchitis chronic     Cancer     Chronic cough 11/15/2013    Chronic diastolic congestive heart failure 09/12/2023    Cough     Digestive disorder     Hyperlipidemia     Hypertension     Liver disease     KALYAN (obstructive sleep apnea) 09/08/2022    Preop cardiovascular exam 6/9/2025    Stroke 09/01/2022    Type 2 diabetes mellitus, without long-term current use of insulin 09/07/2022    BS didn't  check 10/05/2022       Surgical History:   Past Surgical History:   Procedure Laterality Date    COLONOSCOPY N/A 6/21/2016    Procedure: COLONOSCOPY;  Surgeon: Alonso Dorsey MD;  Location: [facility] ENDO;  Service: Endoscopy;  Laterality: N/A;    COLONOSCOPY N/A 11/1/2016    Procedure: COLONOSCOPY;  Surgeon: Alonso Dorsey MD;  Location: [facility] ENDO;  Service: Endoscopy;  Laterality: N/A;    COLONOSCOPY N/A 2/3/2017    Procedure: COLONOSCOPY;  Surgeon: Alonso Dorsey MD;  Location: [facility] ENDO;  Service: Endoscopy;  Laterality: N/A;    COLONOSCOPY N/A 11/13/2017    Procedure: COLONOSCOPY;  Surgeon: Alonso Dorsey MD;  Location: [facility];  Service: Endoscopy;  Laterality: N/A;    COLONOSCOPY N/A 2/19/2021    Procedure: COLONOSCOPY;  Surgeon: Paula Ricardo MD;  Location: [facility] ENDO;  Service: Endoscopy;  Laterality: N/A;    FLUOROSCOPY N/A 6/15/2018    Procedure: Transjugular liver bx;  Surgeon: Petros Recio MD;  Location: [facility] CATH LAB;  Service: General;  Laterality: N/A;    TONSILLECTOMY         Family History:   Family History   Problem Relation Name Age of Onset    Cancer Father      Alzheimer's disease Mother      Colon cancer Neg Hx      Melanoma Neg Hx         Social History:   Social History[1]    Allergies:   Review of patient's allergies indicates:  No Known Allergies    Medications:   Medications Ordered Prior to Encounter[2]    Physical Exam:  Vital Signs:   Vitals:    08/01/25 0726   BP: 128/68   Pulse: 72   Resp: 18   Temp: 97 °F (36.1 °C)     General Appearance: Well appearing in no acute distress  ENT: OP clear  Chest: CTA B  CV: RRR, no m/r/g  Abd: s/nt/nd/nabs  Ext: no edema    Labs:Reviewed    IMP:  Active Hospital Problems    Diagnosis  POA    *Fecal incontinence [R15.9]  No      Resolved Hospital Problems   No resolved problems to display.         Plan:   I have explained the risks and benefits of colonoscopy to the patient including but not limited to bleeding, perforation,  infection, and death. The patient wishes to proceed.         [1]   Social History  Tobacco Use    Smoking status: Former     Current packs/day: 0.00     Types: Cigarettes, Pipe     Start date:      Quit date:      Years since quittin.6     Passive exposure: Past    Smokeless tobacco: Never    Tobacco comments:     smoked a pipe - quit smoking    Substance Use Topics    Alcohol use: Not Currently     Alcohol/week: 2.0 - 3.0 standard drinks of alcohol     Types: 2 - 3 Cans of beer per week     Comment: daily    Drug use: No   [2]   Current Outpatient Medications on File Prior to Encounter   Medication Sig Dispense Refill    ADVAIR -21 mcg/actuation HFAA inhaler INHALE 2 PUFFS INTO THE LUNGS EVERY 12 HOURS 12 g 11    aspirin (ECOTRIN) 81 MG EC tablet Take 1 tablet (81 mg total) by mouth once daily. 90 tablet 3    fluticasone propionate (FLONASE) 50 mcg/actuation nasal spray SHAKE LIQUID AND USE 2 SPRAYS IN EACH NOSTRIL EVERY DAY 48 g 1    furosemide (LASIX) 40 MG tablet Take 1 tablet (40 mg total) by mouth 2 (two) times a day. 60 tablet 11    glipiZIDE 5 MG TR24 Take 1 tablet (5 mg total) by mouth daily with breakfast. 90 tablet 1    ipratropium (ATROVENT) 21 mcg (0.03 %) nasal spray 2 sprays by Each Nostril route 2 (two) times daily. 30 mL 0    losartan (COZAAR) 50 MG tablet Take 1 tablet (50 mg total) by mouth once daily. 90 tablet 3    montelukast (SINGULAIR) 10 mg tablet TAKE 1 TABLET(10 MG) BY MOUTH EVERY EVENING 90 tablet 3    multivitamin (THERAGRAN) per tablet Take 1 tablet by mouth once daily.      [id number] (MACULAR HEALTH FORMULA) 5-1-7.5 mg Cap Take by mouth.      rosuvastatin (CRESTOR) 40 MG Tab TAKE 1 TABLET BY MOUTH EVERY DAY 90 tablet 3    albuterol (VENTOLIN HFA) 90 mcg/actuation inhaler Inhale 1-2 puffs into the lungs every 6 (six) hours as needed for Wheezing. Rescue 18 g 3    blood sugar diagnostic Strp 1 strip by Misc.(Non-Drug; Combo Route) route 2  "(two) times daily with meals. 200 strip 3    blood sugar diagnostic Strp Test blood glucose 2 times a day 200 strip 3    blood-glucose meter kit Use as instructed 1 each 0    lancets Misc 1 each by Misc.(Non-Drug; Combo Route) route 2 (two) times daily with meals. 100 each 11    lancing device Misc 1 Device by Misc.(Non-Drug; Combo Route) route 2 (two) times daily with meals. 1 each 0    mupirocin (BACTROBAN) 2 % ointment Apply topically 2 (two) times daily. PRN broken skin. 30 g 2    ONETOUCH DELICA PLUS LANCET 33 gauge Misc USE TO CHECK BLOOD GLUCOSE TWO TIMES A DAY WITH MEALS 200 each 3    pantoprazole (PROTONIX) 40 MG tablet Take 1 tablet (40 mg total) by mouth daily as needed. 30 tablet 3    pen needle, diabetic 31 gauge x 5/16" Ndle 1 each by Misc.(Non-Drug; Combo Route) route 2 (two) times daily with meals. 100 each 11    triamcinolone acetonide 0.1% (KENALOG) 0.1 % cream Apply topically 2 (two) times daily. PRN rash. Avoid broken skin.  Steroid- limit to 2 weeks then taper. 454 g 1     Current Facility-Administered Medications on File Prior to Encounter   Medication Dose Route Frequency Provider Last Rate Last Admin    EPINEPHrine (EPIPEN) 0.3 mg/0.3 mL pen injection 0.3 mg  0.3 mg Intramuscular PRN Amando Ulrich MD         "

## 2025-08-04 VITALS
HEART RATE: 76 BPM | SYSTOLIC BLOOD PRESSURE: 100 MMHG | TEMPERATURE: 97 F | HEIGHT: 66 IN | RESPIRATION RATE: 20 BRPM | BODY MASS INDEX: 37.61 KG/M2 | OXYGEN SATURATION: 96 % | DIASTOLIC BLOOD PRESSURE: 57 MMHG

## 2025-08-04 LAB
ESTROGEN SERPL-MCNC: NORMAL PG/ML
INSULIN SERPL-ACNC: NORMAL U[IU]/ML
LAB AP CLINICAL INFORMATION: NORMAL
LAB AP GROSS DESCRIPTION: NORMAL
LAB AP PERFORMING LOCATION(S): NORMAL
LAB AP REPORT FOOTNOTES: NORMAL

## 2025-08-21 ENCOUNTER — HOSPITAL ENCOUNTER (OUTPATIENT)
Dept: CARDIOLOGY | Facility: HOSPITAL | Age: 85
Discharge: HOME | End: 2025-08-21
Attending: INTERNAL MEDICINE
Payer: COMMERCIAL

## 2025-08-21 VITALS
WEIGHT: 233 LBS | BODY MASS INDEX: 37.45 KG/M2 | SYSTOLIC BLOOD PRESSURE: 141 MMHG | SYSTOLIC BLOOD PRESSURE: 141 MMHG | DIASTOLIC BLOOD PRESSURE: 78 MMHG | BODY MASS INDEX: 37.45 KG/M2 | HEIGHT: 66 IN | HEART RATE: 96 BPM | HEIGHT: 66 IN | DIASTOLIC BLOOD PRESSURE: 78 MMHG | HEART RATE: 96 BPM | WEIGHT: 233 LBS

## 2025-08-21 DIAGNOSIS — I87.2 VENOUS INSUFFICIENCY: ICD-10-CM

## 2025-08-21 LAB
LEFT ABI: 0.92
LEFT ANT TIBIAL SYS PSV: 56 CM/S
LEFT ARM BP: 155 MMHG
LEFT CFA PSV: 89 CM/S
LEFT DORSALIS PEDIS: 126 MMHG
LEFT PERONEAL SYS PSV: 69 CM/S
LEFT POPLITEAL PSV: 62 CM/S
LEFT POST TIBIAL SYS PSV: 88 CM/S
LEFT POSTERIOR TIBIAL: 142 MMHG
LEFT PROFUNDA SYS PSV: 59 CM/S
LEFT SUPER FEMORAL DIST SYS PSV: 110 CM/S
LEFT SUPER FEMORAL MID SYS PSV: 92 CM/S
LEFT SUPER FEMORAL OSTIAL SYS PSV: 59 CM/S
LEFT SUPER FEMORAL PROX SYS PSV: 87 CM/S
LEFT TBI: 0.74
LEFT TIB/PER TRUNK SYS PSV: 70 CM/S
LEFT TOE PRESSURE: 114 MMHG
OHS CV CPX PATIENT HEIGHT IN: 66
OHS CV CPX PATIENT HEIGHT IN: 66
OHS CV LEFT LOWER EXTREMITY ABI (NO CALC): 0.94
OHS CV RIGHT ABI LOWER EXTREMITY (NO CALC): 0.92
RIGHT ABI: 0.92
RIGHT ANT TIBIAL SYS PSV: 42 CM/S
RIGHT ARM BP: 141 MMHG
RIGHT CFA PSV: 83 CM/S
RIGHT DORSALIS PEDIS: 135 MMHG
RIGHT PERONEAL SYS PSV: 67 CM/S
RIGHT POPLITEAL PSV: 56 CM/S
RIGHT POST TIBIAL SYS PSV: 123 CM/S
RIGHT POSTERIOR TIBIAL: 142 MMHG
RIGHT PROFUNDA SYS PSV: 48 CM/S
RIGHT SUPER FEMORAL DIST SYS PSV: 73 CM/S
RIGHT SUPER FEMORAL MID SYS PSV: 84 CM/S
RIGHT SUPER FEMORAL OSTIAL SYS PSV: 75 CM/S
RIGHT SUPER FEMORAL PROX SYS PSV: 64 CM/S
RIGHT TBI: 0.66
RIGHT TIB/PER TRUNK SYS PSV: 49 CM/S
RIGHT TOE PRESSURE: 103 MMHG

## 2025-08-21 PROCEDURE — 93925 LOWER EXTREMITY STUDY: CPT

## 2025-08-21 PROCEDURE — 93922 UPR/L XTREMITY ART 2 LEVELS: CPT | Mod: 26,,, | Performed by: INTERNAL MEDICINE

## 2025-08-21 PROCEDURE — 93925 LOWER EXTREMITY STUDY: CPT | Mod: 26,,, | Performed by: INTERNAL MEDICINE

## 2025-08-21 PROCEDURE — 93922 UPR/L XTREMITY ART 2 LEVELS: CPT

## 2025-08-25 ENCOUNTER — TELEPHONE (OUTPATIENT)
Dept: CARDIOLOGY | Facility: CLINIC | Age: 85
End: 2025-08-25
Payer: COMMERCIAL

## 2025-08-26 ENCOUNTER — TELEPHONE (OUTPATIENT)
Dept: CARDIOLOGY | Facility: CLINIC | Age: 85
End: 2025-08-26
Payer: COMMERCIAL